# Patient Record
Sex: MALE | Race: WHITE | Employment: OTHER | URBAN - NONMETROPOLITAN AREA
[De-identification: names, ages, dates, MRNs, and addresses within clinical notes are randomized per-mention and may not be internally consistent; named-entity substitution may affect disease eponyms.]

---

## 2017-01-03 ENCOUNTER — OFFICE VISIT (OUTPATIENT)
Dept: PRIMARY CARE CLINIC | Age: 82
End: 2017-01-03
Payer: MEDICARE

## 2017-01-03 VITALS
BODY MASS INDEX: 39.91 KG/M2 | TEMPERATURE: 97.6 F | WEIGHT: 278.8 LBS | HEART RATE: 74 BPM | SYSTOLIC BLOOD PRESSURE: 142 MMHG | DIASTOLIC BLOOD PRESSURE: 80 MMHG | HEIGHT: 70 IN | OXYGEN SATURATION: 92 %

## 2017-01-03 DIAGNOSIS — J44.9 CHRONIC OBSTRUCTIVE PULMONARY DISEASE, UNSPECIFIED COPD TYPE (HCC): ICD-10-CM

## 2017-01-03 DIAGNOSIS — E11.42 TYPE 2 DIABETES MELLITUS WITH DIABETIC POLYNEUROPATHY, WITHOUT LONG-TERM CURRENT USE OF INSULIN (HCC): Primary | ICD-10-CM

## 2017-01-03 DIAGNOSIS — I10 ESSENTIAL HYPERTENSION: ICD-10-CM

## 2017-01-03 DIAGNOSIS — G47.33 OSA (OBSTRUCTIVE SLEEP APNEA): ICD-10-CM

## 2017-01-03 DIAGNOSIS — L03.116 CELLULITIS OF LEFT LOWER EXTREMITY: ICD-10-CM

## 2017-01-03 PROCEDURE — 99214 OFFICE O/P EST MOD 30 MIN: CPT | Performed by: PEDIATRICS

## 2017-01-03 RX ORDER — CEPHALEXIN 500 MG/1
500 CAPSULE ORAL 3 TIMES DAILY
Qty: 30 CAPSULE | Refills: 0 | Status: SHIPPED | OUTPATIENT
Start: 2017-01-03 | End: 2017-01-31

## 2017-01-03 RX ORDER — LANCETS 30 GAUGE
EACH MISCELLANEOUS
Qty: 100 EACH | Refills: 3 | Status: SHIPPED | OUTPATIENT
Start: 2017-01-03 | End: 2017-05-25

## 2017-01-03 ASSESSMENT — ENCOUNTER SYMPTOMS
ABDOMINAL PAIN: 0
SHORTNESS OF BREATH: 0
BACK PAIN: 0
COUGH: 0
WHEEZING: 1
RHINORRHEA: 0
VOICE CHANGE: 0
SINUS PRESSURE: 0
NAUSEA: 0
DIARRHEA: 0
VOMITING: 0

## 2017-01-11 ENCOUNTER — TELEPHONE (OUTPATIENT)
Dept: UROLOGY | Facility: CLINIC | Age: 82
End: 2017-01-11

## 2017-01-11 ENCOUNTER — OFFICE VISIT (OUTPATIENT)
Dept: UROLOGY | Facility: CLINIC | Age: 82
End: 2017-01-11

## 2017-01-11 VITALS — WEIGHT: 272 LBS | HEIGHT: 70 IN | BODY MASS INDEX: 38.94 KG/M2

## 2017-01-11 DIAGNOSIS — R35.1 NOCTURIA: ICD-10-CM

## 2017-01-11 DIAGNOSIS — N32.81 OAB (OVERACTIVE BLADDER): ICD-10-CM

## 2017-01-11 DIAGNOSIS — R35.0 FREQUENCY OF URINATION: ICD-10-CM

## 2017-01-11 DIAGNOSIS — C61 CANCER OF PROSTATE (HCC): Primary | ICD-10-CM

## 2017-01-11 LAB
BILIRUB BLD-MCNC: NEGATIVE MG/DL
CLARITY, POC: CLEAR
COLOR UR: YELLOW
GLUCOSE UR STRIP-MCNC: NEGATIVE MG/DL
KETONES UR QL: NEGATIVE
LEUKOCYTE EST, POC: NEGATIVE
NITRITE UR-MCNC: NEGATIVE MG/ML
PH UR: 6.5 [PH] (ref 5–8)
PROT UR STRIP-MCNC: NEGATIVE MG/DL
RBC # UR STRIP: ABNORMAL /UL
SP GR UR: 1.01 (ref 1–1.03)
UROBILINOGEN UR QL: NORMAL

## 2017-01-11 PROCEDURE — 81003 URINALYSIS AUTO W/O SCOPE: CPT | Performed by: UROLOGY

## 2017-01-11 PROCEDURE — 99214 OFFICE O/P EST MOD 30 MIN: CPT | Performed by: UROLOGY

## 2017-01-11 NOTE — PROGRESS NOTES
Subjective    Mr. Castro is 81 y.o. male    CHIEF COMPLAINT: Prostate cancer the patient has recently been diagnosed with hormone refractory see of the prostate.  We are continuing to Lupron he did not quite a year ago now had a PET/CT and CT scan looking for metastatic disease WHICH were negative his last PSA was actually slightly down to 6.25 but most recently bouts 10.5 he still denies any symptoms metastatic disease such as bone pain back pain weight loss or anorexia and is still continuing to Lupron    He also has irritable bladder symptoms from the radiation therapy he got for see the prostate initially with urgency frequency nocturia we have tried multiple medications he is now on 50 mg of Myrbetriq with seems to have worked the best he also takes Gore in his had no gross hematuria or change in his symptoms      History of Present Illness      The following portions of the patient's history were reviewed and updated as appropriate: allergies, current medications, past family history, past medical history, past social history, past surgical history and problem list.    Review of Systems   Constitutional: Negative for chills and fever.   Gastrointestinal: Negative for abdominal pain, anal bleeding and blood in stool.   Genitourinary: Positive for frequency and urgency. Negative for difficulty urinating, dysuria, flank pain, hematuria, penile pain and penile swelling.         Current Outpatient Prescriptions:   •  albuterol (PROVENTIL HFA;VENTOLIN HFA) 108 (90 BASE) MCG/ACT inhaler, Inhale 2 puffs every 4 (four) hours as needed for wheezing., Disp: , Rfl:   •  buPROPion XL (WELLBUTRIN XL) 300 MG 24 hr tablet, Take 300 mg by mouth daily., Disp: , Rfl:   •  carvedilol (COREG) 12.5 MG tablet, Take 12.5 mg by mouth 2 (two) times a day with meals., Disp: , Rfl:   •  carvedilol (COREG) 6.25 MG tablet, Take 6.25 mg by mouth daily., Disp: , Rfl:   •  celecoxib (CeleBREX) 200 MG capsule, Take 200 mg by mouth daily.,  Disp: , Rfl:   •  digoxin (LANOXIN) 125 MCG tablet, Take 125 mcg by mouth every day., Disp: , Rfl:   •  digoxin (LANOXIN) 250 MCG tablet, Take 250 mcg by mouth every day., Disp: , Rfl:   •  DULoxetine (CYMBALTA) 60 MG capsule, Take 60 mg by mouth daily., Disp: , Rfl:   •  fluticasone (FLONASE) 50 MCG/ACT nasal spray, 2 sprays into each nostril daily. Administer 2 sprays in each nostril for each dose., Disp: , Rfl:   •  formoterol (FORADIL) 12 MCG capsule for inhaler, Place 12 mcg into inhaler and inhale 2 (two) times a day., Disp: , Rfl:   •  furosemide (LASIX) 10 MG/ML solution, Take 60 mg by mouth daily., Disp: , Rfl:   •  furosemide (LASIX) 40 MG tablet, Take 40 mg by mouth 2 (two) times a day., Disp: , Rfl:   •  glimepiride (AMARYL) 4 MG tablet, Take 4 mg by mouth every morning before breakfast., Disp: , Rfl:   •  leuprolide (LUPRON) 30 MG injection, Inject 30 mg into the shoulder, thigh, or buttocks Every 4 (Four) Months., Disp: , Rfl:   •  lisinopril (PRINIVIL,ZESTRIL) 40 MG tablet, Take 40 mg by mouth daily., Disp: , Rfl:   •  metolazone (ZAROXOLYN) 2.5 MG tablet, Take 2.5 mg by mouth daily., Disp: , Rfl:   •  Mirabegron ER (MYRBETRIQ) 50 MG tablet sustained-release 24 hour, Take 50 mg by mouth daily., Disp: , Rfl:   •  Multiple Vitamins-Minerals (MULTIVITAMIN ADULT PO), Take  by mouth daily., Disp: , Rfl:   •  potassium chloride (K-DUR,KLOR-CON) 10 MEQ CR tablet, Take 10 mEq by mouth 2 (two) times a day., Disp: , Rfl:   •  predniSONE (DELTASONE) 10 MG tablet, Take 10 mg by mouth daily., Disp: , Rfl:   •  rivaroxaban (XARELTO) 20 MG tablet, Take 20 mg by mouth daily., Disp: , Rfl:   •  sitaGLIPtin (JANUVIA) 100 MG tablet, Take 100 mg by mouth daily., Disp: , Rfl:   •  SITagliptin-MetFORMIN HCl (JANUMET PO), Take  by mouth., Disp: , Rfl:   •  terazosin (HYTRIN) 5 MG capsule, Take 5 mg by mouth every night., Disp: , Rfl:     Past Medical History   Diagnosis Date   • Cancer      prostate   • Cellulitis    • CHF  "(congestive heart failure)    • Diabetes mellitus    • Elevated prostate specific antigen (PSA)    • Hematuria    • Hypertension    • Overactive bladder    • SOB (shortness of breath)        Past Surgical History   Procedure Laterality Date   • Replacement total knee bilateral     • Hernia repair     • Eye surgery         Social History     Social History   • Marital status:      Spouse name: N/A   • Number of children: N/A   • Years of education: N/A     Social History Main Topics   • Smoking status: Never Smoker   • Smokeless tobacco: None   • Alcohol use No   • Drug use: None   • Sexual activity: Not Asked     Other Topics Concern   • None     Social History Narrative       Family History   Problem Relation Age of Onset   • Prostate cancer Son        Objective    Visit Vitals   • Ht 70\" (177.8 cm)   • Wt 272 lb (123 kg)   • BMI 39.03 kg/m2       Physical Exam      No results found for any previous visit.    Results for orders placed or performed in visit on 01/11/17   POC Urinalysis Dipstick, Automated   Result Value Ref Range    Color Yellow Yellow, Straw, Dark Yellow, Julee    Clarity, UA Clear Clear    Glucose, UA Negative Negative, 1000 mg/dL (3+) mg/dL    Bilirubin Negative Negative    Ketones, UA Negative Negative    Specific Gravity  1.010 1.005 - 1.030    Blood, UA Small (A) Negative    pH, Urine 6.5 5.0 - 8.0    Protein, POC Negative Negative mg/dL    Urobilinogen, UA Normal Normal    Leukocytes Negative Negative    Nitrite, UA Negative Negative       Assessment and Plan    Prakash was seen today for cancer of prostate.    Diagnoses and all orders for this visit:    Cancer of prostate  -     POC Urinalysis Dipstick, Automated  -     CT Abd With Contrast Pelvis With & Without Contrast; Future  -     NM Bone Scan Whole Body; Future    Frequency of urination    Nocturia    OAB (overactive bladder)      Plan--I once again discussed with Mr. Castro and his wife the hormone refractory disease and that is " progressing has been since February that we have done a workup and also is gone up from 5-6 range to 10 cm go ahead and repeat the bone scan and CT scan and we will do that in the next couple weeks and go from there I did negate to indicate to them that at this point in time unfortunately the Zytiga and Xtandi are not prove without evidence of metastatic disease.    I thing of answered all her questions at this time

## 2017-01-11 NOTE — TELEPHONE ENCOUNTER
----- Message from Ariana Leong sent at 1/11/2017  1:39 PM CST -----  PATIENT IS COMING IN ON 01/27/16 AND THEY NEED A BONE SCAN ORDERED AND A CT WITH AND WITHOUT CONTRAST OF ABDOMEN. [facility] CALLED SAID DR GARNICA WANTS THESE ORDERED.    THANKS        Orders already in chart. ML

## 2017-01-11 NOTE — MR AVS SNAPSHOT
Prakash Castro   1/11/2017 10:30 AM   Office Visit    Dept Phone:  [phone]   Encounter #:  [id number]    Provider:  Jesus Kilgore MD   Department:  [facility] UROLOGY                Your Full Care Plan              Your Updated Medication List          This list is accurate as of: 1/11/17  2:59 PM.  Always use your most recent med list.                albuterol 108 (90 BASE) MCG/ACT inhaler   Commonly known as:  PROVENTIL HFA;VENTOLIN HFA       buPROPion  MG 24 hr tablet   Commonly known as:  WELLBUTRIN XL       * carvedilol 6.25 MG tablet   Commonly known as:  COREG       * carvedilol 12.5 MG tablet   Commonly known as:  COREG       celecoxib 200 MG capsule   Commonly known as:  CeleBREX       * digoxin 250 MCG tablet   Commonly known as:  LANOXIN       * digoxin 125 MCG tablet   Commonly known as:  LANOXIN       DULoxetine 60 MG capsule   Commonly known as:  CYMBALTA       fluticasone 50 MCG/ACT nasal spray   Commonly known as:  FLONASE       formoterol 12 MCG capsule for inhaler   Commonly known as:  FORADIL       * furosemide 10 MG/ML solution   Commonly known as:  LASIX       * furosemide 40 MG tablet   Commonly known as:  LASIX       glimepiride 4 MG tablet   Commonly known as:  AMARYL       JANUMET PO       leuprolide 30 MG injection   Commonly known as:  LUPRON       lisinopril 40 MG tablet   Commonly known as:  PRINIVIL,ZESTRIL       metOLazone 2.5 MG tablet   Commonly known as:  ZAROXOLYN       MULTIVITAMIN ADULT PO       MYRBETRIQ 50 MG tablet sustained-release 24 hour   Generic drug:  Mirabegron ER       potassium chloride 10 MEQ CR tablet   Commonly known as:  K-DUR,KLOR-CON       predniSONE 10 MG tablet   Commonly known as:  DELTASONE       rivaroxaban 20 MG tablet   Commonly known as:  XARELTO       SITagliptin 100 MG tablet   Commonly known as:  JANUVIA       terazosin 5 MG capsule   Commonly known as:  HYTRIN       * Notice:  This list has  "6 medication(s) that are the same as other medications prescribed for you. Read the directions carefully, and ask your doctor or other care provider to review them with you.            We Performed the Following     POC Urinalysis Dipstick, Automated       You Were Diagnosed With        Codes Comments    Cancer of prostate    -  Primary ICD-10-CM: C61  ICD-9-CM: 185     Frequency of urination     ICD-10-CM: R35.0  ICD-9-CM: 788.41     Nocturia     ICD-10-CM: R35.1  ICD-9-CM: 788.43     OAB (overactive bladder)     ICD-10-CM: N32.81  ICD-9-CM: 596.51       Instructions     None    Patient Instructions History      Upcoming Appointments     Visit Type Date Time Department    FOLLOW UP 1/11/2017 10:30 AM [facility] [place]    FOLLOW UP 1/27/2017  9:00 AM [facility]      MyChart Signup     Our records indicate that you have declined [facility] signup. If you would like to sign up for [employer], please email [email or web address] or call [phone] to obtain an activation code.             Other Info from Your Visit           Your Appointments     Jan 27, 2017  9:00 AM CST   Follow Up with Jesus Kilgore MD   [facility] (--)    [address] [place] [id number] [phone]           Arrive 15 minutes prior to appointment.              Allergies     No Known Allergies      Reason for Visit     Cancer of prostate           Vital Signs     Height Weight Body Mass Index Smoking Status          70\" (177.8 cm) 272 lb (123 kg) 39.03 kg/m2 Never Smoker        Problems and Diagnoses Noted     Prostate cancer    Frequency of urination    Urination, excessive at night    OAB (overactive bladder)      Results     POC Urinalysis Dipstick, Automated      Component Value Standard Range & Units    Color Yellow Yellow, Straw, Dark Yellow, Julee    Clarity, UA Clear Clear    Glucose, UA Negative Negative, 1000 mg/dL (3+) mg/dL    Bilirubin Negative Negative    " Ketones, UA Negative Negative    Specific Gravity  1.010 1.005 - 1.030    Blood, UA Small Negative    pH, Urine 6.5 5.0 - 8.0    Protein, POC Negative Negative mg/dL    Urobilinogen, UA Normal Normal    Leukocytes Negative Negative    Nitrite, UA Negative Negative

## 2017-01-17 DIAGNOSIS — N40.0 BENIGN NODULAR PROSTATIC HYPERPLASIA WITHOUT LOWER URINARY TRACT SYMPTOMS: ICD-10-CM

## 2017-01-17 RX ORDER — TERAZOSIN 5 MG/1
CAPSULE ORAL
Qty: 90 CAPSULE | Refills: 3 | Status: SHIPPED | OUTPATIENT
Start: 2017-01-17 | End: 2017-05-09 | Stop reason: SDUPTHER

## 2017-01-20 RX ORDER — DIGOXIN 125 MCG
125 TABLET ORAL DAILY
Qty: 90 TABLET | Refills: 3 | Status: SHIPPED | OUTPATIENT
Start: 2017-01-20 | End: 2017-10-24 | Stop reason: SDUPTHER

## 2017-01-25 ENCOUNTER — HOSPITAL ENCOUNTER (OUTPATIENT)
Dept: NUCLEAR MEDICINE | Facility: HOSPITAL | Age: 82
Discharge: HOME | End: 2017-01-25
Attending: UROLOGY

## 2017-01-25 DIAGNOSIS — C61 CANCER OF PROSTATE (HCC): ICD-10-CM

## 2017-01-25 PROCEDURE — 0 TECHNETIUM OXIDRONATE KIT: Performed by: UROLOGY

## 2017-01-25 PROCEDURE — A9561 TC99M OXIDRONATE: HCPCS | Performed by: UROLOGY

## 2017-01-25 PROCEDURE — 78306 BONE IMAGING WHOLE BODY: CPT

## 2017-01-25 RX ADMIN — TECHNETIUM TC 99M OXIDRONATE 1 DOSE: 3.15 INJECTION, POWDER, LYOPHILIZED, FOR SOLUTION INTRAVENOUS at 10:45

## 2017-01-27 ENCOUNTER — OFFICE VISIT (OUTPATIENT)
Dept: UROLOGY | Facility: CLINIC | Age: 82
End: 2017-01-27

## 2017-01-27 VITALS
HEIGHT: 70 IN | DIASTOLIC BLOOD PRESSURE: 62 MMHG | SYSTOLIC BLOOD PRESSURE: 130 MMHG | BODY MASS INDEX: 40.09 KG/M2 | WEIGHT: 280 LBS | TEMPERATURE: 97.1 F

## 2017-01-27 DIAGNOSIS — C61 CANCER OF PROSTATE (HCC): Primary | ICD-10-CM

## 2017-01-27 DIAGNOSIS — R35.0 FREQUENCY OF URINATION: ICD-10-CM

## 2017-01-27 DIAGNOSIS — R35.1 NOCTURIA: ICD-10-CM

## 2017-01-27 DIAGNOSIS — N32.81 OAB (OVERACTIVE BLADDER): ICD-10-CM

## 2017-01-27 LAB
BILIRUB BLD-MCNC: NEGATIVE MG/DL
CLARITY, POC: CLEAR
COLOR UR: YELLOW
GLUCOSE UR STRIP-MCNC: NEGATIVE MG/DL
KETONES UR QL: NEGATIVE
LEUKOCYTE EST, POC: NEGATIVE
NITRITE UR-MCNC: NEGATIVE MG/ML
PH UR: 5.5 [PH] (ref 5–8)
PROT UR STRIP-MCNC: NEGATIVE MG/DL
RBC # UR STRIP: NEGATIVE /UL
SP GR UR: 1.02 (ref 1–1.03)
UROBILINOGEN UR QL: NORMAL

## 2017-01-27 PROCEDURE — 99214 OFFICE O/P EST MOD 30 MIN: CPT | Performed by: UROLOGY

## 2017-01-27 PROCEDURE — 81003 URINALYSIS AUTO W/O SCOPE: CPT | Performed by: UROLOGY

## 2017-01-27 NOTE — MR AVS SNAPSHOT
Prakash Castro   1/27/2017 9:00 AM   Office Visit    Dept Phone:  [phone]   Encounter #:  [id number]    Provider:  Jesus Kilgore MD   Department:  [facility] UROLOGY                Your Full Care Plan              Your Updated Medication List          This list is accurate as of: 1/27/17  1:56 PM.  Always use your most recent med list.                albuterol 108 (90 BASE) MCG/ACT inhaler   Commonly known as:  PROVENTIL HFA;VENTOLIN HFA       buPROPion  MG 24 hr tablet   Commonly known as:  WELLBUTRIN XL       * carvedilol 6.25 MG tablet   Commonly known as:  COREG       * carvedilol 12.5 MG tablet   Commonly known as:  COREG       celecoxib 200 MG capsule   Commonly known as:  CeleBREX       * digoxin 250 MCG tablet   Commonly known as:  LANOXIN       * digoxin 125 MCG tablet   Commonly known as:  LANOXIN       DULoxetine 60 MG capsule   Commonly known as:  CYMBALTA       fluticasone 50 MCG/ACT nasal spray   Commonly known as:  FLONASE       formoterol 12 MCG capsule for inhaler   Commonly known as:  FORADIL       * furosemide 10 MG/ML solution   Commonly known as:  LASIX       * furosemide 40 MG tablet   Commonly known as:  LASIX       glimepiride 4 MG tablet   Commonly known as:  AMARYL       JANUMET PO       leuprolide 30 MG injection   Commonly known as:  LUPRON       lisinopril 40 MG tablet   Commonly known as:  PRINIVIL,ZESTRIL       metOLazone 2.5 MG tablet   Commonly known as:  ZAROXOLYN       MULTIVITAMIN ADULT PO       MYRBETRIQ 50 MG tablet sustained-release 24 hour   Generic drug:  Mirabegron ER       potassium chloride 10 MEQ CR tablet   Commonly known as:  K-DUR,KLOR-CON       predniSONE 10 MG tablet   Commonly known as:  DELTASONE       rivaroxaban 20 MG tablet   Commonly known as:  XARELTO       SITagliptin 100 MG tablet   Commonly known as:  JANUVIA       terazosin 5 MG capsule   Commonly known as:  HYTRIN       * Notice:  This list has 6  "medication(s) that are the same as other medications prescribed for you. Read the directions carefully, and ask your doctor or other care provider to review them with you.            We Performed the Following     POC Urinalysis Dipstick, Automated       You Were Diagnosed With        Codes Comments    Cancer of prostate    -  Primary ICD-10-CM: C61  ICD-9-CM: 185     Frequency of urination     ICD-10-CM: R35.0  ICD-9-CM: 788.41     Nocturia     ICD-10-CM: R35.1  ICD-9-CM: 788.43     OAB (overactive bladder)     ICD-10-CM: N32.81  ICD-9-CM: 596.51       Instructions     None    Patient Instructions History      Upcoming Appointments     Visit Type Date Time Department    FOLLOW UP 1/27/2017  9:00 AM [facility] [place] [place] [facility] [place]    FOLLOW UP 2/14/2017 10:30 AM [facility] [place] [place] [facility]      MyChart Signup     Our records indicate that you have declined [facility] signup. If you would like to sign up for [employer], please email [email or web address] or call [phone] to obtain an activation code.             Other Info from Your Visit           Your Appointments     Feb 14, 2017 10:30 AM CST   Follow Up with Jesus Kilgore MD   [facility] (--)    [address] [place] [id number] [phone]           Arrive 15 minutes prior to appointment.              Allergies     No Known Allergies      Reason for Visit     Cancer of prostate           Vital Signs     Blood Pressure Temperature Height Weight Body Mass Index Smoking Status    130/62 97.1 °F (36.2 °C) 70\" (177.8 cm) 280 lb (127 kg) 40.18 kg/m2 Never Smoker      Problems and Diagnoses Noted     Prostate cancer    Frequency of urination    Urination, excessive at night    OAB (overactive bladder)      Results     POC Urinalysis Dipstick, Automated      Component Value Standard Range & Units    Color Yellow Yellow, Straw, Dark Yellow, Julee    Clarity, UA Clear Clear    Glucose, UA Negative Negative, 1000 mg/dL (3+) mg/dL    " Bilirubin Negative Negative    Ketones, UA Negative Negative    Specific Gravity  1.020 1.005 - 1.030    Blood, UA Negative Negative    pH, Urine 5.5 5.0 - 8.0    Protein, POC Negative Negative mg/dL    Urobilinogen, UA Normal Normal    Leukocytes Negative Negative    Nitrite, UA Negative Negative

## 2017-01-27 NOTE — PROGRESS NOTES
Subjective    Mr. Castro is 81 y.o. male    CHIEF COMPLAINT: Prostate cancer    The patient comes back today with his wife for discussion of workup for metastatic disease for rising PSA in the face of hormone therapy here    I he got a new care medicine bone scan that does show possible metastatic disease although not overt disease he is not had any symptoms of metastatic disease such as a change in bone pain back pain weight loss or anorexia in fact is gained weight    It does not appear that he got a CAT scan even know it looks like one was order    I he also has overactive bladder symptoms which are stable and his AUA score today is 27.  He is had no gross hematuria no flank pain    History of Present Illness      The following portions of the patient's history were reviewed and updated as appropriate: allergies, current medications, past family history, past medical history, past social history, past surgical history and problem list.    Review of Systems   Constitutional: Positive for fatigue. Negative for chills and fever.   Gastrointestinal: Negative for abdominal distention, abdominal pain, anal bleeding, blood in stool and nausea.   Genitourinary: Positive for difficulty urinating, frequency and urgency. Negative for dysuria, flank pain, hematuria, penile pain and penile swelling.   Psychiatric/Behavioral: Negative.  Negative for agitation and confusion.         Current Outpatient Prescriptions:   •  albuterol (PROVENTIL HFA;VENTOLIN HFA) 108 (90 BASE) MCG/ACT inhaler, Inhale 2 puffs every 4 (four) hours as needed for wheezing., Disp: , Rfl:   •  buPROPion XL (WELLBUTRIN XL) 300 MG 24 hr tablet, Take 300 mg by mouth daily., Disp: , Rfl:   •  carvedilol (COREG) 12.5 MG tablet, Take 12.5 mg by mouth 2 (two) times a day with meals., Disp: , Rfl:   •  carvedilol (COREG) 6.25 MG tablet, Take 6.25 mg by mouth daily., Disp: , Rfl:   •  celecoxib (CeleBREX) 200 MG capsule, Take 200 mg by mouth daily., Disp: , Rfl:    •  digoxin (LANOXIN) 125 MCG tablet, Take 125 mcg by mouth every day., Disp: , Rfl:   •  digoxin (LANOXIN) 250 MCG tablet, Take 250 mcg by mouth every day., Disp: , Rfl:   •  DULoxetine (CYMBALTA) 60 MG capsule, Take 60 mg by mouth daily., Disp: , Rfl:   •  fluticasone (FLONASE) 50 MCG/ACT nasal spray, 2 sprays into each nostril daily. Administer 2 sprays in each nostril for each dose., Disp: , Rfl:   •  formoterol (FORADIL) 12 MCG capsule for inhaler, Place 12 mcg into inhaler and inhale 2 (two) times a day., Disp: , Rfl:   •  furosemide (LASIX) 10 MG/ML solution, Take 60 mg by mouth daily., Disp: , Rfl:   •  furosemide (LASIX) 40 MG tablet, Take 40 mg by mouth 2 (two) times a day., Disp: , Rfl:   •  glimepiride (AMARYL) 4 MG tablet, Take 4 mg by mouth every morning before breakfast., Disp: , Rfl:   •  leuprolide (LUPRON) 30 MG injection, Inject 30 mg into the shoulder, thigh, or buttocks Every 4 (Four) Months., Disp: , Rfl:   •  lisinopril (PRINIVIL,ZESTRIL) 40 MG tablet, Take 40 mg by mouth daily., Disp: , Rfl:   •  metolazone (ZAROXOLYN) 2.5 MG tablet, Take 2.5 mg by mouth daily., Disp: , Rfl:   •  Mirabegron ER (MYRBETRIQ) 50 MG tablet sustained-release 24 hour, Take 50 mg by mouth daily., Disp: , Rfl:   •  Multiple Vitamins-Minerals (MULTIVITAMIN ADULT PO), Take  by mouth daily., Disp: , Rfl:   •  potassium chloride (K-DUR,KLOR-CON) 10 MEQ CR tablet, Take 10 mEq by mouth 2 (two) times a day., Disp: , Rfl:   •  predniSONE (DELTASONE) 10 MG tablet, Take 10 mg by mouth daily., Disp: , Rfl:   •  rivaroxaban (XARELTO) 20 MG tablet, Take 20 mg by mouth daily., Disp: , Rfl:   •  sitaGLIPtin (JANUVIA) 100 MG tablet, Take 100 mg by mouth daily., Disp: , Rfl:   •  SITagliptin-MetFORMIN HCl (JANUMET PO), Take  by mouth., Disp: , Rfl:   •  terazosin (HYTRIN) 5 MG capsule, Take 5 mg by mouth every night., Disp: , Rfl:     Past Medical History   Diagnosis Date   • Arthritis    • Cancer      prostate   • Cellulitis    •  "CHF (congestive heart failure)    • Diabetes mellitus    • Elevated prostate specific antigen (PSA)    • Hematuria    • Hypertension    • Overactive bladder    • SOB (shortness of breath)        Past Surgical History   Procedure Laterality Date   • Replacement total knee bilateral     • Hernia repair     • Eye surgery     • Joint replacement         Social History     Social History   • Marital status:      Spouse name: N/A   • Number of children: N/A   • Years of education: N/A     Social History Main Topics   • Smoking status: Never Smoker   • Smokeless tobacco: None   • Alcohol use No   • Drug use: None   • Sexual activity: Not Asked     Other Topics Concern   • None     Social History Narrative       Family History   Problem Relation Age of Onset   • Prostate cancer Son        Objective    Visit Vitals   • /62   • Temp 97.1 °F (36.2 °C)   • Ht 70\" (177.8 cm)   • Wt 280 lb (127 kg)   • BMI 40.18 kg/m2       Physical Exam      Office Visit on 01/11/2017   Component Date Value Ref Range Status   • Color 01/11/2017 Yellow  Yellow, Straw, Dark Yellow, Julee Final   • Clarity, UA 01/11/2017 Clear  Clear Final   • Glucose, UA 01/11/2017 Negative  Negative, 1000 mg/dL (3+) mg/dL Final   • Bilirubin 01/11/2017 Negative  Negative Final   • Ketones, UA 01/11/2017 Negative  Negative Final   • Specific Gravity  01/11/2017 1.010  1.005 - 1.030 Final   • Blood, UA 01/11/2017 Small* Negative Final   • pH, Urine 01/11/2017 6.5  5.0 - 8.0 Final   • Protein, POC 01/11/2017 Negative  Negative mg/dL Final   • Urobilinogen, UA 01/11/2017 Normal  Normal Final   • Leukocytes 01/11/2017 Negative  Negative Final   • Nitrite, UA 01/11/2017 Negative  Negative Final       Results for orders placed or performed in visit on 01/27/17   POC Urinalysis Dipstick, Automated   Result Value Ref Range    Color Yellow Yellow, Straw, Dark Yellow, Julee    Clarity, UA Clear Clear    Glucose, UA Negative Negative, 1000 mg/dL (3+) mg/dL    " Bilirubin Negative Negative    Ketones, UA Negative Negative    Specific Gravity  1.020 1.005 - 1.030    Blood, UA Negative Negative    pH, Urine 5.5 5.0 - 8.0    Protein, POC Negative Negative mg/dL    Urobilinogen, UA Normal Normal    Leukocytes Negative Negative    Nitrite, UA Negative Negative       Assessment and Plan    Prakash was seen today for cancer of prostate.    Diagnoses and all orders for this visit:    Cancer of prostate  -     POC Urinalysis Dipstick, Automated    Frequency of urination    Nocturia    OAB (overactive bladder)    Plan--I discussed the findings with the patient his wife regarding a suspicious bone scan obviously with his PSA going up in the face of hormonal therapy I think he does have hormone refractory prostate cancer.    I recommended that we evaluate this with sodium chloride PET scan which going try to order if I can figure out how to do this Epic machine    EMR Dragon/Transcription disclaimer:  Much of this encounter note is an electronic transcription/translation of spoken language to printed text. The electronic translation of spoken language may permit erroneous, or at times, nonsensical words or phrases to be inadvertently transcribed; although I have reviewed the note for such errors, some may still exist.

## 2017-01-31 ENCOUNTER — DOCUMENTATION (OUTPATIENT)
Dept: UROLOGY | Facility: CLINIC | Age: 82
End: 2017-01-31

## 2017-01-31 ENCOUNTER — OFFICE VISIT (OUTPATIENT)
Dept: PRIMARY CARE CLINIC | Age: 82
End: 2017-01-31
Payer: MEDICARE

## 2017-01-31 VITALS
OXYGEN SATURATION: 94 % | DIASTOLIC BLOOD PRESSURE: 82 MMHG | HEART RATE: 70 BPM | SYSTOLIC BLOOD PRESSURE: 132 MMHG | WEIGHT: 276.4 LBS | TEMPERATURE: 97.6 F | BODY MASS INDEX: 39.57 KG/M2 | HEIGHT: 70 IN

## 2017-01-31 DIAGNOSIS — I48.0 PAROXYSMAL ATRIAL FIBRILLATION (HCC): ICD-10-CM

## 2017-01-31 DIAGNOSIS — E78.2 MIXED HYPERLIPIDEMIA: ICD-10-CM

## 2017-01-31 DIAGNOSIS — I10 ESSENTIAL HYPERTENSION: ICD-10-CM

## 2017-01-31 DIAGNOSIS — H10.31 ACUTE BACTERIAL CONJUNCTIVITIS OF RIGHT EYE: ICD-10-CM

## 2017-01-31 DIAGNOSIS — R60.9 PERIPHERAL EDEMA: ICD-10-CM

## 2017-01-31 DIAGNOSIS — E11.42 TYPE 2 DIABETES MELLITUS WITH DIABETIC POLYNEUROPATHY, WITHOUT LONG-TERM CURRENT USE OF INSULIN (HCC): ICD-10-CM

## 2017-01-31 DIAGNOSIS — I50.42 CHRONIC COMBINED SYSTOLIC AND DIASTOLIC CONGESTIVE HEART FAILURE (HCC): Primary | ICD-10-CM

## 2017-01-31 PROCEDURE — G8427 DOCREV CUR MEDS BY ELIG CLIN: HCPCS | Performed by: PEDIATRICS

## 2017-01-31 PROCEDURE — 1036F TOBACCO NON-USER: CPT | Performed by: PEDIATRICS

## 2017-01-31 PROCEDURE — 1123F ACP DISCUSS/DSCN MKR DOCD: CPT | Performed by: PEDIATRICS

## 2017-01-31 PROCEDURE — 4040F PNEUMOC VAC/ADMIN/RCVD: CPT | Performed by: PEDIATRICS

## 2017-01-31 PROCEDURE — G8484 FLU IMMUNIZE NO ADMIN: HCPCS | Performed by: PEDIATRICS

## 2017-01-31 PROCEDURE — 99214 OFFICE O/P EST MOD 30 MIN: CPT | Performed by: PEDIATRICS

## 2017-01-31 PROCEDURE — G8419 CALC BMI OUT NRM PARAM NOF/U: HCPCS | Performed by: PEDIATRICS

## 2017-01-31 RX ORDER — METOLAZONE 2.5 MG/1
2.5 TABLET ORAL DAILY PRN
Qty: 30 TABLET | Refills: 3 | Status: SHIPPED | OUTPATIENT
Start: 2017-01-31 | End: 2017-07-21 | Stop reason: SDUPTHER

## 2017-01-31 ASSESSMENT — ENCOUNTER SYMPTOMS
ABDOMINAL PAIN: 0
COUGH: 0
EYE PAIN: 1
SHORTNESS OF BREATH: 0
BACK PAIN: 0
RHINORRHEA: 0
VOICE CHANGE: 0
SINUS PRESSURE: 0
DIARRHEA: 0
VOMITING: 0
NAUSEA: 0

## 2017-01-31 NOTE — PROGRESS NOTES
We are requesting the sodium chloride PET scan because the patient has questionable metastatic disease and hormone refractory prostate cancer by PSA.    If he has metastatic disease we will start him on either Zytiga or Xtandi

## 2017-02-01 ENCOUNTER — APPOINTMENT (OUTPATIENT)
Dept: CT IMAGING | Facility: HOSPITAL | Age: 82
End: 2017-02-01
Attending: UROLOGY

## 2017-02-08 ENCOUNTER — HOSPITAL ENCOUNTER (OUTPATIENT)
Dept: CT IMAGING | Facility: HOSPITAL | Age: 82
End: 2017-02-08
Attending: UROLOGY

## 2017-02-14 ENCOUNTER — TELEPHONE (OUTPATIENT)
Dept: UROLOGY | Facility: CLINIC | Age: 82
End: 2017-02-14

## 2017-02-14 ENCOUNTER — OFFICE VISIT (OUTPATIENT)
Dept: UROLOGY | Facility: CLINIC | Age: 82
End: 2017-02-14

## 2017-02-14 VITALS — HEIGHT: 70 IN | TEMPERATURE: 97.3 F | WEIGHT: 280 LBS | BODY MASS INDEX: 40.09 KG/M2

## 2017-02-14 DIAGNOSIS — R35.0 FREQUENCY OF URINATION: Primary | ICD-10-CM

## 2017-02-14 DIAGNOSIS — C61 CANCER OF PROSTATE (HCC): ICD-10-CM

## 2017-02-14 DIAGNOSIS — N32.81 OAB (OVERACTIVE BLADDER): ICD-10-CM

## 2017-02-14 PROCEDURE — 99214 OFFICE O/P EST MOD 30 MIN: CPT | Performed by: UROLOGY

## 2017-02-14 NOTE — TELEPHONE ENCOUNTER
----- Message from Jesus Kilgore MD sent at 2/14/2017 10:34 AM CST -----  This is patient and each presurgical for Xtandi

## 2017-02-14 NOTE — PROGRESS NOTES
Subjective    Mr. Castro is 81 y.o. male    CHIEF COMPLAINT prostate cancer    The patient has hormone refractory prostate cancer he recently had a CT scan which was benign but his bone scan showed what appeared to be findings consistent with early metastatic disease and cannot be excluded.    We try to get him approved from 4 sodium chloride PET scan but his insurance out right denies it even though I did appeared.  With his physician on the panel graft I was told that they do not cover this whatsoever regardless of the etiology was considered investigational.    Therefore I think that if that is the case we have sufficient documentation to consider him a candidate for Xtandi and I discussed this with he and his wife I discussed the complications including seizures she has no evidence of any neurologic disease.    He does have a history of diabetes and congestive heart failure and cardiac disease so we do not probably want you Zytiga at that point  History of Present Illness      The following portions of the patient's history were reviewed and updated as appropriate: allergies, current medications, past family history, past medical history, past social history, past surgical history and problem list.    Review of Systems   Constitutional: Negative.  Negative for chills and fever.   Gastrointestinal: Negative for abdominal distention, abdominal pain, anal bleeding, blood in stool and nausea.   Genitourinary: Positive for difficulty urinating, frequency and urgency. Negative for dysuria, flank pain and hematuria.   Psychiatric/Behavioral: Negative.  Negative for agitation and confusion.         Current Outpatient Prescriptions:   •  albuterol (PROVENTIL HFA;VENTOLIN HFA) 108 (90 BASE) MCG/ACT inhaler, Inhale 2 puffs every 4 (four) hours as needed for wheezing., Disp: , Rfl:   •  buPROPion XL (WELLBUTRIN XL) 300 MG 24 hr tablet, Take 300 mg by mouth daily., Disp: , Rfl:   •  carvedilol (COREG) 12.5 MG tablet, Take  12.5 mg by mouth 2 (two) times a day with meals., Disp: , Rfl:   •  carvedilol (COREG) 6.25 MG tablet, Take 6.25 mg by mouth daily., Disp: , Rfl:   •  celecoxib (CeleBREX) 200 MG capsule, Take 200 mg by mouth daily., Disp: , Rfl:   •  digoxin (LANOXIN) 125 MCG tablet, Take 125 mcg by mouth every day., Disp: , Rfl:   •  digoxin (LANOXIN) 250 MCG tablet, Take 250 mcg by mouth every day., Disp: , Rfl:   •  DULoxetine (CYMBALTA) 60 MG capsule, Take 60 mg by mouth daily., Disp: , Rfl:   •  fluticasone (FLONASE) 50 MCG/ACT nasal spray, 2 sprays into each nostril daily. Administer 2 sprays in each nostril for each dose., Disp: , Rfl:   •  formoterol (FORADIL) 12 MCG capsule for inhaler, Place 12 mcg into inhaler and inhale 2 (two) times a day., Disp: , Rfl:   •  furosemide (LASIX) 10 MG/ML solution, Take 60 mg by mouth daily., Disp: , Rfl:   •  furosemide (LASIX) 40 MG tablet, Take 40 mg by mouth 2 (two) times a day., Disp: , Rfl:   •  glimepiride (AMARYL) 4 MG tablet, Take 4 mg by mouth every morning before breakfast., Disp: , Rfl:   •  leuprolide (LUPRON) 30 MG injection, Inject 30 mg into the shoulder, thigh, or buttocks Every 4 (Four) Months., Disp: , Rfl:   •  lisinopril (PRINIVIL,ZESTRIL) 40 MG tablet, Take 40 mg by mouth daily., Disp: , Rfl:   •  metolazone (ZAROXOLYN) 2.5 MG tablet, Take 2.5 mg by mouth daily., Disp: , Rfl:   •  Mirabegron ER (MYRBETRIQ) 50 MG tablet sustained-release 24 hour, Take 50 mg by mouth daily., Disp: , Rfl:   •  Multiple Vitamins-Minerals (MULTIVITAMIN ADULT PO), Take  by mouth daily., Disp: , Rfl:   •  potassium chloride (K-DUR,KLOR-CON) 10 MEQ CR tablet, Take 10 mEq by mouth 2 (two) times a day., Disp: , Rfl:   •  predniSONE (DELTASONE) 10 MG tablet, Take 10 mg by mouth daily., Disp: , Rfl:   •  rivaroxaban (XARELTO) 20 MG tablet, Take 20 mg by mouth daily., Disp: , Rfl:   •  sitaGLIPtin (JANUVIA) 100 MG tablet, Take 100 mg by mouth daily., Disp: , Rfl:   •  SITagliptin-MetFORMIN HCl  "(JANUMET PO), Take  by mouth., Disp: , Rfl:   •  terazosin (HYTRIN) 5 MG capsule, Take 5 mg by mouth every night., Disp: , Rfl:     Past Medical History   Diagnosis Date   • Arthritis    • Cancer      prostate   • Cellulitis    • CHF (congestive heart failure)    • Diabetes mellitus    • Elevated prostate specific antigen (PSA)    • Hematuria    • Hypertension    • Overactive bladder    • SOB (shortness of breath)        Past Surgical History   Procedure Laterality Date   • Replacement total knee bilateral     • Hernia repair     • Eye surgery     • Joint replacement         Social History     Social History   • Marital status:      Spouse name: N/A   • Number of children: N/A   • Years of education: N/A     Social History Main Topics   • Smoking status: Never Smoker   • Smokeless tobacco: None   • Alcohol use No   • Drug use: None   • Sexual activity: Not Asked     Other Topics Concern   • None     Social History Narrative       Family History   Problem Relation Age of Onset   • Prostate cancer Son        Objective    Visit Vitals   • Temp 97.3 °F (36.3 °C)   • Ht 70\" (177.8 cm)   • Wt 280 lb (127 kg)   • BMI 40.18 kg/m2       Physical Exam      Office Visit on 01/27/2017   Component Date Value Ref Range Status   • Color 01/27/2017 Yellow  Yellow, Straw, Dark Yellow, Julee Final   • Clarity, UA 01/27/2017 Clear  Clear Final   • Glucose, UA 01/27/2017 Negative  Negative, 1000 mg/dL (3+) mg/dL Final   • Bilirubin 01/27/2017 Negative  Negative Final   • Ketones, UA 01/27/2017 Negative  Negative Final   • Specific Gravity  01/27/2017 1.020  1.005 - 1.030 Final   • Blood, UA 01/27/2017 Negative  Negative Final   • pH, Urine 01/27/2017 5.5  5.0 - 8.0 Final   • Protein, POC 01/27/2017 Negative  Negative mg/dL Final   • Urobilinogen, UA 01/27/2017 Normal  Normal Final   • Leukocytes 01/27/2017 Negative  Negative Final   • Nitrite, UA 01/27/2017 Negative  Negative Final       Results for orders placed or performed in " visit on 01/27/17   POC Urinalysis Dipstick, Automated   Result Value Ref Range    Color Yellow Yellow, Straw, Dark Yellow, Julee    Clarity, UA Clear Clear    Glucose, UA Negative Negative, 1000 mg/dL (3+) mg/dL    Bilirubin Negative Negative    Ketones, UA Negative Negative    Specific Gravity  1.020 1.005 - 1.030    Blood, UA Negative Negative    pH, Urine 5.5 5.0 - 8.0    Protein, POC Negative Negative mg/dL    Urobilinogen, UA Normal Normal    Leukocytes Negative Negative    Nitrite, UA Negative Negative       Assessment and Plan    Diagnoses and all orders for this visit:    Frequency of urination    Cancer of prostate    OAB (overactive bladder)   plan--were retrieved we cannot give him preapproved for a Xtandi and then we will call with him with those results I thing of answered all her questions that time they have no further    EMR Dragon/Transcription disclaimer:  Much of this encounter note is an electronic transcription/translation of spoken language to printed text. The electronic translation of spoken language may permit erroneous, or at times, nonsensical words or phrases to be inadvertently transcribed; although I have reviewed the note for such errors, some may still exist.

## 2017-02-15 ENCOUNTER — TELEPHONE (OUTPATIENT)
Dept: UROLOGY | Facility: CLINIC | Age: 82
End: 2017-02-15

## 2017-02-15 NOTE — TELEPHONE ENCOUNTER
----- Message from Ariana Leong sent at 2/15/2017  9:01 AM CST -----  Contact: [phone]  Vanessa from [facility] called wants you to call back about Prakash Castro want to know if patient needs copay help.    Thanks

## 2017-02-20 NOTE — TELEPHONE ENCOUNTER
Request for Medicare Drug Coverage Determination Form faxed to [facility] on 2-20-17 at [phone]. Confirmation of receipt rcvd and scanned.

## 2017-02-22 ENCOUNTER — OFFICE VISIT (OUTPATIENT)
Dept: UROLOGY | Facility: CLINIC | Age: 82
End: 2017-02-22

## 2017-02-22 DIAGNOSIS — C61 CANCER OF PROSTATE (HCC): ICD-10-CM

## 2017-02-22 PROCEDURE — 96402 CHEMO HORMON ANTINEOPL SQ/IM: CPT | Performed by: UROLOGY

## 2017-02-22 NOTE — PROGRESS NOTES
Pt of Dr. Kilgore is here today for a 4 month lupron injection. i administered the injection in the left hip without difficulty. Dr. Kilgore was in office. Pt will return in 4 months for next injection.

## 2017-02-23 ENCOUNTER — TELEPHONE (OUTPATIENT)
Dept: PRIMARY CARE CLINIC | Age: 82
End: 2017-02-23

## 2017-02-23 ENCOUNTER — TELEPHONE (OUTPATIENT)
Dept: UROLOGY | Facility: CLINIC | Age: 82
End: 2017-02-23

## 2017-02-23 ENCOUNTER — OFFICE VISIT (OUTPATIENT)
Dept: PRIMARY CARE CLINIC | Age: 82
End: 2017-02-23
Payer: MEDICARE

## 2017-02-23 VITALS
TEMPERATURE: 97.7 F | OXYGEN SATURATION: 94 % | SYSTOLIC BLOOD PRESSURE: 138 MMHG | BODY MASS INDEX: 39.67 KG/M2 | HEART RATE: 70 BPM | HEIGHT: 70 IN | WEIGHT: 277.12 LBS | DIASTOLIC BLOOD PRESSURE: 70 MMHG

## 2017-02-23 DIAGNOSIS — J98.4 MIXED RESTRICTIVE AND OBSTRUCTIVE LUNG DISEASE (HCC): ICD-10-CM

## 2017-02-23 DIAGNOSIS — E11.42 TYPE 2 DIABETES MELLITUS WITH DIABETIC POLYNEUROPATHY, WITHOUT LONG-TERM CURRENT USE OF INSULIN (HCC): ICD-10-CM

## 2017-02-23 DIAGNOSIS — G47.33 OSA (OBSTRUCTIVE SLEEP APNEA): ICD-10-CM

## 2017-02-23 DIAGNOSIS — J43.9 MIXED RESTRICTIVE AND OBSTRUCTIVE LUNG DISEASE (HCC): ICD-10-CM

## 2017-02-23 DIAGNOSIS — G47.00 MIDDLE INSOMNIA: Primary | ICD-10-CM

## 2017-02-23 DIAGNOSIS — D50.9 MICROCYTIC ANEMIA: Primary | ICD-10-CM

## 2017-02-23 DIAGNOSIS — E78.2 MIXED HYPERLIPIDEMIA: ICD-10-CM

## 2017-02-23 DIAGNOSIS — I50.42 CHRONIC COMBINED SYSTOLIC AND DIASTOLIC CONGESTIVE HEART FAILURE (HCC): ICD-10-CM

## 2017-02-23 DIAGNOSIS — I10 ESSENTIAL HYPERTENSION: ICD-10-CM

## 2017-02-23 DIAGNOSIS — J44.9 CHRONIC OBSTRUCTIVE PULMONARY DISEASE, UNSPECIFIED COPD TYPE (HCC): ICD-10-CM

## 2017-02-23 LAB
ALBUMIN SERPL-MCNC: 4.2 G/DL (ref 3.5–5.2)
ALP BLD-CCNC: 37 U/L (ref 40–130)
ALT SERPL-CCNC: 21 U/L (ref 5–41)
ANION GAP SERPL CALCULATED.3IONS-SCNC: 17 MMOL/L (ref 7–19)
AST SERPL-CCNC: 19 U/L (ref 5–40)
BASOPHILS ABSOLUTE: 0 K/UL (ref 0–0.2)
BASOPHILS RELATIVE PERCENT: 0.5 % (ref 0–1)
BILIRUB SERPL-MCNC: 0.5 MG/DL (ref 0.2–1.2)
BUN BLDV-MCNC: 23 MG/DL (ref 8–23)
CALCIUM SERPL-MCNC: 10 MG/DL (ref 8.8–10.2)
CHLORIDE BLD-SCNC: 94 MMOL/L (ref 98–111)
CHOLESTEROL, TOTAL: 144 MG/DL (ref 160–199)
CO2: 29 MMOL/L (ref 22–29)
CREAT SERPL-MCNC: 0.9 MG/DL (ref 0.5–1.2)
CREATININE URINE: 127.5 MG/DL (ref 4.2–622)
EOSINOPHILS ABSOLUTE: 0.2 K/UL (ref 0–0.6)
EOSINOPHILS RELATIVE PERCENT: 1.7 % (ref 0–5)
GFR NON-AFRICAN AMERICAN: >60
GLOBULIN: 2.6 G/DL
GLUCOSE BLD-MCNC: 179 MG/DL (ref 74–109)
HBA1C MFR BLD: 6.5 %
HCT VFR BLD CALC: 42.2 % (ref 42–52)
HDLC SERPL-MCNC: 50 MG/DL (ref 55–121)
HEMOGLOBIN: 14.6 G/DL (ref 14–18)
LDL CHOLESTEROL CALCULATED: 58 MG/DL
LYMPHOCYTES ABSOLUTE: 1.3 K/UL (ref 1.1–4.5)
LYMPHOCYTES RELATIVE PERCENT: 15.4 % (ref 20–40)
MCH RBC QN AUTO: 33.3 PG (ref 27–31)
MCHC RBC AUTO-ENTMCNC: 34.6 G/DL (ref 33–37)
MCV RBC AUTO: 96.3 FL (ref 80–94)
MICROALBUMIN UR-MCNC: 4.1 MG/DL (ref 0–19)
MICROALBUMIN/CREAT UR-RTO: 32.2 MG/G
MONOCYTES ABSOLUTE: 0.7 K/UL (ref 0–0.9)
MONOCYTES RELATIVE PERCENT: 8.1 % (ref 0–10)
NEUTROPHILS ABSOLUTE: 6.5 K/UL (ref 1.5–7.5)
NEUTROPHILS RELATIVE PERCENT: 74.3 % (ref 50–65)
PDW BLD-RTO: 12.3 % (ref 11.5–14.5)
PLATELET # BLD: 182 K/UL (ref 130–400)
PMV BLD AUTO: 10.7 FL (ref 7.4–10.4)
POTASSIUM SERPL-SCNC: 4.1 MMOL/L (ref 3.5–5)
RBC # BLD: 4.38 M/UL (ref 4.7–6.1)
SODIUM BLD-SCNC: 140 MMOL/L (ref 136–145)
TOTAL PROTEIN: 6.8 G/DL (ref 6.6–8.7)
TRIGL SERPL-MCNC: 178 MG/DL (ref 150–199)
WBC # BLD: 8.7 K/UL (ref 4.8–10.8)

## 2017-02-23 PROCEDURE — G8417 CALC BMI ABV UP PARAM F/U: HCPCS | Performed by: PEDIATRICS

## 2017-02-23 PROCEDURE — G8926 SPIRO NO PERF OR DOC: HCPCS | Performed by: PEDIATRICS

## 2017-02-23 PROCEDURE — 3023F SPIROM DOC REV: CPT | Performed by: PEDIATRICS

## 2017-02-23 PROCEDURE — G8484 FLU IMMUNIZE NO ADMIN: HCPCS | Performed by: PEDIATRICS

## 2017-02-23 PROCEDURE — 1036F TOBACCO NON-USER: CPT | Performed by: PEDIATRICS

## 2017-02-23 PROCEDURE — 4040F PNEUMOC VAC/ADMIN/RCVD: CPT | Performed by: PEDIATRICS

## 2017-02-23 PROCEDURE — 99214 OFFICE O/P EST MOD 30 MIN: CPT | Performed by: PEDIATRICS

## 2017-02-23 PROCEDURE — G8427 DOCREV CUR MEDS BY ELIG CLIN: HCPCS | Performed by: PEDIATRICS

## 2017-02-23 PROCEDURE — 1123F ACP DISCUSS/DSCN MKR DOCD: CPT | Performed by: PEDIATRICS

## 2017-02-23 RX ORDER — PRAZOSIN HYDROCHLORIDE 2 MG/1
2 CAPSULE ORAL NIGHTLY
Qty: 30 CAPSULE | Refills: 5 | Status: SHIPPED | OUTPATIENT
Start: 2017-02-23 | End: 2017-08-04 | Stop reason: SDUPTHER

## 2017-02-23 RX ORDER — CELECOXIB 200 MG/1
200 CAPSULE ORAL DAILY PRN
COMMUNITY
End: 2018-06-13 | Stop reason: SDUPTHER

## 2017-02-23 ASSESSMENT — ENCOUNTER SYMPTOMS
NAUSEA: 0
ABDOMINAL PAIN: 0
RHINORRHEA: 0
BACK PAIN: 0
SHORTNESS OF BREATH: 0
COUGH: 0
DIARRHEA: 0
SINUS PRESSURE: 0
VOMITING: 0
VOICE CHANGE: 0

## 2017-02-23 NOTE — TELEPHONE ENCOUNTER
----- Message from Jaycee Barrios sent at 2/23/2017  2:31 PM CST -----  Contact: [facility]  They called about script for Xtandi. The need script faxed and also dx code, med list, and list of any allergies. Fax # [phone]. If you have questions call [phone] and ask for a pharmacist.

## 2017-02-23 NOTE — TELEPHONE ENCOUNTER
I faxed over the last office note which does has his medication list and any list of allergies, I also printed and faxed over his xtandi RX. ML

## 2017-02-24 ENCOUNTER — TELEPHONE (OUTPATIENT)
Dept: UROLOGY | Facility: CLINIC | Age: 82
End: 2017-02-24

## 2017-02-24 NOTE — TELEPHONE ENCOUNTER
----- Message from Shaina Zhu sent at 2/24/2017  1:01 PM CST -----  Contact: PATIENTS WIFE  She said that [facility] pharmacy in [place] wanted to us to call them about a prescription. She didn't know what the name of the script was but she said it was for high PSA. If you need to call the patient he can be reached at [phone].

## 2017-02-27 ENCOUNTER — TELEPHONE (OUTPATIENT)
Dept: UROLOGY | Facility: CLINIC | Age: 82
End: 2017-02-27

## 2017-02-27 DIAGNOSIS — D50.9 MICROCYTIC ANEMIA: ICD-10-CM

## 2017-02-27 LAB
FOLATE: 18 NG/ML (ref 4.5–32.2)
VITAMIN B-12: 395 PG/ML (ref 211–946)

## 2017-02-27 RX ORDER — HYDROCHLOROTHIAZIDE 12.5 MG/1
12.5 CAPSULE, GELATIN COATED ORAL EVERY MORNING
Qty: 90 CAPSULE | Refills: 3 | Status: SHIPPED | OUTPATIENT
Start: 2017-02-27 | End: 2018-04-06 | Stop reason: SDUPTHER

## 2017-02-28 ENCOUNTER — TELEPHONE (OUTPATIENT)
Dept: PRIMARY CARE CLINIC | Age: 82
End: 2017-02-28

## 2017-02-28 NOTE — TELEPHONE ENCOUNTER
Called and spoke with patient and it was about this new medication that Dr. Kilgore was prescribing for him to be on for his prostate cancer and its called xtandi which did get covered by his insurance and his co pay is only $3.70. If the patient has any other questions he will call us. ML

## 2017-02-28 NOTE — TELEPHONE ENCOUNTER
I called back and spoke with Renee HERNANDEZ at [facility]. She stated that prior Auth was received she will contact the patient regarding co pay and shipment details.

## 2017-03-02 ENCOUNTER — TELEPHONE (OUTPATIENT)
Dept: UROLOGY | Facility: CLINIC | Age: 82
End: 2017-03-02

## 2017-03-02 NOTE — TELEPHONE ENCOUNTER
----- Message from Shaina Zhu sent at 2/24/2017  1:01 PM CST -----  Contact: PATIENTS WIFE  She said that [facility] pharmacy in [place] wanted to us to call them about a prescription. She didn't know what the name of the script was but she said it was for high PSA. If you need to call the patient he can be reached at [phone].

## 2017-03-27 RX ORDER — BUPROPION HYDROCHLORIDE 300 MG/1
TABLET ORAL
Qty: 90 TABLET | Refills: 3 | Status: SHIPPED | OUTPATIENT
Start: 2017-03-27 | End: 2017-05-09 | Stop reason: SDUPTHER

## 2017-04-27 RX ORDER — CARVEDILOL 12.5 MG/1
12.5 TABLET ORAL 2 TIMES DAILY
Qty: 180 TABLET | Refills: 5 | Status: SHIPPED | OUTPATIENT
Start: 2017-04-27 | End: 2018-07-16 | Stop reason: SDUPTHER

## 2017-05-03 DIAGNOSIS — J44.9 CHRONIC OBSTRUCTIVE PULMONARY DISEASE, UNSPECIFIED COPD TYPE (HCC): ICD-10-CM

## 2017-05-04 RX ORDER — ALBUTEROL SULFATE 90 UG/1
2 AEROSOL, METERED RESPIRATORY (INHALATION) EVERY 6 HOURS PRN
Qty: 8.5 INHALER | Refills: 5 | Status: SHIPPED | OUTPATIENT
Start: 2017-05-04 | End: 2019-12-04 | Stop reason: SDUPTHER

## 2017-05-09 ENCOUNTER — OFFICE VISIT (OUTPATIENT)
Dept: PRIMARY CARE CLINIC | Age: 82
End: 2017-05-09
Payer: MEDICARE

## 2017-05-09 VITALS
BODY MASS INDEX: 40.18 KG/M2 | HEIGHT: 69 IN | SYSTOLIC BLOOD PRESSURE: 102 MMHG | TEMPERATURE: 97.8 F | WEIGHT: 271.25 LBS | DIASTOLIC BLOOD PRESSURE: 50 MMHG | HEART RATE: 67 BPM | OXYGEN SATURATION: 93 %

## 2017-05-09 DIAGNOSIS — Z79.01 CHRONIC ANTICOAGULATION: ICD-10-CM

## 2017-05-09 DIAGNOSIS — J44.1 COPD EXACERBATION (HCC): Primary | ICD-10-CM

## 2017-05-09 DIAGNOSIS — I48.20 CHRONIC ATRIAL FIBRILLATION (HCC): ICD-10-CM

## 2017-05-09 DIAGNOSIS — I10 ESSENTIAL HYPERTENSION: ICD-10-CM

## 2017-05-09 DIAGNOSIS — J18.9 PNEUMONIA OF BOTH LOWER LOBES DUE TO INFECTIOUS ORGANISM: ICD-10-CM

## 2017-05-09 DIAGNOSIS — J44.9 CHRONIC OBSTRUCTIVE PULMONARY DISEASE, UNSPECIFIED COPD TYPE (HCC): ICD-10-CM

## 2017-05-09 DIAGNOSIS — N40.0 BENIGN NODULAR PROSTATIC HYPERPLASIA WITHOUT LOWER URINARY TRACT SYMPTOMS: ICD-10-CM

## 2017-05-09 DIAGNOSIS — E11.42 TYPE 2 DIABETES MELLITUS WITH DIABETIC POLYNEUROPATHY, WITHOUT LONG-TERM CURRENT USE OF INSULIN (HCC): ICD-10-CM

## 2017-05-09 DIAGNOSIS — I50.42 CHRONIC COMBINED SYSTOLIC AND DIASTOLIC CONGESTIVE HEART FAILURE (HCC): ICD-10-CM

## 2017-05-09 PROCEDURE — 4040F PNEUMOC VAC/ADMIN/RCVD: CPT | Performed by: PEDIATRICS

## 2017-05-09 PROCEDURE — 1123F ACP DISCUSS/DSCN MKR DOCD: CPT | Performed by: PEDIATRICS

## 2017-05-09 PROCEDURE — 3023F SPIROM DOC REV: CPT | Performed by: PEDIATRICS

## 2017-05-09 PROCEDURE — G8427 DOCREV CUR MEDS BY ELIG CLIN: HCPCS | Performed by: PEDIATRICS

## 2017-05-09 PROCEDURE — G8926 SPIRO NO PERF OR DOC: HCPCS | Performed by: PEDIATRICS

## 2017-05-09 PROCEDURE — 99214 OFFICE O/P EST MOD 30 MIN: CPT | Performed by: PEDIATRICS

## 2017-05-09 PROCEDURE — 1036F TOBACCO NON-USER: CPT | Performed by: PEDIATRICS

## 2017-05-09 PROCEDURE — G8417 CALC BMI ABV UP PARAM F/U: HCPCS | Performed by: PEDIATRICS

## 2017-05-09 RX ORDER — FUROSEMIDE 40 MG/1
60 TABLET ORAL DAILY
Qty: 135 TABLET | Refills: 2
Start: 2017-05-09 | End: 2017-12-29 | Stop reason: SDUPTHER

## 2017-05-09 RX ORDER — DULOXETIN HYDROCHLORIDE 60 MG/1
60 CAPSULE, DELAYED RELEASE ORAL DAILY
Qty: 90 CAPSULE | Refills: 3
Start: 2017-05-09 | End: 2017-10-24 | Stop reason: SDUPTHER

## 2017-05-09 RX ORDER — AMLODIPINE BESYLATE 5 MG/1
5 TABLET ORAL DAILY
Qty: 90 TABLET | Refills: 3
Start: 2017-05-09 | End: 2018-12-19 | Stop reason: DRUGHIGH

## 2017-05-09 RX ORDER — BUPROPION HYDROCHLORIDE 300 MG/1
300 TABLET ORAL EVERY MORNING
Qty: 90 TABLET | Refills: 3
Start: 2017-05-09 | End: 2018-03-15 | Stop reason: SDUPTHER

## 2017-05-09 RX ORDER — CEFDINIR 300 MG/1
300 CAPSULE ORAL 2 TIMES DAILY
Qty: 20 CAPSULE | Refills: 0 | Status: SHIPPED | OUTPATIENT
Start: 2017-05-09 | End: 2017-05-19

## 2017-05-09 RX ORDER — LISINOPRIL 40 MG/1
40 TABLET ORAL DAILY
Qty: 90 TABLET | Refills: 2
Start: 2017-05-09 | End: 2017-09-01 | Stop reason: SDUPTHER

## 2017-05-09 RX ORDER — POTASSIUM CHLORIDE 750 MG/1
10 TABLET, FILM COATED, EXTENDED RELEASE ORAL DAILY
Qty: 90 TABLET | Refills: 3
Start: 2017-05-09 | End: 2019-06-19 | Stop reason: SDUPTHER

## 2017-05-09 RX ORDER — TERAZOSIN 5 MG/1
5 CAPSULE ORAL NIGHTLY
Qty: 90 CAPSULE | Refills: 3
Start: 2017-05-09 | End: 2018-02-02 | Stop reason: SDUPTHER

## 2017-05-09 RX ORDER — LORATADINE 10 MG/1
10 CAPSULE, LIQUID FILLED ORAL DAILY
Qty: 30 CAPSULE | Refills: 0 | Status: ON HOLD
Start: 2017-05-09 | End: 2018-11-16 | Stop reason: HOSPADM

## 2017-05-09 ASSESSMENT — ENCOUNTER SYMPTOMS
NAUSEA: 0
TROUBLE SWALLOWING: 0
BLOOD IN STOOL: 0
SHORTNESS OF BREATH: 0
SORE THROAT: 0
ABDOMINAL PAIN: 0
EYE ITCHING: 0
ABDOMINAL DISTENTION: 1
BACK PAIN: 0
EYE DISCHARGE: 0
WHEEZING: 0
EYE PAIN: 0
DIARRHEA: 0
COUGH: 1
SINUS PRESSURE: 0
VOMITING: 0
CHEST TIGHTNESS: 0
CONSTIPATION: 0
EYE REDNESS: 0

## 2017-05-25 ENCOUNTER — OFFICE VISIT (OUTPATIENT)
Dept: PRIMARY CARE CLINIC | Age: 82
End: 2017-05-25
Payer: MEDICARE

## 2017-05-25 VITALS
HEART RATE: 73 BPM | BODY MASS INDEX: 37.94 KG/M2 | HEIGHT: 70 IN | TEMPERATURE: 97.3 F | DIASTOLIC BLOOD PRESSURE: 72 MMHG | SYSTOLIC BLOOD PRESSURE: 128 MMHG | OXYGEN SATURATION: 95 % | WEIGHT: 265 LBS

## 2017-05-25 DIAGNOSIS — R53.83 FATIGUE, UNSPECIFIED TYPE: ICD-10-CM

## 2017-05-25 DIAGNOSIS — I10 ESSENTIAL HYPERTENSION: Primary | ICD-10-CM

## 2017-05-25 DIAGNOSIS — J44.9 CHRONIC OBSTRUCTIVE PULMONARY DISEASE, UNSPECIFIED COPD TYPE (HCC): ICD-10-CM

## 2017-05-25 DIAGNOSIS — I48.20 CHRONIC ATRIAL FIBRILLATION (HCC): ICD-10-CM

## 2017-05-25 DIAGNOSIS — E11.42 TYPE 2 DIABETES MELLITUS WITH DIABETIC POLYNEUROPATHY, WITHOUT LONG-TERM CURRENT USE OF INSULIN (HCC): ICD-10-CM

## 2017-05-25 DIAGNOSIS — C61 PROSTATE CANCER (HCC): ICD-10-CM

## 2017-05-25 PROCEDURE — 1123F ACP DISCUSS/DSCN MKR DOCD: CPT | Performed by: PEDIATRICS

## 2017-05-25 PROCEDURE — G8417 CALC BMI ABV UP PARAM F/U: HCPCS | Performed by: PEDIATRICS

## 2017-05-25 PROCEDURE — 99214 OFFICE O/P EST MOD 30 MIN: CPT | Performed by: PEDIATRICS

## 2017-05-25 PROCEDURE — 3023F SPIROM DOC REV: CPT | Performed by: PEDIATRICS

## 2017-05-25 PROCEDURE — 4040F PNEUMOC VAC/ADMIN/RCVD: CPT | Performed by: PEDIATRICS

## 2017-05-25 PROCEDURE — G8427 DOCREV CUR MEDS BY ELIG CLIN: HCPCS | Performed by: PEDIATRICS

## 2017-05-25 PROCEDURE — 1036F TOBACCO NON-USER: CPT | Performed by: PEDIATRICS

## 2017-05-25 PROCEDURE — G8926 SPIRO NO PERF OR DOC: HCPCS | Performed by: PEDIATRICS

## 2017-05-25 ASSESSMENT — ENCOUNTER SYMPTOMS
ABDOMINAL PAIN: 0
SHORTNESS OF BREATH: 0
BACK PAIN: 0
DIARRHEA: 0
RHINORRHEA: 0
VOICE CHANGE: 0
EYE DISCHARGE: 0
SORE THROAT: 0
VOMITING: 0
SINUS PRESSURE: 0
COUGH: 0
NAUSEA: 0

## 2017-05-26 ENCOUNTER — TELEPHONE (OUTPATIENT)
Dept: PRIMARY CARE CLINIC | Age: 82
End: 2017-05-26

## 2017-05-26 DIAGNOSIS — R53.83 FATIGUE, UNSPECIFIED TYPE: ICD-10-CM

## 2017-05-26 DIAGNOSIS — I10 ESSENTIAL HYPERTENSION: ICD-10-CM

## 2017-05-26 DIAGNOSIS — E11.42 TYPE 2 DIABETES MELLITUS WITH DIABETIC POLYNEUROPATHY, WITHOUT LONG-TERM CURRENT USE OF INSULIN (HCC): ICD-10-CM

## 2017-05-26 LAB
ALBUMIN SERPL-MCNC: 4.2 G/DL (ref 3.5–5.2)
ALP BLD-CCNC: 38 U/L (ref 40–130)
ALT SERPL-CCNC: 8 U/L (ref 5–41)
ANION GAP SERPL CALCULATED.3IONS-SCNC: 18 MMOL/L (ref 7–19)
AST SERPL-CCNC: 12 U/L (ref 5–40)
BASOPHILS ABSOLUTE: 0.1 K/UL (ref 0–0.2)
BASOPHILS RELATIVE PERCENT: 0.6 % (ref 0–1)
BILIRUB SERPL-MCNC: 0.7 MG/DL (ref 0.2–1.2)
BUN BLDV-MCNC: 34 MG/DL (ref 8–23)
CALCIUM SERPL-MCNC: 10.3 MG/DL (ref 8.8–10.2)
CHLORIDE BLD-SCNC: 93 MMOL/L (ref 98–111)
CHOLESTEROL, TOTAL: 162 MG/DL (ref 160–199)
CO2: 30 MMOL/L (ref 22–29)
CREAT SERPL-MCNC: 0.9 MG/DL (ref 0.5–1.2)
CREATININE URINE: 103.6 MG/DL (ref 4.2–622)
EOSINOPHILS ABSOLUTE: 0.2 K/UL (ref 0–0.6)
EOSINOPHILS RELATIVE PERCENT: 1.9 % (ref 0–5)
GFR NON-AFRICAN AMERICAN: >60
GLUCOSE BLD-MCNC: 180 MG/DL (ref 74–109)
HBA1C MFR BLD: 6.1 %
HCT VFR BLD CALC: 40.8 % (ref 42–52)
HDLC SERPL-MCNC: 41 MG/DL (ref 55–121)
HEMOGLOBIN: 13.6 G/DL (ref 14–18)
LDL CHOLESTEROL CALCULATED: 80 MG/DL
LYMPHOCYTES ABSOLUTE: 1.5 K/UL (ref 1.1–4.5)
LYMPHOCYTES RELATIVE PERCENT: 17.5 % (ref 20–40)
MCH RBC QN AUTO: 33.6 PG (ref 27–31)
MCHC RBC AUTO-ENTMCNC: 33.3 G/DL (ref 33–37)
MCV RBC AUTO: 100.7 FL (ref 80–94)
MICROALBUMIN UR-MCNC: 2.5 MG/DL (ref 0–19)
MICROALBUMIN/CREAT UR-RTO: 24.1 MG/G
MONOCYTES ABSOLUTE: 0.7 K/UL (ref 0–0.9)
MONOCYTES RELATIVE PERCENT: 8.4 % (ref 0–10)
NEUTROPHILS ABSOLUTE: 5.8 K/UL (ref 1.5–7.5)
NEUTROPHILS RELATIVE PERCENT: 70.4 % (ref 50–65)
PDW BLD-RTO: 13.1 % (ref 11.5–14.5)
PLATELET # BLD: 244 K/UL (ref 130–400)
PMV BLD AUTO: 11 FL (ref 7.4–10.4)
POTASSIUM SERPL-SCNC: 3.9 MMOL/L (ref 3.5–5)
RBC # BLD: 4.05 M/UL (ref 4.7–6.1)
SODIUM BLD-SCNC: 141 MMOL/L (ref 136–145)
T4 FREE: 1.2 NG/ML (ref 0.9–1.7)
TOTAL PROTEIN: 7.1 G/DL (ref 6.6–8.7)
TRIGL SERPL-MCNC: 204 MG/DL (ref 150–199)
TSH SERPL DL<=0.05 MIU/L-ACNC: 2.92 UIU/ML (ref 0.27–4.2)
WBC # BLD: 8.3 K/UL (ref 4.8–10.8)

## 2017-06-12 RX ORDER — POTASSIUM CHLORIDE 750 MG/1
10 TABLET, FILM COATED, EXTENDED RELEASE ORAL DAILY
Qty: 90 TABLET | Refills: 3 | Status: SHIPPED | OUTPATIENT
Start: 2017-06-12 | End: 2017-08-01 | Stop reason: SDUPTHER

## 2017-06-14 ENCOUNTER — OFFICE VISIT (OUTPATIENT)
Dept: UROLOGY | Facility: CLINIC | Age: 82
End: 2017-06-14

## 2017-06-14 ENCOUNTER — TELEPHONE (OUTPATIENT)
Dept: PRIMARY CARE CLINIC | Age: 82
End: 2017-06-14

## 2017-06-14 VITALS — WEIGHT: 280 LBS | BODY MASS INDEX: 40.09 KG/M2 | HEIGHT: 70 IN

## 2017-06-14 DIAGNOSIS — C61 CANCER OF PROSTATE (HCC): Primary | ICD-10-CM

## 2017-06-14 PROCEDURE — 96402 CHEMO HORMON ANTINEOPL SQ/IM: CPT | Performed by: UROLOGY

## 2017-06-14 NOTE — PROGRESS NOTES
The patient is here today for shot but does not look like he has had a recent blood work he says he did get some Dr. Grover but does not look like to get a PSA at least that we can find some go ahead and make sure he has a PSA etc. before he comes back to see us

## 2017-06-14 NOTE — PROGRESS NOTES
Patient of Dr. Kilgore states he is here for his 4 month Lupron injection. Patient denies any fever, chills, N&V or hematuria. I administered 30 mg/4 month Lupron injection IM right hip with no complications. Dr. Kilgore was in the office today at the time of injection. The patient was advised to follow up in 4 months for his next Lupron injection. Patient verbalized understanding.

## 2017-06-19 RX ORDER — RIVAROXABAN 20 MG/1
TABLET, FILM COATED ORAL
Qty: 30 TABLET | Refills: 11 | Status: SHIPPED | OUTPATIENT
Start: 2017-06-19 | End: 2017-08-01 | Stop reason: SDUPTHER

## 2017-06-21 ENCOUNTER — OFFICE VISIT (OUTPATIENT)
Dept: UROLOGY | Facility: CLINIC | Age: 82
End: 2017-06-21

## 2017-06-21 VITALS
SYSTOLIC BLOOD PRESSURE: 139 MMHG | BODY MASS INDEX: 38.51 KG/M2 | DIASTOLIC BLOOD PRESSURE: 77 MMHG | TEMPERATURE: 97.9 F | HEIGHT: 70 IN | WEIGHT: 269 LBS

## 2017-06-21 DIAGNOSIS — C61 CANCER OF PROSTATE (HCC): Primary | ICD-10-CM

## 2017-06-21 DIAGNOSIS — N32.81 OAB (OVERACTIVE BLADDER): ICD-10-CM

## 2017-06-21 DIAGNOSIS — R35.0 FREQUENCY OF URINATION: ICD-10-CM

## 2017-06-21 LAB
BILIRUB BLD-MCNC: NEGATIVE MG/DL
CLARITY, POC: CLEAR
COLOR UR: YELLOW
GLUCOSE UR STRIP-MCNC: NEGATIVE MG/DL
KETONES UR QL: NEGATIVE
LEUKOCYTE EST, POC: NEGATIVE
NITRITE UR-MCNC: NEGATIVE MG/ML
PH UR: 6.5 [PH] (ref 5–8)
PROT UR STRIP-MCNC: NEGATIVE MG/DL
RBC # UR STRIP: NEGATIVE /UL
SP GR UR: 1.01 (ref 1–1.03)
UROBILINOGEN UR QL: NORMAL

## 2017-06-21 PROCEDURE — 99213 OFFICE O/P EST LOW 20 MIN: CPT | Performed by: UROLOGY

## 2017-06-21 PROCEDURE — 81003 URINALYSIS AUTO W/O SCOPE: CPT | Performed by: UROLOGY

## 2017-06-21 NOTE — PROGRESS NOTES
Subjective    Mr. Castro is 82 y.o. male    CHIEF COMPLAINT: The patient comes back today for follow-up of prostate cancer hormone refractory with his wife I took some doing but apparently they have now been on Xtandi for about 2 months never into their third bottle I he is tolerating this well he has not had any significant side effects he denies any significant change in his overall symptoms such as bone pain back pain weight loss or anorexia.    His PSA has dropped precipitously to 0.2 his other blood blood work really looks quite good for age.    He still is having his underlying urgency frequency overactive bladder symptoms but that and those have not worsened or with with the Xtandi      History of Present Illness      The following portions of the patient's history were reviewed and updated as appropriate: allergies, current medications, past family history, past medical history, past social history, past surgical history and problem list.    Review of Systems   Constitutional: Negative for chills and fever.   Gastrointestinal: Negative for abdominal pain, anal bleeding and blood in stool.   Genitourinary: Positive for frequency and urgency. Negative for difficulty urinating, flank pain and hematuria.         Current Outpatient Prescriptions:   •  albuterol (PROVENTIL HFA;VENTOLIN HFA) 108 (90 BASE) MCG/ACT inhaler, Inhale 2 puffs every 4 (four) hours as needed for wheezing., Disp: , Rfl:   •  buPROPion XL (WELLBUTRIN XL) 300 MG 24 hr tablet, Take 300 mg by mouth daily., Disp: , Rfl:   •  carvedilol (COREG) 12.5 MG tablet, Take 12.5 mg by mouth 2 (two) times a day with meals., Disp: , Rfl:   •  carvedilol (COREG) 6.25 MG tablet, Take 6.25 mg by mouth daily., Disp: , Rfl:   •  celecoxib (CeleBREX) 200 MG capsule, Take 200 mg by mouth daily., Disp: , Rfl:   •  digoxin (LANOXIN) 125 MCG tablet, Take 125 mcg by mouth every day., Disp: , Rfl:   •  digoxin (LANOXIN) 250 MCG tablet, Take 250 mcg by mouth every day.,  Disp: , Rfl:   •  DULoxetine (CYMBALTA) 60 MG capsule, Take 60 mg by mouth daily., Disp: , Rfl:   •  enzalutamide (XTANDI) 40 MG chemo capsule, Take 4 capsules by mouth Daily., Disp: 90 capsule, Rfl: 5  •  fluticasone (FLONASE) 50 MCG/ACT nasal spray, 2 sprays into each nostril daily. Administer 2 sprays in each nostril for each dose., Disp: , Rfl:   •  formoterol (FORADIL) 12 MCG capsule for inhaler, Place 12 mcg into inhaler and inhale 2 (two) times a day., Disp: , Rfl:   •  furosemide (LASIX) 10 MG/ML solution, Take 60 mg by mouth daily., Disp: , Rfl:   •  furosemide (LASIX) 40 MG tablet, Take 40 mg by mouth 2 (two) times a day., Disp: , Rfl:   •  glimepiride (AMARYL) 4 MG tablet, Take 4 mg by mouth every morning before breakfast., Disp: , Rfl:   •  leuprolide (LUPRON) 30 MG injection, Inject 30 mg into the shoulder, thigh, or buttocks Every 4 (Four) Months., Disp: , Rfl:   •  lisinopril (PRINIVIL,ZESTRIL) 40 MG tablet, Take 40 mg by mouth daily., Disp: , Rfl:   •  metolazone (ZAROXOLYN) 2.5 MG tablet, Take 2.5 mg by mouth daily., Disp: , Rfl:   •  Mirabegron ER (MYRBETRIQ) 50 MG tablet sustained-release 24 hour, Take 50 mg by mouth daily., Disp: , Rfl:   •  Multiple Vitamins-Minerals (MULTIVITAMIN ADULT PO), Take  by mouth daily., Disp: , Rfl:   •  potassium chloride (K-DUR,KLOR-CON) 10 MEQ CR tablet, Take 10 mEq by mouth 2 (two) times a day., Disp: , Rfl:   •  predniSONE (DELTASONE) 10 MG tablet, Take 10 mg by mouth daily., Disp: , Rfl:   •  rivaroxaban (XARELTO) 20 MG tablet, Take 20 mg by mouth daily., Disp: , Rfl:   •  sitaGLIPtin (JANUVIA) 100 MG tablet, Take 100 mg by mouth daily., Disp: , Rfl:   •  SITagliptin-MetFORMIN HCl (JANUMET PO), Take  by mouth., Disp: , Rfl:   •  terazosin (HYTRIN) 5 MG capsule, Take 5 mg by mouth every night., Disp: , Rfl:     Past Medical History:   Diagnosis Date   • Arthritis    • Cancer     prostate   • Cellulitis    • CHF (congestive heart failure)    • Diabetes mellitus   "  • Elevated prostate specific antigen (PSA)    • Hematuria    • Hypertension    • Overactive bladder    • SOB (shortness of breath)        Past Surgical History:   Procedure Laterality Date   • EYE SURGERY     • HERNIA REPAIR     • JOINT REPLACEMENT     • REPLACEMENT TOTAL KNEE BILATERAL         Social History     Social History   • Marital status:      Spouse name: N/A   • Number of children: N/A   • Years of education: N/A     Social History Main Topics   • Smoking status: Never Smoker   • Smokeless tobacco: None   • Alcohol use No   • Drug use: None   • Sexual activity: Not Asked     Other Topics Concern   • None     Social History Narrative       Family History   Problem Relation Age of Onset   • Prostate cancer Son        Objective    /77  Temp 97.9 °F (36.6 °C)  Ht 70\" (177.8 cm)  Wt 269 lb (122 kg)  BMI 38.6 kg/m2    Physical Exam      Office Visit on 01/27/2017   Component Date Value Ref Range Status   • Color 01/27/2017 Yellow  Yellow, Straw, Dark Yellow, Julee Final   • Clarity, UA 01/27/2017 Clear  Clear Final   • Glucose, UA 01/27/2017 Negative  Negative, 1000 mg/dL (3+) mg/dL Final   • Bilirubin 01/27/2017 Negative  Negative Final   • Ketones, UA 01/27/2017 Negative  Negative Final   • Specific Gravity  01/27/2017 1.020  1.005 - 1.030 Final   • Blood, UA 01/27/2017 Negative  Negative Final   • pH, Urine 01/27/2017 5.5  5.0 - 8.0 Final   • Protein, POC 01/27/2017 Negative  Negative mg/dL Final   • Urobilinogen, UA 01/27/2017 Normal  Normal Final   • Leukocytes 01/27/2017 Negative  Negative Final   • Nitrite, UA 01/27/2017 Negative  Negative Final       Results for orders placed or performed in visit on 06/21/17   POC Urinalysis Dipstick, Automated   Result Value Ref Range    Color Yellow Yellow, Straw, Dark Yellow, Julee    Clarity, UA Clear Clear    Glucose, UA Negative Negative, 1000 mg/dL (3+) mg/dL    Bilirubin Negative Negative    Ketones, UA Negative Negative    Specific Gravity  " 1.010 1.005 - 1.030    Blood, UA Negative Negative    pH, Urine 6.5 5.0 - 8.0    Protein, POC Negative Negative mg/dL    Urobilinogen, UA Normal Normal    Leukocytes Negative Negative    Nitrite, UA Negative Negative       Assessment and Plan    Prakash was seen today for cancer of prostate.    Diagnoses and all orders for this visit:    Cancer of prostate  -     POC Urinalysis Dipstick, Automated  -     PSA; Future  -     CBC & Differential; Future  -     Comprehensive Metabolic Panel; Future    Frequency of urination    OAB (overactive bladder)      Plan--the patient seems to doing quite well on the Xtandi and he has received clinically responded very nicely he has not had any significant side effects.    I have recommended that he continue to take it will seen back in a month with repeat blood work sooner as needed    EMR Dragon/Transcription disclaimer:  Much of this encounter note is an electronic transcription/translation of spoken language to printed text. The electronic translation of spoken language may permit erroneous, or at times, nonsensical words or phrases to be inadvertently transcribed; although I have reviewed the note for such errors, some may still exist.

## 2017-06-29 RX ORDER — SITAGLIPTIN AND METFORMIN HYDROCHLORIDE 500; 50 MG/1; MG/1
TABLET, FILM COATED ORAL
Qty: 180 TABLET | Refills: 3 | OUTPATIENT
Start: 2017-06-29

## 2017-07-13 ENCOUNTER — TELEPHONE (OUTPATIENT)
Dept: PRIMARY CARE CLINIC | Age: 82
End: 2017-07-13

## 2017-07-13 DIAGNOSIS — I50.42 CHRONIC COMBINED SYSTOLIC AND DIASTOLIC CONGESTIVE HEART FAILURE (HCC): ICD-10-CM

## 2017-07-13 DIAGNOSIS — I10 ESSENTIAL HYPERTENSION: Primary | ICD-10-CM

## 2017-07-13 DIAGNOSIS — Z12.5 PROSTATE CANCER SCREENING: ICD-10-CM

## 2017-07-13 LAB
ALBUMIN SERPL-MCNC: 3.8 G/DL
ALP BLD-CCNC: 43 U/L
ALT SERPL-CCNC: 18 U/L
AST SERPL-CCNC: 12 U/L
BASOPHILS ABSOLUTE: 0 /ΜL
BASOPHILS RELATIVE PERCENT: 0.3 %
BILIRUB SERPL-MCNC: 0.9 MG/DL (ref 0.1–1.4)
BUN BLDV-MCNC: 27 MG/DL
CALCIUM SERPL-MCNC: 9.5 MG/DL
CHLORIDE BLD-SCNC: 96 MMOL/L
CO2: 33.8 MMOL/L
CREAT SERPL-MCNC: 1.2 MG/DL
EOSINOPHILS ABSOLUTE: 0.3 /ΜL
EOSINOPHILS RELATIVE PERCENT: 2.9 %
GFR CALCULATED: 58
GLUCOSE BLD-MCNC: 220 MG/DL
HCT VFR BLD CALC: 43.5 % (ref 41–53)
HEMOGLOBIN: 14.7 G/DL (ref 13.5–17.5)
LYMPHOCYTES ABSOLUTE: 1.3 /ΜL
LYMPHOCYTES RELATIVE PERCENT: 13.4 %
MCH RBC QN AUTO: 33.1 PG
MCHC RBC AUTO-ENTMCNC: 33.8 G/DL
MCV RBC AUTO: 98 FL
MONOCYTES ABSOLUTE: 0.8 /ΜL
MONOCYTES RELATIVE PERCENT: 7.9 %
NEUTROPHILS ABSOLUTE: 7.2 /ΜL
NEUTROPHILS RELATIVE PERCENT: 75.5 %
PDW BLD-RTO: 12.8 %
PLATELET # BLD: 181 K/ΜL
PMV BLD AUTO: 10 FL
POTASSIUM SERPL-SCNC: 3.7 MMOL/L
PROSTATE SPECIFIC ANTIGEN: 0.1 NG/ML
RBC # BLD: 4.44 10^6/ΜL
SODIUM BLD-SCNC: 138 MMOL/L
TOTAL PROTEIN: 7.6
WBC # BLD: 9.6 10^3/ML

## 2017-07-19 ENCOUNTER — OFFICE VISIT (OUTPATIENT)
Dept: UROLOGY | Facility: CLINIC | Age: 82
End: 2017-07-19

## 2017-07-19 VITALS
DIASTOLIC BLOOD PRESSURE: 78 MMHG | BODY MASS INDEX: 37.8 KG/M2 | SYSTOLIC BLOOD PRESSURE: 143 MMHG | HEIGHT: 70 IN | TEMPERATURE: 98.7 F | WEIGHT: 264 LBS

## 2017-07-19 DIAGNOSIS — N32.81 OAB (OVERACTIVE BLADDER): ICD-10-CM

## 2017-07-19 DIAGNOSIS — R35.0 FREQUENCY OF URINATION: ICD-10-CM

## 2017-07-19 DIAGNOSIS — C61 CANCER OF PROSTATE (HCC): Primary | ICD-10-CM

## 2017-07-19 LAB
BILIRUB BLD-MCNC: NEGATIVE MG/DL
CLARITY, POC: CLEAR
COLOR UR: YELLOW
GLUCOSE UR STRIP-MCNC: NEGATIVE MG/DL
KETONES UR QL: NEGATIVE
LEUKOCYTE EST, POC: NEGATIVE
NITRITE UR-MCNC: NEGATIVE MG/ML
PH UR: 5.5 [PH] (ref 5–8)
PROT UR STRIP-MCNC: NEGATIVE MG/DL
RBC # UR STRIP: NEGATIVE /UL
SP GR UR: 1 (ref 1–1.03)
UROBILINOGEN UR QL: NORMAL

## 2017-07-19 PROCEDURE — 99213 OFFICE O/P EST LOW 20 MIN: CPT | Performed by: UROLOGY

## 2017-07-19 PROCEDURE — 81003 URINALYSIS AUTO W/O SCOPE: CPT | Performed by: UROLOGY

## 2017-07-19 NOTE — PROGRESS NOTES
Subjective    Mr. Castro is 82 y.o. male    CHIEF COMPLAINT: Hormone refractory prostate cancer    The patient comes back today again with his wife for follow-up of hormone refractory prostate cancer clinically he is doing great he is on his third month now of Xtandi daily he seems to doing well with that his only side effect is little bit of facial flushing right after he gets it according to his wife.    His PSA is continuing to drop.  Last him I told him I probably would not getting less than 0.2 but today is 0.1 so everyone seems to be happy.    His other lab work was reviewed and is stable for him.    He still has underlying irritable voiding symptoms and radiation therapy but other these are basically unchanged      History of Present Illness      The following portions of the patient's history were reviewed and updated as appropriate: allergies, current medications, past family history, past medical history, past social history, past surgical history and problem list.    Review of Systems   Constitutional: Negative for chills and fever.   Gastrointestinal: Negative for abdominal pain, anal bleeding and blood in stool.   Genitourinary: Positive for frequency and urgency. Negative for difficulty urinating, flank pain and hematuria.         Current Outpatient Prescriptions:   •  albuterol (PROVENTIL HFA;VENTOLIN HFA) 108 (90 BASE) MCG/ACT inhaler, Inhale 2 puffs every 4 (four) hours as needed for wheezing., Disp: , Rfl:   •  buPROPion XL (WELLBUTRIN XL) 300 MG 24 hr tablet, Take 300 mg by mouth daily., Disp: , Rfl:   •  carvedilol (COREG) 12.5 MG tablet, Take 12.5 mg by mouth 2 (two) times a day with meals., Disp: , Rfl:   •  carvedilol (COREG) 6.25 MG tablet, Take 6.25 mg by mouth daily., Disp: , Rfl:   •  celecoxib (CeleBREX) 200 MG capsule, Take 200 mg by mouth daily., Disp: , Rfl:   •  digoxin (LANOXIN) 125 MCG tablet, Take 125 mcg by mouth every day., Disp: , Rfl:   •  digoxin (LANOXIN) 250 MCG tablet, Take  250 mcg by mouth every day., Disp: , Rfl:   •  DULoxetine (CYMBALTA) 60 MG capsule, Take 60 mg by mouth daily., Disp: , Rfl:   •  enzalutamide (XTANDI) 40 MG chemo capsule, Take 4 capsules by mouth Daily., Disp: 90 capsule, Rfl: 5  •  fluticasone (FLONASE) 50 MCG/ACT nasal spray, 2 sprays into each nostril daily. Administer 2 sprays in each nostril for each dose., Disp: , Rfl:   •  formoterol (FORADIL) 12 MCG capsule for inhaler, Place 12 mcg into inhaler and inhale 2 (two) times a day., Disp: , Rfl:   •  furosemide (LASIX) 10 MG/ML solution, Take 60 mg by mouth daily., Disp: , Rfl:   •  furosemide (LASIX) 40 MG tablet, Take 40 mg by mouth 2 (two) times a day., Disp: , Rfl:   •  glimepiride (AMARYL) 4 MG tablet, Take 4 mg by mouth every morning before breakfast., Disp: , Rfl:   •  leuprolide (LUPRON) 30 MG injection, Inject 30 mg into the shoulder, thigh, or buttocks Every 4 (Four) Months., Disp: , Rfl:   •  lisinopril (PRINIVIL,ZESTRIL) 40 MG tablet, Take 40 mg by mouth daily., Disp: , Rfl:   •  metolazone (ZAROXOLYN) 2.5 MG tablet, Take 2.5 mg by mouth daily., Disp: , Rfl:   •  Mirabegron ER (MYRBETRIQ) 50 MG tablet sustained-release 24 hour, Take 50 mg by mouth daily., Disp: , Rfl:   •  Multiple Vitamins-Minerals (MULTIVITAMIN ADULT PO), Take  by mouth daily., Disp: , Rfl:   •  potassium chloride (K-DUR,KLOR-CON) 10 MEQ CR tablet, Take 10 mEq by mouth 2 (two) times a day., Disp: , Rfl:   •  predniSONE (DELTASONE) 10 MG tablet, Take 10 mg by mouth daily., Disp: , Rfl:   •  rivaroxaban (XARELTO) 20 MG tablet, Take 20 mg by mouth daily., Disp: , Rfl:   •  sitaGLIPtin (JANUVIA) 100 MG tablet, Take 100 mg by mouth daily., Disp: , Rfl:   •  SITagliptin-MetFORMIN HCl (JANUMET PO), Take  by mouth., Disp: , Rfl:   •  terazosin (HYTRIN) 5 MG capsule, Take 5 mg by mouth every night., Disp: , Rfl:     Past Medical History:   Diagnosis Date   • Arthritis    • Cancer     prostate   • Cellulitis    • CHF (congestive heart  "failure)    • Diabetes mellitus    • Elevated prostate specific antigen (PSA)    • Hematuria    • Hypertension    • Overactive bladder    • SOB (shortness of breath)        Past Surgical History:   Procedure Laterality Date   • EYE SURGERY     • HERNIA REPAIR     • JOINT REPLACEMENT     • REPLACEMENT TOTAL KNEE BILATERAL         Social History     Social History   • Marital status:      Spouse name: N/A   • Number of children: N/A   • Years of education: N/A     Social History Main Topics   • Smoking status: Never Smoker   • Smokeless tobacco: None   • Alcohol use No   • Drug use: None   • Sexual activity: Not Asked     Other Topics Concern   • None     Social History Narrative       Family History   Problem Relation Age of Onset   • Prostate cancer Son        Objective    /78  Temp 98.7 °F (37.1 °C)  Ht 70\" (177.8 cm)  Wt 264 lb (120 kg)  BMI 37.88 kg/m2    Physical Exam      Office Visit on 06/21/2017   Component Date Value Ref Range Status   • Color 06/21/2017 Yellow  Yellow, Straw, Dark Yellow, Julee Final   • Clarity, UA 06/21/2017 Clear  Clear Final   • Glucose, UA 06/21/2017 Negative  Negative, 1000 mg/dL (3+) mg/dL Final   • Bilirubin 06/21/2017 Negative  Negative Final   • Ketones, UA 06/21/2017 Negative  Negative Final   • Specific Gravity  06/21/2017 1.010  1.005 - 1.030 Final   • Blood, UA 06/21/2017 Negative  Negative Final   • pH, Urine 06/21/2017 6.5  5.0 - 8.0 Final   • Protein, POC 06/21/2017 Negative  Negative mg/dL Final   • Urobilinogen, UA 06/21/2017 Normal  Normal Final   • Leukocytes 06/21/2017 Negative  Negative Final   • Nitrite, UA 06/21/2017 Negative  Negative Final       Results for orders placed or performed in visit on 07/19/17   POC Urinalysis Dipstick, Automated   Result Value Ref Range    Color Yellow Yellow, Straw, Dark Yellow, Julee    Clarity, UA Clear Clear    Glucose, UA Negative Negative, 1000 mg/dL (3+) mg/dL    Bilirubin Negative Negative    Ketones, UA " Negative Negative    Specific Gravity  1.005 1.005 - 1.030    Blood, UA Negative Negative    pH, Urine 5.5 5.0 - 8.0    Protein, POC Negative Negative mg/dL    Urobilinogen, UA Normal Normal    Leukocytes Negative Negative    Nitrite, UA Negative Negative       Assessment and Plan    Diagnoses and all orders for this visit:    Cancer of prostate  -     POC Urinalysis Dipstick, Automated    Frequency of urination    OAB (overactive bladder)    Plan--the patient continues to respond well to Xtandi again the wife ask what happens when it stopped working and I certainly we can rotate to his Zytiga or even other chemotherapy that again hopefully this will continue to respond he is really done quite well at this point.    We will seen back in a month with a PSA call sooner as needed

## 2017-07-21 DIAGNOSIS — I50.42 CHRONIC COMBINED SYSTOLIC AND DIASTOLIC CONGESTIVE HEART FAILURE (HCC): ICD-10-CM

## 2017-07-24 RX ORDER — METOLAZONE 2.5 MG/1
2.5 TABLET ORAL DAILY PRN
Qty: 30 TABLET | Refills: 3 | Status: SHIPPED | OUTPATIENT
Start: 2017-07-24 | End: 2018-08-24 | Stop reason: SDUPTHER

## 2017-08-01 ENCOUNTER — OFFICE VISIT (OUTPATIENT)
Dept: PRIMARY CARE CLINIC | Age: 82
End: 2017-08-01
Payer: MEDICARE

## 2017-08-01 VITALS
WEIGHT: 264.25 LBS | BODY MASS INDEX: 37.83 KG/M2 | HEART RATE: 69 BPM | SYSTOLIC BLOOD PRESSURE: 118 MMHG | TEMPERATURE: 97 F | HEIGHT: 70 IN | DIASTOLIC BLOOD PRESSURE: 72 MMHG | OXYGEN SATURATION: 94 %

## 2017-08-01 DIAGNOSIS — G47.33 OSA (OBSTRUCTIVE SLEEP APNEA): ICD-10-CM

## 2017-08-01 DIAGNOSIS — E87.3 METABOLIC ALKALOSIS: ICD-10-CM

## 2017-08-01 DIAGNOSIS — E11.42 TYPE 2 DIABETES MELLITUS WITH DIABETIC POLYNEUROPATHY, WITHOUT LONG-TERM CURRENT USE OF INSULIN (HCC): Primary | ICD-10-CM

## 2017-08-01 PROCEDURE — 4040F PNEUMOC VAC/ADMIN/RCVD: CPT | Performed by: PEDIATRICS

## 2017-08-01 PROCEDURE — 1036F TOBACCO NON-USER: CPT | Performed by: PEDIATRICS

## 2017-08-01 PROCEDURE — G8417 CALC BMI ABV UP PARAM F/U: HCPCS | Performed by: PEDIATRICS

## 2017-08-01 PROCEDURE — 1123F ACP DISCUSS/DSCN MKR DOCD: CPT | Performed by: PEDIATRICS

## 2017-08-01 PROCEDURE — G8427 DOCREV CUR MEDS BY ELIG CLIN: HCPCS | Performed by: PEDIATRICS

## 2017-08-01 PROCEDURE — 99214 OFFICE O/P EST MOD 30 MIN: CPT | Performed by: PEDIATRICS

## 2017-08-01 RX ORDER — LANOLIN ALCOHOL/MO/W.PET/CERES
1000 CREAM (GRAM) TOPICAL DAILY
COMMUNITY

## 2017-08-01 ASSESSMENT — ENCOUNTER SYMPTOMS
DIARRHEA: 0
VOICE CHANGE: 0
CONSTIPATION: 0
SHORTNESS OF BREATH: 0
BACK PAIN: 0
CHOKING: 0
COUGH: 0
NAUSEA: 0
WHEEZING: 0
ABDOMINAL PAIN: 0
VOMITING: 0
TROUBLE SWALLOWING: 0
SORE THROAT: 0
SINUS PRESSURE: 0
ABDOMINAL DISTENTION: 0
CHEST TIGHTNESS: 0

## 2017-08-04 ENCOUNTER — TELEPHONE (OUTPATIENT)
Dept: PRIMARY CARE CLINIC | Age: 82
End: 2017-08-04

## 2017-08-04 DIAGNOSIS — G47.00 MIDDLE INSOMNIA: ICD-10-CM

## 2017-08-04 RX ORDER — PRAZOSIN HYDROCHLORIDE 2 MG/1
2 CAPSULE ORAL NIGHTLY
Qty: 30 CAPSULE | Refills: 5 | Status: SHIPPED | OUTPATIENT
Start: 2017-08-04 | End: 2017-10-24 | Stop reason: SDUPTHER

## 2017-08-12 DIAGNOSIS — E78.2 MIXED HYPERLIPIDEMIA: ICD-10-CM

## 2017-08-14 RX ORDER — CHOLESTYRAMINE 4 G/9G
1 POWDER, FOR SUSPENSION ORAL 3 TIMES DAILY
Qty: 90 PACKET | Refills: 3 | Status: SHIPPED | OUTPATIENT
Start: 2017-08-14 | End: 2018-02-25 | Stop reason: SDUPTHER

## 2017-08-23 ENCOUNTER — OFFICE VISIT (OUTPATIENT)
Dept: UROLOGY | Facility: CLINIC | Age: 82
End: 2017-08-23

## 2017-08-23 VITALS
BODY MASS INDEX: 37.22 KG/M2 | DIASTOLIC BLOOD PRESSURE: 62 MMHG | SYSTOLIC BLOOD PRESSURE: 131 MMHG | WEIGHT: 260 LBS | HEIGHT: 70 IN

## 2017-08-23 DIAGNOSIS — N32.81 OAB (OVERACTIVE BLADDER): ICD-10-CM

## 2017-08-23 DIAGNOSIS — R35.0 FREQUENCY OF URINATION: ICD-10-CM

## 2017-08-23 DIAGNOSIS — C61 CANCER OF PROSTATE (HCC): Primary | ICD-10-CM

## 2017-08-23 LAB
BILIRUB BLD-MCNC: NEGATIVE MG/DL
CLARITY, POC: CLEAR
COLOR UR: YELLOW
GLUCOSE UR STRIP-MCNC: NEGATIVE MG/DL
KETONES UR QL: NEGATIVE
LEUKOCYTE EST, POC: NEGATIVE
NITRITE UR-MCNC: NEGATIVE MG/ML
PH UR: 6 [PH] (ref 5–8)
PROT UR STRIP-MCNC: NEGATIVE MG/DL
RBC # UR STRIP: NEGATIVE /UL
SP GR UR: 1.02 (ref 1–1.03)
UROBILINOGEN UR QL: NORMAL

## 2017-08-23 PROCEDURE — 99213 OFFICE O/P EST LOW 20 MIN: CPT | Performed by: UROLOGY

## 2017-08-23 PROCEDURE — 81003 URINALYSIS AUTO W/O SCOPE: CPT | Performed by: UROLOGY

## 2017-08-23 NOTE — PROGRESS NOTES
Subjective    Mr. Castro is 82 y.o. male    CHIEF COMPLAINT: Prostate cancer    The patient has hormone refractory prostate cancer he is doing quite well he is been on Xtandi now for about 3-4 months he is doing quite well he is having no significant side effects his most recent PSA is undetectable at less than 0.1.    He denies any weight loss or anorexia bone pain or decreased change in his metastatic symptoms    He has had radiation therapy his prostate and his OAB symptoms with AUA score today of 24 no urgency frequency gross hematuria flank pain burning stinging etc. at this time that his change in      History of Present Illness      The following portions of the patient's history were reviewed and updated as appropriate: allergies, current medications, past family history, past medical history, past social history, past surgical history and problem list.    Review of Systems   Constitutional: Negative.  Negative for chills and fever.   Gastrointestinal: Negative for abdominal distention, abdominal pain, anal bleeding, blood in stool and nausea.   Genitourinary: Positive for frequency and urgency. Negative for difficulty urinating, dysuria, flank pain and hematuria.   Psychiatric/Behavioral: Negative.  Negative for agitation and confusion.         Current Outpatient Prescriptions:   •  enzalutamide (XTANDI) 40 MG chemo capsule, Take  by mouth Daily., Disp: , Rfl:   •  albuterol (PROVENTIL HFA;VENTOLIN HFA) 108 (90 BASE) MCG/ACT inhaler, Inhale 2 puffs every 4 (four) hours as needed for wheezing., Disp: , Rfl:   •  buPROPion XL (WELLBUTRIN XL) 300 MG 24 hr tablet, Take 300 mg by mouth daily., Disp: , Rfl:   •  carvedilol (COREG) 12.5 MG tablet, Take 12.5 mg by mouth 2 (two) times a day with meals., Disp: , Rfl:   •  carvedilol (COREG) 6.25 MG tablet, Take 6.25 mg by mouth daily., Disp: , Rfl:   •  celecoxib (CeleBREX) 200 MG capsule, Take 200 mg by mouth daily., Disp: , Rfl:   •  digoxin (LANOXIN) 125 MCG  tablet, Take 125 mcg by mouth every day., Disp: , Rfl:   •  digoxin (LANOXIN) 250 MCG tablet, Take 250 mcg by mouth every day., Disp: , Rfl:   •  DULoxetine (CYMBALTA) 60 MG capsule, Take 60 mg by mouth daily., Disp: , Rfl:   •  enzalutamide (XTANDI) 40 MG chemo capsule, Take 4 capsules by mouth Daily., Disp: 90 capsule, Rfl: 5  •  fluticasone (FLONASE) 50 MCG/ACT nasal spray, 2 sprays into each nostril daily. Administer 2 sprays in each nostril for each dose., Disp: , Rfl:   •  formoterol (FORADIL) 12 MCG capsule for inhaler, Place 12 mcg into inhaler and inhale 2 (two) times a day., Disp: , Rfl:   •  furosemide (LASIX) 10 MG/ML solution, Take 60 mg by mouth daily., Disp: , Rfl:   •  furosemide (LASIX) 40 MG tablet, Take 40 mg by mouth 2 (two) times a day., Disp: , Rfl:   •  glimepiride (AMARYL) 4 MG tablet, Take 4 mg by mouth every morning before breakfast., Disp: , Rfl:   •  leuprolide (LUPRON) 30 MG injection, Inject 30 mg into the shoulder, thigh, or buttocks Every 4 (Four) Months., Disp: , Rfl:   •  lisinopril (PRINIVIL,ZESTRIL) 40 MG tablet, Take 40 mg by mouth daily., Disp: , Rfl:   •  metolazone (ZAROXOLYN) 2.5 MG tablet, Take 2.5 mg by mouth daily., Disp: , Rfl:   •  Mirabegron ER (MYRBETRIQ) 50 MG tablet sustained-release 24 hour, Take 50 mg by mouth daily., Disp: , Rfl:   •  Multiple Vitamins-Minerals (MULTIVITAMIN ADULT PO), Take  by mouth daily., Disp: , Rfl:   •  potassium chloride (K-DUR,KLOR-CON) 10 MEQ CR tablet, Take 10 mEq by mouth 2 (two) times a day., Disp: , Rfl:   •  predniSONE (DELTASONE) 10 MG tablet, Take 10 mg by mouth daily., Disp: , Rfl:   •  rivaroxaban (XARELTO) 20 MG tablet, Take 20 mg by mouth daily., Disp: , Rfl:   •  sitaGLIPtin (JANUVIA) 100 MG tablet, Take 100 mg by mouth daily., Disp: , Rfl:   •  SITagliptin-MetFORMIN HCl (JANUMET PO), Take  by mouth., Disp: , Rfl:   •  terazosin (HYTRIN) 5 MG capsule, Take 5 mg by mouth every night., Disp: , Rfl:     Past Medical History:  "  Diagnosis Date   • Arthritis    • Cancer     prostate   • Cellulitis    • CHF (congestive heart failure)    • Diabetes mellitus    • Elevated prostate specific antigen (PSA)    • Hematuria    • Hypertension    • Overactive bladder    • SOB (shortness of breath)        Past Surgical History:   Procedure Laterality Date   • EYE SURGERY     • HERNIA REPAIR     • JOINT REPLACEMENT     • REPLACEMENT TOTAL KNEE BILATERAL         Social History     Social History   • Marital status:      Spouse name: N/A   • Number of children: N/A   • Years of education: N/A     Social History Main Topics   • Smoking status: Never Smoker   • Smokeless tobacco: None   • Alcohol use No   • Drug use: None   • Sexual activity: Not Asked     Other Topics Concern   • None     Social History Narrative       Family History   Problem Relation Age of Onset   • Prostate cancer Son    • No Known Problems Father    • No Known Problems Mother        Objective    /62  Ht 70\" (177.8 cm)  Wt 260 lb (118 kg)  BMI 37.31 kg/m2    Physical Exam      Office Visit on 07/19/2017   Component Date Value Ref Range Status   • Color 07/19/2017 Yellow  Yellow, Straw, Dark Yellow, Julee Final   • Clarity, UA 07/19/2017 Clear  Clear Final   • Glucose, UA 07/19/2017 Negative  Negative, 1000 mg/dL (3+) mg/dL Final   • Bilirubin 07/19/2017 Negative  Negative Final   • Ketones, UA 07/19/2017 Negative  Negative Final   • Specific Gravity  07/19/2017 1.005  1.005 - 1.030 Final   • Blood, UA 07/19/2017 Negative  Negative Final   • pH, Urine 07/19/2017 5.5  5.0 - 8.0 Final   • Protein, POC 07/19/2017 Negative  Negative mg/dL Final   • Urobilinogen, UA 07/19/2017 Normal  Normal Final   • Leukocytes 07/19/2017 Negative  Negative Final   • Nitrite, UA 07/19/2017 Negative  Negative Final       Results for orders placed or performed in visit on 08/23/17   POC Urinalysis Dipstick, Automated   Result Value Ref Range    Color Yellow Yellow, Straw, Dark Yellow, Julee "    Clarity, UA Clear Clear    Glucose, UA Negative Negative, 1000 mg/dL (3+) mg/dL    Bilirubin Negative Negative    Ketones, UA Negative Negative    Specific Gravity  1.020 1.005 - 1.030    Blood, UA Negative Negative    pH, Urine 6.0 5.0 - 8.0    Protein, POC Negative Negative mg/dL    Urobilinogen, UA Normal Normal    Leukocytes Negative Negative    Nitrite, UA Negative Negative       Assessment and Plan    Prakash was seen today for prostate cancer.    Diagnoses and all orders for this visit:    Cancer of prostate  -     PSA; Future  -     CBC & Differential; Future  -     Comprehensive Metabolic Panel; Future    OAB (overactive bladder)  -     POC Urinalysis Dipstick, Automated    Frequency of urination    Plan--from a prostate cancer point review the patient seems doing great on Xtandi I's PSA is undetectable he is feeling very well having no sniffing side effects we will continue the drug was seen back again in 1 month with blood work.    Result OAB symptoms and we tried multiple medications none of which have been really successful so I do not have anything at THAT POINT OF VIEW

## 2017-09-01 RX ORDER — LISINOPRIL 40 MG/1
40 TABLET ORAL DAILY
Qty: 90 TABLET | Refills: 3 | Status: SHIPPED | OUTPATIENT
Start: 2017-09-01 | End: 2018-08-30 | Stop reason: SDUPTHER

## 2017-09-01 RX ORDER — LISINOPRIL 40 MG/1
40 TABLET ORAL DAILY
Qty: 90 TABLET | Refills: 3 | Status: SHIPPED | OUTPATIENT
Start: 2017-09-01 | End: 2017-11-15 | Stop reason: SDUPTHER

## 2017-09-20 ENCOUNTER — OFFICE VISIT (OUTPATIENT)
Dept: UROLOGY | Facility: CLINIC | Age: 82
End: 2017-09-20

## 2017-09-20 VITALS — BODY MASS INDEX: 37.22 KG/M2 | WEIGHT: 260 LBS | TEMPERATURE: 98.6 F | HEIGHT: 70 IN

## 2017-09-20 DIAGNOSIS — R35.0 FREQUENCY OF URINATION: ICD-10-CM

## 2017-09-20 DIAGNOSIS — R35.1 NOCTURIA: ICD-10-CM

## 2017-09-20 DIAGNOSIS — C61 CANCER OF PROSTATE (HCC): Primary | ICD-10-CM

## 2017-09-20 LAB
BILIRUB BLD-MCNC: ABNORMAL MG/DL
CLARITY, POC: CLEAR
COLOR UR: YELLOW
GLUCOSE UR STRIP-MCNC: NEGATIVE MG/DL
KETONES UR QL: NEGATIVE
LEUKOCYTE EST, POC: NEGATIVE
NITRITE UR-MCNC: NEGATIVE MG/ML
PH UR: 5 [PH] (ref 5–8)
PROT UR STRIP-MCNC: NEGATIVE MG/DL
RBC # UR STRIP: NEGATIVE /UL
SP GR UR: 1.01 (ref 1–1.03)
UROBILINOGEN UR QL: NORMAL

## 2017-09-20 PROCEDURE — 99213 OFFICE O/P EST LOW 20 MIN: CPT | Performed by: UROLOGY

## 2017-09-20 PROCEDURE — 81003 URINALYSIS AUTO W/O SCOPE: CPT | Performed by: UROLOGY

## 2017-09-20 NOTE — PROGRESS NOTES
Subjective    Mr. Castro is 82 y.o. male    CHIEF COMPLAINT: Hormone refractory prostate cancer    The patient comes back and then again in today with his wife for follow-up of hormone refractory prostate cancer he is been on Xtandi now for several months really doing quite well he is having no significant side effects no symptoms metastatic disease such as bone pain back pain weight loss or anorexia.    His most recent PSA is graded 0.1 other laboratory work looks relatively normal for him    He does have overactive irritable bladder with urgency frequency nocturia etc. he has not responded well to previous medications this is status post radiation therapy I think he also takes a diuretic in the morning and has to go frequently because of that in that slows up as a day goes on.    Obviously this is an expected symptom      History of Present Illness      The following portions of the patient's history were reviewed and updated as appropriate: allergies, current medications, past family history, past medical history, past social history, past surgical history and problem list.    Review of Systems   Constitutional: Negative.  Negative for chills and fever.   Gastrointestinal: Negative for abdominal distention, abdominal pain, anal bleeding, blood in stool and nausea.   Genitourinary: Positive for frequency and urgency. Negative for difficulty urinating, dysuria, flank pain and hematuria.   Psychiatric/Behavioral: Negative.  Negative for agitation and confusion.         Current Outpatient Prescriptions:   •  albuterol (PROVENTIL HFA;VENTOLIN HFA) 108 (90 BASE) MCG/ACT inhaler, Inhale 2 puffs every 4 (four) hours as needed for wheezing., Disp: , Rfl:   •  buPROPion XL (WELLBUTRIN XL) 300 MG 24 hr tablet, Take 300 mg by mouth daily., Disp: , Rfl:   •  carvedilol (COREG) 12.5 MG tablet, Take 12.5 mg by mouth 2 (two) times a day with meals., Disp: , Rfl:   •  carvedilol (COREG) 6.25 MG tablet, Take 6.25 mg by mouth  daily., Disp: , Rfl:   •  celecoxib (CeleBREX) 200 MG capsule, Take 200 mg by mouth daily., Disp: , Rfl:   •  digoxin (LANOXIN) 125 MCG tablet, Take 125 mcg by mouth every day., Disp: , Rfl:   •  digoxin (LANOXIN) 250 MCG tablet, Take 250 mcg by mouth every day., Disp: , Rfl:   •  DULoxetine (CYMBALTA) 60 MG capsule, Take 60 mg by mouth daily., Disp: , Rfl:   •  enzalutamide (XTANDI) 40 MG chemo capsule, Take 4 capsules by mouth Daily., Disp: 90 capsule, Rfl: 5  •  enzalutamide (XTANDI) 40 MG chemo capsule, Take  by mouth Daily., Disp: , Rfl:   •  fluticasone (FLONASE) 50 MCG/ACT nasal spray, 2 sprays into each nostril daily. Administer 2 sprays in each nostril for each dose., Disp: , Rfl:   •  formoterol (FORADIL) 12 MCG capsule for inhaler, Place 12 mcg into inhaler and inhale 2 (two) times a day., Disp: , Rfl:   •  furosemide (LASIX) 10 MG/ML solution, Take 60 mg by mouth daily., Disp: , Rfl:   •  furosemide (LASIX) 40 MG tablet, Take 40 mg by mouth 2 (two) times a day., Disp: , Rfl:   •  glimepiride (AMARYL) 4 MG tablet, Take 4 mg by mouth every morning before breakfast., Disp: , Rfl:   •  leuprolide (LUPRON) 30 MG injection, Inject 30 mg into the shoulder, thigh, or buttocks Every 4 (Four) Months., Disp: , Rfl:   •  lisinopril (PRINIVIL,ZESTRIL) 40 MG tablet, Take 40 mg by mouth daily., Disp: , Rfl:   •  metolazone (ZAROXOLYN) 2.5 MG tablet, Take 2.5 mg by mouth daily., Disp: , Rfl:   •  Mirabegron ER (MYRBETRIQ) 50 MG tablet sustained-release 24 hour, Take 50 mg by mouth daily., Disp: , Rfl:   •  Multiple Vitamins-Minerals (MULTIVITAMIN ADULT PO), Take  by mouth daily., Disp: , Rfl:   •  potassium chloride (K-DUR,KLOR-CON) 10 MEQ CR tablet, Take 10 mEq by mouth 2 (two) times a day., Disp: , Rfl:   •  predniSONE (DELTASONE) 10 MG tablet, Take 10 mg by mouth daily., Disp: , Rfl:   •  rivaroxaban (XARELTO) 20 MG tablet, Take 20 mg by mouth daily., Disp: , Rfl:   •  sitaGLIPtin (JANUVIA) 100 MG tablet, Take 100 mg  "by mouth daily., Disp: , Rfl:   •  SITagliptin-MetFORMIN HCl (JANUMET PO), Take  by mouth., Disp: , Rfl:   •  terazosin (HYTRIN) 5 MG capsule, Take 5 mg by mouth every night., Disp: , Rfl:     Past Medical History:   Diagnosis Date   • Arthritis    • Cancer     prostate   • Cellulitis    • CHF (congestive heart failure)    • Diabetes mellitus    • Elevated prostate specific antigen (PSA)    • Hematuria    • Hypertension    • Overactive bladder    • SOB (shortness of breath)        Past Surgical History:   Procedure Laterality Date   • EYE SURGERY     • HERNIA REPAIR     • JOINT REPLACEMENT     • REPLACEMENT TOTAL KNEE BILATERAL         Social History     Social History   • Marital status:      Spouse name: N/A   • Number of children: N/A   • Years of education: N/A     Social History Main Topics   • Smoking status: Never Smoker   • Smokeless tobacco: None   • Alcohol use No   • Drug use: None   • Sexual activity: Not Asked     Other Topics Concern   • None     Social History Narrative       Family History   Problem Relation Age of Onset   • Prostate cancer Son    • No Known Problems Father    • No Known Problems Mother        Objective    Temp 98.6 °F (37 °C)  Ht 70\" (177.8 cm)  Wt 260 lb (118 kg)  BMI 37.31 kg/m2    Physical Exam      Office Visit on 08/23/2017   Component Date Value Ref Range Status   • Color 08/23/2017 Yellow  Yellow, Straw, Dark Yellow, Julee Final   • Clarity, UA 08/23/2017 Clear  Clear Final   • Glucose, UA 08/23/2017 Negative  Negative, 1000 mg/dL (3+) mg/dL Final   • Bilirubin 08/23/2017 Negative  Negative Final   • Ketones, UA 08/23/2017 Negative  Negative Final   • Specific Gravity  08/23/2017 1.020  1.005 - 1.030 Final   • Blood, UA 08/23/2017 Negative  Negative Final   • pH, Urine 08/23/2017 6.0  5.0 - 8.0 Final   • Protein, POC 08/23/2017 Negative  Negative mg/dL Final   • Urobilinogen, UA 08/23/2017 Normal  Normal Final   • Leukocytes 08/23/2017 Negative  Negative Final   • " Nitrite, UA 08/23/2017 Negative  Negative Final       Results for orders placed or performed in visit on 09/20/17   POC Urinalysis Dipstick, Automated   Result Value Ref Range    Color Yellow Yellow, Straw, Dark Yellow, Julee    Clarity, UA Clear Clear    Glucose, UA Negative Negative, 1000 mg/dL (3+) mg/dL    Bilirubin Small (1+) (A) Negative    Ketones, UA Negative Negative    Specific Gravity  1.010 1.005 - 1.030    Blood, UA Negative Negative    pH, Urine 5.0 5.0 - 8.0    Protein, POC Negative Negative mg/dL    Urobilinogen, UA Normal Normal    Leukocytes Negative Negative    Nitrite, UA Negative Negative       Assessment and Plan    Prakash was seen today for cancer of prostate.    Diagnoses and all orders for this visit:    Cancer of prostate  -     POC Urinalysis Dipstick, Automated  -     PSA; Future  -     CBC & Differential; Future  -     Comprehensive Metabolic Panel; Future    Nocturia    Frequency of urination    Plan--from a hormone refractory prostate cancer point review the patient is doing very well he has responded very nicely to Xtandi his PSA is undetectable and he has no other symptoms of metastatic disease    As far as his overactive irritable bladder symptoms again we will just observe those at this time

## 2017-10-13 DIAGNOSIS — R35.0 FREQUENCY OF URINATION: Primary | ICD-10-CM

## 2017-10-13 RX ORDER — MIRABEGRON 25 MG/1
TABLET, FILM COATED, EXTENDED RELEASE ORAL
Qty: 90 TABLET | Refills: 4 | Status: SHIPPED | OUTPATIENT
Start: 2017-10-13 | End: 2018-10-01 | Stop reason: SDUPTHER

## 2017-10-24 DIAGNOSIS — G47.00 MIDDLE INSOMNIA: ICD-10-CM

## 2017-10-24 RX ORDER — DULOXETIN HYDROCHLORIDE 60 MG/1
60 CAPSULE, DELAYED RELEASE ORAL DAILY
Qty: 90 CAPSULE | Refills: 3 | Status: SHIPPED | OUTPATIENT
Start: 2017-10-24 | End: 2018-03-15 | Stop reason: SDUPTHER

## 2017-10-24 RX ORDER — DIGOXIN 125 MCG
125 TABLET ORAL DAILY
Qty: 90 TABLET | Refills: 3 | Status: SHIPPED | OUTPATIENT
Start: 2017-10-24 | End: 2018-10-29 | Stop reason: SDUPTHER

## 2017-10-24 RX ORDER — PRAZOSIN HYDROCHLORIDE 2 MG/1
2 CAPSULE ORAL NIGHTLY
Qty: 30 CAPSULE | Refills: 5 | Status: SHIPPED | OUTPATIENT
Start: 2017-10-24 | End: 2018-04-23 | Stop reason: SDUPTHER

## 2017-10-25 ENCOUNTER — OFFICE VISIT (OUTPATIENT)
Dept: UROLOGY | Facility: CLINIC | Age: 82
End: 2017-10-25

## 2017-10-25 DIAGNOSIS — C61 CANCER OF PROSTATE (HCC): Primary | ICD-10-CM

## 2017-10-25 PROCEDURE — 96402 CHEMO HORMON ANTINEOPL SQ/IM: CPT | Performed by: UROLOGY

## 2017-10-31 DIAGNOSIS — J44.9 CHRONIC OBSTRUCTIVE PULMONARY DISEASE, UNSPECIFIED COPD TYPE (HCC): ICD-10-CM

## 2017-10-31 DIAGNOSIS — I50.42 CHRONIC COMBINED SYSTOLIC AND DIASTOLIC CONGESTIVE HEART FAILURE (HCC): ICD-10-CM

## 2017-10-31 DIAGNOSIS — Z12.5 PROSTATE CANCER SCREENING: ICD-10-CM

## 2017-10-31 DIAGNOSIS — E87.4 METABOLIC ALKALOSIS WITH RESPIRATORY ACIDOSIS: ICD-10-CM

## 2017-10-31 DIAGNOSIS — I10 ESSENTIAL HYPERTENSION: Primary | ICD-10-CM

## 2017-10-31 LAB
ALBUMIN SERPL-MCNC: 4 G/DL
ALP BLD-CCNC: 39 U/L
ALT SERPL-CCNC: 17 U/L
ANION GAP SERPL CALCULATED.3IONS-SCNC: 8 MMOL/L
AST SERPL-CCNC: 12 U/L
BASOPHILS ABSOLUTE: 0 /ΜL
BASOPHILS RELATIVE PERCENT: 0.4 %
BILIRUB SERPL-MCNC: 0.6 MG/DL (ref 0.1–1.4)
BUN BLDV-MCNC: 16 MG/DL
CALCIUM SERPL-MCNC: 9.7 MG/DL
CHLORIDE BLD-SCNC: 99 MMOL/L
CO2: 32 MMOL/L
CREAT SERPL-MCNC: 0.9 MG/DL
EOSINOPHILS ABSOLUTE: 0.1 /ΜL
EOSINOPHILS RELATIVE PERCENT: 1.4 %
GFR CALCULATED: 81
GLUCOSE BLD-MCNC: 136 MG/DL
HCT VFR BLD CALC: 39 % (ref 41–53)
HEMOGLOBIN: 13.5 G/DL (ref 13.5–17.5)
LYMPHOCYTES ABSOLUTE: 1 /ΜL
LYMPHOCYTES RELATIVE PERCENT: 10.4 %
MCH RBC QN AUTO: 33.6 PG
MCHC RBC AUTO-ENTMCNC: 34.6 G/DL
MCV RBC AUTO: 97 FL
MONOCYTES ABSOLUTE: 0.7 /ΜL
MONOCYTES RELATIVE PERCENT: 7.3 %
NEUTROPHILS ABSOLUTE: 7.8 /ΜL
NEUTROPHILS RELATIVE PERCENT: 80.5 %
PDW BLD-RTO: 13.1 %
PLATELET # BLD: 180 K/ΜL
PMV BLD AUTO: 9.5 FL
POTASSIUM SERPL-SCNC: 4.3 MMOL/L
PROSTATE SPECIFIC ANTIGEN: 0.1 NG/ML
RBC # BLD: 4.02 10^6/ΜL
SODIUM BLD-SCNC: 139 MMOL/L
TOTAL PROTEIN: 7.5
WBC # BLD: 9.7 10^3/ML

## 2017-11-01 ENCOUNTER — TELEPHONE (OUTPATIENT)
Dept: PRIMARY CARE CLINIC | Age: 82
End: 2017-11-01

## 2017-11-01 ENCOUNTER — OFFICE VISIT (OUTPATIENT)
Dept: UROLOGY | Facility: CLINIC | Age: 82
End: 2017-11-01

## 2017-11-01 VITALS — HEIGHT: 70 IN | WEIGHT: 260 LBS | BODY MASS INDEX: 37.22 KG/M2 | TEMPERATURE: 98.6 F

## 2017-11-01 DIAGNOSIS — C61 CANCER OF PROSTATE (HCC): Primary | ICD-10-CM

## 2017-11-01 DIAGNOSIS — R35.0 FREQUENCY OF URINATION: ICD-10-CM

## 2017-11-01 DIAGNOSIS — N32.81 OAB (OVERACTIVE BLADDER): ICD-10-CM

## 2017-11-01 LAB
BILIRUB BLD-MCNC: NEGATIVE MG/DL
CLARITY, POC: CLEAR
COLOR UR: YELLOW
GLUCOSE UR STRIP-MCNC: NEGATIVE MG/DL
KETONES UR QL: NEGATIVE
LEUKOCYTE EST, POC: NEGATIVE
NITRITE UR-MCNC: NEGATIVE MG/ML
PH UR: 5 [PH] (ref 5–8)
PROT UR STRIP-MCNC: NEGATIVE MG/DL
RBC # UR STRIP: ABNORMAL /UL
SP GR UR: 1.01 (ref 1–1.03)
UROBILINOGEN UR QL: NORMAL

## 2017-11-01 PROCEDURE — 81003 URINALYSIS AUTO W/O SCOPE: CPT | Performed by: UROLOGY

## 2017-11-01 PROCEDURE — 99213 OFFICE O/P EST LOW 20 MIN: CPT | Performed by: UROLOGY

## 2017-11-01 NOTE — PROGRESS NOTES
Subjective    Mr. Castro is 82 y.o. 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 normal as well.    He denies any change in his symptoms such as bone pain back pain weight loss or anorexia so no symptoms of metastatic disease at all at this point in time.    He also has an irritable overactive bladder from radiation therapy he has had tried multiple drugs none of which seemed to have helped him and he is just learned to live with the symptoms he does have a high AUA score I think because of this      History of Present Illness      The following portions of the patient's history were reviewed and updated as appropriate: allergies, current medications, past family history, past medical history, past social history, past surgical history and problem list.    Review of Systems   Constitutional: Negative.  Negative for chills and fever.   Gastrointestinal: Negative for abdominal distention, abdominal pain, anal bleeding, blood in stool and nausea.   Genitourinary: Positive for frequency and urgency. Negative for difficulty urinating, dysuria, flank pain and hematuria.   Psychiatric/Behavioral: Negative.  Negative for agitation and confusion.         Current Outpatient Prescriptions:   •  albuterol (PROVENTIL HFA;VENTOLIN HFA) 108 (90 BASE) MCG/ACT inhaler, Inhale 2 puffs every 4 (four) hours as needed for wheezing., Disp: , Rfl:   •  buPROPion XL (WELLBUTRIN XL) 300 MG 24 hr tablet, Take 300 mg by mouth daily., Disp: , Rfl:   •  carvedilol (COREG) 12.5 MG tablet, Take 12.5 mg by mouth 2 (two) times a day with meals., Disp: , Rfl:   •  carvedilol (COREG) 6.25 MG tablet, Take 6.25 mg by mouth  daily., Disp: , Rfl:   •  celecoxib (CeleBREX) 200 MG capsule, Take 200 mg by mouth daily., Disp: , Rfl:   •  digoxin (LANOXIN) 125 MCG tablet, Take 125 mcg by mouth every day., Disp: , Rfl:   •  digoxin (LANOXIN) 250 MCG tablet, Take 250 mcg by mouth every day., Disp: , Rfl:   •  DULoxetine (CYMBALTA) 60 MG capsule, Take 60 mg by mouth daily., Disp: , Rfl:   •  enzalutamide (XTANDI) 40 MG chemo capsule, Take 4 capsules by mouth Daily., Disp: 90 capsule, Rfl: 5  •  enzalutamide (XTANDI) 40 MG chemo capsule, Take  by mouth Daily., Disp: , Rfl:   •  fluticasone (FLONASE) 50 MCG/ACT nasal spray, 2 sprays into each nostril daily. Administer 2 sprays in each nostril for each dose., Disp: , Rfl:   •  formoterol (FORADIL) 12 MCG capsule for inhaler, Place 12 mcg into inhaler and inhale 2 (two) times a day., Disp: , Rfl:   •  furosemide (LASIX) 10 MG/ML solution, Take 60 mg by mouth daily., Disp: , Rfl:   •  furosemide (LASIX) 40 MG tablet, Take 40 mg by mouth 2 (two) times a day., Disp: , Rfl:   •  glimepiride (AMARYL) 4 MG tablet, Take 4 mg by mouth every morning before breakfast., Disp: , Rfl:   •  leuprolide (LUPRON) 30 MG injection, Inject 30 mg into the shoulder, thigh, or buttocks Every 4 (Four) Months., Disp: , Rfl:   •  lisinopril (PRINIVIL,ZESTRIL) 40 MG tablet, Take 40 mg by mouth daily., Disp: , Rfl:   •  metolazone (ZAROXOLYN) 2.5 MG tablet, Take 2.5 mg by mouth daily., Disp: , Rfl:   •  Mirabegron ER (MYRBETRIQ) 50 MG tablet sustained-release 24 hour, Take 50 mg by mouth daily., Disp: , Rfl:   •  Multiple Vitamins-Minerals (MULTIVITAMIN ADULT PO), Take  by mouth daily., Disp: , Rfl:   •  MYRBETRIQ 25 MG tablet sustained-release 24 hour 24 hr tablet, TAKE 1 TABLET BY MOUTH DAILY, Disp: 90 tablet, Rfl: 4  •  potassium chloride (K-DUR,KLOR-CON) 10 MEQ CR tablet, Take 10 mEq by mouth 2 (two) times a day., Disp: , Rfl:   •  predniSONE (DELTASONE) 10 MG tablet, Take 10 mg by mouth daily., Disp: , Rfl:   •   "rivaroxaban (XARELTO) 20 MG tablet, Take 20 mg by mouth daily., Disp: , Rfl:   •  sitaGLIPtin (JANUVIA) 100 MG tablet, Take 100 mg by mouth daily., Disp: , Rfl:   •  SITagliptin-MetFORMIN HCl (JANUMET PO), Take  by mouth., Disp: , Rfl:   •  terazosin (HYTRIN) 5 MG capsule, Take 5 mg by mouth every night., Disp: , Rfl:     Past Medical History:   Diagnosis Date   • Arthritis    • Cancer     prostate   • Cellulitis    • CHF (congestive heart failure)    • Diabetes mellitus    • Elevated prostate specific antigen (PSA)    • Hematuria    • Hypertension    • Overactive bladder    • SOB (shortness of breath)        Past Surgical History:   Procedure Laterality Date   • EYE SURGERY     • HERNIA REPAIR     • JOINT REPLACEMENT     • REPLACEMENT TOTAL KNEE BILATERAL         Social History     Social History   • Marital status:      Spouse name: N/A   • Number of children: N/A   • Years of education: N/A     Social History Main Topics   • Smoking status: Never Smoker   • Smokeless tobacco: None   • Alcohol use No   • Drug use: None   • Sexual activity: Not Asked     Other Topics Concern   • None     Social History Narrative       Family History   Problem Relation Age of Onset   • Prostate cancer Son    • No Known Problems Father    • No Known Problems Mother        Objective    Temp 98.6 °F (37 °C)  Ht 70\" (177.8 cm)  Wt 260 lb (118 kg)  BMI 37.31 kg/m2    Physical Exam      Office Visit on 09/20/2017   Component Date Value Ref Range Status   • Color 09/20/2017 Yellow  Yellow, Straw, Dark Yellow, Julee Final   • Clarity, UA 09/20/2017 Clear  Clear Final   • Glucose, UA 09/20/2017 Negative  Negative, 1000 mg/dL (3+) mg/dL Final   • Bilirubin 09/20/2017 Small (1+)* Negative Final   • Ketones, UA 09/20/2017 Negative  Negative Final   • Specific Gravity  09/20/2017 1.010  1.005 - 1.030 Final   • Blood, UA 09/20/2017 Negative  Negative Final   • pH, Urine 09/20/2017 5.0  5.0 - 8.0 Final   • Protein, POC 09/20/2017 " Negative  Negative mg/dL Final   • Urobilinogen, UA 09/20/2017 Normal  Normal Final   • Leukocytes 09/20/2017 Negative  Negative Final   • Nitrite, UA 09/20/2017 Negative  Negative Final       Results for orders placed or performed in visit on 11/01/17   POC Urinalysis Dipstick, Automated   Result Value Ref Range    Color Yellow Yellow, Straw, Dark Yellow, Julee    Clarity, UA Clear Clear    Glucose, UA Negative Negative, 1000 mg/dL (3+) mg/dL    Bilirubin Negative Negative    Ketones, UA Negative Negative    Specific Gravity  1.010 1.005 - 1.030    Blood, UA Trace (A) Negative    pH, Urine 5.0 5.0 - 8.0    Protein, POC Negative Negative mg/dL    Urobilinogen, UA Normal Normal    Leukocytes Negative Negative    Nitrite, UA Negative Negative       Assessment and Plan    Diagnoses and all orders for this visit:    Cancer of prostate  -     POC Urinalysis Dipstick, Automated    Frequency of urination    OAB (overactive bladder)    Other orders  -     SCANNED - LABS  Plan--the patient seems to doing well on his hormonal therapy and Xtandi 0.2 continue this on was seen back again in a month with repeat blood work he will call sooner as needed.    From overactive bladder point review on our group treated with anything at this point in time since he has failed previous attempts

## 2017-11-15 ENCOUNTER — OFFICE VISIT (OUTPATIENT)
Dept: PRIMARY CARE CLINIC | Age: 82
End: 2017-11-15
Payer: MEDICARE

## 2017-11-15 VITALS
DIASTOLIC BLOOD PRESSURE: 70 MMHG | HEIGHT: 70 IN | HEART RATE: 64 BPM | BODY MASS INDEX: 38.22 KG/M2 | TEMPERATURE: 96.6 F | OXYGEN SATURATION: 94 % | WEIGHT: 267 LBS | SYSTOLIC BLOOD PRESSURE: 130 MMHG

## 2017-11-15 DIAGNOSIS — J98.4 MIXED RESTRICTIVE AND OBSTRUCTIVE LUNG DISEASE (HCC): ICD-10-CM

## 2017-11-15 DIAGNOSIS — I50.42 CHRONIC COMBINED SYSTOLIC AND DIASTOLIC CONGESTIVE HEART FAILURE (HCC): ICD-10-CM

## 2017-11-15 DIAGNOSIS — E78.2 MIXED HYPERLIPIDEMIA: ICD-10-CM

## 2017-11-15 DIAGNOSIS — J44.9 CHRONIC OBSTRUCTIVE PULMONARY DISEASE, UNSPECIFIED COPD TYPE (HCC): ICD-10-CM

## 2017-11-15 DIAGNOSIS — J43.9 MIXED RESTRICTIVE AND OBSTRUCTIVE LUNG DISEASE (HCC): ICD-10-CM

## 2017-11-15 DIAGNOSIS — R53.83 FATIGUE, UNSPECIFIED TYPE: ICD-10-CM

## 2017-11-15 DIAGNOSIS — E11.42 TYPE 2 DIABETES MELLITUS WITH DIABETIC POLYNEUROPATHY, WITHOUT LONG-TERM CURRENT USE OF INSULIN (HCC): ICD-10-CM

## 2017-11-15 DIAGNOSIS — I48.20 CHRONIC ATRIAL FIBRILLATION (HCC): ICD-10-CM

## 2017-11-15 DIAGNOSIS — I10 ESSENTIAL HYPERTENSION: Primary | ICD-10-CM

## 2017-11-15 PROCEDURE — 1123F ACP DISCUSS/DSCN MKR DOCD: CPT | Performed by: PEDIATRICS

## 2017-11-15 PROCEDURE — G8926 SPIRO NO PERF OR DOC: HCPCS | Performed by: PEDIATRICS

## 2017-11-15 PROCEDURE — G8484 FLU IMMUNIZE NO ADMIN: HCPCS | Performed by: PEDIATRICS

## 2017-11-15 PROCEDURE — 4040F PNEUMOC VAC/ADMIN/RCVD: CPT | Performed by: PEDIATRICS

## 2017-11-15 PROCEDURE — 1036F TOBACCO NON-USER: CPT | Performed by: PEDIATRICS

## 2017-11-15 PROCEDURE — 3023F SPIROM DOC REV: CPT | Performed by: PEDIATRICS

## 2017-11-15 PROCEDURE — 90662 IIV NO PRSV INCREASED AG IM: CPT | Performed by: PEDIATRICS

## 2017-11-15 PROCEDURE — G0008 ADMIN INFLUENZA VIRUS VAC: HCPCS | Performed by: PEDIATRICS

## 2017-11-15 PROCEDURE — G8417 CALC BMI ABV UP PARAM F/U: HCPCS | Performed by: PEDIATRICS

## 2017-11-15 PROCEDURE — 99214 OFFICE O/P EST MOD 30 MIN: CPT | Performed by: PEDIATRICS

## 2017-11-15 PROCEDURE — G8427 DOCREV CUR MEDS BY ELIG CLIN: HCPCS | Performed by: PEDIATRICS

## 2017-11-15 ASSESSMENT — ENCOUNTER SYMPTOMS
SORE THROAT: 0
NAUSEA: 0
CHEST TIGHTNESS: 0
DIARRHEA: 0
COUGH: 0
WHEEZING: 0
BACK PAIN: 0
ABDOMINAL PAIN: 0
VOMITING: 0
SHORTNESS OF BREATH: 0

## 2017-11-15 NOTE — PATIENT INSTRUCTIONS
Patient Education        Learning About Meal Planning for Diabetes  Why plan your meals? Meal planning can be a key part of managing diabetes. Planning meals and snacks with the right balance of carbohydrate, protein, and fat can help you keep your blood sugar at the target level you set with your doctor. You don't have to eat special foods. You can eat what your family eats, including sweets once in a while. But you do have to pay attention to how often you eat and how much you eat of certain foods. You may want to work with a dietitian or a certified diabetes educator. He or she can give you tips and meal ideas and can answer your questions about meal planning. This health professional can also help you reach a healthy weight if that is one of your goals. What plan is right for you? Your dietitian or diabetes educator may suggest that you start with the plate format or carbohydrate counting. The plate format  The plate format is a simple way to help you manage how you eat. You plan meals by learning how much space each food should take on a plate. Using the plate format helps you spread carbohydrate throughout the day. It can make it easier to keep your blood sugar level within your target range. It also helps you see if you're eating healthy portion sizes. To use the plate format, you put non-starchy vegetables on half your plate. Add meat or meat substitutes on one-quarter of the plate. Put a grain or starchy vegetable (such as brown rice or a potato) on the final quarter of the plate. You can add a small piece of fruit and some low-fat or fat-free milk or yogurt, depending on your carbohydrate goal for each meal.  Here are some tips for using the plate format:  · Make sure that you are not using an oversized plate. A 9-inch plate is best. Many restaurants use larger plates. · Get used to using the plate format at home. Then you can use it when you eat out.   · Write down your questions about using the rapid-acting insulin to take before you eat. If you use an insulin pump, you get a constant rate of insulin during the day. So the pump must be programmed at meals to give you extra insulin to cover the rise in blood sugar after meals. When you know how much carbohydrate you will eat, you can take the right amount of insulin. Or, if you always use the same amount of insulin, you need to make sure that you eat the same amount of carbohydrate at meals. If you need more help to understand carbohydrate counting and food labels, ask your doctor, dietitian, or diabetes educator. How do you get started with meal planning? Here are some tips to get started:  · Plan your meals a week at a time. Don't forget to include snacks too. · Use cookbooks or online recipes to plan several main meals. Plan some quick meals for busy nights. You also can double some recipes that freeze well. Then you can save half for other busy nights when you don't have time to cook. · Make sure you have the ingredients you need for your recipes. If you're running low on basic items, put these items on your shopping list too. · List foods that you use to make breakfasts, lunches, and snacks. List plenty of fruits and vegetables. · Post this list on the refrigerator. Add to it as you think of more things you need. · Take the list to the store to do your weekly shopping. Follow-up care is a key part of your treatment and safety. Be sure to make and go to all appointments, and call your doctor if you are having problems. It's also a good idea to know your test results and keep a list of the medicines you take. Where can you learn more? Go to [email or web address]://[email or web address] org and sign in to your [employer] account. Enter Q398 in the [facility] box to learn more about \"Learning About Meal Planning for Diabetes. \"     If you do not have an account, please click on the \"Sign Up Now\" link.   Current as of: March 13, 2017  Content Version: 11.3  © 8279-2729 [employer]. Care instructions adapted under license by [facility] ([facility]). If you have questions about a medical condition or this instruction, always ask your healthcare professional. [address] any warranty or liability for your use of this information.

## 2017-11-15 NOTE — PROGRESS NOTES
[facility], [address]  Phone [phone]   Fax [phone]      OFFICE VISIT: 11/15/2017    Ayana Aguila- : 1935      HPI  Reason For Visit:  Saul Harris is a 81 y.o. Health Maintenance flu-today  Date of Most Recent Physical:  Over a year  Medicare Health Risk Assessment Form completed and in chart? 2017    The patient presents today for diabetes follow up        Diabetes Mellitus Type 2    Diet compliance:  compliant most of the time  Nutrition Consultation Needed:  no  Med Type:   Metformin 184 bid   trulicity 5.02EK weekly  Medication compliance:  compliant all of the time  Weight trend: fluctuating  Current exercise: no  Checkin times daily  Home blood sugar records: fasting range: 120  Low BG:  no  Eye exam current (within one year): yes  Checking Feet regularly:  yes - no sores  ACE/ARB:  yes - lisinopril  Aspirin: No: but recommended. Tobacco history: He  reports that he has never smoked. He has never used smokeless tobacco.    Lab Results   Component Value Date    LABA1C 6.1 (H) 2017    LABA1C 6.5 (H) 2017    LABA1C 6.2 (H) 2016     Lab Results   Component Value Date    LABMICR 2.50 2017    CREATININE 0.9 10/26/2017       Hypertension:    Home blood pressure monitoring: No.    He is not adherent to a low sodium diet. Hyperlipidemia:    Myalgias or GI upset: no   on cholestyramine (Questran)    Lab Results   Component Value Date    CHOL 162 2017    TRIG 204 (H) 2017    HDL 41 (L) 2017    LDLCALC 80 2017      Lab Results   Component Value Date    ALT 17 (L) 10/26/2017    AST 12 (L) 10/26/2017     Depression:  He is sleeping a lot. He does wear his bipap every night. Barriers To Success: none           height is 5' 10\" (1.778 m) and weight is 267 lb (121.1 kg). His temporal temperature is 96.6 °F (35.9 °C). His blood pressure is 130/70 and his pulse is 64. His oxygen saturation is 94%. Total Protein 07/13/2017 7.6     CO2 07/13/2017 33.8     Alb 07/13/2017 3.8     Alkaline Phosphatase 07/13/2017 43     Total Bilirubin 07/13/2017 0.90     Gfr Calculated 07/13/2017 58     WBC 07/13/2017 9.6     RBC 07/13/2017 4.44     Hemoglobin 07/13/2017 14.7     Hematocrit 07/13/2017 43.5     MCV 07/13/2017 98.0     MCH 07/13/2017 33.1     MCHC 07/13/2017 33.8     Platelets 44/39/5173 181     RDW 07/13/2017 12.8     MPV 07/13/2017 10.0     Neutrophils % 07/13/2017 75.5     Lymphocytes % 07/13/2017 13.4     Monocytes % 07/13/2017 7.9     Eosinophils % 07/13/2017 2.9     Basophils % 07/13/2017 0.3     Neutrophils # 07/13/2017 7.2     Lymphocytes # 07/13/2017 1.3     Monocytes # 07/13/2017 0.8     Eosinophils # 07/13/2017 0.3     Basophils # 07/13/2017 0.0    Orders Only on 05/26/2017   Component Date Value    WBC 05/26/2017 8.3     RBC 05/26/2017 4.05*    Hemoglobin 05/26/2017 13.6*    Hematocrit 05/26/2017 40.8*    MCV 05/26/2017 100.7*    MCH 05/26/2017 33.6*    MCHC 05/26/2017 33.3     RDW 05/26/2017 13.1     Platelets 51/08/1305 244     MPV 05/26/2017 11.0*    Neutrophils % 05/26/2017 70.4*    Lymphocytes % 05/26/2017 17.5*    Monocytes % 05/26/2017 8.4     Eosinophils % 05/26/2017 1.9     Basophils % 05/26/2017 0.6     Neutrophils # 05/26/2017 5.8     Lymphocytes # 05/26/2017 1.5     Monocytes # 05/26/2017 0.70     Eosinophils # 05/26/2017 0.20     Basophils # 05/26/2017 0.10     Sodium 05/26/2017 141     Potassium 05/26/2017 3.9     Chloride 05/26/2017 93*    CO2 05/26/2017 30*    Anion Gap 05/26/2017 18     Glucose 05/26/2017 180*    BUN 05/26/2017 34*    CREATININE 05/26/2017 0.9     GFR Non- 05/26/2017 >60     Calcium 05/26/2017 10.3*    Total Protein 05/26/2017 7.1     Alb 05/26/2017 4.2     Total Bilirubin 05/26/2017 0.7     Alkaline Phosphatase 05/26/2017 38*    ALT 05/26/2017 8     AST 05/26/2017 12     Hemoglobin A1C 05/26/2017 6.1*    Cholesterol, Total 05/26/2017 162     Triglycerides 05/26/2017 204*    HDL 05/26/2017 41*    LDL Calculated 05/26/2017 80     T4 Free 05/26/2017 1.2     TSH 05/26/2017 2.92     MICROALBUMIN, RANDOM URI* 05/26/2017 2.50     Creatinine, Ur 05/26/2017 103.6     Microalb Creat Ratio 05/26/2017 24.1      Copies of these are in the chart. Current Outpatient Prescriptions   Medication Sig Dispense Refill    digoxin (LANOXIN) 125 MCG tablet Take 1 tablet by mouth daily 90 tablet 3    DULoxetine (CYMBALTA) 60 MG extended release capsule Take 1 capsule by mouth daily 90 capsule 3    prazosin (MINIPRESS) 2 MG capsule Take 1 capsule by mouth nightly 30 capsule 5    lisinopril (PRINIVIL;ZESTRIL) 40 MG tablet Take 1 tablet by mouth daily 90 tablet 3    cholestyramine (QUESTRAN) 4 g packet Take 1 packet by mouth 3 times daily 90 packet 3    vitamin B-12 (CYANOCOBALAMIN) 1000 MCG tablet Take 1,000 mcg by mouth daily      Dulaglutide 0.75 MG/0.5ML SOPN Inject 0.75 mg into the skin once a week 4 Pen 11    metFORMIN (GLUCOPHAGE) 500 MG tablet Take 1 tablet by mouth 2 times daily (with meals) 60 tablet 3    metolazone (ZAROXOLYN) 2.5 MG tablet Take 1 tablet by mouth daily as needed (FOR EDEMA * TAKE 30 MINS BEFORE LASIX*) 30 tablet 3    enzalutamide (XTANDI) 40 MG capsule Take 160 mg by mouth daily      CPAP Machine MISC by Does not apply route      buPROPion (WELLBUTRIN XL) 300 MG extended release tablet Take 1 tablet by mouth every morning 90 tablet 3    terazosin (HYTRIN) 5 MG capsule Take 1 capsule by mouth nightly TAKE 1 CAPSULE BY MOUTH NIGHTLY.  90 capsule 3    furosemide (LASIX) 40 MG tablet Take 1.5 tablets by mouth daily 135 tablet 2    amLODIPine (NORVASC) 5 MG tablet Take 1 tablet by mouth daily 90 tablet 3    rivaroxaban (XARELTO) 20 MG TABS tablet Take 1 tablet by mouth daily (with breakfast) 30 tablet 11    potassium chloride (KLOR-CON 10) 10 MEQ extended release tablet Take 1 tablet by mouth daily 90 tablet 3    loratadine (CLARITIN) 10 MG capsule Take 1 capsule by mouth daily 30 capsule 0    albuterol sulfate HFA (PROAIR HFA) 108 (90 BASE) MCG/ACT inhaler Inhale 2 puffs into the lungs every 6 hours as needed for Wheezing 8.5 Inhaler 5    carvedilol (COREG) 12.5 MG tablet Take 1 tablet by mouth 2 times daily 180 tablet 5    hydrochlorothiazide (MICROZIDE) 12.5 MG capsule Take 1 capsule by mouth every morning 90 capsule 3    celecoxib (CELEBREX) 200 MG capsule Take 200 mg by mouth daily as needed       leuprolide (LUPRON) 30 MG injection Inject 30 mg into the muscle once Every 4 months      MYRBETRIQ 25 MG TB24 Take 25 mg by mouth daily       FISH OIL Take 1 capsule by mouth 2 times daily. No current facility-administered medications for this visit. Allergies: Review of patient's allergies indicates no known allergies. Past Medical History:   Diagnosis Date    Anxiety     [facility]rial fibrillation ([facility]     Cancer ([facility]     prostate, had treatment    Depression     Hypertension     [facility]     Type II or unspecified type diabetes mellitus without mention of complication, not stated as uncontrolled        Past Surgical History:   Procedure Laterality Date    EYE SURGERY      HERNIA REPAIR      umbilical with Dr Christina Romano History   Substance Use Topics    Smoking status: Never Smoker    Smokeless tobacco: Never Used    Alcohol use No       Review of Systems   Constitutional: Negative for chills, fatigue and fever. HENT: Negative for congestion, ear pain and sore throat. Eyes: Negative for visual disturbance. Respiratory: Negative for cough, chest tightness, shortness of breath and wheezing. Cardiovascular: Negative for chest pain, palpitations and leg swelling. Gastrointestinal: Negative for abdominal pain, diarrhea, nausea and vomiting. Endocrine: Negative for polyuria.    Genitourinary: Negative for Mixed hyperlipidemia E78.2 272.2 Lipid Panel  This has been controlled    8. Fatigue, unspecified type R53.83 780.79 T4, Free      TSH without Reflex       Orders Placed This Encounter   Procedures    INFLUENZA, HIGH DOSE, 65 YRS +, IM, PF, PREFILL SYR, 0.5ML (FLUZONE HD)    CBC Auto Differential    Comprehensive Metabolic Panel    Lipid Panel    Hemoglobin A1C    Microalbumin / Creatinine Urine Ratio    T4, Free    TSH without Reflex    proBNP, N-TERMINAL        Return in about 3 months (around 2/15/2018). Patient Instructions     Patient Education        Learning About Meal Planning for Diabetes  Why plan your meals? Meal planning can be a key part of managing diabetes. Planning meals and snacks with the right balance of carbohydrate, protein, and fat can help you keep your blood sugar at the target level you set with your doctor. You don't have to eat special foods. You can eat what your family eats, including sweets once in a while. But you do have to pay attention to how often you eat and how much you eat of certain foods. You may want to work with a dietitian or a certified diabetes educator. He or she can give you tips and meal ideas and can answer your questions about meal planning. This health professional can also help you reach a healthy weight if that is one of your goals. What plan is right for you? Your dietitian or diabetes educator may suggest that you start with the plate format or carbohydrate counting. The plate format  The plate format is a simple way to help you manage how you eat. You plan meals by learning how much space each food should take on a plate. Using the plate format helps you spread carbohydrate throughout the day. It can make it easier to keep your blood sugar level within your target range. It also helps you see if you're eating healthy portion sizes. To use the plate format, you put non-starchy vegetables on half your plate.  Add meat or meat substitutes on one-quarter of the plate. Put a grain or starchy vegetable (such as brown rice or a potato) on the final quarter of the plate. You can add a small piece of fruit and some low-fat or fat-free milk or yogurt, depending on your carbohydrate goal for each meal.  Here are some tips for using the plate format:  · Make sure that you are not using an oversized plate. A 9-inch plate is best. Many restaurants use larger plates. · Get used to using the plate format at home. Then you can use it when you eat out. · Write down your questions about using the plate format. Talk to your doctor, a dietitian, or a diabetes educator about your concerns. Carbohydrate counting  With carbohydrate counting, you plan meals based on the amount of carbohydrate in each food. Carbohydrate raises blood sugar higher and more quickly than any other nutrient. It is found in desserts, breads and cereals, and fruit. It's also found in starchy vegetables such as potatoes and corn, grains such as rice and pasta, and milk and yogurt. Spreading carbohydrate throughout the day helps keep your blood sugar levels within your target range. Your daily amount depends on several things, including your weight, how active you are, which diabetes medicines you take, and what your goals are for your blood sugar levels. A registered dietitian or diabetes educator can help you plan how much carbohydrate to include in each meal and snack. A guideline for your daily amount of carbohydrate is:  · 45 to 60 grams at each meal. That's about the same as 3 to 4 carbohydrate servings. · 15 to 20 grams at each snack. That's about the same as 1 carbohydrate serving. The Nutrition Facts label on packaged foods tells you how much carbohydrate is in a serving of the food. First, look at the serving size on the food label. Is that the amount you eat in a serving? All of the nutrition information on a food label is based on that serving size.  So if you eat more or less than that, you'll need to adjust the other numbers. Total carbohydrate is the next thing you need to look for on the label. If you count carbohydrate servings, one serving of carbohydrate is 15 grams. For foods that don't come with labels, such as fresh fruits and vegetables, you'll need a guide that lists carbohydrate in these foods. Ask your doctor, dietitian, or diabetes educator about books or other nutrition guides you can use. If you take insulin, you need to know how many grams of carbohydrate are in a meal. This lets you know how much rapid-acting insulin to take before you eat. If you use an insulin pump, you get a constant rate of insulin during the day. So the pump must be programmed at meals to give you extra insulin to cover the rise in blood sugar after meals. When you know how much carbohydrate you will eat, you can take the right amount of insulin. Or, if you always use the same amount of insulin, you need to make sure that you eat the same amount of carbohydrate at meals. If you need more help to understand carbohydrate counting and food labels, ask your doctor, dietitian, or diabetes educator. How do you get started with meal planning? Here are some tips to get started:  · Plan your meals a week at a time. Don't forget to include snacks too. · Use cookbooks or online recipes to plan several main meals. Plan some quick meals for busy nights. You also can double some recipes that freeze well. Then you can save half for other busy nights when you don't have time to cook. · Make sure you have the ingredients you need for your recipes. If you're running low on basic items, put these items on your shopping list too. · List foods that you use to make breakfasts, lunches, and snacks. List plenty of fruits and vegetables. · Post this list on the refrigerator. Add to it as you think of more things you need. · Take the list to the store to do your weekly shopping.   Follow-up care is a key part of your

## 2017-11-15 NOTE — PROGRESS NOTES
After obtaining consent, and per orders of Dr. Amanda Duque, injection of flu was given in the Right deltoid . Patient tolerated it well. Patient instructed to report any adverse reaction to me immediately.

## 2017-11-29 ENCOUNTER — TELEPHONE (OUTPATIENT)
Dept: PRIMARY CARE CLINIC | Age: 82
End: 2017-11-29

## 2017-11-29 NOTE — TELEPHONE ENCOUNTER
Pt walked in and asked if you would put him in a ref to [address] [place] [address] for PT.  There fax is [phone]

## 2017-12-04 DIAGNOSIS — J98.4 MIXED RESTRICTIVE AND OBSTRUCTIVE LUNG DISEASE (HCC): Primary | ICD-10-CM

## 2017-12-04 DIAGNOSIS — J43.9 MIXED RESTRICTIVE AND OBSTRUCTIVE LUNG DISEASE (HCC): Primary | ICD-10-CM

## 2017-12-06 ENCOUNTER — OFFICE VISIT (OUTPATIENT)
Dept: UROLOGY | Facility: CLINIC | Age: 82
End: 2017-12-06

## 2017-12-06 VITALS
DIASTOLIC BLOOD PRESSURE: 56 MMHG | WEIGHT: 260 LBS | BODY MASS INDEX: 37.22 KG/M2 | SYSTOLIC BLOOD PRESSURE: 125 MMHG | HEIGHT: 70 IN | TEMPERATURE: 98.9 F

## 2017-12-06 DIAGNOSIS — C61 CANCER OF PROSTATE (HCC): Primary | ICD-10-CM

## 2017-12-06 DIAGNOSIS — N32.81 OAB (OVERACTIVE BLADDER): ICD-10-CM

## 2017-12-06 DIAGNOSIS — R35.0 FREQUENCY OF URINATION: ICD-10-CM

## 2017-12-06 LAB
BILIRUB BLD-MCNC: NEGATIVE MG/DL
CLARITY, POC: CLEAR
COLOR UR: YELLOW
GLUCOSE UR STRIP-MCNC: NEGATIVE MG/DL
KETONES UR QL: NEGATIVE
LEUKOCYTE EST, POC: NEGATIVE
NITRITE UR-MCNC: NEGATIVE MG/ML
PH UR: 5.5 [PH] (ref 5–8)
PROT UR STRIP-MCNC: NEGATIVE MG/DL
RBC # UR STRIP: NEGATIVE /UL
SP GR UR: 1.01 (ref 1–1.03)
UROBILINOGEN UR QL: NORMAL

## 2017-12-06 PROCEDURE — 99213 OFFICE O/P EST LOW 20 MIN: CPT | Performed by: UROLOGY

## 2017-12-06 PROCEDURE — 81003 URINALYSIS AUTO W/O SCOPE: CPT | Performed by: UROLOGY

## 2017-12-06 NOTE — PROGRESS NOTES
Subjective    Mr. Castro is 82 y.o. male    CHIEF COMPLAINT: Prostate cancer    The patient comes in today for follow-up of hormone refractory prostate cancer he is been on Xtandi now for some time and is really very very very well he is having no sniffing side effects from it his laboratory work is remained stable his creatinine is stable and his PSA is still undetectable.    He denies any symptoms of metastatic disease as bone pain back pain weight loss or anorexia.    He also has symptoms of OAB from where he had radiation therapy these are pretty much stable was well some frequency urgency nocturia but again no change recently      History of Present Illness      The following portions of the patient's history were reviewed and updated as appropriate: allergies, current medications, past family history, past medical history, past social history, past surgical history and problem list.    Review of Systems   Constitutional: Negative.  Negative for chills and fever.   Gastrointestinal: Negative for abdominal distention, abdominal pain, anal bleeding, blood in stool and nausea.   Genitourinary: Positive for frequency and urgency. Negative for difficulty urinating, dysuria, flank pain and hematuria.   Psychiatric/Behavioral: Negative.  Negative for agitation and confusion.         Current Outpatient Prescriptions:   •  albuterol (PROVENTIL HFA;VENTOLIN HFA) 108 (90 BASE) MCG/ACT inhaler, Inhale 2 puffs every 4 (four) hours as needed for wheezing., Disp: , Rfl:   •  buPROPion XL (WELLBUTRIN XL) 300 MG 24 hr tablet, Take 300 mg by mouth daily., Disp: , Rfl:   •  carvedilol (COREG) 12.5 MG tablet, Take 12.5 mg by mouth 2 (two) times a day with meals., Disp: , Rfl:   •  carvedilol (COREG) 6.25 MG tablet, Take 6.25 mg by mouth daily., Disp: , Rfl:   •  celecoxib (CeleBREX) 200 MG capsule, Take 200 mg by mouth daily., Disp: , Rfl:   •  digoxin (LANOXIN) 125 MCG tablet, Take 125 mcg by mouth every day., Disp: , Rfl:   •   digoxin (LANOXIN) 250 MCG tablet, Take 250 mcg by mouth every day., Disp: , Rfl:   •  DULoxetine (CYMBALTA) 60 MG capsule, Take 60 mg by mouth daily., Disp: , Rfl:   •  enzalutamide (XTANDI) 40 MG chemo capsule, Take 4 capsules by mouth Daily., Disp: 90 capsule, Rfl: 5  •  enzalutamide (XTANDI) 40 MG chemo capsule, Take  by mouth Daily., Disp: , Rfl:   •  fluticasone (FLONASE) 50 MCG/ACT nasal spray, 2 sprays into each nostril daily. Administer 2 sprays in each nostril for each dose., Disp: , Rfl:   •  formoterol (FORADIL) 12 MCG capsule for inhaler, Place 12 mcg into inhaler and inhale 2 (two) times a day., Disp: , Rfl:   •  furosemide (LASIX) 10 MG/ML solution, Take 60 mg by mouth daily., Disp: , Rfl:   •  furosemide (LASIX) 40 MG tablet, Take 40 mg by mouth 2 (two) times a day., Disp: , Rfl:   •  glimepiride (AMARYL) 4 MG tablet, Take 4 mg by mouth every morning before breakfast., Disp: , Rfl:   •  leuprolide (LUPRON) 30 MG injection, Inject 30 mg into the shoulder, thigh, or buttocks Every 4 (Four) Months., Disp: , Rfl:   •  lisinopril (PRINIVIL,ZESTRIL) 40 MG tablet, Take 40 mg by mouth daily., Disp: , Rfl:   •  metolazone (ZAROXOLYN) 2.5 MG tablet, Take 2.5 mg by mouth daily., Disp: , Rfl:   •  Mirabegron ER (MYRBETRIQ) 50 MG tablet sustained-release 24 hour, Take 50 mg by mouth daily., Disp: , Rfl:   •  Multiple Vitamins-Minerals (MULTIVITAMIN ADULT PO), Take  by mouth daily., Disp: , Rfl:   •  MYRBETRIQ 25 MG tablet sustained-release 24 hour 24 hr tablet, TAKE 1 TABLET BY MOUTH DAILY, Disp: 90 tablet, Rfl: 4  •  potassium chloride (K-DUR,KLOR-CON) 10 MEQ CR tablet, Take 10 mEq by mouth 2 (two) times a day., Disp: , Rfl:   •  predniSONE (DELTASONE) 10 MG tablet, Take 10 mg by mouth daily., Disp: , Rfl:   •  rivaroxaban (XARELTO) 20 MG tablet, Take 20 mg by mouth daily., Disp: , Rfl:   •  sitaGLIPtin (JANUVIA) 100 MG tablet, Take 100 mg by mouth daily., Disp: , Rfl:   •  SITagliptin-MetFORMIN HCl (JANUMET PO),  "Take  by mouth., Disp: , Rfl:   •  terazosin (HYTRIN) 5 MG capsule, Take 5 mg by mouth every night., Disp: , Rfl:     Past Medical History:   Diagnosis Date   • Arthritis    • Cancer     prostate   • Cellulitis    • CHF (congestive heart failure)    • Diabetes mellitus    • Elevated prostate specific antigen (PSA)    • Hematuria    • Hypertension    • Overactive bladder    • SOB (shortness of breath)        Past Surgical History:   Procedure Laterality Date   • EYE SURGERY     • HERNIA REPAIR     • JOINT REPLACEMENT     • REPLACEMENT TOTAL KNEE BILATERAL         Social History     Social History   • Marital status:      Spouse name: N/A   • Number of children: N/A   • Years of education: N/A     Social History Main Topics   • Smoking status: Never Smoker   • Smokeless tobacco: Never Used   • Alcohol use No   • Drug use: None   • Sexual activity: Not Asked     Other Topics Concern   • None     Social History Narrative       Family History   Problem Relation Age of Onset   • Prostate cancer Son    • No Known Problems Father    • No Known Problems Mother        Objective    /56  Temp 98.9 °F (37.2 °C)  Ht 177.8 cm (70\")  Wt 118 kg (260 lb)  BMI 37.31 kg/m2    Physical Exam      Office Visit on 11/01/2017   Component Date Value Ref Range Status   • Color 11/01/2017 Yellow  Yellow, Straw, Dark Yellow, Julee Final   • Clarity, UA 11/01/2017 Clear  Clear Final   • Glucose, UA 11/01/2017 Negative  Negative, 1000 mg/dL (3+) mg/dL Final   • Bilirubin 11/01/2017 Negative  Negative Final   • Ketones, UA 11/01/2017 Negative  Negative Final   • Specific Gravity  11/01/2017 1.010  1.005 - 1.030 Final   • Blood, UA 11/01/2017 Trace* Negative Final   • pH, Urine 11/01/2017 5.0  5.0 - 8.0 Final   • Protein, POC 11/01/2017 Negative  Negative mg/dL Final   • Urobilinogen, UA 11/01/2017 Normal  Normal Final   • Leukocytes 11/01/2017 Negative  Negative Final   • Nitrite, UA 11/01/2017 Negative  Negative Final "       Results for orders placed or performed in visit on 12/06/17   POC Urinalysis Dipstick, Automated   Result Value Ref Range    Color Yellow Yellow, Straw, Dark Yellow, Julee    Clarity, UA Clear Clear    Glucose, UA Negative Negative, 1000 mg/dL (3+) mg/dL    Bilirubin Negative Negative    Ketones, UA Negative Negative    Specific Gravity  1.010 1.005 - 1.030    Blood, UA Negative Negative    pH, Urine 5.5 5.0 - 8.0    Protein, POC Negative Negative mg/dL    Urobilinogen, UA Normal Normal    Leukocytes Negative Negative    Nitrite, UA Negative Negative       Assessment and Plan    Prakash was seen today for prostate cancer.    Diagnoses and all orders for this visit:    Cancer of prostate  -     POC Urinalysis Dipstick, Automated    Frequency of urination    OAB (overactive bladder)    Plan--patient seems to be stable he seems doing recently well we will seen back again in 6 weeks's time with repeat blood work he has any problems prior then he will course will call.    From a OAB and recommended anything different since his symptoms have been stable

## 2017-12-08 ENCOUNTER — TELEPHONE (OUTPATIENT)
Dept: PRIMARY CARE CLINIC | Age: 82
End: 2017-12-08

## 2017-12-14 RX ORDER — FUROSEMIDE 40 MG/1
TABLET ORAL
Qty: 135 TABLET | Refills: 2 | OUTPATIENT
Start: 2017-12-14

## 2017-12-26 DIAGNOSIS — E11.42 TYPE 2 DIABETES MELLITUS WITH DIABETIC POLYNEUROPATHY, WITHOUT LONG-TERM CURRENT USE OF INSULIN (HCC): ICD-10-CM

## 2017-12-26 RX ORDER — BLOOD SUGAR DIAGNOSTIC
STRIP MISCELLANEOUS
Qty: 100 STRIP | Refills: 3 | OUTPATIENT
Start: 2017-12-26

## 2017-12-29 RX ORDER — FUROSEMIDE 40 MG/1
60 TABLET ORAL DAILY
Qty: 135 TABLET | Refills: 2 | Status: SHIPPED | OUTPATIENT
Start: 2017-12-29 | End: 2018-10-29 | Stop reason: SDUPTHER

## 2018-01-02 RX ORDER — AMLODIPINE BESYLATE 5 MG/1
TABLET ORAL
Qty: 90 TABLET | Refills: 3 | Status: SHIPPED | OUTPATIENT
Start: 2018-01-02 | End: 2018-02-15 | Stop reason: SDUPTHER

## 2018-01-22 RX ORDER — DIGOXIN 125 MCG
125 TABLET ORAL DAILY
Qty: 90 TABLET | Refills: 3 | Status: SHIPPED | OUTPATIENT
Start: 2018-01-22 | End: 2018-02-15 | Stop reason: SDUPTHER

## 2018-01-24 ENCOUNTER — OFFICE VISIT (OUTPATIENT)
Dept: UROLOGY | Facility: CLINIC | Age: 83
End: 2018-01-24

## 2018-01-24 VITALS
HEIGHT: 70 IN | WEIGHT: 258 LBS | BODY MASS INDEX: 36.94 KG/M2 | DIASTOLIC BLOOD PRESSURE: 78 MMHG | SYSTOLIC BLOOD PRESSURE: 140 MMHG | TEMPERATURE: 95.6 F

## 2018-01-24 DIAGNOSIS — C61 CANCER OF PROSTATE (HCC): Primary | ICD-10-CM

## 2018-01-24 DIAGNOSIS — N32.81 OAB (OVERACTIVE BLADDER): ICD-10-CM

## 2018-01-24 PROCEDURE — 99213 OFFICE O/P EST LOW 20 MIN: CPT | Performed by: UROLOGY

## 2018-01-24 PROCEDURE — 81003 URINALYSIS AUTO W/O SCOPE: CPT | Performed by: UROLOGY

## 2018-01-24 NOTE — PROGRESS NOTES
Subjective    Mr. Castro is 82 y.o. male    CHIEF COMPLAINT: Hormone refractory prostate cancer    The patient comes back today with his wife for treatment of hormone refractory prostate cancer he is been on Xtandi now for some time and really really doing very well he is having no significant side effects no weight loss and anorexia symptoms of metastatic disease etc.    Most recent PSA remains undetectable his other blood work looks pretty good except for high glucose which he has noted have diabetes    He does have his underlying overactive bladder symptoms urgency frequency nocturia etc. but again this is been chronic and been on successful with any medications and problem related to his radiation      History of Present Illness      The following portions of the patient's history were reviewed and updated as appropriate: allergies, current medications, past family history, past medical history, past social history, past surgical history and problem list.    Review of Systems   Constitutional: Negative for chills and fever.   Gastrointestinal: Negative for abdominal pain, anal bleeding and blood in stool.   Genitourinary: Positive for frequency and urgency. Negative for difficulty urinating, flank pain and hematuria.         Current Outpatient Prescriptions:   •  albuterol (PROVENTIL HFA;VENTOLIN HFA) 108 (90 BASE) MCG/ACT inhaler, Inhale 2 puffs every 4 (four) hours as needed for wheezing., Disp: , Rfl:   •  buPROPion XL (WELLBUTRIN XL) 300 MG 24 hr tablet, Take 300 mg by mouth daily., Disp: , Rfl:   •  carvedilol (COREG) 12.5 MG tablet, Take 12.5 mg by mouth 2 (two) times a day with meals., Disp: , Rfl:   •  carvedilol (COREG) 6.25 MG tablet, Take 6.25 mg by mouth daily., Disp: , Rfl:   •  celecoxib (CeleBREX) 200 MG capsule, Take 200 mg by mouth daily., Disp: , Rfl:   •  digoxin (LANOXIN) 125 MCG tablet, Take 125 mcg by mouth every day., Disp: , Rfl:   •  digoxin (LANOXIN) 250 MCG tablet, Take 250 mcg by  mouth every day., Disp: , Rfl:   •  DULoxetine (CYMBALTA) 60 MG capsule, Take 60 mg by mouth daily., Disp: , Rfl:   •  enzalutamide (XTANDI) 40 MG chemo capsule, Take 4 capsules by mouth Daily., Disp: 90 capsule, Rfl: 5  •  enzalutamide (XTANDI) 40 MG chemo capsule, Take  by mouth Daily., Disp: , Rfl:   •  fluticasone (FLONASE) 50 MCG/ACT nasal spray, 2 sprays into each nostril daily. Administer 2 sprays in each nostril for each dose., Disp: , Rfl:   •  formoterol (FORADIL) 12 MCG capsule for inhaler, Place 12 mcg into inhaler and inhale 2 (two) times a day., Disp: , Rfl:   •  furosemide (LASIX) 10 MG/ML solution, Take 60 mg by mouth daily., Disp: , Rfl:   •  furosemide (LASIX) 40 MG tablet, Take 40 mg by mouth 2 (two) times a day., Disp: , Rfl:   •  glimepiride (AMARYL) 4 MG tablet, Take 4 mg by mouth every morning before breakfast., Disp: , Rfl:   •  leuprolide (LUPRON) 30 MG injection, Inject 30 mg into the shoulder, thigh, or buttocks Every 4 (Four) Months., Disp: , Rfl:   •  lisinopril (PRINIVIL,ZESTRIL) 40 MG tablet, Take 40 mg by mouth daily., Disp: , Rfl:   •  metolazone (ZAROXOLYN) 2.5 MG tablet, Take 2.5 mg by mouth daily., Disp: , Rfl:   •  Mirabegron ER (MYRBETRIQ) 50 MG tablet sustained-release 24 hour, Take 50 mg by mouth daily., Disp: , Rfl:   •  Multiple Vitamins-Minerals (MULTIVITAMIN ADULT PO), Take  by mouth daily., Disp: , Rfl:   •  MYRBETRIQ 25 MG tablet sustained-release 24 hour 24 hr tablet, TAKE 1 TABLET BY MOUTH DAILY, Disp: 90 tablet, Rfl: 4  •  potassium chloride (K-DUR,KLOR-CON) 10 MEQ CR tablet, Take 10 mEq by mouth 2 (two) times a day., Disp: , Rfl:   •  predniSONE (DELTASONE) 10 MG tablet, Take 10 mg by mouth daily., Disp: , Rfl:   •  rivaroxaban (XARELTO) 20 MG tablet, Take 20 mg by mouth daily., Disp: , Rfl:   •  sitaGLIPtin (JANUVIA) 100 MG tablet, Take 100 mg by mouth daily., Disp: , Rfl:   •  SITagliptin-MetFORMIN HCl (JANUMET PO), Take  by mouth., Disp: , Rfl:   •  terazosin  "(HYTRIN) 5 MG capsule, Take 5 mg by mouth every night., Disp: , Rfl:     Past Medical History:   Diagnosis Date   • Arthritis    • Cancer     prostate   • Cellulitis    • CHF (congestive heart failure)    • Diabetes mellitus    • Elevated prostate specific antigen (PSA)    • Hematuria    • Hypertension    • Overactive bladder    • SOB (shortness of breath)        Past Surgical History:   Procedure Laterality Date   • EYE SURGERY     • HERNIA REPAIR     • JOINT REPLACEMENT     • REPLACEMENT TOTAL KNEE BILATERAL         Social History     Social History   • Marital status:      Spouse name: N/A   • Number of children: N/A   • Years of education: N/A     Social History Main Topics   • Smoking status: Never Smoker   • Smokeless tobacco: Never Used   • Alcohol use No   • Drug use: None   • Sexual activity: Not Asked     Other Topics Concern   • None     Social History Narrative       Family History   Problem Relation Age of Onset   • Prostate cancer Son    • No Known Problems Father    • No Known Problems Mother        Objective    /78  Temp 95.6 °F (35.3 °C)  Ht 177.8 cm (70\")  Wt 117 kg (258 lb)  BMI 37.02 kg/m2    Physical Exam      Office Visit on 12/06/2017   Component Date Value Ref Range Status   • Color 12/06/2017 Yellow  Yellow, Straw, Dark Yellow, Julee Final   • Clarity, UA 12/06/2017 Clear  Clear Final   • Glucose, UA 12/06/2017 Negative  Negative, 1000 mg/dL (3+) mg/dL Final   • Bilirubin 12/06/2017 Negative  Negative Final   • Ketones, UA 12/06/2017 Negative  Negative Final   • Specific Gravity  12/06/2017 1.010  1.005 - 1.030 Final   • Blood, UA 12/06/2017 Negative  Negative Final   • pH, Urine 12/06/2017 5.5  5.0 - 8.0 Final   • Protein, POC 12/06/2017 Negative  Negative mg/dL Final   • Urobilinogen, UA 12/06/2017 Normal  Normal Final   • Leukocytes 12/06/2017 Negative  Negative Final   • Nitrite, UA 12/06/2017 Negative  Negative Final       Results for orders placed or performed in visit " on 01/24/18   POC Urinalysis Dipstick, Automated   Result Value Ref Range    Color Yellow Yellow, Straw, Dark Yellow, Julee    Clarity, UA Clear Clear    Glucose, UA Negative Negative, 1000 mg/dL (3+) mg/dL    Bilirubin Negative Negative    Ketones, UA Negative Negative    Specific Gravity  1.010 1.005 - 1.030    Blood, UA Negative Negative    pH, Urine 6.5 5.0 - 8.0    Protein, POC Negative Negative mg/dL    Urobilinogen, UA Normal Normal    Leukocytes Negative Negative    Nitrite, UA Negative Negative       Assessment and Plan    Diagnoses and all orders for this visit:    Cancer of prostate  -     POC Urinalysis Dipstick, Automated    OAB (overactive bladder)    Plan--the patient seems doing quite well his symptoms are minimal and stable and his PSA remains undetectable so we will get an stone  Clinical response the Xtandi.    On was seen back again in 2 months we will repeat his blood work then call sooner as needed

## 2018-02-01 DIAGNOSIS — C61 PROSTATE CANCER (HCC): Primary | ICD-10-CM

## 2018-02-02 DIAGNOSIS — N40.0 BENIGN NODULAR PROSTATIC HYPERPLASIA WITHOUT LOWER URINARY TRACT SYMPTOMS: ICD-10-CM

## 2018-02-02 RX ORDER — TERAZOSIN 5 MG/1
5 CAPSULE ORAL NIGHTLY
Qty: 90 CAPSULE | Refills: 3 | Status: SHIPPED | OUTPATIENT
Start: 2018-02-02 | End: 2019-02-24 | Stop reason: SDUPTHER

## 2018-02-02 RX ORDER — ENZALUTAMIDE 40 MG/1
CAPSULE ORAL
Qty: 120 CAPSULE | Refills: 10 | Status: ON HOLD | OUTPATIENT
Start: 2018-02-02 | End: 2019-07-19 | Stop reason: SDUPTHER

## 2018-02-14 ENCOUNTER — TELEPHONE (OUTPATIENT)
Dept: PRIMARY CARE CLINIC | Age: 83
End: 2018-02-14

## 2018-02-15 ENCOUNTER — OFFICE VISIT (OUTPATIENT)
Dept: PRIMARY CARE CLINIC | Age: 83
End: 2018-02-15
Payer: MEDICARE

## 2018-02-15 VITALS
DIASTOLIC BLOOD PRESSURE: 74 MMHG | TEMPERATURE: 96.6 F | HEIGHT: 70 IN | SYSTOLIC BLOOD PRESSURE: 138 MMHG | HEART RATE: 81 BPM | WEIGHT: 268.25 LBS | BODY MASS INDEX: 38.4 KG/M2 | OXYGEN SATURATION: 90 %

## 2018-02-15 DIAGNOSIS — I10 ESSENTIAL HYPERTENSION: ICD-10-CM

## 2018-02-15 DIAGNOSIS — F33.41 RECURRENT MAJOR DEPRESSIVE DISORDER, IN PARTIAL REMISSION (HCC): ICD-10-CM

## 2018-02-15 DIAGNOSIS — J06.9 VIRAL UPPER RESPIRATORY TRACT INFECTION: Primary | ICD-10-CM

## 2018-02-15 DIAGNOSIS — E11.42 TYPE 2 DIABETES MELLITUS WITH DIABETIC POLYNEUROPATHY, WITHOUT LONG-TERM CURRENT USE OF INSULIN (HCC): ICD-10-CM

## 2018-02-15 DIAGNOSIS — E78.2 MIXED HYPERLIPIDEMIA: ICD-10-CM

## 2018-02-15 DIAGNOSIS — I48.20 CHRONIC ATRIAL FIBRILLATION (HCC): ICD-10-CM

## 2018-02-15 DIAGNOSIS — Z79.01 CHRONIC ANTICOAGULATION: ICD-10-CM

## 2018-02-15 DIAGNOSIS — J44.9 CHRONIC OBSTRUCTIVE PULMONARY DISEASE, UNSPECIFIED COPD TYPE (HCC): ICD-10-CM

## 2018-02-15 DIAGNOSIS — I50.9 CONGESTIVE HEART FAILURE, UNSPECIFIED CONGESTIVE HEART FAILURE CHRONICITY, UNSPECIFIED CONGESTIVE HEART FAILURE TYPE: ICD-10-CM

## 2018-02-15 DIAGNOSIS — J98.4 MIXED RESTRICTIVE AND OBSTRUCTIVE LUNG DISEASE (HCC): ICD-10-CM

## 2018-02-15 DIAGNOSIS — R06.89 HYPERCARBIA: ICD-10-CM

## 2018-02-15 DIAGNOSIS — G47.33 OSA (OBSTRUCTIVE SLEEP APNEA): ICD-10-CM

## 2018-02-15 DIAGNOSIS — E53.8 B12 DEFICIENCY: ICD-10-CM

## 2018-02-15 DIAGNOSIS — R05.9 COUGH: ICD-10-CM

## 2018-02-15 DIAGNOSIS — R06.09 DYSPNEA ON EXERTION: ICD-10-CM

## 2018-02-15 DIAGNOSIS — C61 PROSTATE CANCER (HCC): ICD-10-CM

## 2018-02-15 DIAGNOSIS — J43.9 MIXED RESTRICTIVE AND OBSTRUCTIVE LUNG DISEASE (HCC): ICD-10-CM

## 2018-02-15 DIAGNOSIS — J44.1 COPD EXACERBATION (HCC): ICD-10-CM

## 2018-02-15 DIAGNOSIS — R06.01 ORTHOPNEA: ICD-10-CM

## 2018-02-15 PROCEDURE — 1123F ACP DISCUSS/DSCN MKR DOCD: CPT | Performed by: PEDIATRICS

## 2018-02-15 PROCEDURE — 99215 OFFICE O/P EST HI 40 MIN: CPT | Performed by: PEDIATRICS

## 2018-02-15 PROCEDURE — 4040F PNEUMOC VAC/ADMIN/RCVD: CPT | Performed by: PEDIATRICS

## 2018-02-15 PROCEDURE — 1036F TOBACCO NON-USER: CPT | Performed by: PEDIATRICS

## 2018-02-15 PROCEDURE — G8417 CALC BMI ABV UP PARAM F/U: HCPCS | Performed by: PEDIATRICS

## 2018-02-15 PROCEDURE — G8926 SPIRO NO PERF OR DOC: HCPCS | Performed by: PEDIATRICS

## 2018-02-15 PROCEDURE — G8427 DOCREV CUR MEDS BY ELIG CLIN: HCPCS | Performed by: PEDIATRICS

## 2018-02-15 PROCEDURE — G8484 FLU IMMUNIZE NO ADMIN: HCPCS | Performed by: PEDIATRICS

## 2018-02-15 PROCEDURE — 87804 INFLUENZA ASSAY W/OPTIC: CPT | Performed by: PEDIATRICS

## 2018-02-15 PROCEDURE — 3023F SPIROM DOC REV: CPT | Performed by: PEDIATRICS

## 2018-02-15 ASSESSMENT — ENCOUNTER SYMPTOMS
VOMITING: 1
SORE THROAT: 0
ABDOMINAL PAIN: 0
COUGH: 1
WHEEZING: 0
NAUSEA: 1
BACK PAIN: 0
SHORTNESS OF BREATH: 0
DIARRHEA: 1
CHEST TIGHTNESS: 0

## 2018-02-15 NOTE — PROGRESS NOTES
medications for this visit. Allergies: Review of patient's allergies indicates no known allergies. Past Medical History:   Diagnosis Date    Anxiety     Atrial fibrillation ([facility] [facility]     Cancer ([facility] [address]     prostate, had treatment    Depression     Hypertension     [facility]     Type II or unspecified type diabetes mellitus without mention of complication, not stated as uncontrolled        Past Surgical History:   Procedure Laterality Date    EYE SURGERY      HERNIA REPAIR      umbilical with Dr Madsen Quest History   Substance Use Topics    Smoking status: Never Smoker    Smokeless tobacco: Never Used    Alcohol use No       Review of Systems   Constitutional: Positive for fatigue. Negative for chills and fever. HENT: Positive for congestion. Negative for ear pain and sore throat. Eyes: Negative for visual disturbance. Respiratory: Positive for cough. Negative for chest tightness, shortness of breath and wheezing. Cardiovascular: Negative for chest pain, palpitations and leg swelling. Gastrointestinal: Positive for diarrhea, nausea and vomiting. Negative for abdominal pain. Endocrine: Negative for polyuria. Genitourinary: Negative for frequency and urgency. Musculoskeletal: Negative for back pain and neck pain. Skin: Negative for rash. Neurological: Negative for dizziness and headaches. Psychiatric/Behavioral: Negative for self-injury. The patient is not nervous/anxious. Physical Exam   Constitutional: He is oriented to person, place, and time. He appears well-developed and well-nourished. No distress. HENT:   Head: Normocephalic and atraumatic. Right Ear: External ear normal.   Left Ear: External ear normal.   Nose: Nose normal.   Mouth/Throat: Posterior oropharyngeal edema (with cobblestoning) and posterior oropharyngeal erythema present. Eyes: EOM are normal. Pupils are equal, round, and reactive to light.  Right conjunctiva is given to use with prn albuterol   10. 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 Congestive heart failure, unspecified congestive heart failure chronicity, unspecified congestive heart failure type (HCC) I50.9 428.0 ECHO Complete 2D W Doppler W Color  Continue present med regimen. Limit sodium      proBNP, N-TERMINAL  He needs to watch his weight on a daily basis. Should he gain more than 3 pounds overnight or 5 pounds in a week he will need to increase his diuretic regimen. 12. Dyspnea on exertion R06.09 786.09 ECHO Complete 2D W Doppler W Color      proBNP, N-TERMINAL   13. Orthopnea R06.01 786.02 ECHO Complete 2D W Doppler W Color      proBNP, N-TERMINAL   14. NEIDA (obstructive sleep apnea) G47.33 327.23 Continue bipap with o2   15. Hypercarbia R06.89 786.09 Use regularly   16. Prostate cancer ([facility] [address] C61 185 Continue treatment   17. Recurrent major depressive disorder, in partial remission ([facility] [address] F33.41 296.35 He is doing well on present regimen. Cymbalta is helping with his depression as well as pain from neuropathy    18. B12 deficiency E53.8 266.2 Vitamin B12  He has been on supplement for approximately one month      I spent in excess of 55 minutes with this patient and his spouse in direct face-to-face consultation and teaching, with greater than 50% of the meeting being consultation. Orders Placed This Encounter   Procedures    CBC Auto Differential    Comprehensive Metabolic Panel    Vitamin B12    Hemoglobin A1C    Lipid Panel    Microalbumin / Creatinine Urine Ratio    proBNP, N-TERMINAL    POCT Influenza A/B    ECHO Complete 2D W Doppler W Color        Return in about 1 month (around 3/15/2018).      Patient Instructions       Patient Education        Low Sodium Diet (2,000 Milligram): Care Instructions  Your Care Instructions    Too much sodium causes your body to hold on to to [email or web address] and sign in to your ethology account. Enter Y761 in the [place] box to learn more about \"Low Sodium Diet (2,000 Milligram): Care Instructions. \"     If you do not have an account, please click on the \"Sign Up Now\" link. Current as of: May 12, 2017  Content Version: 11.5  © 3571-2429 [employer]. Care instructions adapted under license by [facility] ([facility]). If you have questions about a medical condition or this instruction, always ask your healthcare professional. [place] [address] any warranty or liability for your use of this information. Patient Education        COPD Exacerbation Plan: Care Instructions  Your Care Instructions    If you have chronic obstructive pulmonary disease (COPD), your usual shortness of breath could suddenly get worse. You may start coughing more and have more mucus. This flare-up is called a COPD exacerbation (say \"fz-GVI-aa-BAY-shun\"). A lung infection or air pollution could set off an exacerbation. Sometimes it can happen after a quick change in temperature or being around chemicals. Work with your doctor to make a plan for dealing with an exacerbation. You can better manage it if you plan ahead. Follow-up care is a key part of your treatment and safety. Be sure to make and go to all appointments, and call your doctor if you are having problems. It's also a good idea to know your test results and keep a list of the medicines you take. How can you care for yourself at home? During an exacerbation  · Do not panic if you start to have one. Quick treatment at home may help you prevent serious breathing problems. If you have a COPD exacerbation plan that you developed with your doctor, follow it. · Take your medicines exactly as your doctor tells you. ¨ Use your inhaler as directed by your doctor.  If your symptoms do not get better after you use your medicine, have someone take you to the emergency room. Call an ambulance if necessary. ¨ With inhaled medicines, a spacer or a nebulizer may help you get more medicine to your lungs. Ask your doctor or pharmacist how to use them properly. Practice using the spacer in front of a mirror before you have an exacerbation. This may help you get the medicine into your lungs quickly. ¨ If your doctor has given you steroid pills, take them as directed. ¨ Your doctor may have given you a prescription for antibiotics, which you can fill if you need it. ¨ Talk to your doctor if you have any problems with your medicine. And call your doctor if you have to use your antibiotic or steroid pills. Preventing an exacerbation  · Do not smoke. This is the most important step you can take to prevent more damage to your lungs and prevent problems. If you already smoke, it is never too late to stop. If you need help quitting, talk to your doctor about stop-smoking programs and medicines. These can increase your chances of quitting for good. · Take your daily medicines as prescribed. · Avoid colds and flu. ¨ Get a pneumococcal vaccine. ¨ Get a flu vaccine each year, as soon as it is available. Ask those you live or work with to do the same, so they will not get the flu and infect you. ¨ Try to stay away from people with colds or the flu. ¨ Wash your hands often. · Avoid secondhand smoke; air pollution; cold, dry air; hot, humid air; and high altitudes. Stay at home with your windows closed when air pollution is bad. · Learn breathing techniques for COPD, such as breathing through pursed lips. These techniques can help you breathe easier during an exacerbation. When should you call for help? Call 911 anytime you think you may need emergency care. For example, call if:  ? · You have severe trouble breathing. ? · You have severe chest pain. ?Call your doctor now or seek immediate medical care if:  ? · You have new or worse shortness of breath.    ? · You develop you have any problems. Where can you learn more? Go to [email or web address] and sign in to your [employer] account. Enter C078 in the [facility] box to learn more about \"Breathing Techniques for COPD: Care Instructions. \"     If you do not have an account, please click on the \"Sign Up Now\" link. Current as of: May 12, 2017  Content Version: 11.5  © 4437-0231 [employer]. Care instructions adapted under license by [facility] ([facility]). If you have questions about a medical condition or this instruction, always ask your healthcare professional. [address] any warranty or liability for your use of this information. Patient Education        Learning About COPD  What is COPD? COPD is a lung disease that makes it hard to breathe. COPD stands for chronic obstructive pulmonary disease. It is caused by damage to the lungs over many years, usually from smoking. COPD is a mix of two diseases: chronic bronchitis and emphysema. Other things that may put you at risk for COPD include breathing chemical fumes, dust, or air pollution over a long period of time. Secondhand smoke is also bad. In chronic bronchitis, the airways that carry air to the lungs (bronchial tubes) get inflamed and make a lot of mucus. This can narrow or block the airways, making it hard for you to breathe. In emphysema, the air sacs in your lungs are damaged and lose their stretch. Less air gets in and out of your lungs, which makes you feel short of breath. What can you expect when you have COPD? COPD gets worse over time. You cannot undo the damage to your lungs. Over time, you may find that:  · You get short of breath even when you do simple things like get dressed or fix a meal.  · It is hard to eat or exercise. · You lose weight and feel weaker. But there are things you can do to prevent more damage and feel better. What are the symptoms?   The main symptoms are:  · A cough that

## 2018-02-15 NOTE — PATIENT INSTRUCTIONS
have swelling in your belly and legs. ? · You have severe chest pain. ? Watch closely for changes in your health, and be sure to contact your doctor if you have any problems. Where can you learn more? Go to [email or web address] and sign in to your Auth0 account. Enter K495 in the [employer] box to learn more about \"Breathing Techniques for COPD: Care Instructions. \"     If you do not have an account, please click on the \"Sign Up Now\" link. Current as of: May 12, 2017  Content Version: 11.5  © 9192-6708 [employer]ID technologies. Care instructions adapted under license by [employer] [facility] ([facility]). If you have questions about a medical condition or this instruction, always ask your healthcare professional. [address] any warranty or liability for your use of this information. Patient Education        Learning About COPD  What is COPD? COPD is a lung disease that makes it hard to breathe. COPD stands for chronic obstructive pulmonary disease. It is caused by damage to the lungs over many years, usually from smoking. COPD is a mix of two diseases: chronic bronchitis and emphysema. Other things that may put you at risk for COPD include breathing chemical fumes, dust, or air pollution over a long period of time. Secondhand smoke is also bad. In chronic bronchitis, the airways that carry air to the lungs (bronchial tubes) get inflamed and make a lot of mucus. This can narrow or block the airways, making it hard for you to breathe. In emphysema, the air sacs in your lungs are damaged and lose their stretch. Less air gets in and out of your lungs, which makes you feel short of breath. What can you expect when you have COPD? COPD gets worse over time. You cannot undo the damage to your lungs. Over time, you may find that:  · You get short of breath even when you do simple things like get dressed or fix a meal.  · It is hard to eat or exercise.   · You lose every day for them to work well. · Take your medicines exactly as prescribed. Call your doctor if you think you are having a problem with your medicine. · Oxygen therapy boosts the amount of oxygen in your blood and helps you breathe easier. Use the flow rate your doctor has recommended, and do not change it without talking to your doctor first.  Other care at home  · If your doctor recommends it, get more exercise. Walking is a good choice. Bit by bit, increase the amount you walk every day. Try for at least 30 minutes on most days of the week. · Learn breathing methods-such as breathing through pursed lips-to help you become less short of breath. · If your doctor has not set you up with a pulmonary rehabilitation program, talk to him or her about whether rehab is right for you. Rehab includes exercise programs, education about your disease and how to manage it, help with diet and other changes, and emotional support. · Eat regular, healthy meals. Use bronchodilators about 1 hour before you eat to make it easier to eat. Eat several small meals instead of three large ones. Drink beverages at the end of the meal. Avoid foods that are hard to chew. Follow-up care is a key part of your treatment and safety. Be sure to make and go to all appointments, and call your doctor if you are having problems. It's also a good idea to know your test results and keep a list of the medicines you take. Where can you learn more? Go to [email or web address] and sign in to your [employer] account. Enter V314 in the [facility] box to learn more about \"Learning About COPD. \"     If you do not have an account, please click on the \"Sign Up Now\" link. Current as of: May 12, 2017  Content Version: 11.5  © 6345-4360 Healthwise, [employer]. Care instructions adapted under license by [facility] ([facility]).  If you have questions about a medical condition or this instruction, always ask your healthcare professional.

## 2018-02-19 DIAGNOSIS — I10 ESSENTIAL HYPERTENSION: ICD-10-CM

## 2018-02-19 DIAGNOSIS — E78.2 MIXED HYPERLIPIDEMIA: ICD-10-CM

## 2018-02-19 DIAGNOSIS — E11.42 TYPE 2 DIABETES MELLITUS WITH DIABETIC POLYNEUROPATHY, WITHOUT LONG-TERM CURRENT USE OF INSULIN (HCC): ICD-10-CM

## 2018-02-19 DIAGNOSIS — R06.01 ORTHOPNEA: ICD-10-CM

## 2018-02-19 DIAGNOSIS — R06.09 DYSPNEA ON EXERTION: ICD-10-CM

## 2018-02-19 DIAGNOSIS — E53.8 B12 DEFICIENCY: ICD-10-CM

## 2018-02-19 DIAGNOSIS — I50.9 CONGESTIVE HEART FAILURE, UNSPECIFIED CONGESTIVE HEART FAILURE CHRONICITY, UNSPECIFIED CONGESTIVE HEART FAILURE TYPE: ICD-10-CM

## 2018-02-19 LAB
ALBUMIN SERPL-MCNC: 3.9 G/DL (ref 3.5–5.2)
ALP BLD-CCNC: 46 U/L (ref 40–130)
ALT SERPL-CCNC: 9 U/L (ref 5–41)
ANION GAP SERPL CALCULATED.3IONS-SCNC: 16 MMOL/L (ref 7–19)
AST SERPL-CCNC: 12 U/L (ref 5–40)
BASOPHILS ABSOLUTE: 0 K/UL (ref 0–0.2)
BASOPHILS RELATIVE PERCENT: 0.6 % (ref 0–1)
BILIRUB SERPL-MCNC: 0.6 MG/DL (ref 0.2–1.2)
BUN BLDV-MCNC: 17 MG/DL (ref 8–23)
CALCIUM SERPL-MCNC: 9.4 MG/DL (ref 8.8–10.2)
CHLORIDE BLD-SCNC: 94 MMOL/L (ref 98–111)
CHOLESTEROL, TOTAL: 138 MG/DL (ref 160–199)
CO2: 30 MMOL/L (ref 22–29)
CREAT SERPL-MCNC: 0.8 MG/DL (ref 0.5–1.2)
CREATININE URINE: 226.6 MG/DL (ref 4.2–622)
EOSINOPHILS ABSOLUTE: 0.2 K/UL (ref 0–0.6)
EOSINOPHILS RELATIVE PERCENT: 2.4 % (ref 0–5)
GFR NON-AFRICAN AMERICAN: >60
GLUCOSE BLD-MCNC: 171 MG/DL (ref 74–109)
HBA1C MFR BLD: 5.7 %
HCT VFR BLD CALC: 41.7 % (ref 42–52)
HDLC SERPL-MCNC: 45 MG/DL (ref 55–121)
HEMOGLOBIN: 14 G/DL (ref 14–18)
LDL CHOLESTEROL CALCULATED: 61 MG/DL
LYMPHOCYTES ABSOLUTE: 1.3 K/UL (ref 1.1–4.5)
LYMPHOCYTES RELATIVE PERCENT: 17.8 % (ref 20–40)
MCH RBC QN AUTO: 33.2 PG (ref 27–31)
MCHC RBC AUTO-ENTMCNC: 33.6 G/DL (ref 33–37)
MCV RBC AUTO: 98.8 FL (ref 80–94)
MICROALBUMIN UR-MCNC: 8.4 MG/DL (ref 0–19)
MICROALBUMIN/CREAT UR-RTO: 37.1 MG/G
MONOCYTES ABSOLUTE: 0.7 K/UL (ref 0–0.9)
MONOCYTES RELATIVE PERCENT: 9.1 % (ref 0–10)
NEUTROPHILS ABSOLUTE: 4.9 K/UL (ref 1.5–7.5)
NEUTROPHILS RELATIVE PERCENT: 68.7 % (ref 50–65)
PDW BLD-RTO: 13.2 % (ref 11.5–14.5)
PLATELET # BLD: 159 K/UL (ref 130–400)
PMV BLD AUTO: 10.5 FL (ref 9.4–12.4)
POTASSIUM SERPL-SCNC: 3.6 MMOL/L (ref 3.5–5)
RBC # BLD: 4.22 M/UL (ref 4.7–6.1)
SODIUM BLD-SCNC: 140 MMOL/L (ref 136–145)
TOTAL PROTEIN: 6.8 G/DL (ref 6.6–8.7)
TRIGL SERPL-MCNC: 158 MG/DL (ref 0–149)
VITAMIN B-12: 699 PG/ML (ref 211–946)
WBC # BLD: 7.2 K/UL (ref 4.8–10.8)

## 2018-02-21 ENCOUNTER — OFFICE VISIT (OUTPATIENT)
Dept: UROLOGY | Facility: CLINIC | Age: 83
End: 2018-02-21

## 2018-02-21 DIAGNOSIS — C61 CANCER OF PROSTATE (HCC): Primary | ICD-10-CM

## 2018-02-21 PROCEDURE — 96402 CHEMO HORMON ANTINEOPL SQ/IM: CPT | Performed by: UROLOGY

## 2018-02-21 NOTE — PROGRESS NOTES
Patient of Dr. Kilgore states he is here for his 3 month Lupron injection. Patient denies any fever, chills, N&V or hematuria. I administered 22.5 mg/3 month Lupron injection IM right hip with no complications. Dr. Kilgore was in the office today at the time of injection. The patient was advised to follow up in 3 months for his next Lupron injection. Patient verbalized understanding.

## 2018-02-25 DIAGNOSIS — E78.2 MIXED HYPERLIPIDEMIA: ICD-10-CM

## 2018-02-26 ENCOUNTER — HOSPITAL ENCOUNTER (OUTPATIENT)
Dept: NON INVASIVE DIAGNOSTICS | Age: 83
Discharge: HOME | End: 2018-02-26
Payer: MEDICARE

## 2018-02-26 DIAGNOSIS — I50.9 CONGESTIVE HEART FAILURE, UNSPECIFIED CONGESTIVE HEART FAILURE CHRONICITY, UNSPECIFIED CONGESTIVE HEART FAILURE TYPE: ICD-10-CM

## 2018-02-26 DIAGNOSIS — R06.01 ORTHOPNEA: ICD-10-CM

## 2018-02-26 DIAGNOSIS — R06.09 DYSPNEA ON EXERTION: ICD-10-CM

## 2018-02-26 LAB
LV EF: 58 %
LVEF MODALITY: NORMAL

## 2018-02-26 PROCEDURE — 93306 TTE W/DOPPLER COMPLETE: CPT

## 2018-02-26 RX ORDER — CHOLESTYRAMINE 4 G/9G
1 POWDER, FOR SUSPENSION ORAL 3 TIMES DAILY
Qty: 90 PACKET | Refills: 5 | Status: SHIPPED | OUTPATIENT
Start: 2018-02-26 | End: 2019-04-25 | Stop reason: DRUGHIGH

## 2018-02-26 NOTE — TELEPHONE ENCOUNTER
Pt seen 2/15/18      Requested Prescriptions     Pending Prescriptions Disp Refills    cholestyramine (QUESTRAN) 4 g packet [Pharmacy Med Name: Tiff Stuart 90 packet 3     Sig: TAKE 1 PACKET 3 TIMES A DAY

## 2018-02-27 ENCOUNTER — TELEPHONE (OUTPATIENT)
Dept: PRIMARY CARE CLINIC | Age: 83
End: 2018-02-27

## 2018-03-01 ENCOUNTER — TELEPHONE (OUTPATIENT)
Dept: PRIMARY CARE CLINIC | Age: 83
End: 2018-03-01

## 2018-03-02 NOTE — TELEPHONE ENCOUNTER
----- Message from Laith Epps DO sent at 2/28/2018  8:53 PM CST -----  2-D echo shows normal left ventricular systolic size and function with an ejection fraction of 55-60% which is normal.  Tricuspid valve does have some regurgitation with an ejection velocity that equates to a right ventricular pressure of 48 mmHg  Right atrium is enlarged, as his left atrium  Right ventricle is not well imaged. Overall this shows left sided heart with normal size and function. There is evidence of pulmonary hypertension most likely secondary to sleep apnea. It is essential that you use your BiPAP device on a routine basis, even when whenever you take a nap. Continued weight loss would also significantly help sleep apnea as well as right ventricular pressures.

## 2018-03-02 NOTE — TELEPHONE ENCOUNTER
Called patient, spoke with: Patient regarding the results of the patients most recent 2-D Echo. I advised Patient of Dr. Dinorah Min recommendations.    Patient did voice understanding

## 2018-03-15 ENCOUNTER — OFFICE VISIT (OUTPATIENT)
Dept: PRIMARY CARE CLINIC | Age: 83
End: 2018-03-15
Payer: MEDICARE

## 2018-03-15 VITALS
BODY MASS INDEX: 38.65 KG/M2 | TEMPERATURE: 97.9 F | SYSTOLIC BLOOD PRESSURE: 130 MMHG | WEIGHT: 270 LBS | HEART RATE: 59 BPM | DIASTOLIC BLOOD PRESSURE: 70 MMHG | OXYGEN SATURATION: 90 % | HEIGHT: 70 IN

## 2018-03-15 DIAGNOSIS — F33.1 MODERATE EPISODE OF RECURRENT MAJOR DEPRESSIVE DISORDER (HCC): Primary | ICD-10-CM

## 2018-03-15 DIAGNOSIS — J44.9 CHRONIC OBSTRUCTIVE PULMONARY DISEASE, UNSPECIFIED COPD TYPE (HCC): ICD-10-CM

## 2018-03-15 DIAGNOSIS — H61.23 BILATERAL IMPACTED CERUMEN: ICD-10-CM

## 2018-03-15 PROCEDURE — 69209 REMOVE IMPACTED EAR WAX UNI: CPT | Performed by: PEDIATRICS

## 2018-03-15 PROCEDURE — 99214 OFFICE O/P EST MOD 30 MIN: CPT | Performed by: PEDIATRICS

## 2018-03-15 PROCEDURE — 1123F ACP DISCUSS/DSCN MKR DOCD: CPT | Performed by: PEDIATRICS

## 2018-03-15 PROCEDURE — G8427 DOCREV CUR MEDS BY ELIG CLIN: HCPCS | Performed by: PEDIATRICS

## 2018-03-15 PROCEDURE — G8926 SPIRO NO PERF OR DOC: HCPCS | Performed by: PEDIATRICS

## 2018-03-15 PROCEDURE — 1036F TOBACCO NON-USER: CPT | Performed by: PEDIATRICS

## 2018-03-15 PROCEDURE — G8482 FLU IMMUNIZE ORDER/ADMIN: HCPCS | Performed by: PEDIATRICS

## 2018-03-15 PROCEDURE — 4040F PNEUMOC VAC/ADMIN/RCVD: CPT | Performed by: PEDIATRICS

## 2018-03-15 PROCEDURE — 3023F SPIROM DOC REV: CPT | Performed by: PEDIATRICS

## 2018-03-15 PROCEDURE — G8417 CALC BMI ABV UP PARAM F/U: HCPCS | Performed by: PEDIATRICS

## 2018-03-15 RX ORDER — DULOXETIN HYDROCHLORIDE 60 MG/1
60 CAPSULE, DELAYED RELEASE ORAL 2 TIMES DAILY
Qty: 180 CAPSULE | Refills: 3 | Status: SHIPPED | OUTPATIENT
Start: 2018-03-15 | End: 2018-09-13 | Stop reason: ALTCHOICE

## 2018-03-15 RX ORDER — BUPROPION HYDROCHLORIDE 150 MG/1
300 TABLET ORAL EVERY MORNING
Qty: 90 TABLET | Refills: 3 | Status: SHIPPED | OUTPATIENT
Start: 2018-03-15 | End: 2018-09-07 | Stop reason: SDUPTHER

## 2018-03-15 ASSESSMENT — ENCOUNTER SYMPTOMS
BACK PAIN: 0
CHEST TIGHTNESS: 0
NAUSEA: 0
SHORTNESS OF BREATH: 0
WHEEZING: 0
ABDOMINAL PAIN: 0
DIARRHEA: 0
SORE THROAT: 0
VOMITING: 1
COUGH: 0

## 2018-03-15 NOTE — PROGRESS NOTES
feel like they are plugged up). Negative for congestion and sore throat. Eyes: Negative for visual disturbance. Respiratory: Negative for cough, chest tightness, shortness of breath and wheezing. Cardiovascular: Negative for chest pain, palpitations and leg swelling. Gastrointestinal: Positive for vomiting. Negative for abdominal pain, diarrhea and nausea. Endocrine: Negative for polyuria. Genitourinary: Negative for frequency and urgency. Musculoskeletal: Negative for back pain and neck pain. Skin: Negative for rash. Neurological: Negative for dizziness and headaches. Psychiatric/Behavioral: Positive for dysphoric mood (more depressed than usual). Negative for self-injury. The patient is not nervous/anxious. There have been some increase stressors recently   he gets a little lightheaded when he gets up first thing in the morning    Physical Exam   Constitutional: He is oriented to person, place, and time. He appears well-developed and well-nourished. No distress. HENT:   Head: Normocephalic and atraumatic. Right Ear: External ear normal. A foreign body (cerumen impaction) is present. Left Ear: External ear normal. A foreign body (cerumen impaction) is present. Nose: Nose normal.   Mouth/Throat: No posterior oropharyngeal edema or posterior oropharyngeal erythema. Eyes: EOM are normal. Pupils are equal, round, and reactive to light. Right conjunctiva is not injected. No scleral icterus. Neck: Normal range of motion. Neck supple. No JVD present. Carotid bruit is not present. No thyromegaly present. Cardiovascular: Normal rate, S1 normal, S2 normal and normal heart sounds. An irregularly irregular rhythm present. No extrasystoles are present. PMI is not displaced. Exam reveals no gallop and no friction rub. No murmur heard. Pulmonary/Chest: Effort normal. No respiratory distress. He has no wheezes. He has no rhonchi. He has no rales. Abdominal: Soft.  Bowel sounds are normal. There is no hepatosplenomegaly. There is no tenderness. There is no rebound, no guarding and no CVA tenderness. Abdominal obesity is present   Genitourinary:   Genitourinary Comments: Exam deferred   Musculoskeletal: Normal range of motion. He exhibits no edema or tenderness. Lymphadenopathy:     He has no cervical adenopathy. Neurological: He is alert and oriented to person, place, and time. He has normal strength. No sensory deficit (no numbness or tingling). Skin: Skin is warm and dry. No rash noted. Psychiatric: He has a normal mood and affect. ASSESSMENT      ICD-10-CM ICD-9-CM    1. Moderate episode of recurrent major depressive disorder (HCC) F33.1 296.32 DULoxetine (CYMBALTA) 60 MG extended release capsule      buPROPion (WELLBUTRIN XL) 150 MG extended release tablet   2. Chronic obstructive pulmonary disease, unspecified COPD type ([facility].) J44.9 496    3. Bilateral impacted cerumen H61.23 380.4        PLAN      ICD-10-CM ICD-9-CM    1. Moderate episode of recurrent major depressive disorder (HCC) F33.1 296.32 DULoxetine (CYMBALTA) 60 MG extended release capsule  This was increased to 60 mg twice daily instead of 60 mg once daily. buPROPion (WELLBUTRIN XL) 150 MG extended release tablet  This was cut down from 300 mg once daily to 150 mg.     2. Chronic obstructive pulmonary disease, unspecified COPD type ([facility].) J44.9 496 We will continue his present inhaler therapy as he has experienced good results with this regimen. 3. Bilateral impacted cerumen H61.23 380.4 Status post irrigation lavage of bilateral ears with liberation of the cerumen impaction bilaterally. No orders of the defined types were placed in this encounter. Return in about 1 month (around 4/15/2018). There are no Patient Instructions on file for this visit.              Procedure Note      Ear Cerumen Removal Procedure Note    Indication: ear cerumen impaction    Procedure: After placing

## 2018-03-22 DIAGNOSIS — C61 CANCER OF PROSTATE (HCC): ICD-10-CM

## 2018-03-28 ENCOUNTER — OFFICE VISIT (OUTPATIENT)
Dept: UROLOGY | Facility: CLINIC | Age: 83
End: 2018-03-28

## 2018-03-28 VITALS
TEMPERATURE: 98.1 F | BODY MASS INDEX: 37.65 KG/M2 | SYSTOLIC BLOOD PRESSURE: 146 MMHG | DIASTOLIC BLOOD PRESSURE: 65 MMHG | WEIGHT: 263 LBS | HEIGHT: 70 IN

## 2018-03-28 DIAGNOSIS — C61 CANCER OF PROSTATE (HCC): Primary | ICD-10-CM

## 2018-03-28 DIAGNOSIS — N32.81 OAB (OVERACTIVE BLADDER): ICD-10-CM

## 2018-03-28 DIAGNOSIS — R35.0 FREQUENCY OF URINATION: ICD-10-CM

## 2018-03-28 LAB
BILIRUB BLD-MCNC: NEGATIVE MG/DL
CLARITY, POC: CLEAR
COLOR UR: YELLOW
GLUCOSE UR STRIP-MCNC: NEGATIVE MG/DL
KETONES UR QL: NEGATIVE
LEUKOCYTE EST, POC: NEGATIVE
NITRITE UR-MCNC: NEGATIVE MG/ML
PH UR: 6 [PH] (ref 5–8)
PROT UR STRIP-MCNC: ABNORMAL MG/DL
RBC # UR STRIP: NEGATIVE /UL
SP GR UR: 1.01 (ref 1–1.03)
UROBILINOGEN UR QL: NORMAL

## 2018-03-28 PROCEDURE — 81001 URINALYSIS AUTO W/SCOPE: CPT | Performed by: UROLOGY

## 2018-03-28 PROCEDURE — 99213 OFFICE O/P EST LOW 20 MIN: CPT | Performed by: UROLOGY

## 2018-03-28 NOTE — PROGRESS NOTES
Subjective    Mr. Castro is 83 y.o. male    CHIEF COMPLAINT: Prostate cancer    The patient comes back today for follow-up of hormone refractory prostate cancer we have been treating him for many years and is doing very very well at this point he is on Xtandi and really has responded extremely well to the drug he is not any significant side effects.    He has no symptoms metastatic disease such as bone pain back pain weight loss or anorexia.  His PSA is undetectable    He does still have irritable bladder symptoms from his radiation with urgency frequency he has not responded well to medications and he does not want try anything differently at this time      History of Present Illness      The following portions of the patient's history were reviewed and updated as appropriate: allergies, current medications, past family history, past medical history, past social history, past surgical history and problem list.    Review of Systems   Constitutional: Negative for chills and fever.   Gastrointestinal: Negative for abdominal pain, anal bleeding and blood in stool.   Genitourinary: Positive for frequency and urgency. Negative for difficulty urinating, flank pain and hematuria.         Current Outpatient Prescriptions:   •  albuterol (PROVENTIL HFA;VENTOLIN HFA) 108 (90 BASE) MCG/ACT inhaler, Inhale 2 puffs every 4 (four) hours as needed for wheezing., Disp: , Rfl:   •  buPROPion XL (WELLBUTRIN XL) 300 MG 24 hr tablet, Take 300 mg by mouth daily., Disp: , Rfl:   •  carvedilol (COREG) 12.5 MG tablet, Take 12.5 mg by mouth 2 (two) times a day with meals., Disp: , Rfl:   •  carvedilol (COREG) 6.25 MG tablet, Take 6.25 mg by mouth daily., Disp: , Rfl:   •  celecoxib (CeleBREX) 200 MG capsule, Take 200 mg by mouth daily., Disp: , Rfl:   •  digoxin (LANOXIN) 125 MCG tablet, Take 125 mcg by mouth every day., Disp: , Rfl:   •  digoxin (LANOXIN) 250 MCG tablet, Take 250 mcg by mouth every day., Disp: , Rfl:   •  DULoxetine  (CYMBALTA) 60 MG capsule, Take 60 mg by mouth daily., Disp: , Rfl:   •  enzalutamide (XTANDI) 40 MG chemo capsule, Take 4 capsules by mouth Daily., Disp: 90 capsule, Rfl: 5  •  enzalutamide (XTANDI) 40 MG chemo capsule, Take  by mouth Daily., Disp: , Rfl:   •  fluticasone (FLONASE) 50 MCG/ACT nasal spray, 2 sprays into each nostril daily. Administer 2 sprays in each nostril for each dose., Disp: , Rfl:   •  formoterol (FORADIL) 12 MCG capsule for inhaler, Place 12 mcg into inhaler and inhale 2 (two) times a day., Disp: , Rfl:   •  furosemide (LASIX) 10 MG/ML solution, Take 60 mg by mouth daily., Disp: , Rfl:   •  furosemide (LASIX) 40 MG tablet, Take 40 mg by mouth 2 (two) times a day., Disp: , Rfl:   •  glimepiride (AMARYL) 4 MG tablet, Take 4 mg by mouth every morning before breakfast., Disp: , Rfl:   •  leuprolide (LUPRON) 30 MG injection, Inject 30 mg into the shoulder, thigh, or buttocks Every 4 (Four) Months., Disp: , Rfl:   •  lisinopril (PRINIVIL,ZESTRIL) 40 MG tablet, Take 40 mg by mouth daily., Disp: , Rfl:   •  metolazone (ZAROXOLYN) 2.5 MG tablet, Take 2.5 mg by mouth daily., Disp: , Rfl:   •  Mirabegron ER (MYRBETRIQ) 50 MG tablet sustained-release 24 hour, Take 50 mg by mouth daily., Disp: , Rfl:   •  Multiple Vitamins-Minerals (MULTIVITAMIN ADULT PO), Take  by mouth daily., Disp: , Rfl:   •  MYRBETRIQ 25 MG tablet sustained-release 24 hour 24 hr tablet, TAKE 1 TABLET BY MOUTH DAILY, Disp: 90 tablet, Rfl: 4  •  potassium chloride (K-DUR,KLOR-CON) 10 MEQ CR tablet, Take 10 mEq by mouth 2 (two) times a day., Disp: , Rfl:   •  predniSONE (DELTASONE) 10 MG tablet, Take 10 mg by mouth daily., Disp: , Rfl:   •  rivaroxaban (XARELTO) 20 MG tablet, Take 20 mg by mouth daily., Disp: , Rfl:   •  sitaGLIPtin (JANUVIA) 100 MG tablet, Take 100 mg by mouth daily., Disp: , Rfl:   •  SITagliptin-MetFORMIN HCl (JANUMET PO), Take  by mouth., Disp: , Rfl:   •  terazosin (HYTRIN) 5 MG capsule, Take 5 mg by mouth every  "night., Disp: , Rfl:   •  XTANDI 40 MG chemo capsule, Take 4 capsules (160mg) by mouth daily, Disp: 120 capsule, Rfl: 10    Past Medical History:   Diagnosis Date   • Arthritis    • Cancer     prostate   • Cellulitis    • CHF (congestive heart failure)    • Diabetes mellitus    • Elevated prostate specific antigen (PSA)    • Hematuria    • Hypertension    • Overactive bladder    • SOB (shortness of breath)        Past Surgical History:   Procedure Laterality Date   • EYE SURGERY     • HERNIA REPAIR     • JOINT REPLACEMENT     • REPLACEMENT TOTAL KNEE BILATERAL         Social History     Social History   • Marital status:      Social History Main Topics   • Smoking status: Never Smoker   • Smokeless tobacco: Never Used   • Alcohol use No   • Drug use: Unknown     Other Topics Concern   • Not on file       Family History   Problem Relation Age of Onset   • Prostate cancer Son    • No Known Problems Father    • No Known Problems Mother        Objective    /65   Temp 98.1 °F (36.7 °C)   Ht 177.8 cm (70\")   Wt 119 kg (263 lb)   BMI 37.74 kg/m²     Physical Exam      Office Visit on 01/24/2018   Component Date Value Ref Range Status   • Color 01/24/2018 Yellow  Yellow, Straw, Dark Yellow, Julee Final   • Clarity, UA 01/24/2018 Clear  Clear Final   • Glucose, UA 01/24/2018 Negative  Negative, 1000 mg/dL (3+) mg/dL Final   • Bilirubin 01/24/2018 Negative  Negative Final   • Ketones, UA 01/24/2018 Negative  Negative Final   • Specific Gravity  01/24/2018 1.010  1.005 - 1.030 Final   • Blood, UA 01/24/2018 Negative  Negative Final   • pH, Urine 01/24/2018 6.5  5.0 - 8.0 Final   • Protein, POC 01/24/2018 Negative  Negative mg/dL Final   • Urobilinogen, UA 01/24/2018 Normal  Normal Final   • Leukocytes 01/24/2018 Negative  Negative Final   • Nitrite, UA 01/24/2018 Negative  Negative Final       Results for orders placed or performed in visit on 03/28/18   POC Urinalysis Dipstick, Automated   Result Value Ref " Range    Color Yellow Yellow, Straw, Dark Yellow, Julee    Clarity, UA Clear Clear    Glucose, UA Negative Negative, 1000 mg/dL (3+) mg/dL    Bilirubin Negative Negative    Ketones, UA Negative Negative    Specific Gravity  1.015 1.005 - 1.030    Blood, UA Negative Negative    pH, Urine 6.0 5.0 - 8.0    Protein, POC Trace (A) Negative mg/dL    Urobilinogen, UA Normal Normal    Leukocytes Negative Negative    Nitrite, UA Negative Negative       Assessment and Plan    Diagnoses and all orders for this visit:    Cancer of prostate  -     POC Urinalysis Dipstick, Automated    OAB (overactive bladder)    Frequency of urination    Plan--the patient is done quite well and a stable now on Xtandi for a long time his PSA remains undetectable he is having no significant side effects from the medication.    I therefore we will stretch them out to 3 month appointment with repeat blood work and he has any difficulties prior then he will let us know.    His overactive bladder symptoms are stable although significant but that he does not want to try any medications at this time

## 2018-04-06 RX ORDER — HYDROCHLOROTHIAZIDE 12.5 MG/1
12.5 CAPSULE, GELATIN COATED ORAL EVERY MORNING
Qty: 90 CAPSULE | Refills: 3 | Status: SHIPPED | OUTPATIENT
Start: 2018-04-06 | End: 2018-10-03 | Stop reason: SDUPTHER

## 2018-04-14 DIAGNOSIS — E11.42 TYPE 2 DIABETES MELLITUS WITH DIABETIC POLYNEUROPATHY, WITHOUT LONG-TERM CURRENT USE OF INSULIN (HCC): ICD-10-CM

## 2018-04-23 DIAGNOSIS — G47.00 MIDDLE INSOMNIA: ICD-10-CM

## 2018-04-23 RX ORDER — PRAZOSIN HYDROCHLORIDE 2 MG/1
2 CAPSULE ORAL NIGHTLY
Qty: 30 CAPSULE | Refills: 5 | Status: SHIPPED | OUTPATIENT
Start: 2018-04-23 | End: 2018-08-20 | Stop reason: SDUPTHER

## 2018-05-07 ENCOUNTER — OFFICE VISIT (OUTPATIENT)
Dept: PRIMARY CARE CLINIC | Age: 83
End: 2018-05-07
Payer: MEDICARE

## 2018-05-07 VITALS
TEMPERATURE: 97.2 F | HEIGHT: 70 IN | OXYGEN SATURATION: 97 % | BODY MASS INDEX: 38.91 KG/M2 | SYSTOLIC BLOOD PRESSURE: 138 MMHG | DIASTOLIC BLOOD PRESSURE: 84 MMHG | HEART RATE: 60 BPM | WEIGHT: 271.8 LBS

## 2018-05-07 DIAGNOSIS — E11.42 TYPE 2 DIABETES MELLITUS WITH DIABETIC POLYNEUROPATHY, WITHOUT LONG-TERM CURRENT USE OF INSULIN (HCC): ICD-10-CM

## 2018-05-07 DIAGNOSIS — R10.84 GENERALIZED ABDOMINAL PAIN: ICD-10-CM

## 2018-05-07 DIAGNOSIS — I48.20 CHRONIC ATRIAL FIBRILLATION (HCC): ICD-10-CM

## 2018-05-07 DIAGNOSIS — R06.09 DOE (DYSPNEA ON EXERTION): Primary | ICD-10-CM

## 2018-05-07 PROCEDURE — 4040F PNEUMOC VAC/ADMIN/RCVD: CPT | Performed by: PEDIATRICS

## 2018-05-07 PROCEDURE — 1036F TOBACCO NON-USER: CPT | Performed by: PEDIATRICS

## 2018-05-07 PROCEDURE — G8427 DOCREV CUR MEDS BY ELIG CLIN: HCPCS | Performed by: PEDIATRICS

## 2018-05-07 PROCEDURE — G8417 CALC BMI ABV UP PARAM F/U: HCPCS | Performed by: PEDIATRICS

## 2018-05-07 PROCEDURE — 99214 OFFICE O/P EST MOD 30 MIN: CPT | Performed by: PEDIATRICS

## 2018-05-07 PROCEDURE — 1123F ACP DISCUSS/DSCN MKR DOCD: CPT | Performed by: PEDIATRICS

## 2018-05-07 ASSESSMENT — ENCOUNTER SYMPTOMS
SHORTNESS OF BREATH: 1
NAUSEA: 0
WHEEZING: 0
SORE THROAT: 0
VOMITING: 0
COUGH: 1
CHEST TIGHTNESS: 0
DIARRHEA: 0
BACK PAIN: 0
ABDOMINAL PAIN: 0

## 2018-05-08 ENCOUNTER — TELEPHONE (OUTPATIENT)
Dept: PRIMARY CARE CLINIC | Age: 83
End: 2018-05-08

## 2018-05-14 ENCOUNTER — TELEPHONE (OUTPATIENT)
Dept: PRIMARY CARE CLINIC | Age: 83
End: 2018-05-14

## 2018-05-14 DIAGNOSIS — R10.84 GENERALIZED ABDOMINAL PAIN: Primary | ICD-10-CM

## 2018-05-15 ENCOUNTER — TELEPHONE (OUTPATIENT)
Dept: PRIMARY CARE CLINIC | Age: 83
End: 2018-05-15

## 2018-05-16 ENCOUNTER — HOSPITAL ENCOUNTER (OUTPATIENT)
Dept: NUCLEAR MEDICINE | Age: 83
Discharge: HOME | End: 2018-05-18
Payer: MEDICARE

## 2018-05-16 ENCOUNTER — HOSPITAL ENCOUNTER (OUTPATIENT)
Dept: NON INVASIVE DIAGNOSTICS | Age: 83
Discharge: HOME | End: 2018-05-16
Payer: MEDICARE

## 2018-05-16 DIAGNOSIS — R06.09 DOE (DYSPNEA ON EXERTION): ICD-10-CM

## 2018-05-16 PROCEDURE — 3430000000 HC RX DIAGNOSTIC RADIOPHARMACEUTICAL: Performed by: PEDIATRICS

## 2018-05-16 PROCEDURE — 6360000002 HC RX W HCPCS: Performed by: PEDIATRICS

## 2018-05-16 PROCEDURE — 93017 CV STRESS TEST TRACING ONLY: CPT

## 2018-05-16 PROCEDURE — A9500 TC99M SESTAMIBI: HCPCS | Performed by: PEDIATRICS

## 2018-05-16 PROCEDURE — 78452 HT MUSCLE IMAGE SPECT MULT: CPT

## 2018-05-16 RX ADMIN — REGADENOSON 0.4 MG: 0.08 INJECTION, SOLUTION INTRAVENOUS at 14:47

## 2018-05-16 RX ADMIN — TETRAKIS(2-METHOXYISOBUTYLISOCYANIDE)COPPER(I) TETRAFLUOROBORATE 30 MILLICURIE: 1 INJECTION, POWDER, LYOPHILIZED, FOR SOLUTION INTRAVENOUS at 14:48

## 2018-05-16 RX ADMIN — TETRAKIS(2-METHOXYISOBUTYLISOCYANIDE)COPPER(I) TETRAFLUOROBORATE 10 MILLICURIE: 1 INJECTION, POWDER, LYOPHILIZED, FOR SOLUTION INTRAVENOUS at 14:47

## 2018-05-17 LAB
LV EF: 46 %
LVEF MODALITY: NORMAL

## 2018-05-18 ENCOUNTER — TELEPHONE (OUTPATIENT)
Dept: PRIMARY CARE CLINIC | Age: 83
End: 2018-05-18

## 2018-05-18 ENCOUNTER — HOSPITAL ENCOUNTER (OUTPATIENT)
Dept: GENERAL RADIOLOGY | Age: 83
Discharge: HOME | End: 2018-05-18
Payer: MEDICARE

## 2018-05-18 DIAGNOSIS — R10.84 GENERALIZED ABDOMINAL PAIN: ICD-10-CM

## 2018-05-18 DIAGNOSIS — E11.42 TYPE 2 DIABETES MELLITUS WITH DIABETIC POLYNEUROPATHY, WITHOUT LONG-TERM CURRENT USE OF INSULIN (HCC): ICD-10-CM

## 2018-05-18 PROCEDURE — 76700 US EXAM ABDOM COMPLETE: CPT

## 2018-05-29 RX ORDER — CELECOXIB 200 MG/1
200 CAPSULE ORAL 2 TIMES DAILY
Qty: 180 CAPSULE | Refills: 3 | Status: SHIPPED | OUTPATIENT
Start: 2018-05-29 | End: 2019-04-25 | Stop reason: DRUGHIGH

## 2018-06-13 ENCOUNTER — OFFICE VISIT (OUTPATIENT)
Dept: PRIMARY CARE CLINIC | Age: 83
End: 2018-06-13
Payer: MEDICARE

## 2018-06-13 VITALS
HEART RATE: 75 BPM | OXYGEN SATURATION: 97 % | DIASTOLIC BLOOD PRESSURE: 78 MMHG | WEIGHT: 268 LBS | TEMPERATURE: 97.7 F | HEIGHT: 70 IN | SYSTOLIC BLOOD PRESSURE: 130 MMHG | BODY MASS INDEX: 38.37 KG/M2

## 2018-06-13 DIAGNOSIS — G62.9 NEUROPATHY: ICD-10-CM

## 2018-06-13 DIAGNOSIS — I48.20 CHRONIC ATRIAL FIBRILLATION (HCC): ICD-10-CM

## 2018-06-13 DIAGNOSIS — F33.41 RECURRENT MAJOR DEPRESSIVE DISORDER, IN PARTIAL REMISSION (HCC): ICD-10-CM

## 2018-06-13 DIAGNOSIS — I50.9 CONGESTIVE HEART FAILURE, UNSPECIFIED CONGESTIVE HEART FAILURE CHRONICITY, UNSPECIFIED CONGESTIVE HEART FAILURE TYPE: ICD-10-CM

## 2018-06-13 DIAGNOSIS — J44.9 CHRONIC OBSTRUCTIVE PULMONARY DISEASE, UNSPECIFIED COPD TYPE (HCC): ICD-10-CM

## 2018-06-13 DIAGNOSIS — E11.42 TYPE 2 DIABETES MELLITUS WITH DIABETIC POLYNEUROPATHY, WITHOUT LONG-TERM CURRENT USE OF INSULIN (HCC): ICD-10-CM

## 2018-06-13 DIAGNOSIS — I10 ESSENTIAL HYPERTENSION: ICD-10-CM

## 2018-06-13 DIAGNOSIS — R10.84 GENERALIZED ABDOMINAL PAIN: Primary | ICD-10-CM

## 2018-06-13 PROCEDURE — 1036F TOBACCO NON-USER: CPT | Performed by: PEDIATRICS

## 2018-06-13 PROCEDURE — G8417 CALC BMI ABV UP PARAM F/U: HCPCS | Performed by: PEDIATRICS

## 2018-06-13 PROCEDURE — G8427 DOCREV CUR MEDS BY ELIG CLIN: HCPCS | Performed by: PEDIATRICS

## 2018-06-13 PROCEDURE — 3023F SPIROM DOC REV: CPT | Performed by: PEDIATRICS

## 2018-06-13 PROCEDURE — 99214 OFFICE O/P EST MOD 30 MIN: CPT | Performed by: PEDIATRICS

## 2018-06-13 PROCEDURE — 4040F PNEUMOC VAC/ADMIN/RCVD: CPT | Performed by: PEDIATRICS

## 2018-06-13 PROCEDURE — G8926 SPIRO NO PERF OR DOC: HCPCS | Performed by: PEDIATRICS

## 2018-06-13 PROCEDURE — 1123F ACP DISCUSS/DSCN MKR DOCD: CPT | Performed by: PEDIATRICS

## 2018-06-13 ASSESSMENT — ENCOUNTER SYMPTOMS
NAUSEA: 0
BACK PAIN: 0
SORE THROAT: 0
VOMITING: 0
COUGH: 1
CHEST TIGHTNESS: 0
DIARRHEA: 0
WHEEZING: 0
SHORTNESS OF BREATH: 1
ABDOMINAL PAIN: 0

## 2018-06-20 ENCOUNTER — OFFICE VISIT (OUTPATIENT)
Dept: UROLOGY | Facility: CLINIC | Age: 83
End: 2018-06-20

## 2018-06-20 VITALS — HEIGHT: 70 IN | WEIGHT: 273 LBS | BODY MASS INDEX: 39.08 KG/M2

## 2018-06-20 DIAGNOSIS — R35.0 FREQUENCY OF URINATION: ICD-10-CM

## 2018-06-20 DIAGNOSIS — N32.81 OAB (OVERACTIVE BLADDER): ICD-10-CM

## 2018-06-20 DIAGNOSIS — C61 CANCER OF PROSTATE (HCC): Primary | ICD-10-CM

## 2018-06-20 LAB
BILIRUB BLD-MCNC: NEGATIVE MG/DL
CLARITY, POC: CLEAR
COLOR UR: YELLOW
GLUCOSE UR STRIP-MCNC: NEGATIVE MG/DL
KETONES UR QL: NEGATIVE
LEUKOCYTE EST, POC: NEGATIVE
NITRITE UR-MCNC: NEGATIVE MG/ML
PH UR: 6 [PH] (ref 5–8)
PROT UR STRIP-MCNC: NEGATIVE MG/DL
RBC # UR STRIP: ABNORMAL /UL
SP GR UR: 1.01 (ref 1–1.03)
UROBILINOGEN UR QL: NORMAL

## 2018-06-20 PROCEDURE — 99213 OFFICE O/P EST LOW 20 MIN: CPT | Performed by: UROLOGY

## 2018-06-20 PROCEDURE — 81001 URINALYSIS AUTO W/SCOPE: CPT | Performed by: UROLOGY

## 2018-06-20 NOTE — PROGRESS NOTES
Subjective    Mr. Castro is 83 y.o. male    CHIEF COMPLAINT: Prostate cancer    The patient comes back today with his wife for home or hormone refractory prostate cancer he has been on Xtandi now for just about a year he is active done very very well he is had actually no side effects whatsoever he has no symptoms metastatic disease such as bone pain back pain weight loss or anorexia he denies any gross hematuria etc.    His recent laboratory work she is to be stable and his PSA remains 0.    He also has a previous symptoms of right irritable bladder symptoms of OAB from radiation this pre-much have resolved as well still has a moderate AUA score today but again no worsening of his symptoms      History of Present Illness      The following portions of the patient's history were reviewed and updated as appropriate: allergies, current medications, past family history, past medical history, past social history, past surgical history and problem list.    Review of Systems   Constitutional: Negative.  Negative for chills and fever.   Gastrointestinal: Negative for abdominal distention, abdominal pain, anal bleeding, blood in stool and nausea.   Genitourinary: Positive for difficulty urinating, frequency and urgency. Negative for decreased urine volume, discharge, dysuria, enuresis, flank pain, genital sores, hematuria, penile pain, penile swelling, scrotal swelling and testicular pain.   Psychiatric/Behavioral: Negative.  Negative for agitation and confusion.         Current Outpatient Prescriptions:   •  albuterol (PROVENTIL HFA;VENTOLIN HFA) 108 (90 BASE) MCG/ACT inhaler, Inhale 2 puffs every 4 (four) hours as needed for wheezing., Disp: , Rfl:   •  buPROPion XL (WELLBUTRIN XL) 300 MG 24 hr tablet, Take 300 mg by mouth daily., Disp: , Rfl:   •  carvedilol (COREG) 12.5 MG tablet, Take 12.5 mg by mouth 2 (two) times a day with meals., Disp: , Rfl:   •  carvedilol (COREG) 6.25 MG tablet, Take 6.25 mg by mouth daily.,  Disp: , Rfl:   •  celecoxib (CeleBREX) 200 MG capsule, Take 200 mg by mouth daily., Disp: , Rfl:   •  digoxin (LANOXIN) 125 MCG tablet, Take 125 mcg by mouth every day., Disp: , Rfl:   •  digoxin (LANOXIN) 250 MCG tablet, Take 250 mcg by mouth every day., Disp: , Rfl:   •  DULoxetine (CYMBALTA) 60 MG capsule, Take 60 mg by mouth daily., Disp: , Rfl:   •  enzalutamide (XTANDI) 40 MG chemo capsule, Take 4 capsules by mouth Daily., Disp: 90 capsule, Rfl: 5  •  enzalutamide (XTANDI) 40 MG chemo capsule, Take  by mouth Daily., Disp: , Rfl:   •  fluticasone (FLONASE) 50 MCG/ACT nasal spray, 2 sprays into each nostril daily. Administer 2 sprays in each nostril for each dose., Disp: , Rfl:   •  formoterol (FORADIL) 12 MCG capsule for inhaler, Place 12 mcg into inhaler and inhale 2 (two) times a day., Disp: , Rfl:   •  furosemide (LASIX) 10 MG/ML solution, Take 60 mg by mouth daily., Disp: , Rfl:   •  furosemide (LASIX) 40 MG tablet, Take 40 mg by mouth 2 (two) times a day., Disp: , Rfl:   •  glimepiride (AMARYL) 4 MG tablet, Take 4 mg by mouth every morning before breakfast., Disp: , Rfl:   •  leuprolide (LUPRON) 30 MG injection, Inject 30 mg into the shoulder, thigh, or buttocks Every 4 (Four) Months., Disp: , Rfl:   •  lisinopril (PRINIVIL,ZESTRIL) 40 MG tablet, Take 40 mg by mouth daily., Disp: , Rfl:   •  metolazone (ZAROXOLYN) 2.5 MG tablet, Take 2.5 mg by mouth daily., Disp: , Rfl:   •  Mirabegron ER (MYRBETRIQ) 50 MG tablet sustained-release 24 hour, Take 50 mg by mouth daily., Disp: , Rfl:   •  Multiple Vitamins-Minerals (MULTIVITAMIN ADULT PO), Take  by mouth daily., Disp: , Rfl:   •  MYRBETRIQ 25 MG tablet sustained-release 24 hour 24 hr tablet, TAKE 1 TABLET BY MOUTH DAILY, Disp: 90 tablet, Rfl: 4  •  potassium chloride (K-DUR,KLOR-CON) 10 MEQ CR tablet, Take 10 mEq by mouth 2 (two) times a day., Disp: , Rfl:   •  predniSONE (DELTASONE) 10 MG tablet, Take 10 mg by mouth daily., Disp: , Rfl:   •  rivaroxaban  "(XARELTO) 20 MG tablet, Take 20 mg by mouth daily., Disp: , Rfl:   •  sitaGLIPtin (JANUVIA) 100 MG tablet, Take 100 mg by mouth daily., Disp: , Rfl:   •  SITagliptin-MetFORMIN HCl (JANUMET PO), Take  by mouth., Disp: , Rfl:   •  terazosin (HYTRIN) 5 MG capsule, Take 5 mg by mouth every night., Disp: , Rfl:   •  XTANDI 40 MG chemo capsule, Take 4 capsules (160mg) by mouth daily, Disp: 120 capsule, Rfl: 10    Past Medical History:   Diagnosis Date   • Arthritis    • Cancer     prostate   • Cellulitis    • CHF (congestive heart failure)    • Diabetes mellitus    • Elevated prostate specific antigen (PSA)    • Hematuria    • Hypertension    • Overactive bladder    • SOB (shortness of breath)        Past Surgical History:   Procedure Laterality Date   • EYE SURGERY     • HERNIA REPAIR     • JOINT REPLACEMENT     • REPLACEMENT TOTAL KNEE BILATERAL         Social History     Social History   • Marital status:      Social History Main Topics   • Smoking status: Never Smoker   • Smokeless tobacco: Never Used   • Alcohol use No   • Drug use: Unknown     Other Topics Concern   • Not on file       Family History   Problem Relation Age of Onset   • Prostate cancer Son    • No Known Problems Father    • No Known Problems Mother        Objective    Ht 177.8 cm (70\")   Wt 124 kg (273 lb)   BMI 39.17 kg/m²     Physical Exam      Office Visit on 03/28/2018   Component Date Value Ref Range Status   • Color 03/28/2018 Yellow  Yellow, Straw, Dark Yellow, Julee Final   • Clarity, UA 03/28/2018 Clear  Clear Final   • Glucose, UA 03/28/2018 Negative  Negative, 1000 mg/dL (3+) mg/dL Final   • Bilirubin 03/28/2018 Negative  Negative Final   • Ketones, UA 03/28/2018 Negative  Negative Final   • Specific Gravity  03/28/2018 1.015  1.005 - 1.030 Final   • Blood, UA 03/28/2018 Negative  Negative Final   • pH, Urine 03/28/2018 6.0  5.0 - 8.0 Final   • Protein, POC 03/28/2018 Trace* Negative mg/dL Final   • Urobilinogen, UA 03/28/2018 " Normal  Normal Final   • Leukocytes 03/28/2018 Negative  Negative Final   • Nitrite, UA 03/28/2018 Negative  Negative Final       Results for orders placed or performed in visit on 06/20/18   POC Urinalysis Dipstick   Result Value Ref Range    Color Yellow Yellow, Straw, Dark Yellow, Julee    Clarity, UA Clear Clear    Glucose, UA Negative Negative, 1000 mg/dL (3+) mg/dL    Bilirubin Negative Negative    Ketones, UA Negative Negative    Specific Gravity  1.015 1.005 - 1.030    Blood, UA Trace (A) Negative    pH, Urine 6.0 5.0 - 8.0    Protein, POC Negative Negative mg/dL    Urobilinogen, UA Normal Normal    Leukocytes Negative Negative    Nitrite, UA Negative Negative     Microscopic Urinalysis  I inspected the urine myself based on the clinical situation including the dipstick urine. The urine is spun in a centrifuge for three minutes. The spun urine shows 0-2 rbc/hpf, 3-6 wbc/hpf, none epi/hpf, negative bacteria, negative crystals, and negative casts.     Assessment and Plan    Diagnoses and all orders for this visit:    Cancer of prostate  -     POC Urinalysis Dipstick  -     PSA DIAGNOSTIC; Future  -     CBC & Differential; Future  -     Comprehensive Metabolic Panel; Future    OAB (overactive bladder)    Frequency of urination    Plan--the patient seems doing well on Xtandi without any significant side effects and continue that we will seen back again here in 3 months with repeat blood work.    His force OAB symptoms and we tried multiple medications of his work well he seems to be stable

## 2018-06-21 ENCOUNTER — HOSPITAL ENCOUNTER (OUTPATIENT)
Dept: GENERAL RADIOLOGY | Age: 83
Discharge: HOME | End: 2018-06-21
Payer: MEDICARE

## 2018-06-21 ENCOUNTER — NURSE ONLY (OUTPATIENT)
Dept: FAMILY MEDICINE CLINIC | Age: 83
End: 2018-06-21

## 2018-06-21 DIAGNOSIS — Z13.79 ENCOUNTER FOR PHARMACOGENETIC TESTING: Primary | ICD-10-CM

## 2018-06-21 DIAGNOSIS — I48.91 ATRIAL FIBRILLATION, UNSPECIFIED TYPE (HCC): Primary | ICD-10-CM

## 2018-06-21 DIAGNOSIS — R10.84 GENERALIZED ABDOMINAL PAIN: ICD-10-CM

## 2018-06-21 PROCEDURE — 74177 CT ABD & PELVIS W/CONTRAST: CPT

## 2018-06-21 PROCEDURE — 6360000004 HC RX CONTRAST MEDICATION: Performed by: PEDIATRICS

## 2018-06-21 PROCEDURE — 99999 PR OFFICE/OUTPT VISIT,PROCEDURE ONLY: CPT | Performed by: FAMILY MEDICINE

## 2018-06-21 RX ADMIN — IOPAMIDOL 75 ML: 755 INJECTION, SOLUTION INTRAVENOUS at 10:14

## 2018-06-22 ENCOUNTER — TELEPHONE (OUTPATIENT)
Dept: UROLOGY | Facility: CLINIC | Age: 83
End: 2018-06-22

## 2018-06-22 ENCOUNTER — TELEPHONE (OUTPATIENT)
Dept: PRIMARY CARE CLINIC | Age: 83
End: 2018-06-22

## 2018-06-22 NOTE — TELEPHONE ENCOUNTER
ACS called to let us know this patient's Xtandi order was shipped on 6/11/18 for delivery 6/12/18.

## 2018-06-27 ENCOUNTER — OFFICE VISIT (OUTPATIENT)
Dept: UROLOGY | Facility: CLINIC | Age: 83
End: 2018-06-27

## 2018-06-27 DIAGNOSIS — C61 PROSTATE CANCER (HCC): Primary | ICD-10-CM

## 2018-06-27 PROCEDURE — 96402 CHEMO HORMON ANTINEOPL SQ/IM: CPT | Performed by: UROLOGY

## 2018-06-29 RX ORDER — POTASSIUM CHLORIDE 750 MG/1
10 TABLET, FILM COATED, EXTENDED RELEASE ORAL DAILY
Qty: 90 TABLET | Refills: 3 | Status: SHIPPED | OUTPATIENT
Start: 2018-06-29 | End: 2018-09-13 | Stop reason: SDUPTHER

## 2018-07-05 DIAGNOSIS — C61 CANCER OF PROSTATE (HCC): ICD-10-CM

## 2018-07-16 RX ORDER — CARVEDILOL 12.5 MG/1
12.5 TABLET ORAL 2 TIMES DAILY
Qty: 180 TABLET | Refills: 3 | Status: SHIPPED | OUTPATIENT
Start: 2018-07-16 | End: 2019-07-16 | Stop reason: SDUPTHER

## 2018-07-24 DIAGNOSIS — Z79.01 CHRONIC ANTICOAGULATION: Primary | ICD-10-CM

## 2018-08-20 DIAGNOSIS — G47.00 MIDDLE INSOMNIA: ICD-10-CM

## 2018-08-20 RX ORDER — PRAZOSIN HYDROCHLORIDE 2 MG/1
2 CAPSULE ORAL NIGHTLY
Qty: 90 CAPSULE | Refills: 3 | Status: SHIPPED | OUTPATIENT
Start: 2018-08-20 | End: 2021-05-24

## 2018-08-24 DIAGNOSIS — I50.42 CHRONIC COMBINED SYSTOLIC AND DIASTOLIC CONGESTIVE HEART FAILURE (HCC): ICD-10-CM

## 2018-08-24 RX ORDER — METOLAZONE 2.5 MG/1
TABLET ORAL
Qty: 30 TABLET | Refills: 5 | Status: SHIPPED | OUTPATIENT
Start: 2018-08-24 | End: 2019-02-10 | Stop reason: SDUPTHER

## 2018-08-24 NOTE — TELEPHONE ENCOUNTER
Received fax from pharmacy requesting refill on pts medication(s). Pt was last seen in office on 6/13/2018  and has a follow up scheduled for 9/13/2018. Will send request to  Dr. Helene Shea  for authorization.      Requested Prescriptions     Pending Prescriptions Disp Refills    metolazone (ZAROXOLYN) 2.5 MG tablet [Pharmacy Med Name: METOLAZONE 2.5 MG TABLET] 30 tablet 5     Sig: TAKE 1 TABLET BY MOUTH DAILY AS NEEDED FOR EDEMA - TAKE 30 MINUTES BEFORE LASIX

## 2018-08-30 RX ORDER — LISINOPRIL 40 MG/1
40 TABLET ORAL DAILY
Qty: 90 TABLET | Refills: 3 | Status: SHIPPED | OUTPATIENT
Start: 2018-08-30 | End: 2019-08-25 | Stop reason: SDUPTHER

## 2018-09-07 DIAGNOSIS — F33.1 MODERATE EPISODE OF RECURRENT MAJOR DEPRESSIVE DISORDER (HCC): ICD-10-CM

## 2018-09-07 RX ORDER — BUPROPION HYDROCHLORIDE 150 MG/1
300 TABLET ORAL EVERY MORNING
Qty: 180 TABLET | Refills: 3 | Status: SHIPPED | OUTPATIENT
Start: 2018-09-07 | End: 2019-09-08 | Stop reason: SDUPTHER

## 2018-09-07 NOTE — TELEPHONE ENCOUNTER
I dont see where you ever changed this rx, but they sent over a request for 2 a day. I did see this in your note from 18. Okay to send in? OFFICE VISIT: 2018     Bebe Temo- : 1935        HPI  Reason For Visit:  Erik Vargas is a 81 y.o.      Health Maintenance     Follow-up; Anxiety (Medication helps); Hypertension; Discuss Medications (Wants to discuss Wellbutrin); and Abdominal Pain (Patient states this started about 6 months ago)     Patient presents on follow-up for depression and evaluation on Cymbalta 60 mg once daily with Wellbutrin 150 mg daily. He was also having chronic pain in his Wellbutrin was decreased from 300 mg 150 and Cymbalta was added.     He has been taking 300mg wellbutrin (2 of 150mg per day)  He is also taking cymbalta  He notes that he does not seem to be with it. He is feeling some weak. Received fax from pharmacy requesting refill on pts medication(s). Pt was last seen in office on 2018  and has a follow up scheduled for 2018. Will send request to  Dr. Rivas Mcknight  for authorization.      Requested Prescriptions     Pending Prescriptions Disp Refills    buPROPion (WELLBUTRIN XL) 150 MG extended release tablet [Pharmacy Med Name: BUPROPION HCL  MG TABLET] 180 tablet 3     Sig: Take 2 tablets by mouth every morning

## 2018-09-13 ENCOUNTER — OFFICE VISIT (OUTPATIENT)
Dept: PRIMARY CARE CLINIC | Age: 83
End: 2018-09-13
Payer: MEDICARE

## 2018-09-13 VITALS
TEMPERATURE: 97.4 F | HEIGHT: 70 IN | BODY MASS INDEX: 38.2 KG/M2 | DIASTOLIC BLOOD PRESSURE: 80 MMHG | OXYGEN SATURATION: 96 % | SYSTOLIC BLOOD PRESSURE: 136 MMHG | HEART RATE: 66 BPM | WEIGHT: 266.8 LBS

## 2018-09-13 DIAGNOSIS — I48.19 PERSISTENT ATRIAL FIBRILLATION (HCC): ICD-10-CM

## 2018-09-13 DIAGNOSIS — G47.33 OSA (OBSTRUCTIVE SLEEP APNEA): ICD-10-CM

## 2018-09-13 DIAGNOSIS — J44.9 CHRONIC OBSTRUCTIVE PULMONARY DISEASE, UNSPECIFIED COPD TYPE (HCC): ICD-10-CM

## 2018-09-13 DIAGNOSIS — Z00.00 VISIT FOR PREVENTIVE HEALTH EXAMINATION: Primary | ICD-10-CM

## 2018-09-13 DIAGNOSIS — E87.4 METABOLIC ALKALOSIS WITH RESPIRATORY ACIDOSIS: ICD-10-CM

## 2018-09-13 DIAGNOSIS — E78.2 MIXED HYPERLIPIDEMIA: ICD-10-CM

## 2018-09-13 DIAGNOSIS — I10 ESSENTIAL HYPERTENSION: ICD-10-CM

## 2018-09-13 DIAGNOSIS — F33.41 RECURRENT MAJOR DEPRESSIVE DISORDER, IN PARTIAL REMISSION (HCC): ICD-10-CM

## 2018-09-13 DIAGNOSIS — Z12.5 ENCOUNTER FOR SCREENING FOR MALIGNANT NEOPLASM OF PROSTATE: ICD-10-CM

## 2018-09-13 DIAGNOSIS — C61 PROSTATE CANCER (HCC): ICD-10-CM

## 2018-09-13 DIAGNOSIS — E11.42 TYPE 2 DIABETES MELLITUS WITH DIABETIC POLYNEUROPATHY, WITHOUT LONG-TERM CURRENT USE OF INSULIN (HCC): ICD-10-CM

## 2018-09-13 DIAGNOSIS — Z79.01 CHRONIC ANTICOAGULATION: ICD-10-CM

## 2018-09-13 PROCEDURE — G0439 PPPS, SUBSEQ VISIT: HCPCS | Performed by: PEDIATRICS

## 2018-09-13 PROCEDURE — 1123F ACP DISCUSS/DSCN MKR DOCD: CPT | Performed by: PEDIATRICS

## 2018-09-13 PROCEDURE — 4040F PNEUMOC VAC/ADMIN/RCVD: CPT | Performed by: PEDIATRICS

## 2018-09-13 PROCEDURE — G8926 SPIRO NO PERF OR DOC: HCPCS | Performed by: PEDIATRICS

## 2018-09-13 PROCEDURE — 1036F TOBACCO NON-USER: CPT | Performed by: PEDIATRICS

## 2018-09-13 PROCEDURE — G0444 DEPRESSION SCREEN ANNUAL: HCPCS | Performed by: PEDIATRICS

## 2018-09-13 PROCEDURE — G8417 CALC BMI ABV UP PARAM F/U: HCPCS | Performed by: PEDIATRICS

## 2018-09-13 PROCEDURE — 99214 OFFICE O/P EST MOD 30 MIN: CPT | Performed by: PEDIATRICS

## 2018-09-13 PROCEDURE — 3023F SPIROM DOC REV: CPT | Performed by: PEDIATRICS

## 2018-09-13 PROCEDURE — 1101F PT FALLS ASSESS-DOCD LE1/YR: CPT | Performed by: PEDIATRICS

## 2018-09-13 PROCEDURE — G8427 DOCREV CUR MEDS BY ELIG CLIN: HCPCS | Performed by: PEDIATRICS

## 2018-09-13 RX ORDER — FLUOXETINE HYDROCHLORIDE 40 MG/1
40 CAPSULE ORAL DAILY
Qty: 30 CAPSULE | Refills: 11 | Status: SHIPPED | OUTPATIENT
Start: 2018-09-13 | End: 2018-11-12 | Stop reason: SDUPTHER

## 2018-09-13 ASSESSMENT — PATIENT HEALTH QUESTIONNAIRE - PHQ9: SUM OF ALL RESPONSES TO PHQ QUESTIONS 1-9: 11

## 2018-09-13 ASSESSMENT — ENCOUNTER SYMPTOMS
SHORTNESS OF BREATH: 1
ABDOMINAL PAIN: 0
CHEST TIGHTNESS: 0
BACK PAIN: 0
VOMITING: 0
NAUSEA: 0
SORE THROAT: 0
DIARRHEA: 0
COUGH: 1
WHEEZING: 0

## 2018-09-13 ASSESSMENT — LIFESTYLE VARIABLES: HOW OFTEN DO YOU HAVE A DRINK CONTAINING ALCOHOL: 0

## 2018-09-13 ASSESSMENT — ANXIETY QUESTIONNAIRES: GAD7 TOTAL SCORE: 4

## 2018-09-13 NOTE — PATIENT INSTRUCTIONS
Personalized Preventive Plan for Kara Shone - 9/13/2018  Medicare offers a range of preventive health benefits. Some of the tests and screenings are paid in full while other may be subject to a deductible, co-insurance, and/or copay. Some of these benefits include a comprehensive review of your medical history including lifestyle, illnesses that may run in your family, and various assessments and screenings as appropriate. After reviewing your medical record and screening and assessments performed today your provider may have ordered immunizations, labs, imaging, and/or referrals for you. A list of these orders (if applicable) as well as your Preventive Care list are included within your After Visit Summary for your review. Other Preventive Recommendations:    · A preventive eye exam performed by an eye specialist is recommended every 1-2 years to screen for glaucoma; cataracts, macular degeneration, and other eye disorders. · A preventive dental visit is recommended every 6 months. · Try to get at least 150 minutes of exercise per week or 10,000 steps per day on a pedometer . · Order or download the FREE \"Exercise & Physical Activity: Your Everyday Guide\" from [employer]. Call 5-565.166.1451 or search [employer] online. · You need 1224-5019 mg of calcium and 0954-2116 IU of vitamin D per day. It is possible to meet your calcium requirement with diet alone, but a vitamin D supplement is usually necessary to meet this goal.  · When exposed to the sun, use a sunscreen that protects against both UVA and UVB radiation with an SPF of 30 or greater. Reapply every 2 to 3 hours or after sweating, drying off with a towel, or swimming. · Always wear a seat belt when traveling in a car. Always wear a helmet when riding a bicycle or motorcycle.   Patient Education        Well Visit, Over 72: Care Instructions  Your Care Instructions    Physical exams can help you stay healthy. Your doctor has checked your overall health and may have suggested ways to take good care of yourself. He or she also may have recommended tests. At home, you can help prevent illness with healthy eating, regular exercise, and other steps. Follow-up care is a key part of your treatment and safety. Be sure to make and go to all appointments, and call your doctor if you are having problems. It's also a good idea to know your test results and keep a list of the medicines you take. How can you care for yourself at home? Reach and stay at a healthy weight. This will lower your risk for many problems, such as obesity, diabetes, heart disease, and high blood pressure. Get at least 30 minutes of exercise on most days of the week. Walking is a good choice. You also may want to do other activities, such as running, swimming, cycling, or playing tennis or team sports. Do not smoke. Smoking can make health problems worse. If you need help quitting, talk to your doctor about stop-smoking programs and medicines. These can increase your chances of quitting for good. Protect your skin from too much sun. When you're outdoors from 10 a.m. to 4 p.m., stay in the shade or cover up with clothing and a hat with a wide brim. Wear sunglasses that block UV rays. Even when it's cloudy, put broad-spectrum sunscreen (SPF 30 or higher) on any exposed skin. See a dentist one or two times a year for checkups and to have your teeth cleaned. Wear a seat belt in the car. Limit alcohol to 2 drinks a day for men and 1 drink a day for women. Too much alcohol can cause health problems. Follow your doctor's advice about when to have certain tests. These tests can spot problems early. For men and women  Cholesterol. Your doctor will tell you how often to have this done based on your overall health and other things that can increase your risk for heart attack and stroke. Blood pressure.  Have your blood pressure checked during a routine doctor visit. Your doctor will tell you how often to check your blood pressure based on your age, your blood pressure results, and other factors. Diabetes. Ask your doctor whether you should have tests for diabetes. Vision. Experts recommend that you have yearly exams for glaucoma and other age-related eye problems. Hearing. Tell your doctor if you notice any change in your hearing. You can have tests to find out how well you hear. Colon cancer tests. Keep having colon cancer tests as your doctor recommends. You can have one of several types of tests. Heart attack and stroke risk. At least every 4 to 6 years, you should have your risk for heart attack and stroke assessed. Your doctor uses factors such as your age, blood pressure, cholesterol, and whether you smoke or have diabetes to show what your risk for a heart attack or stroke is over the next 10 years. Osteoporosis. Talk to your doctor about whether you should have a bone density test to find out whether you have thinning bones. Also ask your doctor about whether you should take calcium and vitamin D supplements. For women  Pap test and pelvic exam. You may no longer need a Pap test. Talk with your doctor about whether to stop or continue to have Pap tests. Breast exam and mammogram. Ask how often you should have a mammogram, which is an X-ray of your breasts. A mammogram can spot breast cancer before it can be felt and when it is easiest to treat. Thyroid disease. Talk to your doctor about whether to have your thyroid checked as part of a regular physical exam. Women have an increased chance of a thyroid problem. For men  Prostate exam. Talk to your doctor about whether you should have a blood test (called a PSA test) for prostate cancer. Experts disagree on whether men should have this test. Some experts recommend that you discuss the benefits and risks of the test with your doctor. Abdominal aortic aneurysm.  Ask your doctor whether you should have a test to check for an aneurysm. You may need a test if you ever smoked or if your parent, brother, sister, or child has had an aneurysm. When should you call for help? Watch closely for changes in your health, and be sure to contact your doctor if you have any problems or symptoms that concern you. Where can you learn more? Go to [email or web address]://[email or web address].[email or web address] org and sign in to your Qraved account. Enter Q335 in the [employer] box to learn more about \"Well Visit, Over 65: Care Instructions. \"     If you do not have an account, please click on the \"Sign Up Now\" link. Current as of: May 16, 2017  Content Version: 11.7  © 6982-9456 [employer]. Care instructions adapted under license by [facility] ([facility]). If you have questions about a medical condition or this instruction, always ask your healthcare professional. [place] [address] any warranty or liability for your use of this information. Patient Education        Preventing Outdoor Falls: Care Instructions  Your Care Instructions    Worries about falls don't need to keep you indoors. Outdoor activities like walking have big benefits for your health. You will need to watch your step and learn a few safety measures. If you are worried about having a fall outdoors, ask your doctor about exercises, classes, or physical therapy that may help. You can learn ways to gain strength, flexibility, and balance. Ask if it might help to use a cane or walker. You can make your time outdoors safer with a few simple measures. Follow-up care is a key part of your treatment and safety. Be sure to make and go to all appointments, and call your doctor if you are having problems. It's also a good idea to know your test results and keep a list of the medicines you take. How can you prevent falls outdoors? Wear shoes with firm soles and low heels.  If you have to walk on an icy surface, use grippers that can be worn consider installing a raised toilet seat to make getting on and off the toilet easier. Keep your bathroom door unlocked while you are in the shower. Where can you learn more? Go to https://[email or web address].[email or web address] org and sign in to your Netviewer account. Enter 0476 [id number] in the [facility] box to learn more about \"Preventing Falls: Care Instructions. \"     If you do not have an account, please click on the \"Sign Up Now\" link. Current as of: May 12, 2017  Content Version: 11.7  © 3709-9590 [employer]. Care instructions adapted under license by [facility] ([facility]). If you have questions about a medical condition or this instruction, always ask your healthcare professional. [address] any warranty or liability for your use of this information. Patient Education        Learning About Getting In and Out of a Bathtub Safely  Introduction  Many falls happen during bathing. All that water makes the bathroom a slippery place. You may no longer be able to step over the tub wall comfortably and safely. You might lean on things that aren't meant to support your weight, like a towel bar or the shower curtain. There are several types of aids that will help keep you safe. You can buy them at Turn or home improvement stores or online. What tools can you use to get in and out of the tub? Tub rail and grab bar    There are many types of bars and rails you can install on the walls next to your tub or on the edge of the tub. They will help keep you safe while you're getting in or out of the tub. It's important to have them permanently installed, rather than attached with suction. Transfer bench    There are several kinds of benches or seats to use in the bathtub. One type sits in the tub and extends out over the side. You sit on the bench. Lift one leg at a time into the tub, sliding your body over as you do so. Another common tub bench sits inside the tub.  To use this type you need to be able to safely step into the tub before sitting down. Nonskid mats    Water makes both the floor of the tub and the bathroom floor slippery. You can buy adhesive strips that stick to the floor of the tub. Or you can use a nonskid tub mat. Outside the tub, make sure you use a rug with a nonskid bottom. Don't use a plain towel. Hand-held shower faucet    With a hand-held faucet, you can get clean without having to lower yourself into the tub. Hang a shower curtain to keep water from spilling out onto the floor. Follow-up care is a key part of your treatment and safety. Be sure to make and go to all appointments, and call your doctor if you are having problems. It's also a good idea to know your test results and keep a list of the medicines you take. Where can you learn more? Go to https://Surface TensionpeJaxtr.[employer]. org and sign in to your SportsManias account. Enter Z310 in the [employer] box to learn more about \"Learning About Getting In and Out of a Bathtub Safely. \"     If you do not have an account, please click on the \"Sign Up Now\" link. Current as of: November 29, 2017  Content Version: 11.7  © 6708-4952 [employer]. Care instructions adapted under license by [facility] ([place] [facility]). If you have questions about a medical condition or this instruction, always ask your healthcare professional. [place] [address] any warranty or liability for your use of this information. Patient Education        Learning About Emotional Support  When do you need emotional support? When you have a long-term health problem, you may feel alone, confused, or scared. But you are not alone. Other people are going through the same thing you are and know how you feel. Talking with others about your feelings can help you feel better. Your family and friends can give you support. So can your doctor, a support group, or a Mu-ism.  If you have a support network, you will not feel as relationship includes emotional support such as love, trust, and understanding, as well as advice and concrete help, such as help managing your time. Reach out to others  Family and friends can help you. Ask them to:  Listen to you and give you encouragement. This can keep you from feeling hopeless or alone. Help with small daily tasks or with bigger problems. A helping hand can keep you from feeling overwhelmed. Go to doctor visits with you. Your loved ones can offer support by being involved in your medical care. Respect your relationships  A good relationship is also a two-way street. You count on help from others, but they also count on you. Know your friends' limits. You do not have to see or call your friends every day. If you are going through a rough patch, ask friends if you can contact them outside of the usual boundaries. Do not always complain or talk about yourself. Know when it is time to stop talking and listen or just enjoy your friend's company. Know that good friends can be a bad influence. For example, if a friend encourages you to drink when you know it will harm you, you may want to end the friendship. Where can you learn more? Go to https://Writer.lypepiceweb.[email or web address] org and sign in to your MirageWorks account. Enter X041 in the [employer] box to learn more about \"Learning About Emotional Support. \"     If you do not have an account, please click on the \"Sign Up Now\" link. Current as of: October 10, 2017  Content Version: 11.7  © 3520-5509 [employer]. Care instructions adapted under license by [facility] ([facility]). If you have questions about a medical condition or this instruction, always ask your healthcare professional. [place] [address] any warranty or liability for your use of this information.        Patient Education        Eating Healthy Foods: Care Instructions  Your Care Instructions    Eating healthy foods can help lower your risk for Fast-food and convenience-food meals often contain few or no fruits or vegetables. Make sure you eat some fruits and vegetables to make the meal more nutritious. Look at your food diary. For each food group, add up what you have eaten and then divide the total by the number of days. This will give you an idea of how much you are eating from each food group. See if you can find some ways to change your diet to make it more healthy. Start small  Do not try to make dramatic changes to your diet all at once. You might feel that you are missing out on your favorite foods and then be more likely to fail. Start slowly, and gradually change your habits. Try some of the following:  Use whole wheat bread instead of white bread. Use nonfat or low-fat milk instead of whole milk. Eat brown rice instead of white rice, and eat whole wheat pasta instead of white-flour pasta. Try low-fat cheeses and low-fat yogurt. Add more fruits and vegetables to meals and have them for snacks. Add lettuce, tomato, cucumber, and onion to sandwiches. Add fruit to yogurt and cereal.  Enjoy food  You can still eat your favorite foods. You just may need to eat less of them. If your favorite foods are high in fat, salt, and sugar, limit how often you eat them, but do not cut them out entirely. Eat a wide variety of foods. Make healthy choices when eating out  The type of restaurant you choose can help you make healthy choices. Even fast-food chains are now offering more low-fat or healthier choices on the menu. Choose smaller portions, or take half of your meal home. When eating out, try:  A veggie pizza with a whole wheat crust or grilled chicken (instead of sausage or pepperoni). Pasta with roasted vegetables, grilled chicken, or marinara sauce instead of cream sauce. A vegetable wrap or grilled chicken wrap. Broiled or poached food instead of fried or breaded items.   Make healthy choices easy  Buy packaged, prewashed, ready-to-eat cereal, pasta, and rice have about 15 grams of carbs in a serving. A serving is 1 slice of bread (1 ounce), ½ cup of cooked cereal, or 1/3 cup of cooked pasta or rice. Fruits have 15 grams of carbs in a serving. A serving is 1 small fresh fruit, such as an apple or orange; ½ of a banana; ½ cup of cooked or canned fruit; ½ cup of fruit juice; 1 cup of melon or raspberries; or 2 tablespoons of dried fruit. Milk and no-sugar-added yogurt have 15 grams of carbs in a serving. A serving is 1 cup of milk or 2/3 cup of no-sugar-added yogurt. Starchy vegetables have 15 grams of carbs in a serving. A serving is ½ cup of mashed potatoes or sweet potato; 1 cup winter squash; ½ of a small baked potato; ½ cup of cooked beans; or ½ cup cooked corn or green peas. Learn how much carbs to eat each day and at each meal. A dietitian or CDE can teach you how to keep track of the amount of carbs you eat. This is called carbohydrate counting. If you are not sure how to count carbohydrate grams, use the Plate Method to plan meals. It is a good, quick way to make sure that you have a balanced meal. It also helps you spread carbs throughout the day. Divide your plate by types of foods. Put non-starchy vegetables on half the plate, meat or other protein food on one-quarter of the plate, and a grain or starchy vegetable in the final quarter of the plate. To this you can add a small piece of fruit and 1 cup of milk or yogurt, depending on how many carbs you are supposed to eat at a meal.  Try to eat about the same amount of carbs at each meal. Do not \"save up\" your daily allowance of carbs to eat at one meal.  Proteins have very little or no carbs per serving. Examples of proteins are beef, chicken, turkey, fish, eggs, tofu, cheese, cottage cheese, and peanut butter. A serving size of meat is 3 ounces, which is about the size of a deck of cards.  Examples of meat substitute serving sizes (equal to 1 ounce of meat) are 1/4 cup of cottage cheese, 1 egg, 1 tablespoon of peanut butter, and ½ cup of tofu. How can you eat out and still eat healthy? Learn to estimate the serving sizes of foods that have carbohydrate. If you measure food at home, it will be easier to estimate the amount in a serving of restaurant food. If the meal you order has too much carbohydrate (such as potatoes, corn, or baked beans), ask to have a low-carbohydrate food instead. Ask for a salad or green vegetables. If you use insulin, check your blood sugar before and after eating out to help you plan how much to eat in the future. If you eat more carbohydrate at a meal than you had planned, take a walk or do other exercise. This will help lower your blood sugar. What else should you know? Limit saturated fat, such as the fat from meat and dairy products. This is a healthy choice because people who have diabetes are at higher risk of heart disease. So choose lean cuts of meat and nonfat or low-fat dairy products. Use olive or canola oil instead of butter or shortening when cooking. Don't skip meals. Your blood sugar may drop too low if you skip meals and take insulin or certain medicines for diabetes. Check with your doctor before you drink alcohol. Alcohol can cause your blood sugar to drop too low. Alcohol can also cause a bad reaction if you take certain diabetes medicines. Follow-up care is a key part of your treatment and safety. Be sure to make and go to all appointments, and call your doctor if you are having problems. It's also a good idea to know your test results and keep a list of the medicines you take. Where can you learn more? Go to https://amanda.Rapleaf. org and sign in to your [employer] account. Enter R119 in the [facility] box to learn more about \"Learning About Diabetes Food Guidelines. \"     If you do not have an account, please click on the \"Sign Up Now\" link.   Current as of: December 7, 2017  Content Version: 11.7  © 4200-8711 [employer]wise, Incorporated. Care instructions adapted under license by [facility] ([facility]). If you have questions about a medical condition or this instruction, always ask your healthcare professional. [address] any warranty or liability for your use of this information.

## 2018-09-13 NOTE — PROGRESS NOTES
tobacco.    Lab Results   Component Value Date    LABA1C 5.7 02/19/2018    LABA1C 6.1 (H) 05/26/2017    LABA1C 6.5 (H) 02/23/2017     Lab Results   Component Value Date    LABMICR 8.40 02/19/2018    CREATININE 0.9 06/21/2018       Hypertension:   Medication   Lisinopril 40 mg daily   Carvedilol 12.5 mg twice daily    Hydrochlorothiazide 12.5 mg daily   Amlodipine 5 mg daily     Medication compliance:  compliant most of the time  Home blood pressure monitoring: Yes - controlled. He is adherent to a low sodium diet. Symptoms: none  Laboratory:  Lab Results   Component Value Date    BUN 17 02/19/2018    CREATININE 0.9 06/21/2018       Hyperlipidemia:    Myalgias or GI upset: no   on cholestyramine (Questran)    Lab Results   Component Value Date    CHOL 138 (L) 02/19/2018    TRIG 158 (H) 02/19/2018    HDL 45 (L) 02/19/2018    LDLCALC 61 02/19/2018      Lab Results   Component Value Date    ALT 9 02/19/2018    AST 12 02/19/2018       Atrial Fibrillation:  Medication   Digoxin 125mcg daily   xarelto 20mg daily  Symptoms:  None. Prostate cancer: This is stable       Barriers To Success: financial     height is 5' 9.75\" (1.772 m) and weight is 266 lb 12.8 oz (121 kg). His temporal temperature is 97.4 °F (36.3 °C). His blood pressure is 136/80 and his pulse is 66. His oxygen saturation is 96%. Body mass index is 38.56 kg/m². I have reviewed the following with the Mr. Cammie Gonzalez   Lab Review   Erroneous Encounter on 06/21/2018   Component Date Value    POC Sodium 06/21/2018 141     POC Potassium 06/21/2018 4.2     POC Chloride 06/21/2018 96*    CO2 06/21/2018 31     POC Anion Gap 06/21/2018 14     POC Glucose 06/21/2018 133*    POC BUN 06/21/2018 16     POC Creatinine 06/21/2018 0.9     GFR Non- 06/21/2018 >60     Calcium, Ion 06/21/2018 1.23     Performed on 06/21/2018 i-Stat      Copies of these are in the chart.     Current Outpatient Prescriptions   Medication Sig Dispense tablet by mouth daily (with breakfast) 30 tablet 11    potassium chloride (KLOR-CON 10) 10 MEQ extended release tablet Take 1 tablet by mouth daily 90 tablet 3    loratadine (CLARITIN) 10 MG capsule Take 1 capsule by mouth daily 30 capsule 0    albuterol sulfate HFA (PROAIR HFA) 108 (90 BASE) MCG/ACT inhaler Inhale 2 puffs into the lungs every 6 hours as needed for Wheezing 8.5 Inhaler 5    leuprolide (LUPRON) 30 MG injection Inject 30 mg into the muscle once Every 4 months      MYRBETRIQ 25 MG TB24 Take 25 mg by mouth daily       FISH OIL Take 1 capsule by mouth 2 times daily. No current facility-administered medications for this visit. Allergies: Patient has no known allergies. Past Medical History:   Diagnosis Date    Anxiety     [facility] ([facility]     [facility]     prostate, had treatment    Depression     Hypertension     Neuropathy     Type II or unspecified type diabetes mellitus without mention of complication, not stated as uncontrolled        Past Surgical History:   Procedure Laterality Date    EYE SURGERY      HERNIA REPAIR      umbilical with Dr Trinity Trejo History   Substance Use Topics    Smoking status: Never Smoker    Smokeless tobacco: Never Used    Alcohol use No        Review of Systems   Constitutional: Positive for fatigue. Negative for chills and fever. HENT: Negative for congestion, ear pain and sore throat. Eyes: Negative for visual disturbance. Respiratory: Positive for cough (occasional and painful when he does.) and shortness of breath. Negative for chest tightness and wheezing. Cardiovascular: Negative for chest pain, palpitations and leg swelling. Gastrointestinal: Negative for abdominal pain, diarrhea, nausea and vomiting. Endocrine: Negative for polyuria. Genitourinary: Negative for frequency and urgency. Musculoskeletal: Negative for back pain and neck pain. Skin: Negative for rash. I10 401.9 CBC Auto Differential      Comprehensive Metabolic Panel      Microalbumin / Creatinine Urine Ratio  This is controlled but on the higher and desired level of control. Will follow and not make any changes at this time    4. Mixed hyperlipidemia E78.2 272.2 Lipid Panel   5. Persistent atrial fibrillation (HCC) I48.1 427.31 Appropriately rate controlled and anticoagulated    6. Chronic anticoagulation Z79.01 V58.61 Appropriate    7. Chronic obstructive pulmonary disease, unspecified COPD type ([facility] J44.9 496 Compensated at this time   No changes in medication regimen    8. NEIDA (obstructive sleep apnea) G47.33 327.23 Using Pap device regularly    9. Metabolic alkalosis with respiratory acidosis E87.4 276.4 Encouraged him to use device every time that he goes to sleep    10. Prostate cancer (HCC) C61 185 Psa screening   11. Recurrent major depressive disorder, in partial remission (HCC) F33.41 296.35 T4, Free      TSH without Reflex      FLUoxetine (PROZAC) 40 MG capsule  This medication will replace Cymbalta 60 mg twice daily    12. Encounter for screening for malignant neoplasm of prostate  Z12.5 V76.44 Psa screening  He is following with urology and is on chronic GnRH antagonist        Orders Placed This Encounter   Procedures    CBC Auto Differential    Comprehensive Metabolic Panel    Microalbumin / Creatinine Urine Ratio    Psa screening    T4, Free    TSH without Reflex    Lipid Panel    Hemoglobin A1C        Return in about 1 month (around 10/13/2018) for Medicare Annual Wellness Visit in 1 year. Medicare Annual Wellness Visit  Name: Alejandro Pozo Date: 2018   MRN: [id number] Sex: Male   Age: 81 y.o. Ethnicity: Non-/Non    : 1935 Race: Katlin Conner is here for Medicare AWV (Patient is here for his Medicare Annual Wellness visit);  Health Maintenance (Last colonoscopy was possibly 5 years ago [facility] by Dr. Pato Drew Last eye exam was couple months ago by Dr. Willow Melendrez in [place]/ Last Dental exam was was 3 months ago/ ); Other (Patient states he does not get enough uninterrupted sleep at [facility] and feels depressed); and Depression (Patient states he feels depressed. He states he bought a kitten for him and his girlfriend hoping this would help)    Screenings for behavioral, psychosocial and functional/safety risks, and cognitive dysfunction are all negative except as indicated below. These results, as well as other patient data from the [address] [facility] form, are documented in Flowsheets linked to this Encounter. No Known Allergies    Prior to Visit Medications    Medication Sig Taking?  Authorizing Provider   FLUoxetine (PROZAC) 40 MG capsule Take 1 capsule by mouth daily Yes ALISON Burton DO   buPROPion (WELLBUTRIN XL) 150 MG extended release tablet Take 2 tablets by mouth every morning Yes ALISON Burton DO   lisinopril (PRINIVIL;ZESTRIL) 40 MG tablet TAKE 1 TABLET BY MOUTH DAILY Yes ALISON Burton DO   metolazone (ZAROXOLYN) 2.5 MG tablet TAKE 1 TABLET BY MOUTH DAILY AS NEEDED FOR EDEMA - TAKE 30 MINUTES BEFORE LASIX Yes ALISON Burton DO   prazosin (MINIPRESS) 2 MG capsule Take 1 capsule by mouth nightly Yes Merrilyn Carrel, APRN   carvedilol (COREG) 12.5 MG tablet TAKE 1 TABLET BY MOUTH 2 TIMES DAILY Yes ISAAC Barnard   celecoxib (CELEBREX) 200 MG capsule Take 1 capsule by mouth 2 times daily  Patient taking differently: Take 200 mg by mouth daily  Yes ALISON Burton DO   metFORMIN (GLUCOPHAGE) 500 MG tablet Take 1 tablet by mouth 2 times daily (with meals) Yes ALISON Burton DO   Dulaglutide (TRULICITY) 0.29 VL/3.7NI SOPN INJECT 1 PEN EVERY WEEK Yes Merrilyn Carrel, APRN   hydrochlorothiazide (MICROZIDE) 12.5 MG capsule TAKE 1 CAPSULE BY MOUTH EVERY MORNING Yes ISAAC Barnard   cholestyramine (QUESTRAN) 4 g packet Take 1 packet by mouth 3 times daily Yes ISAAC Russell   umeclidinium-vilanterol (ANORO ELLIPTA) 62.5-25 MCG/INH AEPB inhaler Inhale 1 puff into the lungs daily Yes ALISON Núñez, DO   terazosin (HYTRIN) 5 MG capsule Take 1 capsule by mouth nightly TAKE 1 CAPSULE BY MOUTH NIGHTLY. Yes ALISON Núñez, DO   furosemide (LASIX) 40 MG tablet Take 1.5 tablets by mouth daily Yes ISAAC Lovelace   glucose blood VI test strips (FREESTYLE TEST STRIPS) strip USE DAILY AS NEEDED DX: E11.42 Yes ISAAC Lovelace   digoxin (LANOXIN) 125 MCG tablet Take 1 tablet by mouth daily Yes ALISON Núñez, DO   vitamin B-12 (CYANOCOBALAMIN) 1000 MCG tablet Take 1,000 mcg by mouth daily Yes Historical Provider, MD   enzalutamide (XTANDI) 40 MG capsule Take 160 mg by mouth daily Yes Historical Provider, MD   CPAP Machine MISC by Does not apply route Yes Historical Provider, MD   amLODIPine (NORVASC) 5 MG tablet Take 1 tablet by mouth daily Yes ALISON Núñez, DO   rivaroxaban (XARELTO) 20 MG TABS tablet Take 1 tablet by mouth daily (with breakfast) Yes ALISON Núñez DO   potassium chloride (KLOR-CON 10) 10 MEQ extended release tablet Take 1 tablet by mouth daily Yes ALISON Núñez, DO   loratadine (CLARITIN) 10 MG capsule Take 1 capsule by mouth daily Yes ALISON Núñez, DO   albuterol sulfate HFA (PROAIR HFA) 108 (90 BASE) MCG/ACT inhaler Inhale 2 puffs into the lungs every 6 hours as needed for Wheezing Yes ALISON Núñez, DO   leuprolide (LUPRON) 30 MG injection Inject 30 mg into the muscle once Every 4 months Yes Historical Provider, MD   MYRBETRIQ 25 MG TB24 Take 25 mg by mouth daily  Yes Historical Provider, MD   FISH OIL Take 1 capsule by mouth 2 times daily.  Yes Historical Provider, MD       Past Medical History:   Diagnosis Date    Anxiety     Atrial fibrillation ([facility]     [facility]     prostate, had treatment    Depression     Hypertension     Neuropathy     Type II or unspecified type diabetes mellitus

## 2018-09-13 NOTE — Clinical Note
They are looking to see if any services are available for them to clean house. I said I would inquire.

## 2018-09-14 ENCOUNTER — CARE COORDINATION (OUTPATIENT)
Dept: CARE COORDINATION | Age: 83
End: 2018-09-14

## 2018-09-14 NOTE — CARE COORDINATION
Ambulatory Care Coordination Note  9/14/2018  CM Risk Score: 4  Ivone Mortality Risk Score: 28.73    ACC: Pollo Montes, RN    Summary Note: ACC contacted pt at request of PCP. Pt is interested in any support for housekeeping needs at his home. He cannot afford to pay privately for this. ACC advised pt to contact Daxa at [address] [place] [address] Main: [phone]. She will obtain some information and help determine if Al is eligible for waiver services in his home. He thanked me and voiced understanding. He is agreeable to follow up call in 2 weeks. Ambulatory Care Coordination Assessment    Care Coordination Protocol  Program Enrollment:  Rising Risk  Referral from Primary Care Provider:  Yes  Week 1 - Initial Assessment     Do you have all of your prescriptions and are they filled?:  Yes  Barriers to medication adherence:  None  Are you able to afford your medications?:  Yes  How often do you have trouble taking your medications the way you have been told to take them?:  I always take them as prescribed. Do you have Home O2 Therapy?:  No      Is patient able to live independently?:  Yes                    Thinking about your patient's physical health needs, are there any symptoms or problems (risk indicators) you are unsure about that require further investigation?:  Mild vague physical symptoms or problems; but do not impact on daily life or are not of concern to patient   Are the patients physical health problems impacting on their mental well-being?:  Mild impact on mental well-being e.g. \"\"feeling fed-up\"\", \"\"reduced enjoyment\"\"   Are there any problems with your patients lifestyle behaviors (alcohol, drugs, diet, exercise) that are impacting on physical or mental well-being?:  Moderate to severe impact on well-being, preventing enjoyment of usual activities   Do you have any other concerns about your patients mental well-being?  How would you rate their severity and impact on the patient?:  Moderate to severe problems that interfere with function   How would you rate their home environment in terms of safety and stability (including domestic violence, insecure housing, neighbor harassment)?:  Safe, stable, but with some inconsistency   How would you rate their social network (family, work, friends)?:  Restricted participation with some degree of social isolation   Do other services need to be involved to help this patient?:  Other care/services not in place and required   Are current services involved with this patient well-coordinated? (Include coordination with other services you are now recommendation): All required care/services in place and well-coordinated   Suggested Interventions and Community Resources   Home Care Waiver:  Not Started              Goals      Patient Stated (pt-stated)            Obtain housekeeping assistance    Barriers: financial  Plan for overcoming my barriers:   Contact community agency for eligibility to obtain waiver services  Other options as available  Confidence: 10/10  Anticipated Goal Completion Date: 12/14/18            Prior to Admission medications    Medication Sig Start Date End Date Taking?  Authorizing Provider   FLUoxetine (PROZAC) 40 MG capsule Take 1 capsule by mouth daily 9/13/18   ALISON Abraham DO   buPROPion (WELLBUTRIN XL) 150 MG extended release tablet Take 2 tablets by mouth every morning 9/7/18   ALISON Abraham DO   lisinopril (PRINIVIL;ZESTRIL) 40 MG tablet TAKE 1 TABLET BY MOUTH DAILY 8/30/18   ALISON Abraham DO   metolazone (ZAROXOLYN) 2.5 MG tablet TAKE 1 TABLET BY MOUTH DAILY AS NEEDED FOR EDEMA - TAKE 30 MINUTES BEFORE LASIX 8/24/18   ALISON Abraham DO   prazosin (MINIPRESS) 2 MG capsule Take 1 capsule by mouth nightly 8/20/18   ISAAC Sierra   carvedilol (COREG) 12.5 MG tablet TAKE 1 TABLET BY MOUTH 2 TIMES DAILY 7/16/18   Merlin Leventhal, APRN   celecoxib (CELEBREX) 200 MG capsule Take 1 capsule by mouth 2 puffs into the lungs every 6 hours as needed for Wheezing 5/4/17   ALISON Bahena,    leuprolide (LUPRON) 30 MG injection Inject 30 mg into the muscle once Every 4 months    Historical Provider, MD   MYRBETRIQ 25 MG TB24 Take 25 mg by mouth daily  1/25/16   Historical Provider, MD   FISH OIL Take 1 capsule by mouth 2 times daily.     Historical Provider, MD       Future Appointments  Date Time Provider [facility]   10/10/2018 3:30 PM ALISON Bahena, [address] [place] [address]

## 2018-10-01 ENCOUNTER — TELEPHONE (OUTPATIENT)
Dept: UROLOGY | Facility: CLINIC | Age: 83
End: 2018-10-01

## 2018-10-01 DIAGNOSIS — C61 PROSTATE CANCER (HCC): Primary | ICD-10-CM

## 2018-10-03 RX ORDER — HYDROCHLOROTHIAZIDE 12.5 MG/1
12.5 CAPSULE, GELATIN COATED ORAL EVERY MORNING
Qty: 90 CAPSULE | Refills: 3 | Status: SHIPPED | OUTPATIENT
Start: 2018-10-03 | End: 2019-11-23 | Stop reason: SDUPTHER

## 2018-10-19 ENCOUNTER — OFFICE VISIT (OUTPATIENT)
Dept: PRIMARY CARE CLINIC | Age: 83
End: 2018-10-19
Payer: MEDICARE

## 2018-10-19 ENCOUNTER — HOSPITAL ENCOUNTER (EMERGENCY)
Facility: HOSPITAL | Age: 83
Discharge: HOME | End: 2018-10-19
Attending: EMERGENCY MEDICINE | Admitting: EMERGENCY MEDICINE

## 2018-10-19 ENCOUNTER — APPOINTMENT (OUTPATIENT)
Dept: GENERAL RADIOLOGY | Facility: HOSPITAL | Age: 83
End: 2018-10-19

## 2018-10-19 VITALS
WEIGHT: 262 LBS | DIASTOLIC BLOOD PRESSURE: 80 MMHG | HEART RATE: 58 BPM | SYSTOLIC BLOOD PRESSURE: 152 MMHG | BODY MASS INDEX: 37.51 KG/M2 | TEMPERATURE: 97.9 F | HEIGHT: 70 IN | OXYGEN SATURATION: 98 %

## 2018-10-19 VITALS
RESPIRATION RATE: 19 BRPM | DIASTOLIC BLOOD PRESSURE: 58 MMHG | WEIGHT: 262 LBS | OXYGEN SATURATION: 98 % | SYSTOLIC BLOOD PRESSURE: 143 MMHG | TEMPERATURE: 98.7 F | BODY MASS INDEX: 37.51 KG/M2 | HEIGHT: 70 IN | HEART RATE: 74 BPM

## 2018-10-19 DIAGNOSIS — E11.42 TYPE 2 DIABETES MELLITUS WITH DIABETIC POLYNEUROPATHY, WITHOUT LONG-TERM CURRENT USE OF INSULIN (HCC): ICD-10-CM

## 2018-10-19 DIAGNOSIS — I50.22 CHRONIC SYSTOLIC CONGESTIVE HEART FAILURE (HCC): ICD-10-CM

## 2018-10-19 DIAGNOSIS — I10 ESSENTIAL HYPERTENSION: ICD-10-CM

## 2018-10-19 DIAGNOSIS — I50.9 ACUTE ON CHRONIC CONGESTIVE HEART FAILURE, UNSPECIFIED HEART FAILURE TYPE (HCC): Primary | ICD-10-CM

## 2018-10-19 DIAGNOSIS — I20.0 UNSTABLE ANGINA (HCC): Primary | ICD-10-CM

## 2018-10-19 DIAGNOSIS — R53.83 FATIGUE, UNSPECIFIED TYPE: ICD-10-CM

## 2018-10-19 DIAGNOSIS — J44.9 CHRONIC OBSTRUCTIVE PULMONARY DISEASE, UNSPECIFIED COPD TYPE (HCC): ICD-10-CM

## 2018-10-19 DIAGNOSIS — R06.02 SHORTNESS OF BREATH AT REST: ICD-10-CM

## 2018-10-19 DIAGNOSIS — R94.31 ABNORMAL EKG: ICD-10-CM

## 2018-10-19 LAB
ALBUMIN SERPL-MCNC: 4.2 G/DL (ref 3.5–5)
ALBUMIN/GLOB SERPL: 1.4 G/DL (ref 1.1–2.5)
ALP SERPL-CCNC: 44 U/L (ref 24–120)
ALT SERPL W P-5'-P-CCNC: 23 U/L (ref 0–54)
ANION GAP SERPL CALCULATED.3IONS-SCNC: 10 MMOL/L (ref 4–13)
AST SERPL-CCNC: 18 U/L (ref 7–45)
BASOPHILS # BLD AUTO: 0.03 10*3/MM3 (ref 0–0.2)
BASOPHILS NFR BLD AUTO: 0.3 % (ref 0–2)
BILIRUB SERPL-MCNC: 0.7 MG/DL (ref 0.1–1)
BUN BLD-MCNC: 16 MG/DL (ref 5–21)
BUN/CREAT SERPL: 20.3 (ref 7–25)
CALCIUM SPEC-SCNC: 9.5 MG/DL (ref 8.4–10.4)
CHLORIDE SERPL-SCNC: 97 MMOL/L (ref 98–110)
CO2 SERPL-SCNC: 32 MMOL/L (ref 24–31)
CREAT BLD-MCNC: 0.79 MG/DL (ref 0.5–1.4)
D DIMER PPP FEU-MCNC: 1.04 MG/L (FEU) (ref 0–0.5)
DEPRECATED RDW RBC AUTO: 47.2 FL (ref 40–54)
DIGOXIN SERPL-MCNC: 0.8 NG/ML (ref 0.8–2)
EOSINOPHIL # BLD AUTO: 0.11 10*3/MM3 (ref 0–0.7)
EOSINOPHIL NFR BLD AUTO: 1.2 % (ref 0–4)
ERYTHROCYTE [DISTWIDTH] IN BLOOD BY AUTOMATED COUNT: 13.4 % (ref 12–15)
GFR SERPL CREATININE-BSD FRML MDRD: 94 ML/MIN/1.73
GLOBULIN UR ELPH-MCNC: 2.9 GM/DL
GLUCOSE BLD-MCNC: 96 MG/DL (ref 70–100)
HCT VFR BLD AUTO: 35.8 % (ref 40–52)
HGB BLD-MCNC: 12.5 G/DL (ref 14–18)
HOLD SPECIMEN: NORMAL
HOLD SPECIMEN: NORMAL
IMM GRANULOCYTES # BLD: 0.09 10*3/MM3 (ref 0–0.03)
IMM GRANULOCYTES NFR BLD: 1 % (ref 0–5)
LYMPHOCYTES # BLD AUTO: 1.13 10*3/MM3 (ref 0.72–4.86)
LYMPHOCYTES NFR BLD AUTO: 12.3 % (ref 15–45)
MCH RBC QN AUTO: 33.5 PG (ref 28–32)
MCHC RBC AUTO-ENTMCNC: 34.9 G/DL (ref 33–36)
MCV RBC AUTO: 96 FL (ref 82–95)
MONOCYTES # BLD AUTO: 0.81 10*3/MM3 (ref 0.19–1.3)
MONOCYTES NFR BLD AUTO: 8.8 % (ref 4–12)
NEUTROPHILS # BLD AUTO: 6.99 10*3/MM3 (ref 1.87–8.4)
NEUTROPHILS NFR BLD AUTO: 76.4 % (ref 39–78)
NRBC BLD MANUAL-RTO: 0 /100 WBC (ref 0–0)
NT-PROBNP SERPL-MCNC: 1180 PG/ML (ref 0–1800)
PLATELET # BLD AUTO: 192 10*3/MM3 (ref 130–400)
PMV BLD AUTO: 10.3 FL (ref 6–12)
POTASSIUM BLD-SCNC: 3.4 MMOL/L (ref 3.5–5.3)
PROT SERPL-MCNC: 7.1 G/DL (ref 6.3–8.7)
RBC # BLD AUTO: 3.73 10*6/MM3 (ref 4.8–5.9)
SODIUM BLD-SCNC: 139 MMOL/L (ref 135–145)
TROPONIN I SERPL-MCNC: <0.012 NG/ML (ref 0–0.03)
WBC NRBC COR # BLD: 9.16 10*3/MM3 (ref 4.8–10.8)
WHOLE BLOOD HOLD SPECIMEN: NORMAL
WHOLE BLOOD HOLD SPECIMEN: NORMAL

## 2018-10-19 PROCEDURE — 93005 ELECTROCARDIOGRAM TRACING: CPT | Performed by: EMERGENCY MEDICINE

## 2018-10-19 PROCEDURE — 83880 ASSAY OF NATRIURETIC PEPTIDE: CPT | Performed by: EMERGENCY MEDICINE

## 2018-10-19 PROCEDURE — 93010 ELECTROCARDIOGRAM REPORT: CPT | Performed by: INTERNAL MEDICINE

## 2018-10-19 PROCEDURE — 84484 ASSAY OF TROPONIN QUANT: CPT | Performed by: EMERGENCY MEDICINE

## 2018-10-19 PROCEDURE — 71045 X-RAY EXAM CHEST 1 VIEW: CPT

## 2018-10-19 PROCEDURE — 1101F PT FALLS ASSESS-DOCD LE1/YR: CPT | Performed by: PEDIATRICS

## 2018-10-19 PROCEDURE — 99214 OFFICE O/P EST MOD 30 MIN: CPT | Performed by: PEDIATRICS

## 2018-10-19 PROCEDURE — 80162 ASSAY OF DIGOXIN TOTAL: CPT | Performed by: EMERGENCY MEDICINE

## 2018-10-19 PROCEDURE — 1123F ACP DISCUSS/DSCN MKR DOCD: CPT | Performed by: PEDIATRICS

## 2018-10-19 PROCEDURE — 3023F SPIROM DOC REV: CPT | Performed by: PEDIATRICS

## 2018-10-19 PROCEDURE — G8417 CALC BMI ABV UP PARAM F/U: HCPCS | Performed by: PEDIATRICS

## 2018-10-19 PROCEDURE — G8926 SPIRO NO PERF OR DOC: HCPCS | Performed by: PEDIATRICS

## 2018-10-19 PROCEDURE — 85379 FIBRIN DEGRADATION QUANT: CPT | Performed by: EMERGENCY MEDICINE

## 2018-10-19 PROCEDURE — 93000 ELECTROCARDIOGRAM COMPLETE: CPT | Performed by: PEDIATRICS

## 2018-10-19 PROCEDURE — 4040F PNEUMOC VAC/ADMIN/RCVD: CPT | Performed by: PEDIATRICS

## 2018-10-19 PROCEDURE — G8484 FLU IMMUNIZE NO ADMIN: HCPCS | Performed by: PEDIATRICS

## 2018-10-19 PROCEDURE — 85025 COMPLETE CBC W/AUTO DIFF WBC: CPT | Performed by: EMERGENCY MEDICINE

## 2018-10-19 PROCEDURE — 80053 COMPREHEN METABOLIC PANEL: CPT | Performed by: EMERGENCY MEDICINE

## 2018-10-19 PROCEDURE — 1036F TOBACCO NON-USER: CPT | Performed by: PEDIATRICS

## 2018-10-19 PROCEDURE — 99285 EMERGENCY DEPT VISIT HI MDM: CPT

## 2018-10-19 PROCEDURE — G8598 ASA/ANTIPLAT THER USED: HCPCS | Performed by: PEDIATRICS

## 2018-10-19 PROCEDURE — 93005 ELECTROCARDIOGRAM TRACING: CPT

## 2018-10-19 PROCEDURE — G8427 DOCREV CUR MEDS BY ELIG CLIN: HCPCS | Performed by: PEDIATRICS

## 2018-10-19 RX ORDER — SODIUM CHLORIDE 0.9 % (FLUSH) 0.9 %
10 SYRINGE (ML) INJECTION AS NEEDED
Status: DISCONTINUED | OUTPATIENT
Start: 2018-10-19 | End: 2018-10-19 | Stop reason: HOSPADM

## 2018-10-19 ASSESSMENT — ENCOUNTER SYMPTOMS
VOMITING: 0
SORE THROAT: 0
BACK PAIN: 0
COUGH: 1
NAUSEA: 0
SHORTNESS OF BREATH: 1
ABDOMINAL PAIN: 0
WHEEZING: 0
CHEST TIGHTNESS: 0
DIARRHEA: 0

## 2018-10-19 NOTE — PROGRESS NOTES
[facility], [address]  Phone [phone]   Fax [phone]      OFFICE VISIT: 10/19/2018    Navin Blank-: 1935      HPI  Reason For Visit:  Chantale Figueroa is a 81 y.o. Health Maintenance    Fatigue (Patient states that he has been tired and short of breath since his medication has been changed. )    Pt presents with fatigue. He is short of breath. He had a change in his medication. We took him off cymbalta and added prozac. He is very weak. If he walks any distance, he is short of breath. He does feel depressed worse than he was before the change. He has significant orthopnea that is not any more than it was previously. height is 5' 10\" (1.778 m) and weight is 262 lb (118.8 kg). His temporal temperature is 97.9 °F (36.6 °C). His blood pressure is 152/80 (abnormal) and his pulse is 58. His oxygen saturation is 98%. He states that this is an estimated weight. He was not actually weighed today. Body mass index is 37.59 kg/m². I have reviewed the following with the Mr. Terrence Sotelo   Lab Review  Erroneous Encounter on 2018   Component Date Value    POC Sodium 2018 141     POC Potassium 2018 4.2     POC Chloride 2018 96*    CO2 2018 31     POC Anion Gap 2018 14     POC Glucose 2018 133*    POC BUN 2018 16     POC Creatinine 2018 0.9     GFR Non- 2018 >60     Calcium, Ion 2018 1.23     Performed on 2018 i-Stat      Copies of these are in the chart.     Current Outpatient Prescriptions   Medication Sig Dispense Refill    hydrochlorothiazide (MICROZIDE) 12.5 MG capsule Take 1 capsule by mouth every morning 90 capsule 3    FLUoxetine (PROZAC) 40 MG capsule Take 1 capsule by mouth daily 30 capsule 11    buPROPion (WELLBUTRIN XL) 150 MG extended release tablet Take 2 tablets by mouth every morning 180 tablet 3    lisinopril (PRINIVIL;ZESTRIL) 40 MG tablet

## 2018-10-19 NOTE — ED PROVIDER NOTES
Subjective   Patient c/o progressive SOB and weakness for 1 week.  Seen at PCP office and had EKG that was abnormal and sent here.        History provided by:  Patient   used: No    Shortness of Breath   Severity:  Moderate  Onset quality:  Gradual  Duration:  1 week  Timing:  Constant  Progression:  Worsening  Chronicity:  Recurrent  Context: not activity, not animal exposure, not emotional upset, not fumes, not known allergens, not occupational exposure, not pollens, not smoke exposure, not strong odors, not URI and not weather changes    Relieved by:  Nothing  Worsened by:  Exertion  Ineffective treatments:  None tried  Associated symptoms: no abdominal pain, no chest pain, no claudication, no cough, no diaphoresis, no ear pain, no fever, no headaches, no hemoptysis, no neck pain, no PND, no rash, no sore throat, no sputum production, no syncope, no swollen glands, no vomiting and no wheezing    Risk factors: no recent alcohol use, no family hx of DVT, no hx of cancer, no hx of PE/DVT, no obesity, no oral contraceptive use, no prolonged immobilization, no recent surgery and no tobacco use        Review of Systems   Constitutional: Negative.  Negative for diaphoresis and fever.   HENT: Negative.  Negative for ear pain and sore throat.    Respiratory: Positive for shortness of breath. Negative for cough, hemoptysis, sputum production and wheezing.    Cardiovascular: Negative.  Negative for chest pain, claudication, syncope and PND.   Gastrointestinal: Negative.  Negative for abdominal pain and vomiting.   Genitourinary: Negative.    Musculoskeletal: Negative.  Negative for neck pain.   Skin: Negative.  Negative for rash.   Neurological: Negative.  Negative for headaches.   Hematological: Negative.    Psychiatric/Behavioral: Negative.    All other systems reviewed and are negative.      Past Medical History:   Diagnosis Date   • Arthritis    • Cancer (CMS/HCC)     prostate   • Cellulitis    • CHF  (congestive heart failure) (CMS/[facility])    • Diabetes mellitus (CMS/[facility])    • Elevated prostate specific antigen (PSA)    • Hematuria    • Hypertension    • Overactive bladder    • SOB (shortness of breath)        No Known Allergies    Past Surgical History:   Procedure Laterality Date   • EYE SURGERY     • HERNIA REPAIR     • JOINT REPLACEMENT     • REPLACEMENT TOTAL KNEE BILATERAL         Family History   Problem Relation Age of Onset   • Prostate cancer Son    • No Known Problems Father    • No Known Problems Mother        Social History     Social History   • Marital status:      Social History Main Topics   • Smoking status: Never Smoker   • Smokeless tobacco: Never Used   • Alcohol use No   • Drug use: Unknown     Other Topics Concern   • Not on file       Prior to Admission medications    Medication Sig Start Date End Date Taking? Authorizing Provider   albuterol (PROVENTIL HFA;VENTOLIN HFA) 108 (90 BASE) MCG/ACT inhaler Inhale 2 puffs every 4 (four) hours as needed for wheezing.    Yaakov Seymour MD   buPROPion XL (WELLBUTRIN XL) 300 MG 24 hr tablet Take 300 mg by mouth daily.    Yaakov Seymour MD   carvedilol (COREG) 12.5 MG tablet Take 12.5 mg by mouth 2 (two) times a day with meals.    Yaakov Seymour MD   carvedilol (COREG) 6.25 MG tablet Take 6.25 mg by mouth daily.    Yaakov Seymour MD   celecoxib (CeleBREX) 200 MG capsule Take 200 mg by mouth daily.    Yaakov Seymour MD   digoxin (LANOXIN) 125 MCG tablet Take 125 mcg by mouth every day.    Yaakov Seymour MD   digoxin (LANOXIN) 250 MCG tablet Take 250 mcg by mouth every day.    Yaakov Seymour MD   DULoxetine (CYMBALTA) 60 MG capsule Take 60 mg by mouth daily.    Yaakov Seymour MD   enzalutamide (XTANDI) 40 MG chemo capsule Take 4 capsules by mouth Daily. 2/14/17   Jesus Kilgore MD   enzalutamide (XTANDI) 40 MG chemo capsule Take  by mouth Daily.    Yaakov Seymour MD   fluticasone  (FLONASE) 50 MCG/ACT nasal spray 2 sprays into each nostril daily. Administer 2 sprays in each nostril for each dose.    Yaakov Seymour MD   formoterol (FORADIL) 12 MCG capsule for inhaler Place 12 mcg into inhaler and inhale 2 (two) times a day.    Yaakov Seymour MD   furosemide (LASIX) 10 MG/ML solution Take 60 mg by mouth daily.    Yaakov Seymour MD   furosemide (LASIX) 40 MG tablet Take 40 mg by mouth 2 (two) times a day.    Yaakov Seymour MD   glimepiride (AMARYL) 4 MG tablet Take 4 mg by mouth every morning before breakfast.    Yaakov Seymour MD   leuprolide (LUPRON) 30 MG injection Inject 30 mg into the shoulder, thigh, or buttocks Every 4 (Four) Months.    Yaakov Seymour MD   lisinopril (PRINIVIL,ZESTRIL) 40 MG tablet Take 40 mg by mouth daily.    Yaakov Seymour MD   metolazone (ZAROXOLYN) 2.5 MG tablet Take 2.5 mg by mouth daily.    Yaakov Seymour MD   Mirabegron ER (MYRBETRIQ) 25 MG tablet sustained-release 24 hour 24 hr tablet Take 1 tablet by mouth Daily. 10/1/18   Jesus Kilgore MD   Multiple Vitamins-Minerals (MULTIVITAMIN ADULT PO) Take  by mouth daily.    Yaakov Seymour MD   potassium chloride (K-DUR,KLOR-CON) 10 MEQ CR tablet Take 10 mEq by mouth 2 (two) times a day.    Yaakov Seymour MD   predniSONE (DELTASONE) 10 MG tablet Take 10 mg by mouth daily.    Yaakov Seymour MD   rivaroxaban (XARELTO) 20 MG tablet Take 20 mg by mouth daily.    Yaakov Seymour MD   sitaGLIPtin (JANUVIA) 100 MG tablet Take 100 mg by mouth daily.    Yaakov Seymour MD   SITagliptin-MetFORMIN HCl (JANUMET PO) Take  by mouth.    Yaakov Seymour MD   terazosin (HYTRIN) 5 MG capsule Take 5 mg by mouth every night.    Yaakov Seymour MD   XTANDI 40 MG chemo capsule Take 4 capsules (160mg) by mouth daily 2/2/18   Jesus Kilgore MD       Medications   sodium chloride 0.9 % flush 10 mL (not administered)       Vitals:     10/19/18 1531   BP:    Pulse:    Resp:    Temp: 98.7 °F (37.1 °C)   SpO2:          Objective   Physical Exam   Constitutional: He appears well-developed and well-nourished.   HENT:   Head: Normocephalic and atraumatic.   Neck: Normal range of motion. Neck supple.   Cardiovascular:   Mildly bradycardic and irregular   Pulmonary/Chest: Effort normal. He has rales.   Abdominal: Soft. Bowel sounds are normal.   Musculoskeletal: Normal range of motion. He exhibits edema.   Neurological: He is alert.   Skin: Skin is warm and dry.   Psychiatric: He has a normal mood and affect. His behavior is normal.   Nursing note and vitals reviewed.      Procedures         Lab Results (last 24 hours)     Procedure Component Value Units Date/Time    Troponin [[id number]]  (Normal) Collected:  10/19/18 1447    Specimen:  Blood Updated:  10/19/18 1518     Troponin I <0.012 ng/mL     CBC & Differential [[id number]] Collected:  10/19/18 1447    Specimen:  Blood Updated:  10/19/18 1501    Narrative:       The following orders were created for panel order CBC & Differential.  Procedure                               Abnormality         Status                     ---------                               -----------         ------                     CBC Auto Differential[[id number]]        Abnormal            Final result                 Please view results for these tests on the individual orders.    Comprehensive Metabolic Panel [[id number]]  (Abnormal) Collected:  10/19/18 1447    Specimen:  Blood Updated:  10/19/18 1518     Glucose 96 mg/dL      BUN 16 mg/dL      Creatinine 0.79 mg/dL      Sodium 139 mmol/L      Potassium 3.4 (L) mmol/L      Chloride 97 (L) mmol/L      CO2 32.0 (H) mmol/L      Calcium 9.5 mg/dL      Total Protein 7.1 g/dL      Albumin 4.20 g/dL      ALT (SGPT) 23 U/L      AST (SGOT) 18 U/L      Alkaline Phosphatase 44 U/L      Total Bilirubin 0.7 mg/dL      eGFR Non African Amer 94 mL/min/1.73      Globulin 2.9 gm/dL      A/G  Ratio 1.4 g/dL      BUN/Creatinine Ratio 20.3     Anion Gap 10.0 mmol/L     Narrative:       The MDRD GFR formula is only valid for adults with stable renal function between ages 18 and 70.    BNP [[id number]]  (Normal) Collected:  10/19/18 1447    Specimen:  Blood Updated:  10/19/18 1526     proBNP 1,180.0 pg/mL     D-dimer, Quantitative [[id number]]  (Abnormal) Collected:  10/19/18 1447    Specimen:  Blood Updated:  10/19/18 1511     D-Dimer, Quantitative 1.04 (H) mg/L (FEU)     Narrative:       Reference Range is 0-0.50 mg/L FEU. However, results <0.50 mg/L FEU tends to rule out DVT or PE. Results >0.50 mg/L FEU are not useful in predicting absence or presence of DVT or PE.    Digoxin Level [[id number]]  (Normal) Collected:  10/19/18 1447    Specimen:  Blood Updated:  10/19/18 1523     Digoxin 0.80 ng/mL     CBC Auto Differential [[id number]]  (Abnormal) Collected:  10/19/18 1447    Specimen:  Blood Updated:  10/19/18 1501     WBC 9.16 10*3/mm3      RBC 3.73 (L) 10*6/mm3      Hemoglobin 12.5 (L) g/dL      Hematocrit 35.8 (L) %      MCV 96.0 (H) fL      MCH 33.5 (H) pg      MCHC 34.9 g/dL      RDW 13.4 %      RDW-SD 47.2 fl      MPV 10.3 fL      Platelets 192 10*3/mm3      Neutrophil % 76.4 %      Lymphocyte % 12.3 (L) %      Monocyte % 8.8 %      Eosinophil % 1.2 %      Basophil % 0.3 %      Immature Grans % 1.0 %      Neutrophils, Absolute 6.99 10*3/mm3      Lymphocytes, Absolute 1.13 10*3/mm3      Monocytes, Absolute 0.81 10*3/mm3      Eosinophils, Absolute 0.11 10*3/mm3      Basophils, Absolute 0.03 10*3/mm3      Immature Grans, Absolute 0.09 (H) 10*3/mm3      nRBC 0.0 /100 WBC           XR Chest 1 View   Final Result   Impression:       Moderate cardiomegaly and central pulmonary venous congestion.       This report was finalized on 10/19/2018 15:35 by Dr. Mikaela Rizvi MD.          ED Course  ED Course as of Oct 19 1643   Fri Oct 19, 2018   1639 Told the patient his labs look pretty good and it seems he just  "needs to get rid of some extra fluid seen on CXR.  No overwhelming CHF and no MI.  Dimer slightly up but no clinically significant.  He says he has \"super\" pill that he can take when he needs to get rid of extra fluid so I told him to do that.  I think it is the zaroxolyn in his list.  Stable for DC.  [TR]      ED Course User Index  [TR] Dustin Quesada Jr., MD          MDM  Number of Diagnoses or Management Options  Acute on chronic congestive heart failure, unspecified heart failure type (CMS/HCC): established and worsening     Amount and/or Complexity of Data Reviewed  Clinical lab tests: ordered and reviewed  Tests in the radiology section of CPT®: ordered and reviewed  Tests in the medicine section of CPT®: ordered and reviewed    Risk of Complications, Morbidity, and/or Mortality  Presenting problems: moderate  Diagnostic procedures: moderate  Management options: moderate    Patient Progress  Patient progress: stable      Final diagnoses:   Acute on chronic congestive heart failure, unspecified heart failure type (CMS/HCC)          Dustin Quesada Jr., MD  10/19/18 4628    "

## 2018-10-24 ENCOUNTER — PROCEDURE VISIT (OUTPATIENT)
Dept: UROLOGY | Facility: CLINIC | Age: 83
End: 2018-10-24

## 2018-10-24 DIAGNOSIS — C61 CANCER OF PROSTATE (HCC): Primary | ICD-10-CM

## 2018-10-24 PROCEDURE — 96402 CHEMO HORMON ANTINEOPL SQ/IM: CPT | Performed by: UROLOGY

## 2018-10-29 RX ORDER — DIGOXIN 125 MCG
125 TABLET ORAL DAILY
Qty: 90 TABLET | Refills: 3 | Status: SHIPPED | OUTPATIENT
Start: 2018-10-29 | End: 2019-10-17 | Stop reason: DRUGHIGH

## 2018-10-29 RX ORDER — FUROSEMIDE 40 MG/1
60 TABLET ORAL DAILY
Qty: 135 TABLET | Refills: 3 | Status: SHIPPED | OUTPATIENT
Start: 2018-10-29 | End: 2019-11-11 | Stop reason: SDUPTHER

## 2018-10-29 NOTE — ED NOTES
"ED Call Back Questions    1. How are you doing since leaving the Emergency Department?    Doing some better, asked for a urinal never got and peed pants, was upset about this  2. Do you have any questions about your discharge instructions? No     3. Have you filled your new prescriptions yet? N/A  a. Do you have any questions about those medications? N/A    4. Were you able to make a follow-up appointment with the physician? No     5. Do you have a primary care physician? Yes   a. If No, would you like for me to set you up with one? N/A  i. If Yes, “I will have our ED  give you a call right back at this number to work with you on the best time for an appointment.”    6. We are always looking to get better at what we do. Do you have any suggestions for what we can do to be even better? No   a. If Yes, \"Thank you for sharing your concerns. I apologize. I will follow up with our manager and patient . Would you like someone to call you back?\" N/A    7. Is there anything else I can do for you? No     "

## 2018-11-12 DIAGNOSIS — F33.41 RECURRENT MAJOR DEPRESSIVE DISORDER, IN PARTIAL REMISSION (HCC): ICD-10-CM

## 2018-11-12 RX ORDER — FLUOXETINE HYDROCHLORIDE 40 MG/1
40 CAPSULE ORAL DAILY
Qty: 90 CAPSULE | Refills: 3 | Status: SHIPPED | OUTPATIENT
Start: 2018-11-12 | End: 2019-04-25 | Stop reason: DRUGHIGH

## 2018-11-14 ENCOUNTER — APPOINTMENT (OUTPATIENT)
Dept: CT IMAGING | Age: 83
DRG: 191 | End: 2018-11-14
Payer: MEDICARE

## 2018-11-14 ENCOUNTER — HOSPITAL ENCOUNTER (INPATIENT)
Age: 83
LOS: 2 days | Discharge: HOME | DRG: 191 | End: 2018-11-16
Attending: EMERGENCY MEDICINE | Admitting: FAMILY MEDICINE
Payer: MEDICARE

## 2018-11-14 ENCOUNTER — APPOINTMENT (OUTPATIENT)
Dept: GENERAL RADIOLOGY | Age: 83
DRG: 191 | End: 2018-11-14
Payer: MEDICARE

## 2018-11-14 DIAGNOSIS — R06.02 SHORTNESS OF BREATH: ICD-10-CM

## 2018-11-14 DIAGNOSIS — R09.02 HYPOXIA: ICD-10-CM

## 2018-11-14 DIAGNOSIS — J44.1 COPD EXACERBATION (HCC): ICD-10-CM

## 2018-11-14 DIAGNOSIS — J18.9 PNEUMONIA DUE TO ORGANISM: Primary | ICD-10-CM

## 2018-11-14 PROBLEM — J96.90 RESPIRATORY FAILURE (HCC): Status: ACTIVE | Noted: 2018-11-14

## 2018-11-14 LAB
ALBUMIN SERPL-MCNC: 4.1 G/DL (ref 3.5–5.2)
ALP BLD-CCNC: 51 U/L (ref 40–130)
ALT SERPL-CCNC: 15 U/L (ref 5–41)
ANION GAP SERPL CALCULATED.3IONS-SCNC: 9 MMOL/L (ref 7–19)
AST SERPL-CCNC: 15 U/L (ref 5–40)
BASE EXCESS ARTERIAL: 5.8 MMOL/L (ref -2–2)
BILIRUB SERPL-MCNC: 0.8 MG/DL (ref 0.2–1.2)
BUN BLDV-MCNC: 13 MG/DL (ref 8–23)
CALCIUM SERPL-MCNC: 9.2 MG/DL (ref 8.8–10.2)
CARBOXYHEMOGLOBIN ARTERIAL: 1.9 % (ref 0–5)
CHLORIDE BLD-SCNC: 100 MMOL/L (ref 98–111)
CO2: 32 MMOL/L (ref 22–29)
CREAT SERPL-MCNC: 0.9 MG/DL (ref 0.5–1.2)
D DIMER: 1.79 UG/ML FEU (ref 0–0.48)
DIGOXIN LEVEL: 0.9 NG/ML (ref 0.6–1.2)
FOLATE: 18.5 NG/ML (ref 4.5–32.2)
GFR NON-AFRICAN AMERICAN: >60
GLUCOSE BLD-MCNC: 145 MG/DL (ref 74–109)
HCO3 ARTERIAL: 29.8 MMOL/L (ref 22–26)
HCT VFR BLD CALC: 38.4 % (ref 42–52)
HEMOGLOBIN, ART, EXTENDED: 12.9 G/DL (ref 14–18)
HEMOGLOBIN: 12.9 G/DL (ref 14–18)
LACTIC ACID: 2.6 MMOL/L (ref 0.5–1.9)
LACTIC ACID: 4.6 MMOL/L (ref 0.5–1.9)
MAGNESIUM: 1.9 MG/DL (ref 1.6–2.4)
MCH RBC QN AUTO: 33.2 PG (ref 27–31)
MCHC RBC AUTO-ENTMCNC: 33.6 G/DL (ref 33–37)
MCV RBC AUTO: 98.7 FL (ref 80–94)
METHEMOGLOBIN ARTERIAL: 0.6 %
O2 CONTENT ARTERIAL: 16.8 ML/DL
O2 SAT, ARTERIAL: 92.8 %
O2 THERAPY: ABNORMAL
PCO2 ARTERIAL: 40 MMHG (ref 35–45)
PDW BLD-RTO: 13.6 % (ref 11.5–14.5)
PH ARTERIAL: 7.48 (ref 7.35–7.45)
PLATELET # BLD: 180 K/UL (ref 130–400)
PMV BLD AUTO: 10.1 FL (ref 9.4–12.4)
PO2 ARTERIAL: 64 MMHG (ref 80–100)
POTASSIUM SERPL-SCNC: 3.6 MMOL/L (ref 3.5–5)
POTASSIUM, WHOLE BLOOD: 3.2
PRO-BNP: 1405 PG/ML (ref 0–1800)
PROSTATE SPECIFIC ANTIGEN: 0.01 NG/ML (ref 0–4)
RBC # BLD: 3.89 M/UL (ref 4.7–6.1)
SODIUM BLD-SCNC: 141 MMOL/L (ref 136–145)
TOTAL PROTEIN: 7.2 G/DL (ref 6.6–8.7)
TROPONIN: <0.01 NG/ML (ref 0–0.03)
TSH SERPL DL<=0.05 MIU/L-ACNC: 4.11 UIU/ML (ref 0.27–4.2)
VITAMIN B-12: 688 PG/ML (ref 211–946)
VITAMIN D 25-HYDROXY: 7.7 NG/ML
WBC # BLD: 10.9 K/UL (ref 4.8–10.8)

## 2018-11-14 PROCEDURE — 96375 TX/PRO/DX INJ NEW DRUG ADDON: CPT

## 2018-11-14 PROCEDURE — 2500000003 HC RX 250 WO HCPCS: Performed by: HOSPITALIST

## 2018-11-14 PROCEDURE — 84484 ASSAY OF TROPONIN QUANT: CPT

## 2018-11-14 PROCEDURE — 83735 ASSAY OF MAGNESIUM: CPT

## 2018-11-14 PROCEDURE — 82607 VITAMIN B-12: CPT

## 2018-11-14 PROCEDURE — 80162 ASSAY OF DIGOXIN TOTAL: CPT

## 2018-11-14 PROCEDURE — 6370000000 HC RX 637 (ALT 250 FOR IP): Performed by: HOSPITALIST

## 2018-11-14 PROCEDURE — 99222 1ST HOSP IP/OBS MODERATE 55: CPT | Performed by: HOSPITALIST

## 2018-11-14 PROCEDURE — 94664 DEMO&/EVAL PT USE INHALER: CPT

## 2018-11-14 PROCEDURE — 82746 ASSAY OF FOLIC ACID SERUM: CPT

## 2018-11-14 PROCEDURE — 87040 BLOOD CULTURE FOR BACTERIA: CPT

## 2018-11-14 PROCEDURE — 36415 COLL VENOUS BLD VENIPUNCTURE: CPT

## 2018-11-14 PROCEDURE — 71045 X-RAY EXAM CHEST 1 VIEW: CPT

## 2018-11-14 PROCEDURE — 2580000003 HC RX 258: Performed by: EMERGENCY MEDICINE

## 2018-11-14 PROCEDURE — 82803 BLOOD GASES ANY COMBINATION: CPT

## 2018-11-14 PROCEDURE — 82306 VITAMIN D 25 HYDROXY: CPT

## 2018-11-14 PROCEDURE — 84443 ASSAY THYROID STIM HORMONE: CPT

## 2018-11-14 PROCEDURE — 6360000002 HC RX W HCPCS: Performed by: HOSPITALIST

## 2018-11-14 PROCEDURE — 99285 EMERGENCY DEPT VISIT HI MDM: CPT | Performed by: EMERGENCY MEDICINE

## 2018-11-14 PROCEDURE — 85027 COMPLETE CBC AUTOMATED: CPT

## 2018-11-14 PROCEDURE — 93005 ELECTROCARDIOGRAM TRACING: CPT

## 2018-11-14 PROCEDURE — 84132 ASSAY OF SERUM POTASSIUM: CPT

## 2018-11-14 PROCEDURE — 99285 EMERGENCY DEPT VISIT HI MDM: CPT

## 2018-11-14 PROCEDURE — 85379 FIBRIN DEGRADATION QUANT: CPT

## 2018-11-14 PROCEDURE — 80053 COMPREHEN METABOLIC PANEL: CPT

## 2018-11-14 PROCEDURE — 71260 CT THORAX DX C+: CPT

## 2018-11-14 PROCEDURE — 1210000000 HC MED SURG R&B

## 2018-11-14 PROCEDURE — 2700000000 HC OXYGEN THERAPY PER DAY

## 2018-11-14 PROCEDURE — 6360000002 HC RX W HCPCS: Performed by: EMERGENCY MEDICINE

## 2018-11-14 PROCEDURE — 84153 ASSAY OF PSA TOTAL: CPT

## 2018-11-14 PROCEDURE — 2580000003 HC RX 258: Performed by: HOSPITALIST

## 2018-11-14 PROCEDURE — 83605 ASSAY OF LACTIC ACID: CPT

## 2018-11-14 PROCEDURE — 6360000004 HC RX CONTRAST MEDICATION: Performed by: HOSPITALIST

## 2018-11-14 PROCEDURE — 83880 ASSAY OF NATRIURETIC PEPTIDE: CPT

## 2018-11-14 PROCEDURE — 6370000000 HC RX 637 (ALT 250 FOR IP): Performed by: EMERGENCY MEDICINE

## 2018-11-14 PROCEDURE — 96374 THER/PROPH/DIAG INJ IV PUSH: CPT

## 2018-11-14 PROCEDURE — 94640 AIRWAY INHALATION TREATMENT: CPT

## 2018-11-14 RX ORDER — METHYLPREDNISOLONE SODIUM SUCCINATE 40 MG/ML
40 INJECTION, POWDER, LYOPHILIZED, FOR SOLUTION INTRAMUSCULAR; INTRAVENOUS ONCE
Status: COMPLETED | OUTPATIENT
Start: 2018-11-14 | End: 2018-11-14

## 2018-11-14 RX ORDER — DOXAZOSIN MESYLATE 4 MG/1
4 TABLET ORAL NIGHTLY
Status: DISCONTINUED | OUTPATIENT
Start: 2018-11-14 | End: 2018-11-16 | Stop reason: HOSPADM

## 2018-11-14 RX ORDER — POTASSIUM CHLORIDE 750 MG/1
10 TABLET, EXTENDED RELEASE ORAL DAILY
Status: DISCONTINUED | OUTPATIENT
Start: 2018-11-14 | End: 2018-11-16 | Stop reason: HOSPADM

## 2018-11-14 RX ORDER — LISINOPRIL 20 MG/1
40 TABLET ORAL DAILY
Status: DISCONTINUED | OUTPATIENT
Start: 2018-11-14 | End: 2018-11-16 | Stop reason: HOSPADM

## 2018-11-14 RX ORDER — LORATADINE 10 MG/1
10 CAPSULE, LIQUID FILLED ORAL DAILY
Status: DISCONTINUED | OUTPATIENT
Start: 2018-11-14 | End: 2018-11-14 | Stop reason: CLARIF

## 2018-11-14 RX ORDER — CARVEDILOL 12.5 MG/1
12.5 TABLET ORAL 2 TIMES DAILY
Status: DISCONTINUED | OUTPATIENT
Start: 2018-11-14 | End: 2018-11-14

## 2018-11-14 RX ORDER — METHYLPREDNISOLONE SODIUM SUCCINATE 125 MG/2ML
125 INJECTION, POWDER, LYOPHILIZED, FOR SOLUTION INTRAMUSCULAR; INTRAVENOUS ONCE
Status: COMPLETED | OUTPATIENT
Start: 2018-11-14 | End: 2018-11-14

## 2018-11-14 RX ORDER — BUPROPION HYDROCHLORIDE 150 MG/1
300 TABLET ORAL EVERY MORNING
Status: DISCONTINUED | OUTPATIENT
Start: 2018-11-15 | End: 2018-11-16 | Stop reason: HOSPADM

## 2018-11-14 RX ORDER — CHOLESTYRAMINE 4 G/9G
1 POWDER, FOR SUSPENSION ORAL 3 TIMES DAILY
Status: DISCONTINUED | OUTPATIENT
Start: 2018-11-14 | End: 2018-11-16 | Stop reason: HOSPADM

## 2018-11-14 RX ORDER — CETIRIZINE HYDROCHLORIDE 10 MG/1
10 TABLET ORAL DAILY
Status: DISCONTINUED | OUTPATIENT
Start: 2018-11-15 | End: 2018-11-16 | Stop reason: HOSPADM

## 2018-11-14 RX ORDER — TERAZOSIN 5 MG/1
5 CAPSULE ORAL NIGHTLY
Status: DISCONTINUED | OUTPATIENT
Start: 2018-11-14 | End: 2018-11-14 | Stop reason: CLARIF

## 2018-11-14 RX ORDER — METHYLPREDNISOLONE SODIUM SUCCINATE 40 MG/ML
40 INJECTION, POWDER, LYOPHILIZED, FOR SOLUTION INTRAMUSCULAR; INTRAVENOUS DAILY
Status: DISCONTINUED | OUTPATIENT
Start: 2018-11-15 | End: 2018-11-16 | Stop reason: HOSPADM

## 2018-11-14 RX ORDER — METOLAZONE 2.5 MG/1
2.5 TABLET ORAL DAILY
Status: DISCONTINUED | OUTPATIENT
Start: 2018-11-14 | End: 2018-11-16 | Stop reason: HOSPADM

## 2018-11-14 RX ORDER — HYDROCHLOROTHIAZIDE 12.5 MG/1
12.5 CAPSULE, GELATIN COATED ORAL EVERY MORNING
Status: DISCONTINUED | OUTPATIENT
Start: 2018-11-15 | End: 2018-11-16 | Stop reason: HOSPADM

## 2018-11-14 RX ORDER — IPRATROPIUM BROMIDE AND ALBUTEROL SULFATE 2.5; .5 MG/3ML; MG/3ML
1 SOLUTION RESPIRATORY (INHALATION) EVERY 4 HOURS
Status: DISCONTINUED | OUTPATIENT
Start: 2018-11-14 | End: 2018-11-16 | Stop reason: HOSPADM

## 2018-11-14 RX ORDER — DIGOXIN 125 MCG
125 TABLET ORAL DAILY
Status: DISCONTINUED | OUTPATIENT
Start: 2018-11-15 | End: 2018-11-16 | Stop reason: HOSPADM

## 2018-11-14 RX ORDER — FUROSEMIDE 10 MG/ML
40 INJECTION INTRAMUSCULAR; INTRAVENOUS DAILY
Status: DISCONTINUED | OUTPATIENT
Start: 2018-11-14 | End: 2018-11-16 | Stop reason: HOSPADM

## 2018-11-14 RX ORDER — CARVEDILOL 3.12 MG/1
3.12 TABLET ORAL 2 TIMES DAILY
Status: DISCONTINUED | OUTPATIENT
Start: 2018-11-15 | End: 2018-11-16 | Stop reason: HOSPADM

## 2018-11-14 RX ORDER — FLUOXETINE HYDROCHLORIDE 20 MG/1
40 CAPSULE ORAL DAILY
Status: DISCONTINUED | OUTPATIENT
Start: 2018-11-14 | End: 2018-11-16 | Stop reason: HOSPADM

## 2018-11-14 RX ORDER — SODIUM CHLORIDE 9 MG/ML
INJECTION, SOLUTION INTRAVENOUS CONTINUOUS
Status: DISCONTINUED | OUTPATIENT
Start: 2018-11-14 | End: 2018-11-16 | Stop reason: HOSPADM

## 2018-11-14 RX ORDER — FUROSEMIDE 40 MG/1
40 TABLET ORAL DAILY
Status: DISCONTINUED | OUTPATIENT
Start: 2018-11-14 | End: 2018-11-14

## 2018-11-14 RX ORDER — GUAIFENESIN 600 MG/1
600 TABLET, EXTENDED RELEASE ORAL 2 TIMES DAILY
Status: DISCONTINUED | OUTPATIENT
Start: 2018-11-14 | End: 2018-11-16 | Stop reason: HOSPADM

## 2018-11-14 RX ORDER — IPRATROPIUM BROMIDE AND ALBUTEROL SULFATE 2.5; .5 MG/3ML; MG/3ML
1 SOLUTION RESPIRATORY (INHALATION) ONCE
Status: COMPLETED | OUTPATIENT
Start: 2018-11-14 | End: 2018-11-14

## 2018-11-14 RX ADMIN — GUAIFENESIN 600 MG: 600 TABLET, EXTENDED RELEASE ORAL at 20:08

## 2018-11-14 RX ADMIN — IOPAMIDOL 90 ML: 755 INJECTION, SOLUTION INTRAVENOUS at 20:59

## 2018-11-14 RX ADMIN — AZITHROMYCIN MONOHYDRATE 500 MG: 500 INJECTION, POWDER, LYOPHILIZED, FOR SOLUTION INTRAVENOUS at 17:23

## 2018-11-14 RX ADMIN — FLUOXETINE 40 MG: 20 CAPSULE ORAL at 23:35

## 2018-11-14 RX ADMIN — POTASSIUM CHLORIDE 10 MEQ: 750 TABLET, EXTENDED RELEASE ORAL at 20:01

## 2018-11-14 RX ADMIN — IPRATROPIUM BROMIDE AND ALBUTEROL SULFATE 1 AMPULE: 2.5; .5 SOLUTION RESPIRATORY (INHALATION) at 16:05

## 2018-11-14 RX ADMIN — CEFTRIAXONE 1 G: 1 INJECTION, POWDER, FOR SOLUTION INTRAMUSCULAR; INTRAVENOUS at 17:15

## 2018-11-14 RX ADMIN — DOXAZOSIN 4 MG: 4 TABLET ORAL at 22:21

## 2018-11-14 RX ADMIN — DOXYCYCLINE 100 MG: 100 INJECTION, POWDER, LYOPHILIZED, FOR SOLUTION INTRAVENOUS at 22:23

## 2018-11-14 RX ADMIN — SODIUM CHLORIDE: 9 INJECTION, SOLUTION INTRAVENOUS at 22:22

## 2018-11-14 RX ADMIN — MOMETASONE FUROATE AND FORMOTEROL FUMARATE DIHYDRATE: 100; 5 AEROSOL RESPIRATORY (INHALATION) at 22:24

## 2018-11-14 RX ADMIN — METOLAZONE 2.5 MG: 2.5 TABLET ORAL at 20:01

## 2018-11-14 RX ADMIN — LISINOPRIL 40 MG: 20 TABLET ORAL at 23:35

## 2018-11-14 RX ADMIN — METHYLPREDNISOLONE SODIUM SUCCINATE 125 MG: 125 INJECTION, POWDER, FOR SOLUTION INTRAMUSCULAR; INTRAVENOUS at 15:56

## 2018-11-14 RX ADMIN — METHYLPREDNISOLONE SODIUM SUCCINATE 40 MG: 40 INJECTION, POWDER, FOR SOLUTION INTRAMUSCULAR; INTRAVENOUS at 19:59

## 2018-11-14 RX ADMIN — IPRATROPIUM BROMIDE AND ALBUTEROL SULFATE 1 AMPULE: .5; 3 SOLUTION RESPIRATORY (INHALATION) at 19:11

## 2018-11-14 RX ADMIN — CHOLESTYRAMINE 4 G: 4 POWDER, FOR SUSPENSION ORAL at 22:21

## 2018-11-14 RX ADMIN — FUROSEMIDE 40 MG: 10 INJECTION, SOLUTION INTRAMUSCULAR; INTRAVENOUS at 20:01

## 2018-11-14 ASSESSMENT — ENCOUNTER SYMPTOMS
SHORTNESS OF BREATH: 1
COUGH: 0
WHEEZING: 0

## 2018-11-14 NOTE — ED PROVIDER NOTES
mg by mouth daily       FISH OIL Take 1 capsule by mouth 2 times daily. ALLERGIES     Patient has no known allergies. FAMILY HISTORY     History reviewed. No pertinent family history. SOCIAL HISTORY       Social History     Social History    Marital status:      Spouse name: N/A    Number of children: N/A    Years of education: N/A     Social History Main Topics    Smoking status: Never Smoker    Smokeless tobacco: Never Used    Alcohol use No    Drug use: No    Sexual activity: Not Asked     Other Topics Concern    None     Social History Narrative    None       SCREENINGS      @FLOW([id number])@      PHYSICAL EXAM    (up to 7 for level 4, 8 or more for level 5)     ED Triage Vitals [11/14/18 1523]   BP Temp Temp src Pulse Resp SpO2 Height Weight   (!) 154/77 97.8 °F (36.6 °C) -- 87 20 (!) 85 % -- --       Physical Exam   Constitutional: He is oriented to person, place, and time. He appears distressed (mild, aware of breathing). obese   HENT:   Mouth/Throat: Oropharynx is clear and moist.   Eyes: Conjunctivae are normal.   Neck: Normal range of motion. Neck supple. No JVD present. Cardiovascular: Normal rate and normal heart sounds. irregular   Pulmonary/Chest: He is in respiratory distress (mild). He has wheezes (faint exp bilaterally). He has no rales. Abdominal: Soft. There is no tenderness. Musculoskeletal: He exhibits no edema. Neurological: He is alert and oriented to person, place, and time. Skin: Skin is warm and dry. He is not diaphoretic. Psychiatric: He has a normal mood and affect. Nursing note and vitals reviewed.       DIAGNOSTIC RESULTS     EKG: All EKG's are interpreted by the Emergency Department Physician who either signs or Co-signsthis chart in the absence of a cardiologist.    EKG: a.fib, VR 55, IVCD, no sig change from 10/19/18    RADIOLOGY:   Non-plain filmimages such as CT, Ultrasound and MRI are read by the radiologist. Hazel Hickman

## 2018-11-15 PROBLEM — I50.32 CHRONIC DIASTOLIC HEART FAILURE (HCC): Status: ACTIVE | Noted: 2018-11-15

## 2018-11-15 PROBLEM — R79.89 ELEVATED D-DIMER: Status: ACTIVE | Noted: 2018-11-15

## 2018-11-15 PROBLEM — I07.1 TRICUSPID REGURGITATION: Status: ACTIVE | Noted: 2018-11-15

## 2018-11-15 PROBLEM — Z51.5 PALLIATIVE CARE PATIENT: Status: ACTIVE | Noted: 2018-11-15

## 2018-11-15 PROBLEM — A41.9 SEPSIS (HCC): Status: ACTIVE | Noted: 2018-11-15

## 2018-11-15 PROBLEM — I34.0 MITRAL REGURGITATION: Status: ACTIVE | Noted: 2018-11-15

## 2018-11-15 PROBLEM — E55.9 VITAMIN D DEFICIENCY: Status: ACTIVE | Noted: 2018-11-15

## 2018-11-15 PROBLEM — D53.9 MACROCYTIC ANEMIA: Status: ACTIVE | Noted: 2018-11-15

## 2018-11-15 PROBLEM — J20.9 BRONCHITIS, ACUTE: Status: ACTIVE | Noted: 2018-11-15

## 2018-11-15 PROBLEM — I27.20 PULMONARY HYPERTENSION (HCC): Status: ACTIVE | Noted: 2018-11-15

## 2018-11-15 PROBLEM — J96.01 ACUTE RESPIRATORY FAILURE WITH HYPOXIA (HCC): Status: ACTIVE | Noted: 2018-11-14

## 2018-11-15 PROBLEM — J96.11 CHRONIC RESPIRATORY FAILURE WITH HYPOXIA (HCC): Status: ACTIVE | Noted: 2018-11-14

## 2018-11-15 LAB
LV EF: 58 %
LVEF MODALITY: NORMAL

## 2018-11-15 PROCEDURE — 2580000003 HC RX 258: Performed by: HOSPITALIST

## 2018-11-15 PROCEDURE — 6370000000 HC RX 637 (ALT 250 FOR IP): Performed by: HOSPITALIST

## 2018-11-15 PROCEDURE — 99232 SBSQ HOSP IP/OBS MODERATE 35: CPT | Performed by: FAMILY MEDICINE

## 2018-11-15 PROCEDURE — 93306 TTE W/DOPPLER COMPLETE: CPT

## 2018-11-15 PROCEDURE — 2700000000 HC OXYGEN THERAPY PER DAY

## 2018-11-15 PROCEDURE — 94640 AIRWAY INHALATION TREATMENT: CPT

## 2018-11-15 PROCEDURE — 6360000002 HC RX W HCPCS: Performed by: HOSPITALIST

## 2018-11-15 PROCEDURE — 2500000003 HC RX 250 WO HCPCS: Performed by: HOSPITALIST

## 2018-11-15 PROCEDURE — 1210000000 HC MED SURG R&B

## 2018-11-15 RX ADMIN — DOXYCYCLINE 100 MG: 100 INJECTION, POWDER, LYOPHILIZED, FOR SOLUTION INTRAVENOUS at 23:42

## 2018-11-15 RX ADMIN — CARVEDILOL 3.12 MG: 3.12 TABLET, FILM COATED ORAL at 19:19

## 2018-11-15 RX ADMIN — FLUOXETINE 40 MG: 20 CAPSULE ORAL at 08:45

## 2018-11-15 RX ADMIN — LISINOPRIL 40 MG: 20 TABLET ORAL at 08:45

## 2018-11-15 RX ADMIN — METOLAZONE 2.5 MG: 2.5 TABLET ORAL at 08:44

## 2018-11-15 RX ADMIN — BUPROPION HYDROCHLORIDE 300 MG: 150 TABLET, FILM COATED, EXTENDED RELEASE ORAL at 08:46

## 2018-11-15 RX ADMIN — DOXYCYCLINE 100 MG: 100 INJECTION, POWDER, LYOPHILIZED, FOR SOLUTION INTRAVENOUS at 08:44

## 2018-11-15 RX ADMIN — POTASSIUM CHLORIDE 10 MEQ: 750 TABLET, EXTENDED RELEASE ORAL at 08:46

## 2018-11-15 RX ADMIN — CARVEDILOL 3.12 MG: 3.12 TABLET, FILM COATED ORAL at 08:45

## 2018-11-15 RX ADMIN — GUAIFENESIN 600 MG: 600 TABLET, EXTENDED RELEASE ORAL at 19:20

## 2018-11-15 RX ADMIN — IPRATROPIUM BROMIDE AND ALBUTEROL SULFATE 1 AMPULE: .5; 3 SOLUTION RESPIRATORY (INHALATION) at 10:20

## 2018-11-15 RX ADMIN — MOMETASONE FUROATE AND FORMOTEROL FUMARATE DIHYDRATE 2 PUFF: 100; 5 AEROSOL RESPIRATORY (INHALATION) at 19:19

## 2018-11-15 RX ADMIN — CHOLESTYRAMINE 4 G: 4 POWDER, FOR SUSPENSION ORAL at 14:36

## 2018-11-15 RX ADMIN — IPRATROPIUM BROMIDE AND ALBUTEROL SULFATE 1 AMPULE: .5; 3 SOLUTION RESPIRATORY (INHALATION) at 06:17

## 2018-11-15 RX ADMIN — IPRATROPIUM BROMIDE AND ALBUTEROL SULFATE 1 AMPULE: .5; 3 SOLUTION RESPIRATORY (INHALATION) at 19:01

## 2018-11-15 RX ADMIN — CETIRIZINE HYDROCHLORIDE 10 MG: 10 TABLET, FILM COATED ORAL at 08:44

## 2018-11-15 RX ADMIN — IPRATROPIUM BROMIDE AND ALBUTEROL SULFATE 1 AMPULE: .5; 3 SOLUTION RESPIRATORY (INHALATION) at 22:21

## 2018-11-15 RX ADMIN — HYDROCHLOROTHIAZIDE 12.5 MG: 12.5 CAPSULE ORAL at 08:45

## 2018-11-15 RX ADMIN — DIGOXIN 125 MCG: 125 TABLET ORAL at 08:45

## 2018-11-15 RX ADMIN — DOXAZOSIN 4 MG: 4 TABLET ORAL at 19:20

## 2018-11-15 RX ADMIN — FUROSEMIDE 40 MG: 10 INJECTION, SOLUTION INTRAMUSCULAR; INTRAVENOUS at 08:44

## 2018-11-15 RX ADMIN — RIVAROXABAN 20 MG: 20 TABLET, FILM COATED ORAL at 08:46

## 2018-11-15 RX ADMIN — MOMETASONE FUROATE AND FORMOTEROL FUMARATE DIHYDRATE 2 PUFF: 100; 5 AEROSOL RESPIRATORY (INHALATION) at 08:46

## 2018-11-15 RX ADMIN — CHOLESTYRAMINE 4 G: 4 POWDER, FOR SUSPENSION ORAL at 08:46

## 2018-11-15 RX ADMIN — IPRATROPIUM BROMIDE AND ALBUTEROL SULFATE 1 AMPULE: .5; 3 SOLUTION RESPIRATORY (INHALATION) at 14:15

## 2018-11-15 RX ADMIN — SODIUM CHLORIDE: 9 INJECTION, SOLUTION INTRAVENOUS at 21:36

## 2018-11-15 RX ADMIN — GUAIFENESIN 600 MG: 600 TABLET, EXTENDED RELEASE ORAL at 08:46

## 2018-11-15 RX ADMIN — IPRATROPIUM BROMIDE AND ALBUTEROL SULFATE 1 AMPULE: .5; 3 SOLUTION RESPIRATORY (INHALATION) at 02:31

## 2018-11-15 RX ADMIN — CEFTRIAXONE 1 G: 1 INJECTION, POWDER, FOR SOLUTION INTRAMUSCULAR; INTRAVENOUS at 17:19

## 2018-11-15 RX ADMIN — METHYLPREDNISOLONE SODIUM SUCCINATE 40 MG: 40 INJECTION, POWDER, FOR SOLUTION INTRAMUSCULAR; INTRAVENOUS at 08:45

## 2018-11-15 NOTE — H&P
CO2  32*   --    BUN  13   --    CREATININE  0.9   --    GLUCOSE  145*   --    CALCIUM  9.2   --      TSH:   Lab Results   Component Value Date    TSH 4.110 11/14/2018     Cardiac Injury Profile:   Lab Results   Component Value Date    TROPONINI <0.01 11/14/2018     Lipid Profile: No components found for: CHLPL  Lab Results   Component Value Date    TRIG 158 (H) 02/19/2018    TRIG 204 (H) 05/26/2017    TRIG 178 02/23/2017     Lab Results   Component Value Date    HDL 45 (L) 02/19/2018    HDL 41 (L) 05/26/2017    HDL 50 (L) 02/23/2017     Lab Results   Component Value Date    LDLCALC 61 02/19/2018    [address] 05/26/2017    LDLCALC 58 02/23/2017     No results found for: LABVLDL  Hemoglobin A1C:   Lab Results   Component Value Date    LABA1C 5.7 02/19/2018       Assessment/Plan:  ·   Dyspnea with cardiomegaly per CXR and patchy RLL opacities- CT chest and echo pending  · Possible pna- ab, cultures  · COPD AE- stable now- was wheezing in ER- on iv steroid  · PAF- home meds  · HTN- home meds  · DM2- ISS  · History of prostate cancer- home meds  Neuropathy  Anxiety  Depression  NEIDA (obstructive sleep apnea)  Asbestosis ([address]  Chronic anticoagulation  Mixed hyperlipidemia                   I have reviewed my findings and recommendations in detail with Trinh Brandt.     Kortney Robbins MD

## 2018-11-15 NOTE — PROGRESS NOTES
Palliative Care:   Very pleasant 81 yr old male admitted with SOA and fatigue x 2 weeks. Pt is alert and oriented x 3, significant other is present. Palliative care initiated. Pt states he feels better today than yesterday. Past Medical History:        Past Medical History:   Diagnosis Date    Anxiety     Arthritis     Atrial fibrillation ([address]     Blood circulation, collateral     Cancer (HCC)     prostate, had treatment    CHF (congestive heart failure) (HCC)     Chronic kidney disease     COPD (chronic obstructive pulmonary disease) (HCC)     Depression     Hyperlipidemia     Hypertension     Neuromuscular disorder (HCC)     Neuropathy     Other disorders of kidney and ureter in diseases classified elsewhere     Palliative care patient 11/15/2018    Pneumonia     Type II or unspecified type diabetes mellitus without mention of complication, not stated as uncontrolled        Advance Directives:   Pt states he has POA and living will. Discussed getting a copy for our records as well as adding his POA and HCS. Pt tells me he has 6 children, all live out of state. He has named 3 as his HCS and provided me with phone numbers for the POA and one HCS both listed are children. Dionte Chu -[phone]  Martell Pollackdy [phone]  Yohan  no phone number obtained      Pain/Other Symptoms:    Pt denies pain. Pt does state he has SOA with exertion. Activity:    As maya         Psychological/Spiritual:     Good support from sig other. Friends support. Does not attend [employer] at this time. Patient/Family Discussion:   Pt tells me he has been feeling fatigued and SOA  x 2 weeks. Reviewed pt ADL needs and independence. Pt states he can shower hisself. Still goes on outing but has noticed increase in SOA on exertion. Pt has O2 at HS as well as bi pap. Pt tells me he had both knees replaced approx 8-10 yrs ago. Also states he was a  for many years.   Pt states he

## 2018-11-15 NOTE — PROGRESS NOTES
Hospitalist Progress Note  11/15/2018 2:02 PM  Subjective:   Admit Date: 11/14/2018  PCP: Sherryle Portugal, DO    Chief Complaint: shortness of breath    Subjective: Patient states he is feeling much better today. Minimal cough, nonproductive. Denies any sick contacts. No fever or chills. Felt a little \"fuzzy in the head\" after his morning medications today. History is otherwise unchanged. [facility] Hospital History:   11-14: Presents to ED with increasing shortness of breath and fatigue for 2 weeks. History of COPD with chronic hypoxic respiratory failure and diastolic heart failure. CXR showed cardiomegaly and possible bilateral lower lobe infiltrates. D-dimer elevated. CT chest with contrast shows bilateral pleural effusions without infiltrate, bronchial wall thickening, paratracheal lymphadenopathy. Admitted on IV ceftriaxone and azithromycin. IV doxycycline added later. 11-15: DC doxycycline. Clinically improving. Probable discharge to home tomorrow. ROS: 14 point review of systems is negative except as specifically addressed above. DIET GENERAL;     Intake/Output Summary (Last 24 hours) at 11/15/18 1402  Last data filed at 11/15/18 1248   Gross per 24 hour   Intake             2053 ml   Output             3780 ml   Net            -1727 ml     Medications:   sodium chloride 50 mL/hr at 11/14/18 2222     Current Facility-Administered Medications   Medication Dose Route Frequency Provider Last Rate Last Dose    guaiFENesin (MUCINEX) extended release tablet 600 mg  600 mg Oral BID Carlos Nichole MD   600 mg at 11/15/18 0846    metolazone (ZAROXOLYN) tablet 2.5 mg  2.5 mg Oral Daily Carlos Nichole MD   2.5 mg at 11/15/18 0844    potassium chloride (KLOR-CON M) extended release tablet 10 mEq  10 mEq Oral Daily Carlos Nichole MD   10 mEq at 11/15/18 0846    carvedilol (COREG) tablet 3.125 mg  3.125 mg Oral BID Carlos Nichole MD   3.125 mg at 11/15/18 0845    murmur, click, rub or gallop  Abdomen: soft, non-tender; bowel sounds normal; no masses,  no organomegaly and obese  Extremities: edema trace bilaterally, modified Rula's negative  Neurologic: No focal neurologic deficits, normal sensation, alert and oriented, affect and mood appropriate. Skin: no rashes, nodules. Assessment and Plan:   Principal Problem:    Sepsis ([address]  Active Problems:    Type 2 diabetes mellitus with diabetic polyneuropathy (HCC)    Chronic respiratory failure with hypoxia (HCC)    Palliative care patient    Chronic diastolic heart failure (HCC)    Macrocytic anemia    Elevated d-dimer    Vitamin D deficiency    Mitral regurgitation    Tricuspid regurgitation    Pulmonary hypertension (HCC)    Bronchitis, acute  Resolved Problems:    * No resolved hospital problems. *    DC doxycycline. Likely discharge to home to complete antibiotics tomorrow. Advance Directive: No Order    DVT prophylaxis: rivaroxaban    Discharge planning:  To home      DO Jaja Jesus Hospitalist

## 2018-11-16 VITALS
SYSTOLIC BLOOD PRESSURE: 150 MMHG | OXYGEN SATURATION: 90 % | RESPIRATION RATE: 18 BRPM | BODY MASS INDEX: 39.08 KG/M2 | WEIGHT: 273 LBS | TEMPERATURE: 98 F | HEIGHT: 70 IN | DIASTOLIC BLOOD PRESSURE: 80 MMHG | HEART RATE: 62 BPM

## 2018-11-16 LAB
ANION GAP SERPL CALCULATED.3IONS-SCNC: 13 MMOL/L (ref 7–19)
BASOPHILS ABSOLUTE: 0 K/UL (ref 0–0.2)
BASOPHILS RELATIVE PERCENT: 0.3 % (ref 0–1)
BUN BLDV-MCNC: 14 MG/DL (ref 8–23)
CALCIUM SERPL-MCNC: 9.4 MG/DL (ref 8.8–10.2)
CHLORIDE BLD-SCNC: 94 MMOL/L (ref 98–111)
CO2: 31 MMOL/L (ref 22–29)
CREAT SERPL-MCNC: 0.9 MG/DL (ref 0.5–1.2)
EOSINOPHILS ABSOLUTE: 0.1 K/UL (ref 0–0.6)
EOSINOPHILS RELATIVE PERCENT: 1.1 % (ref 0–5)
GFR NON-AFRICAN AMERICAN: >60
GLUCOSE BLD-MCNC: 134 MG/DL (ref 74–109)
HCT VFR BLD CALC: 35.7 % (ref 42–52)
HEMOGLOBIN: 12 G/DL (ref 14–18)
LACTIC ACID: 2.2 MMOL/L (ref 0.5–1.9)
LYMPHOCYTES ABSOLUTE: 1.7 K/UL (ref 1.1–4.5)
LYMPHOCYTES RELATIVE PERCENT: 13.1 % (ref 20–40)
MAGNESIUM: 1.9 MG/DL (ref 1.6–2.4)
MCH RBC QN AUTO: 32.7 PG (ref 27–31)
MCHC RBC AUTO-ENTMCNC: 33.6 G/DL (ref 33–37)
MCV RBC AUTO: 97.3 FL (ref 80–94)
MONOCYTES ABSOLUTE: 1 K/UL (ref 0–0.9)
MONOCYTES RELATIVE PERCENT: 7.8 % (ref 0–10)
NEUTROPHILS ABSOLUTE: 9.6 K/UL (ref 1.5–7.5)
NEUTROPHILS RELATIVE PERCENT: 76.4 % (ref 50–65)
PDW BLD-RTO: 13.2 % (ref 11.5–14.5)
PLATELET # BLD: 170 K/UL (ref 130–400)
PMV BLD AUTO: 9.8 FL (ref 9.4–12.4)
POTASSIUM REFLEX MAGNESIUM: 3 MMOL/L (ref 3.5–5)
RBC # BLD: 3.67 M/UL (ref 4.7–6.1)
SODIUM BLD-SCNC: 138 MMOL/L (ref 136–145)
WBC # BLD: 12.6 K/UL (ref 4.8–10.8)

## 2018-11-16 PROCEDURE — 94640 AIRWAY INHALATION TREATMENT: CPT

## 2018-11-16 PROCEDURE — 99238 HOSP IP/OBS DSCHRG MGMT 30/<: CPT | Performed by: FAMILY MEDICINE

## 2018-11-16 PROCEDURE — 6370000000 HC RX 637 (ALT 250 FOR IP): Performed by: HOSPITALIST

## 2018-11-16 PROCEDURE — 85025 COMPLETE CBC W/AUTO DIFF WBC: CPT

## 2018-11-16 PROCEDURE — 2580000003 HC RX 258: Performed by: HOSPITALIST

## 2018-11-16 PROCEDURE — 6360000002 HC RX W HCPCS: Performed by: HOSPITALIST

## 2018-11-16 PROCEDURE — 83735 ASSAY OF MAGNESIUM: CPT

## 2018-11-16 PROCEDURE — 83605 ASSAY OF LACTIC ACID: CPT

## 2018-11-16 PROCEDURE — 80048 BASIC METABOLIC PNL TOTAL CA: CPT

## 2018-11-16 PROCEDURE — 2500000003 HC RX 250 WO HCPCS: Performed by: HOSPITALIST

## 2018-11-16 PROCEDURE — 36415 COLL VENOUS BLD VENIPUNCTURE: CPT

## 2018-11-16 PROCEDURE — 2700000000 HC OXYGEN THERAPY PER DAY

## 2018-11-16 RX ORDER — CETIRIZINE HYDROCHLORIDE 10 MG/1
10 TABLET ORAL DAILY
Qty: 30 TABLET | Refills: 0 | COMMUNITY
Start: 2018-11-17 | End: 2021-05-24

## 2018-11-16 RX ORDER — DOXYCYCLINE HYCLATE 100 MG
100 TABLET ORAL 2 TIMES DAILY
Qty: 10 TABLET | Refills: 0 | Status: SHIPPED | OUTPATIENT
Start: 2018-11-16 | End: 2018-11-21

## 2018-11-16 RX ORDER — CEPHALEXIN 500 MG/1
500 CAPSULE ORAL 4 TIMES DAILY
Qty: 20 CAPSULE | Refills: 0 | Status: SHIPPED | OUTPATIENT
Start: 2018-11-16 | End: 2018-11-21

## 2018-11-16 RX ORDER — PREDNISONE 20 MG/1
TABLET ORAL
Qty: 5 TABLET | Refills: 0 | Status: SHIPPED | OUTPATIENT
Start: 2018-11-16 | End: 2018-12-03 | Stop reason: ALTCHOICE

## 2018-11-16 RX ORDER — GUAIFENESIN 600 MG/1
600 TABLET, EXTENDED RELEASE ORAL 2 TIMES DAILY
Qty: 30 TABLET | Refills: 0 | Status: SHIPPED | OUTPATIENT
Start: 2018-11-16 | End: 2019-04-25

## 2018-11-16 RX ADMIN — IPRATROPIUM BROMIDE AND ALBUTEROL SULFATE 1 AMPULE: .5; 3 SOLUTION RESPIRATORY (INHALATION) at 02:18

## 2018-11-16 RX ADMIN — FLUOXETINE 40 MG: 20 CAPSULE ORAL at 09:00

## 2018-11-16 RX ADMIN — METHYLPREDNISOLONE SODIUM SUCCINATE 40 MG: 40 INJECTION, POWDER, FOR SOLUTION INTRAMUSCULAR; INTRAVENOUS at 09:01

## 2018-11-16 RX ADMIN — GUAIFENESIN 600 MG: 600 TABLET, EXTENDED RELEASE ORAL at 09:01

## 2018-11-16 RX ADMIN — HYDROCHLOROTHIAZIDE 12.5 MG: 12.5 CAPSULE ORAL at 09:01

## 2018-11-16 RX ADMIN — CARVEDILOL 3.12 MG: 3.12 TABLET, FILM COATED ORAL at 09:01

## 2018-11-16 RX ADMIN — METOLAZONE 2.5 MG: 2.5 TABLET ORAL at 09:01

## 2018-11-16 RX ADMIN — DIGOXIN 125 MCG: 125 TABLET ORAL at 09:01

## 2018-11-16 RX ADMIN — IPRATROPIUM BROMIDE AND ALBUTEROL SULFATE 1 AMPULE: .5; 3 SOLUTION RESPIRATORY (INHALATION) at 10:22

## 2018-11-16 RX ADMIN — FUROSEMIDE 40 MG: 10 INJECTION, SOLUTION INTRAMUSCULAR; INTRAVENOUS at 09:01

## 2018-11-16 RX ADMIN — CETIRIZINE HYDROCHLORIDE 10 MG: 10 TABLET, FILM COATED ORAL at 09:00

## 2018-11-16 RX ADMIN — RIVAROXABAN 20 MG: 20 TABLET, FILM COATED ORAL at 09:00

## 2018-11-16 RX ADMIN — DOXYCYCLINE 100 MG: 100 INJECTION, POWDER, LYOPHILIZED, FOR SOLUTION INTRAVENOUS at 09:00

## 2018-11-16 RX ADMIN — IPRATROPIUM BROMIDE AND ALBUTEROL SULFATE 1 AMPULE: .5; 3 SOLUTION RESPIRATORY (INHALATION) at 06:22

## 2018-11-16 RX ADMIN — CHOLESTYRAMINE 4 G: 4 POWDER, FOR SUSPENSION ORAL at 09:00

## 2018-11-16 RX ADMIN — LISINOPRIL 40 MG: 20 TABLET ORAL at 09:00

## 2018-11-16 RX ADMIN — POTASSIUM CHLORIDE 10 MEQ: 750 TABLET, EXTENDED RELEASE ORAL at 09:00

## 2018-11-16 RX ADMIN — BUPROPION HYDROCHLORIDE 300 MG: 150 TABLET, FILM COATED, EXTENDED RELEASE ORAL at 09:07

## 2018-11-16 RX ADMIN — MOMETASONE FUROATE AND FORMOTEROL FUMARATE DIHYDRATE 2 PUFF: 100; 5 AEROSOL RESPIRATORY (INHALATION) at 09:01

## 2018-11-16 NOTE — PLAN OF CARE
Problem: Falls - Risk of:  Goal: Will remain free from falls  Will remain free from falls   Outcome: Ongoing    Goal: Absence of physical injury  Absence of physical injury   Outcome: Ongoing      Problem: Risk for Impaired Skin Integrity  Goal: Tissue integrity - skin and mucous membranes  Structural intactness and normal physiological function of skin and  mucous membranes.    Outcome: Ongoing      Problem: Ineffective Breathing Pattern  Goal: Able to breathe comfortably  Able to breathe comfortably     Outcome: Ongoing

## 2018-11-19 ENCOUNTER — CARE COORDINATION (OUTPATIENT)
Dept: CARE COORDINATION | Age: 83
End: 2018-11-19

## 2018-11-19 ENCOUNTER — HOSPITAL ENCOUNTER (OUTPATIENT)
Dept: GENERAL RADIOLOGY | Age: 83
Discharge: HOME | End: 2018-11-19
Payer: MEDICARE

## 2018-11-19 ENCOUNTER — OFFICE VISIT (OUTPATIENT)
Dept: PRIMARY CARE CLINIC | Age: 83
End: 2018-11-19
Payer: MEDICARE

## 2018-11-19 ENCOUNTER — CARE COORDINATION (OUTPATIENT)
Dept: CASE MANAGEMENT | Age: 83
End: 2018-11-19

## 2018-11-19 ENCOUNTER — TELEPHONE (OUTPATIENT)
Dept: PRIMARY CARE CLINIC | Age: 83
End: 2018-11-19

## 2018-11-19 VITALS
DIASTOLIC BLOOD PRESSURE: 82 MMHG | SYSTOLIC BLOOD PRESSURE: 136 MMHG | HEART RATE: 64 BPM | BODY MASS INDEX: 37.65 KG/M2 | WEIGHT: 263 LBS | TEMPERATURE: 96.2 F | OXYGEN SATURATION: 93 % | HEIGHT: 70 IN

## 2018-11-19 DIAGNOSIS — J44.1 CHRONIC OBSTRUCTIVE PULMONARY DISEASE WITH ACUTE EXACERBATION (HCC): ICD-10-CM

## 2018-11-19 DIAGNOSIS — R93.89 ABNORMAL CT OF THE CHEST: ICD-10-CM

## 2018-11-19 DIAGNOSIS — I50.32 CHRONIC DIASTOLIC HEART FAILURE (HCC): ICD-10-CM

## 2018-11-19 DIAGNOSIS — Z09 HOSPITAL DISCHARGE FOLLOW-UP: Primary | ICD-10-CM

## 2018-11-19 DIAGNOSIS — Z91.81 AT HIGH RISK FOR FALLS: ICD-10-CM

## 2018-11-19 DIAGNOSIS — J96.11 CHRONIC RESPIRATORY FAILURE WITH HYPOXIA (HCC): ICD-10-CM

## 2018-11-19 LAB
ALBUMIN SERPL-MCNC: 4.2 G/DL (ref 3.5–5.2)
ALP BLD-CCNC: 46 U/L (ref 40–130)
ALT SERPL-CCNC: 10 U/L (ref 5–41)
ANION GAP SERPL CALCULATED.3IONS-SCNC: 19 MMOL/L (ref 7–19)
AST SERPL-CCNC: 18 U/L (ref 5–40)
BASOPHILS ABSOLUTE: 0.1 K/UL (ref 0–0.2)
BASOPHILS RELATIVE PERCENT: 0.5 % (ref 0–1)
BILIRUB SERPL-MCNC: 0.7 MG/DL (ref 0.2–1.2)
BLOOD CULTURE, ROUTINE: NORMAL
BUN BLDV-MCNC: 30 MG/DL (ref 8–23)
CALCIUM SERPL-MCNC: 10.2 MG/DL (ref 8.8–10.2)
CHLORIDE BLD-SCNC: 91 MMOL/L (ref 98–111)
CO2: 29 MMOL/L (ref 22–29)
CREAT SERPL-MCNC: 0.9 MG/DL (ref 0.5–1.2)
CULTURE, BLOOD 2: NORMAL
EKG P AXIS: NORMAL DEGREES
EKG P-R INTERVAL: NORMAL MS
EKG Q-T INTERVAL: 450 MS
EKG QRS DURATION: 114 MS
EKG QTC CALCULATION (BAZETT): 440 MS
EKG T AXIS: -94 DEGREES
EOSINOPHILS ABSOLUTE: 0.4 K/UL (ref 0–0.6)
EOSINOPHILS RELATIVE PERCENT: 2.3 % (ref 0–5)
GFR NON-AFRICAN AMERICAN: >60
GLUCOSE BLD-MCNC: 119 MG/DL (ref 74–109)
HCT VFR BLD CALC: 42.7 % (ref 42–52)
HEMOGLOBIN: 14.3 G/DL (ref 14–18)
LYMPHOCYTES ABSOLUTE: 1 K/UL (ref 1.1–4.5)
LYMPHOCYTES RELATIVE PERCENT: 6.3 % (ref 20–40)
MCH RBC QN AUTO: 32.8 PG (ref 27–31)
MCHC RBC AUTO-ENTMCNC: 33.5 G/DL (ref 33–37)
MCV RBC AUTO: 97.9 FL (ref 80–94)
MONOCYTES ABSOLUTE: 0.7 K/UL (ref 0–0.9)
MONOCYTES RELATIVE PERCENT: 4.7 % (ref 0–10)
NEUTROPHILS ABSOLUTE: 13.3 K/UL (ref 1.5–7.5)
NEUTROPHILS RELATIVE PERCENT: 84.6 % (ref 50–65)
PDW BLD-RTO: 13.2 % (ref 11.5–14.5)
PLATELET # BLD: 238 K/UL (ref 130–400)
PMV BLD AUTO: 10.1 FL (ref 9.4–12.4)
POTASSIUM SERPL-SCNC: 4.2 MMOL/L (ref 3.5–5)
RBC # BLD: 4.36 M/UL (ref 4.7–6.1)
SODIUM BLD-SCNC: 139 MMOL/L (ref 136–145)
TOTAL PROTEIN: 7.6 G/DL (ref 6.6–8.7)
WBC # BLD: 15.8 K/UL (ref 4.8–10.8)

## 2018-11-19 PROCEDURE — 1111F DSCHRG MED/CURRENT MED MERGE: CPT | Performed by: NURSE PRACTITIONER

## 2018-11-19 PROCEDURE — 99495 TRANSJ CARE MGMT MOD F2F 14D: CPT | Performed by: NURSE PRACTITIONER

## 2018-11-19 ASSESSMENT — ENCOUNTER SYMPTOMS
DIARRHEA: 0
EYE DISCHARGE: 0
CHOKING: 0
WHEEZING: 0
COUGH: 0
EYE REDNESS: 0
BLOOD IN STOOL: 0
SORE THROAT: 0
RHINORRHEA: 0
CONSTIPATION: 0

## 2018-11-19 NOTE — PROGRESS NOTES
On the basis of positive falls risk screening, assessment and plan is as follows: home safety tips provided. Post-Discharge Transitional Care Management Services or Hospital Follow Up      Kajal Mackenzie   YOB: 1935    Date of Office Visit:  11/19/2018  Date of Hospital Admission: 11/14/18  Date of Hospital Discharge: 11/16/18  Readmission Risk Score(high >=14%.  Medium >=10%):Readmission Risk Score: 17    Care management risk score Rising risk (score 2-5) and Complex Care (Scores >=6): 4     Non face to face  following discharge, date last encounter closed (first attempt may have been earlier): *No documented post hospital discharge outreach found in the last 14 days *No documented post hospital discharge outreach found in the last 14 days    Call initiated 2 business days of discharge: *No response recorded in the last 14 days     Patient Active Problem List   Diagnosis    Hypertension    Neuropathy    Atrial fibrillation ([address]    Anxiety    Depression    Mixed restrictive and obstructive lung disease ([address]    COPD (chronic obstructive pulmonary disease) ([address]    NEIDA (obstructive sleep apnea)    Asbestosis ([address]    Metabolic alkalosis with respiratory acidosis    Chronic anticoagulation    Acne rosacea    Type 2 diabetes mellitus with diabetic polyneuropathy ([address]    Mixed hyperlipidemia    Prostate cancer ([address]    Recurrent major depressive disorder, in partial remission ([address]    Chronic respiratory failure with hypoxia ([address]    Palliative care patient    Chronic diastolic heart failure (HCC)    Macrocytic anemia    Sepsis ([address]    Elevated d-dimer    Vitamin D deficiency    Mitral regurgitation    Tricuspid regurgitation    Pulmonary hypertension (HCC)    Bronchitis, acute       No Known Allergies    Medications listed as ordered at the time of discharge from hospital   [facility] Medication Instructions COLL[place]:    Printed on:11/19/18 0099   Medication tablet 3    albuterol sulfate HFA (PROAIR HFA) 108 (90 BASE) MCG/ACT inhaler Inhale 2 puffs into the lungs every 6 hours as needed for Wheezing 8.5 Inhaler 5    leuprolide (LUPRON) 30 MG injection Inject 30 mg into the muscle once Every 4 months      MYRBETRIQ 25 MG TB24 Take 25 mg by mouth daily       FISH OIL Take 1 capsule by mouth 2 times daily. Medications patient taking as of now reconciled against medications ordered at time of hospital discharge: Yes    Chief Complaint   Patient presents with    Follow-up     [address] 11/16/18 - pneumonia-     Nausea     has not ate anything today - dry heaves twice since leaving [address]       HPI    Inpatient course: Discharge summary reviewed- see chart. Interval history/Current status: states breathing is better. He is down 10 lbs . He is not doing daily weights. rec this. Still feels fatigued. Review of Systems   Constitutional: Positive for fatigue. Negative for appetite change and unexpected weight change. HENT: Negative for congestion, ear pain, rhinorrhea and sore throat. Eyes: Negative for discharge and redness. Respiratory: Negative for cough, choking and wheezing. Cardiovascular: Negative for chest pain. Gastrointestinal: Negative for blood in stool, constipation and diarrhea. Genitourinary: Negative for decreased urine volume and dysuria. Musculoskeletal: Positive for arthralgias. Skin: Negative for rash. Neurological: Negative for weakness. Hematological: Negative for adenopathy. Psychiatric/Behavioral: Negative for suicidal ideas. Vitals:    11/19/18 1027   BP: 136/82   Pulse: 64   Temp: 96.2 °F (35.7 °C)   TempSrc: Temporal   SpO2: 93%   Weight: 263 lb (119.3 kg)   Height: 5' 10\" (1.778 m)     Body mass index is 37.74 kg/m².    Wt Readings from Last 3 Encounters:   11/19/18 263 lb (119.3 kg)   11/14/18 273 lb (123.8 kg)   10/19/18 262 lb (118.8 kg)     BP Readings from Last 3 Encounters:   11/19/18 136/82   11/16/18 (!)

## 2018-11-20 ENCOUNTER — TELEPHONE (OUTPATIENT)
Dept: PRIMARY CARE CLINIC | Age: 83
End: 2018-11-20

## 2018-11-20 ENCOUNTER — TELEPHONE (OUTPATIENT)
Dept: CARDIOLOGY | Age: 83
End: 2018-11-20

## 2018-11-20 NOTE — TELEPHONE ENCOUNTER
----- Message from Frankey Fake, APRN sent at 11/19/2018  4:12 PM CST -----  Please inform patient results show  potassium is normal now. He is a little dry, but he just had fluid over load.    Wbc is elevated, I believe that he was on steroids in the hospital.

## 2018-11-23 ENCOUNTER — CARE COORDINATION (OUTPATIENT)
Dept: CARE COORDINATION | Age: 83
End: 2018-11-23

## 2018-11-26 ENCOUNTER — CARE COORDINATION (OUTPATIENT)
Dept: CARE COORDINATION | Age: 83
End: 2018-11-26

## 2018-11-29 ENCOUNTER — CARE COORDINATION (OUTPATIENT)
Dept: CARE COORDINATION | Age: 83
End: 2018-11-29

## 2018-11-29 NOTE — CARE COORDINATION
Ambulatory Care Coordination Note  11/29/2018  CM Risk Score: 7  Ivone Mortality Risk Score: 43    ACC: Luma Arias, RN    Summary Note: ACC spoke at length with Al and his SO - Aman Mcdonald. Al has not been keeping a wgt log or a FBS log. He said he has had company and didn't do it. He reported he has continued to be fatigued and is easily short of air with exertion. Pt has home oxygen but only uses it at night presently. He has an oximeter but is not using it. Pt stated he cannot find his HCTZ - it was refilled in October for 90 d. He has been without it for 2 weeks. Pt did say his wgt today is 275lb on home scale. Pt's SO, Ya, stated pt does continue to be fatigued and sometimes his heart seems to be beating harder. ACC explained this may be due to excess fluid from not taking his daily HCTZ. ACC discussed value of daily log of pt's wgt, his FBS and his oxygen level. Urged pt to use his oxygen during the day for exertion also. Encouraged that this will support his heart function and reduce effort / burden on his heart. Stressed that pt needs to write down his daily morning weight, his FBS and an oxygen level at least once daily. Urged him to use his oxygen during the day if below 90% or feels SOA or very fatigued. Kiki Bartlett to help pt remember to log his vitals as this is an important way to monitor how well his heart is doing. Reminded pt and Ya that he has an appt with the Heart Failure Clinic on 12/4 at 9:30 am.  [facility] called local pharmacy, spoke to [facility] and she stated pt may pay cash price for 30d supply of HCTZ at $13.09. [facility] notified Lachelle Bergeron and she stated she will  the refill and get pt restarted on this. Encouraged that pt elevate his feet as well to help get the fluid off his body. Informed Ya I will call again tomorrow and check on pt. She voiced understanding.           Care Coordination Interventions    Program Enrollment:  Rising Risk  Referral from Primary Care Provider: by mouth daily   Yes Historical Provider, MD   enzalutamide [place]) 40 MG capsule Take 160 mg by mouth daily   Yes Historical Provider, MD   amLODIPine (NORVASC) 5 MG tablet Take 1 tablet by mouth daily 5/9/17  Yes ALISON Arellano, DO   rivaroxaban (XARELTO) 20 MG TABS tablet Take 1 tablet by mouth daily (with breakfast) 5/9/17  Yes ALISON Arellano, DO   potassium chloride (KLOR-CON 10) 10 MEQ extended release tablet Take 1 tablet by mouth daily 5/9/17  Yes ALISON Arellano, DO   albuterol sulfate HFA (PROAIR HFA) 108 (90 BASE) MCG/ACT inhaler Inhale 2 puffs into the lungs every 6 hours as needed for Wheezing 5/4/17  Yes ALISON Arellano, DO   leuprolide (LUPRON) 30 MG injection Inject 30 mg into the muscle once Every 4 months   Yes Historical Provider, MD   MYRBETRIQ 25 MG TB24 Take 25 mg by mouth daily  1/25/16  Yes Historical Provider, MD   FISH OIL Take 1 capsule by mouth 2 times daily.    Yes Historical Provider, MD   hydrochlorothiazide (MICROZIDE) 12.5 MG capsule Take 1 capsule by mouth every morning 10/3/18   ALISON Arellano, DO       Future Appointments  Date Time Provider Addie Doss   12/3/2018 9:30 AM ISAAC Escudero [facility]   12/17/2018 11:00 AM ISAAC Haile [facility]      and   Congestive Heart Failure Assessment    Are you currently restricting fluids?:  No Restriction  Do you understand a low sodium diet?:  No (Comment: Discussed today with pt and SO)  Do you understand how to read food labels?:  Yes  How many restaurant meals do you eat per week?:  0  Do you salt your food before tasting it?:  Yes     Other symptoms causing concern      Symptoms:   CHF associated fatigue: Pos, CHF associated shortness of breath: Pos      Symptom course:  no change  Patient-reported weight (lb):  275  Weight trend:  (Comment: 11/29/18 No other home wgts to compare with.)  Salt intake watch compared to last visit:  improved

## 2018-11-30 ENCOUNTER — CARE COORDINATION (OUTPATIENT)
Dept: CARE COORDINATION | Age: 83
End: 2018-11-30

## 2018-11-30 RX ORDER — GLUCOSAMINE HCL/CHONDROITIN SU 500-400 MG
CAPSULE ORAL
Qty: 100 STRIP | Refills: 3 | Status: SHIPPED | OUTPATIENT
Start: 2018-11-30 | End: 2019-04-25

## 2018-12-03 ENCOUNTER — OFFICE VISIT (OUTPATIENT)
Dept: CARDIOLOGY | Age: 83
End: 2018-12-03
Payer: MEDICARE

## 2018-12-03 VITALS
WEIGHT: 267 LBS | DIASTOLIC BLOOD PRESSURE: 76 MMHG | HEART RATE: 62 BPM | SYSTOLIC BLOOD PRESSURE: 138 MMHG | OXYGEN SATURATION: 91 % | BODY MASS INDEX: 38.22 KG/M2 | HEIGHT: 70 IN

## 2018-12-03 DIAGNOSIS — E78.2 MIXED HYPERLIPIDEMIA: ICD-10-CM

## 2018-12-03 DIAGNOSIS — G47.33 OSA (OBSTRUCTIVE SLEEP APNEA): ICD-10-CM

## 2018-12-03 DIAGNOSIS — I34.0 NON-RHEUMATIC MITRAL REGURGITATION: ICD-10-CM

## 2018-12-03 DIAGNOSIS — J44.9 CHRONIC OBSTRUCTIVE PULMONARY DISEASE, UNSPECIFIED COPD TYPE (HCC): ICD-10-CM

## 2018-12-03 DIAGNOSIS — I50.32 CHRONIC DIASTOLIC HEART FAILURE (HCC): Primary | ICD-10-CM

## 2018-12-03 DIAGNOSIS — I48.0 PAROXYSMAL ATRIAL FIBRILLATION (HCC): ICD-10-CM

## 2018-12-03 DIAGNOSIS — Z79.01 CHRONIC ANTICOAGULATION: ICD-10-CM

## 2018-12-03 DIAGNOSIS — J96.11 CHRONIC RESPIRATORY FAILURE WITH HYPOXIA (HCC): ICD-10-CM

## 2018-12-03 DIAGNOSIS — I27.20 PULMONARY HYPERTENSION (HCC): ICD-10-CM

## 2018-12-03 PROCEDURE — G8484 FLU IMMUNIZE NO ADMIN: HCPCS | Performed by: NURSE PRACTITIONER

## 2018-12-03 PROCEDURE — 1111F DSCHRG MED/CURRENT MED MERGE: CPT | Performed by: NURSE PRACTITIONER

## 2018-12-03 PROCEDURE — G8417 CALC BMI ABV UP PARAM F/U: HCPCS | Performed by: NURSE PRACTITIONER

## 2018-12-03 PROCEDURE — 1101F PT FALLS ASSESS-DOCD LE1/YR: CPT | Performed by: NURSE PRACTITIONER

## 2018-12-03 PROCEDURE — 3023F SPIROM DOC REV: CPT | Performed by: NURSE PRACTITIONER

## 2018-12-03 PROCEDURE — 1123F ACP DISCUSS/DSCN MKR DOCD: CPT | Performed by: NURSE PRACTITIONER

## 2018-12-03 PROCEDURE — G8598 ASA/ANTIPLAT THER USED: HCPCS | Performed by: NURSE PRACTITIONER

## 2018-12-03 PROCEDURE — G8427 DOCREV CUR MEDS BY ELIG CLIN: HCPCS | Performed by: NURSE PRACTITIONER

## 2018-12-03 PROCEDURE — 4040F PNEUMOC VAC/ADMIN/RCVD: CPT | Performed by: NURSE PRACTITIONER

## 2018-12-03 PROCEDURE — G8926 SPIRO NO PERF OR DOC: HCPCS | Performed by: NURSE PRACTITIONER

## 2018-12-03 PROCEDURE — 99203 OFFICE O/P NEW LOW 30 MIN: CPT | Performed by: NURSE PRACTITIONER

## 2018-12-03 PROCEDURE — 1036F TOBACCO NON-USER: CPT | Performed by: NURSE PRACTITIONER

## 2018-12-03 NOTE — PROGRESS NOTES
grade 3 diastolic   dysfunction. Severely dilated left atrium. Thickened tricuspid aortic   valve with calcific nodule on the right cusp but preserved cusp   separation. No evidence of stenosis or insufficiency. Mildly thickened but   mobile mitral leaflets with mild-to-moderate regurgitation. Markedly   enlarged right atrium. Right ventricle appears normal size with preserved   systolic function. Mild to moderate tricuspid regurgitation with moderate   pulmonary hypertension and an RVSP estimate of 64 mmHg. The IVC is dilated   with poor inspiratory collapse consistent with elevated right atrial   pressure. No pericardial effusion present. Aortic root dimensions are   within normal limits.      Signature      ----------------------------------------------------------------   Electronically signed by Gigi Gonzalez MD(Interpreting physician)   on 11/15/2018 11:40 AM    Assessment:     Diagnosis Orders   1. Chronic diastolic heart failure (HCC)     2. Paroxysmal atrial fibrillation ([address]     3. Chronic anticoagulation     4. Chronic obstructive pulmonary disease, unspecified COPD type ([address]     5. NEIDA (obstructive sleep apnea)     6. Mixed hyperlipidemia     7. Chronic respiratory failure with hypoxia (HCC)     8. Pulmonary hypertension ([address]     9. Non-rheumatic mitral regurgitation           Patient is  on GDMT including ACE/ARB, BB, Aldosterone antagonist.  Class III - Symptoms of HF on less-than-ordinary exertion, Newly Diagnosed? No, Heart Failure Type: Diastolic    no know adherence challenges  States taking medications as prescribed  Stable cardiovascular status. No evidence of overt heart failure, angina or dysrhythmia. Plan:    Approximately 35-45 minutes spent with patient. Discussed pathophysiology of congestive heart failure. Reviewed echo results with patient. Discussed daily weights, blood pressure control, sodium and fluid restrictions, diet and exercise. Reviewed medications.  Reviewed recent lab for overnight sleep study. Those results will be sent to your primary care physician for further management as directed. Labs: You will be screened for anemia, diabetes, thyroid disease, kidney disease, and hyperlipidemia. If you have not had recent labs you may be asked to have a CBC, CMP, BNP, HgA1C, and TSH drawn. Abnormal results will be referred to your primary care physician for further evaluation and management. Testing: An Ultrasound of your heart called an Echocardiogram may be repeated as directed. Follow up: You will follow up in the heart failure clinic in 2 weeks. You will receive a call from the heart failure nurse in between your visits. You will follow up with your primary care physician once discharged from the clinic. You will follow up with your cardiologist once discharged from the clinic. Report any abnormal edema or increasing shortness of breath  Monitor BP at home- goal at rest < 130/80  Call with any questions or concerns  Report any new problems  Continue current medications as directed  Continue plan of treatment  It is always recommended that you bring your medications bottles with you to each visit - this is for your safety! Smoking:  Quit smoking. Call the [address] [place] [address] 7-204-QUIT-NOW     Quit Now [place] is a FREE online service available to [place] residents 13years of age and over. When you become a member, it offers a free telephone service, so you can speak to a  in person, if you would prefer. Call the [facility] or [phone]. Special tools, a support team of coaches, research-based information, and a community of others trying to become tobacco free.  Expert coaches can talk to you about overcoming common barriers, such as dealing with stress, fighting cravings, coping with irritability, and controlling weight

## 2018-12-03 NOTE — PATIENT INSTRUCTIONS
Weigh daily:  Learn what your \"dry\" or \"ideal\" weight is - Dry weight is your weight without extra water (fluid). Weigh yourself at the same time each day, preferably in the morning, in similar clothing, after urinating but before eating, and on the same scale. Record your weight in a diary or calendar. If you gain two pounds in a day or five pounds in a week, call and report as you may be directed to increase your diuretic. Guideline Directed Medical Therapy (GDMT):   You will be monitored and placed on GDMT. This will include medications such as Diuretics, Ace Inhibitors, Ace Receptor Blockers (ARB), Beta Blockers (BB), Aldosterone Antagonists, and newer combination medication Entresto. Diet:   Low sodium diet- < 2000mg daily   Fluid Restriction of 1500 mL per 24 hours unless notified otherwise. Exercise: Your goal is to progress to 30 minutes a day. Recommendations have been reviewed with you by the heart failure nurse and provided in your information folder. Sleep Apnea  If you have sleep apnea and use a CPAP/ BIPAP- please continue to be compliant in your use. It has been shown that sleep apnea may coexist with patients with heart disease and has been indicated in the progression of heart failure and vascular disease. If you have never been tested for sleep apnea, a [place] sleepiness scale has been given to you. If you score above a 10 we will recommend you for overnight sleep study. Those results will be sent to your primary care physician for further management as directed. Labs: You will be screened for anemia, diabetes, thyroid disease, kidney disease, and hyperlipidemia. If you have not had recent labs you may be asked to have a CBC, CMP, BNP, HgA1C, and TSH drawn. Abnormal results will be referred to your primary care physician for further evaluation and management. Testing: An Ultrasound of your heart called an Echocardiogram may be repeated as directed.      Follow

## 2018-12-19 ENCOUNTER — OFFICE VISIT (OUTPATIENT)
Dept: CARDIOLOGY | Age: 83
End: 2018-12-19
Payer: MEDICARE

## 2018-12-19 VITALS
HEIGHT: 70 IN | HEART RATE: 54 BPM | WEIGHT: 264 LBS | BODY MASS INDEX: 37.8 KG/M2 | DIASTOLIC BLOOD PRESSURE: 68 MMHG | SYSTOLIC BLOOD PRESSURE: 142 MMHG

## 2018-12-19 DIAGNOSIS — J43.9 MIXED RESTRICTIVE AND OBSTRUCTIVE LUNG DISEASE (HCC): ICD-10-CM

## 2018-12-19 DIAGNOSIS — Z79.01 CHRONIC ANTICOAGULATION: ICD-10-CM

## 2018-12-19 DIAGNOSIS — I50.32 CHRONIC DIASTOLIC HEART FAILURE (HCC): ICD-10-CM

## 2018-12-19 DIAGNOSIS — J98.4 MIXED RESTRICTIVE AND OBSTRUCTIVE LUNG DISEASE (HCC): ICD-10-CM

## 2018-12-19 DIAGNOSIS — E78.2 MIXED HYPERLIPIDEMIA: ICD-10-CM

## 2018-12-19 DIAGNOSIS — I10 ESSENTIAL HYPERTENSION: ICD-10-CM

## 2018-12-19 DIAGNOSIS — I48.0 PAROXYSMAL ATRIAL FIBRILLATION (HCC): Primary | ICD-10-CM

## 2018-12-19 DIAGNOSIS — G47.33 OSA (OBSTRUCTIVE SLEEP APNEA): ICD-10-CM

## 2018-12-19 PROCEDURE — 1036F TOBACCO NON-USER: CPT | Performed by: NURSE PRACTITIONER

## 2018-12-19 PROCEDURE — G8427 DOCREV CUR MEDS BY ELIG CLIN: HCPCS | Performed by: NURSE PRACTITIONER

## 2018-12-19 PROCEDURE — G8417 CALC BMI ABV UP PARAM F/U: HCPCS | Performed by: NURSE PRACTITIONER

## 2018-12-19 PROCEDURE — G8598 ASA/ANTIPLAT THER USED: HCPCS | Performed by: NURSE PRACTITIONER

## 2018-12-19 PROCEDURE — 99214 OFFICE O/P EST MOD 30 MIN: CPT | Performed by: NURSE PRACTITIONER

## 2018-12-19 PROCEDURE — 1123F ACP DISCUSS/DSCN MKR DOCD: CPT | Performed by: NURSE PRACTITIONER

## 2018-12-19 PROCEDURE — G8484 FLU IMMUNIZE NO ADMIN: HCPCS | Performed by: NURSE PRACTITIONER

## 2018-12-19 PROCEDURE — 4040F PNEUMOC VAC/ADMIN/RCVD: CPT | Performed by: NURSE PRACTITIONER

## 2018-12-19 PROCEDURE — 1101F PT FALLS ASSESS-DOCD LE1/YR: CPT | Performed by: NURSE PRACTITIONER

## 2018-12-19 PROCEDURE — 93000 ELECTROCARDIOGRAM COMPLETE: CPT | Performed by: NURSE PRACTITIONER

## 2018-12-19 RX ORDER — AMLODIPINE BESYLATE 10 MG/1
10 TABLET ORAL DAILY
Qty: 30 TABLET | Refills: 3 | Status: SHIPPED
Start: 2018-12-19 | End: 2018-12-21 | Stop reason: SDUPTHER

## 2018-12-19 NOTE — PATIENT INSTRUCTIONS
further evaluation and management. Testing: An Ultrasound of your heart called an Echocardiogram may be repeated as directed. Follow up: You will follow up in the heart failure clinic in 2 weeks. You will receive a call from the heart failure nurse in between your visits. You will follow up with your primary care physician once discharged from the clinic. You will follow up with your cardiologist once discharged from the clinic. Report any abnormal edema or increasing shortness of breath  Monitor BP at home- goal at rest < 130/80  Call with any questions or concerns  Report any new problems  Continue current medications as directed  Continue plan of treatment  It is always recommended that you bring your medications bottles with you to each visit - this is for your safety! Smoking:  Quit smoking. Call the [address] [place] [address] 9-875-QUIT-NOW     Quit Now [place] is a FREE online service available to [place] residents 13years of age and over. When you become a member, it offers a free telephone service, so you can speak to a  in person, if you would prefer. Call the [facility] or [phone]. Special tools, a support team of coaches, research-based information, and a community of others trying to become tobacco free. Expert coaches can talk to you about overcoming common barriers, such as dealing with stress, fighting cravings, coping with irritability, and controlling weight gain. https://www.[employer]/    Https://www.quitnow[place]usion Dynamicy.org/    Nicotine is an addictive drug found in tobacco that causes changes in the brain - making people crave it more and more. When prolonged tobacco use is stopped, it can cause unpleasant withdrawal symptoms, including irritability and anxiety. BENEFITS OF STOPPING SMOKIN minutes after quitting - Your heart rate and blood pressure drop.   12 hours after quitting - The carbon monoxide

## 2018-12-19 NOTE — PROGRESS NOTES
celecoxib (CELEBREX) 200 MG capsule Take 1 capsule by mouth 2 times daily (Patient taking differently: Take 200 mg by mouth daily ) 180 capsule 3    metFORMIN (GLUCOPHAGE) 500 MG tablet Take 1 tablet by mouth 2 times daily (with meals) 60 tablet 11    Dulaglutide (TRULICITY) 6.92 BR/5.0IT SOPN INJECT 1 PEN EVERY WEEK 4 pen 11    cholestyramine (QUESTRAN) 4 g packet Take 1 packet by mouth 3 times daily 90 packet 5    umeclidinium-vilanterol (ANORO ELLIPTA) 62.5-25 MCG/INH AEPB inhaler Inhale 1 puff into the lungs daily 1 each 11    terazosin (HYTRIN) 5 MG capsule Take 1 capsule by mouth nightly TAKE 1 CAPSULE BY MOUTH NIGHTLY. 90 capsule 3    glucose blood VI test strips (FREESTYLE TEST STRIPS) strip USE DAILY AS NEEDED DX: E11.42 100 strip 11    vitamin B-12 (CYANOCOBALAMIN) 1000 MCG tablet Take 1,000 mcg by mouth daily      enzalutamide (XTANDI) 40 MG capsule Take 160 mg by mouth daily      rivaroxaban (XARELTO) 20 MG TABS tablet Take 1 tablet by mouth daily (with breakfast) 30 tablet 11    potassium chloride (KLOR-CON 10) 10 MEQ extended release tablet Take 1 tablet by mouth daily 90 tablet 3    albuterol sulfate HFA (PROAIR HFA) 108 (90 BASE) MCG/ACT inhaler Inhale 2 puffs into the lungs every 6 hours as needed for Wheezing 8.5 Inhaler 5    leuprolide (LUPRON) 30 MG injection Inject 30 mg into the muscle once Every 4 months      MYRBETRIQ 25 MG TB24 Take 25 mg by mouth daily       FISH OIL Take 1 capsule by mouth 2 times daily. No current facility-administered medications for this visit. Allergies: Patient has no known allergies. Review of Systems  Constitutional - Activity tolerance has not improved. Appetite Good  No fever, chills or diaphoresis. has fatigue. HEENT - no significant rhinorrhea or epistaxis. No tinnitus or significant hearing loss. Eyes - no sudden vision change or amaurosis. Respiratory - no significant wheezing, stridor, apnea or cough.   has dyspnea on multifactorial related to both diastolic HF and his significant pulmonary disease. Check BMP and BNP   HTN-  Norvasc 5 mg daily  Coreg 12.5 mg twice daily  Lasix 60 mg daily  HCTZ 12.5 daily  Lisinopril 40 mg daily   Metolazone 2.5 daily PRN -last one yesterday. Minipress 2 mg nightly  Hytrin 5 mg nightly   Increase Norvasc from 5 mg to 10 mg   PAF-Continue Xarelto and BB      Guideline Directed Medical Therapy (GDMT):   You will be monitored and placed on GDMT. This will include medications such as Diuretics, Ace Inhibitors, Ace Receptor Blockers (ARB), Beta Blockers (BB), Aldosterone Antagonists, and newer combination medication Entresto. Weigh daily:  Learn what your \"dry\" or \"ideal\" weight is - Dry weight is your weight without extra water (fluid). Weigh yourself at the same time each day, preferably in the morning, in similar clothing, after urinating but before eating, and on the same scale. Record your weight in a diary or calendar. If you gain two pounds in a day or five pounds in a week, call and report as you may be directed to increase your diuretic. Diet:   Low sodium diet- < 2000mg daily   Fluid Restriction of 1500 mL per 24 hours unless notified otherwise. Exercise: Your goal is to progress to 30 minutes a day. Recommendations have been reviewed with you by the heart failure nurse and provided in your information folder. Sleep Apnea  If you have sleep apnea and use a CPAP/ BIPAP- please continue to be compliant in your use. It has been shown that sleep apnea may coexist with patients with heart disease and has been indicated in the progression of heart failure and vascular disease. If you have never been tested for sleep apnea, a [place] sleepiness scale has been given to you. If you score above a 10 we will recommend you for overnight sleep study. Those results will be sent to your primary care physician for further management as directed. Labs:    You will be screened for anemia, diabetes, thyroid disease, kidney disease, and hyperlipidemia. If you have not had recent labs you may be asked to have a CBC, CMP, BNP, HgA1C, and TSH drawn. Abnormal results will be referred to your primary care physician for further evaluation and management. Testing: An Ultrasound of your heart called an Echocardiogram may be repeated as directed. Follow up: You will follow up in the heart failure clinic in 2 weeks. You will receive a call from the heart failure nurse in between your visits. You will follow up with your primary care physician once discharged from the clinic. You will follow up with your cardiologist once discharged from the clinic. Report any abnormal edema or increasing shortness of breath  Monitor BP at home- goal at rest < 130/80  Call with any questions or concerns  Report any new problems  Continue current medications as directed  Continue plan of treatment  It is always recommended that you bring your medications bottles with you to each visit - this is for your safety! Smoking:  Quit smoking. Call the [address] [place] [address] 3-040-QUIT-NOW     Quit Now [place] is a FREE online service available to [place] residents 13years of age and over. When you become a member, it offers a free telephone service, so you can speak to a  in person, if you would prefer. Call the [facility] or [phone]. Special tools, a support team of coaches, research-based information, and a community of others trying to become tobacco free. Expert coaches can talk to you about overcoming common barriers, such as dealing with stress, fighting cravings, coping with irritability, and controlling weight gain. https://www.[employer].com/    Https://www.quitnow[place].org/    Nicotine is an addictive drug found in tobacco that causes changes in the brain - making people crave it more and more.  When prolonged tobacco use is

## 2018-12-21 ENCOUNTER — TELEPHONE (OUTPATIENT)
Dept: CARDIOLOGY | Age: 83
End: 2018-12-21

## 2018-12-21 ENCOUNTER — OFFICE VISIT (OUTPATIENT)
Dept: PRIMARY CARE CLINIC | Age: 83
End: 2018-12-21
Payer: MEDICARE

## 2018-12-21 ENCOUNTER — CARE COORDINATION (OUTPATIENT)
Dept: CARE COORDINATION | Age: 83
End: 2018-12-21

## 2018-12-21 VITALS
WEIGHT: 267 LBS | BODY MASS INDEX: 38.22 KG/M2 | OXYGEN SATURATION: 96 % | DIASTOLIC BLOOD PRESSURE: 74 MMHG | HEART RATE: 75 BPM | SYSTOLIC BLOOD PRESSURE: 130 MMHG | HEIGHT: 70 IN | TEMPERATURE: 97.6 F

## 2018-12-21 DIAGNOSIS — J98.4 MIXED RESTRICTIVE AND OBSTRUCTIVE LUNG DISEASE (HCC): ICD-10-CM

## 2018-12-21 DIAGNOSIS — Z12.5 ENCOUNTER FOR SCREENING FOR MALIGNANT NEOPLASM OF PROSTATE: ICD-10-CM

## 2018-12-21 DIAGNOSIS — I50.32 CHRONIC DIASTOLIC HEART FAILURE (HCC): ICD-10-CM

## 2018-12-21 DIAGNOSIS — I10 ESSENTIAL HYPERTENSION: ICD-10-CM

## 2018-12-21 DIAGNOSIS — I50.22 CHRONIC SYSTOLIC CONGESTIVE HEART FAILURE (HCC): ICD-10-CM

## 2018-12-21 DIAGNOSIS — E78.2 MIXED HYPERLIPIDEMIA: ICD-10-CM

## 2018-12-21 DIAGNOSIS — E11.42 TYPE 2 DIABETES MELLITUS WITH DIABETIC POLYNEUROPATHY, WITHOUT LONG-TERM CURRENT USE OF INSULIN (HCC): ICD-10-CM

## 2018-12-21 DIAGNOSIS — R06.02 SHORTNESS OF BREATH AT REST: ICD-10-CM

## 2018-12-21 DIAGNOSIS — J43.9 MIXED RESTRICTIVE AND OBSTRUCTIVE LUNG DISEASE (HCC): ICD-10-CM

## 2018-12-21 DIAGNOSIS — F33.41 RECURRENT MAJOR DEPRESSIVE DISORDER, IN PARTIAL REMISSION (HCC): ICD-10-CM

## 2018-12-21 DIAGNOSIS — C61 PROSTATE CANCER (HCC): ICD-10-CM

## 2018-12-21 DIAGNOSIS — R93.89 ABNORMAL CT OF THE CHEST: ICD-10-CM

## 2018-12-21 DIAGNOSIS — R53.83 FATIGUE, UNSPECIFIED TYPE: ICD-10-CM

## 2018-12-21 DIAGNOSIS — J44.9 CHRONIC OBSTRUCTIVE PULMONARY DISEASE, UNSPECIFIED COPD TYPE (HCC): Primary | ICD-10-CM

## 2018-12-21 DIAGNOSIS — Z00.00 VISIT FOR PREVENTIVE HEALTH EXAMINATION: ICD-10-CM

## 2018-12-21 LAB
ALBUMIN SERPL-MCNC: 4.1 G/DL (ref 3.5–5.2)
ALP BLD-CCNC: 42 U/L (ref 40–130)
ALT SERPL-CCNC: 9 U/L (ref 5–41)
ANION GAP SERPL CALCULATED.3IONS-SCNC: 23 MMOL/L (ref 7–19)
AST SERPL-CCNC: 12 U/L (ref 5–40)
BASOPHILS ABSOLUTE: 0.1 K/UL (ref 0–0.2)
BASOPHILS RELATIVE PERCENT: 0.6 % (ref 0–1)
BILIRUB SERPL-MCNC: 0.9 MG/DL (ref 0.2–1.2)
BUN BLDV-MCNC: 25 MG/DL (ref 8–23)
CALCIUM SERPL-MCNC: 9.7 MG/DL (ref 8.8–10.2)
CHLORIDE BLD-SCNC: 94 MMOL/L (ref 98–111)
CHOLESTEROL, TOTAL: 141 MG/DL (ref 160–199)
CO2: 25 MMOL/L (ref 22–29)
CREAT SERPL-MCNC: 0.9 MG/DL (ref 0.5–1.2)
DIGOXIN LEVEL: 0.9 NG/ML (ref 0.6–1.2)
EOSINOPHILS ABSOLUTE: 0.3 K/UL (ref 0–0.6)
EOSINOPHILS RELATIVE PERCENT: 3 % (ref 0–5)
GFR NON-AFRICAN AMERICAN: >60
GLUCOSE BLD-MCNC: 168 MG/DL (ref 74–109)
HBA1C MFR BLD: 6.3 % (ref 4–6)
HCT VFR BLD CALC: 38.4 % (ref 42–52)
HDLC SERPL-MCNC: 46 MG/DL (ref 55–121)
HEMOGLOBIN: 12.7 G/DL (ref 14–18)
LDL CHOLESTEROL CALCULATED: 67 MG/DL
LYMPHOCYTES ABSOLUTE: 1.1 K/UL (ref 1.1–4.5)
LYMPHOCYTES RELATIVE PERCENT: 12.1 % (ref 20–40)
MCH RBC QN AUTO: 32.1 PG (ref 27–31)
MCHC RBC AUTO-ENTMCNC: 33.1 G/DL (ref 33–37)
MCV RBC AUTO: 97 FL (ref 80–94)
MONOCYTES ABSOLUTE: 0.7 K/UL (ref 0–0.9)
MONOCYTES RELATIVE PERCENT: 7.3 % (ref 0–10)
NEUTROPHILS ABSOLUTE: 6.8 K/UL (ref 1.5–7.5)
NEUTROPHILS RELATIVE PERCENT: 75.9 % (ref 50–65)
PDW BLD-RTO: 12.6 % (ref 11.5–14.5)
PLATELET # BLD: 189 K/UL (ref 130–400)
PMV BLD AUTO: 10.7 FL (ref 9.4–12.4)
POTASSIUM SERPL-SCNC: 3.7 MMOL/L (ref 3.5–5)
PRO-BNP: 799 PG/ML (ref 0–1800)
PROSTATE SPECIFIC ANTIGEN: 0.01 NG/ML (ref 0–4)
RBC # BLD: 3.96 M/UL (ref 4.7–6.1)
SODIUM BLD-SCNC: 142 MMOL/L (ref 136–145)
T4 FREE: 1.2 NG/DL (ref 0.9–1.7)
TOTAL PROTEIN: 7.2 G/DL (ref 6.6–8.7)
TRIGL SERPL-MCNC: 140 MG/DL (ref 0–149)
TSH SERPL DL<=0.05 MIU/L-ACNC: 3.69 UIU/ML (ref 0.27–4.2)
WBC # BLD: 9 K/UL (ref 4.8–10.8)

## 2018-12-21 PROCEDURE — G8484 FLU IMMUNIZE NO ADMIN: HCPCS | Performed by: NURSE PRACTITIONER

## 2018-12-21 PROCEDURE — 1101F PT FALLS ASSESS-DOCD LE1/YR: CPT | Performed by: NURSE PRACTITIONER

## 2018-12-21 PROCEDURE — 1036F TOBACCO NON-USER: CPT | Performed by: NURSE PRACTITIONER

## 2018-12-21 PROCEDURE — G8427 DOCREV CUR MEDS BY ELIG CLIN: HCPCS | Performed by: NURSE PRACTITIONER

## 2018-12-21 PROCEDURE — 1123F ACP DISCUSS/DSCN MKR DOCD: CPT | Performed by: NURSE PRACTITIONER

## 2018-12-21 PROCEDURE — G8417 CALC BMI ABV UP PARAM F/U: HCPCS | Performed by: NURSE PRACTITIONER

## 2018-12-21 PROCEDURE — 3023F SPIROM DOC REV: CPT | Performed by: NURSE PRACTITIONER

## 2018-12-21 PROCEDURE — 4040F PNEUMOC VAC/ADMIN/RCVD: CPT | Performed by: NURSE PRACTITIONER

## 2018-12-21 PROCEDURE — 99214 OFFICE O/P EST MOD 30 MIN: CPT | Performed by: NURSE PRACTITIONER

## 2018-12-21 PROCEDURE — G8926 SPIRO NO PERF OR DOC: HCPCS | Performed by: NURSE PRACTITIONER

## 2018-12-21 PROCEDURE — G8598 ASA/ANTIPLAT THER USED: HCPCS | Performed by: NURSE PRACTITIONER

## 2018-12-21 RX ORDER — AMLODIPINE BESYLATE 10 MG/1
10 TABLET ORAL DAILY
Qty: 30 TABLET | Refills: 11 | Status: SHIPPED | OUTPATIENT
Start: 2018-12-21 | End: 2019-06-19 | Stop reason: DRUGHIGH

## 2018-12-21 ASSESSMENT — ENCOUNTER SYMPTOMS
DIARRHEA: 0
SORE THROAT: 0
WHEEZING: 0
RHINORRHEA: 0
BLOOD IN STOOL: 0
EYE DISCHARGE: 0
CONSTIPATION: 0
SHORTNESS OF BREATH: 1
DYSPNEA ASSOCIATED WITH: EXERTION
COUGH: 0
EYE REDNESS: 0
CHOKING: 0

## 2018-12-21 NOTE — PROGRESS NOTES
[address]  [place], [address] [place] [address] Rd  Phone [phone]   Fax [phone]      OFFICE VISIT: 12/21/2018    Pauline Hayden is a 81 y.o. male whopresents today for his medical conditions/complaints as noted below. Pauline Hayden isc/o of Follow-up (pt says that he still doesnt feel right) and Discuss Labs (only CBC)        HPI:     HPI  Here for follow up. He has been to the chf clinic since last visit. norvasc was increased to 10 mg. He had this confused with myrbetriq. Hasn't had any swelling with the increase. Copd  States that he is having SOB with activity. States he doesn't think this will get better  He is on anoro daily. States this helps. Ct chest  Impression   1. Small left-sided and moderate right-sided pleural effusion with   bibasilar compressive atelectasis. No evidence of acute consolidative   pneumonia. There is some bronchial wall thickening noted within the   lungs suggesting an element of bronchitis. 2. There are some enlarged right paratracheal nodes present. There is   also a focus of soft tissue nodularity within the lower right   paratracheal bobbi group suspicious for an enlarged bobbi mass. Some   right hilar nodes are also present. These could be reactive in nature   but would warrant follow-up. 3. Coronary calcifications in the LAD, circumflex and right coronary   distribution. There is moderate cardiomegaly. .   Signed by Dr Alvin Elaine on 11/14/2018 9:23 PM         Need to follow up and recheck this. Will order. And have precerted.      Diabetes   A1C is 6.3  Takes trulicity 5.65 mg weekly  Metformin 500mg bid    Past Medical History:   Diagnosis Date    Anxiety     Arthritis     Atrial fibrillation ([facility]     Blood circulation, collateral     Cancer (HCC)     prostate, had treatment    CHF (congestive heart failure) (HCC)     Chronic kidney disease     COPD (chronic obstructive pulmonary disease) (HCC)     Depression     Hyperlipidemia    

## 2018-12-26 RX ORDER — AMLODIPINE BESYLATE 5 MG/1
5 TABLET ORAL DAILY
Qty: 90 TABLET | Refills: 3 | OUTPATIENT
Start: 2018-12-26

## 2018-12-27 ENCOUNTER — HOSPITAL ENCOUNTER (OUTPATIENT)
Dept: GENERAL RADIOLOGY | Age: 83
Discharge: HOME | End: 2018-12-27
Payer: MEDICARE

## 2018-12-27 ENCOUNTER — TELEPHONE (OUTPATIENT)
Dept: PRIMARY CARE CLINIC | Age: 83
End: 2018-12-27

## 2018-12-27 DIAGNOSIS — R93.89 ABNORMAL CHEST CT: ICD-10-CM

## 2018-12-27 DIAGNOSIS — J98.59 MEDIASTINAL MASS: Primary | ICD-10-CM

## 2018-12-27 DIAGNOSIS — R93.89 ABNORMAL CT OF THE CHEST: ICD-10-CM

## 2018-12-27 PROCEDURE — 6360000004 HC RX CONTRAST MEDICATION: Performed by: NURSE PRACTITIONER

## 2018-12-27 PROCEDURE — 71260 CT THORAX DX C+: CPT

## 2018-12-27 RX ADMIN — IOPAMIDOL 65 ML: 755 INJECTION, SOLUTION INTRAVENOUS at 10:38

## 2018-12-31 ENCOUNTER — CARE COORDINATION (OUTPATIENT)
Dept: CARE COORDINATION | Age: 83
End: 2018-12-31

## 2019-01-02 ENCOUNTER — OFFICE VISIT (OUTPATIENT)
Dept: CARDIOLOGY | Age: 84
End: 2019-01-02
Payer: MEDICARE

## 2019-01-02 VITALS
WEIGHT: 263 LBS | HEART RATE: 66 BPM | HEIGHT: 70 IN | DIASTOLIC BLOOD PRESSURE: 60 MMHG | SYSTOLIC BLOOD PRESSURE: 128 MMHG | BODY MASS INDEX: 37.65 KG/M2

## 2019-01-02 DIAGNOSIS — G47.33 OSA (OBSTRUCTIVE SLEEP APNEA): ICD-10-CM

## 2019-01-02 DIAGNOSIS — I10 ESSENTIAL HYPERTENSION: ICD-10-CM

## 2019-01-02 DIAGNOSIS — Z79.01 CHRONIC ANTICOAGULATION: ICD-10-CM

## 2019-01-02 DIAGNOSIS — E78.2 MIXED HYPERLIPIDEMIA: ICD-10-CM

## 2019-01-02 DIAGNOSIS — I48.0 PAROXYSMAL ATRIAL FIBRILLATION (HCC): ICD-10-CM

## 2019-01-02 DIAGNOSIS — J96.11 CHRONIC RESPIRATORY FAILURE WITH HYPOXIA (HCC): ICD-10-CM

## 2019-01-02 DIAGNOSIS — I50.32 CHRONIC DIASTOLIC HEART FAILURE (HCC): Primary | ICD-10-CM

## 2019-01-02 PROCEDURE — 1101F PT FALLS ASSESS-DOCD LE1/YR: CPT | Performed by: NURSE PRACTITIONER

## 2019-01-02 PROCEDURE — G8427 DOCREV CUR MEDS BY ELIG CLIN: HCPCS | Performed by: NURSE PRACTITIONER

## 2019-01-02 PROCEDURE — G8417 CALC BMI ABV UP PARAM F/U: HCPCS | Performed by: NURSE PRACTITIONER

## 2019-01-02 PROCEDURE — 4040F PNEUMOC VAC/ADMIN/RCVD: CPT | Performed by: NURSE PRACTITIONER

## 2019-01-02 PROCEDURE — 1123F ACP DISCUSS/DSCN MKR DOCD: CPT | Performed by: NURSE PRACTITIONER

## 2019-01-02 PROCEDURE — G8484 FLU IMMUNIZE NO ADMIN: HCPCS | Performed by: NURSE PRACTITIONER

## 2019-01-02 PROCEDURE — 1036F TOBACCO NON-USER: CPT | Performed by: NURSE PRACTITIONER

## 2019-01-02 PROCEDURE — 99213 OFFICE O/P EST LOW 20 MIN: CPT | Performed by: NURSE PRACTITIONER

## 2019-01-02 PROCEDURE — 93000 ELECTROCARDIOGRAM COMPLETE: CPT | Performed by: NURSE PRACTITIONER

## 2019-01-03 ENCOUNTER — CARE COORDINATION (OUTPATIENT)
Dept: CARE COORDINATION | Age: 84
End: 2019-01-03

## 2019-01-03 ASSESSMENT — ENCOUNTER SYMPTOMS: DYSPNEA ASSOCIATED WITH: EXERTION

## 2019-01-04 ENCOUNTER — TELEPHONE (OUTPATIENT)
Dept: PRIMARY CARE CLINIC | Age: 84
End: 2019-01-04

## 2019-01-04 RX ORDER — BLOOD-GLUCOSE METER
1 KIT MISCELLANEOUS DAILY
Qty: 1 KIT | Refills: 0 | Status: SHIPPED | OUTPATIENT
Start: 2019-01-04 | End: 2019-04-25

## 2019-01-10 ENCOUNTER — TELEPHONE (OUTPATIENT)
Dept: UROLOGY | Facility: CLINIC | Age: 84
End: 2019-01-10

## 2019-01-10 NOTE — TELEPHONE ENCOUNTER
Pt effective date to receive Lupron injection starts the 2/22/19; Nurse called and informed pt to schedule for 2/27/19 when Dr. Kilgore is in [place] to perform. Pt verified understanding.

## 2019-01-15 ENCOUNTER — TELEPHONE (OUTPATIENT)
Dept: CARDIAC SURGERY | Facility: CLINIC | Age: 84
End: 2019-01-15

## 2019-01-15 NOTE — TELEPHONE ENCOUNTER
Patient called asking about appointment with Dr. Allen. Told patient we would call after Dr. Allen reviews records.

## 2019-01-18 ENCOUNTER — PROCEDURE VISIT (OUTPATIENT)
Dept: PRIMARY CARE CLINIC | Age: 84
End: 2019-01-18
Payer: MEDICARE

## 2019-01-18 DIAGNOSIS — Z23 NEED FOR INFLUENZA VACCINATION: Primary | ICD-10-CM

## 2019-01-18 PROCEDURE — G0008 ADMIN INFLUENZA VIRUS VAC: HCPCS | Performed by: PEDIATRICS

## 2019-01-18 PROCEDURE — 90662 IIV NO PRSV INCREASED AG IM: CPT | Performed by: PEDIATRICS

## 2019-02-04 DIAGNOSIS — C61 PROSTATE CANCER (HCC): ICD-10-CM

## 2019-02-10 DIAGNOSIS — I50.42 CHRONIC COMBINED SYSTOLIC AND DIASTOLIC CONGESTIVE HEART FAILURE (HCC): ICD-10-CM

## 2019-02-11 RX ORDER — METOLAZONE 2.5 MG/1
2.5 TABLET ORAL DAILY
Qty: 30 TABLET | Refills: 5 | Status: SHIPPED | OUTPATIENT
Start: 2019-02-11 | End: 2019-08-10 | Stop reason: SDUPTHER

## 2019-02-15 ENCOUNTER — TELEPHONE (OUTPATIENT)
Dept: UROLOGY | Facility: CLINIC | Age: 84
End: 2019-02-15

## 2019-02-15 ENCOUNTER — TELEPHONE (OUTPATIENT)
Dept: CARDIAC SURGERY | Facility: CLINIC | Age: 84
End: 2019-02-15

## 2019-02-15 NOTE — TELEPHONE ENCOUNTER
"Called to cx appt this am b/c pt has been sick and \"can hardly walk this morning\".  Informed her we would see when else pt could be scheduled and call back later./vangie  "

## 2019-02-21 ENCOUNTER — OFFICE VISIT (OUTPATIENT)
Dept: PRIMARY CARE CLINIC | Age: 84
End: 2019-02-21
Payer: MEDICARE

## 2019-02-21 ENCOUNTER — CARE COORDINATION (OUTPATIENT)
Dept: CARE COORDINATION | Age: 84
End: 2019-02-21

## 2019-02-21 VITALS
TEMPERATURE: 97.9 F | DIASTOLIC BLOOD PRESSURE: 68 MMHG | HEIGHT: 70 IN | HEART RATE: 56 BPM | SYSTOLIC BLOOD PRESSURE: 118 MMHG | BODY MASS INDEX: 37.94 KG/M2 | WEIGHT: 265 LBS | OXYGEN SATURATION: 96 %

## 2019-02-21 DIAGNOSIS — J43.9 MIXED RESTRICTIVE AND OBSTRUCTIVE LUNG DISEASE (HCC): ICD-10-CM

## 2019-02-21 DIAGNOSIS — F33.41 RECURRENT MAJOR DEPRESSIVE DISORDER, IN PARTIAL REMISSION (HCC): ICD-10-CM

## 2019-02-21 DIAGNOSIS — R06.09 DOE (DYSPNEA ON EXERTION): Primary | ICD-10-CM

## 2019-02-21 DIAGNOSIS — J98.4 MIXED RESTRICTIVE AND OBSTRUCTIVE LUNG DISEASE (HCC): ICD-10-CM

## 2019-02-21 DIAGNOSIS — J96.11 CHRONIC RESPIRATORY FAILURE WITH HYPOXIA (HCC): ICD-10-CM

## 2019-02-21 DIAGNOSIS — J61 ASBESTOSIS (HCC): ICD-10-CM

## 2019-02-21 DIAGNOSIS — I48.0 PAROXYSMAL ATRIAL FIBRILLATION (HCC): ICD-10-CM

## 2019-02-21 DIAGNOSIS — R93.89 ABNORMAL FINDINGS ON DIAGNOSTIC IMAGING OF OTHER SPECIFIED BODY STRUCTURES: ICD-10-CM

## 2019-02-21 DIAGNOSIS — F33.1 MODERATE EPISODE OF RECURRENT MAJOR DEPRESSIVE DISORDER (HCC): ICD-10-CM

## 2019-02-21 DIAGNOSIS — E11.42 TYPE 2 DIABETES MELLITUS WITH DIABETIC POLYNEUROPATHY, WITHOUT LONG-TERM CURRENT USE OF INSULIN (HCC): ICD-10-CM

## 2019-02-21 DIAGNOSIS — I51.89 ATRIAL MASS: ICD-10-CM

## 2019-02-21 DIAGNOSIS — G47.33 OSA TREATED WITH BIPAP: ICD-10-CM

## 2019-02-21 DIAGNOSIS — J44.9 CHRONIC OBSTRUCTIVE PULMONARY DISEASE, UNSPECIFIED COPD TYPE (HCC): ICD-10-CM

## 2019-02-21 DIAGNOSIS — I50.32 CHRONIC DIASTOLIC HEART FAILURE (HCC): ICD-10-CM

## 2019-02-21 DIAGNOSIS — I27.20 PULMONARY HYPERTENSION (HCC): ICD-10-CM

## 2019-02-21 PROCEDURE — G8482 FLU IMMUNIZE ORDER/ADMIN: HCPCS | Performed by: PEDIATRICS

## 2019-02-21 PROCEDURE — 1123F ACP DISCUSS/DSCN MKR DOCD: CPT | Performed by: PEDIATRICS

## 2019-02-21 PROCEDURE — G8417 CALC BMI ABV UP PARAM F/U: HCPCS | Performed by: PEDIATRICS

## 2019-02-21 PROCEDURE — G8926 SPIRO NO PERF OR DOC: HCPCS | Performed by: PEDIATRICS

## 2019-02-21 PROCEDURE — 99215 OFFICE O/P EST HI 40 MIN: CPT | Performed by: PEDIATRICS

## 2019-02-21 PROCEDURE — G8427 DOCREV CUR MEDS BY ELIG CLIN: HCPCS | Performed by: PEDIATRICS

## 2019-02-21 PROCEDURE — 1101F PT FALLS ASSESS-DOCD LE1/YR: CPT | Performed by: PEDIATRICS

## 2019-02-21 PROCEDURE — 1036F TOBACCO NON-USER: CPT | Performed by: PEDIATRICS

## 2019-02-21 PROCEDURE — 3023F SPIROM DOC REV: CPT | Performed by: PEDIATRICS

## 2019-02-21 PROCEDURE — 4040F PNEUMOC VAC/ADMIN/RCVD: CPT | Performed by: PEDIATRICS

## 2019-02-21 RX ORDER — FLUOXETINE HYDROCHLORIDE 20 MG/1
20 CAPSULE ORAL DAILY
Qty: 30 CAPSULE | Refills: 5 | Status: SHIPPED | OUTPATIENT
Start: 2019-02-21 | End: 2019-04-25 | Stop reason: DRUGHIGH

## 2019-02-21 ASSESSMENT — ENCOUNTER SYMPTOMS
VOMITING: 0
SORE THROAT: 0
SHORTNESS OF BREATH: 1
NAUSEA: 0
DYSPNEA ASSOCIATED WITH: EXERTION
WHEEZING: 0
DIARRHEA: 0
CHEST TIGHTNESS: 0
BACK PAIN: 0
COUGH: 1
ABDOMINAL PAIN: 0

## 2019-02-24 DIAGNOSIS — N40.0 BENIGN NODULAR PROSTATIC HYPERPLASIA WITHOUT LOWER URINARY TRACT SYMPTOMS: ICD-10-CM

## 2019-02-25 ENCOUNTER — OFFICE VISIT (OUTPATIENT)
Dept: CARDIAC SURGERY | Facility: CLINIC | Age: 84
End: 2019-02-25

## 2019-02-25 VITALS
DIASTOLIC BLOOD PRESSURE: 68 MMHG | OXYGEN SATURATION: 93 % | HEIGHT: 70 IN | WEIGHT: 264 LBS | BODY MASS INDEX: 37.8 KG/M2 | SYSTOLIC BLOOD PRESSURE: 110 MMHG | HEART RATE: 56 BPM

## 2019-02-25 DIAGNOSIS — J98.59 MEDIASTINAL MASS: Primary | ICD-10-CM

## 2019-02-25 PROCEDURE — 99204 OFFICE O/P NEW MOD 45 MIN: CPT | Performed by: THORACIC SURGERY (CARDIOTHORACIC VASCULAR SURGERY)

## 2019-02-25 RX ORDER — TERAZOSIN 5 MG/1
5 CAPSULE ORAL NIGHTLY
Qty: 90 CAPSULE | Refills: 3 | Status: SHIPPED | OUTPATIENT
Start: 2019-02-25 | End: 2020-01-21 | Stop reason: SDUPTHER

## 2019-02-25 RX ORDER — AMLODIPINE BESYLATE 10 MG/1
10 TABLET ORAL DAILY
COMMUNITY

## 2019-02-25 NOTE — PROGRESS NOTES
Prakash Castro  1935  Gabriele Grover DO [facility] APRN    Chief Complaint   Patient presents with   • Mediastinal Mass     New patient from [facility]       Present Illness: Mr. Ruperto Castro is an 84-year-old  male referred to us By Scarlet FIELD for evaluation of a right paratracheal mass.  Mr. Castro has multiple medical problems including prostate cancer, diabetes mellitus, hypertension, significant COPD/emphysema requiring home O2 at night, significant street of heart disease including congestive heart failure and diastolic dysfunction.  He is under the treatment and care of Dr. Juan Ramon Beal at [facility].  He has obesity and sleep apnea also.    As part of his ongoing multiple medical problems had an episode of pneumonia in November 2018.  He had a CT scan performed November 14 and a follow-up CT scan on December 27, 2018.  The initial CT scan revealed a right effusion and pneumonia.  He was also noted to have a 3 cm right paratracheal mass.  He was treated for his pneumonia and got much better.  Repeat CT scan on December 27, 2018 showed the lungs appearing much better.  He still had the right paratracheal mass which was measured at 3.7 x 3.2 cm.  With the finding of this paratracheal mass he was referred to the CT surgical service.    He denies any right-sided chest pain.  He has not had weight loss but in fact is somewhat obese and probably has some mild edema.  He is not been a smoker.  He has not had hemoptysis or significant cough after his bout with pneumonia.  Most of his workup has not been at [facility] but in his hometown and also at [facility].  I was able to see his CT scans which were delivered to me on a disc.  He does have the aforementioned somewhat homogeneous 3.7 x 3.2 cm right paratracheal mass.    Past Medical History:   Diagnosis Date   • Arthritis    • Cancer (CMS/HCC)     prostate   • Cellulitis    • CHF (congestive heart failure)  (CMS/[facility])    • Diabetes mellitus (CMS/[facility])    • Elevated prostate specific antigen (PSA)    • Hematuria    • Hypertension    • Overactive bladder    • SOB (shortness of breath)        Past Surgical History:   Procedure Laterality Date   • EYE SURGERY     • HERNIA REPAIR     • JOINT REPLACEMENT     • REPLACEMENT TOTAL KNEE BILATERAL         No Known Allergies      Current Outpatient Medications:   •  albuterol (PROVENTIL HFA;VENTOLIN HFA) 108 (90 BASE) MCG/ACT inhaler, Inhale 2 puffs every 4 (four) hours as needed for wheezing., Disp: , Rfl:   •  amLODIPine (NORVASC) 10 MG tablet, Take 10 mg by mouth Daily., Disp: , Rfl:   •  buPROPion XL (WELLBUTRIN XL) 300 MG 24 hr tablet, Take 300 mg by mouth daily., Disp: , Rfl:   •  carvedilol (COREG) 12.5 MG tablet, Take 12.5 mg by mouth 2 (two) times a day with meals., Disp: , Rfl:   •  celecoxib (CeleBREX) 200 MG capsule, Take 200 mg by mouth daily., Disp: , Rfl:   •  digoxin (LANOXIN) 125 MCG tablet, Take 125 mcg by mouth every day., Disp: , Rfl:   •  enzalutamide (XTANDI) 40 MG chemo capsule, Take 4 capsules by mouth Daily., Disp: 90 capsule, Rfl: 5  •  fluticasone (FLONASE) 50 MCG/ACT nasal spray, 2 sprays into each nostril daily. Administer 2 sprays in each nostril for each dose., Disp: , Rfl:   •  furosemide (LASIX) 40 MG tablet, Take 40 mg by mouth 2 (two) times a day., Disp: , Rfl:   •  leuprolide (LUPRON) 30 MG injection, Inject 30 mg into the shoulder, thigh, or buttocks Every 4 (Four) Months., Disp: , Rfl:   •  lisinopril (PRINIVIL,ZESTRIL) 40 MG tablet, Take 40 mg by mouth daily., Disp: , Rfl:   •  metFORMIN (GLUCOPHAGE) 500 MG tablet, Take 500 mg by mouth 2 (Two) Times a Day With Meals., Disp: , Rfl:   •  metolazone (ZAROXOLYN) 2.5 MG tablet, Take 2.5 mg by mouth daily., Disp: , Rfl:   •  Mirabegron ER (MYRBETRIQ) 25 MG tablet sustained-release 24 hour 24 hr tablet, Take 1 tablet by mouth Daily., Disp: 90 tablet, Rfl: 3  •  Multiple Vitamins-Minerals (MULTIVITAMIN  ADULT PO), Take  by mouth daily., Disp: , Rfl:   •  potassium chloride (K-DUR,KLOR-CON) 10 MEQ CR tablet, Take 10 mEq by mouth 2 (two) times a day., Disp: , Rfl:   •  rivaroxaban (XARELTO) 20 MG tablet, Take 20 mg by mouth daily., Disp: , Rfl:   •  sitaGLIPtin (JANUVIA) 100 MG tablet, Take 100 mg by mouth daily., Disp: , Rfl:   •  SITagliptin-MetFORMIN HCl (JANUMET PO), Take  by mouth., Disp: , Rfl:   •  terazosin (HYTRIN) 5 MG capsule, Take 5 mg by mouth every night., Disp: , Rfl:   •  XTANDI 40 MG chemo capsule, Take 4 capsules (160mg) by mouth daily, Disp: 120 capsule, Rfl: 10  •  carvedilol (COREG) 6.25 MG tablet, Take 6.25 mg by mouth daily., Disp: , Rfl:   •  digoxin (LANOXIN) 250 MCG tablet, Take 250 mcg by mouth every day., Disp: , Rfl:   •  DULoxetine (CYMBALTA) 60 MG capsule, Take 60 mg by mouth daily., Disp: , Rfl:   •  enzalutamide (XTANDI) 40 MG chemo capsule, Take 4 capsules by mouth Daily., Disp: 120 capsule, Rfl: 10  •  formoterol (FORADIL) 12 MCG capsule for inhaler, Place 12 mcg into inhaler and inhale 2 (two) times a day., Disp: , Rfl:   •  furosemide (LASIX) 10 MG/ML solution, Take 60 mg by mouth daily., Disp: , Rfl:   •  glimepiride (AMARYL) 4 MG tablet, Take 4 mg by mouth every morning before breakfast., Disp: , Rfl:   •  predniSONE (DELTASONE) 10 MG tablet, Take 10 mg by mouth daily., Disp: , Rfl:     Family History   Problem Relation Age of Onset   • Prostate cancer Son    • No Known Problems Father    • No Known Problems Mother        Social History     Socioeconomic History   • Marital status:      Spouse name: Not on file   • Number of children: Not on file   • Years of education: Not on file   • Highest education level: Not on file   Tobacco Use   • Smoking status: Never Smoker   • Smokeless tobacco: Never Used   Substance and Sexual Activity   • Alcohol use: No         Review of Systems   Constitutional: Positive for activity change and fatigue. Negative for unexpected weight  "change.   HENT: Positive for congestion and hearing loss. Negative for voice change.    Eyes: Negative.    Respiratory: Positive for shortness of breath.    Cardiovascular: Positive for leg swelling. Negative for chest pain and palpitations.   Gastrointestinal: Negative.    Endocrine: Negative.    Genitourinary: Positive for difficulty urinating, dysuria and urgency.   Musculoskeletal: Positive for arthralgias and myalgias.   Skin: Negative.    Allergic/Immunologic: Negative.    Neurological: Positive for weakness.   Psychiatric/Behavioral: Negative.          /68 (BP Location: Left arm, Patient Position: Sitting, Cuff Size: Adult)   Pulse 56   Ht 177.8 cm (70\")   Wt 120 kg (264 lb)   SpO2 93%   BMI 37.88 kg/m²     Physical Exam  General: Pleasant, oriented elderly male who appears overweight and somewhat weak and debilitated.  HEENT: Eyes: Pupils equal round and react to light; extraocular movements intact                Nose mouth and throat are benign                Neck: Neck is supple without thyromegaly, mass or bruit.  Chest/Lungs: No chest wall abnormality.  Some decreased breath sounds bilaterally and a few wheezes  Heart:: Regular rate and rhythm without murmur.  He has an S3 gallop  Abdomen: Soft, nontender without rebound guarding or mass  Extremities: Without clubbing cyanosis with 1+ edema  Neurologic: Cranial nerves II through XII are intact.  Motor and sensation are grossly normal    Assessment: Mr. Castro is an 84-year-old somewhat frail and fragile  male with multiple medical problems as outlined above.  He is in the midst of a cardiac workup at the present time.  He has significant COPD/emphysema and is on home oxygen at times.  He had recent (November) pneumonia that has been treated.  He has appointments to be seen in follow-up for cardiac workup in the near future.    In the midst of this workup he was found to have a right paratracheal mass.  There has not been a diagnosis.  " He was sent to cardiothoracic surgical service for our opinion.  This mass would be amenable to mediastinoscopy but I think that general anesthesia would carry somewhat increased risk.  He is on Xarelto and will need to be off of this and he would need to complete his heart workup which is in progress at [facility] prior to any operation.  I think this would be amenable to biopsy from bronchoscopy and possibly EBUS.  This of course would not require general anesthesia and might be the safer option at least originally.  I will contact Dr. Silvestre and see if he would see the patient and give his opinion.  This is not an emergency procedure at the present time.  This would be our safest best way to get a diagnosis and then proceed with the known diagnosis.  I have talked with Mr. Catsro and his significant other.  We discussed the above and they are willing to see Dr. Silvestre.  We will try to schedule this as soon as possible.  Again he is on Xarelto and will have to have cardiology permission to stop this prior to proceeding with any procedure.      Plan: As above      Audi Allen MD  02/25/19  10:52 AM

## 2019-02-26 DIAGNOSIS — J98.59 MEDIASTINAL MASS: Primary | ICD-10-CM

## 2019-02-27 ENCOUNTER — OFFICE VISIT (OUTPATIENT)
Dept: UROLOGY | Facility: CLINIC | Age: 84
End: 2019-02-27

## 2019-02-27 ENCOUNTER — TELEPHONE (OUTPATIENT)
Dept: UROLOGY | Facility: CLINIC | Age: 84
End: 2019-02-27

## 2019-02-27 DIAGNOSIS — C61 CANCER OF PROSTATE (HCC): Primary | ICD-10-CM

## 2019-02-27 PROCEDURE — 96402 CHEMO HORMON ANTINEOPL SQ/IM: CPT | Performed by: UROLOGY

## 2019-02-27 NOTE — PROGRESS NOTES
Patient of Dr. Kilgore states he is here for his 4 month Lupron injection. Patient denies any fever, chills, N&V or hematuria. Alexandrea Epps administered 30  mg/4 month Lupron injection IM Left  hip with no complications. Dr. Kilgore  was in the office today at the time of injection. The patient was advised to follow up in 4 months for his next Lupron injection. Patient verbalized understanding.     Reviewed and agreed with above

## 2019-02-28 NOTE — TELEPHONE ENCOUNTER
Called and talked to patient about his follow up and patient wanted to be seen in [place]. I called Alexandrea to let her know the patient needed to be scheduled and about the time the patient preferred.

## 2019-03-01 ENCOUNTER — HOSPITAL ENCOUNTER (OUTPATIENT)
Dept: NON INVASIVE DIAGNOSTICS | Age: 84
Discharge: HOME | End: 2019-03-01
Payer: MEDICARE

## 2019-03-01 DIAGNOSIS — R93.89 ABNORMAL FINDINGS ON DIAGNOSTIC IMAGING OF OTHER SPECIFIED BODY STRUCTURES: ICD-10-CM

## 2019-03-01 DIAGNOSIS — G47.33 OSA TREATED WITH BIPAP: ICD-10-CM

## 2019-03-01 DIAGNOSIS — I51.89 ATRIAL MASS: ICD-10-CM

## 2019-03-01 DIAGNOSIS — R06.09 DOE (DYSPNEA ON EXERTION): ICD-10-CM

## 2019-03-01 DIAGNOSIS — I27.20 PULMONARY HYPERTENSION (HCC): ICD-10-CM

## 2019-03-04 ENCOUNTER — RESULTS ENCOUNTER (OUTPATIENT)
Dept: UROLOGY | Facility: CLINIC | Age: 84
End: 2019-03-04

## 2019-03-04 DIAGNOSIS — C61 CANCER OF PROSTATE (HCC): ICD-10-CM

## 2019-03-04 PROBLEM — I48.91 ATRIAL FIBRILLATION (HCC): Status: ACTIVE | Noted: 2019-03-04

## 2019-03-04 PROBLEM — J44.9 STAGE 2 MODERATE COPD BY GOLD CLASSIFICATION (HCC): Status: ACTIVE | Noted: 2019-03-04

## 2019-03-04 PROBLEM — R59.0 MEDIASTINAL ADENOPATHY: Status: ACTIVE | Noted: 2019-03-04

## 2019-03-05 ENCOUNTER — TELEPHONE (OUTPATIENT)
Dept: CARDIOLOGY | Age: 84
End: 2019-03-05

## 2019-03-06 DIAGNOSIS — C61 CANCER OF PROSTATE (HCC): Primary | ICD-10-CM

## 2019-03-11 ENCOUNTER — RESULTS ENCOUNTER (OUTPATIENT)
Dept: UROLOGY | Facility: CLINIC | Age: 84
End: 2019-03-11

## 2019-03-11 DIAGNOSIS — C61 CANCER OF PROSTATE (HCC): ICD-10-CM

## 2019-03-13 ENCOUNTER — HOSPITAL ENCOUNTER (OUTPATIENT)
Dept: CARDIAC CATH/INVASIVE PROCEDURES | Age: 84
Setting detail: OBSERVATION
Discharge: HOME | End: 2019-03-16
Attending: INTERNAL MEDICINE | Admitting: INTERNAL MEDICINE
Payer: MEDICARE

## 2019-03-13 DIAGNOSIS — I48.0 PAROXYSMAL ATRIAL FIBRILLATION (HCC): ICD-10-CM

## 2019-03-13 DIAGNOSIS — C61 CANCER OF PROSTATE (HCC): Primary | ICD-10-CM

## 2019-03-13 LAB
ANION GAP SERPL CALCULATED.3IONS-SCNC: 12 MMOL/L (ref 7–19)
BASOPHILS ABSOLUTE: 0 K/UL (ref 0–0.2)
BASOPHILS RELATIVE PERCENT: 0.3 % (ref 0–1)
BUN BLDV-MCNC: 19 MG/DL (ref 8–23)
CALCIUM SERPL-MCNC: 9.6 MG/DL (ref 8.8–10.2)
CHLORIDE BLD-SCNC: 94 MMOL/L (ref 98–111)
CO2: 33 MMOL/L (ref 22–29)
CREAT SERPL-MCNC: 1.1 MG/DL (ref 0.5–1.2)
DIGOXIN LEVEL: 0.7 NG/ML (ref 0.6–1.2)
EOSINOPHILS ABSOLUTE: 0.2 K/UL (ref 0–0.6)
EOSINOPHILS RELATIVE PERCENT: 2 % (ref 0–5)
GFR NON-AFRICAN AMERICAN: >60
GLUCOSE BLD-MCNC: 150 MG/DL (ref 70–99)
GLUCOSE BLD-MCNC: 93 MG/DL (ref 74–109)
HCT VFR BLD CALC: 40.1 % (ref 42–52)
HEMOGLOBIN: 13.7 G/DL (ref 14–18)
LYMPHOCYTES ABSOLUTE: 1.4 K/UL (ref 1.1–4.5)
LYMPHOCYTES RELATIVE PERCENT: 14 % (ref 20–40)
MAGNESIUM: 1.9 MG/DL (ref 1.6–2.4)
MCH RBC QN AUTO: 33.3 PG (ref 27–31)
MCHC RBC AUTO-ENTMCNC: 34.2 G/DL (ref 33–37)
MCV RBC AUTO: 97.3 FL (ref 80–94)
MONOCYTES ABSOLUTE: 0.8 K/UL (ref 0–0.9)
MONOCYTES RELATIVE PERCENT: 7.9 % (ref 0–10)
NEUTROPHILS ABSOLUTE: 7.3 K/UL (ref 1.5–7.5)
NEUTROPHILS RELATIVE PERCENT: 75.1 % (ref 50–65)
PDW BLD-RTO: 13.2 % (ref 11.5–14.5)
PERFORMED ON: ABNORMAL
PLATELET # BLD: 192 K/UL (ref 130–400)
PMV BLD AUTO: 10.2 FL (ref 9.4–12.4)
POTASSIUM REFLEX MAGNESIUM: 2.9 MMOL/L (ref 3.5–5)
RBC # BLD: 4.12 M/UL (ref 4.7–6.1)
SODIUM BLD-SCNC: 139 MMOL/L (ref 136–145)
TROPONIN: <0.01 NG/ML (ref 0–0.03)
WBC # BLD: 9.7 K/UL (ref 4.8–10.8)

## 2019-03-13 PROCEDURE — G0378 HOSPITAL OBSERVATION PER HR: HCPCS

## 2019-03-13 PROCEDURE — 2580000003 HC RX 258: Performed by: INTERNAL MEDICINE

## 2019-03-13 PROCEDURE — 80048 BASIC METABOLIC PNL TOTAL CA: CPT

## 2019-03-13 PROCEDURE — 99223 1ST HOSP IP/OBS HIGH 75: CPT | Performed by: INTERNAL MEDICINE

## 2019-03-13 PROCEDURE — 80162 ASSAY OF DIGOXIN TOTAL: CPT

## 2019-03-13 PROCEDURE — 36415 COLL VENOUS BLD VENIPUNCTURE: CPT

## 2019-03-13 PROCEDURE — 84484 ASSAY OF TROPONIN QUANT: CPT

## 2019-03-13 PROCEDURE — 6370000000 HC RX 637 (ALT 250 FOR IP): Performed by: INTERNAL MEDICINE

## 2019-03-13 PROCEDURE — 85025 COMPLETE CBC W/AUTO DIFF WBC: CPT

## 2019-03-13 PROCEDURE — 82948 REAGENT STRIP/BLOOD GLUCOSE: CPT

## 2019-03-13 PROCEDURE — 83735 ASSAY OF MAGNESIUM: CPT

## 2019-03-13 PROCEDURE — 6360000002 HC RX W HCPCS: Performed by: INTERNAL MEDICINE

## 2019-03-13 RX ORDER — SODIUM CHLORIDE 9 MG/ML
INJECTION, SOLUTION INTRAVENOUS CONTINUOUS
Status: DISCONTINUED | OUTPATIENT
Start: 2019-03-13 | End: 2019-03-14

## 2019-03-13 RX ORDER — FLUOXETINE HYDROCHLORIDE 20 MG/1
20 CAPSULE ORAL DAILY
Status: DISCONTINUED | OUTPATIENT
Start: 2019-03-14 | End: 2019-03-16 | Stop reason: HOSPADM

## 2019-03-13 RX ORDER — BUPROPION HYDROCHLORIDE 150 MG/1
300 TABLET ORAL EVERY MORNING
Status: DISCONTINUED | OUTPATIENT
Start: 2019-03-14 | End: 2019-03-16 | Stop reason: HOSPADM

## 2019-03-13 RX ORDER — POTASSIUM CHLORIDE 750 MG/1
10 TABLET, EXTENDED RELEASE ORAL DAILY
Status: DISCONTINUED | OUTPATIENT
Start: 2019-03-14 | End: 2019-03-15

## 2019-03-13 RX ORDER — ONDANSETRON 2 MG/ML
4 INJECTION INTRAMUSCULAR; INTRAVENOUS EVERY 6 HOURS PRN
Status: DISCONTINUED | OUTPATIENT
Start: 2019-03-13 | End: 2019-03-16 | Stop reason: HOSPADM

## 2019-03-13 RX ORDER — SODIUM CHLORIDE 0.9 % (FLUSH) 0.9 %
10 SYRINGE (ML) INJECTION EVERY 12 HOURS SCHEDULED
Status: DISCONTINUED | OUTPATIENT
Start: 2019-03-13 | End: 2019-03-16 | Stop reason: HOSPADM

## 2019-03-13 RX ORDER — LISINOPRIL 20 MG/1
40 TABLET ORAL DAILY
Status: DISCONTINUED | OUTPATIENT
Start: 2019-03-14 | End: 2019-03-16 | Stop reason: HOSPADM

## 2019-03-13 RX ORDER — SODIUM CHLORIDE 0.9 % (FLUSH) 0.9 %
10 SYRINGE (ML) INJECTION PRN
Status: DISCONTINUED | OUTPATIENT
Start: 2019-03-13 | End: 2019-03-16 | Stop reason: HOSPADM

## 2019-03-13 RX ORDER — BLOOD-GLUCOSE METER
1 KIT MISCELLANEOUS DAILY
Status: DISCONTINUED | OUTPATIENT
Start: 2019-03-13 | End: 2019-03-13 | Stop reason: RX

## 2019-03-13 RX ORDER — ALBUTEROL SULFATE 90 UG/1
2 AEROSOL, METERED RESPIRATORY (INHALATION) EVERY 6 HOURS PRN
Status: DISCONTINUED | OUTPATIENT
Start: 2019-03-13 | End: 2019-03-16 | Stop reason: HOSPADM

## 2019-03-13 RX ORDER — CETIRIZINE HYDROCHLORIDE 10 MG/1
10 TABLET ORAL DAILY
Status: DISCONTINUED | OUTPATIENT
Start: 2019-03-14 | End: 2019-03-16 | Stop reason: HOSPADM

## 2019-03-13 RX ORDER — AMLODIPINE BESYLATE 10 MG/1
10 TABLET ORAL DAILY
Status: DISCONTINUED | OUTPATIENT
Start: 2019-03-14 | End: 2019-03-16 | Stop reason: HOSPADM

## 2019-03-13 RX ORDER — ASPIRIN 81 MG/1
81 TABLET, CHEWABLE ORAL DAILY
Status: DISCONTINUED | OUTPATIENT
Start: 2019-03-13 | End: 2019-03-16 | Stop reason: HOSPADM

## 2019-03-13 RX ORDER — FLUOXETINE HYDROCHLORIDE 20 MG/1
40 CAPSULE ORAL DAILY
Status: DISCONTINUED | OUTPATIENT
Start: 2019-03-14 | End: 2019-03-16 | Stop reason: HOSPADM

## 2019-03-13 RX ORDER — CHOLECALCIFEROL (VITAMIN D3) 125 MCG
1000 CAPSULE ORAL DAILY
Status: DISCONTINUED | OUTPATIENT
Start: 2019-03-13 | End: 2019-03-16 | Stop reason: HOSPADM

## 2019-03-13 RX ORDER — PRAZOSIN HYDROCHLORIDE 2 MG/1
2 CAPSULE ORAL NIGHTLY
Status: DISCONTINUED | OUTPATIENT
Start: 2019-03-13 | End: 2019-03-16 | Stop reason: HOSPADM

## 2019-03-13 RX ORDER — HYDROCHLOROTHIAZIDE 12.5 MG/1
12.5 CAPSULE, GELATIN COATED ORAL EVERY MORNING
Status: DISCONTINUED | OUTPATIENT
Start: 2019-03-14 | End: 2019-03-15

## 2019-03-13 RX ADMIN — METFORMIN HYDROCHLORIDE 500 MG: 500 TABLET ORAL at 20:48

## 2019-03-13 RX ADMIN — Medication 10 ML: at 20:48

## 2019-03-13 RX ADMIN — SODIUM CHLORIDE: 9 INJECTION, SOLUTION INTRAVENOUS at 14:09

## 2019-03-13 RX ADMIN — PRAZOSIN HYDROCHLORIDE 2 MG: 2 CAPSULE ORAL at 20:48

## 2019-03-13 RX ADMIN — ENOXAPARIN SODIUM 40 MG: 40 INJECTION SUBCUTANEOUS at 20:48

## 2019-03-13 ASSESSMENT — PAIN SCALES - GENERAL
PAINLEVEL_OUTOF10: 0

## 2019-03-14 LAB
BILIRUBIN URINE: NEGATIVE
BLOOD, URINE: NEGATIVE
CHOLESTEROL, TOTAL: 122 MG/DL (ref 160–199)
CLARITY: CLEAR
COLOR: YELLOW
CREATININE URINE: 17.6 MG/DL (ref 4.2–622)
EKG P AXIS: NORMAL DEGREES
EKG P-R INTERVAL: NORMAL MS
EKG Q-T INTERVAL: 538 MS
EKG QRS DURATION: 126 MS
EKG QTC CALCULATION (BAZETT): 523 MS
EKG T AXIS: -129 DEGREES
GLUCOSE BLD-MCNC: 105 MG/DL (ref 70–99)
GLUCOSE BLD-MCNC: 109 MG/DL (ref 70–99)
GLUCOSE BLD-MCNC: 112 MG/DL (ref 70–99)
GLUCOSE BLD-MCNC: 136 MG/DL (ref 70–99)
GLUCOSE URINE: NEGATIVE MG/DL
HCT VFR BLD CALC: 36.1 % (ref 42–52)
HDLC SERPL-MCNC: 37 MG/DL (ref 55–121)
HEMOGLOBIN: 12.2 G/DL (ref 14–18)
KETONES, URINE: NEGATIVE MG/DL
LDL CHOLESTEROL CALCULATED: 55 MG/DL
LEUKOCYTE ESTERASE, URINE: NEGATIVE
MCH RBC QN AUTO: 32.9 PG (ref 27–31)
MCHC RBC AUTO-ENTMCNC: 33.8 G/DL (ref 33–37)
MCV RBC AUTO: 97.3 FL (ref 80–94)
MICROALBUMIN UR-MCNC: <1.2 MG/DL (ref 0–19)
MICROALBUMIN/CREAT UR-RTO: NORMAL MG/G
NITRITE, URINE: NEGATIVE
PDW BLD-RTO: 13 % (ref 11.5–14.5)
PERFORMED ON: ABNORMAL
PH UA: 6 (ref 5–8)
PLATELET # BLD: 187 K/UL (ref 130–400)
PMV BLD AUTO: 10.4 FL (ref 9.4–12.4)
POTASSIUM SERPL-SCNC: 2.9 MMOL/L (ref 3.5–5)
POTASSIUM SERPL-SCNC: 3.3 MMOL/L (ref 3.5–5)
PROTEIN UA: NEGATIVE MG/DL
RBC # BLD: 3.71 M/UL (ref 4.7–6.1)
SODIUM URINE: 91 MMOL/L
SPECIFIC GRAVITY UA: 1.01 (ref 1–1.03)
TRIGL SERPL-MCNC: 148 MG/DL (ref 0–149)
TROPONIN: <0.01 NG/ML (ref 0–0.03)
TROPONIN: <0.01 NG/ML (ref 0–0.03)
UROBILINOGEN, URINE: 0.2 E.U./DL
WBC # BLD: 9.8 K/UL (ref 4.8–10.8)

## 2019-03-14 PROCEDURE — 80061 LIPID PANEL: CPT

## 2019-03-14 PROCEDURE — 82043 UR ALBUMIN QUANTITATIVE: CPT

## 2019-03-14 PROCEDURE — 2580000003 HC RX 258: Performed by: INTERNAL MEDICINE

## 2019-03-14 PROCEDURE — 82948 REAGENT STRIP/BLOOD GLUCOSE: CPT

## 2019-03-14 PROCEDURE — 6360000002 HC RX W HCPCS: Performed by: INTERNAL MEDICINE

## 2019-03-14 PROCEDURE — 93005 ELECTROCARDIOGRAM TRACING: CPT

## 2019-03-14 PROCEDURE — 85027 COMPLETE CBC AUTOMATED: CPT

## 2019-03-14 PROCEDURE — 6370000000 HC RX 637 (ALT 250 FOR IP): Performed by: INTERNAL MEDICINE

## 2019-03-14 PROCEDURE — G0378 HOSPITAL OBSERVATION PER HR: HCPCS

## 2019-03-14 PROCEDURE — 81003 URINALYSIS AUTO W/O SCOPE: CPT

## 2019-03-14 PROCEDURE — 84484 ASSAY OF TROPONIN QUANT: CPT

## 2019-03-14 PROCEDURE — 84132 ASSAY OF SERUM POTASSIUM: CPT

## 2019-03-14 PROCEDURE — 84300 ASSAY OF URINE SODIUM: CPT

## 2019-03-14 PROCEDURE — 82570 ASSAY OF URINE CREATININE: CPT

## 2019-03-14 PROCEDURE — 82088 ASSAY OF ALDOSTERONE: CPT

## 2019-03-14 PROCEDURE — 36415 COLL VENOUS BLD VENIPUNCTURE: CPT

## 2019-03-14 RX ORDER — POTASSIUM CHLORIDE 7.45 MG/ML
10 INJECTION INTRAVENOUS PRN
Status: DISCONTINUED | OUTPATIENT
Start: 2019-03-14 | End: 2019-03-16 | Stop reason: HOSPADM

## 2019-03-14 RX ORDER — SPIRONOLACTONE 25 MG/1
25 TABLET ORAL DAILY
Status: DISCONTINUED | OUTPATIENT
Start: 2019-03-14 | End: 2019-03-15

## 2019-03-14 RX ADMIN — ASPIRIN 81 MG 81 MG: 81 TABLET ORAL at 08:56

## 2019-03-14 RX ADMIN — Medication 10 ML: at 08:57

## 2019-03-14 RX ADMIN — POTASSIUM CHLORIDE 10 MEQ: 10 TABLET, EXTENDED RELEASE ORAL at 08:56

## 2019-03-14 RX ADMIN — METFORMIN HYDROCHLORIDE 500 MG: 500 TABLET ORAL at 17:29

## 2019-03-14 RX ADMIN — SODIUM CHLORIDE: 9 INJECTION, SOLUTION INTRAVENOUS at 09:09

## 2019-03-14 RX ADMIN — FLUOXETINE 40 MG: 20 CAPSULE ORAL at 08:55

## 2019-03-14 RX ADMIN — AMLODIPINE BESYLATE 10 MG: 10 TABLET ORAL at 08:55

## 2019-03-14 RX ADMIN — MIRABEGRON 50 MG: 25 TABLET, FILM COATED, EXTENDED RELEASE ORAL at 08:56

## 2019-03-14 RX ADMIN — HYDROCHLOROTHIAZIDE 12.5 MG: 12.5 CAPSULE ORAL at 08:56

## 2019-03-14 RX ADMIN — LISINOPRIL 40 MG: 20 TABLET ORAL at 08:56

## 2019-03-14 RX ADMIN — FUROSEMIDE 60 MG: 40 TABLET ORAL at 08:54

## 2019-03-14 RX ADMIN — SPIRONOLACTONE 25 MG: 25 TABLET ORAL at 10:44

## 2019-03-14 RX ADMIN — CYANOCOBALAMIN TAB 500 MCG 1000 MCG: 500 TAB at 08:55

## 2019-03-14 RX ADMIN — POTASSIUM CHLORIDE 10 MEQ: 7.46 INJECTION, SOLUTION INTRAVENOUS at 09:04

## 2019-03-14 RX ADMIN — BUPROPION HYDROCHLORIDE 300 MG: 150 TABLET, FILM COATED, EXTENDED RELEASE ORAL at 08:57

## 2019-03-14 RX ADMIN — CETIRIZINE HYDROCHLORIDE 10 MG: 10 TABLET, FILM COATED ORAL at 08:56

## 2019-03-14 RX ADMIN — FLUOXETINE 20 MG: 20 CAPSULE ORAL at 08:57

## 2019-03-14 RX ADMIN — RIVAROXABAN 20 MG: 20 TABLET, FILM COATED ORAL at 08:55

## 2019-03-14 RX ADMIN — METFORMIN HYDROCHLORIDE 500 MG: 500 TABLET ORAL at 08:55

## 2019-03-14 RX ADMIN — PRAZOSIN HYDROCHLORIDE 2 MG: 2 CAPSULE ORAL at 22:36

## 2019-03-14 RX ADMIN — Medication 10 ML: at 22:36

## 2019-03-14 RX ADMIN — POTASSIUM CHLORIDE: 2 INJECTION, SOLUTION, CONCENTRATE INTRAVENOUS at 10:42

## 2019-03-14 ASSESSMENT — PAIN SCALES - GENERAL
PAINLEVEL_OUTOF10: 0

## 2019-03-15 ENCOUNTER — APPOINTMENT (OUTPATIENT)
Dept: GENERAL RADIOLOGY | Age: 84
End: 2019-03-15
Attending: INTERNAL MEDICINE
Payer: MEDICARE

## 2019-03-15 LAB
ALBUMIN SERPL-MCNC: 3.6 G/DL (ref 3.5–5.2)
ALP BLD-CCNC: 49 U/L (ref 40–130)
ALT SERPL-CCNC: 5 U/L (ref 5–41)
ANION GAP SERPL CALCULATED.3IONS-SCNC: 10 MMOL/L (ref 7–19)
ANION GAP SERPL CALCULATED.3IONS-SCNC: 16 MMOL/L (ref 7–19)
AST SERPL-CCNC: 8 U/L (ref 5–40)
BILIRUB SERPL-MCNC: 0.7 MG/DL (ref 0.2–1.2)
BUN BLDV-MCNC: 14 MG/DL (ref 8–23)
BUN BLDV-MCNC: 21 MG/DL (ref 8–23)
CALCIUM SERPL-MCNC: 8.9 MG/DL (ref 8.8–10.2)
CALCIUM SERPL-MCNC: 9.5 MG/DL (ref 8.8–10.2)
CHLORIDE BLD-SCNC: 95 MMOL/L (ref 98–111)
CHLORIDE BLD-SCNC: 96 MMOL/L (ref 98–111)
CO2: 28 MMOL/L (ref 22–29)
CO2: 35 MMOL/L (ref 22–29)
CREAT SERPL-MCNC: 0.8 MG/DL (ref 0.5–1.2)
CREAT SERPL-MCNC: 1.1 MG/DL (ref 0.5–1.2)
DIGOXIN LEVEL: 0.6 NG/ML (ref 0.6–1.2)
EKG P AXIS: NORMAL DEGREES
EKG P-R INTERVAL: NORMAL MS
EKG Q-T INTERVAL: 466 MS
EKG QRS DURATION: 118 MS
EKG QTC CALCULATION (BAZETT): 467 MS
EKG T AXIS: -86 DEGREES
GFR NON-AFRICAN AMERICAN: >60
GFR NON-AFRICAN AMERICAN: >60
GLUCOSE BLD-MCNC: 108 MG/DL (ref 70–99)
GLUCOSE BLD-MCNC: 111 MG/DL (ref 74–109)
GLUCOSE BLD-MCNC: 115 MG/DL (ref 70–99)
GLUCOSE BLD-MCNC: 92 MG/DL (ref 74–109)
GLUCOSE BLD-MCNC: 94 MG/DL (ref 70–99)
HCT VFR BLD CALC: 36 % (ref 42–52)
HEMOGLOBIN: 12.4 G/DL (ref 14–18)
LV EF: 55 %
LVEF MODALITY: NORMAL
MCH RBC QN AUTO: 33.7 PG (ref 27–31)
MCHC RBC AUTO-ENTMCNC: 34.4 G/DL (ref 33–37)
MCV RBC AUTO: 97.8 FL (ref 80–94)
PDW BLD-RTO: 13 % (ref 11.5–14.5)
PERFORMED ON: ABNORMAL
PERFORMED ON: ABNORMAL
PERFORMED ON: NORMAL
PLATELET # BLD: 182 K/UL (ref 130–400)
PMV BLD AUTO: 10.5 FL (ref 9.4–12.4)
POTASSIUM SERPL-SCNC: 3 MMOL/L (ref 3.5–5)
POTASSIUM SERPL-SCNC: 3.8 MMOL/L (ref 3.5–5)
RBC # BLD: 3.68 M/UL (ref 4.7–6.1)
SODIUM BLD-SCNC: 140 MMOL/L (ref 136–145)
SODIUM BLD-SCNC: 140 MMOL/L (ref 136–145)
TOTAL PROTEIN: 6.3 G/DL (ref 6.6–8.7)
WBC # BLD: 8.8 K/UL (ref 4.8–10.8)

## 2019-03-15 PROCEDURE — 82948 REAGENT STRIP/BLOOD GLUCOSE: CPT

## 2019-03-15 PROCEDURE — 6360000002 HC RX W HCPCS: Performed by: INTERNAL MEDICINE

## 2019-03-15 PROCEDURE — 85027 COMPLETE CBC AUTOMATED: CPT

## 2019-03-15 PROCEDURE — 99218 PR INITIAL OBSERVATION CARE/DAY 30 MINUTES: CPT | Performed by: INTERNAL MEDICINE

## 2019-03-15 PROCEDURE — 2580000003 HC RX 258: Performed by: INTERNAL MEDICINE

## 2019-03-15 PROCEDURE — 71045 X-RAY EXAM CHEST 1 VIEW: CPT

## 2019-03-15 PROCEDURE — C1781 MESH (IMPLANTABLE): HCPCS

## 2019-03-15 PROCEDURE — 99153 MOD SED SAME PHYS/QHP EA: CPT | Performed by: INTERNAL MEDICINE

## 2019-03-15 PROCEDURE — 93320 DOPPLER ECHO COMPLETE: CPT | Performed by: INTERNAL MEDICINE

## 2019-03-15 PROCEDURE — 2500000003 HC RX 250 WO HCPCS

## 2019-03-15 PROCEDURE — 99152 MOD SED SAME PHYS/QHP 5/>YRS: CPT | Performed by: INTERNAL MEDICINE

## 2019-03-15 PROCEDURE — 6360000002 HC RX W HCPCS

## 2019-03-15 PROCEDURE — 93325 DOPPLER ECHO COLOR FLOW MAPG: CPT | Performed by: INTERNAL MEDICINE

## 2019-03-15 PROCEDURE — C1786 PMKR, SINGLE, RATE-RESP: HCPCS

## 2019-03-15 PROCEDURE — 33207 INSERT HEART PM VENTRICULAR: CPT | Performed by: INTERNAL MEDICINE

## 2019-03-15 PROCEDURE — G0378 HOSPITAL OBSERVATION PER HR: HCPCS

## 2019-03-15 PROCEDURE — C1894 INTRO/SHEATH, NON-LASER: HCPCS

## 2019-03-15 PROCEDURE — 6370000000 HC RX 637 (ALT 250 FOR IP): Performed by: INTERNAL MEDICINE

## 2019-03-15 PROCEDURE — 2709999900 HC NON-CHARGEABLE SUPPLY

## 2019-03-15 PROCEDURE — 6370000000 HC RX 637 (ALT 250 FOR IP)

## 2019-03-15 PROCEDURE — 80053 COMPREHEN METABOLIC PANEL: CPT

## 2019-03-15 PROCEDURE — 36415 COLL VENOUS BLD VENIPUNCTURE: CPT

## 2019-03-15 PROCEDURE — C1898 LEAD, PMKR, OTHER THAN TRANS: HCPCS

## 2019-03-15 PROCEDURE — 80162 ASSAY OF DIGOXIN TOTAL: CPT

## 2019-03-15 PROCEDURE — 93312 ECHO TRANSESOPHAGEAL: CPT | Performed by: INTERNAL MEDICINE

## 2019-03-15 RX ORDER — SODIUM CHLORIDE 0.9 % (FLUSH) 0.9 %
10 SYRINGE (ML) INJECTION PRN
Status: DISCONTINUED | OUTPATIENT
Start: 2019-03-15 | End: 2019-03-16 | Stop reason: HOSPADM

## 2019-03-15 RX ORDER — ONDANSETRON 2 MG/ML
4 INJECTION INTRAMUSCULAR; INTRAVENOUS EVERY 6 HOURS PRN
Status: DISCONTINUED | OUTPATIENT
Start: 2019-03-15 | End: 2019-03-16 | Stop reason: HOSPADM

## 2019-03-15 RX ORDER — SPIRONOLACTONE 50 MG/1
50 TABLET, FILM COATED ORAL DAILY
Status: DISCONTINUED | OUTPATIENT
Start: 2019-03-15 | End: 2019-03-16 | Stop reason: HOSPADM

## 2019-03-15 RX ORDER — SODIUM CHLORIDE 0.9 % (FLUSH) 0.9 %
10 SYRINGE (ML) INJECTION EVERY 12 HOURS SCHEDULED
Status: DISCONTINUED | OUTPATIENT
Start: 2019-03-15 | End: 2019-03-16 | Stop reason: HOSPADM

## 2019-03-15 RX ORDER — POTASSIUM CHLORIDE 20 MEQ/1
20 TABLET, EXTENDED RELEASE ORAL DAILY
Status: DISCONTINUED | OUTPATIENT
Start: 2019-03-15 | End: 2019-03-16

## 2019-03-15 RX ORDER — ACETAMINOPHEN 325 MG/1
650 TABLET ORAL EVERY 4 HOURS PRN
Status: DISCONTINUED | OUTPATIENT
Start: 2019-03-15 | End: 2019-03-16 | Stop reason: HOSPADM

## 2019-03-15 RX ORDER — SODIUM CHLORIDE 9 MG/ML
INJECTION, SOLUTION INTRAVENOUS CONTINUOUS
Status: DISCONTINUED | OUTPATIENT
Start: 2019-03-15 | End: 2019-03-16 | Stop reason: HOSPADM

## 2019-03-15 RX ORDER — METOPROLOL SUCCINATE 25 MG/1
25 TABLET, EXTENDED RELEASE ORAL DAILY
Status: DISCONTINUED | OUTPATIENT
Start: 2019-03-16 | End: 2019-03-16 | Stop reason: HOSPADM

## 2019-03-15 RX ADMIN — MIRABEGRON 50 MG: 25 TABLET, FILM COATED, EXTENDED RELEASE ORAL at 09:56

## 2019-03-15 RX ADMIN — POTASSIUM CHLORIDE 10 MEQ: 7.46 INJECTION, SOLUTION INTRAVENOUS at 15:33

## 2019-03-15 RX ADMIN — CETIRIZINE HYDROCHLORIDE 10 MG: 10 TABLET, FILM COATED ORAL at 09:56

## 2019-03-15 RX ADMIN — Medication 10 ML: at 09:59

## 2019-03-15 RX ADMIN — POTASSIUM CHLORIDE: 2 INJECTION, SOLUTION, CONCENTRATE INTRAVENOUS at 01:38

## 2019-03-15 RX ADMIN — FLUOXETINE 40 MG: 20 CAPSULE ORAL at 10:04

## 2019-03-15 RX ADMIN — SPIRONOLACTONE 50 MG: 50 TABLET ORAL at 09:59

## 2019-03-15 RX ADMIN — FLUOXETINE 20 MG: 20 CAPSULE ORAL at 10:04

## 2019-03-15 RX ADMIN — HYDROCHLOROTHIAZIDE 12.5 MG: 12.5 CAPSULE ORAL at 09:56

## 2019-03-15 RX ADMIN — LISINOPRIL 40 MG: 20 TABLET ORAL at 09:55

## 2019-03-15 RX ADMIN — SODIUM CHLORIDE: 9 INJECTION, SOLUTION INTRAVENOUS at 09:46

## 2019-03-15 RX ADMIN — POTASSIUM CHLORIDE 10 MEQ: 7.46 INJECTION, SOLUTION INTRAVENOUS at 14:06

## 2019-03-15 RX ADMIN — AMLODIPINE BESYLATE 10 MG: 10 TABLET ORAL at 09:54

## 2019-03-15 RX ADMIN — POTASSIUM CHLORIDE 10 MEQ: 7.46 INJECTION, SOLUTION INTRAVENOUS at 16:55

## 2019-03-15 RX ADMIN — BUPROPION HYDROCHLORIDE 300 MG: 150 TABLET, FILM COATED, EXTENDED RELEASE ORAL at 10:05

## 2019-03-15 RX ADMIN — POTASSIUM CHLORIDE 20 MEQ: 20 TABLET, EXTENDED RELEASE ORAL at 09:59

## 2019-03-15 RX ADMIN — CYANOCOBALAMIN TAB 500 MCG 1000 MCG: 500 TAB at 09:54

## 2019-03-15 RX ADMIN — POTASSIUM CHLORIDE 10 MEQ: 7.46 INJECTION, SOLUTION INTRAVENOUS at 09:46

## 2019-03-15 RX ADMIN — FUROSEMIDE 60 MG: 40 TABLET ORAL at 09:54

## 2019-03-15 RX ADMIN — POTASSIUM CHLORIDE 10 MEQ: 7.46 INJECTION, SOLUTION INTRAVENOUS at 12:27

## 2019-03-15 RX ADMIN — ASPIRIN 81 MG 81 MG: 81 TABLET ORAL at 09:56

## 2019-03-15 RX ADMIN — POTASSIUM CHLORIDE 10 MEQ: 7.46 INJECTION, SOLUTION INTRAVENOUS at 11:26

## 2019-03-15 ASSESSMENT — PAIN SCALES - GENERAL
PAINLEVEL_OUTOF10: 0
PAINLEVEL_OUTOF10: 0

## 2019-03-16 VITALS
WEIGHT: 251 LBS | TEMPERATURE: 98.4 F | HEART RATE: 60 BPM | HEIGHT: 70 IN | SYSTOLIC BLOOD PRESSURE: 135 MMHG | BODY MASS INDEX: 35.93 KG/M2 | RESPIRATION RATE: 18 BRPM | DIASTOLIC BLOOD PRESSURE: 78 MMHG | OXYGEN SATURATION: 91 %

## 2019-03-16 LAB
ALBUMIN SERPL-MCNC: 3.7 G/DL (ref 3.5–5.2)
ALP BLD-CCNC: 49 U/L (ref 40–130)
ALT SERPL-CCNC: 5 U/L (ref 5–41)
ANION GAP SERPL CALCULATED.3IONS-SCNC: 12 MMOL/L (ref 7–19)
ANION GAP SERPL CALCULATED.3IONS-SCNC: 13 MMOL/L (ref 7–19)
AST SERPL-CCNC: 9 U/L (ref 5–40)
BILIRUB SERPL-MCNC: 0.7 MG/DL (ref 0.2–1.2)
BUN BLDV-MCNC: 15 MG/DL (ref 8–23)
BUN BLDV-MCNC: 16 MG/DL (ref 8–23)
CALCIUM SERPL-MCNC: 8.9 MG/DL (ref 8.8–10.2)
CALCIUM SERPL-MCNC: 8.9 MG/DL (ref 8.8–10.2)
CHLORIDE BLD-SCNC: 100 MMOL/L (ref 98–111)
CHLORIDE BLD-SCNC: 99 MMOL/L (ref 98–111)
CO2: 30 MMOL/L (ref 22–29)
CO2: 30 MMOL/L (ref 22–29)
CREAT SERPL-MCNC: 0.9 MG/DL (ref 0.5–1.2)
CREAT SERPL-MCNC: 1 MG/DL (ref 0.5–1.2)
GFR NON-AFRICAN AMERICAN: >60
GFR NON-AFRICAN AMERICAN: >60
GLUCOSE BLD-MCNC: 140 MG/DL (ref 74–109)
GLUCOSE BLD-MCNC: 151 MG/DL (ref 74–109)
HCT VFR BLD CALC: 34.8 % (ref 42–52)
HEMOGLOBIN: 11.7 G/DL (ref 14–18)
MCH RBC QN AUTO: 32.8 PG (ref 27–31)
MCHC RBC AUTO-ENTMCNC: 33.6 G/DL (ref 33–37)
MCV RBC AUTO: 97.5 FL (ref 80–94)
PDW BLD-RTO: 13 % (ref 11.5–14.5)
PLATELET # BLD: 171 K/UL (ref 130–400)
PMV BLD AUTO: 10.1 FL (ref 9.4–12.4)
POTASSIUM SERPL-SCNC: 2.9 MMOL/L (ref 3.5–5)
POTASSIUM SERPL-SCNC: 3.2 MMOL/L (ref 3.5–5)
RBC # BLD: 3.57 M/UL (ref 4.7–6.1)
SODIUM BLD-SCNC: 141 MMOL/L (ref 136–145)
SODIUM BLD-SCNC: 143 MMOL/L (ref 136–145)
TOTAL PROTEIN: 6.3 G/DL (ref 6.6–8.7)
WBC # BLD: 10.3 K/UL (ref 4.8–10.8)

## 2019-03-16 PROCEDURE — 96365 THER/PROPH/DIAG IV INF INIT: CPT

## 2019-03-16 PROCEDURE — 96376 TX/PRO/DX INJ SAME DRUG ADON: CPT

## 2019-03-16 PROCEDURE — 6370000000 HC RX 637 (ALT 250 FOR IP): Performed by: INTERNAL MEDICINE

## 2019-03-16 PROCEDURE — 99024 POSTOP FOLLOW-UP VISIT: CPT | Performed by: INTERNAL MEDICINE

## 2019-03-16 PROCEDURE — 6360000002 HC RX W HCPCS: Performed by: INTERNAL MEDICINE

## 2019-03-16 PROCEDURE — 80053 COMPREHEN METABOLIC PANEL: CPT

## 2019-03-16 PROCEDURE — 36415 COLL VENOUS BLD VENIPUNCTURE: CPT

## 2019-03-16 PROCEDURE — 93005 ELECTROCARDIOGRAM TRACING: CPT

## 2019-03-16 PROCEDURE — 2580000003 HC RX 258: Performed by: INTERNAL MEDICINE

## 2019-03-16 PROCEDURE — G0378 HOSPITAL OBSERVATION PER HR: HCPCS

## 2019-03-16 PROCEDURE — 85027 COMPLETE CBC AUTOMATED: CPT

## 2019-03-16 RX ORDER — SPIRONOLACTONE 50 MG/1
50 TABLET, FILM COATED ORAL DAILY
Qty: 30 TABLET | Refills: 3 | Status: SHIPPED | OUTPATIENT
Start: 2019-03-17 | End: 2019-06-06 | Stop reason: SDUPTHER

## 2019-03-16 RX ORDER — POTASSIUM CHLORIDE 20 MEQ/1
40 TABLET, EXTENDED RELEASE ORAL DAILY
Status: DISCONTINUED | OUTPATIENT
Start: 2019-03-17 | End: 2019-03-16 | Stop reason: HOSPADM

## 2019-03-16 RX ADMIN — FUROSEMIDE 60 MG: 40 TABLET ORAL at 09:42

## 2019-03-16 RX ADMIN — METOPROLOL SUCCINATE 25 MG: 25 TABLET, EXTENDED RELEASE ORAL at 09:42

## 2019-03-16 RX ADMIN — Medication 10 ML: at 00:17

## 2019-03-16 RX ADMIN — AMLODIPINE BESYLATE 10 MG: 10 TABLET ORAL at 09:50

## 2019-03-16 RX ADMIN — CETIRIZINE HYDROCHLORIDE 10 MG: 10 TABLET, FILM COATED ORAL at 09:42

## 2019-03-16 RX ADMIN — PRAZOSIN HYDROCHLORIDE 2 MG: 2 CAPSULE ORAL at 00:15

## 2019-03-16 RX ADMIN — Medication 10 ML: at 09:43

## 2019-03-16 RX ADMIN — CYANOCOBALAMIN TAB 500 MCG 1000 MCG: 500 TAB at 09:41

## 2019-03-16 RX ADMIN — POTASSIUM CHLORIDE 10 MEQ: 7.46 INJECTION, SOLUTION INTRAVENOUS at 05:49

## 2019-03-16 RX ADMIN — SPIRONOLACTONE 50 MG: 50 TABLET ORAL at 09:42

## 2019-03-16 RX ADMIN — POTASSIUM CHLORIDE 10 MEQ: 7.46 INJECTION, SOLUTION INTRAVENOUS at 09:40

## 2019-03-16 RX ADMIN — FLUOXETINE 40 MG: 20 CAPSULE ORAL at 09:41

## 2019-03-16 RX ADMIN — ASPIRIN 81 MG 81 MG: 81 TABLET ORAL at 09:42

## 2019-03-16 RX ADMIN — MIRABEGRON 50 MG: 25 TABLET, FILM COATED, EXTENDED RELEASE ORAL at 09:41

## 2019-03-16 RX ADMIN — POTASSIUM CHLORIDE 20 MEQ: 20 TABLET, EXTENDED RELEASE ORAL at 09:41

## 2019-03-16 RX ADMIN — Medication 2 G: at 09:46

## 2019-03-16 RX ADMIN — BUPROPION HYDROCHLORIDE 300 MG: 150 TABLET, FILM COATED, EXTENDED RELEASE ORAL at 09:41

## 2019-03-16 RX ADMIN — LISINOPRIL 40 MG: 20 TABLET ORAL at 09:42

## 2019-03-16 RX ADMIN — POTASSIUM CHLORIDE 10 MEQ: 7.46 INJECTION, SOLUTION INTRAVENOUS at 08:04

## 2019-03-16 RX ADMIN — METFORMIN HYDROCHLORIDE 500 MG: 500 TABLET ORAL at 09:42

## 2019-03-16 RX ADMIN — Medication 2 G: at 00:16

## 2019-03-16 RX ADMIN — RIVAROXABAN 20 MG: 20 TABLET, FILM COATED ORAL at 09:41

## 2019-03-16 RX ADMIN — Medication 10 ML: at 00:16

## 2019-03-16 RX ADMIN — POTASSIUM CHLORIDE 10 MEQ: 7.46 INJECTION, SOLUTION INTRAVENOUS at 06:58

## 2019-03-16 RX ADMIN — FLUOXETINE 20 MG: 20 CAPSULE ORAL at 09:41

## 2019-03-16 ASSESSMENT — PAIN SCALES - GENERAL
PAINLEVEL_OUTOF10: 0
PAINLEVEL_OUTOF10: 0

## 2019-03-17 LAB — ALDOSTERONE: 13 NG/DL

## 2019-03-18 ENCOUNTER — TELEPHONE (OUTPATIENT)
Dept: PRIMARY CARE CLINIC | Age: 84
End: 2019-03-18

## 2019-03-18 ENCOUNTER — RESULTS ENCOUNTER (OUTPATIENT)
Dept: UROLOGY | Facility: CLINIC | Age: 84
End: 2019-03-18

## 2019-03-18 ENCOUNTER — TELEPHONE (OUTPATIENT)
Dept: CARDIOLOGY | Age: 84
End: 2019-03-18

## 2019-03-18 DIAGNOSIS — C61 CANCER OF PROSTATE (HCC): ICD-10-CM

## 2019-03-18 PROBLEM — R00.1 BRADYCARDIA: Status: ACTIVE | Noted: 2019-03-15

## 2019-03-18 PROBLEM — Z95.0 PACEMAKER: Status: ACTIVE | Noted: 2019-03-15

## 2019-03-20 ENCOUNTER — CARE COORDINATION (OUTPATIENT)
Dept: CARE COORDINATION | Age: 84
End: 2019-03-20

## 2019-03-22 ENCOUNTER — OFFICE VISIT (OUTPATIENT)
Dept: CARDIOLOGY | Age: 84
End: 2019-03-22

## 2019-03-22 DIAGNOSIS — Z51.89 VISIT FOR WOUND CHECK: Primary | ICD-10-CM

## 2019-03-22 DIAGNOSIS — Z95.0 PACEMAKER: ICD-10-CM

## 2019-03-22 PROCEDURE — 99024 POSTOP FOLLOW-UP VISIT: CPT | Performed by: CLINICAL NURSE SPECIALIST

## 2019-03-25 ENCOUNTER — OFFICE VISIT (OUTPATIENT)
Dept: PRIMARY CARE CLINIC | Age: 84
End: 2019-03-25
Payer: MEDICARE

## 2019-03-25 ENCOUNTER — TELEPHONE (OUTPATIENT)
Dept: PRIMARY CARE CLINIC | Age: 84
End: 2019-03-25

## 2019-03-25 ENCOUNTER — CARE COORDINATION (OUTPATIENT)
Dept: CARE COORDINATION | Age: 84
End: 2019-03-25

## 2019-03-25 VITALS
OXYGEN SATURATION: 94 % | TEMPERATURE: 97.9 F | DIASTOLIC BLOOD PRESSURE: 66 MMHG | HEIGHT: 70 IN | HEART RATE: 60 BPM | BODY MASS INDEX: 37.62 KG/M2 | WEIGHT: 262.8 LBS | SYSTOLIC BLOOD PRESSURE: 130 MMHG

## 2019-03-25 DIAGNOSIS — I07.1 TRICUSPID VALVE INSUFFICIENCY, UNSPECIFIED ETIOLOGY: ICD-10-CM

## 2019-03-25 DIAGNOSIS — I27.20 PULMONARY HYPERTENSION (HCC): ICD-10-CM

## 2019-03-25 DIAGNOSIS — I10 ESSENTIAL HYPERTENSION: ICD-10-CM

## 2019-03-25 DIAGNOSIS — E78.2 MIXED HYPERLIPIDEMIA: ICD-10-CM

## 2019-03-25 DIAGNOSIS — D64.9 ANEMIA, UNSPECIFIED TYPE: ICD-10-CM

## 2019-03-25 DIAGNOSIS — Z09 HOSPITAL DISCHARGE FOLLOW-UP: Primary | ICD-10-CM

## 2019-03-25 DIAGNOSIS — Z95.0 PACEMAKER: ICD-10-CM

## 2019-03-25 DIAGNOSIS — G47.33 OSA (OBSTRUCTIVE SLEEP APNEA): ICD-10-CM

## 2019-03-25 DIAGNOSIS — R00.1 BRADYCARDIA: ICD-10-CM

## 2019-03-25 DIAGNOSIS — E11.42 TYPE 2 DIABETES MELLITUS WITH DIABETIC POLYNEUROPATHY, WITHOUT LONG-TERM CURRENT USE OF INSULIN (HCC): ICD-10-CM

## 2019-03-25 DIAGNOSIS — E87.6 HYPOKALEMIA: ICD-10-CM

## 2019-03-25 DIAGNOSIS — J44.9 CHRONIC OBSTRUCTIVE PULMONARY DISEASE, UNSPECIFIED COPD TYPE (HCC): ICD-10-CM

## 2019-03-25 LAB
ALBUMIN SERPL-MCNC: 4.4 G/DL (ref 3.5–5.2)
ALP BLD-CCNC: 58 U/L (ref 40–130)
ALT SERPL-CCNC: <5 U/L (ref 5–41)
ANION GAP SERPL CALCULATED.3IONS-SCNC: 15 MMOL/L (ref 7–19)
AST SERPL-CCNC: 10 U/L (ref 5–40)
BASOPHILS ABSOLUTE: 0.1 K/UL (ref 0–0.2)
BASOPHILS RELATIVE PERCENT: 0.5 % (ref 0–1)
BILIRUB SERPL-MCNC: 0.6 MG/DL (ref 0.2–1.2)
BUN BLDV-MCNC: 17 MG/DL (ref 8–23)
CALCIUM SERPL-MCNC: 10.1 MG/DL (ref 8.8–10.2)
CHLORIDE BLD-SCNC: 94 MMOL/L (ref 98–111)
CO2: 27 MMOL/L (ref 22–29)
CREAT SERPL-MCNC: 0.9 MG/DL (ref 0.5–1.2)
EOSINOPHILS ABSOLUTE: 0.3 K/UL (ref 0–0.6)
EOSINOPHILS RELATIVE PERCENT: 2.2 % (ref 0–5)
GFR NON-AFRICAN AMERICAN: >60
GLUCOSE BLD-MCNC: 106 MG/DL (ref 74–109)
HCT VFR BLD CALC: 39.5 % (ref 42–52)
HEMOGLOBIN: 13.2 G/DL (ref 14–18)
LYMPHOCYTES ABSOLUTE: 1.1 K/UL (ref 1.1–4.5)
LYMPHOCYTES RELATIVE PERCENT: 9.3 % (ref 20–40)
MCH RBC QN AUTO: 33.5 PG (ref 27–31)
MCHC RBC AUTO-ENTMCNC: 33.4 G/DL (ref 33–37)
MCV RBC AUTO: 100.3 FL (ref 80–94)
MONOCYTES ABSOLUTE: 0.6 K/UL (ref 0–0.9)
MONOCYTES RELATIVE PERCENT: 5.5 % (ref 0–10)
NEUTROPHILS ABSOLUTE: 9.4 K/UL (ref 1.5–7.5)
NEUTROPHILS RELATIVE PERCENT: 81.4 % (ref 50–65)
PDW BLD-RTO: 12.3 % (ref 11.5–14.5)
PLATELET # BLD: 291 K/UL (ref 130–400)
PMV BLD AUTO: 10 FL (ref 9.4–12.4)
POTASSIUM SERPL-SCNC: 4.2 MMOL/L (ref 3.5–5)
RBC # BLD: 3.94 M/UL (ref 4.7–6.1)
SODIUM BLD-SCNC: 136 MMOL/L (ref 136–145)
TOTAL PROTEIN: 7.7 G/DL (ref 6.6–8.7)
WBC # BLD: 11.6 K/UL (ref 4.8–10.8)

## 2019-03-25 PROCEDURE — 1111F DSCHRG MED/CURRENT MED MERGE: CPT | Performed by: PEDIATRICS

## 2019-03-25 PROCEDURE — 99495 TRANSJ CARE MGMT MOD F2F 14D: CPT | Performed by: PEDIATRICS

## 2019-03-25 ASSESSMENT — ENCOUNTER SYMPTOMS
WHEEZING: 0
SORE THROAT: 0
COUGH: 1
ABDOMINAL PAIN: 0
BACK PAIN: 0
NAUSEA: 0
VOMITING: 0
SHORTNESS OF BREATH: 1
DIARRHEA: 0
CHEST TIGHTNESS: 0

## 2019-04-15 DIAGNOSIS — Z95.0 PACEMAKER: Primary | ICD-10-CM

## 2019-04-15 DIAGNOSIS — I48.0 PAROXYSMAL ATRIAL FIBRILLATION (HCC): ICD-10-CM

## 2019-04-25 ENCOUNTER — OFFICE VISIT (OUTPATIENT)
Dept: CARDIOLOGY | Age: 84
End: 2019-04-25
Payer: MEDICARE

## 2019-04-25 VITALS
HEART RATE: 60 BPM | HEIGHT: 70 IN | BODY MASS INDEX: 36.79 KG/M2 | DIASTOLIC BLOOD PRESSURE: 78 MMHG | WEIGHT: 257 LBS | SYSTOLIC BLOOD PRESSURE: 118 MMHG

## 2019-04-25 DIAGNOSIS — G47.33 OSA (OBSTRUCTIVE SLEEP APNEA): ICD-10-CM

## 2019-04-25 DIAGNOSIS — I50.32 CHRONIC DIASTOLIC HEART FAILURE (HCC): ICD-10-CM

## 2019-04-25 DIAGNOSIS — I48.20 CHRONIC ATRIAL FIBRILLATION (HCC): ICD-10-CM

## 2019-04-25 DIAGNOSIS — I49.5 SINOATRIAL NODE DYSFUNCTION (HCC): Primary | ICD-10-CM

## 2019-04-25 DIAGNOSIS — Z95.0 PACEMAKER: ICD-10-CM

## 2019-04-25 DIAGNOSIS — I10 ESSENTIAL HYPERTENSION: ICD-10-CM

## 2019-04-25 PROBLEM — I48.0 PAF (PAROXYSMAL ATRIAL FIBRILLATION) (HCC): Status: RESOLVED | Noted: 2019-03-13 | Resolved: 2019-04-25

## 2019-04-25 PROCEDURE — 1036F TOBACCO NON-USER: CPT | Performed by: CLINICAL NURSE SPECIALIST

## 2019-04-25 PROCEDURE — 1123F ACP DISCUSS/DSCN MKR DOCD: CPT | Performed by: CLINICAL NURSE SPECIALIST

## 2019-04-25 PROCEDURE — 4040F PNEUMOC VAC/ADMIN/RCVD: CPT | Performed by: CLINICAL NURSE SPECIALIST

## 2019-04-25 PROCEDURE — G8417 CALC BMI ABV UP PARAM F/U: HCPCS | Performed by: CLINICAL NURSE SPECIALIST

## 2019-04-25 PROCEDURE — 93279 PRGRMG DEV EVAL PM/LDLS PM: CPT | Performed by: CLINICAL NURSE SPECIALIST

## 2019-04-25 PROCEDURE — 99214 OFFICE O/P EST MOD 30 MIN: CPT | Performed by: CLINICAL NURSE SPECIALIST

## 2019-04-25 PROCEDURE — G8427 DOCREV CUR MEDS BY ELIG CLIN: HCPCS | Performed by: CLINICAL NURSE SPECIALIST

## 2019-04-25 RX ORDER — FLUOXETINE HYDROCHLORIDE 20 MG/1
20 CAPSULE ORAL DAILY
COMMUNITY
End: 2019-04-29 | Stop reason: SDUPTHER

## 2019-04-25 RX ORDER — CELECOXIB 200 MG/1
200 CAPSULE ORAL DAILY
COMMUNITY
End: 2019-08-15

## 2019-04-25 RX ORDER — FLUOXETINE HYDROCHLORIDE 40 MG/1
40 CAPSULE ORAL DAILY
COMMUNITY
End: 2020-01-28

## 2019-04-25 RX ORDER — CHOLESTYRAMINE 4 G/9G
1 POWDER, FOR SUSPENSION ORAL PRN
COMMUNITY
End: 2019-11-18 | Stop reason: ALTCHOICE

## 2019-04-25 ASSESSMENT — ENCOUNTER SYMPTOMS
SHORTNESS OF BREATH: 1
EYE REDNESS: 0
CHEST TIGHTNESS: 0
VOMITING: 0
ABDOMINAL PAIN: 0
FACIAL SWELLING: 0
NAUSEA: 0
COUGH: 0
WHEEZING: 0

## 2019-04-25 NOTE — PATIENT INSTRUCTIONS
Return in about 3 months (around 7/25/2019) for APRN. - Weigh daily and report weight gain of 3lbs or more in 24hrs or 5lbs in one week. - Call for increasing shortness of breath or increasing swelling in feet and legs. (This could mean you are retaining too much fluid)  - 2000mg low sodium diet  - Fluid restriction of 1500ml per day (about 6 cups of fluid per day)    Call with any questionsor concerns  Follow up with Litzy Black, DO for non cardiac problems  Report any new problems  Cardiovascular Fitness-Exercise as tolerated. Strive for 15 minutes of exercise most days of the week. Cardiac / HealthyDiet  Continue current medications as directed  Continue plan of treatment  It is always recommended that you bring your medicationsbottles with you to each visit - this is for your safety!

## 2019-04-25 NOTE — PROGRESS NOTES
[facility], [address] [place] [address]  [id number]  Phone: [phone]  Fax: [phone]    OFFICE VISIT:  2019    Meghann Pugh - : 1935    Reason For Visit:  Rosendo De La Torre is a 81 y.o. male who is here for Follow Up After Procedure (no cardiac symptoms); Congestive Heart Failure; and Atrial Fibrillation    HPI  Patient is here for hospital follow-up. He had a scheduled LUCILLE for a possible atrial mass and was found to be bradycardic during the procedure and then had placement of a VVI pacemaker. He also has a history of chronic diastolic heart failure, hypertension, sleep apnea, atrial fibrillation. He was found to be in atrial fibrillation during the procedure and LUCILLE showed severe biatrial enlargement with by atrial smoke despite the use of Xarelto. No obvious atrial mass was noted. The patient is chronically short of breath which is unchanged. He denies any signs of fluid retention such as sudden weight gain, orthopnea, PND. He has some mild lower extremity edema which is unchanged       HEATH Olivera DO is PCP.   Meghann Pugh has the following history as recorded in [facility]:    Patient Active Problem List    Diagnosis Date Noted    Sinoatrial node dysfunction ([address] 2019    Bradycardia 03/15/2019    Pacemaker 03/15/2019    Palliative care patient 11/15/2018    Chronic diastolic heart failure ([address] 11/15/2018    Macrocytic anemia 11/15/2018    Sepsis ([address] 11/15/2018    Elevated d-dimer 11/15/2018    Vitamin D deficiency 11/15/2018    Mitral regurgitation 11/15/2018    Tricuspid regurgitation 11/15/2018    Pulmonary hypertension ([address] 11/15/2018    Bronchitis, acute 11/15/2018    Chronic respiratory failure with hypoxia ([address] 2018    Mixed hyperlipidemia 02/15/2018    Prostate cancer ([address] 02/15/2018    Recurrent major depressive disorder, in partial remission ([address] 02/15/2018    Type 2 diabetes mellitus with diabetic Inhale 2 puffs into the lungs every 6 hours as needed for Wheezing 8.5 Inhaler 5    leuprolide (LUPRON) 30 MG injection Inject 30 mg into the muscle once Every 4 months      MYRBETRIQ 25 MG TB24 Take 25 mg by mouth daily       FISH OIL Take 1 capsule by mouth 2 times daily. No current facility-administered medications for this visit. Allergies: Patient has no known allergies. Review of Systems  Review of Systems   Constitutional: Positive for fatigue. Negative for activity change, diaphoresis, fever and unexpected weight change. HENT: Negative for facial swelling and nosebleeds. Eyes: Negative for redness and visual disturbance. Respiratory: Positive for shortness of breath (chronic). Negative for cough, chest tightness and wheezing. Cardiovascular: Positive for leg swelling (mild). Negative for chest pain and palpitations. Gastrointestinal: Negative for abdominal pain, nausea and vomiting. Endocrine: Negative for cold intolerance and heat intolerance. Genitourinary: Negative for dysuria and hematuria. Musculoskeletal: Negative for arthralgias and myalgias. Skin: Negative for pallor and rash. Neurological: Negative for dizziness, seizures, syncope, weakness and light-headedness. Hematological: Does not bruise/bleed easily. Psychiatric/Behavioral: Negative for agitation. The patient is not nervous/anxious. C/o short term memory issues       Objective  Vital Signs - /78   Pulse 60   Ht 5' 10\" (1.778 m)   Wt 257 lb (116.6 kg)   BMI 36.88 kg/m²    Wt Readings from Last 3 Encounters:   04/25/19 257 lb (116.6 kg)   03/25/19 262 lb 12.8 oz (119.2 kg)   03/16/19 251 lb (113.9 kg)      Physical Exam   Constitutional: He is oriented to person, place, and time. He appears well-developed and well-nourished. No distress. HENT:   Head: Normocephalic and atraumatic.    Right Ear: Hearing and external ear normal.   Left Ear: Hearing and external ear normal.   Nose: Nose normal.   Eyes: Pupils are equal, round, and reactive to light. Right eye exhibits no discharge. Left eye exhibits no discharge. Neck: No JVD present. No tracheal deviation present. No thyromegaly present. Cardiovascular: Normal rate, regular rhythm and intact distal pulses. Exam reveals no gallop and no friction rub. Murmur (1/6 systolic murmur) heard. No carotid bruit   Pulmonary/Chest: Effort normal and breath sounds normal. No respiratory distress. He has no wheezes. He has no rales. Abdominal: Soft. There is no tenderness. Musculoskeletal: He exhibits edema (Trace to 1+ lower extremities). Normal gait and station   Neurological: He is alert and oriented to person, place, and time. No cranial nerve deficit. Skin: Skin is warm and dry. No rash noted. Psychiatric: He has a normal mood and affect. His behavior is normal. Judgment normal.   Nursing note and vitals reviewed. Data:  LUCILLE 3/15/19  This transesophageal echocardiogram revealed:   1.   Severe bi - atrial enlargement  2. Bi - atrial \"smoke\" despite the use of Xarelto  3. Mild aortic and mitral regurgitation  4. Severe tricuspid regurgitation  5. No obvious right atrial mass      Assessment:     Diagnosis Orders   1. Sinoatrial node dysfunction (HCC)     2. Pacemaker     3. Chronic atrial fibrillation ([address]     4. Chronic diastolic heart failure ([address]     5. Essential hypertension     6. NEIDA (obstructive sleep apnea)       Pacemaker check showed adequate battery status @ 3.03 V  Mode: VVIR. Lead impedances are stable. Pacing:   85.3%. Appropriate diagnostics and safety margins noted. Sustained arrythmia:  Chronic atrial fib. Reprogramming for sensitivity and threshold testing. Chronic atrial fibrillation-rate controlled and anticoagulated.  No attempt to restore normal rhythm due to severe biatrial enlargement and biatrial smoke and LUCILLE    Chronic diastolic heart failure NYHA class II, stage III-appears euvolemic on guideline directed medical therapy    Hypertension-well controlled current regimen    Sleep apnea-wearing CPAP regularly    Stable cardiovascular status. No evidence of overt heart failure,angina or dysrhythmia. Plan    Return in about 3 months (around 7/25/2019) for ISAAC. - Weigh daily and report weight gain of 3lbs or more in 24hrs or 5lbs in one week. - Call for increasing shortness of breath or increasing swelling in feet and legs. (This could mean you are retaining too much fluid)  - 2000mg low sodium diet  - Fluid restriction of 1500ml per day (about 6 cups of fluid per day)    Call with any questionsor concerns  Follow up with Sameera Gambino, DO for non cardiac problems  Report any new problems  Cardiovascular Fitness-Exercise as tolerated. Strive for 15 minutes of exercise most days of the week. Cardiac / HealthyDiet  Continue current medications as directed  Continue plan of treatment  It is always recommended that you bring your medicationsbottles with you to each visit - this is for your safety!        ISAAC Jimenez

## 2019-04-29 ENCOUNTER — OFFICE VISIT (OUTPATIENT)
Dept: PRIMARY CARE CLINIC | Age: 84
End: 2019-04-29
Payer: MEDICARE

## 2019-04-29 ENCOUNTER — CARE COORDINATION (OUTPATIENT)
Dept: CARE COORDINATION | Age: 84
End: 2019-04-29

## 2019-04-29 VITALS
BODY MASS INDEX: 36.62 KG/M2 | DIASTOLIC BLOOD PRESSURE: 78 MMHG | TEMPERATURE: 98.2 F | WEIGHT: 255.8 LBS | HEART RATE: 68 BPM | HEIGHT: 70 IN | SYSTOLIC BLOOD PRESSURE: 132 MMHG | OXYGEN SATURATION: 94 %

## 2019-04-29 DIAGNOSIS — J96.11 CHRONIC RESPIRATORY FAILURE WITH HYPOXIA (HCC): ICD-10-CM

## 2019-04-29 DIAGNOSIS — E78.2 MIXED HYPERLIPIDEMIA: ICD-10-CM

## 2019-04-29 DIAGNOSIS — I10 ESSENTIAL HYPERTENSION: Primary | ICD-10-CM

## 2019-04-29 DIAGNOSIS — J44.9 CHRONIC OBSTRUCTIVE PULMONARY DISEASE, UNSPECIFIED COPD TYPE (HCC): ICD-10-CM

## 2019-04-29 DIAGNOSIS — F33.41 RECURRENT MAJOR DEPRESSIVE DISORDER, IN PARTIAL REMISSION (HCC): ICD-10-CM

## 2019-04-29 DIAGNOSIS — I50.32 CHRONIC DIASTOLIC HEART FAILURE (HCC): ICD-10-CM

## 2019-04-29 DIAGNOSIS — G47.33 OSA (OBSTRUCTIVE SLEEP APNEA): ICD-10-CM

## 2019-04-29 DIAGNOSIS — E11.42 TYPE 2 DIABETES MELLITUS WITH DIABETIC POLYNEUROPATHY, WITHOUT LONG-TERM CURRENT USE OF INSULIN (HCC): ICD-10-CM

## 2019-04-29 DIAGNOSIS — I27.20 PULMONARY HYPERTENSION (HCC): ICD-10-CM

## 2019-04-29 PROCEDURE — 99214 OFFICE O/P EST MOD 30 MIN: CPT | Performed by: PEDIATRICS

## 2019-04-29 PROCEDURE — 1036F TOBACCO NON-USER: CPT | Performed by: PEDIATRICS

## 2019-04-29 PROCEDURE — 4040F PNEUMOC VAC/ADMIN/RCVD: CPT | Performed by: PEDIATRICS

## 2019-04-29 PROCEDURE — G8427 DOCREV CUR MEDS BY ELIG CLIN: HCPCS | Performed by: PEDIATRICS

## 2019-04-29 PROCEDURE — 3023F SPIROM DOC REV: CPT | Performed by: PEDIATRICS

## 2019-04-29 PROCEDURE — 1123F ACP DISCUSS/DSCN MKR DOCD: CPT | Performed by: PEDIATRICS

## 2019-04-29 PROCEDURE — G8926 SPIRO NO PERF OR DOC: HCPCS | Performed by: PEDIATRICS

## 2019-04-29 PROCEDURE — G8417 CALC BMI ABV UP PARAM F/U: HCPCS | Performed by: PEDIATRICS

## 2019-04-29 RX ORDER — FLUOXETINE HYDROCHLORIDE 20 MG/1
20 CAPSULE ORAL DAILY
Qty: 90 CAPSULE | Refills: 3 | Status: SHIPPED | OUTPATIENT
Start: 2019-04-29 | End: 2020-06-09

## 2019-04-29 RX ORDER — GLUCOSAMINE HCL/CHONDROITIN SU 500-400 MG
CAPSULE ORAL
Qty: 100 STRIP | Refills: 3 | Status: SHIPPED | OUTPATIENT
Start: 2019-04-29 | End: 2020-02-03

## 2019-04-29 RX ORDER — LANCETS 30 GAUGE
1 EACH MISCELLANEOUS DAILY
Qty: 100 EACH | Refills: 5 | Status: SHIPPED | OUTPATIENT
Start: 2019-04-29

## 2019-04-29 ASSESSMENT — ENCOUNTER SYMPTOMS
BACK PAIN: 0
DIARRHEA: 0
CHEST TIGHTNESS: 0
ABDOMINAL PAIN: 0
WHEEZING: 0
COUGH: 0
NAUSEA: 0
DYSPNEA ASSOCIATED WITH: EXERTION
VOMITING: 0
SHORTNESS OF BREATH: 0
SORE THROAT: 0

## 2019-04-29 NOTE — CARE COORDINATION
Ambulatory Care Coordination Note  4/29/2019  CM Risk Score: 7  Ivone Mortality Risk Score: 67    ACC: Francisca Roa, RN    Summary Note: ACC met with Slick Meade during his appt today. Review:  Slick Meade has not obtained a working meter to check his BS. He did not bring a log today of his weight or BP. Slick Meade has met with a  from [facility] - he stated they will help him with housekeeping needs and he will be going to their facility once a week by PATS transport. Slick Meade is using his cane for ambulation all the time. He stated he has not fallen but has felt he might fall a few times and started using cane regularly. He feels a little stronger but cannot do chores inside or mow his yard. He has a riding mower but does not feel he can manage with it anymore. He would like to find someone to mow. Slick Meade would like to find some inexpensive disposable chux pads he can use at home   Plan:  [facility] discussed need for someone to mow pt's yard - about 2 acres. ACC contacted comm volunteer Zeb neville - he will contact pt about finding someone to mow his yard. ACC contacted [employer] [facility], spoke to [facility] re: status of evaluation of pt's request for homemaker assistance through [facility] marianne Akbar stated pt's housekeeping plan has been submitted to the state today, usually takes a week to get a response. If approved, [facility] will contact pt to set up a schedule for household help and to transport pt to [facility] by PATS once a week. [facility] provided pt with a free Accu-Chek Guide glucometer, MA sent in supplies to pt's pharmacy. [facility] provided pt with new log sheets, instructed several times to log a daily BS, BP, wgt and to bring log to all appointments. Will assess community resources for availability of chux pads through donation or discounted pricing. Slick Meade will contact the office if unable to use or afford the meter and supplies provided today.   He will schedule a 2 mo follow up as inst by PCP, or sooner if emergent symptoms arise. ACC will f/u 10 days. Care Coordination Interventions    Program Enrollment:  Complex Care  Referral from Primary Care Provider:  Yes  Suggested Interventions and Community Resources  Home Care Waiver: In Process (Comment: 4/29/19: Pt's HCB waiver appl is in process per [employer] [facility] Plan submitted to today.)  Pharmacist:  Completed (Comment: 4/29/19: New prescription for meter test strps and lancets today Accu-Chek Guide)  Zone Management Tools: In Process (Comment: 4/29/19: Pt discussed homemaker support. ACC discussed BS, BP and wgt monitoring.)         Goals Addressed                 This Visit's Progress     Self Monitoring   No change     Daily Weights - I will weigh myself as directed - Daily and write down weights  I will notify my provider of any increase in weight by 3 or more pounds in 2 days OR 5 or more pounds in a week. Blood Pressure - I will take my blood pressure as directed - Daily  I will notify my provider of any trends of increasing or decreasing blood pressures over a month period of time. I will notify my provider of any changes in blood pressure associated with symptoms of dizziness, falls, passing out, headache, confusion/change in mental status. Patient Reported Blood Pressure   Patient Reported Blood Pressure 1/3/2019 1/2/2019 1/1/2019   Home Blood Pressure 153/93 141/94 114/82     MyChart Blood Pressure No flowsheet data found. Barriers: none  Plan for overcoming my barriers: N/A  Confidence: 8/10  Anticipated Goal Completion Date: 6/19/19  (extended goal date for [facility] waiver processing, pt has stopped daily self-monitoring)              Prior to Admission medications    Medication Sig Start Date End Date Taking? Authorizing Provider   blood glucose monitor strips Test once daily & as needed for symptoms of irregular blood glucose.  4/29/19   ALISON Butcher, DO   Lancets MISC 1 each by Does not apply route daily Accu Chek Guid3 Lancets 4/29/19   ALISON Chavez DO   FLUoxetine (PROZAC) 20 MG capsule Take 1 capsule by mouth daily Along with the 40 mg tablet 4/29/19   ALISON Chavez DO   celecoxib (CELEBREX) 200 MG capsule Take 200 mg by mouth daily    Historical Provider, MD   cholestyramine (QUESTRAN) 4 g packet Take 1 packet by mouth as needed    Historical Provider, MD   FLUoxetine (PROZAC) 40 MG capsule Take 40 mg by mouth daily Along with the 20 mg tablet    Historical Provider, MD   spironolactone (ALDACTONE) 50 MG tablet Take 1 tablet by mouth daily 3/17/19   Elmo Andino MD   terazosin (HYTRIN) 5 MG capsule Take 1 capsule by mouth nightly 2/25/19   ALISON Chavez DO   metolazone (ZAROXOLYN) 2.5 MG tablet Take 1 tablet by mouth daily 2/11/19   ALISON Chavez DO   Fluticasone-Umeclidin-Vilant (TRELEGY ELLIPTA) 100-62.5-25 MCG/INH AEPB Inhale 1 puff into the lungs daily 12/21/18   Pelon FAGAN Toth, APRN   amLODIPine (NORVASC) 10 MG tablet Take 1 tablet by mouth daily 12/21/18   ISAAC Barcenas   OXYGEN Inhale 2 L into the lungs nightly    Historical Provider, MD   BiPAP Machine MISC by Does not apply route nightly    Historical Provider, MD   cetirizine (ZYRTEC) 10 MG tablet Take 1 tablet by mouth daily 11/17/18   Matt Erickson DO   digoxin (LANOXIN) 125 MCG tablet Take 1 tablet by mouth daily 10/29/18   ALISON Chavez DO   furosemide (LASIX) 40 MG tablet Take 1.5 tablets by mouth daily 10/29/18   ALISON Chavez DO   hydrochlorothiazide (MICROZIDE) 12.5 MG capsule Take 1 capsule by mouth every morning 10/3/18   ALISON Chavez DO   buPROPion (WELLBUTRIN XL) 150 MG extended release tablet Take 2 tablets by mouth every morning 9/7/18   ALISON Chavez DO   lisinopril (PRINIVIL;ZESTRIL) 40 MG tablet TAKE 1 TABLET BY MOUTH DAILY 8/30/18   ALISON Chavez DO   prazosin (MINIPRESS) 2 MG capsule Take 1 capsule by mouth nightly 8/20/18   Cindra ISAAC Mi   carvedilol ([address]) 12.5 MG tablet TAKE 1 TABLET BY MOUTH 2 TIMES DAILY 7/16/18   ISAAC Barrientos   metFORMIN (GLUCOPHAGE) 500 MG tablet Take 1 tablet by mouth 2 times daily (with meals) 5/18/18   B Mclaughlin Filler, DO   Dulaglutide (TRULICITY) 6.64 QB/1.6LI SOPN INJECT 1 PEN EVERY WEEK 4/16/18   ISAAC Tee   vitamin B-12 (CYANOCOBALAMIN) 1000 MCG tablet Take 1,000 mcg by mouth daily    Historical Provider, MD   enzalutamide Francisca Graves) 40 MG capsule Take 4 tablets daily    Historical Provider, MD   rivaroxaban (XARELTO) 20 MG TABS tablet Take 1 tablet by mouth daily (with breakfast) 5/9/17   B Mclaughlin Filler, DO   potassium chloride (KLOR-CON 10) 10 MEQ extended release tablet Take 1 tablet by mouth daily 5/9/17   B Mclaughlin Filler, DO   albuterol sulfate HFA (PROAIR HFA) 108 (90 BASE) MCG/ACT inhaler Inhale 2 puffs into the lungs every 6 hours as needed for Wheezing 5/4/17   B Mclaughlin Filler, DO   leuprolide (LUPRON) 30 MG injection Inject 30 mg into the muscle once Every 4 months    Historical Provider, MD   MYRBETRIQ 25 MG TB24 Take 25 mg by mouth daily  1/25/16   Historical Provider, MD   FISH OIL Take 1 capsule by mouth 2 times daily. Historical Provider, MD       Future Appointments   Date Time Provider Addie Doss   5/28/2019  8:00 PM [facility] BED 3 [facility] Joyce [facility]ds   6/19/2019  3:30 PM [address] [facility] [address], DO Joyce Edouard [place]   7/25/2019 10:00 AM ISAAC Cifuentes [facility] [place]     ,   Diabetes Assessment    Medic Alert ID:  No  Meal Planning:  Avoidance of concentrated sweets   How often do you test your blood sugar?:  Daily   Do you have barriers with adherence to non-pharmacologic self-management interventions?  (Nutrition/Exercise/Self-Monitoring):  No   Have you ever had to go to the ED for symptoms of low blood sugar?:  No       Do you have hyperglycemia symptoms?:  No   Do you have hypoglycemia symptoms?:  No   Blood Sugar Monitoring Regimen:  Not Testing      , Congestive Heart Failure Assessment    Are you currently restricting fluids?:  No Restriction  Do you understand a low sodium diet?:  No (Comment: Discussed today with pt and SO)  Do you understand how to read food labels?:  Yes  How many restaurant meals do you eat per week?:  0  Do you salt your food before tasting it?:  Yes         Symptoms:   CHF associated fatigue: Pos      Symptom course:  improving  Patient-reported weight (lb):  255.8  Weight trend:  decreasing steadily  Salt intake watch compared to last visit:  stable      and   COPD Assessment    Does the patient understand envrionmental exposure?:  Yes  Is the patient able to verbalize Rescue vs. Long Acting medications?:  Yes  Does the patient have a nebulizer?:  No  Does the patient use a space with inhaled medications?:  No            Symptoms:   COPD associated increased fatigue: Pos      Symptom course:  improving  Breathlessness:  exertion  Increase use of rapid acting/rescue inhaled medications?:  No  Change in chronic cough?:  No/At Baseline  Change in sputum?:  No/At Baseline  Sputum characteristics:   (Comment: None)  Self Monitoring - SaO2:  No  Have you had a recent diagnosis of pneumonia either by PCP or at a hospital?:  No

## 2019-04-29 NOTE — PROGRESS NOTES
[address] [facility], [address]  Phone [phone]   Fax [phone]      OFFICE VISIT: 2019    Aarti Blank-: 1935      HPI  Reason For Visit:  Abena Lopez is a 81 y.o. Health Maintenance    1 Month Follow-Up (Patient is here for 1 month follow up); Diabetes (Patient does not check his blood sugar/ Patient states he does not have a meter. ); and Discuss Medications (Patient states he has not been taking the Prozac 40 mg but not the 20mg due to pharmacy stating it was too early to Rory Gonzalez. This has been for about 3 weeks per patient. )      Patient presents for one-month follow-up for multiple health issues    Present Concerns:  He states that he is just not feeling well  He is worried about his wife. She does not want to do anything. She stays in bed all day long. She does not take her antidepressant medication. Life tends to be boring right now. He is only taking 40mg Prozac. Diabetes Mellitus Type 2  Diet compliance:  compliant most of the time  Nutrition Consultation Needed:  no  Med Type:              Metformin 500 bid              trulicity 6.43EO weekly  Medication compliance:  compliant all of the time  Weight trend: fluctuating  Current exercise: no  Checkin times daily  Home blood sugar records: not checking recently  Low BG:  no  Eye exam current (within one year): yes  Checking Feet regularly:  yes - no sores  ACE/ARB:  yes - lisinopril  Aspirin: No: but recommended. Tobacco history: He  reports that he has never smoked.  He has never used smokeless tobacco.    Lab Results   Component Value Date    LABA1C 6.3 (H) 2018    LABA1C 5.7 2018    LABA1C 6.1 (H) 2017     Lab Results   Component Value Date    LABMICR <1.20 2019    CREATININE 0.9 2019       Hypertension:   Medication              Lisinopril 40 mg daily              Carvedilol 12.5 mg twice daily                          Hydrochlorothiazide 12.5 mg daily              Amlodipine 5 mg daily  Medication compliance:  compliant most of the time  Home blood pressure monitoring: Yes - very well controlled. He is adherent to a low sodium diet. Symptoms: none  Laboratory:  Lab Results   Component Value Date    BUN 17 03/25/2019    CREATININE 0.9 03/25/2019       Hyperlipidemia:    Myalgias or GI upset: no   on cholestyramine (Questran)    Lab Results   Component Value Date    CHOL 122 (L) 03/14/2019    TRIG 148 03/14/2019    HDL 37 (L) 03/14/2019    LDLCALC 55 03/14/2019      Lab Results   Component Value Date    ALT <5 (A) 03/25/2019    AST 10 03/25/2019        Atrial Fibrillation:  Medication              Digoxin 125mcg daily              xarelto 20mg daily  Symptoms:  None.       Diastolic heart failure:  Medication:   Aldactone 50 mg daily   Zaroxolyn 2.5 mg daily   Lasix 60 mg daily  Daily weight: every day  Sodium restriction: watching. He has been stable here.       COPD:  He notes that his breathing is much better. Medication:   Trelegy 1 puff daily  Symptoms: as above. Prostate cancer: This is stable       Loose stools  He will try to be regular with use of questran. We also discussed dietary fiber. Barriers To Success: none         height is 5' 10\" (1.778 m) and weight is 255 lb 12.8 oz (116 kg). His temporal temperature is 98.2 °F (36.8 °C). His blood pressure is 132/78 and his pulse is 68. His oxygen saturation is 94%. Body mass index is 36.7 kg/m².     I have reviewed the following with the Mr. Germania Bruner   Lab Review  Orders Only on 03/25/2019   Component Date Value    WBC 03/25/2019 11.6*    RBC 03/25/2019 3.94*    Hemoglobin 03/25/2019 13.2*    Hematocrit 03/25/2019 39.5*    MCV 03/25/2019 100.3*    MCH 03/25/2019 33.5*    MCHC 03/25/2019 33.4     RDW 03/25/2019 12.3     Platelets 97/03/3897 291     MPV 03/25/2019 10.0     Neutrophils % 03/25/2019 81.4*    Lymphocytes % 03/25/2019 9.3*    Monocytes % 03/25/2019 5.5     Eosinophils % 03/25/2019 2.2     Basophils % 03/25/2019 0.5     Neutrophils # 03/25/2019 9.4*    Lymphocytes # 03/25/2019 1.1     Monocytes # 03/25/2019 0.60     Eosinophils # 03/25/2019 0.30     Basophils # 03/25/2019 0.10     Sodium 03/25/2019 136     Potassium 03/25/2019 4.2     Chloride 03/25/2019 94*    CO2 03/25/2019 27     Anion Gap 03/25/2019 15     Glucose 03/25/2019 106     BUN 03/25/2019 17     CREATININE 03/25/2019 0.9     GFR Non- 03/25/2019 >60     Calcium 03/25/2019 10.1     Total Protein 03/25/2019 7.7     Alb 03/25/2019 4.4     Total Bilirubin 03/25/2019 0.6     Alkaline Phosphatase 03/25/2019 58     ALT 03/25/2019 <5*    AST 03/25/2019 10    Admission on 03/13/2019, Discharged on 03/16/2019   Component Date Value    Digoxin Lvl 03/13/2019 0.7     WBC 03/13/2019 9.7     RBC 03/13/2019 4.12*    Hemoglobin 03/13/2019 13.7*    Hematocrit 03/13/2019 40.1*    MCV 03/13/2019 97.3*    MCH 03/13/2019 33.3*    MCHC 03/13/2019 34.2     RDW 03/13/2019 13.2     Platelets 09/64/7088 192     MPV 03/13/2019 10.2     Neutrophils % 03/13/2019 75.1*    Lymphocytes % 03/13/2019 14.0*    Monocytes % 03/13/2019 7.9     Eosinophils % 03/13/2019 2.0     Basophils % 03/13/2019 0.3     Neutrophils # 03/13/2019 7.3     Lymphocytes # 03/13/2019 1.4     Monocytes # 03/13/2019 0.80     Eosinophils # 03/13/2019 0.20     Basophils # 03/13/2019 0.00     Sodium 03/13/2019 139     Potassium reflex Magnesi* 03/13/2019 2.9*    Chloride 03/13/2019 94*    CO2 03/13/2019 33*    Anion Gap 03/13/2019 12     Glucose 03/13/2019 93     BUN 03/13/2019 19     CREATININE 03/13/2019 1.1     GFR Non- 03/13/2019 >60     Calcium 03/13/2019 9.6     QRS Duration 03/13/2019 126     Q-T Interval 03/13/2019 538     QTc Calculation (Bazett) 03/13/2019 523     T Axis 03/13/2019 -129     Troponin 03/13/2019 <0.01     Troponin 03/13/2019 <0.01     Troponin 03/14/2019 28     Anion Gap 03/15/2019 16     Glucose 03/15/2019 111*    BUN 03/15/2019 21     CREATININE 03/15/2019 1.1     GFR Non- 03/15/2019 >60     Calcium 03/15/2019 8.9     Total Protein 03/15/2019 6.3*    Alb 03/15/2019 3.6     Total Bilirubin 03/15/2019 0.7     Alkaline Phosphatase 03/15/2019 49     ALT 03/15/2019 5     AST 03/15/2019 8     POC Glucose 03/14/2019 136*    Performed on 03/14/2019 AccuChek     POC Glucose 03/15/2019 108*    Performed on 03/15/2019 AccuChek     Digoxin Lvl 03/15/2019 0.6     POC Glucose 03/15/2019 115*    Performed on 03/15/2019 AccuChek     POC Glucose 03/15/2019 94     Performed on 03/15/2019 AccuChek     WBC 03/16/2019 10.3     RBC 03/16/2019 3.57*    Hemoglobin 03/16/2019 11.7*    Hematocrit 03/16/2019 34.8*    MCV 03/16/2019 97.5*    MCH 03/16/2019 32.8*    MCHC 03/16/2019 33.6     RDW 03/16/2019 13.0     Platelets 42/30/3152 171     MPV 03/16/2019 10.1     Sodium 03/16/2019 141     Potassium 03/16/2019 2.9*    Chloride 03/16/2019 99     CO2 03/16/2019 30*    Anion Gap 03/16/2019 12     Glucose 03/16/2019 151*    BUN 03/16/2019 15     CREATININE 03/16/2019 1.0     GFR Non- 03/16/2019 >60     Calcium 03/16/2019 8.9     Total Protein 03/16/2019 6.3*    Alb 03/16/2019 3.7     Total Bilirubin 03/16/2019 0.7     Alkaline Phosphatase 03/16/2019 49     ALT 03/16/2019 5     AST 03/16/2019 9     Sodium 03/15/2019 140     Potassium 03/15/2019 3.8     Chloride 03/15/2019 95*    CO2 03/15/2019 35*    Anion Gap 03/15/2019 10     Glucose 03/15/2019 92     BUN 03/15/2019 14     CREATININE 03/15/2019 0.8     GFR Non- 03/15/2019 >60     Calcium 03/15/2019 9.5     Sodium 03/16/2019 143     Potassium 03/16/2019 3.2*    Chloride 03/16/2019 100     CO2 03/16/2019 30*    Anion Gap 03/16/2019 13     Glucose 03/16/2019 140*    BUN 03/16/2019 16     CREATININE 03/16/2019 0.9     GFR Non- Basophils % 11/19/2018 0.5     Neutrophils # 11/19/2018 13.3*    Lymphocytes # 11/19/2018 1.0*    Monocytes # 11/19/2018 0.70     Eosinophils # 11/19/2018 0.40     Basophils # 11/19/2018 0.10     Sodium 11/19/2018 139     Potassium 11/19/2018 4.2     Chloride 11/19/2018 91*    CO2 11/19/2018 29     Anion Gap 11/19/2018 19     Glucose 11/19/2018 119*    BUN 11/19/2018 30*    CREATININE 11/19/2018 0.9     GFR Non- 11/19/2018 >60     Calcium 11/19/2018 10.2     Total Protein 11/19/2018 7.6     Alb 11/19/2018 4.2     Total Bilirubin 11/19/2018 0.7     Alkaline Phosphatase 11/19/2018 46     ALT 11/19/2018 10     AST 11/19/2018 18    Admission on 11/14/2018, Discharged on 11/16/2018   Component Date Value    WBC 11/14/2018 10.9*    RBC 11/14/2018 3.89*    Hemoglobin 11/14/2018 12.9*    Hematocrit 11/14/2018 38.4*    MCV 11/14/2018 98.7*    MCH 11/14/2018 33.2*    MCHC 11/14/2018 33.6     RDW 11/14/2018 13.6     Platelets 26/26/1048 180     MPV 11/14/2018 10.1     QRS Duration 11/14/2018 114     Q-T Interval 11/14/2018 450     QTc Calculation (Bazett) 11/14/2018 440     T Axis 11/14/2018 -94     Troponin 11/14/2018 <0.01     Pro-BNP 11/14/2018 1,405     Sodium 11/14/2018 141     Potassium 11/14/2018 3.6     Chloride 11/14/2018 100     CO2 11/14/2018 32*    Anion Gap 11/14/2018 9     Glucose 11/14/2018 145*    BUN 11/14/2018 13     CREATININE 11/14/2018 0.9     GFR Non- 11/14/2018 >60     Calcium 11/14/2018 9.2     Total Protein 11/14/2018 7.2     Alb 11/14/2018 4.1     Total Bilirubin 11/14/2018 0.8     Alkaline Phosphatase 11/14/2018 51     ALT 11/14/2018 15     AST 11/14/2018 15     pH, Arterial 11/14/2018 7.480*    pCO2, Arterial 11/14/2018 40.0     pO2, Arterial 11/14/2018 64.0*    HCO3, Arterial 11/14/2018 29.8*    Base Excess, Arterial 11/14/2018 5.8*    Hemoglobin, Art, Extended 11/14/2018 12.9*    O2 Sat, Arterial 11/14/2018 92.8     Carboxyhgb, Arterial 11/14/2018 1.9     Methemoglobin, Arterial 11/14/2018 0.6     O2 Content, Arterial 11/14/2018 16.8     O2 Therapy 11/14/2018 Unknown     Potassium, Whole Blood 11/14/2018 3.2     Lactic Acid 11/14/2018 2.6*    Blood Culture, Routine 11/14/2018 No growth after 5 days of incubation.  Culture, Blood 2 11/14/2018 No growth after 5 days of incubation.  Digoxin Lvl 11/14/2018 0.9     Magnesium 11/14/2018 1.9     TSH 11/14/2018 4.110     Lactic Acid 11/14/2018 4.6*    D-Dimer, Quant 11/14/2018 1.79*    Vitamin B-12 11/14/2018 688     Folate 11/14/2018 18.5     Vit D, 25-Hydroxy 11/14/2018 7.7*    PSA 11/14/2018 0.01     Lactic Acid 11/16/2018 2.2*    Sodium 11/16/2018 138     Potassium reflex Magnesi* 11/16/2018 3.0*    Chloride 11/16/2018 94*    CO2 11/16/2018 31*    Anion Gap 11/16/2018 13     Glucose 11/16/2018 134*    BUN 11/16/2018 14     CREATININE 11/16/2018 0.9     GFR Non- 11/16/2018 >60     Calcium 11/16/2018 9.4     WBC 11/16/2018 12.6*    RBC 11/16/2018 3.67*    Hemoglobin 11/16/2018 12.0*    Hematocrit 11/16/2018 35.7*    MCV 11/16/2018 97.3*    MCH 11/16/2018 32.7*    MCHC 11/16/2018 33.6     RDW 11/16/2018 13.2     Platelets 43/72/8585 170     MPV 11/16/2018 9.8     Neutrophils % 11/16/2018 76.4*    Lymphocytes % 11/16/2018 13.1*    Monocytes % 11/16/2018 7.8     Eosinophils % 11/16/2018 1.1     Basophils % 11/16/2018 0.3     Neutrophils # 11/16/2018 9.6*    Lymphocytes # 11/16/2018 1.7     Monocytes # 11/16/2018 1.00*    Eosinophils # 11/16/2018 0.10     Basophils # 11/16/2018 0.00     Magnesium 11/16/2018 1.9      Copies of these are in the chart. Current Outpatient Medications   Medication Sig Dispense Refill    blood glucose monitor strips Test once daily & as needed for symptoms of irregular blood glucose.  100 strip 3    Lancets MISC 1 each by Does not apply route daily Accu Chek Guid3 Lancets 100 each 5    FLUoxetine (PROZAC) 20 MG capsule Take 1 capsule by mouth daily Along with the 40 mg tablet 90 capsule 3    celecoxib (CELEBREX) 200 MG capsule Take 200 mg by mouth daily      cholestyramine (QUESTRAN) 4 g packet Take 1 packet by mouth as needed      FLUoxetine (PROZAC) 40 MG capsule Take 40 mg by mouth daily Along with the 20 mg tablet      spironolactone (ALDACTONE) 50 MG tablet Take 1 tablet by mouth daily 30 tablet 3    terazosin (HYTRIN) 5 MG capsule Take 1 capsule by mouth nightly 90 capsule 3    metolazone (ZAROXOLYN) 2.5 MG tablet Take 1 tablet by mouth daily 30 tablet 5    Fluticasone-Umeclidin-Vilant (TRELEGY ELLIPTA) 100-62.5-25 MCG/INH AEPB Inhale 1 puff into the lungs daily 1 each 11    amLODIPine (NORVASC) 10 MG tablet Take 1 tablet by mouth daily 30 tablet 11    OXYGEN Inhale 2 L into the lungs nightly      BiPAP Machine MISC by Does not apply route nightly      cetirizine (ZYRTEC) 10 MG tablet Take 1 tablet by mouth daily 30 tablet 0    digoxin (LANOXIN) 125 MCG tablet Take 1 tablet by mouth daily 90 tablet 3    furosemide (LASIX) 40 MG tablet Take 1.5 tablets by mouth daily 135 tablet 3    hydrochlorothiazide (MICROZIDE) 12.5 MG capsule Take 1 capsule by mouth every morning 90 capsule 3    buPROPion (WELLBUTRIN XL) 150 MG extended release tablet Take 2 tablets by mouth every morning 180 tablet 3    lisinopril (PRINIVIL;ZESTRIL) 40 MG tablet TAKE 1 TABLET BY MOUTH DAILY 90 tablet 3    prazosin (MINIPRESS) 2 MG capsule Take 1 capsule by mouth nightly 90 capsule 3    carvedilol (COREG) 12.5 MG tablet TAKE 1 TABLET BY MOUTH 2 TIMES DAILY 180 tablet 3    metFORMIN (GLUCOPHAGE) 500 MG tablet Take 1 tablet by mouth 2 times daily (with meals) 60 tablet 11    Dulaglutide (TRULICITY) 7.84 PN/1.1CU SOPN INJECT 1 PEN EVERY WEEK 4 pen 11    vitamin B-12 (CYANOCOBALAMIN) 1000 MCG tablet Take 1,000 mcg by mouth daily      enzalutamide (XTANDI) 40 MG capsule Take 4 tablets daily  rivaroxaban (XARELTO) 20 MG TABS tablet Take 1 tablet by mouth daily (with breakfast) 30 tablet 11    potassium chloride (KLOR-CON 10) 10 MEQ extended release tablet Take 1 tablet by mouth daily 90 tablet 3    albuterol sulfate HFA (PROAIR HFA) 108 (90 BASE) MCG/ACT inhaler Inhale 2 puffs into the lungs every 6 hours as needed for Wheezing 8.5 Inhaler 5    leuprolide (LUPRON) 30 MG injection Inject 30 mg into the muscle once Every 4 months      MYRBETRIQ 25 MG TB24 Take 25 mg by mouth daily       FISH OIL Take 1 capsule by mouth 2 times daily. No current facility-administered medications for this visit. Allergies: Patient has no known allergies. Past Medical History:   Diagnosis Date    Anxiety     Arthritis     Atrial fibrillation ([address]     Blood circulation, collateral     Cancer (HCC)     prostate, had treatment    Cellulitis     left leg    CHF (congestive heart failure) (HCC)     Chronic kidney disease     COPD (chronic obstructive pulmonary disease) (HCC)     Depression     Hyperlipidemia     Hypertension     Neuromuscular disorder (HCC)     Neuropathy     Other disorders of kidney and ureter in diseases classified elsewhere     Palliative care patient 11/15/2018    Pneumonia     Type II or unspecified type diabetes mellitus without mention of complication, not stated as uncontrolled        Family History   Problem Relation Age of Onset    Heart Attack Mother     Cancer Father        Past Surgical History:   Procedure Laterality Date    COLONOSCOPY      EYE SURGERY      EYE SURGERY      HERNIA REPAIR      umbilical with Dr Malia Tillman         Social History     Tobacco Use    Smoking status: Never Smoker    Smokeless tobacco: Never Used   Substance Use Topics    Alcohol use: No        Review of Systems   Constitutional: Positive for fatigue. Negative for chills and fever (improving.).    HENT: tenderness. There is no rebound, no guarding and no CVA tenderness. Abdominal obesity is present   Genitourinary:   Genitourinary Comments: Exam deferred   Musculoskeletal: Normal range of motion. He exhibits no edema or tenderness. Wearing compression stockings. Lymphadenopathy:     He has no cervical adenopathy. Neurological: He is alert and oriented to person, place, and time. He has normal strength. No sensory deficit (no numbness or tingling). Skin: Skin is warm and dry. No rash noted. Psychiatric: He has a normal mood and affect. ASSESSMENT      ICD-10-CM    1. Essential hypertension I10    2. Type 2 diabetes mellitus with diabetic polyneuropathy, without long-term current use of insulin (formerly [facility]) E11.42 blood glucose monitor strips     Lancets MISC   3. Mixed hyperlipidemia E78.2    4. Chronic diastolic heart failure (HCC) I50.32    5. Chronic respiratory failure with hypoxia (formerly [facility]) J96.11    6. Chronic obstructive pulmonary disease, unspecified COPD type (HCC) J44.9    7. NEIDA (obstructive sleep apnea) G47.33    8. Pulmonary hypertension (HCC) I27.20    9. Recurrent major depressive disorder, in partial remission ([facility] F33.41        PLAN      ICD-10-CM    1. Essential hypertension I10    2. Type 2 diabetes mellitus with diabetic polyneuropathy, without long-term current use of insulin (formerly [facility]) E11.42 blood glucose monitor strips     Lancets MISC   3. Mixed hyperlipidemia E78.2    4. Chronic diastolic heart failure (HCC) I50.32    5. Chronic respiratory failure with hypoxia (formerly [facility]) J96.11    6. Chronic obstructive pulmonary disease, unspecified COPD type (HCC) J44.9    7. NEIDA (obstructive sleep apnea) G47.33    8. Pulmonary hypertension (HCC) I27.20    9. Recurrent major depressive disorder, in partial remission ([facility] F33.41        No orders of the defined types were placed in this encounter. No follow-ups on file.

## 2019-04-30 ENCOUNTER — CARE COORDINATION (OUTPATIENT)
Dept: CARE COORDINATION | Age: 84
End: 2019-04-30

## 2019-04-30 NOTE — CARE COORDINATION
Ambulatory Care Coordination Note  4/30/2019  CM Risk Score: 7  Ivone Mortality Risk Score: 67    ACC: Yoselin Selby, RN    Summary Note: ACC called back to pt with updated support information. Informed pt that community volunteer, Kyleigh Vergara, will contact him about someone to do his yard mowing. Informed pt that CHW recommended he utilize test company or [employer] to obtain inexpensive disposable underpads or purchase a set of washable underpads that are re-usable. Jeannette Villafana said he is getting his inhalers through local pharmacy now, Part D is covering them well. Urged pt to log daily wgt, BP and BS. He voiced understanding. Care Coordination Interventions    Program Enrollment:  Complex Care  Referral from Primary Care Provider:  Yes  Suggested Interventions and Community Resources  Home Care Waiver: In Process (Comment: 4/29/19: Pt's HCB waiver appl is in process per [employer]. Plan submitted to today.)  Pharmacist:  Completed (Comment: 4/29/19: New prescription for meter test strps and lancets today Accu-Chek Guide)  Other Services: In Process (Comment: 4/30/19: Spoke to pt re: yard work support and finding disposable underpads)  Zone Management Tools: In Process (Comment: 4/29/19: Pt discussed homemaker support. ACC discussed BS, BP and wgt monitoring.)         Goals Addressed                 This Visit's Progress     Self Monitoring   No change     Daily Weights - I will weigh myself as directed - Daily and write down weights  I will notify my provider of any increase in weight by 3 or more pounds in 2 days OR 5 or more pounds in a week. Blood Pressure - I will take my blood pressure as directed - Daily  I will notify my provider of any trends of increasing or decreasing blood pressures over a month period of time. I will notify my provider of any changes in blood pressure associated with symptoms of dizziness, falls, passing out, headache, confusion/change in mental status.     Patient Reported digoxin (LANOXIN) 125 MCG tablet Take 1 tablet by mouth daily 10/29/18   ALISON Olivera DO   furosemide (LASIX) 40 MG tablet Take 1.5 tablets by mouth daily 10/29/18   ALISON Olivera DO   hydrochlorothiazide (MICROZIDE) 12.5 MG capsule Take 1 capsule by mouth every morning 10/3/18   ALISON Olivera DO   buPROPion (WELLBUTRIN XL) 150 MG extended release tablet Take 2 tablets by mouth every morning 9/7/18   ALISON Olivera DO   lisinopril (PRINIVIL;ZESTRIL) 40 MG tablet TAKE 1 TABLET BY MOUTH DAILY 8/30/18   ALISON Olivera DO   prazosin (MINIPRESS) 2 MG capsule Take 1 capsule by mouth nightly 8/20/18   [facility], APRNEENA   carvedilol (COREG) 12.5 MG tablet TAKE 1 TABLET BY MOUTH 2 TIMES DAILY 7/16/18   ISAAC Danielle   metFORMIN (GLUCOPHAGE) 500 MG tablet Take 1 tablet by mouth 2 times daily (with meals) 5/18/18   ALISON Olivera DO   Dulaglutide (TRULICITY) 0.01 JE/7.0HG SOPN INJECT 1 PEN EVERY WEEK 4/16/18   [facility], APRNEENA   vitamin B-12 (CYANOCOBALAMIN) 1000 MCG tablet Take 1,000 mcg by mouth daily    Historical Provider, MD   enzalutamide Bang Rapp) 40 MG capsule Take 4 tablets daily    Historical Provider, MD   rivaroxaban (XARELTO) 20 MG TABS tablet Take 1 tablet by mouth daily (with breakfast) 5/9/17   ALISON Olivera DO   potassium chloride (KLOR-CON 10) 10 MEQ extended release tablet Take 1 tablet by mouth daily 5/9/17   ALISON Olivera DO   albuterol sulfate HFA (PROAIR HFA) 108 (90 BASE) MCG/ACT inhaler Inhale 2 puffs into the lungs every 6 hours as needed for Wheezing 5/4/17   ALISON Olivera DO   leuprolide (LUPRON) 30 MG injection Inject 30 mg into the muscle once Every 4 months    Historical Provider, MD   MYRBETRIQ 25 MG TB24 Take 25 mg by mouth daily  1/25/16   Historical Provider, MD   FISH OIL Take 1 capsule by mouth 2 times daily.     Historical Provider, MD       Future Appointments   Date Time Provider Department

## 2019-05-24 DIAGNOSIS — E11.42 TYPE 2 DIABETES MELLITUS WITH DIABETIC POLYNEUROPATHY, WITHOUT LONG-TERM CURRENT USE OF INSULIN (HCC): ICD-10-CM

## 2019-05-24 NOTE — TELEPHONE ENCOUNTER
Received fax from pharmacy requesting refill on pts medication(s). Pt was last seen in office on 4/29/2019  and has a follow up scheduled for 6/19/2019. Will send request to  Dr. Deyanira Vaughan  for patient.      Requested Prescriptions     Pending Prescriptions Disp Refills    metFORMIN (GLUCOPHAGE) 500 MG tablet [Pharmacy Med Name: METFORMIN  MG TABLET] 60 tablet 11     Sig: TAKE 1 TABLET BY MOUTH TWICE A DAY WITH FOOD

## 2019-05-31 RX ORDER — CELECOXIB 200 MG/1
200 CAPSULE ORAL 2 TIMES DAILY
Qty: 180 CAPSULE | Refills: 3 | Status: SHIPPED | OUTPATIENT
Start: 2019-05-31 | End: 2019-06-19 | Stop reason: DRUGHIGH

## 2019-06-05 PROBLEM — G47.34 NOCTURNAL HYPOXEMIA: Status: ACTIVE | Noted: 2019-06-05

## 2019-06-05 PROBLEM — J43.2 CENTRILOBULAR EMPHYSEMA (HCC): Status: ACTIVE | Noted: 2019-06-05

## 2019-06-06 RX ORDER — SPIRONOLACTONE 50 MG/1
TABLET, FILM COATED ORAL
Qty: 30 TABLET | Refills: 5 | Status: SHIPPED | OUTPATIENT
Start: 2019-06-06 | End: 2020-02-04

## 2019-06-10 DIAGNOSIS — F33.1 MODERATE EPISODE OF RECURRENT MAJOR DEPRESSIVE DISORDER (HCC): ICD-10-CM

## 2019-06-10 RX ORDER — FLUOXETINE HYDROCHLORIDE 20 MG/1
20 CAPSULE ORAL DAILY
Qty: 90 CAPSULE | Refills: 3 | Status: SHIPPED | OUTPATIENT
Start: 2019-06-10 | End: 2019-06-19 | Stop reason: SDUPTHER

## 2019-06-10 NOTE — TELEPHONE ENCOUNTER
Received fax from pharmacy requesting refill on pts medication(s). Pt was last seen in office on 4/29/2019  and has a follow up scheduled for 6/19/2019. Will send request to  Dr. Robert Edwards  for patient.      Requested Prescriptions     Pending Prescriptions Disp Refills    FLUoxetine (PROZAC) 20 MG capsule [Pharmacy Med Name: FLUOXETINE HCL 20 MG CAPSULE] 90 capsule 3     Sig: Take 1 capsule by mouth daily In addition to 40mg to make 60mg daily

## 2019-06-19 ENCOUNTER — TELEPHONE (OUTPATIENT)
Dept: CARDIAC SURGERY | Facility: CLINIC | Age: 84
End: 2019-06-19

## 2019-06-19 ENCOUNTER — OFFICE VISIT (OUTPATIENT)
Dept: PRIMARY CARE CLINIC | Age: 84
End: 2019-06-19
Payer: MEDICARE

## 2019-06-19 ENCOUNTER — TELEPHONE (OUTPATIENT)
Dept: PRIMARY CARE CLINIC | Age: 84
End: 2019-06-19

## 2019-06-19 VITALS
WEIGHT: 254.5 LBS | DIASTOLIC BLOOD PRESSURE: 56 MMHG | TEMPERATURE: 97.5 F | OXYGEN SATURATION: 94 % | HEART RATE: 61 BPM | SYSTOLIC BLOOD PRESSURE: 108 MMHG | HEIGHT: 70 IN | BODY MASS INDEX: 36.43 KG/M2

## 2019-06-19 DIAGNOSIS — I10 ESSENTIAL HYPERTENSION: ICD-10-CM

## 2019-06-19 DIAGNOSIS — I27.20 PULMONARY HYPERTENSION (HCC): ICD-10-CM

## 2019-06-19 DIAGNOSIS — E78.2 MIXED HYPERLIPIDEMIA: ICD-10-CM

## 2019-06-19 DIAGNOSIS — E11.42 TYPE 2 DIABETES MELLITUS WITH DIABETIC POLYNEUROPATHY, WITHOUT LONG-TERM CURRENT USE OF INSULIN (HCC): ICD-10-CM

## 2019-06-19 DIAGNOSIS — J96.11 CHRONIC RESPIRATORY FAILURE WITH HYPOXIA (HCC): Primary | ICD-10-CM

## 2019-06-19 DIAGNOSIS — R53.83 FATIGUE, UNSPECIFIED TYPE: Primary | ICD-10-CM

## 2019-06-19 DIAGNOSIS — J44.9 CHRONIC OBSTRUCTIVE PULMONARY DISEASE, UNSPECIFIED COPD TYPE (HCC): ICD-10-CM

## 2019-06-19 DIAGNOSIS — D53.9 MACROCYTIC ANEMIA: ICD-10-CM

## 2019-06-19 DIAGNOSIS — I48.20 CHRONIC ATRIAL FIBRILLATION (HCC): ICD-10-CM

## 2019-06-19 DIAGNOSIS — C61 PROSTATE CANCER (HCC): ICD-10-CM

## 2019-06-19 DIAGNOSIS — I50.32 CHRONIC DIASTOLIC HEART FAILURE (HCC): ICD-10-CM

## 2019-06-19 DIAGNOSIS — F33.1 MODERATE EPISODE OF RECURRENT MAJOR DEPRESSIVE DISORDER (HCC): ICD-10-CM

## 2019-06-19 DIAGNOSIS — G47.33 OSA (OBSTRUCTIVE SLEEP APNEA): ICD-10-CM

## 2019-06-19 PROBLEM — Z51.5 PALLIATIVE CARE PATIENT: Status: RESOLVED | Noted: 2018-11-15 | Resolved: 2019-06-19

## 2019-06-19 PROBLEM — R79.89 ELEVATED D-DIMER: Status: RESOLVED | Noted: 2018-11-15 | Resolved: 2019-06-19

## 2019-06-19 PROCEDURE — G8926 SPIRO NO PERF OR DOC: HCPCS | Performed by: PEDIATRICS

## 2019-06-19 PROCEDURE — G8417 CALC BMI ABV UP PARAM F/U: HCPCS | Performed by: PEDIATRICS

## 2019-06-19 PROCEDURE — 99214 OFFICE O/P EST MOD 30 MIN: CPT | Performed by: PEDIATRICS

## 2019-06-19 PROCEDURE — 3023F SPIROM DOC REV: CPT | Performed by: PEDIATRICS

## 2019-06-19 PROCEDURE — 1123F ACP DISCUSS/DSCN MKR DOCD: CPT | Performed by: PEDIATRICS

## 2019-06-19 PROCEDURE — 1036F TOBACCO NON-USER: CPT | Performed by: PEDIATRICS

## 2019-06-19 PROCEDURE — 4040F PNEUMOC VAC/ADMIN/RCVD: CPT | Performed by: PEDIATRICS

## 2019-06-19 PROCEDURE — G8427 DOCREV CUR MEDS BY ELIG CLIN: HCPCS | Performed by: PEDIATRICS

## 2019-06-19 RX ORDER — POTASSIUM CHLORIDE 750 MG/1
10 TABLET, FILM COATED, EXTENDED RELEASE ORAL DAILY
Qty: 90 TABLET | Refills: 3 | Status: SHIPPED | OUTPATIENT
Start: 2019-06-19

## 2019-06-19 RX ORDER — AMLODIPINE BESYLATE 5 MG/1
5 TABLET ORAL DAILY
Qty: 90 TABLET | Refills: 3 | Status: SHIPPED | OUTPATIENT
Start: 2019-06-19 | End: 2019-12-06 | Stop reason: SDUPTHER

## 2019-06-19 RX ORDER — M-VIT,TX,IRON,MINS/CALC/FOLIC 27MG-0.4MG
1 TABLET ORAL DAILY
COMMUNITY
End: 2021-05-24

## 2019-06-19 ASSESSMENT — ENCOUNTER SYMPTOMS
NAUSEA: 0
COUGH: 0
SORE THROAT: 0
VOMITING: 0
WHEEZING: 0
CHEST TIGHTNESS: 0
DIARRHEA: 1
BACK PAIN: 0
ABDOMINAL PAIN: 0
SHORTNESS OF BREATH: 0

## 2019-06-19 NOTE — TELEPHONE ENCOUNTER
Dr. Allen referred this patient to pulmonology and patient has cancelled and no showed with them. Called and spoke with Dr. Grover's nurse to let them know.

## 2019-06-19 NOTE — PROGRESS NOTES
[address] [facility], [address]  Phone [phone]   Fax [phone]      OFFICE VISIT: 2019    Nimo Blank-: 1935      HPI  Reason For Visit:  Renate Rogers is a 81 y.o. Health Maintenance    Follow-up (2 month); Hypertension (hasn't been checking his bp lately. ); Diabetes (hasn't been checking this either. ); Fatigue (Pt reports he feels weak and tired all the time. Been this way for a while now. Can barely get around. Pt reports no SOB or chest pain. Does have COPD so does get SOB with exertion. ); and Insomnia (doesn't get enough sleep. Wakes up at night. )      Patient presents for follow-up. His predominant symptom is fatigue  He states that he does not feel like he can hardly keep getting around. He denies any shortness of breath or chest pain. He does have some shortness of breath with exertion but this is baseline due to his COPD and he does not feel that this is playing a role. He is sleeping with his Bi-Pap device all night long   His mask is leaking a lot and is making noise. He states that he is not getting adequate rest  Bed 9:00-10:00  Up 9:00-10:00  He does wake up frequently during the night. He has daytime hypersomnolence. Diabetes Mellitus Type 2  Diet compliance:  compliant most of the time  Nutrition Consultation Needed:  no  Med Type:              Metformin 500 bid              trulicity 9.15IW weekly  Medication compliance:  compliant all of the time  Weight trend: fluctuating  Current exercise: no  Checking: randomly  Home blood sugar records: not checking recently  Low BG:  no  Eye exam current (within one year): yes  Checking Feet regularly:  yes - no sores  ACE/ARB:  yes - lisinopril  Aspirin: No: but recommended. Tobacco history: He  reports that he has never smoked.  He has never used smokeless tobacco.    Lab Results   Component Value Date    LABA1C 6.3 (H) 2018    LABA1C 5.7 2018    LABA1C 6.1 (H) 2017 Lab Results   Component Value Date    LABMICR <1.20 03/14/2019    CREATININE 0.9 03/25/2019       Hypertension:   BP today was   BP Readings from Last 1 Encounters:   06/19/19 (!) 108/56      Recent BP readings:    BP Readings from Last 3 Encounters:   06/19/19 (!) 108/56   04/29/19 132/78   04/25/19 118/78     Medication              Lisinopril 40 mg daily              Carvedilol 12.5 mg twice daily                          Hydrochlorothiazide 12.5 mg daily              Amlodipine 5 mg daily  Medication compliance:  compliant most of the time  Home blood pressure monitoring: Yes - on occasion and controlled. He is adherent to a low sodium diet. Symptoms: none  Laboratory:  Lab Results   Component Value Date    BUN 17 03/25/2019    CREATININE 0.9 03/25/2019       Hyperlipidemia:   Medication:   cholestyramine (Questran) he takes this sporadically. Low Fat, Low Choleterol Diet:  yes - to some degree, but sneaks. Myalgias or GI upset: no  The patient exercises rarely. Laboratory:    Lab Results   Component Value Date    CHOL 122 (L) 03/14/2019    TRIG 148 03/14/2019    HDL 37 (L) 03/14/2019    LDLCALC 55 03/14/2019      Lab Results   Component Value Date    ALT <5 (A) 03/25/2019    AST 10 03/25/2019       Atrial Fibrillation:  Medication              Digoxin 125mcg daily              xarelto 20mg daily  Symptoms:  None.        Diastolic heart failure:  Medication:              Aldactone 50 mg daily              Zaroxolyn 2.5 mg daily              Lasix 60 mg daily  Daily weight: every day  Sodium restriction: watching. He has been stable here.        COPD:  He notes that his breathing is much better. Medication:              Trelegy 1 puff daily  Symptoms: as above. Barriers To Success: financial       height is 5' 10\" (1.778 m) and weight is 254 lb 8 oz (115.4 kg). His temporal temperature is 97.5 °F (36.4 °C). His blood pressure is 108/56 (abnormal) and his pulse is 61.  His oxygen saturation is  Sodium 03/13/2019 139     Potassium reflex Magnesi* 03/13/2019 2.9*    Chloride 03/13/2019 94*    CO2 03/13/2019 33*    Anion Gap 03/13/2019 12     Glucose 03/13/2019 93     BUN 03/13/2019 19     CREATININE 03/13/2019 1.1     GFR Non- 03/13/2019 >60     Calcium 03/13/2019 9.6     QRS Duration 03/13/2019 126     Q-T Interval 03/13/2019 538     QTc Calculation (Bazett) 03/13/2019 523     T Axis 03/13/2019 -129     Troponin 03/13/2019 <0.01     Troponin 03/13/2019 <0.01     Troponin 03/14/2019 <0.01     Magnesium 03/13/2019 1.9     Cholesterol, Total 03/14/2019 122*    Triglycerides 03/14/2019 148     HDL 03/14/2019 37*    LDL Calculated 03/14/2019 55     WBC 03/14/2019 9.8     RBC 03/14/2019 3.71*    Hemoglobin 03/14/2019 12.2*    Hematocrit 03/14/2019 36.1*    MCV 03/14/2019 97.3*    MCH 03/14/2019 32.9*    MCHC 03/14/2019 33.8     RDW 03/14/2019 13.0     Platelets 09/38/4304 187     MPV 03/14/2019 10.4     POC Glucose 03/13/2019 150*    Performed on 03/13/2019 AccuChek     QRS Duration 03/14/2019 118     Q-T Interval 03/14/2019 466     QTc Calculation (Bazett) 03/14/2019 467     T Axis 03/14/2019 -86     Potassium 03/14/2019 3.3*    Potassium 03/14/2019 2.9*    POC Glucose 03/14/2019 112*    Performed on 03/14/2019 AccuChek     Sodium, Ur 03/14/2019 91.0     Aldosterone 03/14/2019 13.0     Microalbumin, Random Uri* 03/14/2019 <1.20     Creatinine, Ur 03/14/2019 17.6     Microalbumin Creatinine * 03/14/2019 see below     Color, UA 03/14/2019 YELLOW     Clarity, UA 03/14/2019 Clear     Glucose, Ur 03/14/2019 Negative     Bilirubin Urine 03/14/2019 Negative     Ketones, Urine 03/14/2019 Negative     Specific Gravity, UA 03/14/2019 1.010     Blood, Urine 03/14/2019 Negative     pH, UA 03/14/2019 6.0     Protein, UA 03/14/2019 Negative     Urobilinogen, Urine 03/14/2019 0.2     Nitrite, Urine 03/14/2019 Negative     Leukocyte Esterase, Urine  Sodium 03/15/2019 140     Potassium 03/15/2019 3.8     Chloride 03/15/2019 95*    CO2 03/15/2019 35*    Anion Gap 03/15/2019 10     Glucose 03/15/2019 92     BUN 03/15/2019 14     CREATININE 03/15/2019 0.8     GFR Non- 03/15/2019 >60     Calcium 03/15/2019 9.5     Left Ventricular Ejectio* 03/15/2019 55     LVEF MODALITY 03/15/2019 CATH     Sodium 03/16/2019 143     Potassium 03/16/2019 3.2*    Chloride 03/16/2019 100     CO2 03/16/2019 30*    Anion Gap 03/16/2019 13     Glucose 03/16/2019 140*    BUN 03/16/2019 16     CREATININE 03/16/2019 0.9     GFR Non- 03/16/2019 >60     Calcium 03/16/2019 8.9    Orders Only on 12/21/2018   Component Date Value    WBC 12/21/2018 9.0     RBC 12/21/2018 3.96*    Hemoglobin 12/21/2018 12.7*    Hematocrit 12/21/2018 38.4*    MCV 12/21/2018 97.0*    MCH 12/21/2018 32.1*    MCHC 12/21/2018 33.1     RDW 12/21/2018 12.6     Platelets 21/30/8685 189     MPV 12/21/2018 10.7     Neutrophils % 12/21/2018 75.9*    Lymphocytes % 12/21/2018 12.1*    Monocytes % 12/21/2018 7.3     Eosinophils % 12/21/2018 3.0     Basophils % 12/21/2018 0.6     Neutrophils # 12/21/2018 6.8     Lymphocytes # 12/21/2018 1.1     Monocytes # 12/21/2018 0.70     Eosinophils # 12/21/2018 0.30     Basophils # 12/21/2018 0.10     Sodium 12/21/2018 142     Potassium 12/21/2018 3.7     Chloride 12/21/2018 94*    CO2 12/21/2018 25     Anion Gap 12/21/2018 23*    Glucose 12/21/2018 168*    BUN 12/21/2018 25*    CREATININE 12/21/2018 0.9     GFR Non- 12/21/2018 >60     Calcium 12/21/2018 9.7     Total Protein 12/21/2018 7.2     Alb 12/21/2018 4.1     Total Bilirubin 12/21/2018 0.9     Alkaline Phosphatase 12/21/2018 42     ALT 12/21/2018 9     AST 12/21/2018 12     PSA 12/21/2018 0.01     T4 Free 12/21/2018 1.2     TSH 12/21/2018 3.690     Cholesterol, Total 12/21/2018 141*    Triglycerides 12/21/2018 140     HDL 12/21/2018 46*    LDL Calculated 12/21/2018 67     Hemoglobin A1C 12/21/2018 6.3*    Digoxin Lvl 12/21/2018 0.9     Pro-BNP 12/21/2018 799      Copies of these are in the chart. Current Outpatient Medications   Medication Sig Dispense Refill    potassium chloride (KLOR-CON 10) 10 MEQ extended release tablet Take 1 tablet by mouth daily 90 tablet 3    Multiple Vitamins-Minerals (THERAPEUTIC MULTIVITAMIN-MINERALS) tablet Take 1 tablet by mouth daily      amLODIPine (NORVASC) 5 MG tablet Take 1 tablet by mouth daily 90 tablet 3    spironolactone (ALDACTONE) 50 MG tablet TAKE 1 TABLET BY MOUTH EVERY DAY 30 tablet 5    metFORMIN (GLUCOPHAGE) 500 MG tablet Take 1 tablet by mouth 2 times daily (with meals) 180 tablet 3    blood glucose monitor strips Test once daily & as needed for symptoms of irregular blood glucose.  100 strip 3    Lancets MISC 1 each by Does not apply route daily Accu Chek Guid3 Lancets 100 each 5    FLUoxetine (PROZAC) 20 MG capsule Take 1 capsule by mouth daily Along with the 40 mg tablet 90 capsule 3    celecoxib (CELEBREX) 200 MG capsule Take 200 mg by mouth daily      cholestyramine (QUESTRAN) 4 g packet Take 1 packet by mouth as needed      FLUoxetine (PROZAC) 40 MG capsule Take 40 mg by mouth daily Along with the 20 mg tablet      metolazone (ZAROXOLYN) 2.5 MG tablet Take 1 tablet by mouth daily 30 tablet 5    Fluticasone-Umeclidin-Vilant (TRELEGY ELLIPTA) 100-62.5-25 MCG/INH AEPB Inhale 1 puff into the lungs daily 1 each 11    OXYGEN Inhale 2 L into the lungs nightly      BiPAP Machine MISC by Does not apply route nightly      cetirizine (ZYRTEC) 10 MG tablet Take 1 tablet by mouth daily 30 tablet 0    digoxin (LANOXIN) 125 MCG tablet Take 1 tablet by mouth daily 90 tablet 3    furosemide (LASIX) 40 MG tablet Take 1.5 tablets by mouth daily 135 tablet 3    hydrochlorothiazide (MICROZIDE) 12.5 MG capsule Take 1 capsule by mouth every morning 90 capsule 3    buPROPion (WELLBUTRIN XL) 150 MG extended release tablet Take 2 tablets by mouth every morning 180 tablet 3    lisinopril (PRINIVIL;ZESTRIL) 40 MG tablet TAKE 1 TABLET BY MOUTH DAILY 90 tablet 3    prazosin (MINIPRESS) 2 MG capsule Take 1 capsule by mouth nightly 90 capsule 3    carvedilol (COREG) 12.5 MG tablet TAKE 1 TABLET BY MOUTH 2 TIMES DAILY 180 tablet 3    Dulaglutide (TRULICITY) 0.13 LY/6.5JL SOPN INJECT 1 PEN EVERY WEEK 4 pen 11    vitamin B-12 (CYANOCOBALAMIN) 1000 MCG tablet Take 1,000 mcg by mouth daily      enzalutamide (XTANDI) 40 MG capsule Take 4 tablets daily      rivaroxaban (XARELTO) 20 MG TABS tablet Take 1 tablet by mouth daily (with breakfast) 30 tablet 11    albuterol sulfate HFA (PROAIR HFA) 108 (90 BASE) MCG/ACT inhaler Inhale 2 puffs into the lungs every 6 hours as needed for Wheezing 8.5 Inhaler 5    leuprolide (LUPRON) 30 MG injection Inject 30 mg into the muscle once Every 4 months      MYRBETRIQ 25 MG TB24 Take 25 mg by mouth daily       FISH OIL Take 1 capsule by mouth 2 times daily.  terazosin (HYTRIN) 5 MG capsule Take 1 capsule by mouth nightly 90 capsule 3     No current facility-administered medications for this visit. Allergies: Patient has no known allergies.      Past Medical History:   Diagnosis Date    Anxiety     Arthritis     Atrial fibrillation ([facility] [address]     Blood circulation, collateral     Cancer (HCC)     prostate, had treatment    Cellulitis     left leg    CHF (congestive heart failure) (HCC)     Chronic kidney disease     COPD (chronic obstructive pulmonary disease) (HCC)     Depression     Hyperlipidemia     Hypertension     Neuromuscular disorder (HCC)     Neuropathy     Other disorders of kidney and ureter in diseases classified elsewhere     Palliative care patient 11/15/2018    Pneumonia     Type II or unspecified type diabetes mellitus without mention of complication, not stated as uncontrolled        Family History   Problem Relation Age of Onset    Heart Attack Mother     Cancer Father        Past Surgical History:   Procedure Laterality Date    COLONOSCOPY      EYE SURGERY      EYE SURGERY      HERNIA REPAIR      umbilical with Dr Celia Crawford         Social History     Tobacco Use    Smoking status: Never Smoker    Smokeless tobacco: Never Used   Substance Use Topics    Alcohol use: No        Review of Systems   Constitutional: Positive for fatigue. Negative for chills and fever (improving.). HENT: Negative for congestion, ear pain and sore throat. Eyes: Negative for visual disturbance. Respiratory: Negative for cough, chest tightness, shortness of breath and wheezing. Cardiovascular: Negative for chest pain, palpitations and leg swelling. Gastrointestinal: Positive for diarrhea (when he eats out.). Negative for abdominal pain, nausea and vomiting. Endocrine: Negative for polyuria. Genitourinary: Negative for frequency and urgency. Musculoskeletal: Negative for back pain and neck pain. Skin: Negative for rash. Neurological: Negative for dizziness and headaches. Psychiatric/Behavioral: Positive for decreased concentration, dysphoric mood ( he is concerned about his wife.) and sleep disturbance (struggling with depression making worse). Negative for self-injury and suicidal ideas. The patient is nervous/anxious. Physical Exam   Constitutional: He is oriented to person, place, and time. He appears well-developed and well-nourished. No distress. HENT:   Head: Normocephalic and atraumatic. Right Ear: External ear normal.   Left Ear: External ear normal.   Nose: Nose normal.   Mouth/Throat: No posterior oropharyngeal edema or posterior oropharyngeal erythema. Eyes: Pupils are equal, round, and reactive to light. EOM are normal. Right conjunctiva is not injected. No scleral icterus. Neck: Normal range of motion. Neck supple. No JVD present. Carotid bruit is not present. No thyromegaly present. Cardiovascular: Normal rate, regular rhythm, S1 normal, S2 normal and normal heart sounds. No extrasystoles are present. PMI is not displaced. Exam reveals no gallop and no friction rub. No murmur heard. Pulmonary/Chest: Effort normal. No respiratory distress. He has decreased breath sounds (bases bilaterally (mild)) in the right lower field and the left lower field. He has no wheezes. He has no rhonchi. He has no rales. Abdominal: Soft. Bowel sounds are normal. There is no hepatosplenomegaly. There is no tenderness. There is no rebound, no guarding and no CVA tenderness. Abdominal obesity is present   Genitourinary:   Genitourinary Comments: Exam deferred   Musculoskeletal: Normal range of motion. He exhibits no edema or tenderness. Wearing compression stockings. Lymphadenopathy:     He has no cervical adenopathy. Neurological: He is alert and oriented to person, place, and time. He has normal strength. No sensory deficit (no numbness or tingling). Skin: Skin is warm and dry. No rash noted. Psychiatric: He has a normal mood and affect. ASSESSMENT      ICD-10-CM    1. Fatigue, unspecified type R53.83 T4, Free     TSH without Reflex     Digoxin Level   2. Type 2 diabetes mellitus with diabetic polyneuropathy, without long-term current use of insulin ([facility]) E11.42 Hemoglobin A1C     Microalbumin / Creatinine Urine Ratio   3. Essential hypertension I10 CBC Auto Differential     Comprehensive Metabolic Panel     Microalbumin / Creatinine Urine Ratio     amLODIPine (NORVASC) 5 MG tablet   4. Mixed hyperlipidemia E78.2 Lipid Panel   5. Chronic atrial fibrillation (HCC) I48.2    6. Chronic diastolic heart failure (HCC) I50.32    7. Chronic obstructive pulmonary disease, unspecified COPD type ([facility] [address] J44.9    8. Moderate episode of recurrent major depressive disorder (HCC) F33.1    9. Macrocytic anemia D53.9    10.  NEIDA (obstructive sleep apnea) that his facial hair is interfering with his seal, although he does state that he cannot maintain an appropriate seal   11. Prostate cancer ([facility] [address] C61 Psa screening  This has been nonexistent       Orders Placed This Encounter   Procedures    CBC Auto Differential    Comprehensive Metabolic Panel    Hemoglobin A1C    Lipid Panel    Microalbumin / Creatinine Urine Ratio    T4, Free    TSH without Reflex    Digoxin Level    Psa screening        Return in about 1 month (around 7/19/2019) for 30.

## 2019-06-19 NOTE — TELEPHONE ENCOUNTER
diaz with [facility] called to discuss pt. Called her back, she said that we had referred pt the end of December 2018.   They then referred him to pulmonology and pt has no showed both appts

## 2019-06-20 ENCOUNTER — TELEPHONE (OUTPATIENT)
Dept: PRIMARY CARE CLINIC | Age: 84
End: 2019-06-20

## 2019-06-20 DIAGNOSIS — R53.83 FATIGUE, UNSPECIFIED TYPE: ICD-10-CM

## 2019-06-20 DIAGNOSIS — C61 PROSTATE CANCER (HCC): ICD-10-CM

## 2019-06-20 DIAGNOSIS — E11.42 TYPE 2 DIABETES MELLITUS WITH DIABETIC POLYNEUROPATHY, WITHOUT LONG-TERM CURRENT USE OF INSULIN (HCC): ICD-10-CM

## 2019-06-20 DIAGNOSIS — E78.2 MIXED HYPERLIPIDEMIA: ICD-10-CM

## 2019-06-20 DIAGNOSIS — I10 ESSENTIAL HYPERTENSION: ICD-10-CM

## 2019-06-20 LAB
ALBUMIN SERPL-MCNC: 4.1 G/DL (ref 3.5–5.2)
ALP BLD-CCNC: 51 U/L (ref 40–130)
ALT SERPL-CCNC: 7 U/L (ref 5–41)
ANION GAP SERPL CALCULATED.3IONS-SCNC: 16 MMOL/L (ref 7–19)
AST SERPL-CCNC: 10 U/L (ref 5–40)
BASOPHILS ABSOLUTE: 0.1 K/UL (ref 0–0.2)
BASOPHILS RELATIVE PERCENT: 0.7 % (ref 0–1)
BILIRUB SERPL-MCNC: 1 MG/DL (ref 0.2–1.2)
BUN BLDV-MCNC: 27 MG/DL (ref 8–23)
CALCIUM SERPL-MCNC: 10.1 MG/DL (ref 8.8–10.2)
CHLORIDE BLD-SCNC: 99 MMOL/L (ref 98–111)
CHOLESTEROL, TOTAL: 140 MG/DL (ref 160–199)
CO2: 28 MMOL/L (ref 22–29)
CREAT SERPL-MCNC: 1 MG/DL (ref 0.5–1.2)
CREATININE URINE: 174.1 MG/DL (ref 4.2–622)
DIGOXIN LEVEL: 3.4 NG/ML (ref 0.6–1.2)
EOSINOPHILS ABSOLUTE: 0.3 K/UL (ref 0–0.6)
EOSINOPHILS RELATIVE PERCENT: 2.7 % (ref 0–5)
GFR NON-AFRICAN AMERICAN: >60
GLUCOSE BLD-MCNC: 144 MG/DL (ref 74–109)
HBA1C MFR BLD: 5.9 % (ref 4–6)
HCT VFR BLD CALC: 36.6 % (ref 42–52)
HDLC SERPL-MCNC: 40 MG/DL (ref 55–121)
HEMOGLOBIN: 12.2 G/DL (ref 14–18)
LDL CHOLESTEROL CALCULATED: 64 MG/DL
LYMPHOCYTES ABSOLUTE: 1.2 K/UL (ref 1.1–4.5)
LYMPHOCYTES RELATIVE PERCENT: 12 % (ref 20–40)
MCH RBC QN AUTO: 32.4 PG (ref 27–31)
MCHC RBC AUTO-ENTMCNC: 33.3 G/DL (ref 33–37)
MCV RBC AUTO: 97.3 FL (ref 80–94)
MICROALBUMIN UR-MCNC: 2 MG/DL (ref 0–19)
MICROALBUMIN/CREAT UR-RTO: 11.5 MG/G
MONOCYTES ABSOLUTE: 0.8 K/UL (ref 0–0.9)
MONOCYTES RELATIVE PERCENT: 7.7 % (ref 0–10)
NEUTROPHILS ABSOLUTE: 7.7 K/UL (ref 1.5–7.5)
NEUTROPHILS RELATIVE PERCENT: 75.8 % (ref 50–65)
PDW BLD-RTO: 13.2 % (ref 11.5–14.5)
PLATELET # BLD: 204 K/UL (ref 130–400)
PMV BLD AUTO: 10.5 FL (ref 9.4–12.4)
POTASSIUM SERPL-SCNC: 4 MMOL/L (ref 3.5–5)
PROSTATE SPECIFIC ANTIGEN: 0.01 NG/ML (ref 0–4)
RBC # BLD: 3.76 M/UL (ref 4.7–6.1)
SODIUM BLD-SCNC: 143 MMOL/L (ref 136–145)
T4 FREE: 1.1 NG/DL (ref 0.9–1.7)
TOTAL PROTEIN: 7 G/DL (ref 6.6–8.7)
TRIGL SERPL-MCNC: 178 MG/DL (ref 0–149)
TSH SERPL DL<=0.05 MIU/L-ACNC: 3.13 UIU/ML (ref 0.27–4.2)
WBC # BLD: 10.2 K/UL (ref 4.8–10.8)

## 2019-06-20 NOTE — TELEPHONE ENCOUNTER
Jennifer Tucker called from [facility]. Pt has a critical Digoxin level.  3.40 and ref range is 0.6-1.2

## 2019-06-20 NOTE — TELEPHONE ENCOUNTER
Spoke with Dr. Jose Alejandro Jimenez   Stop digoxin immediately  Recheck in 1 week  Patient is going to walk in with pill bottles so we can make sure he is not confusing it with something else. Pt voiced understanding.

## 2019-06-26 DIAGNOSIS — E11.42 TYPE 2 DIABETES MELLITUS WITH DIABETIC POLYNEUROPATHY, WITHOUT LONG-TERM CURRENT USE OF INSULIN (HCC): ICD-10-CM

## 2019-06-26 NOTE — TELEPHONE ENCOUNTER
Received fax from pharmacy requesting refill on pts medication(s). Pt was last seen in office on 6/19/2019  and has a follow up scheduled for 7/18/2019. Will send request to  Dr. Robert Edwards  for patient.      Requested Prescriptions     Pending Prescriptions Disp Refills    Dulaglutide (TRULICITY) 5.40 QA/6.7WJ SOPN [Pharmacy Med Name: TRULICITY 6.53 TL/4.8 ML PEN]  11     Sig: INJECT 1 PEN EVERY WEEK

## 2019-06-28 ENCOUNTER — INFUSION (OUTPATIENT)
Dept: ONCOLOGY | Facility: HOSPITAL | Age: 84
End: 2019-06-28

## 2019-06-28 DIAGNOSIS — C61 CANCER OF PROSTATE (HCC): Primary | ICD-10-CM

## 2019-06-28 PROCEDURE — 25010000002 LEUPROLIDE ACETATE (4 MONTH) PER 7.5 MG: Performed by: UROLOGY

## 2019-06-28 PROCEDURE — 96372 THER/PROPH/DIAG INJ SC/IM: CPT

## 2019-06-28 PROCEDURE — 96402 CHEMO HORMON ANTINEOPL SQ/IM: CPT

## 2019-06-28 RX ADMIN — LEUPROLIDE ACETATE 30 MG: KIT at 12:17

## 2019-07-16 DIAGNOSIS — Z79.01 CHRONIC ANTICOAGULATION: ICD-10-CM

## 2019-07-16 RX ORDER — CARVEDILOL 12.5 MG/1
12.5 TABLET ORAL 2 TIMES DAILY WITH MEALS
Qty: 180 TABLET | Refills: 3 | Status: SHIPPED | OUTPATIENT
Start: 2019-07-16 | End: 2020-09-17

## 2019-07-18 ENCOUNTER — HOSPITAL ENCOUNTER (INPATIENT)
Facility: HOSPITAL | Age: 84
LOS: 4 days | Discharge: HOME HEALTH | End: 2019-07-22
Attending: FAMILY MEDICINE | Admitting: INTERNAL MEDICINE

## 2019-07-18 ENCOUNTER — APPOINTMENT (OUTPATIENT)
Dept: GENERAL RADIOLOGY | Facility: HOSPITAL | Age: 84
End: 2019-07-18

## 2019-07-18 ENCOUNTER — TELEPHONE (OUTPATIENT)
Dept: PODIATRY | Facility: CLINIC | Age: 84
End: 2019-07-18

## 2019-07-18 ENCOUNTER — APPOINTMENT (OUTPATIENT)
Dept: MRI IMAGING | Facility: HOSPITAL | Age: 84
End: 2019-07-18

## 2019-07-18 DIAGNOSIS — L02.612 ABSCESS OF LEFT FOOT: Primary | ICD-10-CM

## 2019-07-18 DIAGNOSIS — Z74.09 IMPAIRED MOBILITY AND ENDURANCE: ICD-10-CM

## 2019-07-18 DIAGNOSIS — Z78.9 DECREASED ACTIVITIES OF DAILY LIVING (ADL): ICD-10-CM

## 2019-07-18 PROBLEM — I50.9 CHF (CONGESTIVE HEART FAILURE) (HCC): Status: ACTIVE | Noted: 2019-07-18

## 2019-07-18 PROBLEM — E11.9 DIABETES MELLITUS (HCC): Status: ACTIVE | Noted: 2019-07-18

## 2019-07-18 PROBLEM — L03.90 CELLULITIS: Status: ACTIVE | Noted: 2019-07-18

## 2019-07-18 LAB
ALBUMIN SERPL-MCNC: 3.5 G/DL (ref 3.5–5)
ALBUMIN/GLOB SERPL: 1.1 G/DL (ref 1.1–2.5)
ALP SERPL-CCNC: 54 U/L (ref 24–120)
ALT SERPL W P-5'-P-CCNC: <15 U/L (ref 0–54)
ANION GAP SERPL CALCULATED.3IONS-SCNC: 9 MMOL/L (ref 4–13)
ARTICHOKE IGE QN: 50 MG/DL (ref 0–99)
AST SERPL-CCNC: 17 U/L (ref 7–45)
BASOPHILS # BLD AUTO: 0.05 10*3/MM3 (ref 0–0.2)
BASOPHILS NFR BLD AUTO: 0.4 % (ref 0–2)
BILIRUB SERPL-MCNC: 0.7 MG/DL (ref 0.1–1)
BUN BLD-MCNC: 35 MG/DL (ref 5–21)
BUN/CREAT SERPL: 31 (ref 7–25)
CALCIUM SPEC-SCNC: 8.7 MG/DL (ref 8.4–10.4)
CHLORIDE SERPL-SCNC: 101 MMOL/L (ref 98–110)
CHOLEST SERPL-MCNC: 100 MG/DL (ref 130–200)
CO2 SERPL-SCNC: 26 MMOL/L (ref 24–31)
CREAT BLD-MCNC: 1.13 MG/DL (ref 0.5–1.4)
DEPRECATED RDW RBC AUTO: 45 FL (ref 40–54)
DIGOXIN SERPL-MCNC: 0.7 NG/ML (ref 0.8–2)
EOSINOPHIL # BLD AUTO: 0.12 10*3/MM3 (ref 0–0.7)
EOSINOPHIL NFR BLD AUTO: 0.9 % (ref 0–4)
ERYTHROCYTE [DISTWIDTH] IN BLOOD BY AUTOMATED COUNT: 13 % (ref 12–15)
GFR SERPL CREATININE-BSD FRML MDRD: 62 ML/MIN/1.73
GLOBULIN UR ELPH-MCNC: 3.2 GM/DL
GLUCOSE BLD-MCNC: 134 MG/DL (ref 70–100)
HBA1C MFR BLD: 5.5 %
HCT VFR BLD AUTO: 26.3 % (ref 40–52)
HDLC SERPL-MCNC: 25 MG/DL
HGB BLD-MCNC: 9.2 G/DL (ref 14–18)
IMM GRANULOCYTES # BLD AUTO: 0.23 10*3/MM3 (ref 0–0.05)
IMM GRANULOCYTES NFR BLD AUTO: 1.8 % (ref 0–5)
LDLC/HDLC SERPL: 2.14 {RATIO}
LYMPHOCYTES # BLD AUTO: 0.84 10*3/MM3 (ref 0.72–4.86)
LYMPHOCYTES NFR BLD AUTO: 6.4 % (ref 15–45)
MCH RBC QN AUTO: 33.3 PG (ref 28–32)
MCHC RBC AUTO-ENTMCNC: 35 G/DL (ref 33–36)
MCV RBC AUTO: 95.3 FL (ref 82–95)
MONOCYTES # BLD AUTO: 0.96 10*3/MM3 (ref 0.19–1.3)
MONOCYTES NFR BLD AUTO: 7.3 % (ref 4–12)
NEUTROPHILS # BLD AUTO: 10.94 10*3/MM3 (ref 1.87–8.4)
NEUTROPHILS NFR BLD AUTO: 83.2 % (ref 39–78)
NRBC BLD AUTO-RTO: 0 /100 WBC (ref 0–0.2)
PLATELET # BLD AUTO: 193 10*3/MM3 (ref 130–400)
PMV BLD AUTO: 10 FL (ref 6–12)
POTASSIUM BLD-SCNC: 3.6 MMOL/L (ref 3.5–5.3)
PROT SERPL-MCNC: 6.7 G/DL (ref 6.3–8.7)
RBC # BLD AUTO: 2.76 10*6/MM3 (ref 4.8–5.9)
SODIUM BLD-SCNC: 136 MMOL/L (ref 135–145)
TRIGL SERPL-MCNC: 107 MG/DL (ref 0–149)
TSH SERPL DL<=0.05 MIU/L-ACNC: 3.02 MIU/ML (ref 0.47–4.68)
WBC NRBC COR # BLD: 13.14 10*3/MM3 (ref 4.8–10.8)

## 2019-07-18 PROCEDURE — 73630 X-RAY EXAM OF FOOT: CPT

## 2019-07-18 PROCEDURE — 25010000002 VANCOMYCIN PER 500 MG: Performed by: FAMILY MEDICINE

## 2019-07-18 PROCEDURE — 94640 AIRWAY INHALATION TREATMENT: CPT

## 2019-07-18 PROCEDURE — 80053 COMPREHEN METABOLIC PANEL: CPT | Performed by: FAMILY MEDICINE

## 2019-07-18 PROCEDURE — 0HDNXZZ EXTRACTION OF LEFT FOOT SKIN, EXTERNAL APPROACH: ICD-10-PCS | Performed by: PODIATRIST

## 2019-07-18 PROCEDURE — 80061 LIPID PANEL: CPT | Performed by: FAMILY MEDICINE

## 2019-07-18 PROCEDURE — 87205 SMEAR GRAM STAIN: CPT | Performed by: PODIATRIST

## 2019-07-18 PROCEDURE — 87070 CULTURE OTHR SPECIMN AEROBIC: CPT | Performed by: PODIATRIST

## 2019-07-18 PROCEDURE — 83036 HEMOGLOBIN GLYCOSYLATED A1C: CPT | Performed by: FAMILY MEDICINE

## 2019-07-18 PROCEDURE — 25010000002 VANCOMYCIN 1 G RECONSTITUTED SOLUTION 1 EACH VIAL: Performed by: FAMILY MEDICINE

## 2019-07-18 PROCEDURE — 87040 BLOOD CULTURE FOR BACTERIA: CPT | Performed by: FAMILY MEDICINE

## 2019-07-18 PROCEDURE — 85025 COMPLETE CBC W/AUTO DIFF WBC: CPT | Performed by: FAMILY MEDICINE

## 2019-07-18 PROCEDURE — 94799 UNLISTED PULMONARY SVC/PX: CPT

## 2019-07-18 PROCEDURE — 84443 ASSAY THYROID STIM HORMONE: CPT | Performed by: FAMILY MEDICINE

## 2019-07-18 PROCEDURE — 99222 1ST HOSP IP/OBS MODERATE 55: CPT | Performed by: PODIATRIST

## 2019-07-18 PROCEDURE — 25010000002 CEFEPIME PER 500 MG: Performed by: FAMILY MEDICINE

## 2019-07-18 PROCEDURE — 97598 DBRDMT OPN WND ADDL 20CM/<: CPT | Performed by: PODIATRIST

## 2019-07-18 PROCEDURE — 97597 DBRDMT OPN WND 1ST 20 CM/<: CPT | Performed by: PODIATRIST

## 2019-07-18 PROCEDURE — 94760 N-INVAS EAR/PLS OXIMETRY 1: CPT

## 2019-07-18 PROCEDURE — 80162 ASSAY OF DIGOXIN TOTAL: CPT | Performed by: FAMILY MEDICINE

## 2019-07-18 RX ORDER — LISINOPRIL 20 MG/1
20 TABLET ORAL DAILY
Status: DISCONTINUED | OUTPATIENT
Start: 2019-07-18 | End: 2019-07-21

## 2019-07-18 RX ORDER — FLUOXETINE HYDROCHLORIDE 20 MG/1
20 CAPSULE ORAL DAILY
COMMUNITY

## 2019-07-18 RX ORDER — AMLODIPINE BESYLATE 10 MG/1
10 TABLET ORAL DAILY
Status: DISCONTINUED | OUTPATIENT
Start: 2019-07-18 | End: 2019-07-22 | Stop reason: HOSPADM

## 2019-07-18 RX ORDER — BUPROPION HYDROCHLORIDE 150 MG/1
300 TABLET ORAL DAILY
Status: DISCONTINUED | OUTPATIENT
Start: 2019-07-18 | End: 2019-07-22 | Stop reason: HOSPADM

## 2019-07-18 RX ORDER — ARFORMOTEROL TARTRATE 15 UG/2ML
15 SOLUTION RESPIRATORY (INHALATION)
Status: DISCONTINUED | OUTPATIENT
Start: 2019-07-18 | End: 2019-07-20

## 2019-07-18 RX ORDER — PRAZOSIN HYDROCHLORIDE 2 MG/1
2 CAPSULE ORAL NIGHTLY
Status: ON HOLD | COMMUNITY
End: 2019-07-19

## 2019-07-18 RX ORDER — TERAZOSIN 5 MG/1
5 CAPSULE ORAL NIGHTLY
Status: DISCONTINUED | OUTPATIENT
Start: 2019-07-18 | End: 2019-07-22 | Stop reason: HOSPADM

## 2019-07-18 RX ORDER — AMOXICILLIN AND CLAVULANATE POTASSIUM 500; 125 MG/1; MG/1
500 TABLET, FILM COATED ORAL 2 TIMES DAILY
COMMUNITY
End: 2019-07-22 | Stop reason: HOSPADM

## 2019-07-18 RX ORDER — FLUTICASONE PROPIONATE 50 MCG
2 SPRAY, SUSPENSION (ML) NASAL DAILY
Status: DISCONTINUED | OUTPATIENT
Start: 2019-07-18 | End: 2019-07-22 | Stop reason: HOSPADM

## 2019-07-18 RX ORDER — HYDROCHLOROTHIAZIDE 12.5 MG/1
12.5 TABLET ORAL DAILY
COMMUNITY
End: 2019-07-22 | Stop reason: HOSPADM

## 2019-07-18 RX ORDER — SPIRONOLACTONE 50 MG/1
50 TABLET, FILM COATED ORAL DAILY
COMMUNITY

## 2019-07-18 RX ORDER — DIGOXIN 125 MCG
125 TABLET ORAL DAILY
Status: DISCONTINUED | OUTPATIENT
Start: 2019-07-18 | End: 2019-07-22 | Stop reason: HOSPADM

## 2019-07-18 RX ORDER — METOLAZONE 2.5 MG/1
2.5 TABLET ORAL DAILY
Status: DISCONTINUED | OUTPATIENT
Start: 2019-07-18 | End: 2019-07-22 | Stop reason: HOSPADM

## 2019-07-18 RX ORDER — CARVEDILOL 6.25 MG/1
6.25 TABLET ORAL DAILY
Status: DISCONTINUED | OUTPATIENT
Start: 2019-07-18 | End: 2019-07-21

## 2019-07-18 RX ORDER — ONDANSETRON 2 MG/ML
4 INJECTION INTRAMUSCULAR; INTRAVENOUS EVERY 6 HOURS PRN
Status: DISCONTINUED | OUTPATIENT
Start: 2019-07-18 | End: 2019-07-22 | Stop reason: HOSPADM

## 2019-07-18 RX ORDER — FAMOTIDINE 10 MG/ML
20 INJECTION, SOLUTION INTRAVENOUS DAILY
Status: DISCONTINUED | OUTPATIENT
Start: 2019-07-18 | End: 2019-07-20 | Stop reason: CLARIF

## 2019-07-18 RX ORDER — SODIUM CHLORIDE 0.9 % (FLUSH) 0.9 %
3-10 SYRINGE (ML) INJECTION AS NEEDED
Status: DISCONTINUED | OUTPATIENT
Start: 2019-07-18 | End: 2019-07-22 | Stop reason: HOSPADM

## 2019-07-18 RX ORDER — DOXYCYCLINE HYCLATE 100 MG/1
100 CAPSULE ORAL 2 TIMES DAILY
COMMUNITY
End: 2019-07-22 | Stop reason: HOSPADM

## 2019-07-18 RX ORDER — SODIUM CHLORIDE 0.9 % (FLUSH) 0.9 %
3 SYRINGE (ML) INJECTION EVERY 12 HOURS SCHEDULED
Status: DISCONTINUED | OUTPATIENT
Start: 2019-07-18 | End: 2019-07-22 | Stop reason: HOSPADM

## 2019-07-18 RX ORDER — FLUOXETINE HYDROCHLORIDE 20 MG/1
20 CAPSULE ORAL DAILY
Status: DISCONTINUED | OUTPATIENT
Start: 2019-07-18 | End: 2019-07-20

## 2019-07-18 RX ORDER — VANCOMYCIN HYDROCHLORIDE 1 G/200ML
1000 INJECTION, SOLUTION INTRAVENOUS EVERY 12 HOURS
Status: DISCONTINUED | OUTPATIENT
Start: 2019-07-18 | End: 2019-07-19 | Stop reason: DRUGHIGH

## 2019-07-18 RX ORDER — ACETAMINOPHEN 325 MG/1
650 TABLET ORAL EVERY 6 HOURS PRN
Status: DISCONTINUED | OUTPATIENT
Start: 2019-07-18 | End: 2019-07-22 | Stop reason: HOSPADM

## 2019-07-18 RX ORDER — DULOXETIN HYDROCHLORIDE 30 MG/1
60 CAPSULE, DELAYED RELEASE ORAL DAILY
Status: DISCONTINUED | OUTPATIENT
Start: 2019-07-18 | End: 2019-07-20

## 2019-07-18 RX ADMIN — ARFORMOTEROL TARTRATE 15 MCG: 15 SOLUTION RESPIRATORY (INHALATION) at 19:30

## 2019-07-18 RX ADMIN — TERAZOSIN HYDROCHLORIDE 5 MG: 5 CAPSULE ORAL at 20:49

## 2019-07-18 RX ADMIN — SODIUM CHLORIDE, PRESERVATIVE FREE 3 ML: 5 INJECTION INTRAVENOUS at 20:49

## 2019-07-18 RX ADMIN — FAMOTIDINE 20 MG: 10 INJECTION, SOLUTION INTRAVENOUS at 08:01

## 2019-07-18 RX ADMIN — LINAGLIPTIN 5 MG: 5 TABLET, FILM COATED ORAL at 07:59

## 2019-07-18 RX ADMIN — RIVAROXABAN 20 MG: 20 TABLET, FILM COATED ORAL at 17:03

## 2019-07-18 RX ADMIN — CARVEDILOL 6.25 MG: 6.25 TABLET, FILM COATED ORAL at 07:55

## 2019-07-18 RX ADMIN — BUPROPION HYDROCHLORIDE 300 MG: 150 TABLET, FILM COATED, EXTENDED RELEASE ORAL at 07:57

## 2019-07-18 RX ADMIN — CEFEPIME HYDROCHLORIDE 1 G: 1 INJECTION, POWDER, FOR SOLUTION INTRAMUSCULAR; INTRAVENOUS at 08:04

## 2019-07-18 RX ADMIN — FLUOXETINE HYDROCHLORIDE 20 MG: 20 CAPSULE ORAL at 07:57

## 2019-07-18 RX ADMIN — ARFORMOTEROL TARTRATE 15 MCG: 15 SOLUTION RESPIRATORY (INHALATION) at 07:29

## 2019-07-18 RX ADMIN — FUROSEMIDE 60 MG: 40 TABLET ORAL at 07:58

## 2019-07-18 RX ADMIN — METOLAZONE 2.5 MG: 2.5 TABLET ORAL at 07:59

## 2019-07-18 RX ADMIN — AMLODIPINE BESYLATE 10 MG: 10 TABLET ORAL at 07:58

## 2019-07-18 RX ADMIN — VANCOMYCIN HYDROCHLORIDE 1000 MG: 1 INJECTION, SOLUTION INTRAVENOUS at 17:07

## 2019-07-18 RX ADMIN — DULOXETINE HYDROCHLORIDE 60 MG: 30 CAPSULE, DELAYED RELEASE ORAL at 07:57

## 2019-07-18 RX ADMIN — LISINOPRIL 20 MG: 20 TABLET ORAL at 07:57

## 2019-07-18 RX ADMIN — CEFEPIME HYDROCHLORIDE 1 G: 1 INJECTION, POWDER, FOR SOLUTION INTRAMUSCULAR; INTRAVENOUS at 15:03

## 2019-07-18 RX ADMIN — SODIUM CHLORIDE, PRESERVATIVE FREE 3 ML: 5 INJECTION INTRAVENOUS at 08:02

## 2019-07-18 RX ADMIN — VANCOMYCIN HYDROCHLORIDE 1750 MG: 1 INJECTION, POWDER, LYOPHILIZED, FOR SOLUTION INTRAVENOUS at 06:17

## 2019-07-18 RX ADMIN — DIGOXIN 125 MCG: 125 TABLET ORAL at 07:56

## 2019-07-18 NOTE — TELEPHONE ENCOUNTER
Per Dr. Uriarte have pt be NPO until he sees him, Podiatry consult for diabetic foot care, left foot wound-  Cellulitis for wound consult- he said this maybe infectious disease, April voiced understanding instructions given

## 2019-07-18 NOTE — TELEPHONE ENCOUNTER
April @ [facility] called for new pt consult  Room 493  Consult for Dr. Uriarte for left foot wound .   Dr. Uriarte was given consult. DN

## 2019-07-19 ENCOUNTER — ANESTHESIA EVENT (OUTPATIENT)
Dept: PERIOP | Facility: HOSPITAL | Age: 84
End: 2019-07-19

## 2019-07-19 ENCOUNTER — TELEPHONE (OUTPATIENT)
Dept: PODIATRY | Facility: CLINIC | Age: 84
End: 2019-07-19

## 2019-07-19 ENCOUNTER — ANESTHESIA (OUTPATIENT)
Dept: PERIOP | Facility: HOSPITAL | Age: 84
End: 2019-07-19

## 2019-07-19 ENCOUNTER — APPOINTMENT (OUTPATIENT)
Dept: MRI IMAGING | Facility: HOSPITAL | Age: 84
End: 2019-07-19

## 2019-07-19 PROBLEM — L02.612 ABSCESS OF LEFT FOOT: Status: ACTIVE | Noted: 2019-07-17

## 2019-07-19 PROBLEM — I50.32 CHRONIC DIASTOLIC CONGESTIVE HEART FAILURE (HCC): Status: ACTIVE | Noted: 2019-07-18

## 2019-07-19 PROBLEM — I10 HYPERTENSION: Status: ACTIVE | Noted: 2019-07-19

## 2019-07-19 PROBLEM — E66.9 OBESITY (BMI 30-39.9): Status: ACTIVE | Noted: 2019-07-19

## 2019-07-19 LAB
ABO GROUP BLD: NORMAL
ALBUMIN SERPL-MCNC: 3 G/DL (ref 3.5–5)
ALBUMIN/GLOB SERPL: 1 G/DL (ref 1.1–2.5)
ALP SERPL-CCNC: 46 U/L (ref 24–120)
ALT SERPL W P-5'-P-CCNC: <15 U/L (ref 0–54)
ANION GAP SERPL CALCULATED.3IONS-SCNC: 10 MMOL/L (ref 4–13)
AST SERPL-CCNC: 8 U/L (ref 7–45)
BASOPHILS # BLD AUTO: 0.04 10*3/MM3 (ref 0–0.2)
BASOPHILS NFR BLD AUTO: 0.4 % (ref 0–2)
BILIRUB SERPL-MCNC: 0.7 MG/DL (ref 0.1–1)
BLD GP AB SCN SERPL QL: NEGATIVE
BUN BLD-MCNC: 33 MG/DL (ref 5–21)
BUN/CREAT SERPL: 34 (ref 7–25)
CALCIUM SPEC-SCNC: 8.4 MG/DL (ref 8.4–10.4)
CHLORIDE SERPL-SCNC: 99 MMOL/L (ref 98–110)
CO2 SERPL-SCNC: 25 MMOL/L (ref 24–31)
CREAT BLD-MCNC: 0.97 MG/DL (ref 0.5–1.4)
DEPRECATED RDW RBC AUTO: 44.8 FL (ref 40–54)
EOSINOPHIL # BLD AUTO: 0.16 10*3/MM3 (ref 0–0.7)
EOSINOPHIL NFR BLD AUTO: 1.6 % (ref 0–4)
ERYTHROCYTE [DISTWIDTH] IN BLOOD BY AUTOMATED COUNT: 13 % (ref 12–15)
GFR SERPL CREATININE-BSD FRML MDRD: 74 ML/MIN/1.73
GLOBULIN UR ELPH-MCNC: 2.9 GM/DL
GLUCOSE BLD-MCNC: 96 MG/DL (ref 70–100)
GLUCOSE BLDC GLUCOMTR-MCNC: 106 MG/DL (ref 70–130)
GLUCOSE BLDC GLUCOMTR-MCNC: 106 MG/DL (ref 70–130)
GLUCOSE BLDC GLUCOMTR-MCNC: 124 MG/DL (ref 70–130)
HCT VFR BLD AUTO: 25.3 % (ref 40–52)
HGB BLD-MCNC: 8.8 G/DL (ref 14–18)
IMM GRANULOCYTES # BLD AUTO: 0.26 10*3/MM3 (ref 0–0.05)
IMM GRANULOCYTES NFR BLD AUTO: 2.6 % (ref 0–5)
LYMPHOCYTES # BLD AUTO: 0.82 10*3/MM3 (ref 0.72–4.86)
LYMPHOCYTES NFR BLD AUTO: 8.3 % (ref 15–45)
MCH RBC QN AUTO: 33.3 PG (ref 28–32)
MCHC RBC AUTO-ENTMCNC: 34.8 G/DL (ref 33–36)
MCV RBC AUTO: 95.8 FL (ref 82–95)
MONOCYTES # BLD AUTO: 0.74 10*3/MM3 (ref 0.19–1.3)
MONOCYTES NFR BLD AUTO: 7.5 % (ref 4–12)
NEUTROPHILS # BLD AUTO: 7.82 10*3/MM3 (ref 1.87–8.4)
NEUTROPHILS NFR BLD AUTO: 79.6 % (ref 39–78)
NRBC BLD AUTO-RTO: 0 /100 WBC (ref 0–0.2)
PLATELET # BLD AUTO: 211 10*3/MM3 (ref 130–400)
PMV BLD AUTO: 10.4 FL (ref 6–12)
POTASSIUM BLD-SCNC: 3.5 MMOL/L (ref 3.5–5.3)
PROT SERPL-MCNC: 5.9 G/DL (ref 6.3–8.7)
RBC # BLD AUTO: 2.64 10*6/MM3 (ref 4.8–5.9)
RH BLD: POSITIVE
SODIUM BLD-SCNC: 134 MMOL/L (ref 135–145)
T&S EXPIRATION DATE: NORMAL
VANCOMYCIN TROUGH SERPL-MCNC: 18.04 MCG/ML (ref 10–20)
WBC NRBC COR # BLD: 9.84 10*3/MM3 (ref 4.8–10.8)

## 2019-07-19 PROCEDURE — 87185 SC STD ENZYME DETCJ PER NZM: CPT | Performed by: PODIATRIST

## 2019-07-19 PROCEDURE — 94760 N-INVAS EAR/PLS OXIMETRY 1: CPT

## 2019-07-19 PROCEDURE — 87070 CULTURE OTHR SPECIMN AEROBIC: CPT | Performed by: PODIATRIST

## 2019-07-19 PROCEDURE — 25010000002 CEFEPIME PER 500 MG: Performed by: FAMILY MEDICINE

## 2019-07-19 PROCEDURE — 80202 ASSAY OF VANCOMYCIN: CPT | Performed by: FAMILY MEDICINE

## 2019-07-19 PROCEDURE — 85025 COMPLETE CBC W/AUTO DIFF WBC: CPT | Performed by: FAMILY MEDICINE

## 2019-07-19 PROCEDURE — 94799 UNLISTED PULMONARY SVC/PX: CPT

## 2019-07-19 PROCEDURE — 10060 I&D ABSCESS SIMPLE/SINGLE: CPT | Performed by: PODIATRIST

## 2019-07-19 PROCEDURE — 97162 PT EVAL MOD COMPLEX 30 MIN: CPT

## 2019-07-19 PROCEDURE — 86901 BLOOD TYPING SEROLOGIC RH(D): CPT | Performed by: PODIATRIST

## 2019-07-19 PROCEDURE — 0K9W0ZZ DRAINAGE OF LEFT FOOT MUSCLE, OPEN APPROACH: ICD-10-PCS | Performed by: PODIATRIST

## 2019-07-19 PROCEDURE — 80053 COMPREHEN METABOLIC PANEL: CPT | Performed by: FAMILY MEDICINE

## 2019-07-19 PROCEDURE — 86850 RBC ANTIBODY SCREEN: CPT | Performed by: PODIATRIST

## 2019-07-19 PROCEDURE — 25010000002 GENTAMICIN PER 80 MG: Performed by: PODIATRIST

## 2019-07-19 PROCEDURE — 87147 CULTURE TYPE IMMUNOLOGIC: CPT | Performed by: PODIATRIST

## 2019-07-19 PROCEDURE — 82962 GLUCOSE BLOOD TEST: CPT

## 2019-07-19 PROCEDURE — 87075 CULTR BACTERIA EXCEPT BLOOD: CPT | Performed by: PODIATRIST

## 2019-07-19 PROCEDURE — 86900 BLOOD TYPING SEROLOGIC ABO: CPT | Performed by: PODIATRIST

## 2019-07-19 PROCEDURE — 25010000002 VANCOMYCIN PER 500 MG: Performed by: FAMILY MEDICINE

## 2019-07-19 PROCEDURE — 87186 SC STD MICRODIL/AGAR DIL: CPT | Performed by: PODIATRIST

## 2019-07-19 PROCEDURE — 25010000002 PROPOFOL 10 MG/ML EMULSION: Performed by: NURSE ANESTHETIST, CERTIFIED REGISTERED

## 2019-07-19 PROCEDURE — 73718 MRI LOWER EXTREMITY W/O DYE: CPT

## 2019-07-19 PROCEDURE — 87205 SMEAR GRAM STAIN: CPT | Performed by: PODIATRIST

## 2019-07-19 PROCEDURE — 25010000002 FENTANYL CITRATE (PF) 100 MCG/2ML SOLUTION: Performed by: NURSE ANESTHETIST, CERTIFIED REGISTERED

## 2019-07-19 RX ORDER — BUPIVACAINE HYDROCHLORIDE 5 MG/ML
INJECTION, SOLUTION PERINEURAL AS NEEDED
Status: DISCONTINUED | OUTPATIENT
Start: 2019-07-19 | End: 2019-07-19 | Stop reason: HOSPADM

## 2019-07-19 RX ORDER — GENTAMICIN SULFATE 40 MG/ML
INJECTION, SOLUTION INTRAMUSCULAR; INTRAVENOUS
Status: DISPENSED
Start: 2019-07-19 | End: 2019-07-20

## 2019-07-19 RX ORDER — HYDROMORPHONE HYDROCHLORIDE 1 MG/ML
0.25 INJECTION, SOLUTION INTRAMUSCULAR; INTRAVENOUS; SUBCUTANEOUS
Status: DISCONTINUED | OUTPATIENT
Start: 2019-07-19 | End: 2019-07-19 | Stop reason: HOSPADM

## 2019-07-19 RX ORDER — MAGNESIUM HYDROXIDE 1200 MG/15ML
LIQUID ORAL AS NEEDED
Status: DISCONTINUED | OUTPATIENT
Start: 2019-07-19 | End: 2019-07-19 | Stop reason: HOSPADM

## 2019-07-19 RX ORDER — NALOXONE HCL 0.4 MG/ML
0.04 VIAL (ML) INJECTION AS NEEDED
Status: DISCONTINUED | OUTPATIENT
Start: 2019-07-19 | End: 2019-07-19 | Stop reason: HOSPADM

## 2019-07-19 RX ORDER — BUPIVACAINE HCL/0.9 % NACL/PF 0.1 %
2 PLASTIC BAG, INJECTION (ML) EPIDURAL ONCE
Status: COMPLETED | OUTPATIENT
Start: 2019-07-19 | End: 2019-07-19

## 2019-07-19 RX ORDER — OXYCODONE AND ACETAMINOPHEN 7.5; 325 MG/1; MG/1
1 TABLET ORAL ONCE AS NEEDED
Status: DISCONTINUED | OUTPATIENT
Start: 2019-07-19 | End: 2019-07-19 | Stop reason: HOSPADM

## 2019-07-19 RX ORDER — FLUOXETINE HYDROCHLORIDE 40 MG/1
40 CAPSULE ORAL DAILY
COMMUNITY

## 2019-07-19 RX ORDER — HYDRALAZINE HYDROCHLORIDE 20 MG/ML
5 INJECTION INTRAMUSCULAR; INTRAVENOUS
Status: DISCONTINUED | OUTPATIENT
Start: 2019-07-19 | End: 2019-07-19 | Stop reason: HOSPADM

## 2019-07-19 RX ORDER — SODIUM CHLORIDE, SODIUM LACTATE, POTASSIUM CHLORIDE, CALCIUM CHLORIDE 600; 310; 30; 20 MG/100ML; MG/100ML; MG/100ML; MG/100ML
100 INJECTION, SOLUTION INTRAVENOUS CONTINUOUS
Status: DISCONTINUED | OUTPATIENT
Start: 2019-07-19 | End: 2019-07-20

## 2019-07-19 RX ORDER — METOCLOPRAMIDE HYDROCHLORIDE 5 MG/ML
5 INJECTION INTRAMUSCULAR; INTRAVENOUS
Status: DISCONTINUED | OUTPATIENT
Start: 2019-07-19 | End: 2019-07-19 | Stop reason: HOSPADM

## 2019-07-19 RX ORDER — SODIUM CHLORIDE 0.9 % (FLUSH) 0.9 %
1-10 SYRINGE (ML) INJECTION AS NEEDED
Status: DISCONTINUED | OUTPATIENT
Start: 2019-07-19 | End: 2019-07-19 | Stop reason: HOSPADM

## 2019-07-19 RX ORDER — SODIUM CHLORIDE 0.9 % (FLUSH) 0.9 %
3 SYRINGE (ML) INJECTION EVERY 12 HOURS SCHEDULED
Status: DISCONTINUED | OUTPATIENT
Start: 2019-07-19 | End: 2019-07-19 | Stop reason: HOSPADM

## 2019-07-19 RX ORDER — FENTANYL CITRATE 50 UG/ML
25 INJECTION, SOLUTION INTRAMUSCULAR; INTRAVENOUS AS NEEDED
Status: DISCONTINUED | OUTPATIENT
Start: 2019-07-19 | End: 2019-07-19 | Stop reason: HOSPADM

## 2019-07-19 RX ORDER — PROPOFOL 10 MG/ML
VIAL (ML) INTRAVENOUS AS NEEDED
Status: DISCONTINUED | OUTPATIENT
Start: 2019-07-19 | End: 2019-07-19 | Stop reason: SURG

## 2019-07-19 RX ORDER — FLUMAZENIL 0.1 MG/ML
0.2 INJECTION INTRAVENOUS AS NEEDED
Status: DISCONTINUED | OUTPATIENT
Start: 2019-07-19 | End: 2019-07-19 | Stop reason: HOSPADM

## 2019-07-19 RX ORDER — FENTANYL CITRATE 50 UG/ML
INJECTION, SOLUTION INTRAMUSCULAR; INTRAVENOUS AS NEEDED
Status: DISCONTINUED | OUTPATIENT
Start: 2019-07-19 | End: 2019-07-19 | Stop reason: SURG

## 2019-07-19 RX ORDER — LABETALOL HYDROCHLORIDE 5 MG/ML
5 INJECTION, SOLUTION INTRAVENOUS
Status: DISCONTINUED | OUTPATIENT
Start: 2019-07-19 | End: 2019-07-19 | Stop reason: HOSPADM

## 2019-07-19 RX ORDER — ONDANSETRON 2 MG/ML
4 INJECTION INTRAMUSCULAR; INTRAVENOUS AS NEEDED
Status: DISCONTINUED | OUTPATIENT
Start: 2019-07-19 | End: 2019-07-19 | Stop reason: HOSPADM

## 2019-07-19 RX ORDER — FAMOTIDINE 10 MG/ML
20 INJECTION, SOLUTION INTRAVENOUS
Status: COMPLETED | OUTPATIENT
Start: 2019-07-19 | End: 2019-07-19

## 2019-07-19 RX ORDER — IPRATROPIUM BROMIDE AND ALBUTEROL SULFATE 2.5; .5 MG/3ML; MG/3ML
3 SOLUTION RESPIRATORY (INHALATION) ONCE AS NEEDED
Status: DISCONTINUED | OUTPATIENT
Start: 2019-07-19 | End: 2019-07-19 | Stop reason: HOSPADM

## 2019-07-19 RX ADMIN — ARFORMOTEROL TARTRATE 15 MCG: 15 SOLUTION RESPIRATORY (INHALATION) at 20:26

## 2019-07-19 RX ADMIN — CARVEDILOL 6.25 MG: 6.25 TABLET, FILM COATED ORAL at 10:55

## 2019-07-19 RX ADMIN — PROPOFOL 50 MG: 10 INJECTION, EMULSION INTRAVENOUS at 17:09

## 2019-07-19 RX ADMIN — CEFEPIME HYDROCHLORIDE 1 G: 1 INJECTION, POWDER, FOR SOLUTION INTRAMUSCULAR; INTRAVENOUS at 00:06

## 2019-07-19 RX ADMIN — DULOXETINE HYDROCHLORIDE 60 MG: 30 CAPSULE, DELAYED RELEASE ORAL at 10:54

## 2019-07-19 RX ADMIN — PROPOFOL 25 MG: 10 INJECTION, EMULSION INTRAVENOUS at 17:21

## 2019-07-19 RX ADMIN — CEFEPIME HYDROCHLORIDE 1 G: 1 INJECTION, POWDER, FOR SOLUTION INTRAMUSCULAR; INTRAVENOUS at 20:11

## 2019-07-19 RX ADMIN — VANCOMYCIN HYDROCHLORIDE 1000 MG: 1 INJECTION, SOLUTION INTRAVENOUS at 06:14

## 2019-07-19 RX ADMIN — FAMOTIDINE 20 MG: 10 INJECTION INTRAVENOUS at 16:52

## 2019-07-19 RX ADMIN — METOLAZONE 2.5 MG: 2.5 TABLET ORAL at 10:56

## 2019-07-19 RX ADMIN — SODIUM CHLORIDE, POTASSIUM CHLORIDE, SODIUM LACTATE AND CALCIUM CHLORIDE 100 ML/HR: 600; 310; 30; 20 INJECTION, SOLUTION INTRAVENOUS at 20:12

## 2019-07-19 RX ADMIN — FAMOTIDINE 20 MG: 10 INJECTION, SOLUTION INTRAVENOUS at 10:57

## 2019-07-19 RX ADMIN — TERAZOSIN HYDROCHLORIDE 5 MG: 5 CAPSULE ORAL at 20:12

## 2019-07-19 RX ADMIN — LINAGLIPTIN 5 MG: 5 TABLET, FILM COATED ORAL at 10:58

## 2019-07-19 RX ADMIN — FENTANYL CITRATE 50 MCG: 50 INJECTION, SOLUTION INTRAMUSCULAR; INTRAVENOUS at 17:08

## 2019-07-19 RX ADMIN — PROPOFOL 25 MG: 10 INJECTION, EMULSION INTRAVENOUS at 17:27

## 2019-07-19 RX ADMIN — FLUTICASONE PROPIONATE 2 SPRAY: 50 SPRAY, METERED NASAL at 10:57

## 2019-07-19 RX ADMIN — BUPROPION HYDROCHLORIDE 300 MG: 150 TABLET, FILM COATED, EXTENDED RELEASE ORAL at 10:55

## 2019-07-19 RX ADMIN — FUROSEMIDE 60 MG: 40 TABLET ORAL at 10:54

## 2019-07-19 RX ADMIN — SODIUM CHLORIDE, POTASSIUM CHLORIDE, SODIUM LACTATE AND CALCIUM CHLORIDE 100 ML/HR: 600; 310; 30; 20 INJECTION, SOLUTION INTRAVENOUS at 16:51

## 2019-07-19 RX ADMIN — PROPOFOL 25 MG: 10 INJECTION, EMULSION INTRAVENOUS at 17:15

## 2019-07-19 RX ADMIN — DIGOXIN 125 MCG: 125 TABLET ORAL at 10:54

## 2019-07-19 RX ADMIN — SODIUM CHLORIDE, PRESERVATIVE FREE 3 ML: 5 INJECTION INTRAVENOUS at 20:17

## 2019-07-19 RX ADMIN — Medication 2 G: at 17:15

## 2019-07-19 RX ADMIN — LISINOPRIL 20 MG: 20 TABLET ORAL at 10:55

## 2019-07-19 RX ADMIN — ACETAMINOPHEN 650 MG: 325 TABLET, FILM COATED ORAL at 06:18

## 2019-07-19 RX ADMIN — SODIUM CHLORIDE, PRESERVATIVE FREE 3 ML: 5 INJECTION INTRAVENOUS at 10:58

## 2019-07-19 RX ADMIN — AMLODIPINE BESYLATE 10 MG: 10 TABLET ORAL at 10:56

## 2019-07-19 RX ADMIN — ARFORMOTEROL TARTRATE 15 MCG: 15 SOLUTION RESPIRATORY (INHALATION) at 07:30

## 2019-07-19 RX ADMIN — CEFEPIME HYDROCHLORIDE 1 G: 1 INJECTION, POWDER, FOR SOLUTION INTRAMUSCULAR; INTRAVENOUS at 10:54

## 2019-07-19 RX ADMIN — FLUOXETINE HYDROCHLORIDE 20 MG: 20 CAPSULE ORAL at 10:58

## 2019-07-19 NOTE — ANESTHESIA PREPROCEDURE EVALUATION
" Anesthesia Evaluation     Patient summary reviewed and Nursing notes reviewed   no history of anesthetic complications:  NPO Solid Status: > 8 hours  NPO Liquid Status: > 8 hours           Airway   Mallampati: I  TM distance: >3 FB  Neck ROM: full  No difficulty expected  Dental    (+) poor dentition    Pulmonary    (+) COPD, shortness of breath,   Cardiovascular   Exercise tolerance: poor (<4 METS)    ECG reviewed  Patient on routine beta blocker and Beta blocker given within 24 hours of surgery    (+) pacemaker (medtronic pacemaker) pacemaker, hypertension, dysrhythmias Atrial Fib, CHF (chronic, diastolic),     ROS comment: Heart cath 3/2019  1.  Severe bi - atrial enlargement  2.   Bi - atrial \"smoke\" despite the use of Xarelto  3.   Mild aortic and mitral regurgitation  4.   Severe tricuspid regurgitation  5.   No obvious right atrial mass         Neuro/Psych  (+) psychiatric history Depression,     GI/Hepatic/Renal/Endo    (+) obesity,   renal disease CRI, diabetes mellitus type 2 using insulin,     ROS Comment: Cellulitis, diabetic ulcer LLE    Musculoskeletal     Abdominal    Substance History      OB/GYN          Other   (+) arthritis   history of cancer (prostate cancer) remission                  Anesthesia Plan    ASA 3     general     intravenous induction   Anesthetic plan, all risks, benefits, and alternatives have been provided, discussed and informed consent has been obtained with: patient.      "

## 2019-07-19 NOTE — TELEPHONE ENCOUNTER
Called April @ [facility] , Dr. Chapito PARRY have pt NPO , room 493.    April voiced understanding verbal orders. DN

## 2019-07-19 NOTE — TELEPHONE ENCOUNTER
Dr. Uriarte put orders in for I&D of left foot, OR booked for today 7/19/19 @ 4:30.  Called OR, s/w Rena- informed Rena orders were entered.  DN

## 2019-07-19 NOTE — ANESTHESIA POSTPROCEDURE EVALUATION
Patient: Prakash Castro    Procedure Summary     Date:  07/19/19 Room / Location:  [facility] OR [address] [place] [address] PAD OR    Anesthesia Start:  1705 Anesthesia Stop:  1742    Procedure:  INCISION AND DRAINAGE, LEFT FOOT (Left ) Diagnosis:       Abscess of left foot      (Abscess of left foot [L02.612])    Surgeon:  Juan Antonio Uriarte DPM Provider:  Maynor Jarrett CRNA    Anesthesia Type:  general ASA Status:  3          Anesthesia Type: general  Last vitals  BP   122/64 (07/19/19 1549)   Temp   98.2 °F (36.8 °C) (07/19/19 1549)   Pulse   69 (07/19/19 1634)   Resp   18 (07/19/19 1634)     SpO2   96 % (07/19/19 1634)     Post Anesthesia Care and Evaluation    Patient location during evaluation: PHASE II  Patient participation: complete - patient participated  Level of consciousness: awake and alert  Pain score: 0  Pain management: adequate  Airway patency: patent  Anesthetic complications: No anesthetic complications  PONV Status: none  Cardiovascular status: acceptable and stable  Respiratory status: acceptable  Hydration status: acceptable

## 2019-07-20 LAB
ANION GAP SERPL CALCULATED.3IONS-SCNC: 7 MMOL/L (ref 4–13)
BUN BLD-MCNC: 24 MG/DL (ref 5–21)
BUN/CREAT SERPL: 27.6 (ref 7–25)
CALCIUM SPEC-SCNC: 9.1 MG/DL (ref 8.4–10.4)
CHLORIDE SERPL-SCNC: 96 MMOL/L (ref 98–110)
CO2 SERPL-SCNC: 30 MMOL/L (ref 24–31)
CREAT BLD-MCNC: 0.87 MG/DL (ref 0.5–1.4)
DEPRECATED RDW RBC AUTO: 44.1 FL (ref 40–54)
ERYTHROCYTE [DISTWIDTH] IN BLOOD BY AUTOMATED COUNT: 12.8 % (ref 12–15)
GFR SERPL CREATININE-BSD FRML MDRD: 84 ML/MIN/1.73
GLUCOSE BLD-MCNC: 101 MG/DL (ref 70–100)
HCT VFR BLD AUTO: 25.2 % (ref 40–52)
HGB BLD-MCNC: 8.8 G/DL (ref 14–18)
MCH RBC QN AUTO: 33 PG (ref 28–32)
MCHC RBC AUTO-ENTMCNC: 34.9 G/DL (ref 33–36)
MCV RBC AUTO: 94.4 FL (ref 82–95)
PLATELET # BLD AUTO: 248 10*3/MM3 (ref 130–400)
PMV BLD AUTO: 10.2 FL (ref 6–12)
POTASSIUM BLD-SCNC: 3.5 MMOL/L (ref 3.5–5.3)
RBC # BLD AUTO: 2.67 10*6/MM3 (ref 4.8–5.9)
SODIUM BLD-SCNC: 133 MMOL/L (ref 135–145)
WBC NRBC COR # BLD: 10.32 10*3/MM3 (ref 4.8–10.8)

## 2019-07-20 PROCEDURE — 85027 COMPLETE CBC AUTOMATED: CPT | Performed by: NURSE PRACTITIONER

## 2019-07-20 PROCEDURE — 80048 BASIC METABOLIC PNL TOTAL CA: CPT | Performed by: NURSE PRACTITIONER

## 2019-07-20 PROCEDURE — 94799 UNLISTED PULMONARY SVC/PX: CPT

## 2019-07-20 PROCEDURE — 97530 THERAPEUTIC ACTIVITIES: CPT

## 2019-07-20 PROCEDURE — 99024 POSTOP FOLLOW-UP VISIT: CPT | Performed by: PODIATRIST

## 2019-07-20 PROCEDURE — 97164 PT RE-EVAL EST PLAN CARE: CPT

## 2019-07-20 PROCEDURE — 25010000002 VANCOMYCIN 10 G RECONSTITUTED SOLUTION: Performed by: FAMILY MEDICINE

## 2019-07-20 PROCEDURE — 25010000002 CEFEPIME PER 500 MG: Performed by: FAMILY MEDICINE

## 2019-07-20 PROCEDURE — 94760 N-INVAS EAR/PLS OXIMETRY 1: CPT

## 2019-07-20 PROCEDURE — 97116 GAIT TRAINING THERAPY: CPT

## 2019-07-20 RX ORDER — FLUOXETINE HYDROCHLORIDE 20 MG/1
60 CAPSULE ORAL DAILY
Status: DISCONTINUED | OUTPATIENT
Start: 2019-07-21 | End: 2019-07-22 | Stop reason: HOSPADM

## 2019-07-20 RX ORDER — FAMOTIDINE 20 MG/1
20 TABLET, FILM COATED ORAL DAILY
Status: DISCONTINUED | OUTPATIENT
Start: 2019-07-21 | End: 2019-07-22 | Stop reason: HOSPADM

## 2019-07-20 RX ORDER — SACCHAROMYCES BOULARDII 250 MG
250 CAPSULE ORAL 2 TIMES DAILY
Status: DISCONTINUED | OUTPATIENT
Start: 2019-07-20 | End: 2019-07-22 | Stop reason: HOSPADM

## 2019-07-20 RX ADMIN — AMLODIPINE BESYLATE 10 MG: 10 TABLET ORAL at 10:56

## 2019-07-20 RX ADMIN — Medication 250 MG: at 20:57

## 2019-07-20 RX ADMIN — VANCOMYCIN HYDROCHLORIDE 750 MG: 10 INJECTION, POWDER, LYOPHILIZED, FOR SOLUTION INTRAVENOUS at 00:38

## 2019-07-20 RX ADMIN — ARFORMOTEROL TARTRATE 15 MCG: 15 SOLUTION RESPIRATORY (INHALATION) at 07:49

## 2019-07-20 RX ADMIN — DULOXETINE HYDROCHLORIDE 60 MG: 30 CAPSULE, DELAYED RELEASE ORAL at 10:56

## 2019-07-20 RX ADMIN — FLUTICASONE PROPIONATE 2 SPRAY: 50 SPRAY, METERED NASAL at 10:58

## 2019-07-20 RX ADMIN — ACETAMINOPHEN 650 MG: 325 TABLET, FILM COATED ORAL at 12:31

## 2019-07-20 RX ADMIN — CARVEDILOL 6.25 MG: 6.25 TABLET, FILM COATED ORAL at 10:57

## 2019-07-20 RX ADMIN — CEFEPIME HYDROCHLORIDE 1 G: 1 INJECTION, POWDER, FOR SOLUTION INTRAMUSCULAR; INTRAVENOUS at 20:57

## 2019-07-20 RX ADMIN — CEFEPIME HYDROCHLORIDE 1 G: 1 INJECTION, POWDER, FOR SOLUTION INTRAMUSCULAR; INTRAVENOUS at 14:51

## 2019-07-20 RX ADMIN — VANCOMYCIN HYDROCHLORIDE 750 MG: 10 INJECTION, POWDER, LYOPHILIZED, FOR SOLUTION INTRAVENOUS at 12:32

## 2019-07-20 RX ADMIN — RIVAROXABAN 20 MG: 20 TABLET, FILM COATED ORAL at 17:58

## 2019-07-20 RX ADMIN — METOLAZONE 2.5 MG: 2.5 TABLET ORAL at 10:55

## 2019-07-20 RX ADMIN — FLUOXETINE HYDROCHLORIDE 20 MG: 20 CAPSULE ORAL at 10:56

## 2019-07-20 RX ADMIN — SODIUM CHLORIDE, PRESERVATIVE FREE 3 ML: 5 INJECTION INTRAVENOUS at 20:57

## 2019-07-20 RX ADMIN — LISINOPRIL 20 MG: 20 TABLET ORAL at 10:57

## 2019-07-20 RX ADMIN — CEFEPIME HYDROCHLORIDE 1 G: 1 INJECTION, POWDER, FOR SOLUTION INTRAMUSCULAR; INTRAVENOUS at 04:50

## 2019-07-20 RX ADMIN — FUROSEMIDE 60 MG: 40 TABLET ORAL at 10:57

## 2019-07-20 RX ADMIN — BUPROPION HYDROCHLORIDE 300 MG: 150 TABLET, FILM COATED, EXTENDED RELEASE ORAL at 10:57

## 2019-07-20 RX ADMIN — TERAZOSIN HYDROCHLORIDE 5 MG: 5 CAPSULE ORAL at 20:57

## 2019-07-20 RX ADMIN — Medication 250 MG: at 12:47

## 2019-07-20 RX ADMIN — DIGOXIN 125 MCG: 125 TABLET ORAL at 10:56

## 2019-07-20 RX ADMIN — FAMOTIDINE 20 MG: 10 INJECTION, SOLUTION INTRAVENOUS at 10:56

## 2019-07-20 RX ADMIN — LINAGLIPTIN 5 MG: 5 TABLET, FILM COATED ORAL at 10:56

## 2019-07-21 LAB
ANION GAP SERPL CALCULATED.3IONS-SCNC: 7 MMOL/L (ref 4–13)
BUN BLD-MCNC: 30 MG/DL (ref 5–21)
BUN/CREAT SERPL: 33 (ref 7–25)
CALCIUM SPEC-SCNC: 9.1 MG/DL (ref 8.4–10.4)
CHLORIDE SERPL-SCNC: 97 MMOL/L (ref 98–110)
CO2 SERPL-SCNC: 29 MMOL/L (ref 24–31)
CREAT BLD-MCNC: 0.91 MG/DL (ref 0.5–1.4)
DEPRECATED RDW RBC AUTO: 44.5 FL (ref 40–54)
ERYTHROCYTE [DISTWIDTH] IN BLOOD BY AUTOMATED COUNT: 12.9 % (ref 12–15)
GFR SERPL CREATININE-BSD FRML MDRD: 80 ML/MIN/1.73
GLUCOSE BLD-MCNC: 106 MG/DL (ref 70–100)
GLUCOSE BLDC GLUCOMTR-MCNC: 109 MG/DL (ref 70–130)
GLUCOSE BLDC GLUCOMTR-MCNC: 150 MG/DL (ref 70–130)
GLUCOSE BLDC GLUCOMTR-MCNC: 164 MG/DL (ref 70–130)
GLUCOSE BLDC GLUCOMTR-MCNC: 215 MG/DL (ref 70–130)
HCT VFR BLD AUTO: 25.8 % (ref 40–52)
HGB BLD-MCNC: 9.1 G/DL (ref 14–18)
MCH RBC QN AUTO: 33.3 PG (ref 28–32)
MCHC RBC AUTO-ENTMCNC: 35.3 G/DL (ref 33–36)
MCV RBC AUTO: 94.5 FL (ref 82–95)
PLATELET # BLD AUTO: 257 10*3/MM3 (ref 130–400)
PMV BLD AUTO: 9.9 FL (ref 6–12)
POTASSIUM BLD-SCNC: 3.3 MMOL/L (ref 3.5–5.3)
RBC # BLD AUTO: 2.73 10*6/MM3 (ref 4.8–5.9)
SODIUM BLD-SCNC: 133 MMOL/L (ref 135–145)
WBC NRBC COR # BLD: 7 10*3/MM3 (ref 4.8–10.8)

## 2019-07-21 PROCEDURE — 97116 GAIT TRAINING THERAPY: CPT

## 2019-07-21 PROCEDURE — 25010000002 CEFEPIME PER 500 MG: Performed by: FAMILY MEDICINE

## 2019-07-21 PROCEDURE — 99024 POSTOP FOLLOW-UP VISIT: CPT | Performed by: PODIATRIST

## 2019-07-21 PROCEDURE — 63710000001 INSULIN LISPRO (HUMAN) PER 5 UNITS: Performed by: NURSE PRACTITIONER

## 2019-07-21 PROCEDURE — 97110 THERAPEUTIC EXERCISES: CPT

## 2019-07-21 PROCEDURE — 82962 GLUCOSE BLOOD TEST: CPT

## 2019-07-21 PROCEDURE — 80048 BASIC METABOLIC PNL TOTAL CA: CPT | Performed by: NURSE PRACTITIONER

## 2019-07-21 PROCEDURE — 85027 COMPLETE CBC AUTOMATED: CPT | Performed by: NURSE PRACTITIONER

## 2019-07-21 PROCEDURE — 25010000002 VANCOMYCIN 10 G RECONSTITUTED SOLUTION: Performed by: FAMILY MEDICINE

## 2019-07-21 RX ORDER — POTASSIUM CHLORIDE 750 MG/1
40 CAPSULE, EXTENDED RELEASE ORAL ONCE
Status: COMPLETED | OUTPATIENT
Start: 2019-07-21 | End: 2019-07-21

## 2019-07-21 RX ORDER — CARVEDILOL 6.25 MG/1
6.25 TABLET ORAL 2 TIMES DAILY WITH MEALS
Status: DISCONTINUED | OUTPATIENT
Start: 2019-07-21 | End: 2019-07-22 | Stop reason: HOSPADM

## 2019-07-21 RX ORDER — DOCUSATE SODIUM 100 MG/1
100 CAPSULE, LIQUID FILLED ORAL 2 TIMES DAILY
Status: DISCONTINUED | OUTPATIENT
Start: 2019-07-21 | End: 2019-07-22 | Stop reason: HOSPADM

## 2019-07-21 RX ORDER — LISINOPRIL 20 MG/1
40 TABLET ORAL DAILY
Status: DISCONTINUED | OUTPATIENT
Start: 2019-07-22 | End: 2019-07-22 | Stop reason: HOSPADM

## 2019-07-21 RX ORDER — POTASSIUM CHLORIDE 750 MG/1
20 CAPSULE, EXTENDED RELEASE ORAL DAILY
Status: DISCONTINUED | OUTPATIENT
Start: 2019-07-22 | End: 2019-07-22 | Stop reason: HOSPADM

## 2019-07-21 RX ORDER — NICOTINE POLACRILEX 4 MG
15 LOZENGE BUCCAL
Status: DISCONTINUED | OUTPATIENT
Start: 2019-07-21 | End: 2019-07-22 | Stop reason: HOSPADM

## 2019-07-21 RX ORDER — DEXTROSE MONOHYDRATE 25 G/50ML
25 INJECTION, SOLUTION INTRAVENOUS
Status: DISCONTINUED | OUTPATIENT
Start: 2019-07-21 | End: 2019-07-22 | Stop reason: HOSPADM

## 2019-07-21 RX ADMIN — FUROSEMIDE 60 MG: 40 TABLET ORAL at 08:31

## 2019-07-21 RX ADMIN — TERAZOSIN HYDROCHLORIDE 5 MG: 5 CAPSULE ORAL at 22:35

## 2019-07-21 RX ADMIN — Medication 250 MG: at 22:35

## 2019-07-21 RX ADMIN — CEFEPIME HYDROCHLORIDE 1 G: 1 INJECTION, POWDER, FOR SOLUTION INTRAMUSCULAR; INTRAVENOUS at 03:47

## 2019-07-21 RX ADMIN — CARVEDILOL 6.25 MG: 6.25 TABLET, FILM COATED ORAL at 17:25

## 2019-07-21 RX ADMIN — POTASSIUM CHLORIDE 40 MEQ: 750 CAPSULE, EXTENDED RELEASE ORAL at 08:30

## 2019-07-21 RX ADMIN — SODIUM CHLORIDE, PRESERVATIVE FREE 3 ML: 5 INJECTION INTRAVENOUS at 08:34

## 2019-07-21 RX ADMIN — FLUTICASONE PROPIONATE 2 SPRAY: 50 SPRAY, METERED NASAL at 08:30

## 2019-07-21 RX ADMIN — SODIUM CHLORIDE, PRESERVATIVE FREE 3 ML: 5 INJECTION INTRAVENOUS at 22:37

## 2019-07-21 RX ADMIN — VANCOMYCIN HYDROCHLORIDE 750 MG: 10 INJECTION, POWDER, LYOPHILIZED, FOR SOLUTION INTRAVENOUS at 00:38

## 2019-07-21 RX ADMIN — DOCUSATE SODIUM 100 MG: 100 CAPSULE, LIQUID FILLED ORAL at 12:17

## 2019-07-21 RX ADMIN — METOLAZONE 2.5 MG: 2.5 TABLET ORAL at 08:32

## 2019-07-21 RX ADMIN — CARVEDILOL 6.25 MG: 6.25 TABLET, FILM COATED ORAL at 08:31

## 2019-07-21 RX ADMIN — Medication 250 MG: at 08:31

## 2019-07-21 RX ADMIN — POLYETHYLENE GLYCOL (3350) 17 G: 17 POWDER, FOR SOLUTION ORAL at 12:17

## 2019-07-21 RX ADMIN — AMLODIPINE BESYLATE 10 MG: 10 TABLET ORAL at 08:34

## 2019-07-21 RX ADMIN — DIGOXIN 125 MCG: 125 TABLET ORAL at 08:30

## 2019-07-21 RX ADMIN — LISINOPRIL 20 MG: 20 TABLET ORAL at 08:31

## 2019-07-21 RX ADMIN — BUPROPION HYDROCHLORIDE 300 MG: 150 TABLET, FILM COATED, EXTENDED RELEASE ORAL at 08:30

## 2019-07-21 RX ADMIN — INSULIN LISPRO 2 UNITS: 100 INJECTION, SOLUTION INTRAVENOUS; SUBCUTANEOUS at 12:17

## 2019-07-21 RX ADMIN — FLUOXETINE HYDROCHLORIDE 60 MG: 20 CAPSULE ORAL at 08:31

## 2019-07-21 RX ADMIN — FAMOTIDINE 20 MG: 20 TABLET, FILM COATED ORAL at 08:32

## 2019-07-21 RX ADMIN — VANCOMYCIN HYDROCHLORIDE 750 MG: 10 INJECTION, POWDER, LYOPHILIZED, FOR SOLUTION INTRAVENOUS at 12:17

## 2019-07-21 RX ADMIN — RIVAROXABAN 20 MG: 20 TABLET, FILM COATED ORAL at 17:25

## 2019-07-22 VITALS
WEIGHT: 263.8 LBS | HEART RATE: 62 BPM | RESPIRATION RATE: 20 BRPM | OXYGEN SATURATION: 92 % | DIASTOLIC BLOOD PRESSURE: 60 MMHG | HEIGHT: 70 IN | BODY MASS INDEX: 37.77 KG/M2 | SYSTOLIC BLOOD PRESSURE: 133 MMHG | TEMPERATURE: 98.1 F

## 2019-07-22 LAB
ANION GAP SERPL CALCULATED.3IONS-SCNC: 6 MMOL/L (ref 4–13)
B-LACTAMASE USUAL SUSC ISLT: POSITIVE
BACTERIA SPEC AEROBE CULT: ABNORMAL
BUN BLD-MCNC: 43 MG/DL (ref 5–21)
BUN/CREAT SERPL: 43.4 (ref 7–25)
CALCIUM SPEC-SCNC: 9.3 MG/DL (ref 8.4–10.4)
CHLORIDE SERPL-SCNC: 96 MMOL/L (ref 98–110)
CO2 SERPL-SCNC: 32 MMOL/L (ref 24–31)
CREAT BLD-MCNC: 0.99 MG/DL (ref 0.5–1.4)
DEPRECATED RDW RBC AUTO: 44.1 FL (ref 40–54)
ERYTHROCYTE [DISTWIDTH] IN BLOOD BY AUTOMATED COUNT: 12.7 % (ref 12–15)
GFR SERPL CREATININE-BSD FRML MDRD: 72 ML/MIN/1.73
GLUCOSE BLD-MCNC: 105 MG/DL (ref 70–100)
GLUCOSE BLDC GLUCOMTR-MCNC: 114 MG/DL (ref 70–130)
GLUCOSE BLDC GLUCOMTR-MCNC: 138 MG/DL (ref 70–130)
GLUCOSE BLDC GLUCOMTR-MCNC: 141 MG/DL (ref 70–130)
GRAM STN SPEC: ABNORMAL
GRAM STN SPEC: ABNORMAL
HCT VFR BLD AUTO: 27.3 % (ref 40–52)
HGB BLD-MCNC: 9.4 G/DL (ref 14–18)
MCH RBC QN AUTO: 33 PG (ref 28–32)
MCHC RBC AUTO-ENTMCNC: 34.4 G/DL (ref 33–36)
MCV RBC AUTO: 95.8 FL (ref 82–95)
PLATELET # BLD AUTO: 287 10*3/MM3 (ref 130–400)
PMV BLD AUTO: 9.8 FL (ref 6–12)
POTASSIUM BLD-SCNC: 3.8 MMOL/L (ref 3.5–5.3)
RBC # BLD AUTO: 2.85 10*6/MM3 (ref 4.8–5.9)
SODIUM BLD-SCNC: 134 MMOL/L (ref 135–145)
WBC NRBC COR # BLD: 8.65 10*3/MM3 (ref 4.8–10.8)

## 2019-07-22 PROCEDURE — 97110 THERAPEUTIC EXERCISES: CPT

## 2019-07-22 PROCEDURE — 25010000002 VANCOMYCIN 10 G RECONSTITUTED SOLUTION: Performed by: FAMILY MEDICINE

## 2019-07-22 PROCEDURE — 97166 OT EVAL MOD COMPLEX 45 MIN: CPT

## 2019-07-22 PROCEDURE — 99024 POSTOP FOLLOW-UP VISIT: CPT | Performed by: PODIATRIST

## 2019-07-22 PROCEDURE — 82962 GLUCOSE BLOOD TEST: CPT

## 2019-07-22 PROCEDURE — 85027 COMPLETE CBC AUTOMATED: CPT | Performed by: NURSE PRACTITIONER

## 2019-07-22 PROCEDURE — 80048 BASIC METABOLIC PNL TOTAL CA: CPT | Performed by: NURSE PRACTITIONER

## 2019-07-22 PROCEDURE — 97116 GAIT TRAINING THERAPY: CPT

## 2019-07-22 PROCEDURE — 97530 THERAPEUTIC ACTIVITIES: CPT

## 2019-07-22 RX ORDER — DOXYCYCLINE 100 MG/1
100 TABLET ORAL EVERY 12 HOURS SCHEDULED
Status: DISCONTINUED | OUTPATIENT
Start: 2019-07-22 | End: 2019-07-22 | Stop reason: HOSPADM

## 2019-07-22 RX ORDER — DOXYCYCLINE 100 MG/1
100 TABLET ORAL EVERY 12 HOURS SCHEDULED
Qty: 19 TABLET | Refills: 0 | Status: SHIPPED | OUTPATIENT
Start: 2019-07-22 | End: 2019-08-02 | Stop reason: HOSPADM

## 2019-07-22 RX ORDER — SACCHAROMYCES BOULARDII 250 MG
250 CAPSULE ORAL 2 TIMES DAILY
Start: 2019-07-22 | End: 2020-03-17

## 2019-07-22 RX ADMIN — Medication 250 MG: at 08:37

## 2019-07-22 RX ADMIN — CARVEDILOL 6.25 MG: 6.25 TABLET, FILM COATED ORAL at 08:37

## 2019-07-22 RX ADMIN — DOCUSATE SODIUM 100 MG: 100 CAPSULE, LIQUID FILLED ORAL at 08:37

## 2019-07-22 RX ADMIN — FLUOXETINE HYDROCHLORIDE 60 MG: 20 CAPSULE ORAL at 08:37

## 2019-07-22 RX ADMIN — DIGOXIN 125 MCG: 125 TABLET ORAL at 08:37

## 2019-07-22 RX ADMIN — CARVEDILOL 6.25 MG: 6.25 TABLET, FILM COATED ORAL at 17:11

## 2019-07-22 RX ADMIN — LISINOPRIL 40 MG: 20 TABLET ORAL at 08:37

## 2019-07-22 RX ADMIN — METOLAZONE 2.5 MG: 2.5 TABLET ORAL at 08:36

## 2019-07-22 RX ADMIN — SODIUM CHLORIDE, PRESERVATIVE FREE 3 ML: 5 INJECTION INTRAVENOUS at 08:37

## 2019-07-22 RX ADMIN — RIVAROXABAN 20 MG: 20 TABLET, FILM COATED ORAL at 17:11

## 2019-07-22 RX ADMIN — BUPROPION HYDROCHLORIDE 300 MG: 150 TABLET, FILM COATED, EXTENDED RELEASE ORAL at 08:37

## 2019-07-22 RX ADMIN — POLYETHYLENE GLYCOL (3350) 17 G: 17 POWDER, FOR SOLUTION ORAL at 08:40

## 2019-07-22 RX ADMIN — FUROSEMIDE 60 MG: 40 TABLET ORAL at 08:36

## 2019-07-22 RX ADMIN — DOXYCYCLINE 100 MG: 100 TABLET ORAL at 12:17

## 2019-07-22 RX ADMIN — VANCOMYCIN HYDROCHLORIDE 750 MG: 10 INJECTION, POWDER, LYOPHILIZED, FOR SOLUTION INTRAVENOUS at 00:28

## 2019-07-22 RX ADMIN — FLUTICASONE PROPIONATE 2 SPRAY: 50 SPRAY, METERED NASAL at 08:41

## 2019-07-22 RX ADMIN — POTASSIUM CHLORIDE 20 MEQ: 750 CAPSULE, EXTENDED RELEASE ORAL at 08:37

## 2019-07-22 RX ADMIN — FAMOTIDINE 20 MG: 20 TABLET, FILM COATED ORAL at 08:37

## 2019-07-22 RX ADMIN — AMLODIPINE BESYLATE 10 MG: 10 TABLET ORAL at 08:37

## 2019-07-23 ENCOUNTER — TELEPHONE (OUTPATIENT)
Dept: PRIMARY CARE CLINIC | Age: 84
End: 2019-07-23

## 2019-07-23 ENCOUNTER — OFFICE VISIT (OUTPATIENT)
Dept: WOUND CARE | Facility: HOSPITAL | Age: 84
End: 2019-07-23

## 2019-07-23 ENCOUNTER — READMISSION MANAGEMENT (OUTPATIENT)
Dept: CALL CENTER | Facility: HOSPITAL | Age: 84
End: 2019-07-23

## 2019-07-23 ENCOUNTER — APPOINTMENT (OUTPATIENT)
Dept: WOUND CARE | Facility: HOSPITAL | Age: 84
End: 2019-07-23

## 2019-07-23 LAB
BACTERIA SPEC AEROBE CULT: NORMAL
GRAM STN SPEC: NORMAL
GRAM STN SPEC: NORMAL

## 2019-07-23 PROCEDURE — G0463 HOSPITAL OUTPT CLINIC VISIT: HCPCS

## 2019-07-23 NOTE — OUTREACH NOTE
Prep Survey      Responses   Facility patient discharged from?  [place]   Is patient eligible?  Yes   Discharge diagnosis  abscess of left foot,  I&D    Does the patient have one of the following disease processes/diagnoses(primary or secondary)?  General Surgery   Does the patient have Home health ordered?  Yes   What is the Home health agency?   [facility]   Is there a DME ordered?  Yes   What DME was ordered?  knee scooter   Prep survey completed?  Yes          Tara Wilder RN

## 2019-07-24 ENCOUNTER — READMISSION MANAGEMENT (OUTPATIENT)
Dept: CALL CENTER | Facility: HOSPITAL | Age: 84
End: 2019-07-24

## 2019-07-24 LAB — BACTERIA SPEC ANAEROBE CULT: NORMAL

## 2019-07-24 NOTE — OUTREACH NOTE
General Surgery Week 1 Survey      Responses   Facility patient discharged from?  [place]   Does the patient have one of the following disease processes/diagnoses(primary or secondary)?  General Surgery   Is there a successful TCM telephone encounter documented?  No   Week 1 attempt successful?  Yes   Call start time  1545   Call end time  1549   Discharge diagnosis  abscess of left foot,  I&D    Meds reviewed with patient/caregiver?  Yes   Is the patient having any side effects they believe may be caused by any medication additions or changes?  No   Does the patient have all medications related to this admission filled (includes all antibiotics, pain medications, etc.)  Yes   Is the patient taking all medications as directed (includes completed medication regime)?  Yes   Does the patient have a follow up appointment scheduled with their surgeon?  Yes   Has the patient kept scheduled appointments due by today?  N/A   [facility]   Has home health visited the patient within 72 hours of discharge?  Yes   What DME was ordered?  knee scooter   Has all DME been delivered?  Yes   Psychosocial issues?  No   Did the patient receive a copy of their discharge instructions?  Yes   Nursing interventions  Reviewed instructions with patient   What is the patient's perception of their health status since discharge?  Improving   Nursing interventions  Nurse provided patient education   Is the patient /caregiver able to teach back basic post-op care?  Lifting as instructed by MD in discharge instructions, Keep incision areas clean,dry and protected, No tub bath, swimming, or hot tub until instructed by MD, Take showers only when approved by MD-sponge bathe until then, Drive as instructed by MD in discharge instructions, Practice 'cough and deep breath', Continue use of incentive spirometry at least 1 week post discharge   Is the patient/caregiver able to teach back signs and symptoms of  incisional infection?  Increased redness, swelling or pain at the incisonal site, Increased drainage or bleeding, Incisional warmth, Fever, Pus or odor from incision   Is the patient/caregiver able to teach back steps to recovery at home?  Set small, achievable goals for return to baseline health, Rest and rebuild strength, gradually increase activity, Eat a well-balance diet   Is the patient/caregiver able to teach back the hierarchy of who to call/visit for symptoms/problems? PCP, Specialist, Home health nurse, Urgent Care, ED, 911  Yes   Additional teach back comments  Pt says he is doing well, he is following all the discharge instructions and doing what he should be, no questions or concerns at this time.   Week 1 call completed?  Yes          Delilah Wells RN

## 2019-07-26 ENCOUNTER — HOSPITAL ENCOUNTER (INPATIENT)
Facility: HOSPITAL | Age: 84
LOS: 7 days | Discharge: HOME HEALTH | End: 2019-08-02
Attending: INTERNAL MEDICINE | Admitting: FAMILY MEDICINE

## 2019-07-26 ENCOUNTER — APPOINTMENT (OUTPATIENT)
Dept: GENERAL RADIOLOGY | Facility: HOSPITAL | Age: 84
End: 2019-07-26

## 2019-07-26 DIAGNOSIS — Z78.9 DECREASED ACTIVITIES OF DAILY LIVING (ADL): ICD-10-CM

## 2019-07-26 DIAGNOSIS — I50.9 ACUTE ON CHRONIC CONGESTIVE HEART FAILURE, UNSPECIFIED HEART FAILURE TYPE (HCC): Primary | ICD-10-CM

## 2019-07-26 DIAGNOSIS — I50.33 ACUTE ON CHRONIC DIASTOLIC CONGESTIVE HEART FAILURE (HCC): ICD-10-CM

## 2019-07-26 DIAGNOSIS — Z74.09 IMPAIRED FUNCTIONAL MOBILITY, BALANCE, GAIT, AND ENDURANCE: ICD-10-CM

## 2019-07-26 DIAGNOSIS — J96.21 ACUTE ON CHRONIC RESPIRATORY FAILURE WITH HYPOXIA AND HYPERCAPNIA (HCC): ICD-10-CM

## 2019-07-26 DIAGNOSIS — J96.22 ACUTE ON CHRONIC RESPIRATORY FAILURE WITH HYPOXIA AND HYPERCAPNIA (HCC): ICD-10-CM

## 2019-07-26 PROBLEM — N17.9 ACUTE KIDNEY INJURY: Status: ACTIVE | Noted: 2019-07-26

## 2019-07-26 LAB
ALBUMIN SERPL-MCNC: 3.6 G/DL (ref 3.5–5)
ALBUMIN/GLOB SERPL: 1 G/DL (ref 1.1–2.5)
ALP SERPL-CCNC: 59 U/L (ref 24–120)
ALT SERPL W P-5'-P-CCNC: <15 U/L (ref 0–54)
ANION GAP SERPL CALCULATED.3IONS-SCNC: 6 MMOL/L (ref 4–13)
ARTERIAL PATENCY WRIST A: POSITIVE
AST SERPL-CCNC: 19 U/L (ref 7–45)
ATMOSPHERIC PRESS: 755 MMHG
BASE EXCESS BLDA CALC-SCNC: 2.6 MMOL/L (ref 0–2)
BASOPHILS # BLD AUTO: 0.06 10*3/MM3 (ref 0–0.2)
BASOPHILS NFR BLD AUTO: 0.5 % (ref 0–2)
BDY SITE: ABNORMAL
BILIRUB SERPL-MCNC: 0.5 MG/DL (ref 0.1–1)
BODY TEMPERATURE: 37 C
BUN BLD-MCNC: 61 MG/DL (ref 5–21)
BUN/CREAT SERPL: 43.3 (ref 7–25)
CALCIUM SPEC-SCNC: 9.5 MG/DL (ref 8.4–10.4)
CHLORIDE SERPL-SCNC: 100 MMOL/L (ref 98–110)
CO2 SERPL-SCNC: 31 MMOL/L (ref 24–31)
CREAT BLD-MCNC: 1.41 MG/DL (ref 0.5–1.4)
DEPRECATED RDW RBC AUTO: 46.7 FL (ref 40–54)
DIGOXIN SERPL-MCNC: 1.2 NG/ML (ref 0.8–2)
EOSINOPHIL # BLD AUTO: 0.16 10*3/MM3 (ref 0–0.7)
EOSINOPHIL NFR BLD AUTO: 1.4 % (ref 0–4)
ERYTHROCYTE [DISTWIDTH] IN BLOOD BY AUTOMATED COUNT: 13.2 % (ref 12–15)
GAS FLOW AIRWAY: 1 LPM
GFR SERPL CREATININE-BSD FRML MDRD: 48 ML/MIN/1.73
GLOBULIN UR ELPH-MCNC: 3.6 GM/DL
GLUCOSE BLD-MCNC: 160 MG/DL (ref 70–100)
HCO3 BLDA-SCNC: 27.6 MMOL/L (ref 20–26)
HCT VFR BLD AUTO: 28.1 % (ref 40–52)
HGB BLD-MCNC: 9.5 G/DL (ref 14–18)
IMM GRANULOCYTES # BLD AUTO: 0.45 10*3/MM3 (ref 0–0.05)
IMM GRANULOCYTES NFR BLD AUTO: 4 % (ref 0–5)
LYMPHOCYTES # BLD AUTO: 0.71 10*3/MM3 (ref 0.72–4.86)
LYMPHOCYTES NFR BLD AUTO: 6.2 % (ref 15–45)
Lab: ABNORMAL
MCH RBC QN AUTO: 33.2 PG (ref 28–32)
MCHC RBC AUTO-ENTMCNC: 33.8 G/DL (ref 33–36)
MCV RBC AUTO: 98.3 FL (ref 82–95)
MODALITY: ABNORMAL
MONOCYTES # BLD AUTO: 0.83 10*3/MM3 (ref 0.19–1.3)
MONOCYTES NFR BLD AUTO: 7.3 % (ref 4–12)
NEUTROPHILS # BLD AUTO: 9.17 10*3/MM3 (ref 1.87–8.4)
NEUTROPHILS NFR BLD AUTO: 80.6 % (ref 39–78)
NRBC BLD AUTO-RTO: 0 /100 WBC (ref 0–0.2)
NT-PROBNP SERPL-MCNC: 4450 PG/ML (ref 0–1800)
PCO2 BLDA: 43.7 MM HG (ref 35–45)
PH BLDA: 7.41 PH UNITS (ref 7.35–7.45)
PLATELET # BLD AUTO: 312 10*3/MM3 (ref 130–400)
PMV BLD AUTO: 9.7 FL (ref 6–12)
PO2 BLDA: 65 MM HG (ref 83–108)
POTASSIUM BLD-SCNC: 4.4 MMOL/L (ref 3.5–5.3)
PROT SERPL-MCNC: 7.2 G/DL (ref 6.3–8.7)
RBC # BLD AUTO: 2.86 10*6/MM3 (ref 4.8–5.9)
SAO2 % BLDCOA: 93.4 % (ref 94–99)
SODIUM BLD-SCNC: 137 MMOL/L (ref 135–145)
TROPONIN I SERPL-MCNC: 0.01 NG/ML (ref 0–0.03)
TROPONIN I SERPL-MCNC: <0.012 NG/ML (ref 0–0.03)
VENTILATOR MODE: ABNORMAL
WBC NRBC COR # BLD: 11.38 10*3/MM3 (ref 4.8–10.8)
WHOLE BLOOD HOLD SPECIMEN: NORMAL

## 2019-07-26 PROCEDURE — 93005 ELECTROCARDIOGRAM TRACING: CPT | Performed by: PHYSICIAN ASSISTANT

## 2019-07-26 PROCEDURE — 85025 COMPLETE CBC W/AUTO DIFF WBC: CPT | Performed by: PHYSICIAN ASSISTANT

## 2019-07-26 PROCEDURE — 71045 X-RAY EXAM CHEST 1 VIEW: CPT

## 2019-07-26 PROCEDURE — 80053 COMPREHEN METABOLIC PANEL: CPT | Performed by: PHYSICIAN ASSISTANT

## 2019-07-26 PROCEDURE — 93010 ELECTROCARDIOGRAM REPORT: CPT | Performed by: INTERNAL MEDICINE

## 2019-07-26 PROCEDURE — 36600 WITHDRAWAL OF ARTERIAL BLOOD: CPT

## 2019-07-26 PROCEDURE — 25010000002 CEFTRIAXONE PER 250 MG: Performed by: NURSE PRACTITIONER

## 2019-07-26 PROCEDURE — 93005 ELECTROCARDIOGRAM TRACING: CPT | Performed by: INTERNAL MEDICINE

## 2019-07-26 PROCEDURE — G0378 HOSPITAL OBSERVATION PER HR: HCPCS

## 2019-07-26 PROCEDURE — 84484 ASSAY OF TROPONIN QUANT: CPT | Performed by: PHYSICIAN ASSISTANT

## 2019-07-26 PROCEDURE — 94799 UNLISTED PULMONARY SVC/PX: CPT

## 2019-07-26 PROCEDURE — 94760 N-INVAS EAR/PLS OXIMETRY 1: CPT

## 2019-07-26 PROCEDURE — 99285 EMERGENCY DEPT VISIT HI MDM: CPT

## 2019-07-26 PROCEDURE — 84484 ASSAY OF TROPONIN QUANT: CPT | Performed by: NURSE PRACTITIONER

## 2019-07-26 PROCEDURE — 83880 ASSAY OF NATRIURETIC PEPTIDE: CPT | Performed by: PHYSICIAN ASSISTANT

## 2019-07-26 PROCEDURE — 25010000002 FUROSEMIDE PER 20 MG: Performed by: PHYSICIAN ASSISTANT

## 2019-07-26 PROCEDURE — 82803 BLOOD GASES ANY COMBINATION: CPT

## 2019-07-26 PROCEDURE — 94640 AIRWAY INHALATION TREATMENT: CPT

## 2019-07-26 PROCEDURE — 80162 ASSAY OF DIGOXIN TOTAL: CPT | Performed by: PHYSICIAN ASSISTANT

## 2019-07-26 PROCEDURE — 63710000001 PREDNISONE PER 1 MG: Performed by: NURSE PRACTITIONER

## 2019-07-26 RX ORDER — SODIUM CHLORIDE 0.9 % (FLUSH) 0.9 %
3 SYRINGE (ML) INJECTION EVERY 12 HOURS SCHEDULED
Status: DISCONTINUED | OUTPATIENT
Start: 2019-07-26 | End: 2019-08-02 | Stop reason: HOSPADM

## 2019-07-26 RX ORDER — SODIUM CHLORIDE 0.9 % (FLUSH) 0.9 %
3-10 SYRINGE (ML) INJECTION AS NEEDED
Status: DISCONTINUED | OUTPATIENT
Start: 2019-07-26 | End: 2019-08-02 | Stop reason: HOSPADM

## 2019-07-26 RX ORDER — ACETAMINOPHEN 325 MG/1
650 TABLET ORAL EVERY 4 HOURS PRN
Status: DISCONTINUED | OUTPATIENT
Start: 2019-07-26 | End: 2019-08-02 | Stop reason: HOSPADM

## 2019-07-26 RX ORDER — DOXYCYCLINE 100 MG/1
100 TABLET ORAL EVERY 12 HOURS SCHEDULED
Status: COMPLETED | OUTPATIENT
Start: 2019-07-26 | End: 2019-08-01

## 2019-07-26 RX ORDER — FLUTICASONE PROPIONATE 50 MCG
2 SPRAY, SUSPENSION (ML) NASAL DAILY
Status: DISCONTINUED | OUTPATIENT
Start: 2019-07-27 | End: 2019-08-02 | Stop reason: HOSPADM

## 2019-07-26 RX ORDER — CARVEDILOL 6.25 MG/1
12.5 TABLET ORAL 2 TIMES DAILY WITH MEALS
Status: DISCONTINUED | OUTPATIENT
Start: 2019-07-26 | End: 2019-08-02 | Stop reason: HOSPADM

## 2019-07-26 RX ORDER — PREDNISONE 20 MG/1
40 TABLET ORAL
Status: COMPLETED | OUTPATIENT
Start: 2019-07-26 | End: 2019-07-30

## 2019-07-26 RX ORDER — POTASSIUM CHLORIDE 750 MG/1
10 CAPSULE, EXTENDED RELEASE ORAL DAILY
Status: DISCONTINUED | OUTPATIENT
Start: 2019-07-27 | End: 2019-07-31

## 2019-07-26 RX ORDER — SPIRONOLACTONE 50 MG/1
50 TABLET, FILM COATED ORAL DAILY
Status: DISCONTINUED | OUTPATIENT
Start: 2019-07-27 | End: 2019-08-02 | Stop reason: HOSPADM

## 2019-07-26 RX ORDER — AMLODIPINE BESYLATE 10 MG/1
10 TABLET ORAL DAILY
Status: DISCONTINUED | OUTPATIENT
Start: 2019-07-27 | End: 2019-08-02 | Stop reason: HOSPADM

## 2019-07-26 RX ORDER — IPRATROPIUM BROMIDE AND ALBUTEROL SULFATE 2.5; .5 MG/3ML; MG/3ML
3 SOLUTION RESPIRATORY (INHALATION)
Status: DISCONTINUED | OUTPATIENT
Start: 2019-07-27 | End: 2019-07-29

## 2019-07-26 RX ORDER — TERAZOSIN 5 MG/1
5 CAPSULE ORAL NIGHTLY
Status: DISCONTINUED | OUTPATIENT
Start: 2019-07-26 | End: 2019-08-02 | Stop reason: HOSPADM

## 2019-07-26 RX ORDER — OXYBUTYNIN CHLORIDE 5 MG/1
5 TABLET, EXTENDED RELEASE ORAL DAILY
Status: DISCONTINUED | OUTPATIENT
Start: 2019-07-27 | End: 2019-08-02 | Stop reason: HOSPADM

## 2019-07-26 RX ORDER — SACCHAROMYCES BOULARDII 250 MG
250 CAPSULE ORAL 2 TIMES DAILY
Status: DISCONTINUED | OUTPATIENT
Start: 2019-07-26 | End: 2019-08-02 | Stop reason: HOSPADM

## 2019-07-26 RX ORDER — DIGOXIN 125 MCG
125 TABLET ORAL
Status: DISCONTINUED | OUTPATIENT
Start: 2019-07-27 | End: 2019-08-02 | Stop reason: HOSPADM

## 2019-07-26 RX ORDER — BUPROPION HYDROCHLORIDE 150 MG/1
300 TABLET ORAL DAILY
Status: DISCONTINUED | OUTPATIENT
Start: 2019-07-27 | End: 2019-08-02 | Stop reason: HOSPADM

## 2019-07-26 RX ORDER — FLUOXETINE HYDROCHLORIDE 20 MG/1
20 CAPSULE ORAL DAILY
Status: DISCONTINUED | OUTPATIENT
Start: 2019-07-27 | End: 2019-08-02 | Stop reason: HOSPADM

## 2019-07-26 RX ORDER — FLUOXETINE HYDROCHLORIDE 20 MG/1
40 CAPSULE ORAL DAILY
Status: DISCONTINUED | OUTPATIENT
Start: 2019-07-27 | End: 2019-08-02 | Stop reason: HOSPADM

## 2019-07-26 RX ORDER — FUROSEMIDE 10 MG/ML
60 INJECTION INTRAMUSCULAR; INTRAVENOUS ONCE
Status: COMPLETED | OUTPATIENT
Start: 2019-07-26 | End: 2019-07-26

## 2019-07-26 RX ORDER — ONDANSETRON 4 MG/1
4 TABLET, FILM COATED ORAL EVERY 6 HOURS PRN
Status: DISCONTINUED | OUTPATIENT
Start: 2019-07-26 | End: 2019-08-02 | Stop reason: HOSPADM

## 2019-07-26 RX ORDER — ONDANSETRON 2 MG/ML
4 INJECTION INTRAMUSCULAR; INTRAVENOUS EVERY 6 HOURS PRN
Status: DISCONTINUED | OUTPATIENT
Start: 2019-07-26 | End: 2019-08-02 | Stop reason: HOSPADM

## 2019-07-26 RX ADMIN — CEFTRIAXONE SODIUM 1 G: 1 INJECTION, POWDER, FOR SOLUTION INTRAMUSCULAR; INTRAVENOUS at 23:37

## 2019-07-26 RX ADMIN — DOXYCYCLINE 100 MG: 100 TABLET ORAL at 23:37

## 2019-07-26 RX ADMIN — TERAZOSIN HYDROCHLORIDE 5 MG: 5 CAPSULE ORAL at 23:37

## 2019-07-26 RX ADMIN — FUROSEMIDE 60 MG: 10 INJECTION, SOLUTION INTRAMUSCULAR; INTRAVENOUS at 20:00

## 2019-07-26 RX ADMIN — Medication 250 MG: at 23:37

## 2019-07-26 RX ADMIN — PREDNISONE 40 MG: 20 TABLET ORAL at 23:37

## 2019-07-26 RX ADMIN — IPRATROPIUM BROMIDE AND ALBUTEROL SULFATE 3 ML: 2.5; .5 SOLUTION RESPIRATORY (INHALATION) at 23:51

## 2019-07-26 RX ADMIN — CARVEDILOL 12.5 MG: 6.25 TABLET, FILM COATED ORAL at 23:37

## 2019-07-27 LAB
ANION GAP SERPL CALCULATED.3IONS-SCNC: 10 MMOL/L (ref 4–13)
BUN BLD-MCNC: 58 MG/DL (ref 5–21)
BUN/CREAT SERPL: 43 (ref 7–25)
CALCIUM SPEC-SCNC: 9.4 MG/DL (ref 8.4–10.4)
CHLORIDE SERPL-SCNC: 100 MMOL/L (ref 98–110)
CO2 SERPL-SCNC: 28 MMOL/L (ref 24–31)
CREAT BLD-MCNC: 1.35 MG/DL (ref 0.5–1.4)
DEPRECATED RDW RBC AUTO: 46.6 FL (ref 40–54)
ERYTHROCYTE [DISTWIDTH] IN BLOOD BY AUTOMATED COUNT: 13.1 % (ref 12–15)
GFR SERPL CREATININE-BSD FRML MDRD: 50 ML/MIN/1.73
GLUCOSE BLD-MCNC: 165 MG/DL (ref 70–100)
GLUCOSE BLDC GLUCOMTR-MCNC: 211 MG/DL (ref 70–130)
GLUCOSE BLDC GLUCOMTR-MCNC: 263 MG/DL (ref 70–130)
HCT VFR BLD AUTO: 27.6 % (ref 40–52)
HGB BLD-MCNC: 9.4 G/DL (ref 14–18)
MCH RBC QN AUTO: 33.3 PG (ref 28–32)
MCHC RBC AUTO-ENTMCNC: 34.1 G/DL (ref 33–36)
MCV RBC AUTO: 97.9 FL (ref 82–95)
PLATELET # BLD AUTO: 287 10*3/MM3 (ref 130–400)
PMV BLD AUTO: 9.9 FL (ref 6–12)
POTASSIUM BLD-SCNC: 4.5 MMOL/L (ref 3.5–5.3)
RBC # BLD AUTO: 2.82 10*6/MM3 (ref 4.8–5.9)
SODIUM BLD-SCNC: 138 MMOL/L (ref 135–145)
TROPONIN I SERPL-MCNC: 0.01 NG/ML (ref 0–0.03)
WBC NRBC COR # BLD: 10.66 10*3/MM3 (ref 4.8–10.8)

## 2019-07-27 PROCEDURE — 94799 UNLISTED PULMONARY SVC/PX: CPT

## 2019-07-27 PROCEDURE — 5A09357 ASSISTANCE WITH RESPIRATORY VENTILATION, LESS THAN 24 CONSECUTIVE HOURS, CONTINUOUS POSITIVE AIRWAY PRESSURE: ICD-10-PCS | Performed by: INTERNAL MEDICINE

## 2019-07-27 PROCEDURE — 84484 ASSAY OF TROPONIN QUANT: CPT | Performed by: NURSE PRACTITIONER

## 2019-07-27 PROCEDURE — 63710000001 INSULIN LISPRO (HUMAN) PER 5 UNITS: Performed by: INTERNAL MEDICINE

## 2019-07-27 PROCEDURE — 94760 N-INVAS EAR/PLS OXIMETRY 1: CPT

## 2019-07-27 PROCEDURE — 63710000001 PREDNISONE PER 1 MG: Performed by: NURSE PRACTITIONER

## 2019-07-27 PROCEDURE — 80048 BASIC METABOLIC PNL TOTAL CA: CPT | Performed by: NURSE PRACTITIONER

## 2019-07-27 PROCEDURE — 25010000002 CEFTRIAXONE PER 250 MG: Performed by: NURSE PRACTITIONER

## 2019-07-27 PROCEDURE — 85027 COMPLETE CBC AUTOMATED: CPT | Performed by: NURSE PRACTITIONER

## 2019-07-27 PROCEDURE — 82962 GLUCOSE BLOOD TEST: CPT

## 2019-07-27 PROCEDURE — 63710000001 INSULIN DETEMIR PER 5 UNITS: Performed by: INTERNAL MEDICINE

## 2019-07-27 RX ORDER — BUMETANIDE 0.25 MG/ML
1 INJECTION INTRAMUSCULAR; INTRAVENOUS EVERY 12 HOURS
Status: DISCONTINUED | OUTPATIENT
Start: 2019-07-27 | End: 2019-07-29

## 2019-07-27 RX ORDER — CELECOXIB 200 MG/1
200 CAPSULE ORAL 2 TIMES DAILY
COMMUNITY

## 2019-07-27 RX ORDER — HYDROCHLOROTHIAZIDE 12.5 MG/1
12.5 TABLET ORAL DAILY
Status: ON HOLD | COMMUNITY
End: 2019-08-02 | Stop reason: SDUPTHER

## 2019-07-27 RX ORDER — PRAZOSIN HYDROCHLORIDE 2 MG/1
2 CAPSULE ORAL NIGHTLY
COMMUNITY

## 2019-07-27 RX ORDER — NICOTINE POLACRILEX 4 MG
15 LOZENGE BUCCAL
Status: DISCONTINUED | OUTPATIENT
Start: 2019-07-27 | End: 2019-08-02 | Stop reason: HOSPADM

## 2019-07-27 RX ADMIN — FUROSEMIDE 60 MG: 40 TABLET ORAL at 10:27

## 2019-07-27 RX ADMIN — IPRATROPIUM BROMIDE AND ALBUTEROL SULFATE 3 ML: 2.5; .5 SOLUTION RESPIRATORY (INHALATION) at 06:37

## 2019-07-27 RX ADMIN — TERAZOSIN HYDROCHLORIDE 5 MG: 5 CAPSULE ORAL at 20:35

## 2019-07-27 RX ADMIN — OXYBUTYNIN CHLORIDE 5 MG: 5 TABLET, EXTENDED RELEASE ORAL at 10:27

## 2019-07-27 RX ADMIN — SPIRONOLACTONE 50 MG: 50 TABLET, FILM COATED ORAL at 10:26

## 2019-07-27 RX ADMIN — DOXYCYCLINE 100 MG: 100 TABLET ORAL at 20:35

## 2019-07-27 RX ADMIN — BUPROPION HYDROCHLORIDE 300 MG: 150 TABLET, FILM COATED, EXTENDED RELEASE ORAL at 10:29

## 2019-07-27 RX ADMIN — CARVEDILOL 12.5 MG: 6.25 TABLET, FILM COATED ORAL at 10:27

## 2019-07-27 RX ADMIN — IPRATROPIUM BROMIDE AND ALBUTEROL SULFATE 3 ML: 2.5; .5 SOLUTION RESPIRATORY (INHALATION) at 14:28

## 2019-07-27 RX ADMIN — SODIUM CHLORIDE, PRESERVATIVE FREE 3 ML: 5 INJECTION INTRAVENOUS at 20:35

## 2019-07-27 RX ADMIN — FLUOXETINE HYDROCHLORIDE 40 MG: 20 CAPSULE ORAL at 10:28

## 2019-07-27 RX ADMIN — Medication 250 MG: at 10:30

## 2019-07-27 RX ADMIN — DOXYCYCLINE 100 MG: 100 TABLET ORAL at 10:28

## 2019-07-27 RX ADMIN — DIGOXIN 125 MCG: 125 TABLET ORAL at 10:28

## 2019-07-27 RX ADMIN — INSULIN LISPRO 3 UNITS: 100 INJECTION, SOLUTION INTRAVENOUS; SUBCUTANEOUS at 22:11

## 2019-07-27 RX ADMIN — INSULIN DETEMIR 5 UNITS: 100 INJECTION, SOLUTION SUBCUTANEOUS at 22:03

## 2019-07-27 RX ADMIN — SODIUM CHLORIDE, PRESERVATIVE FREE 3 ML: 5 INJECTION INTRAVENOUS at 00:03

## 2019-07-27 RX ADMIN — FLUOXETINE HYDROCHLORIDE 20 MG: 20 CAPSULE ORAL at 10:27

## 2019-07-27 RX ADMIN — POTASSIUM CHLORIDE 10 MEQ: 750 CAPSULE, EXTENDED RELEASE ORAL at 10:26

## 2019-07-27 RX ADMIN — FLUTICASONE PROPIONATE 2 SPRAY: 50 SPRAY, METERED NASAL at 10:30

## 2019-07-27 RX ADMIN — BUMETANIDE 1 MG: 0.25 INJECTION INTRAMUSCULAR; INTRAVENOUS at 14:54

## 2019-07-27 RX ADMIN — CARVEDILOL 12.5 MG: 6.25 TABLET, FILM COATED ORAL at 18:05

## 2019-07-27 RX ADMIN — AMLODIPINE BESYLATE 10 MG: 10 TABLET ORAL at 10:29

## 2019-07-27 RX ADMIN — SODIUM CHLORIDE, PRESERVATIVE FREE 3 ML: 5 INJECTION INTRAVENOUS at 14:55

## 2019-07-27 RX ADMIN — PREDNISONE 40 MG: 20 TABLET ORAL at 10:26

## 2019-07-27 RX ADMIN — CEFTRIAXONE SODIUM 1 G: 1 INJECTION, POWDER, FOR SOLUTION INTRAMUSCULAR; INTRAVENOUS at 23:51

## 2019-07-27 RX ADMIN — IPRATROPIUM BROMIDE AND ALBUTEROL SULFATE 3 ML: 2.5; .5 SOLUTION RESPIRATORY (INHALATION) at 18:36

## 2019-07-27 RX ADMIN — RIVAROXABAN 20 MG: 20 TABLET, FILM COATED ORAL at 10:26

## 2019-07-27 RX ADMIN — Medication 250 MG: at 20:35

## 2019-07-28 ENCOUNTER — APPOINTMENT (OUTPATIENT)
Dept: CARDIOLOGY | Facility: HOSPITAL | Age: 84
End: 2019-07-28

## 2019-07-28 ENCOUNTER — READMISSION MANAGEMENT (OUTPATIENT)
Dept: CALL CENTER | Facility: HOSPITAL | Age: 84
End: 2019-07-28

## 2019-07-28 ENCOUNTER — APPOINTMENT (OUTPATIENT)
Dept: GENERAL RADIOLOGY | Facility: HOSPITAL | Age: 84
End: 2019-07-28

## 2019-07-28 LAB
ANION GAP SERPL CALCULATED.3IONS-SCNC: 8 MMOL/L (ref 4–13)
BASOPHILS # BLD AUTO: 0.02 10*3/MM3 (ref 0–0.2)
BASOPHILS NFR BLD AUTO: 0.2 % (ref 0–2)
BH CV ECHO MEAS - AO MAX PG (FULL): 4 MMHG
BH CV ECHO MEAS - AO MAX PG: 10.2 MMHG
BH CV ECHO MEAS - AO MEAN PG (FULL): 2 MMHG
BH CV ECHO MEAS - AO MEAN PG: 6 MMHG
BH CV ECHO MEAS - AO ROOT AREA (BSA CORRECTED): 1.1
BH CV ECHO MEAS - AO ROOT AREA: 4.9 CM^2
BH CV ECHO MEAS - AO ROOT DIAM: 2.5 CM
BH CV ECHO MEAS - AO V2 MAX: 160 CM/SEC
BH CV ECHO MEAS - AO V2 MEAN: 111 CM/SEC
BH CV ECHO MEAS - AO V2 VTI: 31.4 CM
BH CV ECHO MEAS - AVA(I,A): 1.9 CM^2
BH CV ECHO MEAS - AVA(I,D): 1.9 CM^2
BH CV ECHO MEAS - AVA(V,A): 2 CM^2
BH CV ECHO MEAS - AVA(V,D): 2 CM^2
BH CV ECHO MEAS - BSA(HAYCOCK): 2.5 M^2
BH CV ECHO MEAS - BSA: 2.4 M^2
BH CV ECHO MEAS - BZI_BMI: 38 KILOGRAMS/M^2
BH CV ECHO MEAS - BZI_METRIC_HEIGHT: 177.8 CM
BH CV ECHO MEAS - BZI_METRIC_WEIGHT: 120.2 KG
BH CV ECHO MEAS - EDV(MOD-SP4): 97.5 ML
BH CV ECHO MEAS - EF(MOD-SP4): 55.3 %
BH CV ECHO MEAS - ESV(MOD-SP4): 43.6 ML
BH CV ECHO MEAS - LA DIMENSION: 4.8 CM
BH CV ECHO MEAS - LA/AO: 1.9
BH CV ECHO MEAS - LAT PEAK E' VEL: 9.8 CM/SEC
BH CV ECHO MEAS - LV DIASTOLIC VOL/BSA (35-75): 41.4 ML/M^2
BH CV ECHO MEAS - LV MAX PG: 6.2 MMHG
BH CV ECHO MEAS - LV MEAN PG: 4 MMHG
BH CV ECHO MEAS - LV SYSTOLIC VOL/BSA (12-30): 18.5 ML/M^2
BH CV ECHO MEAS - LV V1 MAX: 124.5 CM/SEC
BH CV ECHO MEAS - LV V1 MEAN: 89.3 CM/SEC
BH CV ECHO MEAS - LV V1 VTI: 23.6 CM
BH CV ECHO MEAS - LVLD AP4: 8.4 CM
BH CV ECHO MEAS - LVLS AP4: 7.2 CM
BH CV ECHO MEAS - LVOT AREA (M): 2.5 CM^2
BH CV ECHO MEAS - LVOT AREA: 2.5 CM^2
BH CV ECHO MEAS - LVOT DIAM: 1.8 CM
BH CV ECHO MEAS - MED PEAK E' VEL: 5.44 CM/SEC
BH CV ECHO MEAS - MR MAX PG: 124.1 MMHG
BH CV ECHO MEAS - MR MAX VEL: 557 CM/SEC
BH CV ECHO MEAS - MR MEAN PG: 83 MMHG
BH CV ECHO MEAS - MR MEAN VEL: 424 CM/SEC
BH CV ECHO MEAS - MR VTI: 179 CM
BH CV ECHO MEAS - MV A MAX VEL: 39.9 CM/SEC
BH CV ECHO MEAS - MV DEC TIME: 0.13 SEC
BH CV ECHO MEAS - MV E MAX VEL: 142 CM/SEC
BH CV ECHO MEAS - MV E/A: 3.6
BH CV ECHO MEAS - PA MAX PG: 3.5 MMHG
BH CV ECHO MEAS - PA V2 MAX: 93.6 CM/SEC
BH CV ECHO MEAS - RAP SYSTOLE: 5 MMHG
BH CV ECHO MEAS - RVSP: 56.8 MMHG
BH CV ECHO MEAS - SI(AO): 65.5 ML/M^2
BH CV ECHO MEAS - SI(LVOT): 25.5 ML/M^2
BH CV ECHO MEAS - SI(MOD-SP4): 22.9 ML/M^2
BH CV ECHO MEAS - SV(AO): 154.1 ML
BH CV ECHO MEAS - SV(LVOT): 60.1 ML
BH CV ECHO MEAS - SV(MOD-SP4): 53.9 ML
BH CV ECHO MEAS - TR MAX VEL: 360 CM/SEC
BH CV ECHO MEASUREMENTS AVERAGE E/E' RATIO: 18.64
BUN BLD-MCNC: 60 MG/DL (ref 5–21)
BUN/CREAT SERPL: 42 (ref 7–25)
CALCIUM SPEC-SCNC: 9.3 MG/DL (ref 8.4–10.4)
CHLORIDE SERPL-SCNC: 99 MMOL/L (ref 98–110)
CO2 SERPL-SCNC: 29 MMOL/L (ref 24–31)
CREAT BLD-MCNC: 1.43 MG/DL (ref 0.5–1.4)
DEPRECATED RDW RBC AUTO: 46.2 FL (ref 40–54)
EOSINOPHIL # BLD AUTO: 0.3 10*3/MM3 (ref 0–0.7)
EOSINOPHIL NFR BLD AUTO: 3.2 % (ref 0–4)
ERYTHROCYTE [DISTWIDTH] IN BLOOD BY AUTOMATED COUNT: 13.2 % (ref 12–15)
GFR SERPL CREATININE-BSD FRML MDRD: 47 ML/MIN/1.73
GLUCOSE BLD-MCNC: 83 MG/DL (ref 70–100)
GLUCOSE BLDC GLUCOMTR-MCNC: 180 MG/DL (ref 70–130)
GLUCOSE BLDC GLUCOMTR-MCNC: 200 MG/DL (ref 70–130)
GLUCOSE BLDC GLUCOMTR-MCNC: 211 MG/DL (ref 70–130)
HCT VFR BLD AUTO: 25.8 % (ref 40–52)
HGB BLD-MCNC: 8.8 G/DL (ref 14–18)
IMM GRANULOCYTES # BLD AUTO: 0.23 10*3/MM3 (ref 0–0.05)
IMM GRANULOCYTES NFR BLD AUTO: 2.5 % (ref 0–5)
LEFT ATRIUM VOLUME: 239 CM3
LV EF 2D ECHO EST: 55 %
LYMPHOCYTES # BLD AUTO: 0.91 10*3/MM3 (ref 0.72–4.86)
LYMPHOCYTES NFR BLD AUTO: 9.7 % (ref 15–45)
MAXIMAL PREDICTED HEART RATE: 137 BPM
MCH RBC QN AUTO: 33.1 PG (ref 28–32)
MCHC RBC AUTO-ENTMCNC: 34.1 G/DL (ref 33–36)
MCV RBC AUTO: 97 FL (ref 82–95)
MONOCYTES # BLD AUTO: 0.86 10*3/MM3 (ref 0.19–1.3)
MONOCYTES NFR BLD AUTO: 9.2 % (ref 4–12)
NEUTROPHILS # BLD AUTO: 7.06 10*3/MM3 (ref 1.87–8.4)
NEUTROPHILS NFR BLD AUTO: 75.2 % (ref 39–78)
NRBC BLD AUTO-RTO: 0 /100 WBC (ref 0–0.2)
PLATELET # BLD AUTO: 266 10*3/MM3 (ref 130–400)
PMV BLD AUTO: 9.7 FL (ref 6–12)
POTASSIUM BLD-SCNC: 4.2 MMOL/L (ref 3.5–5.3)
RBC # BLD AUTO: 2.66 10*6/MM3 (ref 4.8–5.9)
SODIUM BLD-SCNC: 136 MMOL/L (ref 135–145)
STRESS TARGET HR: 116 BPM
WBC NRBC COR # BLD: 9.38 10*3/MM3 (ref 4.8–10.8)

## 2019-07-28 PROCEDURE — 94760 N-INVAS EAR/PLS OXIMETRY 1: CPT

## 2019-07-28 PROCEDURE — 93306 TTE W/DOPPLER COMPLETE: CPT

## 2019-07-28 PROCEDURE — 82803 BLOOD GASES ANY COMBINATION: CPT

## 2019-07-28 PROCEDURE — 36600 WITHDRAWAL OF ARTERIAL BLOOD: CPT

## 2019-07-28 PROCEDURE — 71045 X-RAY EXAM CHEST 1 VIEW: CPT

## 2019-07-28 PROCEDURE — 94799 UNLISTED PULMONARY SVC/PX: CPT

## 2019-07-28 PROCEDURE — 85025 COMPLETE CBC W/AUTO DIFF WBC: CPT | Performed by: NURSE PRACTITIONER

## 2019-07-28 PROCEDURE — 63710000001 PREDNISONE PER 1 MG: Performed by: NURSE PRACTITIONER

## 2019-07-28 PROCEDURE — 63710000001 INSULIN DETEMIR PER 5 UNITS: Performed by: INTERNAL MEDICINE

## 2019-07-28 PROCEDURE — 25010000002 METHYLPREDNISOLONE PER 125 MG: Performed by: NURSE PRACTITIONER

## 2019-07-28 PROCEDURE — 82962 GLUCOSE BLOOD TEST: CPT

## 2019-07-28 PROCEDURE — 80048 BASIC METABOLIC PNL TOTAL CA: CPT | Performed by: NURSE PRACTITIONER

## 2019-07-28 PROCEDURE — 63710000001 INSULIN LISPRO (HUMAN) PER 5 UNITS: Performed by: INTERNAL MEDICINE

## 2019-07-28 PROCEDURE — 93306 TTE W/DOPPLER COMPLETE: CPT | Performed by: INTERNAL MEDICINE

## 2019-07-28 RX ORDER — BUMETANIDE 0.25 MG/ML
0.5 INJECTION INTRAMUSCULAR; INTRAVENOUS ONCE
Status: DISCONTINUED | OUTPATIENT
Start: 2019-07-28 | End: 2019-07-28

## 2019-07-28 RX ORDER — METOLAZONE 2.5 MG/1
2.5 TABLET ORAL DAILY
Status: DISCONTINUED | OUTPATIENT
Start: 2019-07-28 | End: 2019-08-02 | Stop reason: HOSPADM

## 2019-07-28 RX ORDER — METHYLPREDNISOLONE SODIUM SUCCINATE 125 MG/2ML
80 INJECTION, POWDER, LYOPHILIZED, FOR SOLUTION INTRAMUSCULAR; INTRAVENOUS ONCE
Status: COMPLETED | OUTPATIENT
Start: 2019-07-28 | End: 2019-07-28

## 2019-07-28 RX ORDER — ACETYLCYSTEINE 200 MG/ML
3 SOLUTION ORAL; RESPIRATORY (INHALATION) EVERY 8 HOURS
Status: DISCONTINUED | OUTPATIENT
Start: 2019-07-28 | End: 2019-07-30

## 2019-07-28 RX ORDER — HYDROCHLOROTHIAZIDE 25 MG/1
12.5 TABLET ORAL DAILY
Status: DISCONTINUED | OUTPATIENT
Start: 2019-07-28 | End: 2019-08-01

## 2019-07-28 RX ORDER — BUMETANIDE 0.25 MG/ML
1 INJECTION INTRAMUSCULAR; INTRAVENOUS ONCE
Status: COMPLETED | OUTPATIENT
Start: 2019-07-28 | End: 2019-07-28

## 2019-07-28 RX ORDER — BUDESONIDE AND FORMOTEROL FUMARATE DIHYDRATE 80; 4.5 UG/1; UG/1
2 AEROSOL RESPIRATORY (INHALATION)
Status: DISCONTINUED | OUTPATIENT
Start: 2019-07-28 | End: 2019-08-02 | Stop reason: HOSPADM

## 2019-07-28 RX ORDER — LISINOPRIL 20 MG/1
40 TABLET ORAL DAILY
Status: DISCONTINUED | OUTPATIENT
Start: 2019-07-28 | End: 2019-08-02 | Stop reason: HOSPADM

## 2019-07-28 RX ORDER — IPRATROPIUM BROMIDE AND ALBUTEROL SULFATE 2.5; .5 MG/3ML; MG/3ML
3 SOLUTION RESPIRATORY (INHALATION) EVERY 6 HOURS PRN
Status: DISCONTINUED | OUTPATIENT
Start: 2019-07-28 | End: 2019-08-02 | Stop reason: HOSPADM

## 2019-07-28 RX ADMIN — TERAZOSIN HYDROCHLORIDE 5 MG: 5 CAPSULE ORAL at 21:50

## 2019-07-28 RX ADMIN — CARVEDILOL 12.5 MG: 6.25 TABLET, FILM COATED ORAL at 17:13

## 2019-07-28 RX ADMIN — DOXYCYCLINE 100 MG: 100 TABLET ORAL at 21:50

## 2019-07-28 RX ADMIN — BUMETANIDE 1 MG: 0.25 INJECTION INTRAMUSCULAR; INTRAVENOUS at 02:05

## 2019-07-28 RX ADMIN — METOLAZONE 2.5 MG: 2.5 TABLET ORAL at 12:38

## 2019-07-28 RX ADMIN — IPRATROPIUM BROMIDE AND ALBUTEROL SULFATE 3 ML: 2.5; .5 SOLUTION RESPIRATORY (INHALATION) at 23:20

## 2019-07-28 RX ADMIN — OXYBUTYNIN CHLORIDE 5 MG: 5 TABLET, EXTENDED RELEASE ORAL at 08:12

## 2019-07-28 RX ADMIN — ACETYLCYSTEINE 3 ML: 200 SOLUTION ORAL; RESPIRATORY (INHALATION) at 18:38

## 2019-07-28 RX ADMIN — FLUOXETINE HYDROCHLORIDE 20 MG: 20 CAPSULE ORAL at 08:17

## 2019-07-28 RX ADMIN — Medication 250 MG: at 08:12

## 2019-07-28 RX ADMIN — ACETYLCYSTEINE 1.5 ML: 200 SOLUTION ORAL; RESPIRATORY (INHALATION) at 13:44

## 2019-07-28 RX ADMIN — HYDROCHLOROTHIAZIDE 12.5 MG: 25 TABLET ORAL at 12:39

## 2019-07-28 RX ADMIN — PREDNISONE 40 MG: 20 TABLET ORAL at 08:12

## 2019-07-28 RX ADMIN — RIVAROXABAN 20 MG: 20 TABLET, FILM COATED ORAL at 08:12

## 2019-07-28 RX ADMIN — CARVEDILOL 12.5 MG: 6.25 TABLET, FILM COATED ORAL at 08:06

## 2019-07-28 RX ADMIN — BUMETANIDE 1 MG: 0.25 INJECTION INTRAMUSCULAR; INTRAVENOUS at 08:38

## 2019-07-28 RX ADMIN — FLUOXETINE HYDROCHLORIDE 40 MG: 20 CAPSULE ORAL at 08:13

## 2019-07-28 RX ADMIN — BUMETANIDE 1 MG: 0.25 INJECTION INTRAMUSCULAR; INTRAVENOUS at 15:55

## 2019-07-28 RX ADMIN — POTASSIUM CHLORIDE 10 MEQ: 750 CAPSULE, EXTENDED RELEASE ORAL at 08:12

## 2019-07-28 RX ADMIN — LISINOPRIL 40 MG: 20 TABLET ORAL at 12:38

## 2019-07-28 RX ADMIN — INSULIN DETEMIR 5 UNITS: 100 INJECTION, SOLUTION SUBCUTANEOUS at 21:49

## 2019-07-28 RX ADMIN — INSULIN LISPRO 2 UNITS: 100 INJECTION, SOLUTION INTRAVENOUS; SUBCUTANEOUS at 17:16

## 2019-07-28 RX ADMIN — DOXYCYCLINE 100 MG: 100 TABLET ORAL at 08:13

## 2019-07-28 RX ADMIN — IPRATROPIUM BROMIDE AND ALBUTEROL SULFATE 3 ML: 2.5; .5 SOLUTION RESPIRATORY (INHALATION) at 13:44

## 2019-07-28 RX ADMIN — INSULIN LISPRO 3 UNITS: 100 INJECTION, SOLUTION INTRAVENOUS; SUBCUTANEOUS at 21:50

## 2019-07-28 RX ADMIN — Medication 250 MG: at 21:50

## 2019-07-28 RX ADMIN — SODIUM CHLORIDE, PRESERVATIVE FREE 3 ML: 5 INJECTION INTRAVENOUS at 08:15

## 2019-07-28 RX ADMIN — SPIRONOLACTONE 50 MG: 50 TABLET, FILM COATED ORAL at 08:12

## 2019-07-28 RX ADMIN — DIGOXIN 125 MCG: 125 TABLET ORAL at 12:45

## 2019-07-28 RX ADMIN — METHYLPREDNISOLONE SODIUM SUCCINATE 80 MG: 125 INJECTION, POWDER, FOR SOLUTION INTRAMUSCULAR; INTRAVENOUS at 12:39

## 2019-07-28 RX ADMIN — AMLODIPINE BESYLATE 10 MG: 10 TABLET ORAL at 08:13

## 2019-07-28 RX ADMIN — INSULIN LISPRO 3 UNITS: 100 INJECTION, SOLUTION INTRAVENOUS; SUBCUTANEOUS at 12:57

## 2019-07-28 RX ADMIN — IPRATROPIUM BROMIDE AND ALBUTEROL SULFATE 3 ML: 2.5; .5 SOLUTION RESPIRATORY (INHALATION) at 00:02

## 2019-07-28 RX ADMIN — IPRATROPIUM BROMIDE AND ALBUTEROL SULFATE 3 ML: 2.5; .5 SOLUTION RESPIRATORY (INHALATION) at 06:37

## 2019-07-28 RX ADMIN — IPRATROPIUM BROMIDE AND ALBUTEROL SULFATE 3 ML: 2.5; .5 SOLUTION RESPIRATORY (INHALATION) at 18:38

## 2019-07-28 RX ADMIN — BUPROPION HYDROCHLORIDE 300 MG: 150 TABLET, FILM COATED, EXTENDED RELEASE ORAL at 08:13

## 2019-07-28 NOTE — OUTREACH NOTE
General Surgery Week 2 Survey      Responses   Facility patient discharged from?  [facility]   Does the patient have one of the following disease processes/diagnoses(primary or secondary)?  General Surgery   Week 2 attempt successful?  No   Revoke  Readmitted          Anni Lucia RN

## 2019-07-29 LAB
ANION GAP SERPL CALCULATED.3IONS-SCNC: 9 MMOL/L (ref 4–13)
ARTERIAL PATENCY WRIST A: POSITIVE
ATMOSPHERIC PRESS: 755 MMHG
BASE EXCESS BLDA CALC-SCNC: 2.4 MMOL/L (ref 0–2)
BDY SITE: ABNORMAL
BODY TEMPERATURE: 37 C
BUN BLD-MCNC: 60 MG/DL (ref 5–21)
BUN/CREAT SERPL: 46.2 (ref 7–25)
CALCIUM SPEC-SCNC: 9 MG/DL (ref 8.4–10.4)
CHLORIDE SERPL-SCNC: 99 MMOL/L (ref 98–110)
CO2 SERPL-SCNC: 27 MMOL/L (ref 24–31)
CREAT BLD-MCNC: 1.3 MG/DL (ref 0.5–1.4)
DEPRECATED RDW RBC AUTO: 45.9 FL (ref 40–54)
EPAP: 16
ERYTHROCYTE [DISTWIDTH] IN BLOOD BY AUTOMATED COUNT: 13.2 % (ref 12–15)
FERRITIN SERPL-MCNC: 127 NG/ML (ref 17.9–464)
GAS FLOW AIRWAY: ABNORMAL LPM
GFR SERPL CREATININE-BSD FRML MDRD: 53 ML/MIN/1.73
GLUCOSE BLD-MCNC: 95 MG/DL (ref 70–100)
GLUCOSE BLDC GLUCOMTR-MCNC: 106 MG/DL (ref 70–130)
GLUCOSE BLDC GLUCOMTR-MCNC: 175 MG/DL (ref 70–130)
GLUCOSE BLDC GLUCOMTR-MCNC: 196 MG/DL (ref 70–130)
GLUCOSE BLDC GLUCOMTR-MCNC: 231 MG/DL (ref 70–130)
HCO3 BLDA-SCNC: 26.6 MMOL/L (ref 20–26)
HCT VFR BLD AUTO: 25.6 % (ref 40–52)
HGB BLD-MCNC: 8.8 G/DL (ref 14–18)
IPAP: 24
IRON 24H UR-MRATE: 34 MCG/DL (ref 42–180)
IRON SATN MFR SERPL: 12 % (ref 20–45)
Lab: ABNORMAL
MCH RBC QN AUTO: 33.1 PG (ref 28–32)
MCHC RBC AUTO-ENTMCNC: 34.4 G/DL (ref 33–36)
MCV RBC AUTO: 96.2 FL (ref 82–95)
MODALITY: ABNORMAL
PCO2 BLDA: 39 MM HG (ref 35–45)
PH BLDA: 7.44 PH UNITS (ref 7.35–7.45)
PLATELET # BLD AUTO: 259 10*3/MM3 (ref 130–400)
PMV BLD AUTO: 9.8 FL (ref 6–12)
PO2 BLDA: 66.2 MM HG (ref 83–108)
POTASSIUM BLD-SCNC: 4.1 MMOL/L (ref 3.5–5.3)
PROCALCITONIN SERPL-MCNC: <0.25 NG/ML
PSV: ABNORMAL CMH2O
RBC # BLD AUTO: 2.66 10*6/MM3 (ref 4.8–5.9)
RETICS # AUTO: 0.08 10*6/MM3 (ref 0.02–0.13)
RETICS/RBC NFR AUTO: 2.9 % (ref 0.6–1.8)
SAO2 % BLDCOA: 94.7 % (ref 94–99)
SET MECH RESP RATE: 12
SODIUM BLD-SCNC: 135 MMOL/L (ref 135–145)
TIBC SERPL-MCNC: 289 MCG/DL (ref 225–420)
VENTILATOR MODE: ABNORMAL
WBC NRBC COR # BLD: 10.46 10*3/MM3 (ref 4.8–10.8)

## 2019-07-29 PROCEDURE — 63710000001 INSULIN DETEMIR PER 5 UNITS: Performed by: INTERNAL MEDICINE

## 2019-07-29 PROCEDURE — 94799 UNLISTED PULMONARY SVC/PX: CPT

## 2019-07-29 PROCEDURE — 83550 IRON BINDING TEST: CPT | Performed by: NURSE PRACTITIONER

## 2019-07-29 PROCEDURE — 25010000002 IRON SUCROSE PER 1 MG: Performed by: NURSE PRACTITIONER

## 2019-07-29 PROCEDURE — 63710000001 PREDNISONE PER 1 MG: Performed by: NURSE PRACTITIONER

## 2019-07-29 PROCEDURE — 82962 GLUCOSE BLOOD TEST: CPT

## 2019-07-29 PROCEDURE — 80048 BASIC METABOLIC PNL TOTAL CA: CPT | Performed by: NURSE PRACTITIONER

## 2019-07-29 PROCEDURE — 85045 AUTOMATED RETICULOCYTE COUNT: CPT | Performed by: NURSE PRACTITIONER

## 2019-07-29 PROCEDURE — 94640 AIRWAY INHALATION TREATMENT: CPT

## 2019-07-29 PROCEDURE — 99024 POSTOP FOLLOW-UP VISIT: CPT | Performed by: PODIATRIST

## 2019-07-29 PROCEDURE — 94760 N-INVAS EAR/PLS OXIMETRY 1: CPT

## 2019-07-29 PROCEDURE — 84145 PROCALCITONIN (PCT): CPT | Performed by: NURSE PRACTITIONER

## 2019-07-29 PROCEDURE — 82728 ASSAY OF FERRITIN: CPT | Performed by: NURSE PRACTITIONER

## 2019-07-29 PROCEDURE — 63710000001 INSULIN LISPRO (HUMAN) PER 5 UNITS: Performed by: INTERNAL MEDICINE

## 2019-07-29 PROCEDURE — 83540 ASSAY OF IRON: CPT | Performed by: NURSE PRACTITIONER

## 2019-07-29 PROCEDURE — 85027 COMPLETE CBC AUTOMATED: CPT | Performed by: NURSE PRACTITIONER

## 2019-07-29 PROCEDURE — 97162 PT EVAL MOD COMPLEX 30 MIN: CPT

## 2019-07-29 RX ORDER — BUMETANIDE 0.25 MG/ML
2 INJECTION INTRAMUSCULAR; INTRAVENOUS
Status: DISCONTINUED | OUTPATIENT
Start: 2019-07-29 | End: 2019-07-29

## 2019-07-29 RX ORDER — BUMETANIDE 0.25 MG/ML
2 INJECTION INTRAMUSCULAR; INTRAVENOUS
Status: DISCONTINUED | OUTPATIENT
Start: 2019-07-29 | End: 2019-07-31

## 2019-07-29 RX ORDER — BUMETANIDE 0.25 MG/ML
1 INJECTION INTRAMUSCULAR; INTRAVENOUS ONCE
Status: COMPLETED | OUTPATIENT
Start: 2019-07-29 | End: 2019-07-29

## 2019-07-29 RX ORDER — IPRATROPIUM BROMIDE AND ALBUTEROL SULFATE 2.5; .5 MG/3ML; MG/3ML
3 SOLUTION RESPIRATORY (INHALATION)
Status: DISCONTINUED | OUTPATIENT
Start: 2019-07-29 | End: 2019-08-01

## 2019-07-29 RX ADMIN — ACETYLCYSTEINE 3 ML: 200 SOLUTION ORAL; RESPIRATORY (INHALATION) at 18:49

## 2019-07-29 RX ADMIN — OXYBUTYNIN CHLORIDE 5 MG: 5 TABLET, EXTENDED RELEASE ORAL at 08:17

## 2019-07-29 RX ADMIN — IPRATROPIUM BROMIDE AND ALBUTEROL SULFATE 3 ML: 2.5; .5 SOLUTION RESPIRATORY (INHALATION) at 06:31

## 2019-07-29 RX ADMIN — DIGOXIN 125 MCG: 125 TABLET ORAL at 11:49

## 2019-07-29 RX ADMIN — RIVAROXABAN 20 MG: 20 TABLET, FILM COATED ORAL at 08:18

## 2019-07-29 RX ADMIN — DOXYCYCLINE 100 MG: 100 TABLET ORAL at 08:18

## 2019-07-29 RX ADMIN — IPRATROPIUM BROMIDE AND ALBUTEROL SULFATE 3 ML: 2.5; .5 SOLUTION RESPIRATORY (INHALATION) at 22:56

## 2019-07-29 RX ADMIN — BUPROPION HYDROCHLORIDE 300 MG: 150 TABLET, FILM COATED, EXTENDED RELEASE ORAL at 08:17

## 2019-07-29 RX ADMIN — BUMETANIDE 1 MG: 0.25 INJECTION INTRAMUSCULAR; INTRAVENOUS at 03:16

## 2019-07-29 RX ADMIN — DOXYCYCLINE 100 MG: 100 TABLET ORAL at 21:00

## 2019-07-29 RX ADMIN — BUDESONIDE AND FORMOTEROL FUMARATE DIHYDRATE 2 PUFF: 80; 4.5 AEROSOL RESPIRATORY (INHALATION) at 18:59

## 2019-07-29 RX ADMIN — ACETYLCYSTEINE 1.5 ML: 200 SOLUTION ORAL; RESPIRATORY (INHALATION) at 11:05

## 2019-07-29 RX ADMIN — FLUOXETINE HYDROCHLORIDE 40 MG: 20 CAPSULE ORAL at 08:19

## 2019-07-29 RX ADMIN — INSULIN DETEMIR 5 UNITS: 100 INJECTION, SOLUTION SUBCUTANEOUS at 20:59

## 2019-07-29 RX ADMIN — BUMETANIDE 1 MG: 0.25 INJECTION INTRAMUSCULAR; INTRAVENOUS at 11:49

## 2019-07-29 RX ADMIN — Medication 250 MG: at 08:17

## 2019-07-29 RX ADMIN — BUDESONIDE AND FORMOTEROL FUMARATE DIHYDRATE 2 PUFF: 80; 4.5 AEROSOL RESPIRATORY (INHALATION) at 06:31

## 2019-07-29 RX ADMIN — INSULIN LISPRO 2 UNITS: 100 INJECTION, SOLUTION INTRAVENOUS; SUBCUTANEOUS at 21:00

## 2019-07-29 RX ADMIN — IPRATROPIUM BROMIDE AND ALBUTEROL SULFATE 3 ML: 2.5; .5 SOLUTION RESPIRATORY (INHALATION) at 11:05

## 2019-07-29 RX ADMIN — INSULIN LISPRO 2 UNITS: 100 INJECTION, SOLUTION INTRAVENOUS; SUBCUTANEOUS at 12:05

## 2019-07-29 RX ADMIN — INSULIN LISPRO 3 UNITS: 100 INJECTION, SOLUTION INTRAVENOUS; SUBCUTANEOUS at 18:15

## 2019-07-29 RX ADMIN — FLUOXETINE HYDROCHLORIDE 20 MG: 20 CAPSULE ORAL at 08:17

## 2019-07-29 RX ADMIN — CARVEDILOL 12.5 MG: 6.25 TABLET, FILM COATED ORAL at 18:15

## 2019-07-29 RX ADMIN — IPRATROPIUM BROMIDE AND ALBUTEROL SULFATE 3 ML: 2.5; .5 SOLUTION RESPIRATORY (INHALATION) at 18:48

## 2019-07-29 RX ADMIN — LISINOPRIL 40 MG: 20 TABLET ORAL at 08:18

## 2019-07-29 RX ADMIN — IRON SUCROSE 200 MG: 20 INJECTION, SOLUTION INTRAVENOUS at 11:49

## 2019-07-29 RX ADMIN — METOLAZONE 2.5 MG: 2.5 TABLET ORAL at 08:17

## 2019-07-29 RX ADMIN — POTASSIUM CHLORIDE 10 MEQ: 750 CAPSULE, EXTENDED RELEASE ORAL at 08:18

## 2019-07-29 RX ADMIN — FLUTICASONE PROPIONATE 2 SPRAY: 50 SPRAY, METERED NASAL at 08:28

## 2019-07-29 RX ADMIN — TERAZOSIN HYDROCHLORIDE 5 MG: 5 CAPSULE ORAL at 21:00

## 2019-07-29 RX ADMIN — BUMETANIDE 2 MG: 0.25 INJECTION INTRAMUSCULAR; INTRAVENOUS at 18:15

## 2019-07-29 RX ADMIN — Medication 250 MG: at 21:00

## 2019-07-29 RX ADMIN — SODIUM CHLORIDE, PRESERVATIVE FREE 3 ML: 5 INJECTION INTRAVENOUS at 08:18

## 2019-07-29 RX ADMIN — AMLODIPINE BESYLATE 10 MG: 10 TABLET ORAL at 08:17

## 2019-07-29 RX ADMIN — IPRATROPIUM BROMIDE AND ALBUTEROL SULFATE 3 ML: 2.5; .5 SOLUTION RESPIRATORY (INHALATION) at 14:49

## 2019-07-29 RX ADMIN — SPIRONOLACTONE 50 MG: 50 TABLET, FILM COATED ORAL at 09:26

## 2019-07-29 RX ADMIN — CARVEDILOL 12.5 MG: 6.25 TABLET, FILM COATED ORAL at 08:17

## 2019-07-29 RX ADMIN — PREDNISONE 40 MG: 20 TABLET ORAL at 08:18

## 2019-07-29 RX ADMIN — HYDROCHLOROTHIAZIDE 12.5 MG: 25 TABLET ORAL at 08:17

## 2019-07-30 LAB
ANION GAP SERPL CALCULATED.3IONS-SCNC: 9 MMOL/L (ref 4–13)
BUN BLD-MCNC: 58 MG/DL (ref 5–21)
BUN/CREAT SERPL: 47.5 (ref 7–25)
CALCIUM SPEC-SCNC: 9.3 MG/DL (ref 8.4–10.4)
CHLORIDE SERPL-SCNC: 97 MMOL/L (ref 98–110)
CO2 SERPL-SCNC: 30 MMOL/L (ref 24–31)
CREAT BLD-MCNC: 1.22 MG/DL (ref 0.5–1.4)
DEPRECATED RDW RBC AUTO: 46.3 FL (ref 40–54)
ERYTHROCYTE [DISTWIDTH] IN BLOOD BY AUTOMATED COUNT: 13.2 % (ref 12–15)
GFR SERPL CREATININE-BSD FRML MDRD: 57 ML/MIN/1.73
GLUCOSE BLD-MCNC: 106 MG/DL (ref 70–100)
GLUCOSE BLDC GLUCOMTR-MCNC: 100 MG/DL (ref 70–130)
GLUCOSE BLDC GLUCOMTR-MCNC: 156 MG/DL (ref 70–130)
GLUCOSE BLDC GLUCOMTR-MCNC: 167 MG/DL (ref 70–130)
GLUCOSE BLDC GLUCOMTR-MCNC: 189 MG/DL (ref 70–130)
GLUCOSE BLDC GLUCOMTR-MCNC: 204 MG/DL (ref 70–130)
HCT VFR BLD AUTO: 27.7 % (ref 40–52)
HGB BLD-MCNC: 9.4 G/DL (ref 14–18)
MCH RBC QN AUTO: 32.6 PG (ref 28–32)
MCHC RBC AUTO-ENTMCNC: 33.9 G/DL (ref 33–36)
MCV RBC AUTO: 96.2 FL (ref 82–95)
PLATELET # BLD AUTO: 265 10*3/MM3 (ref 130–400)
PMV BLD AUTO: 9.8 FL (ref 6–12)
POTASSIUM BLD-SCNC: 3.9 MMOL/L (ref 3.5–5.3)
RBC # BLD AUTO: 2.88 10*6/MM3 (ref 4.8–5.9)
SODIUM BLD-SCNC: 136 MMOL/L (ref 135–145)
WBC NRBC COR # BLD: 11.6 10*3/MM3 (ref 4.8–10.8)

## 2019-07-30 PROCEDURE — 85027 COMPLETE CBC AUTOMATED: CPT | Performed by: NURSE PRACTITIONER

## 2019-07-30 PROCEDURE — 25010000002 IRON SUCROSE PER 1 MG: Performed by: NURSE PRACTITIONER

## 2019-07-30 PROCEDURE — 80048 BASIC METABOLIC PNL TOTAL CA: CPT | Performed by: NURSE PRACTITIONER

## 2019-07-30 PROCEDURE — 94760 N-INVAS EAR/PLS OXIMETRY 1: CPT

## 2019-07-30 PROCEDURE — 94799 UNLISTED PULMONARY SVC/PX: CPT

## 2019-07-30 PROCEDURE — 63710000001 INSULIN LISPRO (HUMAN) PER 5 UNITS: Performed by: INTERNAL MEDICINE

## 2019-07-30 PROCEDURE — 63710000001 INSULIN DETEMIR PER 5 UNITS: Performed by: INTERNAL MEDICINE

## 2019-07-30 PROCEDURE — 97116 GAIT TRAINING THERAPY: CPT

## 2019-07-30 PROCEDURE — 97165 OT EVAL LOW COMPLEX 30 MIN: CPT | Performed by: OCCUPATIONAL THERAPIST

## 2019-07-30 PROCEDURE — 82962 GLUCOSE BLOOD TEST: CPT

## 2019-07-30 PROCEDURE — 97110 THERAPEUTIC EXERCISES: CPT

## 2019-07-30 PROCEDURE — 63710000001 PREDNISONE PER 1 MG: Performed by: NURSE PRACTITIONER

## 2019-07-30 RX ORDER — PREDNISONE 20 MG/1
20 TABLET ORAL
Status: DISCONTINUED | OUTPATIENT
Start: 2019-07-31 | End: 2019-08-02 | Stop reason: HOSPADM

## 2019-07-30 RX ADMIN — SODIUM CHLORIDE, PRESERVATIVE FREE 3 ML: 5 INJECTION INTRAVENOUS at 08:21

## 2019-07-30 RX ADMIN — POTASSIUM CHLORIDE 10 MEQ: 750 CAPSULE, EXTENDED RELEASE ORAL at 08:17

## 2019-07-30 RX ADMIN — SPIRONOLACTONE 50 MG: 50 TABLET, FILM COATED ORAL at 08:16

## 2019-07-30 RX ADMIN — BUDESONIDE AND FORMOTEROL FUMARATE DIHYDRATE 2 PUFF: 80; 4.5 AEROSOL RESPIRATORY (INHALATION) at 06:49

## 2019-07-30 RX ADMIN — IPRATROPIUM BROMIDE AND ALBUTEROL SULFATE 3 ML: 2.5; .5 SOLUTION RESPIRATORY (INHALATION) at 02:57

## 2019-07-30 RX ADMIN — IPRATROPIUM BROMIDE AND ALBUTEROL SULFATE 3 ML: 2.5; .5 SOLUTION RESPIRATORY (INHALATION) at 19:08

## 2019-07-30 RX ADMIN — RIVAROXABAN 20 MG: 20 TABLET, FILM COATED ORAL at 08:17

## 2019-07-30 RX ADMIN — HYDROCHLOROTHIAZIDE 12.5 MG: 25 TABLET ORAL at 08:16

## 2019-07-30 RX ADMIN — DOXYCYCLINE 100 MG: 100 TABLET ORAL at 21:29

## 2019-07-30 RX ADMIN — PREDNISONE 40 MG: 20 TABLET ORAL at 08:18

## 2019-07-30 RX ADMIN — FLUOXETINE HYDROCHLORIDE 20 MG: 20 CAPSULE ORAL at 08:41

## 2019-07-30 RX ADMIN — FLUOXETINE HYDROCHLORIDE 40 MG: 20 CAPSULE ORAL at 08:17

## 2019-07-30 RX ADMIN — LISINOPRIL 40 MG: 20 TABLET ORAL at 08:16

## 2019-07-30 RX ADMIN — IPRATROPIUM BROMIDE AND ALBUTEROL SULFATE 3 ML: 2.5; .5 SOLUTION RESPIRATORY (INHALATION) at 06:49

## 2019-07-30 RX ADMIN — SODIUM CHLORIDE, PRESERVATIVE FREE 3 ML: 5 INJECTION INTRAVENOUS at 08:18

## 2019-07-30 RX ADMIN — CARVEDILOL 12.5 MG: 6.25 TABLET, FILM COATED ORAL at 17:21

## 2019-07-30 RX ADMIN — DIGOXIN 125 MCG: 125 TABLET ORAL at 12:11

## 2019-07-30 RX ADMIN — INSULIN DETEMIR 5 UNITS: 100 INJECTION, SOLUTION SUBCUTANEOUS at 21:30

## 2019-07-30 RX ADMIN — FLUTICASONE PROPIONATE 2 SPRAY: 50 SPRAY, METERED NASAL at 08:43

## 2019-07-30 RX ADMIN — OXYBUTYNIN CHLORIDE 5 MG: 5 TABLET, EXTENDED RELEASE ORAL at 08:17

## 2019-07-30 RX ADMIN — AMLODIPINE BESYLATE 10 MG: 10 TABLET ORAL at 08:18

## 2019-07-30 RX ADMIN — BUDESONIDE AND FORMOTEROL FUMARATE DIHYDRATE 2 PUFF: 80; 4.5 AEROSOL RESPIRATORY (INHALATION) at 19:08

## 2019-07-30 RX ADMIN — Medication 250 MG: at 21:30

## 2019-07-30 RX ADMIN — TERAZOSIN HYDROCHLORIDE 5 MG: 5 CAPSULE ORAL at 21:30

## 2019-07-30 RX ADMIN — IPRATROPIUM BROMIDE AND ALBUTEROL SULFATE 3 ML: 2.5; .5 SOLUTION RESPIRATORY (INHALATION) at 14:51

## 2019-07-30 RX ADMIN — Medication 250 MG: at 08:17

## 2019-07-30 RX ADMIN — IRON SUCROSE 200 MG: 20 INJECTION, SOLUTION INTRAVENOUS at 12:10

## 2019-07-30 RX ADMIN — DOXYCYCLINE 100 MG: 100 TABLET ORAL at 08:17

## 2019-07-30 RX ADMIN — BUMETANIDE 2 MG: 0.25 INJECTION INTRAMUSCULAR; INTRAVENOUS at 17:22

## 2019-07-30 RX ADMIN — INSULIN LISPRO 2 UNITS: 100 INJECTION, SOLUTION INTRAVENOUS; SUBCUTANEOUS at 12:09

## 2019-07-30 RX ADMIN — ACETYLCYSTEINE 3 ML: 200 SOLUTION ORAL; RESPIRATORY (INHALATION) at 02:57

## 2019-07-30 RX ADMIN — METOLAZONE 2.5 MG: 2.5 TABLET ORAL at 08:16

## 2019-07-30 RX ADMIN — BUMETANIDE 2 MG: 0.25 INJECTION INTRAMUSCULAR; INTRAVENOUS at 08:15

## 2019-07-30 RX ADMIN — BUPROPION HYDROCHLORIDE 300 MG: 150 TABLET, FILM COATED, EXTENDED RELEASE ORAL at 08:17

## 2019-07-30 RX ADMIN — CARVEDILOL 12.5 MG: 6.25 TABLET, FILM COATED ORAL at 08:18

## 2019-07-30 RX ADMIN — INSULIN LISPRO 3 UNITS: 100 INJECTION, SOLUTION INTRAVENOUS; SUBCUTANEOUS at 17:22

## 2019-07-30 RX ADMIN — INSULIN LISPRO 2 UNITS: 100 INJECTION, SOLUTION INTRAVENOUS; SUBCUTANEOUS at 21:30

## 2019-07-30 RX ADMIN — IPRATROPIUM BROMIDE AND ALBUTEROL SULFATE 3 ML: 2.5; .5 SOLUTION RESPIRATORY (INHALATION) at 10:51

## 2019-07-31 LAB
ANION GAP SERPL CALCULATED.3IONS-SCNC: 8 MMOL/L (ref 4–13)
BUN BLD-MCNC: 50 MG/DL (ref 5–21)
BUN/CREAT SERPL: 43.9 (ref 7–25)
CALCIUM SPEC-SCNC: 9.5 MG/DL (ref 8.4–10.4)
CHLORIDE SERPL-SCNC: 93 MMOL/L (ref 98–110)
CO2 SERPL-SCNC: 34 MMOL/L (ref 24–31)
CREAT BLD-MCNC: 1.14 MG/DL (ref 0.5–1.4)
DEPRECATED RDW RBC AUTO: 45.7 FL (ref 40–54)
ERYTHROCYTE [DISTWIDTH] IN BLOOD BY AUTOMATED COUNT: 13.2 % (ref 12–15)
GFR SERPL CREATININE-BSD FRML MDRD: 61 ML/MIN/1.73
GLUCOSE BLD-MCNC: 85 MG/DL (ref 70–100)
GLUCOSE BLDC GLUCOMTR-MCNC: 105 MG/DL (ref 70–130)
GLUCOSE BLDC GLUCOMTR-MCNC: 147 MG/DL (ref 70–130)
GLUCOSE BLDC GLUCOMTR-MCNC: 162 MG/DL (ref 70–130)
GLUCOSE BLDC GLUCOMTR-MCNC: 167 MG/DL (ref 70–130)
HCT VFR BLD AUTO: 28.3 % (ref 40–52)
HGB BLD-MCNC: 9.8 G/DL (ref 14–18)
MCH RBC QN AUTO: 33 PG (ref 28–32)
MCHC RBC AUTO-ENTMCNC: 34.6 G/DL (ref 33–36)
MCV RBC AUTO: 95.3 FL (ref 82–95)
PLATELET # BLD AUTO: 287 10*3/MM3 (ref 130–400)
PMV BLD AUTO: 10.1 FL (ref 6–12)
POTASSIUM BLD-SCNC: 3.7 MMOL/L (ref 3.5–5.3)
RBC # BLD AUTO: 2.97 10*6/MM3 (ref 4.8–5.9)
SODIUM BLD-SCNC: 135 MMOL/L (ref 135–145)
WBC NRBC COR # BLD: 13.1 10*3/MM3 (ref 4.8–10.8)

## 2019-07-31 PROCEDURE — 80048 BASIC METABOLIC PNL TOTAL CA: CPT | Performed by: NURSE PRACTITIONER

## 2019-07-31 PROCEDURE — 94799 UNLISTED PULMONARY SVC/PX: CPT

## 2019-07-31 PROCEDURE — 25010000002 ONDANSETRON PER 1 MG: Performed by: NURSE PRACTITIONER

## 2019-07-31 PROCEDURE — 97110 THERAPEUTIC EXERCISES: CPT

## 2019-07-31 PROCEDURE — 63710000001 PREDNISONE PER 1 MG: Performed by: NURSE PRACTITIONER

## 2019-07-31 PROCEDURE — 63710000001 INSULIN LISPRO (HUMAN) PER 5 UNITS: Performed by: INTERNAL MEDICINE

## 2019-07-31 PROCEDURE — 82962 GLUCOSE BLOOD TEST: CPT

## 2019-07-31 PROCEDURE — 94760 N-INVAS EAR/PLS OXIMETRY 1: CPT

## 2019-07-31 PROCEDURE — 85027 COMPLETE CBC AUTOMATED: CPT | Performed by: NURSE PRACTITIONER

## 2019-07-31 PROCEDURE — 97116 GAIT TRAINING THERAPY: CPT

## 2019-07-31 PROCEDURE — 63710000001 INSULIN DETEMIR PER 5 UNITS: Performed by: INTERNAL MEDICINE

## 2019-07-31 RX ORDER — BUMETANIDE 0.25 MG/ML
1 INJECTION INTRAMUSCULAR; INTRAVENOUS
Status: DISCONTINUED | OUTPATIENT
Start: 2019-07-31 | End: 2019-08-01

## 2019-07-31 RX ORDER — SENNA AND DOCUSATE SODIUM 50; 8.6 MG/1; MG/1
2 TABLET, FILM COATED ORAL NIGHTLY
Status: DISCONTINUED | OUTPATIENT
Start: 2019-07-31 | End: 2019-08-02 | Stop reason: HOSPADM

## 2019-07-31 RX ORDER — POTASSIUM CHLORIDE 750 MG/1
20 CAPSULE, EXTENDED RELEASE ORAL DAILY
Status: DISCONTINUED | OUTPATIENT
Start: 2019-07-31 | End: 2019-08-02 | Stop reason: HOSPADM

## 2019-07-31 RX ADMIN — INSULIN DETEMIR 5 UNITS: 100 INJECTION, SOLUTION SUBCUTANEOUS at 21:00

## 2019-07-31 RX ADMIN — RIVAROXABAN 20 MG: 20 TABLET, FILM COATED ORAL at 09:40

## 2019-07-31 RX ADMIN — INSULIN LISPRO 2 UNITS: 100 INJECTION, SOLUTION INTRAVENOUS; SUBCUTANEOUS at 11:42

## 2019-07-31 RX ADMIN — BUMETANIDE 1 MG: 0.25 INJECTION INTRAMUSCULAR; INTRAVENOUS at 17:17

## 2019-07-31 RX ADMIN — IPRATROPIUM BROMIDE AND ALBUTEROL SULFATE 3 ML: 2.5; .5 SOLUTION RESPIRATORY (INHALATION) at 19:01

## 2019-07-31 RX ADMIN — HYDROCHLOROTHIAZIDE 12.5 MG: 25 TABLET ORAL at 09:40

## 2019-07-31 RX ADMIN — METOLAZONE 2.5 MG: 2.5 TABLET ORAL at 09:39

## 2019-07-31 RX ADMIN — SPIRONOLACTONE 50 MG: 50 TABLET, FILM COATED ORAL at 09:40

## 2019-07-31 RX ADMIN — Medication 250 MG: at 09:40

## 2019-07-31 RX ADMIN — BUPROPION HYDROCHLORIDE 300 MG: 150 TABLET, FILM COATED, EXTENDED RELEASE ORAL at 09:40

## 2019-07-31 RX ADMIN — IPRATROPIUM BROMIDE AND ALBUTEROL SULFATE 3 ML: 2.5; .5 SOLUTION RESPIRATORY (INHALATION) at 07:20

## 2019-07-31 RX ADMIN — BUMETANIDE 1 MG: 0.25 INJECTION INTRAMUSCULAR; INTRAVENOUS at 09:38

## 2019-07-31 RX ADMIN — SODIUM CHLORIDE, PRESERVATIVE FREE 3 ML: 5 INJECTION INTRAVENOUS at 09:41

## 2019-07-31 RX ADMIN — FLUOXETINE HYDROCHLORIDE 20 MG: 20 CAPSULE ORAL at 09:39

## 2019-07-31 RX ADMIN — DIGOXIN 125 MCG: 125 TABLET ORAL at 11:42

## 2019-07-31 RX ADMIN — BUDESONIDE AND FORMOTEROL FUMARATE DIHYDRATE 2 PUFF: 80; 4.5 AEROSOL RESPIRATORY (INHALATION) at 19:02

## 2019-07-31 RX ADMIN — SODIUM CHLORIDE, PRESERVATIVE FREE 3 ML: 5 INJECTION INTRAVENOUS at 21:01

## 2019-07-31 RX ADMIN — AMLODIPINE BESYLATE 10 MG: 10 TABLET ORAL at 09:40

## 2019-07-31 RX ADMIN — IPRATROPIUM BROMIDE AND ALBUTEROL SULFATE 3 ML: 2.5; .5 SOLUTION RESPIRATORY (INHALATION) at 23:36

## 2019-07-31 RX ADMIN — Medication 250 MG: at 20:45

## 2019-07-31 RX ADMIN — CARVEDILOL 12.5 MG: 6.25 TABLET, FILM COATED ORAL at 17:17

## 2019-07-31 RX ADMIN — INSULIN LISPRO 2 UNITS: 100 INJECTION, SOLUTION INTRAVENOUS; SUBCUTANEOUS at 17:17

## 2019-07-31 RX ADMIN — IPRATROPIUM BROMIDE AND ALBUTEROL SULFATE 3 ML: 2.5; .5 SOLUTION RESPIRATORY (INHALATION) at 03:27

## 2019-07-31 RX ADMIN — DOXYCYCLINE 100 MG: 100 TABLET ORAL at 09:40

## 2019-07-31 RX ADMIN — LISINOPRIL 40 MG: 20 TABLET ORAL at 09:39

## 2019-07-31 RX ADMIN — OXYBUTYNIN CHLORIDE 5 MG: 5 TABLET, EXTENDED RELEASE ORAL at 09:40

## 2019-07-31 RX ADMIN — TERAZOSIN HYDROCHLORIDE 5 MG: 5 CAPSULE ORAL at 20:45

## 2019-07-31 RX ADMIN — IPRATROPIUM BROMIDE AND ALBUTEROL SULFATE 3 ML: 2.5; .5 SOLUTION RESPIRATORY (INHALATION) at 14:43

## 2019-07-31 RX ADMIN — IPRATROPIUM BROMIDE AND ALBUTEROL SULFATE 3 ML: 2.5; .5 SOLUTION RESPIRATORY (INHALATION) at 00:17

## 2019-07-31 RX ADMIN — FLUOXETINE HYDROCHLORIDE 40 MG: 20 CAPSULE ORAL at 09:40

## 2019-07-31 RX ADMIN — ONDANSETRON HYDROCHLORIDE 4 MG: 2 SOLUTION INTRAMUSCULAR; INTRAVENOUS at 11:11

## 2019-07-31 RX ADMIN — POTASSIUM CHLORIDE 20 MEQ: 750 CAPSULE, EXTENDED RELEASE ORAL at 09:39

## 2019-07-31 RX ADMIN — SENNOSIDES, DOCUSATE SODIUM 2 TABLET: 50; 8.6 TABLET, FILM COATED ORAL at 20:45

## 2019-07-31 RX ADMIN — FLUTICASONE PROPIONATE 2 SPRAY: 50 SPRAY, METERED NASAL at 09:38

## 2019-07-31 RX ADMIN — PREDNISONE 20 MG: 20 TABLET ORAL at 09:40

## 2019-07-31 RX ADMIN — DOXYCYCLINE 100 MG: 100 TABLET ORAL at 20:45

## 2019-07-31 RX ADMIN — BUDESONIDE AND FORMOTEROL FUMARATE DIHYDRATE 2 PUFF: 80; 4.5 AEROSOL RESPIRATORY (INHALATION) at 07:20

## 2019-07-31 RX ADMIN — CARVEDILOL 12.5 MG: 6.25 TABLET, FILM COATED ORAL at 09:39

## 2019-08-01 LAB
ANION GAP SERPL CALCULATED.3IONS-SCNC: 6 MMOL/L (ref 4–13)
BUN BLD-MCNC: 49 MG/DL (ref 5–21)
BUN/CREAT SERPL: 45.4 (ref 7–25)
CALCIUM SPEC-SCNC: 9.4 MG/DL (ref 8.4–10.4)
CHLORIDE SERPL-SCNC: 93 MMOL/L (ref 98–110)
CO2 SERPL-SCNC: 35 MMOL/L (ref 24–31)
CREAT BLD-MCNC: 1.08 MG/DL (ref 0.5–1.4)
DEPRECATED RDW RBC AUTO: 46.6 FL (ref 40–54)
ERYTHROCYTE [DISTWIDTH] IN BLOOD BY AUTOMATED COUNT: 13.2 % (ref 12–15)
GFR SERPL CREATININE-BSD FRML MDRD: 65 ML/MIN/1.73
GLUCOSE BLD-MCNC: 92 MG/DL (ref 70–100)
GLUCOSE BLDC GLUCOMTR-MCNC: 112 MG/DL (ref 70–130)
GLUCOSE BLDC GLUCOMTR-MCNC: 185 MG/DL (ref 70–130)
GLUCOSE BLDC GLUCOMTR-MCNC: 197 MG/DL (ref 70–130)
GLUCOSE BLDC GLUCOMTR-MCNC: 217 MG/DL (ref 70–130)
HCT VFR BLD AUTO: 28 % (ref 40–52)
HGB BLD-MCNC: 9.7 G/DL (ref 14–18)
MCH RBC QN AUTO: 33.2 PG (ref 28–32)
MCHC RBC AUTO-ENTMCNC: 34.6 G/DL (ref 33–36)
MCV RBC AUTO: 95.9 FL (ref 82–95)
PLATELET # BLD AUTO: 271 10*3/MM3 (ref 130–400)
PMV BLD AUTO: 9.9 FL (ref 6–12)
POTASSIUM BLD-SCNC: 3.8 MMOL/L (ref 3.5–5.3)
RBC # BLD AUTO: 2.92 10*6/MM3 (ref 4.8–5.9)
SODIUM BLD-SCNC: 134 MMOL/L (ref 135–145)
WBC NRBC COR # BLD: 14.22 10*3/MM3 (ref 4.8–10.8)

## 2019-08-01 PROCEDURE — 94618 PULMONARY STRESS TESTING: CPT

## 2019-08-01 PROCEDURE — 97116 GAIT TRAINING THERAPY: CPT

## 2019-08-01 PROCEDURE — 94799 UNLISTED PULMONARY SVC/PX: CPT

## 2019-08-01 PROCEDURE — 97535 SELF CARE MNGMENT TRAINING: CPT

## 2019-08-01 PROCEDURE — 63710000001 INSULIN LISPRO (HUMAN) PER 5 UNITS: Performed by: INTERNAL MEDICINE

## 2019-08-01 PROCEDURE — 85027 COMPLETE CBC AUTOMATED: CPT | Performed by: NURSE PRACTITIONER

## 2019-08-01 PROCEDURE — 63710000001 INSULIN DETEMIR PER 5 UNITS: Performed by: INTERNAL MEDICINE

## 2019-08-01 PROCEDURE — 97530 THERAPEUTIC ACTIVITIES: CPT

## 2019-08-01 PROCEDURE — 82962 GLUCOSE BLOOD TEST: CPT

## 2019-08-01 PROCEDURE — 63710000001 PREDNISONE PER 1 MG: Performed by: NURSE PRACTITIONER

## 2019-08-01 PROCEDURE — 80048 BASIC METABOLIC PNL TOTAL CA: CPT | Performed by: NURSE PRACTITIONER

## 2019-08-01 PROCEDURE — 94760 N-INVAS EAR/PLS OXIMETRY 1: CPT

## 2019-08-01 RX ORDER — HYDROCHLOROTHIAZIDE 25 MG/1
25 TABLET ORAL DAILY
Status: DISCONTINUED | OUTPATIENT
Start: 2019-08-01 | End: 2019-08-02 | Stop reason: HOSPADM

## 2019-08-01 RX ORDER — IPRATROPIUM BROMIDE AND ALBUTEROL SULFATE 2.5; .5 MG/3ML; MG/3ML
3 SOLUTION RESPIRATORY (INHALATION)
Status: DISCONTINUED | OUTPATIENT
Start: 2019-08-01 | End: 2019-08-02 | Stop reason: HOSPADM

## 2019-08-01 RX ORDER — GUAIFENESIN 600 MG/1
1200 TABLET, EXTENDED RELEASE ORAL EVERY 12 HOURS SCHEDULED
Status: DISCONTINUED | OUTPATIENT
Start: 2019-08-01 | End: 2019-08-02 | Stop reason: HOSPADM

## 2019-08-01 RX ADMIN — INSULIN LISPRO 2 UNITS: 100 INJECTION, SOLUTION INTRAVENOUS; SUBCUTANEOUS at 12:51

## 2019-08-01 RX ADMIN — DIGOXIN 125 MCG: 125 TABLET ORAL at 12:52

## 2019-08-01 RX ADMIN — BUPROPION HYDROCHLORIDE 300 MG: 150 TABLET, FILM COATED, EXTENDED RELEASE ORAL at 09:44

## 2019-08-01 RX ADMIN — FLUOXETINE HYDROCHLORIDE 40 MG: 20 CAPSULE ORAL at 09:43

## 2019-08-01 RX ADMIN — POTASSIUM CHLORIDE 20 MEQ: 750 CAPSULE, EXTENDED RELEASE ORAL at 09:43

## 2019-08-01 RX ADMIN — CARVEDILOL 12.5 MG: 6.25 TABLET, FILM COATED ORAL at 09:43

## 2019-08-01 RX ADMIN — INSULIN DETEMIR 5 UNITS: 100 INJECTION, SOLUTION SUBCUTANEOUS at 21:46

## 2019-08-01 RX ADMIN — SENNOSIDES, DOCUSATE SODIUM 2 TABLET: 50; 8.6 TABLET, FILM COATED ORAL at 21:46

## 2019-08-01 RX ADMIN — LISINOPRIL 40 MG: 20 TABLET ORAL at 09:43

## 2019-08-01 RX ADMIN — RIVAROXABAN 20 MG: 20 TABLET, FILM COATED ORAL at 09:44

## 2019-08-01 RX ADMIN — FUROSEMIDE 60 MG: 40 TABLET ORAL at 12:43

## 2019-08-01 RX ADMIN — TERAZOSIN HYDROCHLORIDE 5 MG: 5 CAPSULE ORAL at 21:46

## 2019-08-01 RX ADMIN — CARVEDILOL 12.5 MG: 6.25 TABLET, FILM COATED ORAL at 18:11

## 2019-08-01 RX ADMIN — DOXYCYCLINE 100 MG: 100 TABLET ORAL at 09:44

## 2019-08-01 RX ADMIN — GUAIFENESIN 1200 MG: 600 TABLET, EXTENDED RELEASE ORAL at 21:46

## 2019-08-01 RX ADMIN — IPRATROPIUM BROMIDE AND ALBUTEROL SULFATE 3 ML: 2.5; .5 SOLUTION RESPIRATORY (INHALATION) at 03:26

## 2019-08-01 RX ADMIN — SODIUM CHLORIDE, PRESERVATIVE FREE 3 ML: 5 INJECTION INTRAVENOUS at 09:42

## 2019-08-01 RX ADMIN — IPRATROPIUM BROMIDE AND ALBUTEROL SULFATE 3 ML: 2.5; .5 SOLUTION RESPIRATORY (INHALATION) at 07:04

## 2019-08-01 RX ADMIN — METOLAZONE 2.5 MG: 2.5 TABLET ORAL at 09:44

## 2019-08-01 RX ADMIN — HYDROCHLOROTHIAZIDE 25 MG: 25 TABLET ORAL at 09:44

## 2019-08-01 RX ADMIN — FLUOXETINE HYDROCHLORIDE 20 MG: 20 CAPSULE ORAL at 09:45

## 2019-08-01 RX ADMIN — IPRATROPIUM BROMIDE AND ALBUTEROL SULFATE 3 ML: 2.5; .5 SOLUTION RESPIRATORY (INHALATION) at 15:18

## 2019-08-01 RX ADMIN — IPRATROPIUM BROMIDE AND ALBUTEROL SULFATE 3 ML: 2.5; .5 SOLUTION RESPIRATORY (INHALATION) at 20:37

## 2019-08-01 RX ADMIN — GUAIFENESIN 1200 MG: 600 TABLET, EXTENDED RELEASE ORAL at 09:42

## 2019-08-01 RX ADMIN — Medication 250 MG: at 09:43

## 2019-08-01 RX ADMIN — PREDNISONE 20 MG: 20 TABLET ORAL at 09:44

## 2019-08-01 RX ADMIN — BUDESONIDE AND FORMOTEROL FUMARATE DIHYDRATE 2 PUFF: 80; 4.5 AEROSOL RESPIRATORY (INHALATION) at 07:04

## 2019-08-01 RX ADMIN — INSULIN LISPRO 3 UNITS: 100 INJECTION, SOLUTION INTRAVENOUS; SUBCUTANEOUS at 21:46

## 2019-08-01 RX ADMIN — SPIRONOLACTONE 50 MG: 50 TABLET, FILM COATED ORAL at 09:43

## 2019-08-01 RX ADMIN — Medication 250 MG: at 21:46

## 2019-08-01 RX ADMIN — AMLODIPINE BESYLATE 10 MG: 10 TABLET ORAL at 09:44

## 2019-08-01 RX ADMIN — FLUTICASONE PROPIONATE 2 SPRAY: 50 SPRAY, METERED NASAL at 09:46

## 2019-08-01 RX ADMIN — OXYBUTYNIN CHLORIDE 5 MG: 5 TABLET, EXTENDED RELEASE ORAL at 09:44

## 2019-08-01 RX ADMIN — INSULIN LISPRO 2 UNITS: 100 INJECTION, SOLUTION INTRAVENOUS; SUBCUTANEOUS at 18:11

## 2019-08-01 RX ADMIN — SODIUM CHLORIDE, PRESERVATIVE FREE 3 ML: 5 INJECTION INTRAVENOUS at 21:47

## 2019-08-01 NOTE — THERAPY TREATMENT NOTE
Acute Care - Physical Therapy Treatment Note  [facility]     Patient Name: Prakash Castro  : 1935  MRN: [id number]  Today's Date: 2019  Onset of Illness/Injury or Date of Surgery: 19  Date of Referral to PT: 19  Referring Physician: Jessica Khan APRN    Admit Date: 2019    Visit Dx:    ICD-10-CM ICD-9-CM   1. Acute on chronic congestive heart failure, unspecified heart failure type (CMS/HCC) I50.9 428.0   2. Impaired functional mobility, balance, gait, and endurance Z74.09 V49.89   3. Decreased activities of daily living (ADL) R68.89 780.99     Patient Active Problem List   Diagnosis   • Cancer of prostate (CMS/HCC)   • Screening PSA (prostate specific antigen)   • OAB (overactive bladder)   • Frequency of urination   • Nocturia   • Mediastinal mass   • Atrial fibrillation (CMS/HCC)   • Mediastinal adenopathy   • Stage 2 moderate COPD by GOLD classification (CMS/HCC), with exacerbation   • Centrilobular emphysema (CMS/HCC)   • Nocturnal hypoxemia   • Cellulitis   • Diabetes mellitus (CMS/HCC)   • Chronic diastolic congestive heart failure (CMS/HCC)   • Hypertension   • Obesity (BMI 30-39.9)   • Abscess of left foot   • Acute on chronic congestive heart failure (CMS/HCC)   • Acute on chronic respiratory failure with hypoxia and hypercapnia (CMS/HCC)   • Acute kidney injury (CMS/HCC)       Therapy Treatment    Rehabilitation Treatment Summary     Row Name 19 1337 19 1007          Treatment Time/Intention    Discipline  physical therapy assistant  (Pended)   -MO  physical therapy assistant  -BRITTNY GAUTHIER JO2     Document Type  therapy note (daily note)  (Pended)   -MO  therapy note (daily note)  -BRITTNY GAUTHIER JO2     Subjective Information  no complaints  (Pended)   -MO2  complains of;nausea/vomiting  -BRITTNY GAUTHIER JO2     Mode of Treatment  physical therapy  (Pended)   -MO2  physical therapy  -BRITTNY GAUTHIER JO2     Patient/Family Observations  --  Daughter in room  -BRITTNY GAUTHIER JO2     Existing  Precautions/Restrictions  cardiac;weight bearing  (Pended)   -MO2  fall;weight bearing PWB on heel of LLE  -ABRAHAN,MO,JO2     Recorded by [MO] Margie Elizalde PTA Student 08/01/19 1347  [MO2] Margie Elizalde PTA Student 08/01/19 1400 [ABRAHAN,MO,JO2] Jordin Todd, PT DPT (r) Margie Elizalde PTA Student (t) Jordin Todd, PT DPT (c) 08/01/19 1334     Row Name 08/01/19 1337 08/01/19 1007          Bed Mobility Assessment/Treatment    Scooting/Bridging [place] (Bed Mobility)  supervision  (Pended)   -MO  --     Sit-Supine [place] (Bed Mobility)  supervision  (Pended)   -MO  --     Comment (Bed Mobility)  --  Sitting EOB  -ABRAHAN,MO,JO2     Recorded by [MO] Margie Elizalde PTA Student 08/01/19 1400 [ABRAHAN,MO,JO2] Jordin Todd, PT DPT (r) Margie Elizalde, STEFANO Student (t) Jordin Todd, PT DPT (c) 08/01/19 1334     Row Name 08/01/19 1337 08/01/19 1007          Sit-Stand Transfer    Sit-Stand [place] (Transfers)  stand by assist;contact guard;1 person assist;verbal cues  (Pended)   -MO  verbal cues;contact guard;stand by assist  -ABRAHAN,MO,JO2     Assistive Device (Sit-Stand Transfers)  walker, front-wheeled  (Pended)   -MO  walker, front-wheeled  -ABRAHAN,MO,JO2     Recorded by [MO] Margie Elizalde PTA Student 08/01/19 1400 [ABRAHAN,MO,JO2] Jordin Todd, PT DPT (r) Margie Elizalde PTA Student (t) Jordin Todd, PT DPT (c) 08/01/19 1334     Row Name 08/01/19 1337 08/01/19 1007          Stand-Sit Transfer    Stand-Sit [place] (Transfers)  verbal cues;contact guard;stand by assist;1 person assist  (Pended)   -MO  verbal cues;contact guard;stand by assist  -ABRAHAN,MO,JO2     Assistive Device (Stand-Sit Transfers)  walker, front-wheeled  (Pended)   -MO  walker, front-wheeled  -BRITTNY GAUTHIER,JO2     Recorded by [MO] Margie Elizalde, PTA Student 08/01/19 1400 [ABRAHAN,MO,JO2] Jordin Todd, PT DPT (r) Margie Elizalde, STEFANO Student (t) Jordin Todd, PT DPT (c) 08/01/19 1334     Row Name 08/01/19 1337 08/01/19 1007           Gait/Stairs Assessment/Training    [place] Level (Gait)  contact guard  (Pended)   -MO  contact guard  -ABRAHAN,MO,JO2     Assistive Device (Gait)  walker, front-wheeled  (Pended)   -MO  walker, front-wheeled  -ABRAHAN,MO,JO2     Distance in Feet (Gait)  25 ft  (Pended)   -MO  25 ft  -ABRAHAN,MO,JO2     Pattern (Gait)  step-through  (Pended)   -MO  step-through  -ABRAHAN,MO,JO2     Deviations/Abnormal Patterns (Gait)  dina decreased;gait speed decreased;stride length decreased;base of support, narrow  (Pended)   -MO  dina decreased;gait speed decreased;stride length decreased  -ABRAHAN,MO,JO2     Bilateral Gait Deviations  forward flexed posture  (Pended)   -MO  forward flexed posture  -ABRAHAN,MO,JO2     Comment (Gait/Stairs)  --  Pt more stable with use of RW  -ABRAHAN,MO,JO2     Recorded by [MO] Margie Elizalde PTA Student 08/01/19 1400 [ABRAHAN,MO,JO2] Jordin Todd PT DPT (r) Margie Elizalde PTA Student (t) Jordin Todd PT DPT (c) 08/01/19 1334     Row Name 08/01/19 1337 08/01/19 1007          Therapeutic Exercise    Lower Extremity (Therapeutic Exercise)  LAQ (long arc quad), bilateral;marching while seated  (Pended)   -MO  LAQ (long arc quad), bilateral;marching while seated  -ABRAHAN,MO,JO2     Lower Extremity Range of Motion (Therapeutic Exercise)  hip abduction/adduction, bilateral;ankle dorsiflexion/plantar flexion, bilateral  (Pended)   -MO  hip abduction/adduction, bilateral;ankle dorsiflexion/plantar flexion, bilateral  -ABRAHAN,MO,JO2     Exercise Type (Therapeutic Exercise)  AROM (active range of motion)  (Pended)   -MO  AROM (active range of motion)  (Pended)   -MO2     Position (Therapeutic Exercise)  seated  (Pended)   -MO  seated  -ABRAHAN,MO,JO2     Sets/Reps (Therapeutic Exercise)  20  (Pended)   -MO  20  -ABRAHAN,MO,JO2     Recorded by [MO] Margie Elizalde PTA Student 08/01/19 1400 [ABRAHAN,MO,JO2] Jordin Todd, PT DPT (r) Margie Elizalde PTA Student (t) Jordin Todd, PT DPT (c) 08/01/19 1334  [MO2] Margie Elizalde, PTA  Student 08/01/19 1400     Row Name 08/01/19 1337 08/01/19 1007          Positioning and Restraints    Pre-Treatment Position  sitting in chair/recliner  (Pended)   -MO  in bed EOB  -ABRAHAN,MO,JO2     Post Treatment Position  bed  (Pended)   -MO  chair  -ABRAHAN,MO,JO2     In Bed  fowlers;call light within reach;encouraged to call for assist  (Pended)   -MO  --     In Chair  --  call light within reach;encouraged to call for assist;with family/caregiver;legs elevated  -ABRAHAN,MO,JO2     Recorded by [MO] Margie Elizalde PTA Student 08/01/19 1400 [ABRAHAN,MO,JO2] Jordin Todd, PT DPT (r) Margie Elizalde PTA Student (t) Jordin Todd, PT DPT (c) 08/01/19 1334     Row Name 08/01/19 1337 08/01/19 1007          Pain Scale: Numbers Pre/Post-Treatment    Pain Scale: Numbers, Pretreatment  0/10 - no pain  (Pended)   -MO  0/10 - no pain  -ABRAHAN,MO,JO2     Pain Scale: Numbers, Post-Treatment  0/10 - no pain  (Pended)   -MO  0/10 - no pain  -ABRAHAN,MO,JO2     Recorded by [MO] Margie Elizalde PTA Student 08/01/19 1400 [ABRAHAN,MO,JO2] Jordin Todd, PT DPT (r) Margie Elizalde, STEFANO Student (t) Jordin Todd, PT DPT (c) 08/01/19 1334     Row Name                Wound 07/18/19 0221 Left anterior foot blisters    Wound - Properties Group Date first assessed: 07/18/19 [SC] Time first assessed: 0221 [SC] Present On Admission : yes;picture taken [SC] Side: Left [SC] Orientation: anterior [SC] Location: foot [SC] Type: blisters [SC] Recorded by:  [SC] Nicole Rollins RN 07/18/19 0445    Row Name                Wound 07/18/19 0221 Left posterior plantar diabetic/neuropathic ulceration    Wound - Properties Group Date first assessed: 07/18/19 [SC] Time first assessed: 0221 [SC] Present On Admission : yes;picture taken [SC] Side: Left [SC] Orientation: posterior [SC] Location: plantar [SC] Type: diabetic/neuropathic ulceration [SC] Recorded by:  [SC] Nicole Rollins RN 07/18/19 0447      User Key  (r) = Recorded By, (t) = Taken By, (c) = Cosigned By     Initials Name Effective Dates Discipline    Jordin Evans, PT DPT 08/02/16 -  PT    Nicole Alvarado RN 08/02/16 -  Nurse    Margie Goodwin, PTA Student 06/24/19 -  PT          Wound 07/18/19 0221 Left anterior foot blisters (Active)   Dressing Appearance dry;intact 8/1/2019  8:00 AM   Closure DEJAH 8/1/2019  8:00 AM   Base dressing in place, unable to visualize 8/1/2019  8:00 AM   Periwound intact;dry;warm 8/1/2019  8:00 AM   Periwound Temperature warm 8/1/2019  8:00 AM   Periwound Skin Turgor firm 8/1/2019  8:00 AM   Drainage Amount none 8/1/2019  8:00 AM       Wound 07/18/19 0221 Left posterior plantar diabetic/neuropathic ulceration (Active)   Dressing Appearance dry;intact 8/1/2019  8:00 AM   Closure DEJAH 8/1/2019  8:00 AM   Base dressing in place, unable to visualize 8/1/2019  8:00 AM   Periwound intact;dry;warm 8/1/2019  8:00 AM   Periwound Temperature warm 8/1/2019  8:00 AM   Periwound Skin Turgor firm 8/1/2019  8:00 AM   Drainage Amount none 8/1/2019  8:00 AM           Physical Therapy Education     Title: PT OT SLP Therapies (In Progress)     Topic: Physical Therapy (In Progress)     Point: Mobility training (Done)     Learning Progress Summary           Patient Acceptance, E, VU by AL at 7/29/2019  9:54 AM    Comment:  PT educate patient on purse lip breathing and taking deep breaths before activity to help reduce SOB                   Point: Precautions (Done)     Learning Progress Summary           Patient Acceptance, E, VU by AL at 7/29/2019  9:53 AM    Comment:  PT educated patient on fall risk, L WBAT status, and O2 stats.                               User Key     Initials Effective Dates Name Provider Type Discipline    AL 04/24/19 -  Everton Brandon, PT Student PT Student PT                PT Recommendation and Plan     Plan of Care Reviewed With: patient  Progress: improving  Outcome Summary: Pt agreeable to herapy. Issued pt RW for ambulation. Pt much more stable and able ot maintain WB  precautions with use of RW. Performed BLE therapeutic exercise x20. Will continue to follow.   Outcome Measures     Row Name 07/31/19 1525 07/30/19 1500          How much help from another is currently needed...    Putting on and taking off regular lower body clothing?  2  -MM  2  -JJ     Bathing (including washing, rinsing, and drying)  2  -MM  2  -JJ     Toileting (which includes using toilet bed pan or urinal)  3  -MM  3  -JJ     Putting on and taking off regular upper body clothing  4  -MM  4  -JJ     Taking care of personal grooming (such as brushing teeth)  4  -MM  4  -JJ     Eating meals  4  -MM  4  -JJ     AM-PAC 6 Clicks Score (OT)  19  -MM  19  -JJ        Functional Assessment    Outcome Measure Options  --  AM-PAC 6 Clicks Daily Activity (OT)  -JJ       User Key  (r) = Recorded By, (t) = Taken By, (c) = Cosigned By    Initials Name Provider Type    Kelli Middleton COTA/L Occupational Therapy Assistant    Katherine Valle OTR/L Occupational Therapist         Time Calculation:   PT Charges     Row Name 08/01/19 1400 08/01/19 1043          Time Calculation    Start Time  1337  (Pended)   -MO  1007  -ABRAHAN (r) MO (t) ABRAHAN (c)     Stop Time  1356  (Pended)   -MO  1026  -ABRAHAN (r) MO (t) ABRAHAN (c)     Time Calculation (min)  19 min  (Pended)   -MO  19 min  -ABRAHAN (r) MO (t)     PT Received On  08/01/19  (Pended)   -MO  08/01/19  -ABRAHAN (r) MO (t) ABRAHAN (c)     PT Goal Re-Cert Due Date  08/08/19  (Pended)   -MO  08/08/19  -ABRAHAN (r) MO (t) ABRAHAN (c)        Time Calculation- PT    Total Timed Code Minutes- PT  19 minute(s)  (Pended)   -MO  19 minute(s)  -ABRAHAN (r) MO (t) ABRAHAN (c)        Timed Charges    31872 - Gait Training Minutes   19  (Pended)   -MO  19  -ABRAHAN (r) MO (t) ABRAHAN (c)       User Key  (r) = Recorded By, (t) = Taken By, (c) = Cosigned By    Initials Name Provider Type    Jordin Evans, PT DPT Physical Therapist    Margie Goodwin, PTA Student PTA Student        Therapy Charges for Today     Code Description  Service Date Service Provider Modifiers Qty    48872064551 HC PT THER PROC EA 15 MIN 7/31/2019 Margie Elizalde, STEFANO Student GP 1    99812090764 HC GAIT TRAINING EA 15 MIN 7/31/2019 Margie Elizalde, STEFANO Student GP 1    41060787468 HC GAIT TRAINING EA 15 MIN 8/1/2019 Margie Elizalde, STEFANO Student GP 1    41403629007 HC GAIT TRAINING EA 15 MIN 8/1/2019 Margie Elizalde PTA Student GP 1          PT G-Codes  Outcome Measure Options: AM-PAC 6 Clicks Daily Activity (OT)  AM-PAC 6 Clicks Score (PT): 19  AM-PAC 6 Clicks Score (OT): 19    Margie Elizalde PTA Student  8/1/2019

## 2019-08-01 NOTE — THERAPY TREATMENT NOTE
Acute Care - Occupational Therapy Treatment Note  [facility]     Patient Name: Prakash Castro  : 1935  MRN: [id number]  Today's Date: 2019  Onset of Illness/Injury or Date of Surgery: 19  Date of Referral to OT: 19  Referring Physician: Jessica Khan APRN    Admit Date: 2019       ICD-10-CM ICD-9-CM   1. Acute on chronic congestive heart failure, unspecified heart failure type (CMS/HCC) I50.9 428.0   2. Impaired functional mobility, balance, gait, and endurance Z74.09 V49.89   3. Decreased activities of daily living (ADL) R68.89 780.99     Patient Active Problem List   Diagnosis   • Cancer of prostate (CMS/HCC)   • Screening PSA (prostate specific antigen)   • OAB (overactive bladder)   • Frequency of urination   • Nocturia   • Mediastinal mass   • Atrial fibrillation (CMS/HCC)   • Mediastinal adenopathy   • Stage 2 moderate COPD by GOLD classification (CMS/HCC), with exacerbation   • Centrilobular emphysema (CMS/HCC)   • Nocturnal hypoxemia   • Cellulitis   • Diabetes mellitus (CMS/HCC)   • Chronic diastolic congestive heart failure (CMS/HCC)   • Hypertension   • Obesity (BMI 30-39.9)   • Abscess of left foot   • Acute on chronic congestive heart failure (CMS/HCC)   • Acute on chronic respiratory failure with hypoxia and hypercapnia (CMS/HCC)   • Acute kidney injury (CMS/HCC)     Past Medical History:   Diagnosis Date   • Acute kidney injury (CMS/HCC) 2019   • Arthritis    • Atrial fibrillation (CMS/HCC)     HISTORY OF   • Cancer (CMS/HCC)     prostate   • Cellulitis    • CHF (congestive heart failure) (CMS/HCC)    • Depression    • Diabetes mellitus (CMS/HCC)    • Hematuria    • History of cardioversion     PER PATIENT REPORT   • Hypertension    • Obesity    • Overactive bladder    • Pacemaker    • SOB (shortness of breath)    • Stage 2 moderate COPD by GOLD classification (CMS/HCC) 3/4/2019     Past Surgical History:   Procedure Laterality Date   • EYE SURGERY     • HERNIA  REPAIR     • INCISION AND DRAINAGE LEG Left 7/19/2019    Procedure: INCISION AND DRAINAGE, LEFT FOOT;  Surgeon: Juan Antonio Uriarte DPM;  Location: [facility] OR;  Service: Podiatry   • JOINT REPLACEMENT     • REPLACEMENT TOTAL KNEE BILATERAL         Therapy Treatment    Rehabilitation Treatment Summary     Row Name 08/01/19 1452 08/01/19 1337 08/01/19 1007       Treatment Time/Intention    Discipline  occupational therapy assistant  -MM  physical therapy assistant  -ABRAHAN,MO,JO2  physical therapy assistant  -ABRAHAN,MO,JO2    Document Type  therapy note (daily note)  -MM  therapy note (daily note)  -ABRHAAN,MO,JO2  therapy note (daily note)  -ABRAHAN,MO,JO2    Subjective Information  complains of;pain sore  -MM  no complaints  -ABRAHAN,MO,JO2  complains of;nausea/vomiting  -ABRAHAN,MO,JO2    Mode of Treatment  individual therapy;occupational therapy  -MM  physical therapy  -ABRAHAN,MO,JO2  physical therapy  -ABRAHAN,MO,JO2    Patient/Family Observations  --  --  Daughter in room  -ABRAHAN,MO,JO2    Patient Effort  good  -MM  --  --    Existing Precautions/Restrictions  weight bearing  -MM  cardiac;weight bearing  -ABRAHAN,MO,JO2  fall;weight bearing PWB on heel of LLE  -ABRAHAN,MO,JO2    Recorded by [MM] Kelli Riley COTA/L 08/01/19 1459 [ABRAHAN,MO,JO2] Jordin Todd, PT DPT (r) Margie Elizalde, PTA Student (t) Jordin Todd, PT DPT (c) 08/01/19 1454 [ABRAHAN,MO,JO2] Jordin Todd, PT DPT (r) Margie Elizalde, PTA Student (t) Jordin Todd, PT DPT (c) 08/01/19 1334    Row Name 08/01/19 1452             Cognitive Assessment/Intervention- PT/OT    Orientation Status (Cognition)  oriented x 4  -MM      Personal Safety Interventions  fall prevention program maintained;gait belt;nonskid shoes/slippers when out of bed;supervised activity  -MM      Recorded by [MM] Kelli Riley COTA/L 08/01/19 1459      Row Name 08/01/19 1452             Mobility Assessment/Intervention    Extremity Weight-bearing Status  left lower extremity  (Significant)   -MM      Left Lower  Extremity (Weight-bearing Status)  partial weight-bearing (PWB)  (Significant)  verbal order WB on heel only   -MM      Recorded by [MM] Kelli Riley COTA/L 08/01/19 1459      Row Name 08/01/19 1452 08/01/19 1337 08/01/19 1007       Bed Mobility Assessment/Treatment    Scooting/Bridging [place] (Bed Mobility)  supervision  -MM  supervision  -ABRAHAN,MO,JO2  --    Sit-Supine [place] (Bed Mobility)  supervision  -MM  supervision  -ABRAHAN,MO,JO2  --    Comment (Bed Mobility)  I sitting EOB  -MM  --  Sitting EOB  -ABRAHAN,MO,JO2    Recorded by [MM] Kelli Riley COTA/L 08/01/19 1508 [ABRAHAN,MO,JO2] Jordin Todd, PT DPT (r) Margie Elizalde, PTA Student (t) Jordin Todd, PT DPT (c) 08/01/19 1454 [ABRAHAN,MO,JO2] Jordin Todd, PT DPT (r) Margie Elizalde, PTA Student (t) Jordin Todd, PT DPT (c) 08/01/19 1334    Row Name 08/01/19 1452             Functional Mobility    Functional Mobility- Ind. Level  contact guard assist;verbal cues required  -MM      Functional Mobility- Device  rolling walker  -MM      Functional Mobility-Maintain WBing  cues to maintain weight bearing status  -MM      Functional Mobility- Safety Issues  supplemental O2  -MM      Functional Mobility- Comment  in room<>bathroom   -MM      Recorded by [MM] Kelli Riley COTA/L 08/01/19 1530      Row Name 08/01/19 1452             Transfer Assessment/Treatment    Transfer Assessment/Treatment  sit-stand transfer;stand-sit transfer;toilet transfer  -MM      Recorded by [MM] Kelli Riley COTA/L 08/01/19 1530      Row Name 08/01/19 1452 08/01/19 1337 08/01/19 1007       Sit-Stand Transfer    Sit-Stand [place] (Transfers)  contact guard;verbal cues  -MM  stand by assist;contact guard;1 person assist;verbal cues  -ABRAHAN,MO,JO2  verbal cues;contact guard;stand by assist  -ABRAHAN,MO,JO2    Assistive Device (Sit-Stand Transfers)  walker, front-wheeled  -MM  walker, front-wheeled  -ABRAHAN,MO,JO2  walker, front-wheeled  -ABRAHAN,MO,JO2    Recorded by  [MM] Kelli Riley COTA/L 08/01/19 1530 [ABRAHAN,MO,JO2] Jordin Todd, PT DPT (r) Margie Elizalde, STEFANO Student (t) Jordin Todd, PT DPT (c) 08/01/19 1454 [ABRAHAN,MO,JO2] Jordin Todd, PT DPT (r) Margie Elizalde, STEFANO Student (t) Jordin Todd, PT DPT (c) 08/01/19 1334    Row Name 08/01/19 1452 08/01/19 1337 08/01/19 1007       Stand-Sit Transfer    Stand-Sit [place] (Transfers)  contact guard  -MM  verbal cues;contact guard;stand by assist;1 person assist  -ABRAHAN,MO,JO2  verbal cues;contact guard;stand by assist  -ABRAHAN,MO,JO2    Assistive Device (Stand-Sit Transfers)  walker, front-wheeled  -MM  walker, front-wheeled  -ABRAHAN,MO,JO2  walker, front-wheeled  -ABRAHAN,MO,JO2    Recorded by [MM] Kelli Riley HOOPER/L 08/01/19 1530 [ABRAHAN,MO,JO2] Jordin Todd, PT DPT (r) Margie Elizalde, STEFANO Student (t) Jordin Todd, PT DPT (c) 08/01/19 1454 [ABRAHAN,MO,JO2] Jordin Todd, PT DPT (r) Margie Elizalde, STEFANO Student (t) Jordin Todd, PT DPT (c) 08/01/19 1334    Row Name 08/01/19 1452             Toilet Transfer    Type (Toilet Transfer)  stand pivot/stand step  -MM      [place] Level (Toilet Transfer)  verbal cues;contact guard  -MM      Assistive Device (Toilet Transfer)  commode;walker, front-wheeled  -MM      Recorded by [MM] Kelli Riley HOOPER/L 08/01/19 1530      Row Name 08/01/19 1337 08/01/19 1007          Gait/Stairs Assessment/Training    [place] Level (Gait)  contact guard  -ABRAHAN,MO,JO2  contact guard  -ABRAHAN,MO,JO2     Assistive Device (Gait)  walker, front-wheeled  -ABRAHAN,MO,JO2  walker, front-wheeled  -ABRAHAN,MO,JO2     Distance in Feet (Gait)  25 ft  -ABRAHAN,MO,JO2  25 ft  -ABRAHAN,MO,JO2     Pattern (Gait)  step-through  -ABRAHAN,MO,JO2  step-through  -ABRAHAN,MO,JO2     Deviations/Abnormal Patterns (Gait)  dina decreased;gait speed decreased;stride length decreased;base of support, narrow  -ABRAHAN,MO,JO2  dina decreased;gait speed decreased;stride length decreased  -ABRAHAN,MO,JO2     Bilateral Gait Deviations   forward flexed posture  -ABRAHAN,MO,JO2  forward flexed posture  -ABRAHAN,MO,JO2     Comment (Gait/Stairs)  --  Pt more stable with use of RW  -ABRAHAN,MO,JO2     Recorded by [ABRAHAN,MO,JO2] Jordin Todd, PT DPT (r) Margie Elizalde, PTA Student (t) Jordin Todd, PT DPT (c) 08/01/19 1454 [ABRAHAN,MO,JO2] Jordin Todd, PT DPT (r) Margie Elizalde, PTA Student (t) Jordin Todd, PT DPT (c) 08/01/19 1334     Row Name 08/01/19 1452             ADL Assessment/Intervention    BADL Assessment/Intervention  lower body dressing;upper body dressing;grooming;toileting  -MM      Recorded by [MM] Kelli Riley COTA/L 08/01/19 1530      Row Name 08/01/19 1452             Upper Body Dressing Assessment/Training    Upper Body Dressing [place] Level  don;set up  -MM      Upper Body Dressing Position  unsupported sitting  -MM      Comment (Upper Body Dressing)  gown  -MM      Recorded by [MM] Kelli Riley COTA/L 08/01/19 1530      Row Name 08/01/19 1452             Lower Body Dressing Assessment/Training    Lower Body Dressing [place] Level  don;undergarment;minimum assist (75% patient effort)  -MM      Lower Body Dressing Position  unsupported sitting;supported standing  -MM      Comment (Lower Body Dressing)  over feet  -MM      Recorded by [MM] Kelli Riley COTA/L 08/01/19 1530      Row Name 08/01/19 1452             Grooming Assessment/Training    [place] Level (Grooming)  wash face, hands;contact guard assist  -MM      Grooming Position  sink side;supported standing  -MM      Comment (Grooming)  for 4 mins at sink   -MM      Recorded by [MM] Kelli Riley COTA/L 08/01/19 1530      Row Name 08/01/19 1452             Toileting Assessment/Training    [place] Level (Toileting)  perform perineal hygiene;adjust/manage clothing;contact guard assist;verbal cues  -MM      Toileting Position  unsupported sitting;supported standing  -MM      Recorded by [MM] Kelli Riley COTA/L 08/01/19 1530      Row  Name 08/01/19 1452             BADL Safety/Performance    Impairments, BADL Safety/Performance  endurance/activity tolerance;balance;strength  -MM      Skilled BADL Treatment/Intervention  BADL process/adaptation training  -MM      Progress in BADL Status  improvement noted  -MM      Recorded by [MM] Kelli Riley COTA/L 08/01/19 1530      Row Name 08/01/19 1337 08/01/19 1007          Therapeutic Exercise    Lower Extremity (Therapeutic Exercise)  LAQ (long arc quad), bilateral;marching while seated  -ABRAHAN,MO,JO2  LAQ (long arc quad), bilateral;marching while seated  -ABRAHAN,MO,JO2     Lower Extremity Range of Motion (Therapeutic Exercise)  hip abduction/adduction, bilateral;ankle dorsiflexion/plantar flexion, bilateral  -ABRAHAN,MO,JO2  hip abduction/adduction, bilateral;ankle dorsiflexion/plantar flexion, bilateral  -ABRAHAN,MO,JO2     Exercise Type (Therapeutic Exercise)  AROM (active range of motion)  -ABRAHAN,MO,JO2  AROM (active range of motion)  -JO3,MO2,JO4     Position (Therapeutic Exercise)  seated  -ABRAHAN,MO,JO2  seated  -ABRAHAN,MO,JO2     Sets/Reps (Therapeutic Exercise)  20  -ABRAHAN,MO,JO2  20  -ABRAHAN,MO,JO2     Recorded by [ABRAHAN,MO,JO2] Jordin Todd, PT DPT (r) Margie Elizalde, PTA Student (t) Jordin Todd, PT DPT (c) 08/01/19 1454 [ABRAHAN,MO,JO2] Jordin Todd, PT DPT (r) Margie Elizalde, PTA Student (t) Jordin Todd, PT DPT (c) 08/01/19 1334  [JO3,MO2,JO4] Jordin Todd, PT DPT (r) Margie Elizalde PTA Student (t) Jordin Todd, PT DPT (c) 08/01/19 1454     Row Name 08/01/19 1452 08/01/19 1337 08/01/19 1007       Positioning and Restraints    Pre-Treatment Position  in bed  -MM  sitting in chair/recliner  -ABRAHAN,MO,JO2  in bed EOB  -BRITTNY GAUTHIER,JO2    Post Treatment Position  bed  -MM  bed  -ABRAHAN,MO,JO2  chair  -BRITTNY GAUTHIER,JO2    In Bed  fowlers;call light within reach;encouraged to call for assist;with other staff;side rails up x2;LLE elevated  -MM  fowlers;call light within reach;encouraged to call for assist  -BRITTNY GAUTHIER,JO2  --     In Chair  --  --  call light within reach;encouraged to call for assist;with family/caregiver;legs elevated  -ABRAHAN,MO,JO2    Recorded by [MM] Kelli Riley COTA/L 08/01/19 1530 [ABRAHAN,MO,JO2] Jordin Todd, PT DPT (r) Margie Elizalde, PTA Student (t) Jordin Todd, PT DPT (c) 08/01/19 1454 [ABRAHAN,MO,JO2] Jordin Todd, PT DPT (r) Margie Elizalde, PTA Student (t) Jordin Todd, PT DPT (c) 08/01/19 1334    Row Name 08/01/19 1452 08/01/19 1337 08/01/19 1007       Pain Scale: Numbers Pre/Post-Treatment    Pain Scale: Numbers, Pretreatment  2/10  -MM  0/10 - no pain  -ABRAHAN,MO,JO2  0/10 - no pain  -ABRAHAN,MO,JO2    Pain Scale: Numbers, Post-Treatment  0/10 - no pain  -MM  0/10 - no pain  -ABRAHAN,MO,JO2  0/10 - no pain  -ABRAHAN,MO,JO2    Pain Intervention(s)  Repositioned;Ambulation/increased activity  -MM  --  --    Recorded by [MM] Kelli Riley COTA/L 08/01/19 1530 [ABRAHAN,MO,JO2] Jordin Todd, PT DPT (r) Margie Elizalde PTA Student (t) Jordin Todd, PT DPT (c) 08/01/19 1454 [ABRAHAN,MO,JO2] Jordin Todd, PT DPT (r) Margie Elizalde, PTA Student (t) Jordin Todd, PT DPT (c) 08/01/19 1334    Row Name                Wound 07/18/19 0221 Left anterior foot blisters    Wound - Properties Group Date first assessed: 07/18/19 [SC] Time first assessed: 0221 [SC] Present On Admission : yes;picture taken [SC] Side: Left [SC] Orientation: anterior [SC] Location: foot [SC] Type: blisters [SC] Recorded by:  [SC] Nicole Rollins RN 07/18/19 0445    Row Name                Wound 07/18/19 0221 Left posterior plantar diabetic/neuropathic ulceration    Wound - Properties Group Date first assessed: 07/18/19 [SC] Time first assessed: 0221 [SC] Present On Admission : yes;picture taken [SC] Side: Left [SC] Orientation: posterior [SC] Location: plantar [SC] Type: diabetic/neuropathic ulceration [SC] Recorded by:  [SC] Nicole Rollins RN 07/18/19 0447    Row Name 08/01/19 1452             Plan of Care Review    Plan of Care  Reviewed With  patient  -MM      Recorded by [MM] Kelli Riley COTA/L 08/01/19 1530      Row Name 08/01/19 1452             Outcome Summary/Treatment Plan (OT)    Daily Summary of Progress (OT)  progress toward functional goals is good  -MM      Recorded by [MM] Osvaldo KelliJOHNA/RUBÉN 08/01/19 1530        User Key  (r) = Recorded By, (t) = Taken By, (c) = Cosigned By    Initials Name Effective Dates Discipline    Jordin Evans, PT DPT 08/02/16 -  PT    Nicole Alvarado RN 08/02/16 -  Nurse    MM Osvaldo JOHN PereiraA/L 10/15/18 -  OT    Margie Goodwin PTA Student 06/24/19 -  PT        Wound 07/18/19 0221 Left anterior foot blisters (Active)   Dressing Appearance dry;intact 8/1/2019  8:00 AM   Closure DEJAH 8/1/2019  8:00 AM   Base dressing in place, unable to visualize 8/1/2019  8:00 AM   Periwound intact;dry;warm 8/1/2019  8:00 AM   Periwound Temperature warm 8/1/2019  8:00 AM   Periwound Skin Turgor firm 8/1/2019  8:00 AM   Drainage Amount none 8/1/2019  8:00 AM       Wound 07/18/19 0221 Left posterior plantar diabetic/neuropathic ulceration (Active)   Dressing Appearance dry;intact 8/1/2019  8:00 AM   Closure DEJAH 8/1/2019  8:00 AM   Base dressing in place, unable to visualize 8/1/2019  8:00 AM   Periwound intact;dry;warm 8/1/2019  8:00 AM   Periwound Temperature warm 8/1/2019  8:00 AM   Periwound Skin Turgor firm 8/1/2019  8:00 AM   Drainage Amount none 8/1/2019  8:00 AM       Occupational Therapy Education     Title: PT OT SLP Therapies (In Progress)     Topic: Occupational Therapy (In Progress)     Point: ADL training (Done)     Description: Instruct learner(s) on proper safety adaptation and remediation techniques during self care or transfers.   Instruct in proper use of assistive devices.    Learning Progress Summary           Patient Acceptance, E,TB, VU by MM at 8/1/2019  3:30 PM    Comment:  PWB LLE on heel, need for RW, ADLs    Acceptance, E,TB, VU by MM at 7/31/2019  3:43 PM     Comment:  HEP for lung expansion, safety, need for movement, breathing exercises for c/o SOA    Acceptance, E, VU by ADELINA at 7/30/2019  3:54 PM    Comment:  OT POC, adls, t/fs, bed mobility, NWB LLE, assistive device use.                   Point: Home exercise program (Done)     Description: Instruct learner(s) on appropriate technique for monitoring, assisting and/or progressing therapeutic exercises/activities.    Learning Progress Summary           Patient Acceptance, E,TB, VU by BETH at 7/31/2019  3:43 PM    Comment:  HEP for lung expansion, safety, need for movement, breathing exercises for c/o SOA                   Point: Precautions (Done)     Description: Instruct learner(s) on prescribed precautions during self-care and functional transfers.    Learning Progress Summary           Patient Acceptance, E,TB, VU by BETH at 8/1/2019  3:30 PM    Comment:  PWB LLE on heel, need for RW, ADLs    Acceptance, E, VU by ADELINA at 7/30/2019  3:54 PM    Comment:  OT POC, adls, t/fs, bed mobility, NWB LLE, assistive device use.                               User Key     Initials Effective Dates Name Provider Type Discipline    BETH 10/15/18 -  Kelli Riley COTA/L Occupational Therapy Assistant OT    ADELINA 10/12/18 -  Katherine Vidales OTR/L Occupational Therapist OT                OT Recommendation and Plan  Outcome Summary/Treatment Plan (OT)  Daily Summary of Progress (OT): progress toward functional goals is good  Daily Summary of Progress (OT): progress toward functional goals is good  Plan of Care Review  Plan of Care Reviewed With: patient  Plan of Care Reviewed With: patient  Outcome Summary: Pt S bed mobility. Sit<>stand and t/fs CGA via RW; needed VCs for PWB on LLE heel. Pt toileted CGA. [place] don LB clothing (brief). Stood at sink for hand washing CGA. UB don/doff gown set up. Demo'd decreased endurance/strength with ADLs. Recommend OT POC and HH at d/c vs SNF pending I in following WB.  Outcome Measures     Row Name  08/01/19 1452 07/31/19 1525 07/30/19 1500       How much help from another is currently needed...    Putting on and taking off regular lower body clothing?  3  -MM  2  -MM  2  -JJ    Bathing (including washing, rinsing, and drying)  3  -MM  2  -MM  2  -JJ    Toileting (which includes using toilet bed pan or urinal)  3  -MM  3  -MM  3  -JJ    Putting on and taking off regular upper body clothing  4  -MM  4  -MM  4  -JJ    Taking care of personal grooming (such as brushing teeth)  4  -MM  4  -MM  4  -JJ    Eating meals  4  -MM  4  -MM  4  -JJ    AM-PAC 6 Clicks Score (OT)  21  -MM  19  -MM  19  -JJ       Functional Assessment    Outcome Measure Options  --  --  AM-PAC 6 Clicks Daily Activity (OT)  -      User Key  (r) = Recorded By, (t) = Taken By, (c) = Cosigned By    Initials Name Provider Type    Kelli Middleton COTA/L Occupational Therapy Assistant    Katherine Valle OTR/L Occupational Therapist           Time Calculation:   Time Calculation- OT     Row Name 08/01/19 1452 08/01/19 1400 08/01/19 1043       Time Calculation- OT    OT Start Time  1452  -MM  --  --    OT Stop Time  1530  -MM  --  --    OT Time Calculation (min)  38 min  -MM  --  --    Total Timed Code Minutes- OT  38 minute(s)  -MM  --  --    OT Received On  08/01/19  -MM  --  --       Timed Charges    90300 - Gait Training Minutes   --  19  -ABRAHAN (r) MO (t) ABRAHAN (c)  19  -ABRAHAN (r) MO (t) ABRAHAN (c)    22129 - OT Therapeutic Activity Minutes  12  -MM  --  --    26434 - OT Self Care/Mgmt Minutes  26  -MM  --  --      User Key  (r) = Recorded By, (t) = Taken By, (c) = Cosigned By    Initials Name Provider Type    Jordin Evans, PT DPT Physical Therapist    Kelli Middleton COTA/L Occupational Therapy Assistant    Margie Goodwin PTA Student PTA Student        Therapy Charges for Today     Code Description Service Date Service Provider Modifiers Qty    26892354476  OT THER PROC EA 15 MIN 7/31/2019 Kelli Riley COTA/RUBÉN GO 2     45609444248 HC OT THERAPEUTIC ACT EA 15 MIN 8/1/2019 Kelli Riley COTA/L GO 1    49646485620 HC OT SELF CARE/MGMT/TRAIN EA 15 MIN 8/1/2019 Kelli Riley COTA/L GO 2               CLEMENTE Sanchez  8/1/2019

## 2019-08-01 NOTE — PLAN OF CARE
Problem: Patient Care Overview  Goal: Plan of Care Review  Outcome: Ongoing (interventions implemented as appropriate)   08/01/19 0446   Coping/Psychosocial   Plan of Care Reviewed With patient   Plan of Care Review   Progress improving   OTHER   Outcome Summary VSS. No complaint of pain. Weight shift assist provided prn, self turning. Monitor and notify md of any changes.       Problem: Fall Risk (Adult)  Goal: Identify Related Risk Factors and Signs and Symptoms  Outcome: Ongoing (interventions implemented as appropriate)   08/01/19 0446   Fall Risk (Adult)   Related Risk Factors (Fall Risk) age-related changes;bladder function altered;fatigue/slow reaction;gait/mobility problems   Signs and Symptoms (Fall Risk) presence of risk factors     Goal: Absence of Fall  Outcome: Ongoing (interventions implemented as appropriate)   08/01/19 0446   Fall Risk (Adult)   Absence of Fall making progress toward outcome       Problem: Skin Injury Risk (Adult)  Goal: Identify Related Risk Factors and Signs and Symptoms   08/01/19 0446   Skin Injury Risk (Adult)   Related Risk Factors (Skin Injury Risk) advanced age;body weight extremes;mobility impaired;mechanical forces     Goal: Skin Health and Integrity  Outcome: Ongoing (interventions implemented as appropriate)   08/01/19 0446   Skin Injury Risk (Adult)   Skin Health and Integrity making progress toward outcome       Problem: Wound (Includes Pressure Injury) (Adult)  Goal: Signs and Symptoms of Listed Potential Problems Will be Absent, Minimized or Managed (Wound)  Outcome: Ongoing (interventions implemented as appropriate)   08/01/19 0446   Goal/Outcome Evaluation   Problems Assessed (Wound) delayed wound healing;infection   Problems Present (Wound) delayed wound healing

## 2019-08-01 NOTE — PROGRESS NOTES
Exercise Oximetry    Patient Name:Prakash Castro   MRN: [id number]   Date: 08/01/19             ROOM AIR BASELINE   SpO2% 90   Heart Rate    Blood Pressure      EXERCISE ON ROOM AIR SpO2% EXERCISE ON O2 @  3  LPM SpO2%   1 MINUTE 82 1 MINUTE 90   2 MINUTES  2 MINUTES    3 MINUTES  3 MINUTES    4 MINUTES  4 MINUTES    5 MINUTES  5 MINUTES    6 MINUTES  6 MINUTES               Distance Walked   Distance Walked  50'   Dyspnea (Stew Scale)   Dyspnea (Stew Scale)  2   Fatigue (Stew Scale)   Fatigue (Stew Scale)   SpO2% Post Exercise   SpO2% Post Exercise   HR Post Exercise   HR Post Exercise   Time to Recovery   Time to Recovery  3     Comments: Patient desaturated with ambulation on room air to 82%.  Oxygen at 3 lpm applied with recovery time of 3 mins to achieve saturation of 90%.

## 2019-08-01 NOTE — THERAPY TREATMENT NOTE
Acute Care - Physical Therapy Treatment Note  [facility]     Patient Name: Prakash Castro  : 1935  MRN: [id number]  Today's Date: 2019  Onset of Illness/Injury or Date of Surgery: 19  Date of Referral to PT: 19  Referring Physician: Jessica Khan APRN    Admit Date: 2019    Visit Dx:    ICD-10-CM ICD-9-CM   1. Acute on chronic congestive heart failure, unspecified heart failure type (CMS/HCC) I50.9 428.0   2. Impaired functional mobility, balance, gait, and endurance Z74.09 V49.89   3. Decreased activities of daily living (ADL) R68.89 780.99     Patient Active Problem List   Diagnosis   • Cancer of prostate (CMS/HCC)   • Screening PSA (prostate specific antigen)   • OAB (overactive bladder)   • Frequency of urination   • Nocturia   • Mediastinal mass   • Atrial fibrillation (CMS/HCC)   • Mediastinal adenopathy   • Stage 2 moderate COPD by GOLD classification (CMS/HCC), with exacerbation   • Centrilobular emphysema (CMS/HCC)   • Nocturnal hypoxemia   • Cellulitis   • Diabetes mellitus (CMS/HCC)   • Chronic diastolic congestive heart failure (CMS/HCC)   • Hypertension   • Obesity (BMI 30-39.9)   • Abscess of left foot   • Acute on chronic congestive heart failure (CMS/HCC)   • Acute on chronic respiratory failure with hypoxia and hypercapnia (CMS/HCC)   • Acute kidney injury (CMS/HCC)       Therapy Treatment    Rehabilitation Treatment Summary     Row Name 19 1007             Treatment Time/Intention    Discipline  physical therapy assistant  (Pended)   -MO      Document Type  therapy note (daily note)  (Pended)   -MO      Subjective Information  complains of;nausea/vomiting  (Pended)   -MO      Mode of Treatment  physical therapy  (Pended)   -MO      Patient/Family Observations  Daughter in room  (Pended)   -MO      Existing Precautions/Restrictions  fall;weight bearing  (Pended)  PWB on heel of LLE  -MO      Recorded by [MO] Margie Elizalde PTA Student 19 1041      Row  Name 08/01/19 1007             Bed Mobility Assessment/Treatment    Comment (Bed Mobility)  Sitting EOB  (Pended)   -MO      Recorded by [MO] Margie Elizalde PTA Student 08/01/19 1041      Row Name 08/01/19 1007             Sit-Stand Transfer    Sit-Stand [place] (Transfers)  verbal cues;contact guard;stand by assist  (Pended)   -MO      Assistive Device (Sit-Stand Transfers)  walker, front-wheeled  (Pended)   -MO      Recorded by [MO] Margie Elizalde PTA Student 08/01/19 1041      Row Name 08/01/19 1007             Stand-Sit Transfer    Stand-Sit [place] (Transfers)  verbal cues;contact guard;stand by assist  (Pended)   -MO      Assistive Device (Stand-Sit Transfers)  walker, front-wheeled  (Pended)   -MO      Recorded by [MO] Margie Elizalde PTA Student 08/01/19 1041      Row Name 08/01/19 1007             Gait/Stairs Assessment/Training    [place] Level (Gait)  contact guard  (Pended)   -MO      Assistive Device (Gait)  walker, front-wheeled  (Pended)   -MO      Distance in Feet (Gait)  25 ft  (Pended)   -MO      Pattern (Gait)  step-through  (Pended)   -MO      Deviations/Abnormal Patterns (Gait)  dina decreased;gait speed decreased;stride length decreased  (Pended)   -MO      Bilateral Gait Deviations  forward flexed posture  (Pended)   -MO      Comment (Gait/Stairs)  Pt more stable with use of RW  (Pended)   -MO      Recorded by [MO] Margie Elizalde PTA Student 08/01/19 1041      Row Name 08/01/19 1007             Therapeutic Exercise    Lower Extremity (Therapeutic Exercise)  LAQ (long arc quad), bilateral;marching while seated  (Pended)   -MO      Lower Extremity Range of Motion (Therapeutic Exercise)  hip abduction/adduction, bilateral;ankle dorsiflexion/plantar flexion, bilateral  (Pended)   -MO      Exercise Type (Therapeutic Exercise)  AAROM (active assistive range of motion)  (Pended)   -MO      Position (Therapeutic Exercise)  seated  (Pended)   -MO      Sets/Reps (Therapeutic  Exercise)  20  (Pended)   -MO      Recorded by [MO] Margie Elizalde PTA Student 08/01/19 1041      Row Name 08/01/19 1007             Positioning and Restraints    Pre-Treatment Position  in bed  (Pended)  EOB  -MO      Post Treatment Position  chair  (Pended)   -MO      In Chair  call light within reach;encouraged to call for assist;with family/caregiver;legs elevated  (Pended)   -MO      Recorded by [MO] Margie Elizalde PTA Student 08/01/19 1041      Row Name 08/01/19 1007             Pain Scale: Numbers Pre/Post-Treatment    Pain Scale: Numbers, Pretreatment  0/10 - no pain  (Pended)   -MO      Pain Scale: Numbers, Post-Treatment  0/10 - no pain  (Pended)   -MO      Recorded by [MO] Margie Elizalde PTA Student 08/01/19 1041      Row Name                Wound 07/18/19 0221 Left anterior foot blisters    Wound - Properties Group Date first assessed: 07/18/19 [SC] Time first assessed: 0221 [SC] Present On Admission : yes;picture taken [SC] Side: Left [SC] Orientation: anterior [SC] Location: foot [SC] Type: blisters [SC] Recorded by:  [SC] Nicole Rollins RN 07/18/19 0445    Row Name                Wound 07/18/19 0221 Left posterior plantar diabetic/neuropathic ulceration    Wound - Properties Group Date first assessed: 07/18/19 [SC] Time first assessed: 0221 [SC] Present On Admission : yes;picture taken [SC] Side: Left [SC] Orientation: posterior [SC] Location: plantar [SC] Type: diabetic/neuropathic ulceration [SC] Recorded by:  [SC] Nicole Rollins RN 07/18/19 0447      User Key  (r) = Recorded By, (t) = Taken By, (c) = Cosigned By    Initials Name Effective Dates Discipline    SC Nicole Rollins, RN 08/02/16 -  Nurse    Margie Goodwin PTA Student 06/24/19 -  PT          Wound 07/18/19 0221 Left anterior foot blisters (Active)   Dressing Appearance dry;intact 7/31/2019  8:00 PM   Closure DEJAH 7/31/2019  8:00 PM   Base dressing in place, unable to visualize 7/31/2019  8:00 PM       Wound 07/18/19 0221 Left  posterior plantar diabetic/neuropathic ulceration (Active)   Dressing Appearance dry;intact 7/31/2019  8:00 PM   Closure DEJAH 7/31/2019  8:00 PM   Base dressing in place, unable to visualize 7/31/2019  8:00 PM           Physical Therapy Education     Title: PT OT SLP Therapies (In Progress)     Topic: Physical Therapy (In Progress)     Point: Mobility training (Done)     Learning Progress Summary           Patient Acceptance, E, VU by AL at 7/29/2019  9:54 AM    Comment:  PT educate patient on purse lip breathing and taking deep breaths before activity to help reduce SOB                   Point: Precautions (Done)     Learning Progress Summary           Patient Acceptance, E, VU by AL at 7/29/2019  9:53 AM    Comment:  PT educated patient on fall risk, L WBAT status, and O2 stats.                               User Key     Initials Effective Dates Name Provider Type Discipline    AL 04/24/19 -  Everton Brandon, PT Student PT Student PT                PT Recommendation and Plan     Plan of Care Reviewed With: (P) patient  Progress: (P) improving  Outcome Summary: (P) Pt agreeable to herapy. Issued pt RW for ambulation. Pt much more stable and able ot maintain WB precautions with use of RW. Performed BLE therapeutic exercise x20. Will continue to follow.   Outcome Measures     Row Name 07/31/19 1525 07/30/19 1500          How much help from another is currently needed...    Putting on and taking off regular lower body clothing?  2  -MM  2  -JJ     Bathing (including washing, rinsing, and drying)  2  -MM  2  -JJ     Toileting (which includes using toilet bed pan or urinal)  3  -MM  3  -JJ     Putting on and taking off regular upper body clothing  4  -MM  4  -JJ     Taking care of personal grooming (such as brushing teeth)  4  -MM  4  -JJ     Eating meals  4  -MM  4  -JJ     AM-PAC 6 Clicks Score (OT)  19  -MM  19  -JJ        Functional Assessment    Outcome Measure Options  --  AM-PAC 6 Clicks Daily Activity (OT)  -JJ        User Key  (r) = Recorded By, (t) = Taken By, (c) = Cosigned By    Initials Name Provider Type    Kelli Middleton COTA/L Occupational Therapy Assistant    Katherine Valle OTR/L Occupational Therapist         Time Calculation:   PT Charges     Row Name 08/01/19 1043             Time Calculation    Start Time  1007  (Pended)   -MO      Stop Time  1026  (Pended)   -MO      Time Calculation (min)  19 min  (Pended)   -MO      PT Received On  08/01/19  (Pended)   -MO      PT Goal Re-Cert Due Date  08/08/19  (Pended)   -MO         Time Calculation- PT    Total Timed Code Minutes- PT  19 minute(s)  (Pended)   -MO         Timed Charges    98529 - Gait Training Minutes   19  (Pended)   -MO        User Key  (r) = Recorded By, (t) = Taken By, (c) = Cosigned By    Initials Name Provider Type    Margie Goodwin PTA Student PTA Student        Therapy Charges for Today     Code Description Service Date Service Provider Modifiers Qty    86768806500 HC PT THER PROC EA 15 MIN 7/31/2019 Margie Elizalde PTA Student GP 1    13253857118 HC GAIT TRAINING EA 15 MIN 7/31/2019 Margie Elizalde PTA Student GP 1    80252023332 HC GAIT TRAINING EA 15 MIN 8/1/2019 Margie Elizalde PTA Student GP 1          PT G-Codes  Outcome Measure Options: AM-PAC 6 Clicks Daily Activity (OT)  AM-PAC 6 Clicks Score (PT): 19  AM-PAC 6 Clicks Score (OT): 19    Margie Elizalde PTA Student  8/1/2019

## 2019-08-01 NOTE — PROGRESS NOTES
[facility] Services  INPATIENT PROGRESS NOTE    Length of Stay: 6  Date of Admission: 7/26/2019  Primary Care Physician: Gabriele Grover DO    Subjective   Chief Complaint: Follow-up shortness of breath  HPI   Patient is resting in bed.  He reports feeling better overall in terms of his breathing.  He denies chest pain.  He is starting to cough up a little sputum after receiving breathing treatments, has not noticed color of his sputum.  He denies abdominal pain, nausea, vomiting, or diarrhea.     Review of Systems   All pertinent negatives and positives are as above. All other systems have been reviewed and are negative unless otherwise stated.     Objective    Temp:  [97.7 °F (36.5 °C)-98.3 °F (36.8 °C)] 98.1 °F (36.7 °C)  Heart Rate:  [58-68] 60  Resp:  [14-20] 14  BP: (124-149)/(51-66) 140/60  Physical Exam  Constitutional: He is oriented to person, place, and time. He appears well-developed and well-nourished. No distress.   Obese body habitus   HENT:   Head: Normocephalic and atraumatic.   Neck: Normal range of motion. Neck supple. No JVD present. No tracheal deviation present.   Cardiovascular: Normal rate, normal heart sounds and intact distal pulses. Exam reveals no gallop and no friction rub.   Pulmonary/Chest: He has no wheezes. He has rales (Left basilar).   Diminished breath sounds but better air movement  Abdominal: Soft. Bowel sounds are normal. He exhibits no distension. There is no tenderness. There is no guarding.   Musculoskeletal: He exhibits edema (Trace LLE edema-improving, skin is starting to wrinkle.) and tenderness.   Neurological: He is alert and oriented to person, place, and time. No cranial nerve deficit.   Skin: Skin is warm and dry. No rash noted. No erythema.   Chronic venous skin changes of the lower extremities.  Ace wrap to L foot clean/dry/intact   Psychiatric: He has a normal mood and affect. His behavior is normal. Judgment and  thought content normal.   Vitals reviewed.    Results Review:  I have reviewed the labs, radiology results, and diagnostic studies.    Laboratory Data:   Results from last 7 days   Lab Units 08/01/19 0328 07/31/19 0406 07/30/19  0337   WBC 10*3/mm3 14.22* 13.10* 11.60*   HEMOGLOBIN g/dL 9.7* 9.8* 9.4*   HEMATOCRIT % 28.0* 28.3* 27.7*   PLATELETS 10*3/mm3 271 287 265     Results from last 7 days   Lab Units 08/01/19  0328 07/31/19  0406 07/30/19  0337  07/26/19  1903   SODIUM mmol/L 134* 135 136   < > 137   POTASSIUM mmol/L 3.8 3.7 3.9   < > 4.4   CHLORIDE mmol/L 93* 93* 97*   < > 100   CO2 mmol/L 35.0* 34.0* 30.0   < > 31.0   BUN mg/dL 49* 50* 58*   < > 61*   CREATININE mg/dL 1.08 1.14 1.22   < > 1.41*   CALCIUM mg/dL 9.4 9.5 9.3   < > 9.5   BILIRUBIN mg/dL  --   --   --   --  0.5   ALK PHOS U/L  --   --   --   --  59   ALT (SGPT) U/L  --   --   --   --  <15   AST (SGOT) U/L  --   --   --   --  19   GLUCOSE mg/dL 92 85 106*   < > 160*    < > = values in this interval not displayed.     I have reviewed the patient current medications.     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  Patient continues to improve overall.  His weight continues to decrease, difficult to assess how much he has lost overall as the weights have varied from bed to standing scale.  Will convert diuretics to his home dosage of oral Lasix and monitor his symptoms throughout the day.  If he tolerates oral diuretics today, he may likely be able to discharge home tomorrow.  Will increase hydrochlorothiazide.  Continue metolazone and spironolactone at current dosages.  Continue strict intake  and output, daily weights, cardiac diet.  2.  Continue duo nebs- decrease frequency, Symbicort, encouraged use of incentive spirometer and flutter valve.  Add Mucinex.  The patient currently only uses oxygen bled into his CPAP at night, will need walking oximetry to assess for continuous need for oxygen at home.  Continue oral prednisone taper.  3.  Patient will complete 10 days of doxycycline therapy day for SSA cellulitis of the left foot.  Continue wound care as per Dr. Uriarte's orders, we will see the patient in follow-up in the wound care clinic.  4.  Continue physical and occupational therapy with activity as tolerated  5.  Labs in a.m.    Discharge Planning: I expect the patient to be discharged to home with home health in 1-2 days.    RASHIDA Bermeo   08/01/19   7:41 AM    Chart reviewed  Patient examined  agaree with assessment and plan  Discussed with SAMAN Rosario,   08/01/19  3:41 PM

## 2019-08-01 NOTE — PLAN OF CARE
Problem: Patient Care Overview  Goal: Plan of Care Review  Outcome: Ongoing (interventions implemented as appropriate)   08/01/19 1041   Coping/Psychosocial   Plan of Care Reviewed With patient   Plan of Care Review   Progress improving   OTHER   Outcome Summary Pt agreeable to herapy. Issued pt RW for ambulation. Pt much more stable and able ot maintain WB precautions with use of RW. Performed BLE therapeutic exercise x20. Will continue to follow.

## 2019-08-01 NOTE — PLAN OF CARE
Problem: Patient Care Overview  Goal: Plan of Care Review  Outcome: Ongoing (interventions implemented as appropriate)   08/01/19 0137   Coping/Psychosocial   Plan of Care Reviewed With patient   Plan of Care Review   Progress improving   OTHER   Outcome Summary Pt S bed mobility. Sit<>stand and t/fs CGA via RW; needed VCs for PWB on LLE heel. Pt toileted CGA. [place] don LB clothing (brief). Stood at sink for hand washing CGA. UB don/doff gown set up. Demo'd decreased endurance/strength with ADLs. Recommend OT POC and HH at d/c vs SNF pending I in following WB.

## 2019-08-02 ENCOUNTER — TELEPHONE (OUTPATIENT)
Dept: PRIMARY CARE CLINIC | Age: 84
End: 2019-08-02

## 2019-08-02 VITALS
BODY MASS INDEX: 36.16 KG/M2 | TEMPERATURE: 98.2 F | HEART RATE: 59 BPM | DIASTOLIC BLOOD PRESSURE: 47 MMHG | SYSTOLIC BLOOD PRESSURE: 131 MMHG | RESPIRATION RATE: 14 BRPM | OXYGEN SATURATION: 100 % | HEIGHT: 70 IN | WEIGHT: 252.6 LBS

## 2019-08-02 LAB
ANION GAP SERPL CALCULATED.3IONS-SCNC: 7 MMOL/L (ref 4–13)
BUN BLD-MCNC: 47 MG/DL (ref 5–21)
BUN/CREAT SERPL: 48 (ref 7–25)
CALCIUM SPEC-SCNC: 9.3 MG/DL (ref 8.4–10.4)
CHLORIDE SERPL-SCNC: 92 MMOL/L (ref 98–110)
CO2 SERPL-SCNC: 35 MMOL/L (ref 24–31)
CREAT BLD-MCNC: 0.98 MG/DL (ref 0.5–1.4)
DEPRECATED RDW RBC AUTO: 45.5 FL (ref 40–54)
ERYTHROCYTE [DISTWIDTH] IN BLOOD BY AUTOMATED COUNT: 13.2 % (ref 12–15)
GFR SERPL CREATININE-BSD FRML MDRD: 73 ML/MIN/1.73
GLUCOSE BLD-MCNC: 86 MG/DL (ref 70–100)
GLUCOSE BLDC GLUCOMTR-MCNC: 104 MG/DL (ref 70–130)
HCT VFR BLD AUTO: 28.2 % (ref 40–52)
HGB BLD-MCNC: 9.8 G/DL (ref 14–18)
MCH RBC QN AUTO: 33 PG (ref 28–32)
MCHC RBC AUTO-ENTMCNC: 34.8 G/DL (ref 33–36)
MCV RBC AUTO: 94.9 FL (ref 82–95)
PLATELET # BLD AUTO: 260 10*3/MM3 (ref 130–400)
PMV BLD AUTO: 9.8 FL (ref 6–12)
POTASSIUM BLD-SCNC: 3.6 MMOL/L (ref 3.5–5.3)
RBC # BLD AUTO: 2.97 10*6/MM3 (ref 4.8–5.9)
SODIUM BLD-SCNC: 134 MMOL/L (ref 135–145)
WBC NRBC COR # BLD: 15.15 10*3/MM3 (ref 4.8–10.8)

## 2019-08-02 PROCEDURE — 82962 GLUCOSE BLOOD TEST: CPT

## 2019-08-02 PROCEDURE — 94760 N-INVAS EAR/PLS OXIMETRY 1: CPT

## 2019-08-02 PROCEDURE — 97535 SELF CARE MNGMENT TRAINING: CPT | Performed by: OCCUPATIONAL THERAPIST

## 2019-08-02 PROCEDURE — 80048 BASIC METABOLIC PNL TOTAL CA: CPT | Performed by: NURSE PRACTITIONER

## 2019-08-02 PROCEDURE — 97116 GAIT TRAINING THERAPY: CPT

## 2019-08-02 PROCEDURE — 97110 THERAPEUTIC EXERCISES: CPT

## 2019-08-02 PROCEDURE — 94799 UNLISTED PULMONARY SVC/PX: CPT

## 2019-08-02 PROCEDURE — 85027 COMPLETE CBC AUTOMATED: CPT | Performed by: NURSE PRACTITIONER

## 2019-08-02 PROCEDURE — 63710000001 PREDNISONE PER 1 MG: Performed by: NURSE PRACTITIONER

## 2019-08-02 RX ORDER — FERROUS SULFATE 325(65) MG
325 TABLET ORAL
Qty: 30 TABLET | Refills: 1 | Status: SHIPPED | OUTPATIENT
Start: 2019-08-02 | End: 2020-03-17

## 2019-08-02 RX ORDER — HYDROCHLOROTHIAZIDE 12.5 MG/1
12.5 TABLET ORAL DAILY
Qty: 30 TABLET | Refills: 1 | Status: SHIPPED | OUTPATIENT
Start: 2019-08-02

## 2019-08-02 RX ADMIN — FLUOXETINE HYDROCHLORIDE 40 MG: 20 CAPSULE ORAL at 09:10

## 2019-08-02 RX ADMIN — BUPROPION HYDROCHLORIDE 300 MG: 150 TABLET, FILM COATED, EXTENDED RELEASE ORAL at 09:10

## 2019-08-02 RX ADMIN — GUAIFENESIN 1200 MG: 600 TABLET, EXTENDED RELEASE ORAL at 09:11

## 2019-08-02 RX ADMIN — BUDESONIDE AND FORMOTEROL FUMARATE DIHYDRATE 2 PUFF: 80; 4.5 AEROSOL RESPIRATORY (INHALATION) at 06:57

## 2019-08-02 RX ADMIN — FUROSEMIDE 60 MG: 40 TABLET ORAL at 09:11

## 2019-08-02 RX ADMIN — PREDNISONE 20 MG: 20 TABLET ORAL at 09:11

## 2019-08-02 RX ADMIN — IPRATROPIUM BROMIDE AND ALBUTEROL SULFATE 3 ML: 2.5; .5 SOLUTION RESPIRATORY (INHALATION) at 15:15

## 2019-08-02 RX ADMIN — RIVAROXABAN 20 MG: 20 TABLET, FILM COATED ORAL at 09:10

## 2019-08-02 RX ADMIN — POTASSIUM CHLORIDE 20 MEQ: 750 CAPSULE, EXTENDED RELEASE ORAL at 09:10

## 2019-08-02 RX ADMIN — LISINOPRIL 40 MG: 20 TABLET ORAL at 09:10

## 2019-08-02 RX ADMIN — METOLAZONE 2.5 MG: 2.5 TABLET ORAL at 09:10

## 2019-08-02 RX ADMIN — HYDROCHLOROTHIAZIDE 25 MG: 25 TABLET ORAL at 09:10

## 2019-08-02 RX ADMIN — SODIUM CHLORIDE, PRESERVATIVE FREE 3 ML: 5 INJECTION INTRAVENOUS at 09:12

## 2019-08-02 RX ADMIN — SPIRONOLACTONE 50 MG: 50 TABLET, FILM COATED ORAL at 09:10

## 2019-08-02 RX ADMIN — FLUTICASONE PROPIONATE 2 SPRAY: 50 SPRAY, METERED NASAL at 09:14

## 2019-08-02 RX ADMIN — Medication 250 MG: at 09:10

## 2019-08-02 RX ADMIN — AMLODIPINE BESYLATE 10 MG: 10 TABLET ORAL at 09:11

## 2019-08-02 RX ADMIN — FLUOXETINE HYDROCHLORIDE 20 MG: 20 CAPSULE ORAL at 09:11

## 2019-08-02 RX ADMIN — IPRATROPIUM BROMIDE AND ALBUTEROL SULFATE 3 ML: 2.5; .5 SOLUTION RESPIRATORY (INHALATION) at 10:26

## 2019-08-02 RX ADMIN — IPRATROPIUM BROMIDE AND ALBUTEROL SULFATE 3 ML: 2.5; .5 SOLUTION RESPIRATORY (INHALATION) at 06:51

## 2019-08-02 RX ADMIN — OXYBUTYNIN CHLORIDE 5 MG: 5 TABLET, EXTENDED RELEASE ORAL at 09:10

## 2019-08-02 RX ADMIN — CARVEDILOL 12.5 MG: 6.25 TABLET, FILM COATED ORAL at 09:11

## 2019-08-02 NOTE — DISCHARGE SUMMARY
[facility] Medicine Services  DISCHARGE SUMMARY       Date of Admission: 7/26/2019  Date of Discharge:  8/2/2019  Primary Care Physician: Gabriele Grover DO    Presenting Problem/History of Present Illness:  Shortness of breath     Final Discharge Diagnoses:  Active Hospital Problems    Diagnosis   • **Acute on chronic congestive heart failure (CMS/HCC)   • Acute on chronic respiratory failure with hypoxia and hypercapnia (CMS/HCC)   • Acute kidney injury (CMS/HCC)   • Obesity (BMI 30-39.9)   • Diabetes mellitus (CMS/HCC)   • Abscess of left foot   • Stage 2 moderate COPD by GOLD classification (CMS/HCC), with exacerbation     Consults:   1.  Dr. Juan Antonio Uriarte- podiatry/wound care    Procedures Performed: None    Pertinent Test Results:   Lab Results (all)     Procedure Component Value Units Date/Time    POC Glucose Once [[id number]]  (Normal) Collected:  08/02/19 0745    Specimen:  Blood Updated:  08/02/19 0816     Glucose 104 mg/dL      Comment: : [id number] Hernan EuraisaMeter ID: [id number]       Basic Metabolic Panel [[id number]]  (Abnormal) Collected:  08/02/19 0346    Specimen:  Blood Updated:  08/02/19 0423     Glucose 86 mg/dL      BUN 47 mg/dL      Creatinine 0.98 mg/dL      Sodium 134 mmol/L      Potassium 3.6 mmol/L      Chloride 92 mmol/L      CO2 35.0 mmol/L      Calcium 9.3 mg/dL      eGFR Non African Amer 73 mL/min/1.73      BUN/Creatinine Ratio 48.0     Anion Gap 7.0 mmol/L     CBC (No Diff) [[id number]]  (Abnormal) Collected:  08/02/19 0346    Specimen:  Blood Updated:  08/02/19 0400     WBC 15.15 10*3/mm3      RBC 2.97 10*6/mm3      Hemoglobin 9.8 g/dL      Hematocrit 28.2 %      MCV 94.9 fL      MCH 33.0 pg      MCHC 34.8 g/dL      RDW 13.2 %      RDW-SD 45.5 fl      MPV 9.8 fL      Platelets 260 10*3/mm3      Comment: : [id number] Manuel LadonnaMeter ID: [id number]       Basic Metabolic Panel [[id number]]  (Abnormal) Collected:  08/01/19 7906     Specimen:  Blood Updated:  08/01/19 0408     Glucose 92 mg/dL      BUN 49 mg/dL      Creatinine 1.08 mg/dL      Sodium 134 mmol/L      Potassium 3.8 mmol/L      Chloride 93 mmol/L      CO2 35.0 mmol/L      Calcium 9.4 mg/dL      eGFR Non African Amer 65 mL/min/1.73      BUN/Creatinine Ratio 45.4     Anion Gap 6.0 mmol/L     CBC (No Diff) [[id number]]  (Abnormal) Collected:  08/01/19 0328    Specimen:  Blood Updated:  08/01/19 0353     WBC 14.22 10*3/mm3      RBC 2.92 10*6/mm3      Hemoglobin 9.7 g/dL      Hematocrit 28.0 %      MCV 95.9 fL      MCH 33.2 pg      MCHC 34.6 g/dL      RDW 13.2 %      RDW-SD 46.6 fl      MPV 9.9 fL      Platelets 271 10*3/mm3     Basic Metabolic Panel [[id number]]  (Abnormal) Collected:  07/31/19 0406    Specimen:  Blood Updated:  07/31/19 0454     Glucose 85 mg/dL      BUN 50 mg/dL      Creatinine 1.14 mg/dL      Sodium 135 mmol/L      Potassium 3.7 mmol/L      Chloride 93 mmol/L      CO2 34.0 mmol/L      Calcium 9.5 mg/dL      eGFR Non African Amer 61 mL/min/1.73      BUN/Creatinine Ratio 43.9     Anion Gap 8.0 mmol/L     CBC (No Diff) [[id number]]  (Abnormal) Collected:  07/31/19 0406    Specimen:  Blood Updated:  07/31/19 0440     WBC 13.10 10*3/mm3      RBC 2.97 10*6/mm3      Hemoglobin 9.8 g/dL      Hematocrit 28.3 %      MCV 95.3 fL      MCH 33.0 pg      MCHC 34.6 g/dL      RDW 13.2 %      RDW-SD 45.7 fl      MPV 10.1 fL      Platelets 287 10*3/mm3     Basic Metabolic Panel [[id number]]  (Abnormal) Collected:  07/30/19 0337    Specimen:  Blood Updated:  07/30/19 0407     Glucose 106 mg/dL      BUN 58 mg/dL      Creatinine 1.22 mg/dL      Sodium 136 mmol/L      Potassium 3.9 mmol/L      Chloride 97 mmol/L      CO2 30.0 mmol/L      Calcium 9.3 mg/dL      eGFR Non African Amer 57 mL/min/1.73      BUN/Creatinine Ratio 47.5     Anion Gap 9.0 mmol/L     CBC (No Diff) [[id number]]  (Abnormal) Collected:  07/30/19 0337    Specimen:  Blood Updated:  07/30/19 0355     WBC 11.60 10*3/mm3      RBC  2.88 10*6/mm3      Hemoglobin 9.4 g/dL      Hematocrit 27.7 %      MCV 96.2 fL      MCH 32.6 pg      MCHC 33.9 g/dL      RDW 13.2 %      RDW-SD 46.3 fl      MPV 9.8 fL      Platelets 265 10*3/mm3     POC Glucose Once [[id number]]  (Abnormal) Collected:  07/29/19 2021    Specimen:  Blood Updated:  07/29/19 2059     Glucose 196 mg/dL      Comment: : [id number] Lummus TerryMeter ID: [id number]       POC Glucose Once [[id number]]  (Abnormal) Collected:  07/29/19 1612    Specimen:  Blood Updated:  07/29/19 1623     Glucose 231 mg/dL      Comment: : [id number] Hari (Fernandez) KaylieMeter ID: [id number]       Blood Gas, Arterial [[id number]]  (Abnormal) Collected:  07/28/19 0935    Specimen:  Arterial Blood Updated:  07/29/19 1213     Site Left Brachial     Marco's Test Positive     pH, Arterial 7.442 pH units      pCO2, Arterial 39.0 mm Hg      pO2, Arterial 66.2 mm Hg      Comment: 84 Value below reference range        HCO3, Arterial 26.6 mmol/L      Comment: 83 Value above reference range        Base Excess, Arterial 2.4 mmol/L      Comment: 83 Value above reference range        O2 Saturation, Arterial 94.7 %      Temperature 37.0 C      Barometric Pressure for Blood Gas 755 mmHg      Modality BiPap     Flow Rate -- lpm      Comment: Corrected result. Previous result was 2.0 lpm on 7/28/2019 at 0937 CDT.        Ventilator Mode BiPAP     Set [facility] Resp Rate 12.0     PSV -- cmH2O      Comment: Corrected result. Previous result was 8.0 cmH2O on 7/28/2019 at 0937 CDT.        IPAP 24     Comment: Meter: [id number]     :  [id number]        EPAP 16     Collected by [id number]    Ferritin [[id number]]  (Normal) Collected:  07/29/19 0321    Specimen:  Blood Updated:  07/29/19 0435     Ferritin 127.00 ng/mL     Procalcitonin [[id number]]  (Normal) Collected:  07/29/19 0321    Specimen:  Blood Updated:  07/29/19 0417     Procalcitonin <0.25 ng/mL     Iron Profile [[id number]]  (Abnormal) Collected:  07/29/19 5140     Specimen:  Blood Updated:  07/29/19 0408     Iron 34 mcg/dL      TIBC 289 mcg/dL      Iron Saturation 12 %     Basic Metabolic Panel [[id number]]  (Abnormal) Collected:  07/29/19 0321    Specimen:  Blood Updated:  07/29/19 0352     Glucose 95 mg/dL      BUN 60 mg/dL      Creatinine 1.30 mg/dL      Sodium 135 mmol/L      Potassium 4.1 mmol/L      Chloride 99 mmol/L      CO2 27.0 mmol/L      Calcium 9.0 mg/dL      eGFR Non African Amer 53 mL/min/1.73      BUN/Creatinine Ratio 46.2     Anion Gap 9.0 mmol/L     CBC (No Diff) [[id number]]  (Abnormal) Collected:  07/29/19 0321    Specimen:  Blood Updated:  07/29/19 0329     WBC 10.46 10*3/mm3      RBC 2.66 10*6/mm3      Hemoglobin 8.8 g/dL      Hematocrit 25.6 %      MCV 96.2 fL      MCH 33.1 pg      MCHC 34.4 g/dL      RDW 13.2 %      RDW-SD 45.9 fl      MPV 9.8 fL      Platelets 259 10*3/mm3     Reticulocytes [[id number]]  (Abnormal) Collected:  07/29/19 0321    Specimen:  Blood Updated:  07/29/19 0329     Reticulocyte % 2.90 %      Reticulocyte Absolute 0.0771 10*6/mm3      Glucose 200 mg/dL      Comment: : [id number] Hari DanielFernandez) KaylieMeter ID: [id number]       Basic Metabolic Panel [[id number]]  (Abnormal) Collected:  07/28/19 0401    Specimen:  Blood Updated:  07/28/19 0459     Glucose 83 mg/dL      BUN 60 mg/dL      Creatinine 1.43 mg/dL      Sodium 136 mmol/L      Potassium 4.2 mmol/L      Chloride 99 mmol/L      CO2 29.0 mmol/L      Calcium 9.3 mg/dL      eGFR Non African Amer 47 mL/min/1.73      BUN/Creatinine Ratio 42.0     Anion Gap 8.0 mmol/L     CBC Auto Differential [[id number]]  (Abnormal) Collected:  07/28/19 0401    Specimen:  Blood Updated:  07/28/19 0445     WBC 9.38 10*3/mm3      RBC 2.66 10*6/mm3      Hemoglobin 8.8 g/dL      Hematocrit 25.8 %      MCV 97.0 fL      MCH 33.1 pg      MCHC 34.1 g/dL      RDW 13.2 %      RDW-SD 46.2 fl      MPV 9.7 fL      Platelets 266 10*3/mm3      Neutrophil % 75.2 %      Lymphocyte % 9.7 %      Monocyte % 9.2 %       Eosinophil % 3.2 %      Basophil % 0.2 %      Immature Grans % 2.5 %      Neutrophils, Absolute 7.06 10*3/mm3      Lymphocytes, Absolute 0.91 10*3/mm3      Monocytes, Absolute 0.86 10*3/mm3      Eosinophils, Absolute 0.30 10*3/mm3      Basophils, Absolute 0.02 10*3/mm3      Immature Grans, Absolute 0.23 10*3/mm3      nRBC 0.0 /100 WBC     POC Glucose Once [[id number]]  (Abnormal) Collected:  07/27/19 2206    Specimen:  Blood Updated:  07/27/19 2217     Glucose 211 mg/dL      Comment: : [id number] Mehdi Elena  GMeter ID: [id number]       POC Glucose Once [[id number]]  (Abnormal) Collected:  07/27/19 1951    Specimen:  Blood Updated:  07/27/19 2003     Glucose 263 mg/dL      Comment: : [id number] Mehdi Elena  GMeter ID: [id number]       Troponin [[id number]]  (Normal) Collected:  07/27/19 0656    Specimen:  Blood Updated:  07/27/19 0827     Troponin I 0.013 ng/mL     Basic Metabolic Panel [[id number]]  (Abnormal) Collected:  07/27/19 0656    Specimen:  Blood Updated:  07/27/19 0815     Glucose 165 mg/dL      BUN 58 mg/dL      Creatinine 1.35 mg/dL      Sodium 138 mmol/L      Potassium 4.5 mmol/L      Chloride 100 mmol/L      CO2 28.0 mmol/L      Calcium 9.4 mg/dL      eGFR Non African Amer 50 mL/min/1.73      BUN/Creatinine Ratio 43.0     Anion Gap 10.0 mmol/L     CBC (No Diff) [[id number]]  (Abnormal) Collected:  07/27/19 0657    Specimen:  Blood Updated:  07/27/19 0753     WBC 10.66 10*3/mm3      RBC 2.82 10*6/mm3      Hemoglobin 9.4 g/dL      Hematocrit 27.6 %      MCV 97.9 fL      MCH 33.3 pg      MCHC 34.1 g/dL      RDW 13.1 %      RDW-SD 46.6 fl      MPV 9.9 fL      Platelets 287 10*3/mm3     Troponin [[id number]]  (Normal) Collected:  07/26/19 2311    Specimen:  Blood Updated:  07/26/19 2340     Troponin I 0.015 ng/mL     Blood Gas, Arterial [[id number]]  (Abnormal) Collected:  07/26/19 1947    Specimen:  Arterial Blood Updated:  07/26/19 1956     Site Right Radial     Marco's Test Positive     pH,  Arterial 7.409 pH units      pCO2, Arterial 43.7 mm Hg      pO2, Arterial 65.0 mm Hg      Comment: 84 Value below reference range        HCO3, Arterial 27.6 mmol/L      Comment: 83 Value above reference range        Base Excess, Arterial 2.6 mmol/L      Comment: 83 Value above reference range        O2 Saturation, Arterial 93.4 %      Comment: 84 Value below reference range        Temperature 37.0 C      Barometric Pressure for Blood Gas 755 mmHg      Modality Nasal Cannula     Flow Rate 1.0 lpm      Ventilator Mode NA     Collected by [id number]     Comment: Meter: [id number]     :  [id number]       BNP [[id number]]  (Abnormal) Collected:  07/26/19 1903    Specimen:  Blood Updated:  07/26/19 1933     proBNP 4,450.0 pg/mL     Troponin [[id number]]  (Normal) Collected:  07/26/19 1903    Specimen:  Blood Updated:  07/26/19 1933     Troponin I <0.012 ng/mL     Digoxin Level [[id number]]  (Normal) Collected:  07/26/19 1903    Specimen:  Blood Updated:  07/26/19 1925     Digoxin 1.20 ng/mL     Comprehensive Metabolic Panel [[id number]]  (Abnormal) Collected:  07/26/19 1903    Specimen:  Blood Updated:  07/26/19 1925     Glucose 160 mg/dL      BUN 61 mg/dL      Creatinine 1.41 mg/dL      Sodium 137 mmol/L      Potassium 4.4 mmol/L      Chloride 100 mmol/L      CO2 31.0 mmol/L      Calcium 9.5 mg/dL      Total Protein 7.2 g/dL      Albumin 3.60 g/dL      ALT (SGPT) <15 U/L      AST (SGOT) 19 U/L      Alkaline Phosphatase 59 U/L      Total Bilirubin 0.5 mg/dL      eGFR Non African Amer 48 mL/min/1.73      Globulin 3.6 gm/dL      A/G Ratio 1.0 g/dL      BUN/Creatinine Ratio 43.3     Anion Gap 6.0 mmol/L     CBC Auto Differential [[id number]]  (Abnormal) Collected:  07/26/19 1903    Specimen:  Blood Updated:  07/26/19 1912     WBC 11.38 10*3/mm3      RBC 2.86 10*6/mm3      Hemoglobin 9.5 g/dL      Hematocrit 28.1 %      MCV 98.3 fL      MCH 33.2 pg      MCHC 33.8 g/dL      RDW 13.2 %      RDW-SD 46.7 fl      MPV 9.7 fL       Platelets 312 10*3/mm3      Neutrophil % 80.6 %      Lymphocyte % 6.2 %      Monocyte % 7.3 %      Eosinophil % 1.4 %      Basophil % 0.5 %      Immature Grans % 4.0 %      Neutrophils, Absolute 9.17 10*3/mm3      Lymphocytes, Absolute 0.71 10*3/mm3      Monocytes, Absolute 0.83 10*3/mm3      Eosinophils, Absolute 0.16 10*3/mm3      Basophils, Absolute 0.06 10*3/mm3      Immature Grans, Absolute 0.45 10*3/mm3      nRBC 0.0 /100 WBC         Imaging Results (all)     Procedure Component Value Units Date/Time    XR Chest 1 View [[id number]] Collected:  07/28/19 1027     Updated:  07/28/19 1031    Narrative:       Frontal upright radiograph of the chest 7/28/2019 9:50 AM CDT     COMPARISON: 01/26/2019.     HISTORY: Difficulty breathing.     FINDINGS:   Left lung is clear. Right perihilar fullness is present. This may be  early interstitial pulmonary vascular congestion.. Cardiac silhouettes  moderately enlarged. Pacing device present soft tissues left chest..      The osseous structures and surrounding soft tissues demonstrate no acute  abnormality.       Impression:       1. Right perihilar interstitial infiltrate. This may represent early  pulmonary vascular congestion..        This report was finalized on 07/28/2019 10:28 by Dr. Osman Ponce MD.    XR Chest 1 View [[id number]] Collected:  07/26/19 2015     Updated:  07/26/19 2019    Narrative:       EXAMINATION: XR CHEST 1 VW- 7/26/2019 8:15 PM CDT     HISTORY: Shortness of breath.     REPORT: Comparison is made with chest x-ray 10/19/2018.     Lungs are hypoaerated, the heart is enlarged, there is central vascular  congestion with evidence of pulmonary venous hypertension. No lung  consolidation seen. There is no pneumothorax or pleural effusion. There  is a new left subclavian pacemaker, which appears to be in satisfactory  position. The osseous structures are unremarkable.       Impression:       Cardiomegaly with mild congestive heart failure.  This report was  "finalized on 07/26/2019 20:16 by Dr. Freddy Mensah MD.        Results for orders placed during the hospital encounter of 07/26/19   Adult Transthoracic Echo Complete W/ Cont if Necessary Per Protocol    Narrative · Left ventricular wall thickness is consistent with mild concentric   hypertrophy.  · Left ventricular systolic function is normal. Estimated EF = 55%.  · Left ventricular diastolic dysfunction (grade II) consistent with   pseudonormalization.  · Left atrial cavity size is moderately dilated.  · Right ventricular cavity is mildly dilated. Systolic function is normal.  · Mild-to-moderate mitral valve regurgitation is present  · Mild tricuspid valve regurgitation is present.  · Estimated right ventricular systolic pressure from tricuspid   regurgitation is moderately elevated (45-55 mmHg).        Chief Complaint on Day of Discharge: \"woozy\"    History of Present Illness on Day of Discharge: Patient is sitting up on the bedside commode.  He reports feeling \"woozy headed\" after working with therapy, but overall does feel better in comparison to admission.    Hospital Course:  Mr. Castro is an 84-year-old  male who follows Dr. Gabriele Grover for primary care.  He has a past medical history significant for paroxysmal atrial fibrillation, prostate cancer with ongoing chemotherapy, diastolic heart failure, chronic obstructive pulmonary disease, diabetes mellitus type 2, hypertension, hyperlipidemia, sleep apnea and pacemaker.  He presented to the [facility] emergency department on 7/26/2019 with complaints of progressively worsening shortness of breath.  In the emergency department, the patient was noted to have a room air oxygen saturation of 82%.  Chest x-ray showed cardiomegaly with mild congestive heart failure.  BNP level was 4450.  He was felt to have an acute kidney injury with creatinine of 1.41 and BUN of 61.  The patient was admitted to the hospitalist service for further " evaluation and management.    The patient was initially given IV Lasix for diuresis.  He was having minimal urine output and no improvement of his symptoms, therefore he was changed to Bumex.  His dose was titrated upward to achieve desired effect and has now been able to be converted back to his home dosage of oral Lasix.  His echocardiogram shows grade 2 diastolic dysfunction with preserved ejection fraction.  His symptoms are much improved.  He has lost approximately 14 pounds in comparison to admission, and has diuresed nearly 8 L of fluid.      The patient was also treated for exacerbation of chronic obstructive pulmonary disease.  He was given IV Solu-Medrol for 1 dosage, and has been on a tapering dose of prednisone.  He has been given DuoNeb breathing treatments, Mucinex, and pulmonary toilet with incentive spirometry and flutter valve.  He is still requiring supplemental oxygen, and home oxygen evaluation was performed.  This does reveal the patient will need 3 L of oxygen continuously.  He can continue to use oxygen bled into his CPAP at night as well.    Patient was seen in consultation by podiatry/wound care for continuity of care as he was recently discharged from this facility for MSSA cellulitis to the left lower extremity.  We did finish his course of oral doxycycline as previously prescribed.  There are no new changes to his wound care regimen.  He will see wound care in follow-up.    Overall, the patient is hemodynamically stable and appropriate for discharge home today.  We will resume home health services for skilled nursing, physical, and occupational therapy.  He will follow-up with his primary care provider in 1 week.  He has an appointment scheduled with cardiology RASHIDA Jason at the beginning of next month.    Condition on Discharge: Stable    Physical Exam on Discharge:  /47 (BP Location: Right arm, Patient Position: Sitting)   Pulse 64   Temp 98.2 °F (36.8 °C) (Oral)   Resp  "18   Ht 177.8 cm (70\")   Wt 115 kg (252 lb 9.6 oz)   SpO2 98%   BMI 36.24 kg/m²   Physical Exam  Constitutional: He is oriented to person, place, and time. He appears well-developed and well-nourished. No distress.   Obese body habitus   HENT:   Head: Normocephalic and atraumatic.   Neck: Normal range of motion. Neck supple. No JVD present. No tracheal deviation present.   Cardiovascular: Normal rate, normal heart sounds and intact distal pulses. Exam reveals no gallop and no friction rub.   Pulmonary/Chest: He has no wheezes. He has rales (Left basilar).   Diminished breath sounds but better air movement  Abdominal: Soft. Bowel sounds are normal. He exhibits no distension. There is no tenderness. There is no guarding.   Musculoskeletal: He exhibits edema (Trace LLE edema-improving, skin is starting to wrinkle.) and tenderness.   Neurological: He is alert and oriented to person, place, and time. No cranial nerve deficit.   Skin: Skin is warm and dry. No rash noted. No erythema.   Chronic venous skin changes of the lower extremities.  Ace wrap to L foot clean/dry/intact   Psychiatric: He has a normal mood and affect. His behavior is normal. Judgment and thought content normal.   Vitals reviewed.    [place] Dis[place] [facility] [place] Medications:     Discharge Medications      New Medications      Instructions Start Date   ferrous sulfate 325 (65 FE) MG tablet  Commonly known as:  IRON SUPPLEMENT   325 mg, Oral, Daily With Breakfast         Continue These Medications      Instructions Start Date   amLODIPine 10 MG tablet  Commonly known as:  NORVASC   10 mg, Oral, Daily      buPROPion  MG 24 hr tablet  Commonly known as:  WELLBUTRIN XL   300 mg, Oral, Daily      carvedilol 12.5 MG tablet  Commonly known as:  COREG   12.5 mg, Oral, 2 Times Daily With Meals      celecoxib 200 MG capsule  Commonly known as:  CeleBREX   200 mg, Oral, 2 Times Daily      digoxin 125 MCG tablet  Commonly " known as:  LANOXIN   125 mcg, Oral, Daily Digoxin      enzalutamide 40 MG chemo capsule  Commonly known as:  XTANDI   160 mg, Oral, Daily      FLUoxetine 20 MG capsule  Commonly known as:  PROzac   20 mg, Oral, Daily, Take with Prozac 40 mg (total dose of 60 mg daily).      PROZAC 40 MG capsule  Generic drug:  FLUoxetine   40 mg, Oral, Daily, Take with Prozac 20 mg (total dose of 60 mg daily).       fluticasone 50 MCG/ACT nasal spray  Commonly known as:  FLONASE   2 sprays, Nasal, Daily, Administer 2 sprays in each nostril for each dose.      Fluticasone-Umeclidin-Vilant 100-62.5-25 MCG/INH aerosol powder    1 each, Inhalation, Daily      furosemide 40 MG tablet  Commonly known as:  LASIX   60 mg, Oral, Daily      hydrochlorothiazide 12.5 MG tablet  Commonly known as:  HYDRODIURIL   12.5 mg, Oral, Daily      leuprolide 30 MG injection  Commonly known as:  LUPRON   30 mg, Intramuscular, Every 4 Months      lisinopril 40 MG tablet  Commonly known as:  PRINIVIL,ZESTRIL   40 mg, Oral, Daily      metFORMIN 500 MG tablet  Commonly known as:  GLUCOPHAGE   500 mg, Oral, 2 Times Daily With Meals      metOLazone 2.5 MG tablet  Commonly known as:  ZAROXOLYN   2.5 mg, Oral, Daily      Mirabegron ER 25 MG tablet sustained-release 24 hour 24 hr tablet  Commonly known as:  MYRBETRIQ   25 mg, Oral, Daily      MULTIVITAMIN ADULT tablet   1 tablet, Oral, Daily      potassium chloride 10 MEQ CR tablet  Commonly known as:  K-DUR,KLOR-CON   10 mEq, Oral, Daily      prazosin 2 MG capsule  Commonly known as:  MINIPRESS   2 mg, Oral, Nightly      rivaroxaban 20 MG tablet  Commonly known as:  XARELTO   20 mg, Oral, Daily      saccharomyces boulardii 250 MG capsule  Commonly known as:  FLORASTOR   250 mg, Oral, 2 Times Daily      spironolactone 50 MG tablet  Commonly known as:  ALDACTONE   50 mg, Oral, Daily      terazosin 5 MG capsule  Commonly known as:  HYTRIN   5 mg, Oral, Nightly      TRULICITY 0.75 MG/0.5ML solution  pen-injector  Generic drug:  Dulaglutide   0.5 mL, Subcutaneous, Weekly, Saturday.         Stop These Medications    doxycycline 100 MG tablet  Commonly known as:  ADOXA          Discharge Diet:   Diet Instructions     Diet: Cardiac, Consistent Carbohydrate, Renal; Thin      Discharge Diet:   Cardiac  Consistent Carbohydrate  Renal       Fluid Consistency:  Thin        Activity at Discharge:   Activity Instructions     Activity as Tolerated      Measure Weight      Measure weight daily at the same time each day.  Record these measurements.  Call primary care provider for weight gain greater than 2 pounds overnight or 3 pounds in 2 days, especially accompanied with shortness of breath or worsening swelling.        Discharge Care Plan/Instructions:   1.  Return for any acute or worsening symptoms  2.  Measure weight daily  3.  Medication changes as above  4.  Resumption of home health services for skilled nursing, physical, and occupational therapy    Follow-up Appointments:   1.  Primary care provider in 1 week  2.  Patient has an appointment with RASHIDA Hopkins at the beginning of September  Future Appointments   Date Time Provider Department Center   10/18/2019 12:00 PM CHAIR 13  PAD OP INFU ONC  [facility]     Test Results Pending at Discharge: None    RASHIDA Bemreo  08/02/19  9:38 AM    Time: 45 minutes      Chart reviewed.  Patient examined.  Agree with assessment and plan.  Discussed with patient and with TABITHA FIELD.    Brandy Rosario DO  08/02/19  2:27 PM

## 2019-08-02 NOTE — PROGRESS NOTES
Case Management Discharge Note    Final Note: NOTIFIED ANGIE BEJARANO [facility] OF PT'S D/C TODAY AND FAXED D/C SUMMARY.  CONTACTED GIANNI BEJARANO [facility] TO ADVISE THAT PT. HAS NEW ORDER FOR PORTABLE O2 FOR TRANSPORT HOME.  O2 WILL BE DELIVERED TO PT'S ROOM TODAY.      Destination      No service has been selected for the patient.      Durable Medical Equipment      No service has been selected for the patient.      Dialysis/Infusion      No service has been selected for the patient.      Home Medical Care      No service has been selected for the patient.      Therapy      No service has been selected for the patient.      Community Resources      No service has been selected for the patient.             Final Discharge Disposition Code: 06 - home with home health care

## 2019-08-02 NOTE — PLAN OF CARE
Problem: Patient Care Overview  Goal: Plan of Care Review  Outcome: Ongoing (interventions implemented as appropriate)   08/02/19 0437   Coping/Psychosocial   Plan of Care Reviewed With patient   Plan of Care Review   Progress improving   OTHER   Outcome Summary Pt denies any pain. VSS. CPAP worn during the night. Home O2 eval. Possible discharge today with HH if tolerates lasix dose. Will continue to monitor.      Goal: Individualization and Mutuality  Outcome: Ongoing (interventions implemented as appropriate)    Goal: Discharge Needs Assessment  Outcome: Ongoing (interventions implemented as appropriate)      Problem: Fall Risk (Adult)  Goal: Identify Related Risk Factors and Signs and Symptoms  Outcome: Outcome(s) achieved Date Met: 08/02/19    Goal: Absence of Fall  Outcome: Ongoing (interventions implemented as appropriate)      Problem: Skin Injury Risk (Adult)  Goal: Identify Related Risk Factors and Signs and Symptoms  Outcome: Outcome(s) achieved Date Met: 08/02/19    Goal: Skin Health and Integrity  Outcome: Ongoing (interventions implemented as appropriate)      Problem: Wound (Includes Pressure Injury) (Adult)  Goal: Signs and Symptoms of Listed Potential Problems Will be Absent, Minimized or Managed (Wound)  Outcome: Ongoing (interventions implemented as appropriate)      Problem: Breathing Pattern Ineffective (Adult)  Goal: Identify Related Risk Factors and Signs and Symptoms  Outcome: Outcome(s) achieved Date Met: 08/02/19    Goal: Effective Oxygenation/Ventilation  Outcome: Ongoing (interventions implemented as appropriate)    Goal: Anxiety/Fear Reduction  Outcome: Ongoing (interventions implemented as appropriate)

## 2019-08-02 NOTE — THERAPY TREATMENT NOTE
Acute Care - Physical Therapy Treatment Note  [facility]     Patient Name: Prakash Castro  : 1935  MRN: [id number]  Today's Date: 2019  Onset of Illness/Injury or Date of Surgery: 19  Date of Referral to PT: 19  Referring Physician: Jessica Khan APRN    Admit Date: 2019    Visit Dx:    ICD-10-CM ICD-9-CM   1. Acute on chronic congestive heart failure, unspecified heart failure type (CMS/HCC) I50.9 428.0   2. Impaired functional mobility, balance, gait, and endurance Z74.09 V49.89   3. Decreased activities of daily living (ADL) R68.89 780.99     Patient Active Problem List   Diagnosis   • Cancer of prostate (CMS/HCC)   • Screening PSA (prostate specific antigen)   • OAB (overactive bladder)   • Frequency of urination   • Nocturia   • Mediastinal mass   • Atrial fibrillation (CMS/HCC)   • Mediastinal adenopathy   • Stage 2 moderate COPD by GOLD classification (CMS/HCC), with exacerbation   • Centrilobular emphysema (CMS/HCC)   • Nocturnal hypoxemia   • Cellulitis   • Diabetes mellitus (CMS/HCC)   • Chronic diastolic congestive heart failure (CMS/HCC)   • Hypertension   • Obesity (BMI 30-39.9)   • Abscess of left foot   • Acute on chronic congestive heart failure (CMS/HCC)   • Acute on chronic respiratory failure with hypoxia and hypercapnia (CMS/HCC)   • Acute kidney injury (CMS/HCC)       Therapy Treatment    Rehabilitation Treatment Summary     Row Name 19 0740             Treatment Time/Intention    Discipline  physical therapy assistant  (Pended)   -MO      Document Type  therapy note (daily note)  (Pended)   -MO      Subjective Information  no complaints  (Pended)   -MO      Mode of Treatment  physical therapy  (Pended)   -MO      Existing Precautions/Restrictions  fall;weight bearing  (Pended)   -MO      Recorded by [MO] Margie Elizalde PTA Student 19 0813      Row Name 19 0740             Vital Signs    Pre SpO2 (%)  98  (Pended)   -MO      O2 Delivery Pre  Treatment  supplemental O2  (Pended)   -MO      O2 Delivery Intra Treatment  supplemental O2  (Pended)   -MO      Post SpO2 (%)  96  (Pended)   -MO      O2 Delivery Post Treatment  supplemental O2  (Pended)   -MO      Pre Patient Position  Sitting  (Pended)   -MO      Intra Patient Position  Standing  (Pended)   -MO      Post Patient Position  Sitting  (Pended)   -MO      Recorded by [MO] Margie Elizalde PTA Student 08/02/19 0813      Row Name 08/02/19 0740             Bed Mobility Assessment/Treatment    Supine-Sit-Supine Knox (Bed Mobility)  supervision  (Pended)   -MO      Recorded by [MO] Margie Elizalde PTA Student 08/02/19 0815      Row Name 08/02/19 0740             Sit-Stand Transfer    Sit-Stand Knox (Transfers)  contact guard;stand by assist  (Pended)   -MO      Assistive Device (Sit-Stand Transfers)  walker, front-wheeled  (Pended)   -MO      Recorded by [MO] Margie Elizalde PTA Student 08/02/19 0815      Row Name 08/02/19 0740             Stand-Sit Transfer    Stand-Sit Knox (Transfers)  contact guard;stand by assist  (Pended)   -MO      Assistive Device (Stand-Sit Transfers)  walker, front-wheeled  (Pended)   -MO      Recorded by [MO] Margie Elizalde PTA Student 08/02/19 0815      Row Name 08/02/19 0740             Gait/Stairs Assessment/Training    Knox Level (Gait)  contact guard  (Pended)   -MO      Assistive Device (Gait)  walker, front-wheeled  (Pended)   -MO      Distance in Feet (Gait)  75 ft  (Pended)   -MO      Pattern (Gait)  step-through  (Pended)   -MO      Deviations/Abnormal Patterns (Gait)  gait speed decreased;stride length decreased  (Pended)   -MO      Bilateral Gait Deviations  forward flexed posture  (Pended)   -MO      Recorded by [MO] Margie Elizalde PTA Student 08/02/19 0815      Row Name 08/02/19 0740             Therapeutic Exercise    Lower Extremity (Therapeutic Exercise)  LAQ (long arc quad), bilateral;marching while seated  (Pended)   -MO       Lower Extremity Range of Motion (Therapeutic Exercise)  hip abduction/adduction, bilateral;ankle dorsiflexion/plantar flexion, bilateral  (Pended)   -MO      Exercise Type (Therapeutic Exercise)  AROM (active range of motion)  (Pended)   -MO      Position (Therapeutic Exercise)  seated  (Pended)   -MO      Sets/Reps (Therapeutic Exercise)  20  (Pended)   -MO      Recorded by [MO] Margie Elizalde PTA Student 08/02/19 0815      Row Name 08/02/19 0740             Positioning and Restraints    Pre-Treatment Position  in bed  (Pended)   -MO      Post Treatment Position  chair  (Pended)   -MO      In Chair  sitting;call light within reach;encouraged to call for assist  (Pended)   -MO      Recorded by [MO] Margie Elizalde PTA Student 08/02/19 0815      Row Name 08/02/19 0740             Pain Scale: Numbers Pre/Post-Treatment    Pain Scale: Numbers, Pretreatment  0/10 - no pain  (Pended)   -MO      Pain Scale: Numbers, Post-Treatment  0/10 - no pain  (Pended)   -MO      Recorded by [MO] Margie Elizalde PTA Student 08/02/19 0815      Row Name                Wound 07/18/19 0221 Left anterior foot blisters    Wound - Properties Group Date first assessed: 07/18/19 [SC] Time first assessed: 0221 [SC] Present On Admission : yes;picture taken [SC] Side: Left [SC] Orientation: anterior [SC] Location: foot [SC] Type: blisters [SC] Recorded by:  [Nicole Copeland RN 07/18/19 0445    Row Name                Wound 07/18/19 0221 Left posterior plantar diabetic/neuropathic ulceration    Wound - Properties Group Date first assessed: 07/18/19 [SC] Time first assessed: 0221 [SC] Present On Admission : yes;picture taken [SC] Side: Left [SC] Orientation: posterior [SC] Location: plantar [SC] Type: diabetic/neuropathic ulceration [SC] Recorded by:  [SC] Nicole Rollins RN 07/18/19 0447      User Key  (r) = Recorded By, (t) = Taken By, (c) = Cosigned By    Initials Name Effective Dates Discipline    SC Nicole Rollins RN 08/02/16 -  Nurse     Margie Goodwin, PTA Student 06/24/19 -  PT          Wound 07/18/19 0221 Left anterior foot blisters (Active)   Dressing Appearance dry;intact 8/1/2019  8:15 PM   Closure DEJAH 8/1/2019  8:15 PM   Base dressing in place, unable to visualize 8/1/2019  8:15 PM   Periwound intact;dry;warm 8/1/2019  8:15 PM   Periwound Temperature warm 8/1/2019  8:15 PM   Periwound Skin Turgor firm 8/1/2019  8:15 PM   Drainage Amount none 8/1/2019  8:15 PM   Dressing Care, Wound dressing changed;gauze, wet-to-dry;other (see comments) 8/1/2019  6:00 PM   Periwound Care, Wound dry periwound area maintained 8/1/2019  8:15 PM       Wound 07/18/19 0221 Left posterior plantar diabetic/neuropathic ulceration (Active)   Dressing Appearance dry;intact 8/1/2019  8:15 PM   Closure DEJAH 8/1/2019  8:15 PM   Base dressing in place, unable to visualize 8/1/2019  8:15 PM   Periwound intact;dry;warm 8/1/2019  8:15 PM   Periwound Temperature warm 8/1/2019  8:15 PM   Periwound Skin Turgor firm 8/1/2019  8:15 PM   Drainage Amount none 8/1/2019  8:15 PM   Periwound Care, Wound dry periwound area maintained 8/1/2019  8:15 PM           Physical Therapy Education     Title: PT OT SLP Therapies (In Progress)     Topic: Physical Therapy (In Progress)     Point: Mobility training (Done)     Learning Progress Summary           Patient Acceptance, EALEM by AL at 7/29/2019  9:54 AM    Comment:  PT educate patient on purse lip breathing and taking deep breaths before activity to help reduce SOB                   Point: Precautions (Done)     Learning Progress Summary           Patient Acceptance, EALEM by AL at 7/29/2019  9:53 AM    Comment:  PT educated patient on fall risk, L WBAT status, and O2 stats.                               User Key     Initials Effective Dates Name Provider Type Discipline    AL 04/24/19 -  Everton Brandon, PT Student PT Student PT                PT Recommendation and Plan     Plan of Care Reviewed With: (P) patient  Progress: (P)  improving  Outcome Summary: (P) Pt agreeable ot therapy. Pt continue to progress. Pt performed BLE therapeutic exercise x 20. Pt was able to ambulate 75 ft outside of room CGA with RW. Pt sitting in chair eating breaskfast after treatment. Will continue to follow  Outcome Measures     Row Name 08/01/19 1452 07/31/19 1525 07/30/19 1500       How much help from another is currently needed...    Putting on and taking off regular lower body clothing?  3  -MM  2  -MM  2  -JJ    Bathing (including washing, rinsing, and drying)  3  -MM  2  -MM  2  -JJ    Toileting (which includes using toilet bed pan or urinal)  3  -MM  3  -MM  3  -JJ    Putting on and taking off regular upper body clothing  4  -MM  4  -MM  4  -JJ    Taking care of personal grooming (such as brushing teeth)  4  -MM  4  -MM  4  -JJ    Eating meals  4  -MM  4  -MM  4  -JJ    AM-PAC 6 Clicks Score (OT)  21  -MM  19  -MM  19  -JJ       Functional Assessment    Outcome Measure Options  --  --  AM-PAC 6 Clicks Daily Activity (OT)  -JJ      User Key  (r) = Recorded By, (t) = Taken By, (c) = Cosigned By    Initials Name Provider Type    Kelli Middleton COTA/L Occupational Therapy Assistant    Katherine Valle OTR/L Occupational Therapist         Time Calculation:   PT Charges     Row Name 08/02/19 0819             Time Calculation    Start Time  0740  (Pended)   -MO      Stop Time  0810  (Pended)   -MO      Time Calculation (min)  30 min  (Pended)   -MO      PT Received On  08/02/19  (Pended)   -MO      PT Goal Re-Cert Due Date  08/08/19  (Pended)   -MO         Time Calculation- PT    Total Timed Code Minutes- PT  30 minute(s)  (Pended)   -MO         Timed Charges    73179 - PT Therapeutic Exercise Minutes  18  (Pended)   -MO      77355 - Gait Training Minutes   12  (Pended)   -MO        User Key  (r) = Recorded By, (t) = Taken By, (c) = Cosigned By    Initials Name Provider Type    Margie Goodwin PTA Student PTA Student        Therapy Charges for  Today     Code Description Service Date Service Provider Modifiers Qty    39198354142 HC GAIT TRAINING EA 15 MIN 8/1/2019 Margie Elizalde, STEFANO Student GP 1    26526706182 HC GAIT TRAINING EA 15 MIN 8/1/2019 Margie Elizalde, STEFANO Student GP 1    94254779169 HC PT THER PROC EA 15 MIN 8/2/2019 Margie Elizalde, STEFANO Student GP 1    51082066573 HC GAIT TRAINING EA 15 MIN 8/2/2019 Margie Elizalde, STEFANO Student GP 1          PT G-Codes  Outcome Measure Options: AM-PAC 6 Clicks Daily Activity (OT)  AM-PAC 6 Clicks Score (PT): 19  AM-PAC 6 Clicks Score (OT): 21    Margie Elizalde PTA Student  8/2/2019

## 2019-08-02 NOTE — THERAPY TREATMENT NOTE
Acute Care - Occupational Therapy Treatment Note  [facility]     Patient Name: Prakash Castro  : 1935  MRN: [id number]  Today's Date: 2019  Onset of Illness/Injury or Date of Surgery: 19  Date of Referral to OT: 19  Referring Physician: Jessica Khan APRN    Admit Date: 2019       ICD-10-CM ICD-9-CM   1. Acute on chronic congestive heart failure, unspecified heart failure type (CMS/HCC) I50.9 428.0   2. Impaired functional mobility, balance, gait, and endurance Z74.09 V49.89   3. Decreased activities of daily living (ADL) R68.89 780.99   4. Acute on chronic respiratory failure with hypoxia and hypercapnia (CMS/HCC) J96.21 518.84    J96.22 786.09     799.02   5. 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 (CMS/HCC), with exacerbation   • Centrilobular emphysema (CMS/HCC)   • Nocturnal hypoxemia   • Cellulitis   • Diabetes mellitus (CMS/HCC)   • Chronic diastolic congestive heart failure (CMS/HCC)   • Hypertension   • Obesity (BMI 30-39.9)   • Abscess of left foot   • Acute on chronic congestive heart failure (CMS/HCC)   • Acute on chronic respiratory failure with hypoxia and hypercapnia (CMS/HCC)   • Acute kidney injury (CMS/HCC)     Past Medical History:   Diagnosis Date   • Acute kidney injury (CMS/HCC) 2019   • Arthritis    • Atrial fibrillation (CMS/HCC)     HISTORY OF   • Cancer (CMS/HCC)     prostate   • Cellulitis    • CHF (congestive heart failure) (CMS/HCC)    • Depression    • Diabetes mellitus (CMS/HCC)    • Hematuria    • History of cardioversion     PER PATIENT REPORT   • Hypertension    • Obesity    •  Overactive bladder    • Pacemaker    • SOB (shortness of breath)    • Stage 2 moderate COPD by GOLD classification (CMS/HCC) 3/4/2019     Past Surgical History:   Procedure Laterality Date   • EYE SURGERY     • HERNIA REPAIR     • INCISION AND DRAINAGE LEG Left 7/19/2019    Procedure: INCISION AND DRAINAGE, LEFT FOOT;  Surgeon: Juan Antonio Uriarte DPM;  Location: [facility] OR;  Service: Podiatry   • JOINT REPLACEMENT     • REPLACEMENT TOTAL KNEE BILATERAL         Therapy Treatment    Rehabilitation Treatment Summary     Row Name 08/02/19 0923 08/02/19 0740          Treatment Time/Intention    Discipline  occupational therapist  -J  physical therapy assistant  (Pended)   -MO     Document Type  therapy note (daily note)  -  therapy note (daily note)  (Pended)   -MO     Subjective Information  complains of lightheadness  -  no complaints  (Pended)   -MO     Mode of Treatment  occupational therapy;individual therapy  -  physical therapy  (Pended)   -MO     Therapy Frequency (OT Eval)  5 times/wk  -  --     Patient Effort  good  -  --     Existing Precautions/Restrictions  fall;weight bearing PWB L heel  -JJ  fall;weight bearing  (Pended)   -MO     Recorded by [JJ] Katherine Vidales OTR/RUBÉN 08/02/19 0936 [MO] Margie Elizalde PTA Student 08/02/19 0813     Row Name 08/02/19 0740             Vital Signs    Pre SpO2 (%)  98  (Pended)   -MO      O2 Delivery Pre Treatment  supplemental O2  (Pended)   -MO      O2 Delivery Intra Treatment  supplemental O2  (Pended)   -MO      Post SpO2 (%)  96  (Pended)   -MO      O2 Delivery Post Treatment  supplemental O2  (Pended)   -MO      Pre Patient Position  Sitting  (Pended)   -MO      Intra Patient Position  Standing  (Pended)   -MO      Post Patient Position  Sitting  (Pended)   -MO      Recorded by [MO] Margie Elizalde PTA Student 08/02/19 0813      Row Name 08/02/19 0740             Bed Mobility Assessment/Treatment    Supine-Sit-Supine [place] (Bed Mobility)   supervision  (Pended)   -MO      Recorded by [MO] Margie Elizalde PTA Student 08/02/19 0815      Row Name 08/02/19 0923             Transfer Assessment/Treatment    Transfer Assessment/Treatment  sit-stand transfer;stand-sit transfer  -JJ      Maintains Weight-bearing Status (Transfers)  able to maintain  -JJ      Recorded by [JJ] Katherine Vidales OTR/L 08/02/19 1015      Row Name 08/02/19 0923 08/02/19 0740          Sit-Stand Transfer    Sit-Stand [place] (Transfers)  contact guard;stand by assist  -JJ  contact guard;stand by assist  (Pended)   -MO     Assistive Device (Sit-Stand Transfers)  walker, front-wheeled  -JJ  walker, front-wheeled  (Pended)   -MO     Recorded by [JJ] Katherine Vidales OTR/RUBÉN 08/02/19 1015 [MO] Margie Elizalde PTA Student 08/02/19 0815     Row Name 08/02/19 0923 08/02/19 0740          Stand-Sit Transfer    Stand-Sit [place] (Transfers)  contact guard;stand by assist  -JJ  contact guard;stand by assist  (Pended)   -MO     Assistive Device (Stand-Sit Transfers)  walker, front-wheeled  -JJ  walker, front-wheeled  (Pended)   -MO     Recorded by [JJ] Katherine Vidales OTR/RUBÉN 08/02/19 1015 [MO] Margie Elizalde PTA Student 08/02/19 0815     Row Name 08/02/19 0740             Gait/Stairs Assessment/Training    [place] Level (Gait)  contact guard  (Pended)   -MO      Assistive Device (Gait)  walker, front-wheeled  (Pended)   -MO      Distance in Feet (Gait)  75 ft  (Pended)   -MO      Pattern (Gait)  step-through  (Pended)   -MO      Deviations/Abnormal Patterns (Gait)  gait speed decreased;stride length decreased  (Pended)   -MO      Bilateral Gait Deviations  forward flexed posture  (Pended)   -MO      Recorded by [MO] Margie Elizalde PTA Student 08/02/19 0815      Row Name 08/02/19 0923             ADL Assessment/Intervention    BADL Assessment/Intervention  bathing;upper body dressing;lower body dressing;grooming  -JJ      Recorded by [JJ] Katherine Vidales OTR/L 08/02/19  1015      Row Name 08/02/19 0923             Bathing Assessment/Intervention    Bathing [place] Level  upper body;chest/trunk;set up;lower body;minimum assist (75% patient effort);verbal cues  -JJ      Bathing Position  unsupported sitting  -JJ      Recorded by [JJ] Katherine Vidales OTR/L 08/02/19 1015      Row Name 08/02/19 0923             Upper Body Dressing Assessment/Training    Upper Body Dressing [place] Level  don;front opening garment;set up;verbal cues  -JJ      Upper Body Dressing Position  unsupported sitting  -JJ      Comment (Upper Body Dressing)  button up shirt  -JJ      Recorded by [JJ] Katherine Vidales OTR/L 08/02/19 1015      Row Name 08/02/19 0923             Lower Body Dressing Assessment/Training    Lower Body Dressing [place] Level  don;pants/bottoms;shoes/slippers;minimum assist (75% patient effort)  -JJ      Lower Body Dressing Position  unsupported sitting  -JJ      Comment (Lower Body Dressing)  min A for threading feet through   -JJ      Recorded by [JJ] Katherine Vidales OTR/L 08/02/19 1015      Row Name 08/02/19 0923             Grooming Assessment/Training    [place] Level (Grooming)  wash face, hands;set up  -JJ      Grooming Position  unsupported sitting  -JJ      Recorded by [JJ] Katherine Vidales OTR/L 08/02/19 1015      Row Name 08/02/19 0740             Therapeutic Exercise    Lower Extremity (Therapeutic Exercise)  LAQ (long arc quad), bilateral;marching while seated  (Pended)   -MO      Lower Extremity Range of Motion (Therapeutic Exercise)  hip abduction/adduction, bilateral;ankle dorsiflexion/plantar flexion, bilateral  (Pended)   -MO      Exercise Type (Therapeutic Exercise)  AROM (active range of motion)  (Pended)   -MO      Position (Therapeutic Exercise)  seated  (Pended)   -MO      Sets/Reps (Therapeutic Exercise)  20  (Pended)   -MO      Recorded by [MO] Margie Elizalde PTA Student 08/02/19 0815      Row Name 08/02/19 0923 08/02/19 0703           Positioning and Restraints    Pre-Treatment Position  sitting in chair/recliner  -JJ  in bed  (Pended)   -MO     Post Treatment Position  chair  -JJ  chair  (Pended)   -MO     In Chair  sitting;call light within reach;encouraged to call for assist;notified nsg;legs elevated  -JJ  sitting;call light within reach;encouraged to call for assist  (Pended)   -MO     Recorded by [JJ] Katherine Vidales OTR/RUBÉN 08/02/19 1015 [MO] Margie Elizalde PTA Student 08/02/19 0815     Row Name 08/02/19 0740             Pain Scale: Numbers Pre/Post-Treatment    Pain Scale: Numbers, Pretreatment  0/10 - no pain  (Pended)   -MO      Pain Scale: Numbers, Post-Treatment  0/10 - no pain  (Pended)   -MO      Recorded by [MO] Margie Elizalde PTA Student 08/02/19 0815      Row Name                Wound 07/18/19 0221 Left anterior foot blisters    Wound - Properties Group Date first assessed: 07/18/19 [SC] Time first assessed: 0221 [SC] Present On Admission : yes;picture taken [SC] Side: Left [SC] Orientation: anterior [SC] Location: foot [SC] Type: blisters [SC] Recorded by:  [SC] Nicole Rollins RN 07/18/19 0445    Row Name                Wound 07/18/19 0221 Left posterior plantar diabetic/neuropathic ulceration    Wound - Properties Group Date first assessed: 07/18/19 [SC] Time first assessed: 0221 [SC] Present On Admission : yes;picture taken [SC] Side: Left [SC] Orientation: posterior [SC] Location: plantar [SC] Type: diabetic/neuropathic ulceration [SC] Recorded by:  [SC] Nicole Rollins RN 07/18/19 0447    Row Name 08/02/19 0923             Plan of Care Review    Plan of Care Reviewed With  patient  -JJ      Recorded by [JJ] Katherine Vidales OTR/L 08/02/19 1015      Row Name 08/02/19 0923             Outcome Summary/Treatment Plan (OT)    Daily Summary of Progress (OT)  progress toward functional goals is good  -JGENEVA      Anticipated Discharge Disposition (OT)  home with assist;home with home health  -GENEVAJ      Recorded by [JJ]  Katherine Vidales, OTR/L 08/02/19 1015        User Key  (r) = Recorded By, (t) = Taken By, (c) = Cosigned By    Initials Name Effective Dates Discipline    SC Nicole Rollins RN 08/02/16 -  Nurse    ADELINA Katherine Vidales, OTR/L 10/12/18 -  OT    Margie Goodwin PTA Student 06/24/19 -  PT        Wound 07/18/19 0221 Left anterior foot blisters (Active)   Dressing Appearance dry;intact 8/1/2019  8:15 PM   Closure DEJAH 8/1/2019  8:15 PM   Base dressing in place, unable to visualize 8/1/2019  8:15 PM   Periwound intact;dry;warm 8/1/2019  8:15 PM   Periwound Temperature warm 8/1/2019  8:15 PM   Periwound Skin Turgor firm 8/1/2019  8:15 PM   Drainage Amount none 8/1/2019  8:15 PM   Dressing Care, Wound dressing changed;gauze, wet-to-dry;other (see comments) 8/1/2019  6:00 PM   Periwound Care, Wound dry periwound area maintained 8/1/2019  8:15 PM       Wound 07/18/19 0221 Left posterior plantar diabetic/neuropathic ulceration (Active)   Dressing Appearance dry;intact 8/1/2019  8:15 PM   Closure DEJAH 8/1/2019  8:15 PM   Base dressing in place, unable to visualize 8/1/2019  8:15 PM   Periwound intact;dry;warm 8/1/2019  8:15 PM   Periwound Temperature warm 8/1/2019  8:15 PM   Periwound Skin Turgor firm 8/1/2019  8:15 PM   Drainage Amount none 8/1/2019  8:15 PM   Periwound Care, Wound dry periwound area maintained 8/1/2019  8:15 PM       Occupational Therapy Education     Title: PT OT SLP Therapies (In Progress)     Topic: Occupational Therapy (In Progress)     Point: ADL training (Done)     Description: Instruct learner(s) on proper safety adaptation and remediation techniques during self care or transfers.   Instruct in proper use of assistive devices.    Learning Progress Summary           Patient Acceptance, E, VU by ADELINA at 8/2/2019 10:16 AM    Comment:  energy conservation techniques, adl modifications for task simplification.    Acceptance, E,TB, VU by MM at 8/1/2019  3:30 PM    Comment:  PWB LLE on heel, need for RW,  ADLs    Acceptance, E,TB, VU by BETH at 7/31/2019  3:43 PM    Comment:  HEP for lung expansion, safety, need for movement, breathing exercises for c/o SOA    Acceptance, E, VU by ADELINA at 7/30/2019  3:54 PM    Comment:  OT POC, adls, t/fs, bed mobility, NWB LLE, assistive device use.                   Point: Home exercise program (Done)     Description: Instruct learner(s) on appropriate technique for monitoring, assisting and/or progressing therapeutic exercises/activities.    Learning Progress Summary           Patient Acceptance, E,TB, VU by BETH at 7/31/2019  3:43 PM    Comment:  HEP for lung expansion, safety, need for movement, breathing exercises for c/o SOA                   Point: Precautions (Done)     Description: Instruct learner(s) on prescribed precautions during self-care and functional transfers.    Learning Progress Summary           Patient Acceptance, E,TB, VU by BETH at 8/1/2019  3:30 PM    Comment:  PWB LLE on heel, need for RW, ADLs    Acceptance, E, VU by ADELINA at 7/30/2019  3:54 PM    Comment:  OT POC, adls, t/fs, bed mobility, NWB LLE, assistive device use.                               User Key     Initials Effective Dates Name Provider Type Discipline    BETH 10/15/18 -  Kelli Riley COTA/L Occupational Therapy Assistant OT    ADELINA 10/12/18 -  Katherine Vidales OTR/L Occupational Therapist OT                OT Recommendation and Plan  Outcome Summary/Treatment Plan (OT)  Daily Summary of Progress (OT): progress toward functional goals is good  Anticipated Discharge Disposition (OT): home with assist, home with home health  Planned Therapy Interventions (OT Eval): activity tolerance training, BADL retraining, functional balance retraining, adaptive equipment training, occupation/activity based interventions, patient/caregiver education/training, transfer/mobility retraining, strengthening exercise  Therapy Frequency (OT Eval): 5 times/wk  Daily Summary of Progress (OT): progress toward functional  goals is good  Plan of Care Review  Plan of Care Reviewed With: patient  Plan of Care Reviewed With: patient  Outcome Summary: OT tx completed. Pt required Min A for LB bathing and Set-up for UB bathing. Min A for donning pants to thread feet through. Set-up for donning button up shirt. Set-up for washing face. CGA/SBA for sit <> stand t/f from chair. Pt educated on energy conservation techniques and adl modifications for task simplification/increased safety. Continue OT POC.   Outcome Measures     Row Name 08/02/19 1000 08/01/19 1452 07/31/19 1525       How much help from another is currently needed...    Putting on and taking off regular lower body clothing?  3  -JJ  3  -MM  2  -MM    Bathing (including washing, rinsing, and drying)  3  -JJ  3  -MM  2  -MM    Toileting (which includes using toilet bed pan or urinal)  3  -JJ  3  -MM  3  -MM    Putting on and taking off regular upper body clothing  4  -JJ  4  -MM  4  -MM    Taking care of personal grooming (such as brushing teeth)  4  -JJ  4  -MM  4  -MM    Eating meals  4  -JJ  4  -MM  4  -MM    AM-PAC 6 Clicks Score (OT)  21  -JJ  21  -MM  19  -MM       Functional Assessment    Outcome Measure Options  AM-PAC 6 Clicks Daily Activity (OT)  -JJ  --  --    Row Name 07/30/19 1500             How much help from another is currently needed...    Putting on and taking off regular lower body clothing?  2  -JJ      Bathing (including washing, rinsing, and drying)  2  -JJ      Toileting (which includes using toilet bed pan or urinal)  3  -JJ      Putting on and taking off regular upper body clothing  4  -JJ      Taking care of personal grooming (such as brushing teeth)  4  -JJ      Eating meals  4  -JJ      AM-PAC 6 Clicks Score (OT)  19  -JJ         Functional Assessment    Outcome Measure Options  AM-PAC 6 Clicks Daily Activity (OT)  -JJ        User Key  (r) = Recorded By, (t) = Taken By, (c) = Cosigned By    Initials Name Provider Type    Kelli Middleton COTA/URBÉN  Occupational Therapy Assistant    Katherine Valle OTR/L Occupational Therapist           Time Calculation:   Time Calculation- OT     Row Name 08/02/19 1015 08/02/19 0819          Time Calculation- OT    OT Start Time  0923  -J  --     OT Stop Time  1005  -J  --     OT Time Calculation (min)  42 min  -  --     Total Timed Code Minutes- OT  42 minute(s)  -  --     OT Received On  08/02/19  -GENEVA  --        Timed Charges    00509 - Gait Training Minutes   --  12  (Pended)   -MO       User Key  (r) = Recorded By, (t) = Taken By, (c) = Cosigned By    Initials Name Provider Type    Katherine Valle OTR/L Occupational Therapist    Margie Goodwin PTA Student PTA Student        Therapy Charges for Today     Code Description Service Date Service Provider Modifiers Qty    [id number] HC OT SELF CARE/MGMT/TRAIN EA 15 MIN 8/2/2019 Katherine Vidales OTR/L GO 3               STEPHIE Abernathy/RUBÉN  8/2/2019

## 2019-08-02 NOTE — PLAN OF CARE
Problem: Patient Care Overview  Goal: Plan of Care Review  Outcome: Ongoing (interventions implemented as appropriate)   08/02/19 1017   Coping/Psychosocial   Plan of Care Reviewed With patient   Plan of Care Review   Progress improving   OTHER   Outcome Summary OT tx completed. Pt required Min A for LB bathing and Set-up for UB bathing. Min A for donning pants to thread feet through. Set-up for donning button up shirt. Set-up for washing face. CGA/SBA for sit <> stand t/f from chair. Pt educated on energy conservation techniques and adl modifications for task simplification/increased safety. Continue OT POC.

## 2019-08-02 NOTE — PLAN OF CARE
Problem: Patient Care Overview  Goal: Plan of Care Review  Outcome: Ongoing (interventions implemented as appropriate)   08/02/19 0815   Coping/Psychosocial   Plan of Care Reviewed With patient   Plan of Care Review   Progress improving   OTHER   Outcome Summary Pt agreeable ot therapy. Pt continue to progress. Pt performed BLE therapeutic exercise x 20. Pt was able to ambulate 75 ft outside of room CGA with RW. Pt sitting in chair eating breaskfast after treatment. Will continue to follow

## 2019-08-02 NOTE — TELEPHONE ENCOUNTER
[facility] Trans[facility] Initial Follow Up Call    Outreach made within 2 business days of discharge: Yes    Patient: Joann Wong Patient : 1935   MRN: [id number]  Reason for Admission: There are no discharge diagnoses documented for the most recent discharge.   Discharge Date: 3/16/19       Spoke with: spouse    Scheduled appointment with PCP within 7-14 days    Follow Up  Future Appointments   Date Time Provider Addie Doss   2019 11:00 AM ISAAC Chi [facility]   2019 11:15 AM ISAAC Bhagat [facility]       Earnest Ervin

## 2019-08-03 ENCOUNTER — READMISSION MANAGEMENT (OUTPATIENT)
Dept: CALL CENTER | Facility: HOSPITAL | Age: 84
End: 2019-08-03

## 2019-08-03 NOTE — OUTREACH NOTE
Prep Survey      Responses   [facility]   Is patient eligible?  Yes   Discharge diagnosis  CHF   Does the patient have one of the following disease processes/diagnoses(primary or secondary)?  CHF   Does the patient have Home health ordered?  Yes   What is the Home health agency?   [facility]   Is there a DME ordered?  Yes   What DME was ordered?  [facility]   Comments regarding appointments  see AVS   Prep survey completed?  Yes          Tara Wilder RN

## 2019-08-04 NOTE — THERAPY DISCHARGE NOTE
Acute Care - Physical Therapy Progress Note/Discharge  [facility]     Patient Name: Prakash Castro  : 1935  MRN: [id number]  Today's Date: 2019  Onset of Illness/Injury or Date of Surgery: 19  Date of Referral to PT: 19  Referring Physician: Jessica Khan APRN    Admit Date: 2019    Visit Dx:    ICD-10-CM ICD-9-CM   1. Acute on chronic congestive heart failure, unspecified heart failure type (CMS/HCC) I50.9 428.0   2. Impaired functional mobility, balance, gait, and endurance Z74.09 V49.89   3. Decreased activities of daily living (ADL) R68.89 780.99   4. Acute on chronic respiratory failure with hypoxia and hypercapnia (CMS/HCC) J96.21 518.84    J96.22 786.09     799.02   5. 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 (CMS/HCC), with exacerbation   • Centrilobular emphysema (CMS/HCC)   • Nocturnal hypoxemia   • Cellulitis   • Diabetes mellitus (CMS/HCC)   • Chronic diastolic congestive heart failure (CMS/HCC)   • Hypertension   • Obesity (BMI 30-39.9)   • Abscess of left foot   • Acute on chronic congestive heart failure (CMS/HCC)   • Acute on chronic respiratory failure with hypoxia and hypercapnia (CMS/HCC)   • Acute kidney injury (CMS/HCC)       Physical Therapy Education     Title: PT OT SLP Therapies (Resolved)     Topic: Physical Therapy (Resolved)     Point: Mobility training (Resolved)     Learning Progress Summary           Patient BRANDI Nicolas VU by AL at 2019  9:54 AM    Comment:  PT educate patient on purse lip breathing and taking deep breaths before activity to help reduce SOB                   Point:  Precautions (Resolved)     Learning Progress Summary           Patient Acceptance, BRANDI VU by AL at 7/29/2019  9:53 AM    Comment:  PT educated patient on fall risk, L WBAT status, and O2 stats.                               User Key     Initials Effective Dates Name Provider Type Discipline    AL 04/24/19 -  Everton Brandon, PT Student PT Student PT              Rehab Goal Summary     Row Name 08/04/19 1500 08/04/19 0800          Bed Mobility Goal 1 (PT)    [place] Level/Cues Needed (Bed Mobility Goal 1, PT)  -- Goal: cond. independence. Rolling not addressed  -OLIVA  --     Progress/Outcomes (Bed Mobility Goal 1, PT)  goal not met  -OLIVA  --        Transfer Goal 1 (PT)    [place] Level/Cues Needed (Transfer Goal 1, PT)  contact guard assist Goal: supervision  -OLIVA  --     Progress/Outcome (Transfer Goal 1, PT)  goal not met  -OLIVA  --        Gait Training Goal 1 (PT)    [place] Level (Gait Training Goal 1, PT)  contact guard assist Goal: supervision  -OLIVA  --     Distance (Gait Goal 1, PT)  75' Goal:50 w no SOA. SOA is not addressed  -OLIVA  --     Progress/Outcome (Gait Training Goal 1, PT)  goal not met  -OLIVA  --        Balance Goal 1 (PT)    [place] Level/Cues Needed (Balance Goal 1, PT)  -- Goal: supervision. Goal is not addressed.  -OLIVA  --     Progress/Outcomes (Balance Goal 1, PT)  goal not met  -OLIVA  --        Occupational Therapy Goals    Bed Mobility Goal Selection (OT)  --  bed mobility, OT goal 1  -MM     Dressing Goal Selection (OT)  --  dressing, OT goal 1  -MM     Toileting Goal Selection (OT)  --  toileting, OT goal 1  -MM        Bed Mobility Goal 1 (OT)    Activity/Assistive Device (Bed Mobility Goal 1, OT)  --  bed mobility activities, all  -MM     [place] Level/Cues Needed (Bed Mobility Goal 1, OT)  --  contact guard assist  -MM     Time Frame (Bed Mobility Goal 1, OT)  --  long term goal (LTG);by discharge  -MM     Progress/Outcomes (Bed Mobility Goal 1, OT)  --  goal met  -MM        Dressing  Goal 1 (OT)    Activity/Assistive Device (Dressing Goal 1, OT)  --  lower body dressing  -MM     [place]/Cues Needed (Dressing Goal 1, OT)  --  minimum assist (75% or more patient effort)  -MM     Time Frame (Dressing Goal 1, OT)  --  long term goal (LTG);by discharge  -MM     Progress/Outcome (Dressing Goal 1, OT)  --  goal met  -MM        Toileting Goal 1 (OT)    Activity/Device (Toileting Goal 1, OT)  --  toileting skills, all;commode  -MM     [place] Level/Cues Needed (Toileting Goal 1, OT)  --  supervision required  -MM     Time Frame (Toileting Goal 1, OT)  --  long term goal (LTG);by discharge  -MM     Progress/Outcome (Toileting Goal 1, OT)  --  goal not met  -MM       User Key  (r) = Recorded By, (t) = Taken By, (c) = Cosigned By    Initials Name Provider Type Discipline    OLIVA Zully Bo, PTA Physical Therapy Assistant PT    MM Mandeep Santana, OTR/L Occupational Therapist OT        Therapy Treatment  Rehabilitation Treatment Summary     Row Name                Wound 07/18/19 0221 Left anterior foot blisters    Wound - Properties Group Date first assessed: 07/18/19 [SC] Time first assessed: 0221 [SC] Present On Admission : yes;picture taken [SC] Side: Left [SC] Orientation: anterior [SC] Location: foot [SC] Type: blisters [SC] Recorded by:  [SC] Nicole Rollins RN 07/18/19 0445    Row Name                Wound 07/18/19 0221 Left posterior plantar diabetic/neuropathic ulceration    Wound - Properties Group Date first assessed: 07/18/19 [SC] Time first assessed: 0221 [SC] Present On Admission : yes;picture taken [SC] Side: Left [SC] Orientation: posterior [SC] Location: plantar [SC] Type: diabetic/neuropathic ulceration [SC] Recorded by:  [SC] Nicole Rollins RN 07/18/19 5170      User Key  (r) = Recorded By, (t) = Taken By, (c) = Cosigned By    Initials Name Effective Dates Discipline    SC Nicole Rollins RN 08/02/16 -  Nurse             PT Recommendation and Plan  Anticipated  Discharge Disposition (PT): home with home health  Outcome Summary/Treatment Plan (PT)  Anticipated Discharge Disposition (PT): home with home health    Outcome Measures     Row Name 08/02/19 1000             How much help from another is currently needed...    Putting on and taking off regular lower body clothing?  3  -JJ      Bathing (including washing, rinsing, and drying)  3  -JJ      Toileting (which includes using toilet bed pan or urinal)  3  -JJ      Putting on and taking off regular upper body clothing  4  -JJ      Taking care of personal grooming (such as brushing teeth)  4  -JJ      Eating meals  4  -JJ      AM-PAC 6 Clicks Score (OT)  21  -JJ         Functional Assessment    Outcome Measure Options  AM-PAC 6 Clicks Daily Activity (OT)  -        User Key  (r) = Recorded By, (t) = Taken By, (c) = Cosigned By    Initials Name Provider Type    Katherine Valle OTR/L Occupational Therapist           Time Calculation:         PT G-Codes  Outcome Measure Options: AM-PAC 6 Clicks Daily Activity (OT)  AM-PAC 6 Clicks Score (PT): 19  AM-PAC 6 Clicks Score (OT): 21    PT Discharge Summary  Anticipated Discharge Disposition (PT): home with home health  Reason for Discharge: Discharge from facility  Outcomes Achieved: Patient able to partially acheive established goals  Discharge Destination: Home with home health    Zully Bo, PTA  8/4/2019

## 2019-08-05 ENCOUNTER — TELEPHONE (OUTPATIENT)
Dept: CARDIOLOGY | Age: 84
End: 2019-08-05

## 2019-08-05 ENCOUNTER — READMISSION MANAGEMENT (OUTPATIENT)
Dept: CALL CENTER | Facility: HOSPITAL | Age: 84
End: 2019-08-05

## 2019-08-05 NOTE — OUTREACH NOTE
CHF Week 1 Survey      Responses   Facility patient discharged from?  [facility]   Does the patient have one of the following disease processes/diagnoses(primary or secondary)?  CHF   Is there a successful TCM telephone encounter documented?  No   CHF Week 1 attempt successful?  Yes   Call start time  1028   Call end time  1031   Discharge diagnosis  CHF   Is patient permission given to speak with other caregiver?  Yes   List who call center can speak with  SO   Meds reviewed with patient/caregiver?  Yes   Is the patient having any side effects they believe may be caused by any medication additions or changes?  No   Does the patient have all medications ordered at discharge?  Yes   Is the patient taking all medications as directed (includes completed medication regime)?  Yes   Does the patient have a primary care provider?   Yes   Does the patient have an appointment with their PCP within 7 days of discharge?  Yes   Has the patient kept scheduled appointments due by today?  N/A   What is the Home health agency?   [facility]   Has home health visited the patient within 72 hours of discharge?  Yes   What DME was ordered?  O2   Has all DME been delivered?  Yes   Psychosocial issues?  No   Comments  SOA is better per pt.    Did the patient receive a copy of their discharge instructions?  Yes   Nursing interventions  Reviewed instructions with patient   What is the patient's perception of their health status since discharge?  Improving   Nursing interventions  Nurse provided patient education   Is the patient weighing daily?  Yes   Does the patient have scales?  Yes   Daily weight interventions  Education provided on importance of daily weight   Is the patient able to teach back Heart Failure diet management?  Yes   Is the patient able to teach back Heart Failure Zones?  Yes   Is the patient able to teach back signs and symptoms of worsening condition? (i.e. weight gain, shortness of air, etc.)  Yes   Is the  patient/caregiver able to teach back the hierarchy of who to call/visit for symptoms/problems? PCP, Specialist, Home health nurse, Urgent Care, ED, 911  Yes    CHF Week 1 call completed?  Yes          Sherri Loya RN

## 2019-08-08 ENCOUNTER — APPOINTMENT (OUTPATIENT)
Dept: WOUND CARE | Facility: HOSPITAL | Age: 84
End: 2019-08-08

## 2019-08-09 ENCOUNTER — APPOINTMENT (OUTPATIENT)
Dept: WOUND CARE | Facility: HOSPITAL | Age: 84
End: 2019-08-09

## 2019-08-09 ENCOUNTER — OFFICE VISIT (OUTPATIENT)
Dept: WOUND CARE | Facility: HOSPITAL | Age: 84
End: 2019-08-09

## 2019-08-09 PROCEDURE — G0463 HOSPITAL OUTPT CLINIC VISIT: HCPCS

## 2019-08-10 DIAGNOSIS — I50.42 CHRONIC COMBINED SYSTOLIC AND DIASTOLIC CONGESTIVE HEART FAILURE (HCC): ICD-10-CM

## 2019-08-12 RX ORDER — METOLAZONE 2.5 MG/1
2.5 TABLET ORAL DAILY
Qty: 90 TABLET | Refills: 1 | Status: SHIPPED | OUTPATIENT
Start: 2019-08-12 | End: 2020-07-14 | Stop reason: SDUPTHER

## 2019-08-13 ENCOUNTER — READMISSION MANAGEMENT (OUTPATIENT)
Dept: CALL CENTER | Facility: HOSPITAL | Age: 84
End: 2019-08-13

## 2019-08-13 NOTE — OUTREACH NOTE
CHF Week 2 Survey      Responses   Facility patient discharged from?  [place]   Does the patient have one of the following disease processes/diagnoses(primary or secondary)?  CHF   Week 2 attempt successful?  Yes   Call start time  1001   Rescheduled  Rescheduled-pt requested          Ellie Desir, RN

## 2019-08-14 ENCOUNTER — READMISSION MANAGEMENT (OUTPATIENT)
Dept: CALL CENTER | Facility: HOSPITAL | Age: 84
End: 2019-08-14

## 2019-08-14 ENCOUNTER — TELEPHONE (OUTPATIENT)
Dept: PRIMARY CARE CLINIC | Age: 84
End: 2019-08-14

## 2019-08-14 NOTE — OUTREACH NOTE
"CHF Week 2 Survey      Responses   Facility patient discharged from?  [place]   Does the patient have one of the following disease processes/diagnoses(primary or secondary)?  CHF   Week 2 attempt successful?  Yes   Call start time  1239   Call end time  1243   Discharge diagnosis  CHF   Is patient permission given to speak with other caregiver?  Yes   List who call center can speak with  SO   Meds reviewed with patient/caregiver?  Yes   Is the patient having any side effects they believe may be caused by any medication additions or changes?  No   Is the patient taking all medications as directed (includes completed medication regime)?  Yes   Has the patient kept scheduled appointments due by today?  Yes   What is the Home health agency?   [facility]   Has home health visited the patient within 72 hours of discharge?  Yes   Psychosocial issues?  No   Comments  Pt reports that SOA is his baseline. Still wearing O2 and gets \"out of breath with any activity.\" Pt reports wound healing very heal.    What is the patient's perception of their health status since discharge?  Improving   Is the patient weighing daily?  Yes   Does the patient have scales?  Yes   Daily weight interventions  Education provided on importance of daily weight   Is the patient able to teach back Heart Failure diet management?  Yes   Is the patient able to teach back Heart Failure Zones?  Yes   Is the patient able to teach back signs and symptoms of worsening condition? (i.e. weight gain, shortness of air, etc.)  Yes   Additional teach back comments  Pt's wt is stable.    CHF Week 2 call completed?  Yes          Sherri Loya RN  "

## 2019-08-15 ENCOUNTER — CARE COORDINATION (OUTPATIENT)
Dept: CARE COORDINATION | Age: 84
End: 2019-08-15

## 2019-08-15 ENCOUNTER — OFFICE VISIT (OUTPATIENT)
Dept: PRIMARY CARE CLINIC | Age: 84
End: 2019-08-15
Payer: MEDICARE

## 2019-08-15 VITALS
OXYGEN SATURATION: 94 % | TEMPERATURE: 98.2 F | DIASTOLIC BLOOD PRESSURE: 68 MMHG | WEIGHT: 249.12 LBS | SYSTOLIC BLOOD PRESSURE: 118 MMHG | HEART RATE: 98 BPM | HEIGHT: 70 IN | BODY MASS INDEX: 35.66 KG/M2

## 2019-08-15 DIAGNOSIS — I10 ESSENTIAL HYPERTENSION: ICD-10-CM

## 2019-08-15 DIAGNOSIS — E11.42 TYPE 2 DIABETES MELLITUS WITH DIABETIC POLYNEUROPATHY, WITHOUT LONG-TERM CURRENT USE OF INSULIN (HCC): ICD-10-CM

## 2019-08-15 DIAGNOSIS — Z09 HOSPITAL DISCHARGE FOLLOW-UP: Primary | ICD-10-CM

## 2019-08-15 DIAGNOSIS — J44.9 CHRONIC OBSTRUCTIVE PULMONARY DISEASE, UNSPECIFIED COPD TYPE (HCC): ICD-10-CM

## 2019-08-15 DIAGNOSIS — I50.32 CHRONIC DIASTOLIC CONGESTIVE HEART FAILURE (HCC): ICD-10-CM

## 2019-08-15 PROCEDURE — 1123F ACP DISCUSS/DSCN MKR DOCD: CPT | Performed by: PEDIATRICS

## 2019-08-15 PROCEDURE — G8417 CALC BMI ABV UP PARAM F/U: HCPCS | Performed by: PEDIATRICS

## 2019-08-15 PROCEDURE — 3023F SPIROM DOC REV: CPT | Performed by: PEDIATRICS

## 2019-08-15 PROCEDURE — 1036F TOBACCO NON-USER: CPT | Performed by: PEDIATRICS

## 2019-08-15 PROCEDURE — G8926 SPIRO NO PERF OR DOC: HCPCS | Performed by: PEDIATRICS

## 2019-08-15 PROCEDURE — 4040F PNEUMOC VAC/ADMIN/RCVD: CPT | Performed by: PEDIATRICS

## 2019-08-15 PROCEDURE — 99214 OFFICE O/P EST MOD 30 MIN: CPT | Performed by: PEDIATRICS

## 2019-08-15 PROCEDURE — G8427 DOCREV CUR MEDS BY ELIG CLIN: HCPCS | Performed by: PEDIATRICS

## 2019-08-15 ASSESSMENT — ENCOUNTER SYMPTOMS
DIARRHEA: 1
COUGH: 0
WHEEZING: 0
CHEST TIGHTNESS: 0
BACK PAIN: 0
SORE THROAT: 0
SHORTNESS OF BREATH: 0
VOMITING: 0
ABDOMINAL PAIN: 0
NAUSEA: 0

## 2019-08-15 NOTE — PROGRESS NOTES
[address] [facility], [address]  Phone [phone]   Fax [phone]      OFFICE VISIT: 8/15/2019    José Miguel Blank-: 1935      HPI  Reason For Visit:  Luna Lira is a 81 y.o. Health Maintenance    Follow-Up from Hospital (Patient states he was in [facility] 3 different times. Recently was discharged on ? He went in for left leg cellulitis then the second visit was for SOA, then the last time, for foot surgery on left foot. )    Patient presents on follow-up for multiple hospitalizations. He was apparently at [facility] [place] 3 different times within the past couple weeks. No TCN call was performed as we did not have information apparently that he was hospitalized. Only medication change that was made is stopping his Celebrex. Otherwise medications remain unchanged. He had one admission for surgery on his foot for infection. They are going tomorrow to follow up again. This is wound care     He states that he just feels lousy. His head does not feel right. He does have tylenol at home. height is 5' 10\" (1.778 m) and weight is 249 lb 1.9 oz (113 kg). His temporal temperature is 98.2 °F (36.8 °C). His blood pressure is 118/68 and his pulse is 98. His oxygen saturation is 94%. Body mass index is 35.74 kg/m².     I have reviewed the following with the Mr. Damon Cazares   Lab Review  Orders Only on 2019   Component Date Value    PSA 2019 0.01     Digoxin Lvl 2019 3.4*    TSH 2019 3.130     T4 Free 2019 1.1     Microalbumin, Random Uri* 2019 2.00     Creatinine, Ur 2019 174.1     Microalbumin Creatinine * 2019 11.5     Cholesterol, Total 2019 140*    Triglycerides 2019 178*    HDL 2019 40*    LDL Calculated 2019 64     Hemoglobin A1C 2019 5.9     Sodium 2019 143     Potassium 2019 4.0     Chloride 2019 99     CO2 2019 28     Anion Gap capsule Take 4 tablets daily      rivaroxaban (XARELTO) 20 MG TABS tablet Take 1 tablet by mouth daily (with breakfast) 30 tablet 11    albuterol sulfate HFA (PROAIR HFA) 108 (90 BASE) MCG/ACT inhaler Inhale 2 puffs into the lungs every 6 hours as needed for Wheezing 8.5 Inhaler 5    leuprolide (LUPRON) 30 MG injection Inject 30 mg into the muscle once Every 4 months      MYRBETRIQ 25 MG TB24 Take 25 mg by mouth daily       FISH OIL Take 1 capsule by mouth 2 times daily. No current facility-administered medications for this visit. Allergies: Patient has no known allergies. Past Medical History:   Diagnosis Date    Anxiety     Arthritis     Atrial fibrillation ([address]     Blood circulation, collateral     Cancer (HCC)     prostate, had treatment    Cellulitis     left leg    CHF (congestive heart failure) (HCC)     Chronic kidney disease     COPD (chronic obstructive pulmonary disease) (HCC)     Depression     Hyperlipidemia     Hypertension     Neuromuscular disorder (HCC)     Neuropathy     Other disorders of kidney and ureter in diseases classified elsewhere     Palliative care patient 11/15/2018    Pneumonia     Type II or unspecified type diabetes mellitus without mention of complication, not stated as uncontrolled        Family History   Problem Relation Age of Onset    Heart Attack Mother     Cancer Father        Past Surgical History:   Procedure Laterality Date    COLONOSCOPY      EYE SURGERY      EYE SURGERY      HERNIA REPAIR      umbilical with Dr Andrea Weaver         Social History     Tobacco Use    Smoking status: Never Smoker    Smokeless tobacco: Never Used   Substance Use Topics    Alcohol use: No        Review of Systems   Constitutional: Positive for fatigue. Negative for chills and fever (improving.). HENT: Negative for congestion, ear pain and sore throat.     Eyes: Negative for visual tenderness. Abdominal obesity is present   Genitourinary:   Genitourinary Comments: Exam deferred   Musculoskeletal: Normal range of motion. He exhibits no edema or tenderness. Lymphadenopathy:     He has no cervical adenopathy. Neurological: He is alert and oriented to person, place, and time. He has normal strength. No sensory deficit (no numbness or tingling). Skin: Skin is warm and dry. No rash noted. He has his L LE distally wrapped and wearing a surgical boot. Psychiatric: He exhibits a depressed mood. He has been through a lot recently. ASSESSMENT      ICD-10-CM    1. Hospital discharge follow-up Z09    2. Chronic diastolic congestive heart failure (HCC) I50.32    3. Chronic obstructive pulmonary disease, unspecified COPD type ([facility] J44.9    4. Essential hypertension I10    5. Type 2 diabetes mellitus with diabetic polyneuropathy, without long-term current use of insulin ([facility]) E11.42        PLAN      ICD-10-CM    1. Hospital discharge follow-up Z09 Records reviewed   2. Chronic diastolic congestive heart failure (HCC) I50.32 Presently compensated   3. Chronic obstructive pulmonary disease, unspecified COPD type ([facility] J44.9 Stable on oxygen   4. Essential hypertension I10 The current medical regimen is effective;  continue present plan and medications. 5. Type 2 diabetes mellitus with diabetic polyneuropathy, without long-term current use of insulin ([facility]) E11.42 Stable on present medication       No orders of the defined types were placed in this encounter. Return in about 1 month (around 9/15/2019) for 30.

## 2019-08-15 NOTE — PATIENT INSTRUCTIONS
other fast foods. ? Pickles, olives, ketchup, and other condiments, especially soy sauce, unless labeled sodium-free or low-sodium. Where can you learn more? Go to https://Friendshipprsilvina.CPXi. org and sign in to your Swagapalooza account. Enter E137 in the [facility] box to learn more about \"Low Sodium Diet (2,000 Milligram): Care Instructions. \"     If you do not have an account, please click on the \"Sign Up Now\" link. Current as of: November 7, 2018  Content Version: 12.1  © 3795-5152 Auxogyn. Care instructions adapted under license by [facility] ([facility]). If you have questions about a medical condition or this instruction, always ask your healthcare professional. [address] any warranty or liability for your use of this information. Patient Education        How to Read a Food Label to Limit Sodium: Care Instructions  Your Care Instructions  Sodium causes your body to hold on to extra water. This can raise your blood pressure and force your heart and kidneys to work harder. In very serious cases, this could cause you to be put in the hospital. It might even be life-threatening. By limiting sodium, you will feel better and lower your risk of serious problems. Processed foods, fast food, and restaurant foods are the major sources of dietary sodium. The most common name for sodium is salt. Try to limit how much sodium you eat to less than 2,300 milligrams (mg) a day. If you limit your sodium to 1,500 mg a day, you can lower your blood pressure even more. This limit counts all the salt that you eat in foods you cook or in packaged foods. Keep a list of everything you eat and drink. Follow-up care is a key part of your treatment and safety. Be sure to make and go to all appointments, and call your doctor if you are having problems. It's also a good idea to know your test results and keep a list of the medicines you take.   How can you care for yourself at your body get rid of extra fluids. However, your doctor may tell you to limit your fluid intake to a set amount each day. Follow-up care is a key part of your treatment and safety. Be sure to make and go to all appointments, and call your doctor if you are having problems. It's also a good idea to know your test results and keep a list of the medicines you take. How can you care for yourself at home? Read food labels  · Read food labels on cans and food packages. The labels tell you how much sodium is in each serving. Make sure that you look at the serving size. If you eat more than the serving size, you have eaten more sodium than is listed for one serving. · Food labels also tell you the Percent Daily Value. If the Percent Daily Value says 50%, it means that you will get at least 50% of all the sodium you need for the entire day in one serving. Choose products with low Percent Daily Values for sodium. · Be aware that sodium can come in forms other than salt, including monosodium glutamate (MSG), sodium citrate, and sodium bicarbonate (baking soda). MSG is often added to Asian food. You can sometimes ask for food without MSG or salt. Buy low-sodium foods  · Buy foods that are labeled \"unsalted\" (no salt added), \"sodium-free\" (less than 5 mg of sodium per serving), or \"low-sodium\" (less than 140 mg of sodium per serving). A food labeled \"light sodium\" has less than half of the full-sodium version of that food. Foods labeled \"reduced-sodium\" may still have too much sodium. · Buy fresh vegetables or plain, frozen vegetables. Buy low-sodium versions of canned vegetables, soups, and other canned goods. Prepare low-sodium meals  · Use less salt each day when cooking. Reducing salt in this way will help you adjust to the taste. Do not add salt after cooking. Take the salt shaker off the table. · Flavor your food with garlic, lemon juice, onion, vinegar, herbs, and spices instead of salt.  Do not use soy sauce, method that works for you. You might simply write down how much you drink every time you do. Some people keep a container filled with the amount of fluid allowed for that day. If they drink from a source other than the container, then they pour out that amount. · Measure your regular drinking glasses to find out how much fluid each one holds. Once you know this, you will not have to measure every time. · Besides water, milk, juices, and other drinks, some foods have a lot of fluid. Count any foods that will melt (such as ice cream or gelatin dessert) or liquid foods (such as soup) as part of your fluid intake for the day. Where can you learn more? Go to https://Pushing GreenpeAPPEK Mobile Appseweb.New Channel Online School. org and sign in to your [employer] Iron Software account. Enter A166 in the [facility] box to learn more about \"Limiting Sodium and Fluids With Heart Failure: Care Instructions. \"     If you do not have an account, please click on the \"Sign Up Now\" link. Current as of: July 22, 2018  Content Version: 12.1  © 4562-2299 Splash.FM. Care instructions adapted under license by [facility] ([facility]). If you have questions about a medical condition or this instruction, always ask your healthcare professional. [address] any warranty or liability for your use of this information. Patient Education        Heart-Healthy Diet: Care Instructions  Your Care Instructions    A heart-healthy diet has lots of vegetables, fruits, nuts, beans, and whole grains, and is low in salt. It limits foods that are high in saturated fat, such as meats, cheeses, and fried foods. It may be hard to change your diet, but even small changes can lower your risk of heart attack and heart disease. Follow-up care is a key part of your treatment and safety. Be sure to make and go to all appointments, and call your doctor if you are having problems.  It's also a good idea to know your test results and keep a list of the medicines good for your heart. · Choose low-fat or fat-free milk and dairy products. Eat fish  · Eat at least two servings of fish a week. Certain fish, such as salmon and tuna, contain omega-3 fatty acids, which may help reduce your risk of heart attack. Eat foods high in fiber  · Eat a variety of grain products every day. Include whole-grain foods that have lots of fiber and nutrients. Examples of whole-grain foods include oats, whole wheat bread, and brown rice. · Buy whole-grain breads and cereals, instead of white bread or pastries. Limit salt and sodium  · Limit how much salt and sodium you eat to help lower your blood pressure. · Taste food before you salt it. Add only a little salt when you think you need it. With time, your taste buds will adjust to less salt. · Eat fewer snack items, fast foods, and other high-salt, processed foods. Check food labels for the amount of sodium in packaged foods. · Choose low-sodium versions of canned goods (such as soups, vegetables, and beans). Limit sugar  · Limit drinks and foods with added sugar. These include candy, desserts, and soda pop. Limit alcohol  · Limit alcohol to no more than 2 drinks a day for men and 1 drink a day for women. Too much alcohol can cause health problems. When should you call for help? Watch closely for changes in your health, and be sure to contact your doctor if:    · You would like help planning heart-healthy meals. Where can you learn more? Go to https://KUNFOOD.comsilvina.healthFibroGen. org and sign in to your Flimper account. Enter V137 in the [facility] box to learn more about \"Heart-Healthy Diet: Care Instructions. \"     If you do not have an account, please click on the \"Sign Up Now\" link. Current as of: July 22, 2018  Content Version: 12.1  © 6577-0155 Healthwise, Incorporated. Care instructions adapted under license by [facility] ([facility]).  If you have questions about a medical condition or this instruction, always ask your

## 2019-08-15 NOTE — CARE COORDINATION
Blood Pressure No flowsheet data found. Barriers: Motivational  Plan for overcoming my barriers: Pt will take small actions to improve compliance, beginning with working with [facility] services  Confidence: 7/10  Anticipated Goal Completion Date: 10/1/2019  (extended goal date for [facility] waiver processing, pt has stopped daily self-monitoring)              Prior to Admission medications    Medication Sig Start Date End Date Taking? Authorizing Provider   metOLazone (ZAROXOLYN) 2.5 MG tablet Take 1 tablet by mouth daily 8/12/19   ISAAC Ortega   carvedilol (COREG) 12.5 MG tablet Take 1 tablet by mouth 2 times daily (with meals) 7/16/19   ALISON Cotter DO   Dulaglutide (TRULICITY) 3.75 WO/3.3IN SOPN INJECT 1 PEN EVERY WEEK 6/26/19   ISAAC Alcala   potassium chloride (KLOR-CON 10) 10 MEQ extended release tablet Take 1 tablet by mouth daily 6/19/19   ISAAC Alcala   Multiple Vitamins-Minerals (THERAPEUTIC MULTIVITAMIN-MINERALS) tablet Take 1 tablet by mouth daily    Historical Provider, MD   amLODIPine (NORVASC) 5 MG tablet Take 1 tablet by mouth daily 6/19/19   ALISON Cotter DO   spironolactone (ALDACTONE) 50 MG tablet TAKE 1 TABLET BY MOUTH EVERY DAY 6/6/19   ISAAC Alarcon   metFORMIN (GLUCOPHAGE) 500 MG tablet Take 1 tablet by mouth 2 times daily (with meals) 5/24/19   ALISON Cotter DO   blood glucose monitor strips Test once daily & as needed for symptoms of irregular blood glucose.  4/29/19   ALISON Cotter DO   Lancets MISC 1 each by Does not apply route daily Accu Chek Guid3 Lancets 4/29/19   ALISON Cotter DO   FLUoxetine (PROZAC) 20 MG capsule Take 1 capsule by mouth daily Along with the 40 mg tablet 4/29/19   ALISON Cotter DO   cholestyramine (QUESTRAN) 4 g packet Take 1 packet by mouth as needed    Historical Provider, MD   FLUoxetine (PROZAC) 40 MG capsule Take 40 mg by mouth daily Along with the 20 mg tablet    Historical Provider, MD   terazosin (HYTRIN) 5 MG capsule Take 1 capsule by mouth nightly 2/25/19   B Earlean Burows, DO   Fluticasone-Umeclidin-Vilant (TRELEGY ELLIPTA) 100-62.5-25 MCG/INH AEPB Inhale 1 puff into the lungs daily 12/21/18   ISAAC Solis   OXYGEN Inhale 2 L into the lungs nightly    Historical Provider, MD   BiPAP Machine MISC by Does not apply route nightly    Historical Provider, MD   cetirizine (ZYRTEC) 10 MG tablet Take 1 tablet by mouth daily 11/17/18   Madison Reno, DO   digoxin (LANOXIN) 125 MCG tablet Take 1 tablet by mouth daily 10/29/18   B Earlean Burows, DO   furosemide (LASIX) 40 MG tablet Take 1.5 tablets by mouth daily 10/29/18   B Earlean Burows, DO   hydrochlorothiazide (MICROZIDE) 12.5 MG capsule Take 1 capsule by mouth every morning 10/3/18   B Earlean Burows, DO   buPROPion (WELLBUTRIN XL) 150 MG extended release tablet Take 2 tablets by mouth every morning 9/7/18   B Earlean Burows, DO   lisinopril (PRINIVIL;ZESTRIL) 40 MG tablet TAKE 1 TABLET BY MOUTH DAILY 8/30/18   B Earlean Burows, DO   prazosin (MINIPRESS) 2 MG capsule Take 1 capsule by mouth nightly 8/20/18   Tonya SanesISAAC   vitamin B-12 (CYANOCOBALAMIN) 1000 MCG tablet Take 1,000 mcg by mouth daily    Historical Provider, MD   enzalutamide Gretta Lipoma) 40 MG capsule Take 4 tablets daily    Historical Provider, MD   rivaroxaban (XARELTO) 20 MG TABS tablet Take 1 tablet by mouth daily (with breakfast) 5/9/17   B Earlean Burows, DO   albuterol sulfate HFA (PROAIR HFA) 108 (90 BASE) MCG/ACT inhaler Inhale 2 puffs into the lungs every 6 hours as needed for Wheezing 5/4/17   B Earlean Burows, DO   leuprolide (LUPRON) 30 MG injection Inject 30 mg into the muscle once Every 4 months    Historical Provider, MD   MYRBETRIQ 25 MG TB24 Take 25 mg by mouth daily  1/25/16   Historical Provider, MD   FISH OIL Take 1 capsule by mouth 2 times daily.     Historical Provider, MD       Future Appointments   Date

## 2019-08-16 ENCOUNTER — OFFICE VISIT (OUTPATIENT)
Dept: WOUND CARE | Facility: HOSPITAL | Age: 84
End: 2019-08-16

## 2019-08-21 ENCOUNTER — READMISSION MANAGEMENT (OUTPATIENT)
Dept: CALL CENTER | Facility: HOSPITAL | Age: 84
End: 2019-08-21

## 2019-08-21 NOTE — OUTREACH NOTE
CHF Week 3 Survey      Responses   Facility patient discharged from?  [facility]   Does the patient have one of the following disease processes/diagnoses(primary or secondary)?  CHF   Week 3 attempt successful?  Yes   Call start time  1217   Call end time  1222   Discharge diagnosis  CHF   Meds reviewed with patient/caregiver?  Yes   Is the patient taking all medications as directed (includes completed medication regime)?  Yes   Has the patient kept scheduled appointments due by today?  Yes   What is the Home health agency?   [facility]   What DME was ordered?  O2 24/7 increased to 3l/m while in hospital   Comments  Still gets SOA with activity. Wears O2 at 3l/m.   What is the patient's perception of their health status since discharge?  Improving   Is the patient weighing daily?  Yes   Is the patient able to teach back Heart Failure Zones?  Yes [Reviewed zones, reports green zone.]   CHF Week 3 call completed?  Yes          Safia Aguiar RN

## 2019-08-23 ENCOUNTER — CARE COORDINATION (OUTPATIENT)
Dept: CARE COORDINATION | Age: 84
End: 2019-08-23

## 2019-08-23 ENCOUNTER — OFFICE VISIT (OUTPATIENT)
Dept: WOUND CARE | Facility: HOSPITAL | Age: 84
End: 2019-08-23

## 2019-08-26 ENCOUNTER — CARE COORDINATION (OUTPATIENT)
Dept: CARE COORDINATION | Age: 84
End: 2019-08-26

## 2019-08-26 RX ORDER — LISINOPRIL 40 MG/1
40 TABLET ORAL DAILY
Qty: 90 TABLET | Refills: 3 | Status: SHIPPED | OUTPATIENT
Start: 2019-08-26 | End: 2020-08-27 | Stop reason: SDUPTHER

## 2019-08-26 ASSESSMENT — ENCOUNTER SYMPTOMS: DYSPNEA ASSOCIATED WITH: EXERTION

## 2019-08-26 NOTE — CARE COORDINATION
Ambulatory Care Coordination Note  8/26/2019  CM Risk Score: 7  Charlson 10 Year Mortality Risk Score: 100%     ACC: Hawa Patel, RN    Summary Note: ACM spoke to pt's Trang Oneill. Asuncion Luciano stated she has been IP at non[facility] hospital and got home Saturday. Stated pt has been weighing himself daily but not checking his BS, unsure why. Asuncion Luciano thought perhaps pt's hand tremors make it difficulty for him to assess his FBS. Pt has been distracted with her IP stay. They continue to get weekday lunches delivered by [employer]. Plan:  ACM encouraged that Asuncion Luciano ask [facility] nurse to assist pt to determine if he can check and log his daily FBS. ACM spoke to [address] [place] [address] Ne. Requested that she ask if OT can be made aware of need for shower chair for this patient. Also requested that [facility] nurse go over BS meter and self-check process to determine if pt is able to perform this skill - if he is able with or without assistance. Narendra Olmedo voiced understanding. ACM will f/u next week. Care Coordination Interventions    Program Enrollment:  Complex Care  Referral from Primary Care Provider:  Yes  Suggested Interventions and Community Resources  Home Care Waiver:  Completed (Comment: 8/15/19: Waiver completed, application approved, Active Day services in place and resumed post hospital discharge.)  [address]:  Completed (Comment: 8/26/19: [facility](thru [facility] clinic) in place with weekly visits from PT/OT/SN. Please order shower chair for pt, assist him to self-ck his FBS.)  Meals on Wheels:  Completed (Comment: 8/26/19: Weekday lunch delivered by [employer])  Zone Management Tools: In Process (Comment: 8/26/19: Pt's SO, Asuncion Luciano, stated he is weighing himself but not testing his BS. URged this is important information to keep record of.  ACM will ask SN to assist pt to check this.)         Goals Addressed                 This Visit's Progress     Self Monitoring   Improving     Daily Weights - I will (XTANDI) 40 MG capsule Take 4 tablets daily    Historical Provider, MD   rivaroxaban (XARELTO) 20 MG TABS tablet Take 1 tablet by mouth daily (with breakfast) 5/9/17   ALISON Quintanilla DO   albuterol sulfate HFA (PROAIR HFA) 108 (90 BASE) MCG/ACT inhaler Inhale 2 puffs into the lungs every 6 hours as needed for Wheezing 5/4/17   ALISON Quintanilla DO   leuprolide (LUPRON) 30 MG injection Inject 30 mg into the muscle once Every 4 months    Historical Provider, MD   MYRBETRIQ 25 MG TB24 Take 25 mg by mouth daily  1/25/16   Historical Provider, MD   FISH OIL Take 1 capsule by mouth 2 times daily. Historical Provider, MD       Future Appointments   Date Time Provider Addie Doss   9/4/2019 11:15 AM ISAAC Uribe [facility]   9/16/2019  3:00 PM ALISON Quintanilla DO [address] [facility]     ,   Diabetes Assessment    Medic Alert ID:  No  Meal Planning:  Avoidance of concentrated sweets   How often do you test your blood sugar?:  No Testing (Comment: Agreeable to start daily FBG with help from [facility] RN)   Do you have barriers with adherence to non-pharmacologic self-management interventions?  (Nutrition/Exercise/Self-Monitoring):  Yes   Have you ever had to go to the ED for symptoms of low blood sugar?:  No       Blood Sugar Monitoring Regimen:  Not Testing      ,   Congestive Heart Failure Assessment    Are you currently restricting fluids?:  No Restriction  Do you understand a low sodium diet?:  No (Comment: Discussed today with pt and SO)  Do you understand how to read food labels?:  Yes  How many restaurant meals do you eat per week?:  0  Do you salt your food before tasting it?:  Yes         Symptoms:   CHF associated fatigue: Pos, CHF associated shortness of breath: Pos      Symptom course:  no change      and   COPD Assessment    Does the patient understand envrionmental exposure?:  Yes  Is the patient able to verbalize Rescue vs. Long Acting medications?:  Yes  Does the patient have a nebulizer?:  No  Does the patient use a space with inhaled medications?:  No            Symptoms:   COPD associated increased fatigue: Pos      Symptom course:  no change  Breathlessness:  exertion  Increase use of rapid acting/rescue inhaled medications?:  No  Change in chronic cough?:  No/At Baseline  Change in sputum?:  No/At Baseline  Have you had a recent diagnosis of pneumonia either by PCP or at a hospital?:  No

## 2019-08-29 ENCOUNTER — READMISSION MANAGEMENT (OUTPATIENT)
Dept: CALL CENTER | Facility: HOSPITAL | Age: 84
End: 2019-08-29

## 2019-08-30 ENCOUNTER — OFFICE VISIT (OUTPATIENT)
Dept: WOUND CARE | Facility: HOSPITAL | Age: 84
End: 2019-08-30

## 2019-09-04 ENCOUNTER — TELEPHONE (OUTPATIENT)
Dept: CARDIOLOGY | Age: 84
End: 2019-09-04

## 2019-09-04 NOTE — TELEPHONE ENCOUNTER
Spoke with pt spouse and she stated that Mr. Wilma Rodriguez had an appt in [facility]; Pt has not showed up for his appt in cardiology.

## 2019-09-05 ENCOUNTER — TELEPHONE (OUTPATIENT)
Dept: CARDIOLOGY | Age: 84
End: 2019-09-05

## 2019-09-06 ENCOUNTER — OFFICE VISIT (OUTPATIENT)
Dept: WOUND CARE | Facility: HOSPITAL | Age: 84
End: 2019-09-06

## 2019-09-06 ENCOUNTER — TELEPHONE (OUTPATIENT)
Dept: CARDIOLOGY | Age: 84
End: 2019-09-06

## 2019-09-06 PROCEDURE — G0463 HOSPITAL OUTPT CLINIC VISIT: HCPCS

## 2019-09-08 DIAGNOSIS — F33.1 MODERATE EPISODE OF RECURRENT MAJOR DEPRESSIVE DISORDER (HCC): ICD-10-CM

## 2019-09-09 ENCOUNTER — TELEPHONE (OUTPATIENT)
Dept: CARDIOLOGY | Age: 84
End: 2019-09-09

## 2019-09-10 RX ORDER — BUPROPION HYDROCHLORIDE 150 MG/1
300 TABLET ORAL EVERY MORNING
Qty: 180 TABLET | Refills: 3 | Status: SHIPPED | OUTPATIENT
Start: 2019-09-10 | End: 2020-11-30

## 2019-09-13 ENCOUNTER — OFFICE VISIT (OUTPATIENT)
Dept: WOUND CARE | Facility: HOSPITAL | Age: 84
End: 2019-09-13

## 2019-09-13 PROCEDURE — G0463 HOSPITAL OUTPT CLINIC VISIT: HCPCS

## 2019-09-16 ENCOUNTER — OFFICE VISIT (OUTPATIENT)
Dept: PRIMARY CARE CLINIC | Age: 84
End: 2019-09-16
Payer: MEDICARE

## 2019-09-16 VITALS
HEART RATE: 85 BPM | SYSTOLIC BLOOD PRESSURE: 136 MMHG | OXYGEN SATURATION: 95 % | BODY MASS INDEX: 34.22 KG/M2 | DIASTOLIC BLOOD PRESSURE: 80 MMHG | TEMPERATURE: 98 F | HEIGHT: 70 IN | WEIGHT: 239 LBS

## 2019-09-16 DIAGNOSIS — R53.83 FATIGUE, UNSPECIFIED TYPE: ICD-10-CM

## 2019-09-16 DIAGNOSIS — F33.1 MODERATE EPISODE OF RECURRENT MAJOR DEPRESSIVE DISORDER (HCC): ICD-10-CM

## 2019-09-16 DIAGNOSIS — I50.32 CHRONIC DIASTOLIC HEART FAILURE (HCC): ICD-10-CM

## 2019-09-16 DIAGNOSIS — C61 PROSTATE CANCER (HCC): ICD-10-CM

## 2019-09-16 DIAGNOSIS — K91.1 DUMPING SYNDROME: ICD-10-CM

## 2019-09-16 DIAGNOSIS — G47.33 OSA TREATED WITH BIPAP: ICD-10-CM

## 2019-09-16 DIAGNOSIS — R35.0 URINARY FREQUENCY: ICD-10-CM

## 2019-09-16 DIAGNOSIS — T46.0X1D POISONING BY DIGOXIN, ACCIDENTAL OR UNINTENTIONAL, SUBSEQUENT ENCOUNTER: Primary | ICD-10-CM

## 2019-09-16 DIAGNOSIS — T46.0X1D POISONING BY DIGOXIN, ACCIDENTAL OR UNINTENTIONAL, SUBSEQUENT ENCOUNTER: ICD-10-CM

## 2019-09-16 DIAGNOSIS — F41.8 SITUATIONAL ANXIETY: ICD-10-CM

## 2019-09-16 LAB
ALBUMIN SERPL-MCNC: 4.3 G/DL (ref 3.5–5.2)
ALP BLD-CCNC: 55 U/L (ref 40–130)
ALT SERPL-CCNC: 6 U/L (ref 5–41)
ANION GAP SERPL CALCULATED.3IONS-SCNC: 14 MMOL/L (ref 7–19)
AST SERPL-CCNC: 10 U/L (ref 5–40)
BASOPHILS ABSOLUTE: 0.1 K/UL (ref 0–0.2)
BASOPHILS RELATIVE PERCENT: 0.6 % (ref 0–1)
BILIRUB SERPL-MCNC: 0.5 MG/DL (ref 0.2–1.2)
BUN BLDV-MCNC: 19 MG/DL (ref 8–23)
CALCIUM SERPL-MCNC: 10.4 MG/DL (ref 8.8–10.2)
CHLORIDE BLD-SCNC: 96 MMOL/L (ref 98–111)
CO2: 30 MMOL/L (ref 22–29)
CREAT SERPL-MCNC: 0.9 MG/DL (ref 0.5–1.2)
CREATININE URINE: 129.1 MG/DL (ref 4.2–622)
DIGOXIN LEVEL: 1.4 NG/ML (ref 0.6–1.2)
EOSINOPHILS ABSOLUTE: 0.2 K/UL (ref 0–0.6)
EOSINOPHILS RELATIVE PERCENT: 2.1 % (ref 0–5)
GFR NON-AFRICAN AMERICAN: >60
GLUCOSE BLD-MCNC: 89 MG/DL (ref 74–109)
HCT VFR BLD CALC: 38.2 % (ref 42–52)
HEMOGLOBIN: 12.7 G/DL (ref 14–18)
IMMATURE GRANULOCYTES #: 0.1 K/UL
LYMPHOCYTES ABSOLUTE: 1.4 K/UL (ref 1.1–4.5)
LYMPHOCYTES RELATIVE PERCENT: 13.9 % (ref 20–40)
MCH RBC QN AUTO: 32.2 PG (ref 27–31)
MCHC RBC AUTO-ENTMCNC: 33.2 G/DL (ref 33–37)
MCV RBC AUTO: 96.7 FL (ref 80–94)
MICROALBUMIN UR-MCNC: 2.7 MG/DL (ref 0–19)
MICROALBUMIN/CREAT UR-RTO: 20.9 MG/G
MONOCYTES ABSOLUTE: 0.8 K/UL (ref 0–0.9)
MONOCYTES RELATIVE PERCENT: 7.9 % (ref 0–10)
NEUTROPHILS ABSOLUTE: 7.3 K/UL (ref 1.5–7.5)
NEUTROPHILS RELATIVE PERCENT: 74.6 % (ref 50–65)
PDW BLD-RTO: 13.2 % (ref 11.5–14.5)
PLATELET # BLD: 290 K/UL (ref 130–400)
PMV BLD AUTO: 10.2 FL (ref 9.4–12.4)
POTASSIUM SERPL-SCNC: 4 MMOL/L (ref 3.5–5)
RBC # BLD: 3.95 M/UL (ref 4.7–6.1)
SODIUM BLD-SCNC: 140 MMOL/L (ref 136–145)
T4 FREE: 1.2 NG/DL (ref 0.9–1.7)
TOTAL PROTEIN: 8 G/DL (ref 6.6–8.7)
TSH SERPL DL<=0.05 MIU/L-ACNC: 3.41 UIU/ML (ref 0.27–4.2)
VITAMIN B-12: 580 PG/ML (ref 211–946)
WBC # BLD: 9.8 K/UL (ref 4.8–10.8)

## 2019-09-16 PROCEDURE — 99214 OFFICE O/P EST MOD 30 MIN: CPT | Performed by: PEDIATRICS

## 2019-09-16 PROCEDURE — 1036F TOBACCO NON-USER: CPT | Performed by: PEDIATRICS

## 2019-09-16 PROCEDURE — 4040F PNEUMOC VAC/ADMIN/RCVD: CPT | Performed by: PEDIATRICS

## 2019-09-16 PROCEDURE — G8427 DOCREV CUR MEDS BY ELIG CLIN: HCPCS | Performed by: PEDIATRICS

## 2019-09-16 PROCEDURE — 1123F ACP DISCUSS/DSCN MKR DOCD: CPT | Performed by: PEDIATRICS

## 2019-09-16 PROCEDURE — G8417 CALC BMI ABV UP PARAM F/U: HCPCS | Performed by: PEDIATRICS

## 2019-09-16 ASSESSMENT — ENCOUNTER SYMPTOMS
NAUSEA: 0
SHORTNESS OF BREATH: 0
ABDOMINAL PAIN: 0
VOMITING: 0
BACK PAIN: 0
CHEST TIGHTNESS: 0
DIARRHEA: 1
COUGH: 0
SORE THROAT: 0
WHEEZING: 0

## 2019-09-16 NOTE — PROGRESS NOTES
 MCHC 03/25/2019 33.4     RDW 03/25/2019 12.3     Platelets 76/56/5172 291     MPV 03/25/2019 10.0     Neutrophils % 03/25/2019 81.4*    Lymphocytes % 03/25/2019 9.3*    Monocytes % 03/25/2019 5.5     Eosinophils % 03/25/2019 2.2     Basophils % 03/25/2019 0.5     Neutrophils Absolute 03/25/2019 9.4*    Lymphocytes Absolute 03/25/2019 1.1     Monocytes Absolute 03/25/2019 0.60     Eosinophils Absolute 03/25/2019 0.30     Basophils Absolute 03/25/2019 0.10     Sodium 03/25/2019 136     Potassium 03/25/2019 4.2     Chloride 03/25/2019 94*    CO2 03/25/2019 27     Anion Gap 03/25/2019 15     Glucose 03/25/2019 106     BUN 03/25/2019 17     CREATININE 03/25/2019 0.9     GFR Non- 03/25/2019 >60     Calcium 03/25/2019 10.1     Total Protein 03/25/2019 7.7     Alb 03/25/2019 4.4     Total Bilirubin 03/25/2019 0.6     Alkaline Phosphatase 03/25/2019 58     ALT 03/25/2019 <5*    AST 03/25/2019 10      Copies of these are in the chart.     Current Outpatient Medications   Medication Sig Dispense Refill    buPROPion (WELLBUTRIN XL) 150 MG extended release tablet Take 2 tablets by mouth every morning 180 tablet 3    lisinopril (PRINIVIL;ZESTRIL) 40 MG tablet Take 1 tablet by mouth daily 90 tablet 3    metOLazone (ZAROXOLYN) 2.5 MG tablet Take 1 tablet by mouth daily 90 tablet 1    carvedilol (COREG) 12.5 MG tablet Take 1 tablet by mouth 2 times daily (with meals) 180 tablet 3    Dulaglutide (TRULICITY) 3.03 TF/3.7NX SOPN INJECT 1 PEN EVERY WEEK 12 pen 3    potassium chloride (KLOR-CON 10) 10 MEQ extended release tablet Take 1 tablet by mouth daily 90 tablet 3    Multiple Vitamins-Minerals (THERAPEUTIC MULTIVITAMIN-MINERALS) tablet Take 1 tablet by mouth daily      amLODIPine (NORVASC) 5 MG tablet Take 1 tablet by mouth daily 90 tablet 3    spironolactone (ALDACTONE) 50 MG tablet TAKE 1 TABLET BY MOUTH EVERY DAY 30 tablet 5    metFORMIN (GLUCOPHAGE) 500 MG tablet Take 1

## 2019-09-17 ENCOUNTER — TELEPHONE (OUTPATIENT)
Dept: PRIMARY CARE CLINIC | Age: 84
End: 2019-09-17

## 2019-09-17 NOTE — TELEPHONE ENCOUNTER
----- Message from ISAAC Nguyen sent at 9/16/2019  6:24 PM CDT -----  CBC stable   Anemia improving   Recheck in 6 weeks for stability

## 2019-09-18 NOTE — TELEPHONE ENCOUNTER
Spoke with patient regarding recommendations, I called At [address] he is due for cushions on his mask the are going to get these for patient.

## 2019-09-20 ENCOUNTER — OFFICE VISIT (OUTPATIENT)
Dept: WOUND CARE | Facility: HOSPITAL | Age: 84
End: 2019-09-20

## 2019-09-20 PROCEDURE — G0463 HOSPITAL OUTPT CLINIC VISIT: HCPCS

## 2019-09-25 ENCOUNTER — TELEPHONE (OUTPATIENT)
Dept: UROLOGY | Facility: CLINIC | Age: 84
End: 2019-09-25

## 2019-09-26 DIAGNOSIS — C61 CANCER OF PROSTATE (HCC): Primary | ICD-10-CM

## 2019-10-01 NOTE — PROGRESS NOTES
Subjective    Mr. Castro is 84 y.o. male    Chief Complaint: Prostate cancer    History of Present Illness    84-year-old male previously treated by Dr. 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  He reports doing well without bone pain.  Denies LUTS.  No hematuria.  We called over to [facility] today and apparently had a PSA which was undetectable couple weeks ago.  They are faxing the PSA results to us.  He has stable urgency with urge incontinence which is not bothersome to him.    I spent time today reviewing and summarizing old records last urology clinic visit with Dr. Kilgore 6/20/2018.      The following portions of the patient's history were reviewed and updated as appropriate: allergies, current medications, past family history, past medical history, past social history, past surgical history and problem list.    Review of Systems   Constitutional: Negative for chills and fever.   Gastrointestinal: Negative for abdominal pain, anal bleeding and blood in stool.   Genitourinary: Negative for dysuria, frequency, hematuria and urgency.   Musculoskeletal: Positive for gait problem.   All other systems reviewed and are negative.        Current Outpatient Medications:   •  amLODIPine (NORVASC) 10 MG tablet, Take 10 mg by mouth Daily., Disp: , Rfl:   •  buPROPion XL (WELLBUTRIN XL) 150 MG 24 hr tablet, Take 300 mg by mouth Daily., Disp: , Rfl:   •  carvedilol (COREG) 12.5 MG tablet, Take 12.5 mg by mouth 2 (two) times a day with meals., Disp: , Rfl:   •  celecoxib (CeleBREX) 200 MG capsule, Take 200 mg by mouth 2 (Two) Times a Day., Disp: , Rfl:   •  digoxin (LANOXIN) 125 MCG tablet, Take 125 mcg by mouth every day., Disp: , Rfl:   •  Dulaglutide (TRULICITY) 0.75 MG/0.5ML solution pen-injector, Inject 0.5 mL under the skin into the appropriate area as directed 1 (One) Time Per Week. Saturday., Disp: , Rfl:   •  enzalutamide (XTANDI) 40 MG chemo capsule, Take 4  capsules by mouth Daily., Disp: 120 capsule, Rfl: 10  •  ferrous sulfate (IRON SUPPLEMENT) 325 (65 FE) MG tablet, Take 1 tablet by mouth Daily With Breakfast., Disp: 30 tablet, Rfl: 1  •  FLUoxetine (PROzac) 20 MG capsule, Take 20 mg by mouth Daily. Take with Prozac 40 mg (total dose of 60 mg daily)., Disp: , Rfl:   •  FLUoxetine (PROZAC) 40 MG capsule, Take 40 mg by mouth Daily. Take with Prozac 20 mg (total dose of 60 mg daily)., Disp: , Rfl:   •  fluticasone (FLONASE) 50 MCG/ACT nasal spray, 2 sprays into each nostril daily. Administer 2 sprays in each nostril for each dose., Disp: , Rfl:   •  Fluticasone-Umeclidin-Vilant 100-62.5-25 MCG/INH aerosol powder , Inhale 1 each Daily., Disp: , Rfl:   •  furosemide (LASIX) 40 MG tablet, Take 60 mg by mouth Daily., Disp: , Rfl:   •  hydrochlorothiazide (HYDRODIURIL) 12.5 MG tablet, Take 1 tablet by mouth Daily., Disp: 30 tablet, Rfl: 1  •  leuprolide (LUPRON) 30 MG injection, Inject 30 mg into the shoulder, thigh, or buttocks Every 4 (Four) Months., Disp: , Rfl:   •  lisinopril (PRINIVIL,ZESTRIL) 40 MG tablet, Take 40 mg by mouth daily., Disp: , Rfl:   •  metFORMIN (GLUCOPHAGE) 500 MG tablet, Take 500 mg by mouth 2 (Two) Times a Day With Meals., Disp: , Rfl:   •  metolazone (ZAROXOLYN) 2.5 MG tablet, Take 2.5 mg by mouth daily., Disp: , Rfl:   •  Mirabegron ER (MYRBETRIQ) 25 MG tablet sustained-release 24 hour 24 hr tablet, Take 1 tablet by mouth Daily., Disp: 90 tablet, Rfl: 3  •  Multiple Vitamins-Minerals (MULTIVITAMIN ADULT) tablet, Take 1 tablet by mouth Daily., Disp: , Rfl:   •  potassium chloride (K-DUR,KLOR-CON) 10 MEQ CR tablet, Take 10 mEq by mouth Daily., Disp: , Rfl:   •  prazosin (MINIPRESS) 2 MG capsule, Take 2 mg by mouth Every Night., Disp: , Rfl:   •  rivaroxaban (XARELTO) 20 MG tablet, Take 20 mg by mouth daily., Disp: , Rfl:   •  saccharomyces boulardii (FLORASTOR) 250 MG capsule, Take 1 capsule by mouth 2 (Two) Times a Day., Disp: , Rfl:   •   spironolactone (ALDACTONE) 50 MG tablet, Take 50 mg by mouth Daily., Disp: , Rfl:   •  terazosin (HYTRIN) 5 MG capsule, Take 5 mg by mouth every night., Disp: , Rfl:     Past Medical History:   Diagnosis Date   • Acute kidney injury (CMS/HCC) 7/26/2019   • Arthritis    • Atrial fibrillation (CMS/HCC)     HISTORY OF   • Cancer (CMS/HCC)     prostate   • Cellulitis    • CHF (congestive heart failure) (CMS/HCC)    • Depression    • Diabetes mellitus (CMS/HCC)    • Hematuria    • History of cardioversion     PER PATIENT REPORT   • Hypertension    • Obesity    • Overactive bladder    • Pacemaker    • SOB (shortness of breath)    • Stage 2 moderate COPD by GOLD classification (CMS/[facility]) 3/4/2019       Past Surgical History:   Procedure Laterality Date   • EYE SURGERY     • HERNIA REPAIR     • INCISION AND DRAINAGE LEG Left 7/19/2019    Procedure: INCISION AND DRAINAGE, LEFT FOOT;  Surgeon: Juan Antonio Uriarte DPM;  Location: [facility] OR;  Service: Podiatry   • JOINT REPLACEMENT     • REPLACEMENT TOTAL KNEE BILATERAL         Social History     Socioeconomic History   • Marital status:      Spouse name: Not on file   • Number of children: Not on file   • Years of education: Not on file   • Highest education level: Not on file   Tobacco Use   • Smoking status: Never Smoker   • Smokeless tobacco: Never Used   Substance and Sexual Activity   • Alcohol use: No   • Drug use: No   • Sexual activity: Defer       Family History   Problem Relation Age of Onset   • Prostate cancer Son    • Cancer Father    • Arthritis Mother    • Depression Mother    • Hearing loss Mother    • Heart disease Mother    • Hypertension Mother        Objective    There were no vitals taken for this visit.    Physical Exam  Constitutional: Well nourished, Well developed; No apparent distress; Vital reviewed as above  Psychiatric: Appropriate affect; Alert and oriented  Eyes: Unremarkable  Musculoskeletal: Using cane  GI: Abdomen is soft,  non-tender  Respiratory: No distress; Unlabored movement; No accessory musculature needed with symmetric movements  Skin: No pallor or diaphoresis  Lymphatic: No adenopathy neck or groin      Results for orders placed or performed during the hospital encounter of 07/26/19   Comprehensive Metabolic Panel   Result Value Ref Range    Glucose 160 (H) 70 - 100 mg/dL    BUN 61 (H) 5 - 21 mg/dL    Creatinine 1.41 (H) 0.50 - 1.40 mg/dL    Sodium 137 135 - 145 mmol/L    Potassium 4.4 3.5 - 5.3 mmol/L    Chloride 100 98 - 110 mmol/L    CO2 31.0 24.0 - 31.0 mmol/L    Calcium 9.5 8.4 - 10.4 mg/dL    Total Protein 7.2 6.3 - 8.7 g/dL    Albumin 3.60 3.50 - 5.00 g/dL    ALT (SGPT) <15 0 - 54 U/L    AST (SGOT) 19 7 - 45 U/L    Alkaline Phosphatase 59 24 - 120 U/L    Total Bilirubin 0.5 0.1 - 1.0 mg/dL    eGFR Non African Amer 48 (L) >60 mL/min/1.73    Globulin 3.6 gm/dL    A/G Ratio 1.0 (L) 1.1 - 2.5 g/dL    BUN/Creatinine Ratio 43.3 (H) 7.0 - 25.0    Anion Gap 6.0 4.0 - 13.0 mmol/L   BNP   Result Value Ref Range    proBNP 4,450.0 (H) 0.0-1,800.0 pg/mL   Troponin   Result Value Ref Range    Troponin I <0.012 0.000 - 0.034 ng/mL   Digoxin Level   Result Value Ref Range    Digoxin 1.20 0.80 - 2.00 ng/mL   CBC Auto Differential   Result Value Ref Range    WBC 11.38 (H) 4.80 - 10.80 10*3/mm3    RBC 2.86 (L) 4.80 - 5.90 10*6/mm3    Hemoglobin 9.5 (L) 14.0 - 18.0 g/dL    Hematocrit 28.1 (L) 40.0 - 52.0 %    MCV 98.3 (H) 82.0 - 95.0 fL    MCH 33.2 (H) 28.0 - 32.0 pg    MCHC 33.8 33.0 - 36.0 g/dL    RDW 13.2 12.0 - 15.0 %    RDW-SD 46.7 40.0 - 54.0 fl    MPV 9.7 6.0 - 12.0 fL    Platelets 312 130 - 400 10*3/mm3    Neutrophil % 80.6 (H) 39.0 - 78.0 %    Lymphocyte % 6.2 (L) 15.0 - 45.0 %    Monocyte % 7.3 4.0 - 12.0 %    Eosinophil % 1.4 0.0 - 4.0 %    Basophil % 0.5 0.0 - 2.0 %    Immature Grans % 4.0 0.0 - 5.0 %    Neutrophils, Absolute 9.17 (H) 1.87 - 8.40 10*3/mm3    Lymphocytes, Absolute 0.71 (L) 0.72 - 4.86 10*3/mm3    Monocytes,  Absolute 0.83 0.19 - 1.30 10*3/mm3    Eosinophils, Absolute 0.16 0.00 - 0.70 10*3/mm3    Basophils, Absolute 0.06 0.00 - 0.20 10*3/mm3    Immature Grans, Absolute 0.45 (H) 0.00 - 0.05 10*3/mm3    nRBC 0.0 0.0 - 0.2 /100 WBC   Blood Gas, Arterial   Result Value Ref Range    Site Right Radial     Marco's Test Positive     pH, Arterial 7.409 7.350 - 7.450 pH units    pCO2, Arterial 43.7 35.0 - 45.0 mm Hg    pO2, Arterial 65.0 (L) 83.0 - 108.0 mm Hg    HCO3, Arterial 27.6 (H) 20.0 - 26.0 mmol/L    Base Excess, Arterial 2.6 (H) 0.0 - 2.0 mmol/L    O2 Saturation, Arterial 93.4 (L) 94.0 - 99.0 %    Temperature 37.0 C    Barometric Pressure for Blood Gas 755 mmHg    Modality Nasal Cannula     Flow Rate 1.0 lpm    Ventilator Mode NA     Collected by [id number]    Troponin   Result Value Ref Range    Troponin I 0.015 0.000 - 0.034 ng/mL   Troponin   Result Value Ref Range    Troponin I 0.013 0.000 - 0.034 ng/mL   Basic Metabolic Panel   Result Value Ref Range    Glucose 165 (H) 70 - 100 mg/dL    BUN 58 (H) 5 - 21 mg/dL    Creatinine 1.35 0.50 - 1.40 mg/dL    Sodium 138 135 - 145 mmol/L    Potassium 4.5 3.5 - 5.3 mmol/L    Chloride 100 98 - 110 mmol/L    CO2 28.0 24.0 - 31.0 mmol/L    Calcium 9.4 8.4 - 10.4 mg/dL    eGFR Non African Amer 50 (L) >60 mL/min/1.73    BUN/Creatinine Ratio 43.0 (H) 7.0 - 25.0    Anion Gap 10.0 4.0 - 13.0 mmol/L   CBC (No Diff)   Result Value Ref Range    WBC 10.66 4.80 - 10.80 10*3/mm3    RBC 2.82 (L) 4.80 - 5.90 10*6/mm3    Hemoglobin 9.4 (L) 14.0 - 18.0 g/dL    Hematocrit 27.6 (L) 40.0 - 52.0 %    MCV 97.9 (H) 82.0 - 95.0 fL    MCH 33.3 (H) 28.0 - 32.0 pg    MCHC 34.1 33.0 - 36.0 g/dL    RDW 13.1 12.0 - 15.0 %    RDW-SD 46.6 40.0 - 54.0 fl    MPV 9.9 6.0 - 12.0 fL    Platelets 287 130 - 400 10*3/mm3   Basic Metabolic Panel   Result Value Ref Range    Glucose 83 70 - 100 mg/dL    BUN 60 (H) 5 - 21 mg/dL    Creatinine 1.43 (H) 0.50 - 1.40 mg/dL    Sodium 136 135 - 145 mmol/L    Potassium 4.2 3.5 - 5.3  mmol/L    Chloride 99 98 - 110 mmol/L    CO2 29.0 24.0 - 31.0 mmol/L    Calcium 9.3 8.4 - 10.4 mg/dL    eGFR Non African Amer 47 (L) >60 mL/min/1.73    BUN/Creatinine Ratio 42.0 (H) 7.0 - 25.0    Anion Gap 8.0 4.0 - 13.0 mmol/L   CBC Auto Differential   Result Value Ref Range    WBC 9.38 4.80 - 10.80 10*3/mm3    RBC 2.66 (L) 4.80 - 5.90 10*6/mm3    Hemoglobin 8.8 (L) 14.0 - 18.0 g/dL    Hematocrit 25.8 (L) 40.0 - 52.0 %    MCV 97.0 (H) 82.0 - 95.0 fL    MCH 33.1 (H) 28.0 - 32.0 pg    MCHC 34.1 33.0 - 36.0 g/dL    RDW 13.2 12.0 - 15.0 %    RDW-SD 46.2 40.0 - 54.0 fl    MPV 9.7 6.0 - 12.0 fL    Platelets 266 130 - 400 10*3/mm3    Neutrophil % 75.2 39.0 - 78.0 %    Lymphocyte % 9.7 (L) 15.0 - 45.0 %    Monocyte % 9.2 4.0 - 12.0 %    Eosinophil % 3.2 0.0 - 4.0 %    Basophil % 0.2 0.0 - 2.0 %    Immature Grans % 2.5 0.0 - 5.0 %    Neutrophils, Absolute 7.06 1.87 - 8.40 10*3/mm3    Lymphocytes, Absolute 0.91 0.72 - 4.86 10*3/mm3    Monocytes, Absolute 0.86 0.19 - 1.30 10*3/mm3    Eosinophils, Absolute 0.30 0.00 - 0.70 10*3/mm3    Basophils, Absolute 0.02 0.00 - 0.20 10*3/mm3    Immature Grans, Absolute 0.23 (H) 0.00 - 0.05 10*3/mm3    nRBC 0.0 0.0 - 0.2 /100 WBC   Blood Gas, Arterial   Result Value Ref Range    Site Left Brachial     Marco's Test Positive     pH, Arterial 7.442 7.350 - 7.450 pH units    pCO2, Arterial 39.0 35.0 - 45.0 mm Hg    pO2, Arterial 66.2 (L) 83.0 - 108.0 mm Hg    HCO3, Arterial 26.6 (H) 20.0 - 26.0 mmol/L    Base Excess, Arterial 2.4 (H) 0.0 - 2.0 mmol/L    O2 Saturation, Arterial 94.7 94.0 - 99.0 %    Temperature 37.0 C    Barometric Pressure for Blood Gas 755 mmHg    Modality BiPap     Flow Rate  lpm    Ventilator Mode BiPAP     Set Mech Resp Rate 12.0     PSV  cmH2O    IPAP 24     EPAP 16     Collected by [id number]    Procalcitonin   Result Value Ref Range    Procalcitonin <0.25 <=0.25 ng/mL   CBC (No Diff)   Result Value Ref Range    WBC 10.46 4.80 - 10.80 10*3/mm3    RBC 2.66 (L) 4.80 - 5.90  10*6/mm3    Hemoglobin 8.8 (L) 14.0 - 18.0 g/dL    Hematocrit 25.6 (L) 40.0 - 52.0 %    MCV 96.2 (H) 82.0 - 95.0 fL    MCH 33.1 (H) 28.0 - 32.0 pg    MCHC 34.4 33.0 - 36.0 g/dL    RDW 13.2 12.0 - 15.0 %    RDW-SD 45.9 40.0 - 54.0 fl    MPV 9.8 6.0 - 12.0 fL    Platelets 259 130 - 400 10*3/mm3   Basic Metabolic Panel   Result Value Ref Range    Glucose 95 70 - 100 mg/dL    BUN 60 (H) 5 - 21 mg/dL    Creatinine 1.30 0.50 - 1.40 mg/dL    Sodium 135 135 - 145 mmol/L    Potassium 4.1 3.5 - 5.3 mmol/L    Chloride 99 98 - 110 mmol/L    CO2 27.0 24.0 - 31.0 mmol/L    Calcium 9.0 8.4 - 10.4 mg/dL    eGFR Non African Amer 53 (L) >60 mL/min/1.73    BUN/Creatinine Ratio 46.2 (H) 7.0 - 25.0    Anion Gap 9.0 4.0 - 13.0 mmol/L   Iron Profile   Result Value Ref Range    Iron 34 (L) 42 - 180 mcg/dL    TIBC 289 225 - 420 mcg/dL    Iron Saturation 12 (L) 20 - 45 %   Ferritin   Result Value Ref Range    Ferritin 127.00 17.90 - 464.00 ng/mL   Reticulocytes   Result Value Ref Range    Reticulocyte % 2.90 (H) 0.60 - 1.80 %    Reticulocyte Absolute 0.0771 0.0200 - 0.1300 10*6/mm3   Basic Metabolic Panel   Result Value Ref Range    Glucose 106 (H) 70 - 100 mg/dL    BUN 58 (H) 5 - 21 mg/dL    Creatinine 1.22 0.50 - 1.40 mg/dL    Sodium 136 135 - 145 mmol/L    Potassium 3.9 3.5 - 5.3 mmol/L    Chloride 97 (L) 98 - 110 mmol/L    CO2 30.0 24.0 - 31.0 mmol/L    Calcium 9.3 8.4 - 10.4 mg/dL    eGFR Non African Amer 57 (L) >60 mL/min/1.73    BUN/Creatinine Ratio 47.5 (H) 7.0 - 25.0    Anion Gap 9.0 4.0 - 13.0 mmol/L   CBC (No Diff)   Result Value Ref Range    WBC 11.60 (H) 4.80 - 10.80 10*3/mm3    RBC 2.88 (L) 4.80 - 5.90 10*6/mm3    Hemoglobin 9.4 (L) 14.0 - 18.0 g/dL    Hematocrit 27.7 (L) 40.0 - 52.0 %    MCV 96.2 (H) 82.0 - 95.0 fL    MCH 32.6 (H) 28.0 - 32.0 pg    MCHC 33.9 33.0 - 36.0 g/dL    RDW 13.2 12.0 - 15.0 %    RDW-SD 46.3 40.0 - 54.0 fl    MPV 9.8 6.0 - 12.0 fL    Platelets 265 130 - 400 10*3/mm3   CBC (No Diff)   Result Value Ref  Range    WBC 13.10 (H) 4.80 - 10.80 10*3/mm3    RBC 2.97 (L) 4.80 - 5.90 10*6/mm3    Hemoglobin 9.8 (L) 14.0 - 18.0 g/dL    Hematocrit 28.3 (L) 40.0 - 52.0 %    MCV 95.3 (H) 82.0 - 95.0 fL    MCH 33.0 (H) 28.0 - 32.0 pg    MCHC 34.6 33.0 - 36.0 g/dL    RDW 13.2 12.0 - 15.0 %    RDW-SD 45.7 40.0 - 54.0 fl    MPV 10.1 6.0 - 12.0 fL    Platelets 287 130 - 400 10*3/mm3   Basic Metabolic Panel   Result Value Ref Range    Glucose 85 70 - 100 mg/dL    BUN 50 (H) 5 - 21 mg/dL    Creatinine 1.14 0.50 - 1.40 mg/dL    Sodium 135 135 - 145 mmol/L    Potassium 3.7 3.5 - 5.3 mmol/L    Chloride 93 (L) 98 - 110 mmol/L    CO2 34.0 (H) 24.0 - 31.0 mmol/L    Calcium 9.5 8.4 - 10.4 mg/dL    eGFR Non African Amer 61 >60 mL/min/1.73    BUN/Creatinine Ratio 43.9 (H) 7.0 - 25.0    Anion Gap 8.0 4.0 - 13.0 mmol/L   Basic Metabolic Panel   Result Value Ref Range    Glucose 92 70 - 100 mg/dL    BUN 49 (H) 5 - 21 mg/dL    Creatinine 1.08 0.50 - 1.40 mg/dL    Sodium 134 (L) 135 - 145 mmol/L    Potassium 3.8 3.5 - 5.3 mmol/L    Chloride 93 (L) 98 - 110 mmol/L    CO2 35.0 (H) 24.0 - 31.0 mmol/L    Calcium 9.4 8.4 - 10.4 mg/dL    eGFR Non African Amer 65 >60 mL/min/1.73    BUN/Creatinine Ratio 45.4 (H) 7.0 - 25.0    Anion Gap 6.0 4.0 - 13.0 mmol/L   CBC (No Diff)   Result Value Ref Range    WBC 14.22 (H) 4.80 - 10.80 10*3/mm3    RBC 2.92 (L) 4.80 - 5.90 10*6/mm3    Hemoglobin 9.7 (L) 14.0 - 18.0 g/dL    Hematocrit 28.0 (L) 40.0 - 52.0 %    MCV 95.9 (H) 82.0 - 95.0 fL    MCH 33.2 (H) 28.0 - 32.0 pg    MCHC 34.6 33.0 - 36.0 g/dL    RDW 13.2 12.0 - 15.0 %    RDW-SD 46.6 40.0 - 54.0 fl    MPV 9.9 6.0 - 12.0 fL    Platelets 271 130 - 400 10*3/mm3   CBC (No Diff)   Result Value Ref Range    WBC 15.15 (H) 4.80 - 10.80 10*3/mm3    RBC 2.97 (L) 4.80 - 5.90 10*6/mm3    Hemoglobin 9.8 (L) 14.0 - 18.0 g/dL    Hematocrit 28.2 (L) 40.0 - 52.0 %    MCV 94.9 82.0 - 95.0 fL    MCH 33.0 (H) 28.0 - 32.0 pg    MCHC 34.8 33.0 - 36.0 g/dL    RDW 13.2 12.0 - 15.0 %     RDW-SD 45.5 40.0 - 54.0 fl    MPV 9.8 6.0 - 12.0 fL    Platelets 260 130 - 400 10*3/mm3   Basic Metabolic Panel   Result Value Ref Range    Glucose 86 70 - 100 mg/dL    BUN 47 (H) 5 - 21 mg/dL    Creatinine 0.98 0.50 - 1.40 mg/dL    Sodium 134 (L) 135 - 145 mmol/L    Potassium 3.6 3.5 - 5.3 mmol/L    Chloride 92 (L) 98 - 110 mmol/L    CO2 35.0 (H) 24.0 - 31.0 mmol/L    Calcium 9.3 8.4 - 10.4 mg/dL    eGFR Non African Amer 73 >60 mL/min/1.73    BUN/Creatinine Ratio 48.0 (H) 7.0 - 25.0    Anion Gap 7.0 4.0 - 13.0 mmol/L   POC Glucose Once   Result Value Ref Range    Glucose 263 (H) 70 - 130 mg/dL   POC Glucose Once   Result Value Ref Range    Glucose 211 (H) 70 - 130 mg/dL   POC Glucose Once   Result Value Ref Range    Glucose 200 (H) 70 - 130 mg/dL   POC Glucose Once   Result Value Ref Range    Glucose 180 (H) 70 - 130 mg/dL   POC Glucose Once   Result Value Ref Range    Glucose 211 (H) 70 - 130 mg/dL   POC Glucose Once   Result Value Ref Range    Glucose 106 70 - 130 mg/dL   POC Glucose Once   Result Value Ref Range    Glucose 175 (H) 70 - 130 mg/dL   POC Glucose Once   Result Value Ref Range    Glucose 231 (H) 70 - 130 mg/dL   POC Glucose Once   Result Value Ref Range    Glucose 196 (H) 70 - 130 mg/dL   POC Glucose Once   Result Value Ref Range    Glucose 100 70 - 130 mg/dL   POC Glucose Once   Result Value Ref Range    Glucose 156 (H) 70 - 130 mg/dL   POC Glucose Once   Result Value Ref Range    Glucose 189 (H) 70 - 130 mg/dL   POC Glucose Once   Result Value Ref Range    Glucose 204 (H) 70 - 130 mg/dL   POC Glucose Once   Result Value Ref Range    Glucose 167 (H) 70 - 130 mg/dL   POC Glucose Once   Result Value Ref Range    Glucose 105 70 - 130 mg/dL   POC Glucose Once   Result Value Ref Range    Glucose 167 (H) 70 - 130 mg/dL   POC Glucose Once   Result Value Ref Range    Glucose 162 (H) 70 - 130 mg/dL   POC Glucose Once   Result Value Ref Range    Glucose 147 (H) 70 - 130 mg/dL   POC Glucose Once   Result Value  Ref Range    Glucose 112 70 - 130 mg/dL   POC Glucose Once   Result Value Ref Range    Glucose 197 (H) 70 - 130 mg/dL   POC Glucose Once   Result Value Ref Range    Glucose 185 (H) 70 - 130 mg/dL   POC Glucose Once   Result Value Ref Range    Glucose 217 (H) 70 - 130 mg/dL   POC Glucose Once   Result Value Ref Range    Glucose 104 70 - 130 mg/dL   Adult Transthoracic Echo Complete W/ Cont if Necessary Per Protocol   Result Value Ref Range    BSA 2.4 m^2    Ao root diam 2.5 cm    Ao root area 4.9 cm^2    LA dimension 4.8 cm    LA/Ao 1.9     LVOT diam 1.8 cm    LVOT area 2.5 cm^2    LVOT area(traced) 2.5 cm^2    LVLd ap4 8.4 cm    EDV(MOD-sp4) 97.5 ml    LVLs ap4 7.2 cm    ESV(MOD-sp4) 43.6 ml    EF(MOD-sp4) 55.3 %    SV(MOD-sp4) 53.9 ml    SI(MOD-sp4) 22.9 ml/m^2    Ao root area (BSA corrected) 1.1     LV Paredes Vol (BSA corrected) 41.4 ml/m^2    LV Sys Vol (BSA corrected) 18.5 ml/m^2    MV E max nina 142.0 cm/sec    MV A max nina 39.9 cm/sec    MV E/A 3.6     MV dec time 0.13 sec    Ao pk nina 160.0 cm/sec    Ao max PG 10.2 mmHg    Ao max PG (full) 4.0 mmHg    Ao V2 mean 111.0 cm/sec    Ao mean PG 6.0 mmHg    Ao mean PG (full) 2.0 mmHg    Ao V2 VTI 31.4 cm    JYOTI(I,A) 1.9 cm^2    JYOTI(I,D) 1.9 cm^2    JYOTI(V,A) 2.0 cm^2    JYOTI(V,D) 2.0 cm^2    LV V1 max PG 6.2 mmHg    LV V1 mean PG 4.0 mmHg    LV V1 max 124.5 cm/sec    LV V1 mean 89.3 cm/sec    LV V1 VTI 23.6 cm    MR max nina 557.0 cm/sec    MR max .1 mmHg    MR mean nina 424.0 cm/sec    MR mean PG 83.0 mmHg    MR .0 cm    SV(Ao) 154.1 ml    SI(Ao) 65.5 ml/m^2    SV(LVOT) 60.1 ml    SI(LVOT) 25.5 ml/m^2    PA V2 max 93.6 cm/sec    PA max PG 3.5 mmHg    TR max nina 360.0 cm/sec    RVSP(TR) 56.8 mmHg    RAP systole 5.0 mmHg     CV ECHO HUMAIRA - BZI_BMI 38.0 kilograms/m^2     CV ECHO HUMAIRA - BSA(HAYCOCK) 2.5 m^2     CV ECHO HUMAIRA - BZI_METRIC_WEIGHT 120.2 kg     CV ECHO HUMAIRA - BZI_METRIC_HEIGHT 177.8 cm    Target HR (85%) 116 bpm    Max. Pred. HR (100%) 137 bpm     LA volume 239.0 cm3    Avg E/e' ratio 18.64     Lat Peak E' Iggy 9.8 cm/sec    Med Peak E' Iggy 5.44 cm/sec    Echo EF Estimated 55 %   Light Blue Top   Result Value Ref Range    Extra Tube hold for add-on      Patient's There is no height or weight on file to calculate BMI. BMI is above normal parameters. Recommendations include: educational material.    Assessment and Plan    Diagnoses and all orders for this visit:    Cancer of prostate (CMS/HCC)  -     Cancel: POC Urinalysis Dipstick, Multipro  -     PSA DIAGNOSTIC; Future    Essential hypertension    Type 2 diabetes mellitus without complication, unspecified whether long term insulin use (CMS/[facility])      Doing well overall given his age and castrate resistant prostate cancer with reportedly undetectable PSA.  Recommend continue Xtandi and Lupron.  Follow-up with me in 6 months with pre-clinic PSA or sooner as needed.

## 2019-10-07 ENCOUNTER — RESULTS ENCOUNTER (OUTPATIENT)
Dept: UROLOGY | Facility: CLINIC | Age: 84
End: 2019-10-07

## 2019-10-07 ENCOUNTER — OFFICE VISIT (OUTPATIENT)
Dept: UROLOGY | Facility: CLINIC | Age: 84
End: 2019-10-07

## 2019-10-07 VITALS — TEMPERATURE: 97.3 F | WEIGHT: 238 LBS | HEIGHT: 71 IN | BODY MASS INDEX: 33.32 KG/M2

## 2019-10-07 DIAGNOSIS — E11.9 TYPE 2 DIABETES MELLITUS WITHOUT COMPLICATION, UNSPECIFIED WHETHER LONG TERM INSULIN USE (HCC): ICD-10-CM

## 2019-10-07 DIAGNOSIS — C61 CANCER OF PROSTATE (HCC): Primary | ICD-10-CM

## 2019-10-07 DIAGNOSIS — C61 CANCER OF PROSTATE (HCC): ICD-10-CM

## 2019-10-07 DIAGNOSIS — I10 ESSENTIAL HYPERTENSION: ICD-10-CM

## 2019-10-07 PROCEDURE — 99214 OFFICE O/P EST MOD 30 MIN: CPT | Performed by: UROLOGY

## 2019-10-07 NOTE — PATIENT INSTRUCTIONS

## 2019-10-08 DIAGNOSIS — C61 CANCER OF PROSTATE (HCC): ICD-10-CM

## 2019-10-14 ENCOUNTER — OFFICE VISIT (OUTPATIENT)
Dept: CARDIOLOGY | Age: 84
End: 2019-10-14
Payer: MEDICARE

## 2019-10-14 VITALS
HEART RATE: 80 BPM | BODY MASS INDEX: 34.5 KG/M2 | WEIGHT: 241 LBS | DIASTOLIC BLOOD PRESSURE: 60 MMHG | HEIGHT: 70 IN | SYSTOLIC BLOOD PRESSURE: 128 MMHG

## 2019-10-14 DIAGNOSIS — I10 ESSENTIAL HYPERTENSION: ICD-10-CM

## 2019-10-14 DIAGNOSIS — Z95.0 PACEMAKER: ICD-10-CM

## 2019-10-14 DIAGNOSIS — G47.33 OSA (OBSTRUCTIVE SLEEP APNEA): ICD-10-CM

## 2019-10-14 DIAGNOSIS — I48.20 CHRONIC ATRIAL FIBRILLATION (HCC): Primary | ICD-10-CM

## 2019-10-14 DIAGNOSIS — I50.32 CHRONIC DIASTOLIC HEART FAILURE (HCC): ICD-10-CM

## 2019-10-14 PROCEDURE — G8417 CALC BMI ABV UP PARAM F/U: HCPCS | Performed by: CLINICAL NURSE SPECIALIST

## 2019-10-14 PROCEDURE — 1123F ACP DISCUSS/DSCN MKR DOCD: CPT | Performed by: CLINICAL NURSE SPECIALIST

## 2019-10-14 PROCEDURE — 99213 OFFICE O/P EST LOW 20 MIN: CPT | Performed by: CLINICAL NURSE SPECIALIST

## 2019-10-14 PROCEDURE — G8427 DOCREV CUR MEDS BY ELIG CLIN: HCPCS | Performed by: CLINICAL NURSE SPECIALIST

## 2019-10-14 PROCEDURE — 1036F TOBACCO NON-USER: CPT | Performed by: CLINICAL NURSE SPECIALIST

## 2019-10-14 PROCEDURE — 93279 PRGRMG DEV EVAL PM/LDLS PM: CPT | Performed by: CLINICAL NURSE SPECIALIST

## 2019-10-14 PROCEDURE — 4040F PNEUMOC VAC/ADMIN/RCVD: CPT | Performed by: CLINICAL NURSE SPECIALIST

## 2019-10-14 PROCEDURE — G8484 FLU IMMUNIZE NO ADMIN: HCPCS | Performed by: CLINICAL NURSE SPECIALIST

## 2019-10-14 ASSESSMENT — ENCOUNTER SYMPTOMS
ABDOMINAL PAIN: 0
EYE REDNESS: 0
SHORTNESS OF BREATH: 1
VOMITING: 0
COUGH: 0
FACIAL SWELLING: 0
CHEST TIGHTNESS: 0
WHEEZING: 0
NAUSEA: 0

## 2019-10-17 ENCOUNTER — OFFICE VISIT (OUTPATIENT)
Dept: PRIMARY CARE CLINIC | Age: 84
End: 2019-10-17
Payer: MEDICARE

## 2019-10-17 VITALS
TEMPERATURE: 95.1 F | HEART RATE: 84 BPM | SYSTOLIC BLOOD PRESSURE: 118 MMHG | BODY MASS INDEX: 33.74 KG/M2 | OXYGEN SATURATION: 93 % | WEIGHT: 235.12 LBS | DIASTOLIC BLOOD PRESSURE: 68 MMHG
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
## 2019-10-17 DIAGNOSIS — T46.0X1D POISONING BY DIGOXIN, ACCIDENTAL OR UNINTENTIONAL, SUBSEQUENT ENCOUNTER: ICD-10-CM

## 2019-10-17 DIAGNOSIS — R53.83 FATIGUE, UNSPECIFIED TYPE: ICD-10-CM

## 2019-10-17 DIAGNOSIS — Z23 NEED FOR INFLUENZA VACCINATION: ICD-10-CM

## 2019-10-17 DIAGNOSIS — M15.9 PRIMARY OSTEOARTHRITIS INVOLVING MULTIPLE JOINTS: ICD-10-CM

## 2019-10-17 DIAGNOSIS — E78.2 MIXED HYPERLIPIDEMIA: Primary | ICD-10-CM

## 2019-10-17 PROCEDURE — G8417 CALC BMI ABV UP PARAM F/U: HCPCS | Performed by: PEDIATRICS

## 2019-10-17 PROCEDURE — 1036F TOBACCO NON-USER: CPT | Performed by: PEDIATRICS

## 2019-10-17 PROCEDURE — 90653 IIV ADJUVANT VACCINE IM: CPT | Performed by: PEDIATRICS

## 2019-10-17 PROCEDURE — G0008 ADMIN INFLUENZA VIRUS VAC: HCPCS | Performed by: PEDIATRICS

## 2019-10-17 PROCEDURE — 4040F PNEUMOC VAC/ADMIN/RCVD: CPT | Performed by: PEDIATRICS

## 2019-10-17 PROCEDURE — G8427 DOCREV CUR MEDS BY ELIG CLIN: HCPCS | Performed by: PEDIATRICS

## 2019-10-17 PROCEDURE — 99214 OFFICE O/P EST MOD 30 MIN: CPT | Performed by: PEDIATRICS

## 2019-10-17 PROCEDURE — G8482 FLU IMMUNIZE ORDER/ADMIN: HCPCS | Performed by: PEDIATRICS

## 2019-10-17 PROCEDURE — 1123F ACP DISCUSS/DSCN MKR DOCD: CPT | Performed by: PEDIATRICS

## 2019-10-17 RX ORDER — DIGOXIN 125 MCG
125 TABLET ORAL EVERY OTHER DAY
Qty: 90 TABLET | Refills: 3 | Status: SHIPPED
Start: 2019-10-17 | End: 2020-11-30

## 2019-10-17 RX ORDER — CELECOXIB 200 MG/1
200 CAPSULE ORAL DAILY
Qty: 30 CAPSULE | Refills: 5 | Status: ON HOLD | OUTPATIENT
Start: 2019-10-17 | End: 2020-03-30 | Stop reason: HOSPADM

## 2019-10-17 RX ORDER — COLESEVELAM 180 1/1
1875 TABLET ORAL 2 TIMES DAILY WITH MEALS
Qty: 180 TABLET | Refills: 3 | Status: SHIPPED | OUTPATIENT
Start: 2019-10-17 | End: 2020-01-09

## 2019-10-17 ASSESSMENT — ENCOUNTER SYMPTOMS
SHORTNESS OF BREATH: 0
SORE THROAT: 0
BACK PAIN: 0
ABDOMINAL PAIN: 0
DIARRHEA: 1
NAUSEA: 0
VOMITING: 0
COUGH: 0
WHEEZING: 0
CHEST TIGHTNESS: 0

## 2019-10-18 ENCOUNTER — INFUSION (OUTPATIENT)
Dept: ONCOLOGY | Facility: HOSPITAL | Age: 84
End: 2019-10-18

## 2019-10-18 VITALS
DIASTOLIC BLOOD PRESSURE: 46 MMHG | HEART RATE: 119 BPM | RESPIRATION RATE: 18 BRPM | SYSTOLIC BLOOD PRESSURE: 125 MMHG | HEIGHT: 71 IN | WEIGHT: 234 LBS | OXYGEN SATURATION: 97 % | BODY MASS INDEX: 32.76 KG/M2 | TEMPERATURE: 97.8 F

## 2019-10-18 DIAGNOSIS — C61 CANCER OF PROSTATE (HCC): Primary | ICD-10-CM

## 2019-10-18 PROCEDURE — 25010000002 LEUPROLIDE ACETATE (4 MONTH) PER 7.5 MG: Performed by: UROLOGY

## 2019-10-18 PROCEDURE — 96402 CHEMO HORMON ANTINEOPL SQ/IM: CPT

## 2019-10-18 PROCEDURE — 96372 THER/PROPH/DIAG INJ SC/IM: CPT

## 2019-10-18 RX ADMIN — LEUPROLIDE ACETATE 30 MG: KIT at 12:31

## 2019-10-30 ENCOUNTER — CARE COORDINATION (OUTPATIENT)
Dept: CARE COORDINATION | Age: 84
End: 2019-10-30

## 2019-11-01 ENCOUNTER — CARE COORDINATION (OUTPATIENT)
Dept: CARE COORDINATION | Age: 84
End: 2019-11-01

## 2019-11-04 ENCOUNTER — CARE COORDINATION (OUTPATIENT)
Dept: CARE COORDINATION | Age: 84
End: 2019-11-04

## 2019-11-07 ENCOUNTER — CARE COORDINATION (OUTPATIENT)
Dept: CARE COORDINATION | Age: 84
End: 2019-11-07

## 2019-11-07 DIAGNOSIS — F32.89 OTHER DEPRESSION: ICD-10-CM

## 2019-11-07 DIAGNOSIS — F41.9 ANXIETY: Primary | ICD-10-CM

## 2019-11-11 RX ORDER — FUROSEMIDE 40 MG/1
60 TABLET ORAL DAILY
Qty: 135 TABLET | Refills: 3 | Status: SHIPPED | OUTPATIENT
Start: 2019-11-11 | End: 2020-12-23

## 2019-11-11 RX ORDER — DIGOXIN 125 MCG
125 TABLET ORAL DAILY
Qty: 90 TABLET | Refills: 3 | Status: SHIPPED | OUTPATIENT
Start: 2019-11-11 | End: 2019-11-18 | Stop reason: DRUGHIGH

## 2019-11-18 ENCOUNTER — CARE COORDINATION (OUTPATIENT)
Dept: CARE COORDINATION | Age: 84
End: 2019-11-18

## 2019-11-18 ENCOUNTER — OFFICE VISIT (OUTPATIENT)
Dept: PRIMARY CARE CLINIC | Age: 84
End: 2019-11-18
Payer: MEDICARE

## 2019-11-18 VITALS
SYSTOLIC BLOOD PRESSURE: 122 MMHG | TEMPERATURE: 96.7 F | DIASTOLIC BLOOD PRESSURE: 88 MMHG | BODY MASS INDEX: 34.55 KG/M2 | WEIGHT: 240.8 LBS | OXYGEN SATURATION: 93 % | HEART RATE: 94 BPM

## 2019-11-18 DIAGNOSIS — K52.9 CHRONIC DIARRHEA: ICD-10-CM

## 2019-11-18 DIAGNOSIS — R29.6 MULTIPLE FALLS: Primary | ICD-10-CM

## 2019-11-18 DIAGNOSIS — F33.41 RECURRENT MAJOR DEPRESSIVE DISORDER, IN PARTIAL REMISSION (HCC): ICD-10-CM

## 2019-11-18 PROCEDURE — G8482 FLU IMMUNIZE ORDER/ADMIN: HCPCS | Performed by: PEDIATRICS

## 2019-11-18 PROCEDURE — G8417 CALC BMI ABV UP PARAM F/U: HCPCS | Performed by: PEDIATRICS

## 2019-11-18 PROCEDURE — 4040F PNEUMOC VAC/ADMIN/RCVD: CPT | Performed by: PEDIATRICS

## 2019-11-18 PROCEDURE — 1123F ACP DISCUSS/DSCN MKR DOCD: CPT | Performed by: PEDIATRICS

## 2019-11-18 PROCEDURE — 1036F TOBACCO NON-USER: CPT | Performed by: PEDIATRICS

## 2019-11-18 PROCEDURE — 99214 OFFICE O/P EST MOD 30 MIN: CPT | Performed by: PEDIATRICS

## 2019-11-18 PROCEDURE — G8427 DOCREV CUR MEDS BY ELIG CLIN: HCPCS | Performed by: PEDIATRICS

## 2019-11-18 ASSESSMENT — ENCOUNTER SYMPTOMS
NAUSEA: 0
SHORTNESS OF BREATH: 0
SORE THROAT: 0
VOMITING: 0
CHEST TIGHTNESS: 0
ABDOMINAL PAIN: 0
BACK PAIN: 0
WHEEZING: 0
COUGH: 0
DIARRHEA: 1

## 2019-11-19 ASSESSMENT — ENCOUNTER SYMPTOMS: DYSPNEA ASSOCIATED WITH: EXERTION

## 2019-11-25 RX ORDER — HYDROCHLOROTHIAZIDE 12.5 MG/1
CAPSULE, GELATIN COATED ORAL
Qty: 90 CAPSULE | Refills: 3 | Status: SHIPPED | OUTPATIENT
Start: 2019-11-25 | End: 2021-03-16

## 2019-11-26 ENCOUNTER — CARE COORDINATION (OUTPATIENT)
Dept: CARE COORDINATION | Age: 84
End: 2019-11-26

## 2019-11-26 ASSESSMENT — ENCOUNTER SYMPTOMS: DYSPNEA ASSOCIATED WITH: EXERTION

## 2019-12-03 ENCOUNTER — CARE COORDINATION (OUTPATIENT)
Dept: CARE COORDINATION | Age: 84
End: 2019-12-03

## 2019-12-04 DIAGNOSIS — J44.9 CHRONIC OBSTRUCTIVE PULMONARY DISEASE, UNSPECIFIED COPD TYPE (HCC): ICD-10-CM

## 2019-12-04 RX ORDER — ALBUTEROL SULFATE 90 UG/1
2 AEROSOL, METERED RESPIRATORY (INHALATION) EVERY 6 HOURS PRN
Qty: 8.5 INHALER | Refills: 5 | Status: SHIPPED | OUTPATIENT
Start: 2019-12-04

## 2019-12-06 DIAGNOSIS — I10 ESSENTIAL HYPERTENSION: ICD-10-CM

## 2019-12-06 RX ORDER — AMLODIPINE BESYLATE 5 MG/1
5 TABLET ORAL DAILY
Qty: 90 TABLET | Refills: 3 | Status: SHIPPED | OUTPATIENT
Start: 2019-12-06 | End: 2020-12-16 | Stop reason: SDUPTHER

## 2019-12-10 ENCOUNTER — CARE COORDINATION (OUTPATIENT)
Dept: CARE COORDINATION | Age: 84
End: 2019-12-10

## 2019-12-10 ASSESSMENT — ENCOUNTER SYMPTOMS: DYSPNEA ASSOCIATED WITH: EXERTION

## 2019-12-13 DIAGNOSIS — C61 PROSTATE CANCER (HCC): ICD-10-CM

## 2019-12-13 RX ORDER — MIRABEGRON 25 MG/1
TABLET, FILM COATED, EXTENDED RELEASE ORAL
Qty: 90 TABLET | Refills: 3 | Status: SHIPPED | OUTPATIENT
Start: 2019-12-13

## 2019-12-18 DIAGNOSIS — C61 PROSTATE CANCER (HCC): ICD-10-CM

## 2020-01-09 RX ORDER — COLESEVELAM HYDROCHLORIDE 625 MG/1
TABLET, FILM COATED ORAL
Qty: 540 TABLET | Refills: 1 | Status: SHIPPED | OUTPATIENT
Start: 2020-01-09 | End: 2021-01-12

## 2020-01-09 NOTE — TELEPHONE ENCOUNTER
Requested Prescriptions     Pending Prescriptions Disp Refills    WELCHOL 625 MG tablet [Pharmacy Med Name: WELCHOL 625 MG TABLET] 540 tablet 1     Sig: TAKE 3 TABLETS BY MOUTH 2 TIMES DAILY (WITH MEALS)

## 2020-01-10 ENCOUNTER — CARE COORDINATION (OUTPATIENT)
Dept: CARE COORDINATION | Age: 85
End: 2020-01-10

## 2020-01-15 ENCOUNTER — TELEPHONE (OUTPATIENT)
Dept: CARDIOLOGY | Age: 85
End: 2020-01-15

## 2020-01-15 NOTE — TELEPHONE ENCOUNTER
Pt seen 11/18/19 with a follow up on 2/18/20.       Requested Prescriptions     Pending Prescriptions Disp Refills    fluticasone-umeclidin-vilant (TRELEGY ELLIPTA) 100-62.5-25 MCG/INH AEPB 1 each 11     Sig: Inhale 1 puff into the lungs daily

## 2020-01-21 RX ORDER — TERAZOSIN 5 MG/1
5 CAPSULE ORAL NIGHTLY
Qty: 90 CAPSULE | Refills: 3 | Status: SHIPPED | OUTPATIENT
Start: 2020-01-21 | End: 2021-02-04

## 2020-01-21 NOTE — TELEPHONE ENCOUNTER
Pt seen 11/18/19.       Requested Prescriptions     Pending Prescriptions Disp Refills    terazosin (HYTRIN) 5 MG capsule 90 capsule 3     Sig: Take 1 capsule by mouth nightly

## 2020-01-28 RX ORDER — FLUOXETINE HYDROCHLORIDE 40 MG/1
40 CAPSULE ORAL DAILY
Qty: 90 CAPSULE | Refills: 3 | Status: ON HOLD | OUTPATIENT
Start: 2020-01-28 | End: 2021-05-04 | Stop reason: HOSPADM

## 2020-01-28 NOTE — TELEPHONE ENCOUNTER
Pt seen 11/18/19.       Requested Prescriptions     Pending Prescriptions Disp Refills    FLUoxetine (PROZAC) 40 MG capsule [Pharmacy Med Name: FLUOXETINE HCL 40 MG CAPSULE] 90 capsule 3     Sig: TAKE 1 CAPSULE BY MOUTH EVERY DAY

## 2020-02-03 ENCOUNTER — TELEPHONE (OUTPATIENT)
Dept: PRIMARY CARE CLINIC | Age: 85
End: 2020-02-03

## 2020-02-03 ENCOUNTER — CARE COORDINATION (OUTPATIENT)
Dept: CARE COORDINATION | Age: 85
End: 2020-02-03

## 2020-02-03 RX ORDER — GLUCOSAMINE HCL/CHONDROITIN SU 500-400 MG
CAPSULE ORAL
Qty: 100 STRIP | Refills: 1 | Status: SHIPPED | OUTPATIENT
Start: 2020-02-03 | End: 2020-11-11

## 2020-02-03 NOTE — CARE COORDINATION
ACM spoke to Al today. He reported he has moved to an apt in [place] recently - he is at a [address] called [employer]. His significant other is living separately with one of her relatives right now. She is not very mobile and needs increased support. PT stated he is satisfied with his current living situation and that it is very nice. Apt complex has exercise room and TV room and a [employer]. Service includes 3 meals daily provided and maid service weekly. [employer] caregiver now comes twice weekly and takes care of pt's laundry. Pt still has his vehicle and stated he feels comfortable driving from [place] to PCP in [place]. They dispense pt's dirty needles for him, provide a container. Pt is doing meds for himself but using [facility] now. They deliver. They can do med organizer for Al when/if needed. He has new oximeter and BP cuff. He also has a digital scale. Pt has not been able to locate his glucometer. Pt does not feel he needs additional support but did agree to f/u call 1 week - he stated to remind him of appt on 2/18. Plan:  ACM will f/u call 1 week to remind pt of appt w PCP on 2/18. ACM sent request to phone MA that pt needs new glucometer order sent to [facility] in [place] - with supplies. Pt will start weighing daily. Urged that he keep log to bring to his appt in 2 wks. Pt vo.   Electronically signed by Tiana Messer RN on 2/3/2020 at 1:58 PM

## 2020-02-03 NOTE — TELEPHONE ENCOUNTER
Treasure Son, RN  Andrés Mata MA; Guero mcrae - let me know if I should send messages to one or the other phone MA if you are assigned to specific providers. I sent this note to both of you today just in case. Mr. Beverly Barcenas has not been able to find his old glucometer for quite a while. He asked that Dr. Roger Yu send a new prescription for a glucose meter, lancets and test strips:   Test twice a day: fasting and before bedtime. Would you be able to put in that prescription for him? He is now using [facility] on [address] [place] [address]. Thank you,     Treasure Son, [address] [facility] [phone]   C) [phone]   [email or web address]

## 2020-02-04 RX ORDER — SPIRONOLACTONE 50 MG/1
TABLET, FILM COATED ORAL
Qty: 90 TABLET | Refills: 1 | Status: SHIPPED | OUTPATIENT
Start: 2020-02-04 | End: 2020-06-03 | Stop reason: SDUPTHER

## 2020-02-07 PROCEDURE — 93296 REM INTERROG EVL PM/IDS: CPT | Performed by: CLINICAL NURSE SPECIALIST

## 2020-02-07 PROCEDURE — 93294 REM INTERROG EVL PM/LDLS PM: CPT | Performed by: CLINICAL NURSE SPECIALIST

## 2020-02-18 ENCOUNTER — OFFICE VISIT (OUTPATIENT)
Dept: PRIMARY CARE CLINIC | Age: 85
End: 2020-02-18
Payer: MEDICARE

## 2020-02-18 VITALS
HEIGHT: 70 IN | TEMPERATURE: 97.2 F | HEART RATE: 61 BPM | WEIGHT: 243.75 LBS | BODY MASS INDEX: 34.89 KG/M2 | OXYGEN SATURATION: 96 % | SYSTOLIC BLOOD PRESSURE: 124 MMHG | DIASTOLIC BLOOD PRESSURE: 80 MMHG

## 2020-02-18 PROBLEM — Z12.11 ENCOUNTER FOR SCREENING FOR MALIGNANT NEOPLASM OF COLON: Status: ACTIVE | Noted: 2018-11-15

## 2020-02-18 PROCEDURE — 1123F ACP DISCUSS/DSCN MKR DOCD: CPT | Performed by: PEDIATRICS

## 2020-02-18 PROCEDURE — 4040F PNEUMOC VAC/ADMIN/RCVD: CPT | Performed by: PEDIATRICS

## 2020-02-18 PROCEDURE — 3023F SPIROM DOC REV: CPT | Performed by: PEDIATRICS

## 2020-02-18 PROCEDURE — G8926 SPIRO NO PERF OR DOC: HCPCS | Performed by: PEDIATRICS

## 2020-02-18 PROCEDURE — G8417 CALC BMI ABV UP PARAM F/U: HCPCS | Performed by: PEDIATRICS

## 2020-02-18 PROCEDURE — G8427 DOCREV CUR MEDS BY ELIG CLIN: HCPCS | Performed by: PEDIATRICS

## 2020-02-18 PROCEDURE — 99214 OFFICE O/P EST MOD 30 MIN: CPT | Performed by: PEDIATRICS

## 2020-02-18 PROCEDURE — 1036F TOBACCO NON-USER: CPT | Performed by: PEDIATRICS

## 2020-02-18 PROCEDURE — G0444 DEPRESSION SCREEN ANNUAL: HCPCS | Performed by: PEDIATRICS

## 2020-02-18 PROCEDURE — G8482 FLU IMMUNIZE ORDER/ADMIN: HCPCS | Performed by: PEDIATRICS

## 2020-02-18 PROCEDURE — G0439 PPPS, SUBSEQ VISIT: HCPCS | Performed by: PEDIATRICS

## 2020-02-18 RX ORDER — WHEAT DEXTRIN 3 G/3.8 G
4 POWDER (GRAM) ORAL
Qty: 1 CAN | Refills: 5 | Status: SHIPPED | OUTPATIENT
Start: 2020-02-18 | End: 2021-05-24

## 2020-02-18 RX ORDER — CEPHALEXIN 500 MG/1
500 CAPSULE ORAL 3 TIMES DAILY
Qty: 30 CAPSULE | Refills: 0 | Status: SHIPPED | OUTPATIENT
Start: 2020-02-18 | End: 2020-03-27

## 2020-02-18 ASSESSMENT — ENCOUNTER SYMPTOMS
SORE THROAT: 0
NAUSEA: 0
CHEST TIGHTNESS: 0
COUGH: 0
SHORTNESS OF BREATH: 0
ABDOMINAL PAIN: 0
DIARRHEA: 1
WHEEZING: 0
BACK PAIN: 0
VOMITING: 0

## 2020-02-18 ASSESSMENT — PATIENT HEALTH QUESTIONNAIRE - PHQ9: SUM OF ALL RESPONSES TO PHQ QUESTIONS 1-9: 9

## 2020-02-18 ASSESSMENT — LIFESTYLE VARIABLES: HOW OFTEN DO YOU HAVE A DRINK CONTAINING ALCOHOL: 0

## 2020-02-18 NOTE — PROGRESS NOTES
[address] [facility], [address]  Phone [phone]   Fax [phone]      OFFICE VISIT: 2020    Netta Blank-: 1935      HPI  Reason For Visit:  Agus Alaniz is a 81 y.o. Medicare AWV; Skin Problem (itchy, rough spot on left ear. Sore popped up under left arm pit around a week ago. continues to get worse); and Health Maintenance (colon screening, shingles )      Patient presents for routine annual wellness evaluation. He also presents with multiple other health issues. Present concerns: There are some stressful family issues. Diabetes Mellitus Type 2  Diet compliance:  compliant most of the time  Nutrition Consultation Needed:  no  Med Type:              Metformin 500 bid              trulicity 2.30BG weekly  Medication compliance:  compliant all of the time  Weight trend: fluctuating. Up about 3 lb in the past 3 months  Current exercise: no  Checkin times daily  Home blood sugar records: not checking recently  Low BG:  no  Eye exam current (within one year): yes  Checking Feet regularly:  yes - no sores  ACE/ARB:  yes - lisinopril  Aspirin: No: but recommended. Tobacco history: He  reports that he has never smoked.  He has never used smokeless tobacco.    Lab Results   Component Value Date    LABA1C 5.9 2019    LABA1C 6.3 (H) 2018    LABA1C 5.7 2018     Lab Results   Component Value Date    LABMICR 2.70 2019    CREATININE 0.9 2019       Hypertension:   BP today was   BP Readings from Last 1 Encounters:   20 124/80      Recent BP readings:    BP Readings from Last 3 Encounters:   20 124/80   19 122/88   10/17/19 118/68     Medication              Lisinopril 40 mg daily              Carvedilol 12.5 mg twice daily                          Hydrochlorothiazide 12.5 mg daily              Amlodipine 5 mg daily   Medication compliance:  compliant most of the time  Home blood pressure monitoring: No.    He is 1.4*    WBC 09/16/2019 9.8     RBC 09/16/2019 3.95*    Hemoglobin 09/16/2019 12.7*    Hematocrit 09/16/2019 38.2*    MCV 09/16/2019 96.7*    MCH 09/16/2019 32.2*    MCHC 09/16/2019 33.2     RDW 09/16/2019 13.2     Platelets 48/58/7720 290     MPV 09/16/2019 10.2     Neutrophils % 09/16/2019 74.6*    Lymphocytes % 09/16/2019 13.9*    Monocytes % 09/16/2019 7.9     Eosinophils % 09/16/2019 2.1     Basophils % 09/16/2019 0.6     Neutrophils Absolute 09/16/2019 7.3     Immature Granulocytes # 09/16/2019 0.1     Lymphocytes Absolute 09/16/2019 1.4     Monocytes Absolute 09/16/2019 0.80     Eosinophils Absolute 09/16/2019 0.20     Basophils Absolute 09/16/2019 0.10     Sodium 09/16/2019 140     Potassium 09/16/2019 4.0     Chloride 09/16/2019 96*    CO2 09/16/2019 30*    Anion Gap 09/16/2019 14     Glucose 09/16/2019 89     BUN 09/16/2019 19     CREATININE 09/16/2019 0.9     GFR Non- 09/16/2019 >60     Calcium 09/16/2019 10.4*    Total Protein 09/16/2019 8.0     Alb 09/16/2019 4.3     Total Bilirubin 09/16/2019 0.5     Alkaline Phosphatase 09/16/2019 55     ALT 09/16/2019 6     AST 09/16/2019 10     T4 Free 09/16/2019 1.2     TSH 09/16/2019 3.410     Microalbumin, Random Uri* 09/16/2019 2.70     Creatinine, Ur 09/16/2019 129.1     Microalbumin Creatinine * 09/16/2019 20.9     Vitamin B-12 09/16/2019 580      Copies of these are in the chart. Current Outpatient Medications   Medication Sig Dispense Refill    Wheat Dextrin (BENEFIBER) POWD Take 4 g by mouth 3 times daily (with meals) 1 Can 5    cephALEXin (KEFLEX) 500 MG capsule Take 1 capsule by mouth 3 times daily 30 capsule 0    spironolactone (ALDACTONE) 50 MG tablet TAKE 1 TABLET BY MOUTH EVERY DAY 90 tablet 1    blood glucose monitor kit and supplies Test once a day for symptoms of irregular blood glucose.  1 kit 0    ONE TOUCH LANCETS MISC 1 each by Does not apply route daily 100 each 3    blood glucose monitor strips Test once daily for symptoms of irregular blood glucose.  100 strip 1    FLUoxetine (PROZAC) 40 MG capsule Take 1 capsule by mouth daily 90 capsule 3    terazosin (HYTRIN) 5 MG capsule Take 1 capsule by mouth nightly 90 capsule 3    fluticasone-umeclidin-vilant (TRELEGY ELLIPTA) 100-62.5-25 MCG/INH AEPB Inhale 1 puff into the lungs daily 1 each 11    WELCHOL 625 MG tablet TAKE 3 TABLETS BY MOUTH 2 TIMES DAILY (WITH MEALS) 540 tablet 1    amLODIPine (NORVASC) 5 MG tablet Take 1 tablet by mouth daily 90 tablet 3    albuterol sulfate HFA (PROAIR HFA) 108 (90 Base) MCG/ACT inhaler Inhale 2 puffs into the lungs every 6 hours as needed for Wheezing 8.5 Inhaler 5    hydrochlorothiazide (MICROZIDE) 12.5 MG capsule TAKE 1 CAPSULE BY MOUTH EVERY DAY IN THE MORNING 90 capsule 3    furosemide (LASIX) 40 MG tablet Take 1.5 tablets by mouth daily 135 tablet 3    digoxin (LANOXIN) 125 MCG tablet Take 1 tablet by mouth every other day 90 tablet 3    celecoxib (CELEBREX) 200 MG capsule Take 1 capsule by mouth daily 30 capsule 5    buPROPion (WELLBUTRIN XL) 150 MG extended release tablet Take 2 tablets by mouth every morning 180 tablet 3    lisinopril (PRINIVIL;ZESTRIL) 40 MG tablet Take 1 tablet by mouth daily 90 tablet 3    metOLazone (ZAROXOLYN) 2.5 MG tablet Take 1 tablet by mouth daily 90 tablet 1    carvedilol (COREG) 12.5 MG tablet Take 1 tablet by mouth 2 times daily (with meals) 180 tablet 3    Dulaglutide (TRULICITY) 5.12 LS/1.9UY SOPN INJECT 1 PEN EVERY WEEK 12 pen 3    potassium chloride (KLOR-CON 10) 10 MEQ extended release tablet Take 1 tablet by mouth daily 90 tablet 3    Multiple Vitamins-Minerals (THERAPEUTIC MULTIVITAMIN-MINERALS) tablet Take 1 tablet by mouth daily      metFORMIN (GLUCOPHAGE) 500 MG tablet Take 1 tablet by mouth 2 times daily (with meals) 180 tablet 3    Lancets MISC 1 each by Does not apply route daily Accu Chek Guid3 Lancets 100 each 5    FLUoxetine hyperlipidemia E78.2 Lipid Panel   4. Chronic obstructive pulmonary disease, unspecified COPD type ([facility] J44.9 CT CHEST W CONTRAST   5. Chronic diastolic heart failure ([facility]) I50.32    6. Chronic atrial fibrillation I48.20    7. Chronic anticoagulation Z79.01    8. Prostate cancer ([facility] C61 Psa screening   9. Recurrent major depressive disorder, in partial remission ([facility] F33.41    10. Pulmonary hypertension ([facility]) I27.20 CT CHEST W CONTRAST   11. Chronic respiratory failure with hypoxia ([facility]) J96.11 CT CHEST W CONTRAST   12. Asbestosis ([facility] [email or web address] CT CHEST W CONTRAST   13. Anxiety F41.9    14. Routine general medical examination at a health care facility Z00.00 CBC Auto Differential     Comprehensive Metabolic Panel     FREE T4     Lipid Panel     Microalbumin / Creatinine Urine Ratio     TSH without Reflex     Hemoglobin A1C   15. Fatigue, unspecified type R53.83 FREE T4     TSH without Reflex   16. Loose stools R19.5 Wheat Dextrin (BENEFIBER) POWD   17. Abscess L02.91 cephALEXin (KEFLEX) 500 MG capsule   18. Encounter for screening for malignant neoplasm of prostate  Z12.5 Psa screening   19. Digoxin toxicity, accidental or unintentional, subsequent encounter T46.0X1D Digoxin Level   20. Chronic kidney disease, stage 3 (moderate) ([facility])  N18.3 Digoxin Level         PLAN    1. Type 2 diabetes mellitus with diabetic polyneuropathy, without long-term current use of insulin ([facility])  Hemoglobin A1c has been well controlled historically the last value of 5.9 on 6/20/2019. When he does check this is controlled by his account. We will recheck hemoglobin A1c to ensure stability  - Comprehensive Metabolic Panel; Future  - FREE T4; Future  - Microalbumin / Creatinine Urine Ratio; Future  - TSH without Reflex; Future  - Hemoglobin A1C; Future    2. Essential hypertension  Controlled on present medication regimen. Continue same regimen.    - CBC Auto Differential; Future  - Comprehensive Metabolic Panel;  Future  - Microalbumin / Creatinine Urine Ratio; Future    3. Mixed hyperlipidemia  Taking Questran and excellently controlled LDL at 64 6/20/2019. Recheck this value to ensure stability  - Lipid Panel; Future    4. Chronic obstructive pulmonary disease, unspecified COPD type ([facility]  Very well controlled on Trelegy Ellipta 1 inhalation daily. He has not had any exacerbations. He is using BiPAP at night  - CT CHEST W CONTRAST; Future    5. Chronic diastolic heart failure (HCC)  Clinically compensated on present medication regimen. Weight is stable and he is limiting his sodium intake    6. Chronic atrial fibrillation  Rate controlled on Lanoxin and anticoagulated on Xarelto. 7. Chronic anticoagulation  Xarelto as above. 8. Prostate cancer ([facility]  We will need to recheck to ensure stability and the absence of any recurrence of prostate cancer.    - Psa screening; Future    9. Recurrent major depressive disorder, in partial remission ([facility]  Doing well on duloxetine. He is under a lot of stress with family issues but adapting appropriately. 10. Pulmonary hypertension ([facility]  Continue with pulmonary therapy. Need to recheck CT of the chest stenosis and abnormal CT in the past  - CT CHEST W CONTRAST; Future    11. Chronic respiratory failure with hypoxia (HCC)  Continue present therapy stable  - CT CHEST W CONTRAST; Future    12. Asbestosis ([facility]  Recheck imaging as above  - CT CHEST W CONTRAST; Future    13. Anxiety  Doing well on present medication regimen    14. Routine general medical examination at a health care facility  Routine age-appropriate anticipatory guidance was provided. Annual wellness visit was performed in a separate note and issues were addressed as identified.     - CBC Auto Differential; Future  - Comprehensive Metabolic Panel; Future  - FREE T4; Future  - Lipid Panel; Future  - Microalbumin / Creatinine Urine Ratio; Future  - TSH without Reflex; Future  - Hemoglobin A1C; Future    15.  Fatigue, unspecified type  Recheck thyroid function  - FREE T4; Future  - TSH without Reflex; Future    16. Loose stools  Try increasing fiber in addition to Questran  - Wheat Dextrin (BENEFIBER) POWD; Take 4 g by mouth 3 times daily (with meals)  Dispense: 1 Can; Refill: 5    17. Abscess  Treat with antibiotic  - cephALEXin (KEFLEX) 500 MG capsule; Take 1 capsule by mouth 3 times daily  Dispense: 30 capsule; Refill: 0    18. Encounter for screening for malignant neoplasm of prostate   Recheck PSA as mentioned above  - Psa screening; Future    19. Digoxin toxicity, accidental or unintentional, subsequent encounter  Reassess digoxin level on lower dose  - Digoxin Level; Future    20. Chronic kidney disease, stage 3 (moderate) (HCC)   Stable on present medication regimen  - Digoxin Level; Future      Orders Placed This Encounter   Procedures    CT CHEST W CONTRAST    CBC Auto Differential    Comprehensive Metabolic Panel    FREE T4    Lipid Panel    Microalbumin / Creatinine Urine Ratio    TSH without Reflex    Hemoglobin A1C    Psa screening    Digoxin Level        Return in 3 months (on 2020) for Medicare Annual Wellness Visit in 1 year, 30. Medicare Annual Wellness Visit  Name: Magaly Naylor Date: 2020   MRN: [id number] Sex: Male   Age: 81 y.o. Ethnicity: Non-/Non    : 1935 Race: Nathaly Degroot is here for Medicare AWV; Skin Problem (itchy, rough spot on left ear. Sore popped up under left arm pit around a week ago. continues to get worse); and Health Maintenance (colon screening, shingles )    Screenings for behavioral, psychosocial and functional/safety risks, and cognitive dysfunction are all negative except as indicated below. These results, as well as other patient data from the [address] form, are documented in Flowsheets linked to this Encounter. No Known Allergies      Prior to Visit Medications    Medication Sig Taking?  Authorizing Provider   Wheat Dextrin (BENEFIBER) POWD Take 4 g by mouth 3 times daily (with meals) Yes ALISON Rogers DO   cephALEXin (KEFLEX) 500 MG capsule Take 1 capsule by mouth 3 times daily Yes ALISON Rogers DO   spironolactone (ALDACTONE) 50 MG tablet TAKE 1 TABLET BY MOUTH EVERY DAY Yes ISAAC Castro   blood glucose monitor kit and supplies Test once a day for symptoms of irregular blood glucose. Yes ALISON Rogers DO   ONE TOUCH LANCETS MISC 1 each by Does not apply route daily Yes ALISON Rogers DO   blood glucose monitor strips Test once daily for symptoms of irregular blood glucose.  Yes ALISON Rogers DO   FLUoxetine (PROZAC) 40 MG capsule Take 1 capsule by mouth daily Yes ISAAC Soto   terazosin (HYTRIN) 5 MG capsule Take 1 capsule by mouth nightly Yes ISAAC Soto   fluticasone-umeclidin-vilant (TRELEGY ELLIPTA) 100-62.5-25 MCG/INH AEPB Inhale 1 puff into the lungs daily Yes ISAAC Cazares   WELCHOL 625 MG tablet TAKE 3 TABLETS BY MOUTH 2 TIMES DAILY (WITH MEALS) Yes ALISON Rogers DO   amLODIPine (NORVASC) 5 MG tablet Take 1 tablet by mouth daily Yes ISAAC Soto   albuterol sulfate HFA (PROAIR HFA) 108 (90 Base) MCG/ACT inhaler Inhale 2 puffs into the lungs every 6 hours as needed for Wheezing Yes ISAAC Cazares   hydrochlorothiazide (MICROZIDE) 12.5 MG capsule TAKE 1 CAPSULE BY MOUTH EVERY DAY IN THE MORNING Yes ALISON Rogers DO   furosemide (LASIX) 40 MG tablet Take 1.5 tablets by mouth daily Yes ALISON Rogers DO   digoxin (LANOXIN) 125 MCG tablet Take 1 tablet by mouth every other day Yes ALISON Rogers DO   celecoxib (CELEBREX) 200 MG capsule Take 1 capsule by mouth daily Yes ALISON Rogers DO   buPROPion (WELLBUTRIN XL) 150 MG extended release tablet Take 2 tablets by mouth every morning Yes ISAAC Soto   lisinopril (PRINIVIL;ZESTRIL) 40 MG tablet Take 1 tablet Influenza, High Dose (Fluzone 65 yrs and older) 12/06/2016, 11/15/2017, 01/18/2019    Influenza, Triv, inactivated, subunit, adjuvanted, IM (Fluad 65 yrs and older) 10/17/2019    Pneumococcal Conjugate 13-valent ([id number]) 11/25/2015    Pneumococcal Polysaccharide ([id number]) 11/23/2016        Health Maintenance   Topic Date Due    DTaP/Tdap/Td vaccine (1 - Tdap) 02/17/1946    Hepatitis B vaccine (1 of 3 - Risk 3-dose series) 02[birth year]17/1955    Shingles Vaccine (1 of 2) 02/17/1986    Colon cancer screen colonoscopy  02/17/2011    Annual Wellness Visit (AWV)  05/29/2019    PSA counseling  06/20/2020    Potassium monitoring  09/16/2020    Creatinine monitoring  09/16/2020    Flu vaccine  Completed    Pneumococcal 65+ years Vaccine  Completed    Hepatitis A vaccine  Aged Out    Hib vaccine  Aged Out    Meningococcal (ACWY) vaccine  Aged Out     Recommendations for Xyo Due: see orders and patient instructions/AVS.  . Recommended screening schedule for the next 5-10 years is provided to the patient in written form: see Patient Instructions/AVS.    Jamila Tirado was seen today for medicare awv, skin problem and health maintenance. Diagnoses and all orders for this visit:    Type 2 diabetes mellitus with diabetic polyneuropathy, without long-term current use of insulin ([address]  -     Comprehensive Metabolic Panel; Future  -     FREE T4; Future  -     Microalbumin / Creatinine Urine Ratio; Future  -     TSH without Reflex; Future  -     Hemoglobin A1C; Future    Essential hypertension  -     CBC Auto Differential; Future  -     Comprehensive Metabolic Panel; Future  -     Microalbumin / Creatinine Urine Ratio; Future    Mixed hyperlipidemia  -     Lipid Panel; Future    Chronic obstructive pulmonary disease, unspecified COPD type ([address]  -     CT CHEST W CONTRAST;  Future    Chronic diastolic heart failure (HCC)    Chronic atrial fibrillation    Chronic anticoagulation    Prostate cancer ([address]  - Psa screening; Future    Recurrent major depressive disorder, in partial remission ([address]    Pulmonary hypertension ([address]  -     CT CHEST W CONTRAST; Future    Chronic respiratory failure with hypoxia ([address]  -     CT CHEST W CONTRAST; Future    Asbestosis ([address]  -     [address]; Future    Anxiety    Routine general medical examination at a health care facility  -     CBC Auto Differential; Future  -     Comprehensive Metabolic Panel; Future  -     FREE T4; Future  -     Lipid Panel; Future  -     Microalbumin / Creatinine Urine Ratio; Future  -     TSH without Reflex; Future  -     Hemoglobin A1C; Future    Fatigue, unspecified type  -     FREE T4; Future  -     TSH without Reflex; Future    Loose stools  -     Wheat Dextrin (BENEFIBER) POWD; Take 4 g by mouth 3 times daily (with meals)    Abscess  -     cephALEXin (KEFLEX) 500 MG capsule; Take 1 capsule by mouth 3 times daily    Encounter for screening for malignant neoplasm of prostate   -     Psa screening; Future    Digoxin toxicity, accidental or unintentional, subsequent encounter  -     Digoxin Level; Future    Chronic kidney disease, stage 3 (moderate) (HCC)   -     Digoxin Level;  Future

## 2020-02-18 NOTE — PATIENT INSTRUCTIONS
Personalized Preventive Plan for Jeanine Aguilar - 2/18/2020  Medicare offers a range of preventive health benefits. Some of the tests and screenings are paid in full while other may be subject to a deductible, co-insurance, and/or copay. Some of these benefits include a comprehensive review of your medical history including lifestyle, illnesses that may run in your family, and various assessments and screenings as appropriate. After reviewing your medical record and screening and assessments performed today your provider may have ordered immunizations, labs, imaging, and/or referrals for you. A list of these orders (if applicable) as well as your Preventive Care list are included within your After Visit Summary for your review. Other Preventive Recommendations:    · A preventive eye exam performed by an eye specialist is recommended every 1-2 years to screen for glaucoma; cataracts, macular degeneration, and other eye disorders. · A preventive dental visit is recommended every 6 months. · Try to get at least 150 minutes of exercise per week or 10,000 steps per day on a pedometer . · Order or download the FREE \"Exercise & Physical Activity: Your Everyday Guide\" from [employer]. Call 3-921.781.2430 or search [employer] online. · You need 4955-4250 mg of calcium and 3011-4476 IU of vitamin D per day. It is possible to meet your calcium requirement with diet alone, but a vitamin D supplement is usually necessary to meet this goal.  · When exposed to the sun, use a sunscreen that protects against both UVA and UVB radiation with an SPF of 30 or greater. Reapply every 2 to 3 hours or after sweating, drying off with a towel, or swimming. · Always wear a seat belt when traveling in a car. Always wear a helmet when riding a bicycle or motorcycle.   Patient Education        Well Visit, Over 72: Care Instructions  Your Care Instructions    Physical exams can help you stay healthy. Your doctor has checked your overall health and may have suggested ways to take good care of yourself. He or she also may have recommended tests. At home, you can help prevent illness with healthy eating, regular exercise, and other steps. Follow-up care is a key part of your treatment and safety. Be sure to make and go to all appointments, and call your doctor if you are having problems. It's also a good idea to know your test results and keep a list of the medicines you take. How can you care for yourself at home? Reach and stay at a healthy weight. This will lower your risk for many problems, such as obesity, diabetes, heart disease, and high blood pressure. Get at least 30 minutes of exercise on most days of the week. Walking is a good choice. You also may want to do other activities, such as running, swimming, cycling, or playing tennis or team sports. Do not smoke. Smoking can make health problems worse. If you need help quitting, talk to your doctor about stop-smoking programs and medicines. These can increase your chances of quitting for good. Protect your skin from too much sun. When you're outdoors from 10 a.m. to 4 p.m., stay in the shade or cover up with clothing and a hat with a wide brim. Wear sunglasses that block UV rays. Even when it's cloudy, put broad-spectrum sunscreen (SPF 30 or higher) on any exposed skin. See a dentist one or two times a year for checkups and to have your teeth cleaned. Wear a seat belt in the car. Follow your doctor's advice about when to have certain tests. These tests can spot problems early. For men and women  Cholesterol. Your doctor will tell you how often to have this done based on your overall health and other things that can increase your risk for heart attack and stroke. Blood pressure. Have your blood pressure checked during a routine doctor visit.  Your doctor will tell you how often to check your blood pressure based on your age, your blood pressure results, and other factors. Diabetes. Ask your doctor whether you should have tests for diabetes. Vision. Experts recommend that you have yearly exams for glaucoma and other age-related eye problems. Hearing. Tell your doctor if you notice any change in your hearing. You can have tests to find out how well you hear. Colon cancer tests. Keep having colon cancer tests as your doctor recommends. You can have one of several types of tests. Heart attack and stroke risk. At least every 4 to 6 years, you should have your risk for heart attack and stroke assessed. Your doctor uses factors such as your age, blood pressure, cholesterol, and whether you smoke or have diabetes to show what your risk for a heart attack or stroke is over the next 10 years. Osteoporosis. Talk to your doctor about whether you should have a bone density test to find out whether you have thinning bones. Also ask your doctor about whether you should take calcium and vitamin D supplements. For women  Pap test and pelvic exam. You may no longer need a Pap test. Talk with your doctor about whether to stop or continue to have Pap tests. Breast exam and mammogram. Ask how often you should have a mammogram, which is an X-ray of your breasts. A mammogram can spot breast cancer before it can be felt and when it is easiest to treat. Thyroid disease. Talk to your doctor about whether to have your thyroid checked as part of a regular physical exam. Women have an increased chance of a thyroid problem. For men  Prostate exam. Talk to your doctor about whether you should have a blood test (called a PSA test) for prostate cancer. Experts recommend that you discuss the benefits and risks of the test with your doctor before you decide whether to have this test. Some experts say that men ages 79 and older no longer need testing. Abdominal aortic aneurysm. Ask your doctor whether you should have a test to check for an aneurysm.  You may need a test if you ever smoked or if your parent, brother, sister, or child has had an aneurysm. When should you call for help? Watch closely for changes in your health, and be sure to contact your doctor if you have any problems or symptoms that concern you. Where can you learn more? Go to https://chpepiceweb.[employer].. org and sign in to your [employer] account. Enter M124 in the [facility] box to learn more about \"Well Visit, Over 65: Care Instructions. \"     If you do not have an account, please click on the \"Sign Up Now\" link. Current as of: August 21, 2019  Content Version: 12.3  © 9563-6976 [employer]. Care instructions adapted under license by [facility] ([facility]). If you have questions about a medical condition or this instruction, always ask your healthcare professional. [address] any warranty or liability for your use of this information. Patient Education        Preventing Falls: Care Instructions  Your Care Instructions    Getting around your home safely can be a challenge if you have injuries or health problems that make it easy for you to fall. Loose rugs and furniture in walkways are among the dangers for many older people who have problems walking or who have poor eyesight. People who have conditions such as arthritis, osteoporosis, or dementia also have to be careful not to fall. You can make your home safer with a few simple measures. Follow-up care is a key part of your treatment and safety. Be sure to make and go to all appointments, and call your doctor if you are having problems. It's also a good idea to know your test results and keep a list of the medicines you take. How can you care for yourself at home? Taking care of yourself  You may get dizzy if you do not drink enough water. To prevent dehydration, drink plenty of fluids, enough so that your urine is light yellow or clear like water. Choose water and other caffeine-free clear liquids.  If Instructions    Worries about falls don't need to keep you indoors. Outdoor activities like walking have big benefits for your health. You will need to watch your step and learn a few safety measures. If you are worried about having a fall outdoors, ask your doctor about exercises, classes, or physical therapy that may help. You can learn ways to gain strength, flexibility, and balance. Ask if it might help to use a cane or walker. You can make your time outdoors safer with a few simple measures. Follow-up care is a key part of your treatment and safety. Be sure to make and go to all appointments, and call your doctor if you are having problems. It's also a good idea to know your test results and keep a list of the medicines you take. How can you prevent falls outdoors? Wear shoes with firm soles and low heels. If you have to walk on an icy surface, use grippers that can be worn over your shoes in bad weather. Be extra careful if weather is bad. Walk on the grass when the sidewalks are slick. If you live in a place that gets snow and ice in the winter, sprinkle salt on slippery stairs and sidewalks. Be careful getting on or off buses and trains or getting in and out of cars. If handrails are available, use them. Be careful when you cross the street. Look for crosswalks or places where curb cuts or ramps are present. Try not to hurry, especially if you are carrying something. Be cautious in parking lots or garages. There may be curbs or changes in pavement, or the height of the pavement may vary. Make sure to wear the correct eyeglasses, if you need them. Reading glasses or bifocals can make it harder to see hazards that might be in your way. If you are walking outdoors for exercise, try to: Walk in well-lighted, well-maintained areas. These include high school or college tracks, shopping malls, and public spaces. Walk with a partner.   Watch out for cracked sidewalks, curbs, changes in the height of the pavement, exposed tree roots, and debris such as fallen leaves or branches. Where can you learn more? Go to https://chpepiceweb.[employer]. org and sign in to your [employer] account. Enter N684 in the [facility] box to learn more about \"Preventing Outdoor Falls: Care Instructions. \"     If you do not have an account, please click on the \"Sign Up Now\" link. Current as of: August 6, 2019  Content Version: 12.3  © 8693-0624 [email or web address].[employer]. Care instructions adapted under license by [facility] ([facility]). If you have questions about a medical condition or this instruction, always ask your healthcare professional. [place] [address] any warranty or liability for your use of this information. Patient Education        Learning About Getting In and Out of a Bathtub Safely  Introduction  Many falls happen during bathing. All that water makes the bathroom a slippery place. You may no longer be able to step over the tub wall comfortably and safely. You might lean on things that aren't meant to support your weight, like a towel bar or the shower curtain. There are several types of aids that will help keep you safe. You can buy them at [employer] or home improvement stores or online. What tools can you use to get in and out of the tub? Tub rail and grab bar   There are many types of bars and rails you can install on the walls next to your tub or on the edge of the tub. They will help keep you safe while you're getting in or out of the tub. It's important to have them permanently installed, rather than attached with suction. Transfer bench   There are several kinds of benches or seats to use in the bathtub. One type sits in the tub and extends out over the side. You sit on the bench. Lift one leg at a time into the tub, sliding your body over as you do so. Another common tub bench sits inside the tub.  To use this type you need to be able to safely step into the tub before sitting an important job on time. Stress also can last a long time. Long-term stress is caused by stressful situations or events. Examples of long-term stress include long-term health problems, ongoing problems at work, or conflicts in your family. Long-term stress can harm your health. How does stress affect your health? When you are stressed, your body responds as though you are in danger. It makes hormones that speed up your heart, make you breathe faster, and give you a burst of energy. This is called the fight-or-flight stress response. If the stress is over quickly, your body goes back to normal and no harm is done. But if stress happens too often or lasts too long, it can have bad effects. Long-term stress can make you more likely to get sick, and it can make symptoms of some diseases worse. If you tense up when you are stressed, you may develop neck, shoulder, or low back pain. Stress is linked to high blood pressure and heart disease. Stress also harms your emotional health. It can make you cross, tense, or depressed. Your relationships may suffer, and you may not do well at work or school. What can you do to manage stress? How to relax your mind  Write. It may help to write about things that are bothering you. This helps you find out how much stress you feel and what is causing it. When you know this, you can find better ways to cope. Let your feelings out. Talk, laugh, cry, and express anger when you need to. Talking with friends, family, a counselor, or a member of the clergy about your feelings is a healthy way to relieve stress. Do something you enjoy. For example, listen to music or go to a movie. Practice your hobby or do volunteer work. Meditate. This can help you relax, because you are not worrying about what happened before or what may happen in the future. Do guided imagery. Imagine yourself in any setting that helps you feel calm. You can use audiotapes, books, or a teacher to guide you.   How to relax your body  Do something active. Exercise or activity can help reduce stress. Walking is a great way to get started. Even everyday activities such as housecleaning or yard work can help. Do breathing exercises. For example:  From a standing position, bend forward from the waist with your knees slightly bent. Let your arms dangle close to the floor. Breathe in slowly and deeply as you return to a standing position. Roll up slowly and lift your head last.  Hold your breath for just a few seconds in the standing position. Breathe out slowly and bend forward from the waist.  Try yoga or tk chi. These techniques combine exercise and meditation. You may need some training at first to learn them. What can you do to prevent stress? Manage your time. This helps you find time to do the things you want and need to do. Get enough sleep. Your body recovers from the stresses of the day while you are sleeping. Get support. Your family, friends, and community can make a difference in how you experience stress. Where can you learn more? Go to https://ViterichelleWepa.Lemonwise. org and sign in to your [employer] account. Enter B118 in the [employer] box to learn more about \"Learning About Stress. \"     If you do not have an account, please click on the \"Sign Up Now\" link. Current as of: April 7, 2019  Content Version: 12.3  © 3632-7317 Kizoom. Care instructions adapted under license by [address] If you have questions about a medical condition or this instruction, always ask your healthcare professional. [place] [address] any warranty or liability for your use of this information. Patient Education        Learning About Guided Imagery for Stress  What are guided imagery and stress? Stress is what you feel when you have to handle more than you are used to. A lot of things can cause stress.  You may feel stress when you go on a job interview, take a test, or slowly, and then let the air out completely through your mouth. Do it again slowly. Keep breathing like this. Gather up any tension in your body, and send it out with every breath. Let a feeling of warmth spread from your lungs to your neck and head, down your arms to your fingertips, through your body and into your legs, all the way down to your toes. Stay this way for a moment. Now imagine a pleasant day. You're at a park or at a place you've visited in the past where you felt at peace. In your mind's eye, look at what lies in front of you. Maybe you see the sun, filtered through trees. Maybe clouds are drifting by. Look to one side, and then the other. Notice the feel of the air around you. Notice how it feels on your face and on your arms. Stay here for a while. Let it become real for you. Follow-up care is a key part of your treatment and safety. Be sure to make and go to all appointments, and call your doctor if you are having problems. It's also a good idea to know your test results and keep a list of the medicines you take. Where can you learn more? Go to https://Ringleadr.compeCrestHire.Larky. org and sign in to your [employer] account. Enter N291 in the [employer] box to learn more about \"Learning About Guided Imagery for Stress. \"     If you do not have an account, please click on the \"Sign Up Now\" link. Current as of: April 7, 2019  Content Version: 12.3  © 6687-1180 Healthwise, Incorporated. Care instructions adapted under license by [facility] ([facility]). If you have questions about a medical condition or this instruction, always ask your healthcare professional. [place] [address] any warranty or liability for your use of this information. Patient Education        Learning About Mindfulness for Stress  What are mindfulness and stress? Stress is what you feel when you have to handle more than you are used to. A lot of things can cause stress.  You may feel stress when come.  You can practice anytime, anywhere, and in any way you choose. You can practice in many ways. Here are a few ideas:  While doing your chores, like washing the dishes, let your mind focus on what's in your hand. What does the dish feel like? Is the water warm or cold? Go outside and take a few deep breaths. What is the air like? Is it warm or cold? When you can, take some time at the start of your day to sit alone and think. Take a slow walk by yourself. Count your steps while you breathe in and out. Try yoga breathing exercises, stretches, and poses to strengthen and relax your muscles. At work, if you can, try to stop for a few moments each hour. Note how your body feels. Let yourself regroup and let your mind settle before you return to what you were doing. If you struggle with anxiety or \"worry thoughts,\" imagine your mind as a blue yousif and your worry thoughts as clouds. Now imagine those worry thoughts floating across your mind's yousif. Just let them pass by as you watch. Follow-up care is a key part of your treatment and safety. Be sure to make and go to all appointments, and call your doctor if you are having problems. It's also a good idea to know your test results and keep a list of the medicines you take. Where can you learn more? Go to https://ADman Mediapejaneb.[employer]me Networks. org and sign in to your Seisquare account. Enter R718 in the [employer] box to learn more about \"Learning About Mindfulness for Stress. \"     If you do not have an account, please click on the \"Sign Up Now\" link. Current as of: April 7, 2019  Content Version: 12.3  © 3504-2052 Healthwise, Incorporated. Care instructions adapted under license by [facility] ([facility]). If you have questions about a medical condition or this instruction, always ask your healthcare professional. [address] any warranty or liability for your use of this information.          Patient Education        Learning About Progressive Muscle Relaxation for Stress  What are progressive muscle relaxation and stress? Stress is what you feel when you have to handle more than you are used to. A lot of things can cause stress. You may feel stress when you go on a job interview, take a test, or run a race. This kind of short-term stress is normal and even useful. It can help you if you need to work hard or react quickly. Stress also can last a long time. Long-term stress is caused by stressful situations or events. Examples of long-term stress include long-term health problems, ongoing problems at work, and conflicts in your family. Long-term stress can harm your health. When you have anxiety or stress in your life, one of the ways your body responds is with muscle tension. Progressive muscle relaxation is a method that helps relieve that tension. How does progressive muscle relaxation reduce stress? The body responds to stress with muscle tension, which can cause pain or discomfort. In turn, tense muscles relay to the body that it's stressed. That keeps the cycle of stress and muscle tension going. Progressive muscle relaxation helps break this cycle by reducing muscle tension and general mental anxiety. This method often helps people get to sleep. How do you do progressive muscle relaxation? To do progressive muscle relaxation, first you tense a group of muscles as you breathe in. Then you relax them as you breathe out. Notice how your muscles feel when you relax them. You work on your muscle groups in a certain order. Through practice, you can learn to feel the difference between a tensed muscle and a relaxed muscle. Then you can learn how to turn on this relaxed state at the first sign of a muscle tensing up due to stress. Practicing this method for a few weeks will help you get better at this skill. In time you'll be able to use this method to relieve stress.  When you first start, it may help to use an audio recording until with dental care? Normal dental care  To keep the teeth and gums healthy:  Brush the teeth with fluoride toothpaste twice a day--in the morning and at night--and floss at least once a day. Plaque can quickly build up on the teeth of older adults. Watch for the signs of gum disease. These signs include gums that bleed after brushing or after eating hard foods, such as apples. See a dentist regularly. Many experts recommend checkups every 6 months. Keep the dentist up to date on any new medications the person is taking. Encourage a balanced diet that includes whole grains, vegetables, and fruits, and that is low in saturated fat and sodium. Encourage the person you're caring for not to use tobacco products. They can affect dental and general health. Many older adults have a fixed income and feel that they can't afford dental care. But most [facility] and [facility] have programs in which dentists help older adults by lowering fees. Contact your area's public health offices or  for information about dental care in your area. Using a toothbrush  Older adults with arthritis sometimes have trouble brushing their teeth because they can't easily hold the toothbrush. Their hands and fingers may be stiff, painful, or weak. If this is the case, you can: Offer an electric toothbrush. Enlarge the handle of a non-electric toothbrush by wrapping a sponge, an elastic bandage, or adhesive tape around it. Push the toothbrush handle through a ball made of rubber or soft foam.  Make the handle longer and thicker by taping Popsicle sticks or tongue depressors to it. You may also be able to buy special toothbrushes, toothpaste dispensers, and floss holders. Your doctor may recommend a soft-bristle toothbrush if the person you care for bleeds easily. Bleeding can happen because of a health problem or from certain medicines. A toothpaste for sensitive teeth may help if the person you care for has sensitive teeth.   How do you brush and floss someone's teeth? If the person you are caring for has a hard time cleaning their teeth on their own, you may need to brush and floss their teeth for them. It may be easiest to have the person sit and face away from you, and to sit or stand behind them. That way you can steady their head against your arm as you reach around to floss and brush their teeth. Choose a place that has good lighting and is comfortable for both of you. Before you begin, gather your supplies. You will need gloves, floss, a toothbrush, and a container to hold water if you are not near a sink. Wash and dry your hands well and put on gloves. Start by flossing:  Gently work a piece of floss between each of the teeth toward the gums. A plastic flossing tool may make this easier, and they are available at most [facility]. Curve the floss around each tooth into a U-shape and gently slide it under the gum line. Move the floss firmly up and down several times to scrape off the plaque. After you've finished flossing, throw away the used floss and begin brushing:  Wet the brush and apply toothpaste. Place the brush at a 45-degree angle where the teeth meet the gums. Press firmly, and move the brush in small circles over the surface of the teeth. Be careful not to brush too hard. Vigorous brushing can make the gums pull away from the teeth and can scratch the tooth enamel. Brush all surfaces of the teeth, on the tongue side and on the cheek side. Pay special attention to the front teeth and all surfaces of the back teeth. Brush chewing surfaces with short back-and-forth strokes. After you've finished, help the person rinse the remaining toothpaste from their mouth. Where can you learn more? Go to https://amanda.I-Workspartners. org and sign in to your [employer] account. Enter B084 in the ReferStar box to learn more about \"Learning About Dental Care for Older Adults. \"     If you do not have an account, your tongue from back to front. Floss at least once a day. Choose the type and flavor you like best.  Schedule checkups and cleanings as often as your dentist recommends it. Eat a healthy diet to help keep your gums healthy and your teeth strong. Choose foods that are good for your teeth, such as whole grains, vegetables, fruits, and foods that are low in saturated fat and sodium. Mozzarella and other cheeses, peanuts, yogurt, and milk are good for your teeth. Sugar-free chewing gum (especially gum that contains xylitol) is also a good choice. Avoid foods that contain a lot of sugar, especially sticky, sweet foods like taffy. Don't snack before bedtime. Food left on the teeth is more likely to cause tooth decay overnight. Don't smoke or use smokeless tobacco. Tobacco can make tooth decay worse. If you need help quitting, talk to your doctor about stop-smoking programs and medicines. These can increase your chances of quitting for good. Where can you learn more? Go to https://Game Ventures.Mixertech. org and sign in to your M[employer] Connect account. Enter D443 in the CTQuan box to learn more about \"Learning About Dental Care. \"     If you do not have an account, please click on the \"Sign Up Now\" link. Current as of: July 28, 2019  Content Version: 12.3  © 8965-0507 Healthwise, Incorporated. Care instructions adapted under license by [facility] ([facility]). If you have questions about a medical condition or this instruction, always ask your healthcare professional. [place] [address] any warranty or liability for your use of this information. Patient Education        Skin Abscess: Care Instructions  Your Care Instructions    A skin abscess is a bacterial infection that forms a pocket of pus. A boil is a kind of skin abscess. The doctor may have cut an opening in the abscess so that the pus can drain out.  You may have gauze in the cut so that the abscess will stay open and keep draining. You may need antibiotics. You will need to follow up with your doctor to make sure the infection has gone away. The doctor has checked you carefully, but problems can develop later. If you notice any problems or new symptoms, get medical treatment right away. Follow-up care is a key part of your treatment and safety. Be sure to make and go to all appointments, and call your doctor if you are having problems. It's also a good idea to know your test results and keep a list of the medicines you take. How can you care for yourself at home? Apply warm and dry compresses, a heating pad set on low, or a hot water bottle 3 or 4 times a day for pain. Keep a cloth between the heat source and your skin. If your doctor prescribed antibiotics, take them as directed. Do not stop taking them just because you feel better. You need to take the full course of antibiotics. Take pain medicines exactly as directed. If the doctor gave you a prescription medicine for pain, take it as prescribed. If you are not taking a prescription pain medicine, ask your doctor if you can take an over-the-counter medicine. Keep your bandage clean and dry. Change the bandage whenever it gets wet or dirty, or at least one time a day. If the abscess was packed with gauze:  Keep follow-up appointments to have the gauze changed or removed. If the doctor instructed you to remove the gauze, follow the instructions you were given for how to remove it. After the gauze is removed, soak the area in warm water for 15 to 20 minutes 2 times a day, until the wound closes. When should you call for help? Call your doctor now or seek immediate medical care if:    You have signs of worsening infection, such as: Increased pain, swelling, warmth, or redness. Red streaks leading from the infected skin. Pus draining from the wound.   A fever.    Watch closely for changes in your health, and be sure to contact your doctor if:    You do not get better as expected. Where can you learn more? Go to https://chpepiceweb.FibeRio. org and sign in to your TV Pixie account. Enter J605 in the [facility] box to learn more about \"Skin Abscess: Care Instructions. \"     If you do not have an account, please click on the \"Sign Up Now\" link. Current as of: April 1, 2019  Content Version: 12.3  © 8897-2642 Matchbook. Care instructions adapted under license by [facility] ([facility]). If you have questions about a medical condition or this instruction, always ask your healthcare professional. [place] [address] any warranty or liability for your use of this information. Patient Education        Learning About Diabetes Food Guidelines  Your Care Instructions    Meal planning is important to manage diabetes. It helps keep your blood sugar at a target level (which you set with your doctor). You don't have to eat special foods. You can eat what your family eats, including sweets once in a while. But you do have to pay attention to how often you eat and how much you eat of certain foods. You may want to work with a dietitian or a certified diabetes educator (CDE) to help you plan meals and snacks. A dietitian or CDE can also help you lose weight if that is one of your goals. What should you know about eating carbs? Managing the amount of carbohydrate (carbs) you eat is an important part of healthy meals when you have diabetes. Carbohydrate is found in many foods. Learn which foods have carbs. And learn the amounts of carbs in different foods. Bread, cereal, pasta, and rice have about 15 grams of carbs in a serving. A serving is 1 slice of bread (1 ounce), ½ cup of cooked cereal, or 1/3 cup of cooked pasta or rice. Fruits have 15 grams of carbs in a serving.  A serving is 1 small fresh fruit, such as an apple or orange; ½ of a banana; ½ cup of cooked or canned fruit; ½ cup of fruit juice; 1 cup of melon or raspberries; or 2 tablespoons of dried fruit. Milk and no-sugar-added yogurt have 15 grams of carbs in a serving. A serving is 1 cup of milk or 2/3 cup of no-sugar-added yogurt. Starchy vegetables have 15 grams of carbs in a serving. A serving is ½ cup of mashed potatoes or sweet potato; 1 cup winter squash; ½ of a small baked potato; ½ cup of cooked beans; or ½ cup cooked corn or green peas. Learn how much carbs to eat each day and at each meal. A dietitian or CDE can teach you how to keep track of the amount of carbs you eat. This is called carbohydrate counting. If you are not sure how to count carbohydrate grams, use the Plate Method to plan meals. It is a good, quick way to make sure that you have a balanced meal. It also helps you spread carbs throughout the day. Divide your plate by types of foods. Put non-starchy vegetables on half the plate, meat or other protein food on one-quarter of the plate, and a grain or starchy vegetable in the final quarter of the plate. To this you can add a small piece of fruit and 1 cup of milk or yogurt, depending on how many carbs you are supposed to eat at a meal.  Try to eat about the same amount of carbs at each meal. Do not \"save up\" your daily allowance of carbs to eat at one meal.  Proteins have very little or no carbs per serving. Examples of proteins are beef, chicken, turkey, fish, eggs, tofu, cheese, cottage cheese, and peanut butter. A serving size of meat is 3 ounces, which is about the size of a deck of cards. Examples of meat substitute serving sizes (equal to 1 ounce of meat) are 1/4 cup of cottage cheese, 1 egg, 1 tablespoon of peanut butter, and ½ cup of tofu. How can you eat out and still eat healthy? Learn to estimate the serving sizes of foods that have carbohydrate. If you measure food at home, it will be easier to estimate the amount in a serving of restaurant food.   If the meal you order has too much carbohydrate (such as potatoes, corn, or baked beans), ask to have a low-carbohydrate food instead. Ask for a salad or green vegetables. If you use insulin, check your blood sugar before and after eating out to help you plan how much to eat in the future. If you eat more carbohydrate at a meal than you had planned, take a walk or do other exercise. This will help lower your blood sugar. What else should you know? Limit saturated fat, such as the fat from meat and dairy products. This is a healthy choice because people who have diabetes are at higher risk of heart disease. So choose lean cuts of meat and nonfat or low-fat dairy products. Use olive or canola oil instead of butter or shortening when cooking. Don't skip meals. Your blood sugar may drop too low if you skip meals and take insulin or certain medicines for diabetes. Check with your doctor before you drink alcohol. Alcohol can cause your blood sugar to drop too low. Alcohol can also cause a bad reaction if you take certain diabetes medicines. Follow-up care is a key part of your treatment and safety. Be sure to make and go to all appointments, and call your doctor if you are having problems. It's also a good idea to know your test results and keep a list of the medicines you take. Where can you learn more? Go to https://MetaresolverpeRedfin.trgt.us. org and sign in to your Tendr account. Enter Q291 in the [facility] box to learn more about \"Learning About Diabetes Food Guidelines. \"     If you do not have an account, please click on the \"Sign Up Now\" link. Current as of: April 16, 2019  Content Version: 12.3  © 0255-3530 Healthwise, Incorporated. Care instructions adapted under license by [facility] ([facility]). If you have questions about a medical condition or this instruction, always ask your healthcare professional. [place] [address] any warranty or liability for your use of this information.          Patient Education        Learning About Meal Planning for Diabetes  Why plan your concerns. Carbohydrate counting  With carbohydrate counting, you plan meals based on the amount of carbohydrate in each food. Carbohydrate raises blood sugar higher and more quickly than any other nutrient. It is found in desserts, breads and cereals, and fruit. It's also found in starchy vegetables such as potatoes and corn, grains such as rice and pasta, and milk and yogurt. Spreading carbohydrate throughout the day helps keep your blood sugar levels within your target range. Your daily amount depends on several things, including your weight, how active you are, which diabetes medicines you take, and what your goals are for your blood sugar levels. A registered dietitian or diabetes educator can help you plan how much carbohydrate to include in each meal and snack. A guideline for your daily amount of carbohydrate is:  45 to 60 grams at each meal. That's about the same as 3 to 4 carbohydrate servings. 15 to 20 grams at each snack. That's about the same as 1 carbohydrate serving. The Nutrition Facts label on packaged foods tells you how much carbohydrate is in a serving of the food. First, look at the serving size on the food label. Is that the amount you eat in a serving? All of the nutrition information on a food label is based on that serving size. So if you eat more or less than that, you'll need to adjust the other numbers. Total carbohydrate is the next thing you need to look for on the label. If you count carbohydrate servings, one serving of carbohydrate is 15 grams. For foods that don't come with labels, such as fresh fruits and vegetables, you'll need a guide that lists carbohydrate in these foods. Ask your doctor, dietitian, or diabetes educator about books or other nutrition guides you can use. If you take insulin, you need to know how many grams of carbohydrate are in a meal. This lets you know how much rapid-acting insulin to take before you eat.  If you use an insulin pump, you get a constant rate of insulin during the day. So the pump must be programmed at meals to give you extra insulin to cover the rise in blood sugar after meals. When you know how much carbohydrate you will eat, you can take the right amount of insulin. Or, if you always use the same amount of insulin, you need to make sure that you eat the same amount of carbohydrate at meals. If you need more help to understand carbohydrate counting and food labels, ask your doctor, dietitian, or diabetes educator. How do you get started with meal planning? Here are some tips to get started:  Plan your meals a week at a time. Don't forget to include snacks too. Use cookbooks or online recipes to plan several main meals. Plan some quick meals for busy nights. You also can double some recipes that freeze well. Then you can save half for other busy nights when you don't have time to cook. Make sure you have the ingredients you need for your recipes. If you're running low on basic items, put these items on your shopping list too. List foods that you use to make breakfasts, lunches, and snacks. List plenty of fruits and vegetables. Post this list on the refrigerator. Add to it as you think of more things you need. Take the list to the store to do your weekly shopping. Follow-up care is a key part of your treatment and safety. Be sure to make and go to all appointments, and call your doctor if you are having problems. It's also a good idea to know your test results and keep a list of the medicines you take. Where can you learn more? Go to https://amanda.Prepared Response. org and sign in to your optionsXpress account. Enter N872 in the [facility] box to learn more about \"Learning About Meal Planning for Diabetes. \"     If you do not have an account, please click on the \"Sign Up Now\" link. Current as of: April 16, 2019  Content Version: 12.3  © 4292-1368 Healthwise, Incorporated.  Care instructions adapted under license by [facility]

## 2020-02-19 ENCOUNTER — APPOINTMENT (OUTPATIENT)
Dept: ONCOLOGY | Facility: HOSPITAL | Age: 85
End: 2020-02-19

## 2020-02-20 ENCOUNTER — INFUSION (OUTPATIENT)
Dept: ONCOLOGY | Facility: HOSPITAL | Age: 85
End: 2020-02-20

## 2020-02-20 VITALS
WEIGHT: 245 LBS | DIASTOLIC BLOOD PRESSURE: 58 MMHG | HEART RATE: 68 BPM | OXYGEN SATURATION: 98 % | BODY MASS INDEX: 35.07 KG/M2 | SYSTOLIC BLOOD PRESSURE: 143 MMHG | HEIGHT: 70 IN | TEMPERATURE: 98 F | RESPIRATION RATE: 18 BRPM

## 2020-02-20 DIAGNOSIS — C61 CANCER OF PROSTATE (HCC): Primary | ICD-10-CM

## 2020-02-20 PROCEDURE — 96402 CHEMO HORMON ANTINEOPL SQ/IM: CPT

## 2020-02-20 PROCEDURE — 25010000002 LEUPROLIDE ACETATE (4 MONTH) PER 7.5 MG: Performed by: UROLOGY

## 2020-02-20 RX ADMIN — LEUPROLIDE ACETATE 30 MG: KIT at 14:56

## 2020-02-25 ENCOUNTER — TELEPHONE (OUTPATIENT)
Dept: PRIMARY CARE CLINIC | Age: 85
End: 2020-02-25

## 2020-02-25 ENCOUNTER — HOSPITAL ENCOUNTER (OUTPATIENT)
Dept: CT IMAGING | Age: 85
Discharge: HOME | End: 2020-02-25
Payer: MEDICARE

## 2020-02-25 LAB
GFR NON-AFRICAN AMERICAN: >60
PERFORMED ON: NORMAL
POC CREATININE: 1.1 MG/DL (ref 0.3–1.3)
POC SAMPLE TYPE: NORMAL

## 2020-02-25 PROCEDURE — 71260 CT THORAX DX C+: CPT

## 2020-02-25 PROCEDURE — 82565 ASSAY OF CREATININE: CPT

## 2020-02-25 PROCEDURE — 6360000004 HC RX CONTRAST MEDICATION: Performed by: PEDIATRICS

## 2020-02-25 RX ADMIN — IOPAMIDOL 90 ML: 755 INJECTION, SOLUTION INTRAVENOUS at 10:51

## 2020-02-25 NOTE — TELEPHONE ENCOUNTER
Spoke with pt and he said that he had seen Cardiothoracic. In care everywhere it shows that pt was seen by Stanislaw Cantu at [facility] for Mediastinal Mass on 2/25/19.

## 2020-02-25 NOTE — TELEPHONE ENCOUNTER
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 This has been noted in the past and referrals have been sent to cardiothoracic surgery as well as pulmonary. The results of those referrals are not readily available in the chart to my assessment. He does not have a care everywhere tab. I am still concerned about this and would like to have pulmonary revisit this process. Please put in referral to pulmonary medicine with a diagnosis of mediastinal mass. If not already done, we may want to see if they can possibly obtain a piece of this by biopsy to see what exactly this is.   Cardiothoracic surgery would be another option

## 2020-03-04 ENCOUNTER — OFFICE VISIT (OUTPATIENT)
Dept: PRIMARY CARE CLINIC | Age: 85
End: 2020-03-04
Payer: MEDICARE

## 2020-03-04 VITALS
WEIGHT: 242.5 LBS | SYSTOLIC BLOOD PRESSURE: 122 MMHG | TEMPERATURE: 97.6 F | BODY MASS INDEX: 34.72 KG/M2 | HEART RATE: 65 BPM | DIASTOLIC BLOOD PRESSURE: 80 MMHG | HEIGHT: 70 IN | OXYGEN SATURATION: 95 %

## 2020-03-04 PROCEDURE — 1036F TOBACCO NON-USER: CPT | Performed by: PEDIATRICS

## 2020-03-04 PROCEDURE — G8417 CALC BMI ABV UP PARAM F/U: HCPCS | Performed by: PEDIATRICS

## 2020-03-04 PROCEDURE — G8482 FLU IMMUNIZE ORDER/ADMIN: HCPCS | Performed by: PEDIATRICS

## 2020-03-04 PROCEDURE — 1123F ACP DISCUSS/DSCN MKR DOCD: CPT | Performed by: PEDIATRICS

## 2020-03-04 PROCEDURE — 4040F PNEUMOC VAC/ADMIN/RCVD: CPT | Performed by: PEDIATRICS

## 2020-03-04 PROCEDURE — 99214 OFFICE O/P EST MOD 30 MIN: CPT | Performed by: PEDIATRICS

## 2020-03-04 PROCEDURE — G8427 DOCREV CUR MEDS BY ELIG CLIN: HCPCS | Performed by: PEDIATRICS

## 2020-03-04 ASSESSMENT — ENCOUNTER SYMPTOMS
VOMITING: 0
NAUSEA: 0
DIARRHEA: 1
SORE THROAT: 0
COUGH: 0
BACK PAIN: 0
CHEST TIGHTNESS: 0
ABDOMINAL PAIN: 0
WHEEZING: 0
SHORTNESS OF BREATH: 0

## 2020-03-04 NOTE — PROGRESS NOTES
[facility], [address]  Phone [phone]   Fax [phone]      OFFICE VISIT: 3/4/2020    Petty Blank-: 1935      HPI  Reason For Visit:  Luke Mckoy is a 81 y.o. Follow-up (pt is here to discuss CT results, mediastinal mass was found 20, Has raise spot on lower neck that has been itching) and Health Maintenance (shingles, colon screen, )      Patient presents on follow-up for mediastinal mass. He had a CT scan on 2020 that showed progression of the mediastinal mass. He has been followed for this at [facility].  Cardiothoracic surgery has noted that he is amenable to mediastinoscopy in order to definitively diagnose this lesion. Additional consultation with pulmonary has been requested in order to ensure that he is safe from a pulmonary standpoint to undergo this procedure. Cardiothoracic surgery at [facility] did note that he was presently under investigation from a cardiac standpoint. There is no evidence of any active investigation occurring. This note was over a year ago, on 2019. No appointment was made with pulmonary, by chart review. Last cardiology evaluation was on 10/14/2019  There was not even any mention of mediastinal mass in that note. I do not believe that they were even aware of this. This was primarily a pacemaker evaluation. He was noted to have stable cardiovascular status with no evidence of overt heart failure angina or dysrhythmia. The only element of the plan was return in 6 months    I will therefore put in cardiology evaluation for preoperative clearance for potential mediastinoscopy       e height is 5' 10\" (1.778 m) and weight is 242 lb 8 oz (110 kg). His temporal temperature is 97.6 °F (36.4 °C). His blood pressure is 122/80 and his pulse is 65. His oxygen saturation is 95%. Body mass index is 34.8 kg/m².     I have reviewed the following with the Mr. Dunni Lizbet Visit on 02/25/2020   Component Date Value    POC Creatinine 02/25/2020 1.1     GFR Non- 02/25/2020 >60     Sample Type 02/25/2020 Venous     Performed on 02/25/2020 EPOC    Orders Only on 09/16/2019   Component Date Value    Digoxin Lvl 09/16/2019 1.4*    WBC 09/16/2019 9.8     RBC 09/16/2019 3.95*    Hemoglobin 09/16/2019 12.7*    Hematocrit 09/16/2019 38.2*    MCV 09/16/2019 96.7*    MCH 09/16/2019 32.2*    MCHC 09/16/2019 33.2     RDW 09/16/2019 13.2     Platelets 58/42/2741 290     MPV 09/16/2019 10.2     Neutrophils % 09/16/2019 74.6*    Lymphocytes % 09/16/2019 13.9*    Monocytes % 09/16/2019 7.9     Eosinophils % 09/16/2019 2.1     Basophils % 09/16/2019 0.6     Neutrophils Absolute 09/16/2019 7.3     Immature Granulocytes # 09/16/2019 0.1     Lymphocytes Absolute 09/16/2019 1.4     Monocytes Absolute 09/16/2019 0.80     Eosinophils Absolute 09/16/2019 0.20     Basophils Absolute 09/16/2019 0.10     Sodium 09/16/2019 140     Potassium 09/16/2019 4.0     Chloride 09/16/2019 96*    CO2 09/16/2019 30*    Anion Gap 09/16/2019 14     Glucose 09/16/2019 89     BUN 09/16/2019 19     CREATININE 09/16/2019 0.9     GFR Non- 09/16/2019 >60     Calcium 09/16/2019 10.4*    Total Protein 09/16/2019 8.0     Alb 09/16/2019 4.3     Total Bilirubin 09/16/2019 0.5     Alkaline Phosphatase 09/16/2019 55     ALT 09/16/2019 6     AST 09/16/2019 10     T4 Free 09/16/2019 1.2     TSH 09/16/2019 3.410     Microalbumin, Random Uri* 09/16/2019 2.70     Creatinine, Ur 09/16/2019 129.1     Microalbumin Creatinine * 09/16/2019 20.9     Vitamin B-12 09/16/2019 580      Copies of these are in the chart.     Current Outpatient Medications   Medication Sig Dispense Refill    Wheat Dextrin (BENEFIBER) POWD Take 4 g by mouth 3 times daily (with meals) 1 Can 5    cephALEXin (KEFLEX) 500 MG capsule Take 1 capsule by mouth 3 times daily 30 capsule 0    spironolactone (ALDACTONE) 50 MG tablet TAKE 1 TABLET BY MOUTH EVERY DAY 90 tablet 1    blood glucose monitor kit and supplies Test once a day for symptoms of irregular blood glucose. 1 kit 0    ONE TOUCH LANCETS MISC 1 each by Does not apply route daily 100 each 3    blood glucose monitor strips Test once daily for symptoms of irregular blood glucose.  100 strip 1    FLUoxetine (PROZAC) 40 MG capsule Take 1 capsule by mouth daily 90 capsule 3    terazosin (HYTRIN) 5 MG capsule Take 1 capsule by mouth nightly 90 capsule 3    fluticasone-umeclidin-vilant (TRELEGY ELLIPTA) 100-62.5-25 MCG/INH AEPB Inhale 1 puff into the lungs daily 1 each 11    WELCHOL 625 MG tablet TAKE 3 TABLETS BY MOUTH 2 TIMES DAILY (WITH MEALS) 540 tablet 1    amLODIPine (NORVASC) 5 MG tablet Take 1 tablet by mouth daily 90 tablet 3    albuterol sulfate HFA (PROAIR HFA) 108 (90 Base) MCG/ACT inhaler Inhale 2 puffs into the lungs every 6 hours as needed for Wheezing 8.5 Inhaler 5    hydrochlorothiazide (MICROZIDE) 12.5 MG capsule TAKE 1 CAPSULE BY MOUTH EVERY DAY IN THE MORNING 90 capsule 3    furosemide (LASIX) 40 MG tablet Take 1.5 tablets by mouth daily (Patient taking differently: Take 60 mg by mouth daily 1.5 tablets daily for a total of 60mg) 135 tablet 3    digoxin (LANOXIN) 125 MCG tablet Take 1 tablet by mouth every other day 90 tablet 3    celecoxib (CELEBREX) 200 MG capsule Take 1 capsule by mouth daily 30 capsule 5    buPROPion (WELLBUTRIN XL) 150 MG extended release tablet Take 2 tablets by mouth every morning 180 tablet 3    lisinopril (PRINIVIL;ZESTRIL) 40 MG tablet Take 1 tablet by mouth daily 90 tablet 3    metOLazone (ZAROXOLYN) 2.5 MG tablet Take 1 tablet by mouth daily 90 tablet 1    carvedilol (COREG) 12.5 MG tablet Take 1 tablet by mouth 2 times daily (with meals) 180 tablet 3    Dulaglutide (TRULICITY) 2.27 NR/5.9PH SOPN INJECT 1 PEN EVERY WEEK 12 pen 3    potassium chloride (KLOR-CON 10) 10 MEQ extended release tablet Take 1 tablet by mouth daily 90 tablet 3    Multiple Vitamins-Minerals (THERAPEUTIC MULTIVITAMIN-MINERALS) tablet Take 1 tablet by mouth daily      metFORMIN (GLUCOPHAGE) 500 MG tablet Take 1 tablet by mouth 2 times daily (with meals) 180 tablet 3    Lancets MISC 1 each by Does not apply route daily Accu Chek Guid3 Lancets 100 each 5    FLUoxetine (PROZAC) 20 MG capsule Take 1 capsule by mouth daily Along with the 40 mg tablet 90 capsule 3    OXYGEN Inhale 2 L into the lungs nightly      BiPAP Machine MISC by Does not apply route nightly      cetirizine (ZYRTEC) 10 MG tablet Take 1 tablet by mouth daily 30 tablet 0    prazosin (MINIPRESS) 2 MG capsule Take 1 capsule by mouth nightly 90 capsule 3    vitamin B-12 (CYANOCOBALAMIN) 1000 MCG tablet Take 1,000 mcg by mouth daily      enzalutamide (XTANDI) 40 MG capsule Take 4 tablets daily      rivaroxaban (XARELTO) 20 MG TABS tablet Take 1 tablet by mouth daily (with breakfast) 30 tablet 11    leuprolide (LUPRON) 30 MG injection Inject 30 mg into the muscle once Every 4 months      MYRBETRIQ 25 MG TB24 Take 25 mg by mouth daily       FISH OIL Take 1 capsule by mouth 2 times daily. No current facility-administered medications for this visit. Allergies: Patient has no known allergies.      Past Medical History:   Diagnosis Date    Anxiety     Arthritis     Atrial fibrillation ([address]     Blood circulation, collateral     Cancer (HCC)     prostate, had treatment    Cellulitis     left leg    CHF (congestive heart failure) (HCC)     Chronic kidney disease     COPD (chronic obstructive pulmonary disease) (HCC)     Depression     Hyperlipidemia     Hypertension     Neuromuscular disorder (HCC)     Neuropathy     Other disorders of kidney and ureter in diseases classified elsewhere     Palliative care patient 11/15/2018    Pneumonia     Type II or unspecified type diabetes mellitus without mention of complication, not membranes are moist.      Pharynx: Oropharynx is clear. No posterior oropharyngeal erythema. Comments: S/p uvulopalatoplasty  Eyes:      General: No scleral icterus. Extraocular Movements: Extraocular movements intact. Conjunctiva/sclera: Conjunctivae normal.      Right eye: Right conjunctiva is not injected. Pupils: Pupils are equal, round, and reactive to light. Neck:      Musculoskeletal: Normal range of motion and neck supple. Thyroid: No thyromegaly. Vascular: No carotid bruit or JVD. Cardiovascular:      Rate and Rhythm: Normal rate and regular rhythm. No extrasystoles are present. Chest Wall: PMI is not displaced. Heart sounds: Normal heart sounds, S1 normal and S2 normal. No murmur. No friction rub. No gallop. Pulmonary:      Effort: Pulmonary effort is normal. No respiratory distress. Breath sounds: Examination of the right-lower field reveals decreased breath sounds. Examination of the left-lower field reveals decreased breath sounds. Decreased breath sounds (bases bilaterally (mild)) present. No wheezing, rhonchi or rales. Abdominal:      General: Bowel sounds are normal.      Palpations: Abdomen is soft. Tenderness: There is no abdominal tenderness. There is no guarding or rebound. Comments: Abdominal obesity is present   Genitourinary:     Comments: Exam deferred  Musculoskeletal: Normal range of motion. General: No tenderness. Lymphadenopathy:      Cervical: No cervical adenopathy. Skin:     General: Skin is warm and dry. Capillary Refill: Capillary refill takes less than 2 seconds. Findings: No rash. Comments:      Neurological:      Mental Status: He is alert and oriented to person, place, and time. Sensory: No sensory deficit (no numbness or tingling). Psychiatric:         Mood and Affect: Mood normal.         Speech: Speech normal.         Behavior: Behavior normal.         Thought Content:  Thought content normal.      Comments: He has been through a lot recently. ASSESSMENT      ICD-10-CM    1. Chronic diastolic congestive heart failure (HCC) I50.32 Kayleigh Lizama MD, Cyrus Vuong   2. Chronic atrial fibrillation I48.20 Kayleigh Lizama MD, Cyrus Vuong   3. Preoperative clearance Z01.818 Kayleigh Lizama MD, Cyrus Vuong   4. Mediastinal mass J98.59 Kayleigh Lizama MD, [facility] [place]     External Referral To Cardiothoracic Surgery     External Referral To Pulmonology         PLAN    1. Chronic diastolic congestive heart failure ([facility]  Will set up referral for cardiac pre-operative evaluation  - Kayleigh Lizama MD, Cyrus Vuong    2. Chronic atrial fibrillation  Stable on medications  Rated controlled and anticoagulated. - Kayleigh Lizama MD, Malissa Vuong    3. Preoperative clearance  Start work up   - Kayleigh Lizama MD, [facility], [place]    4. Mediastinal mass  Set up appropriate evaluation   - Kayleigh Lizama MD, [facility], [place]  - External Referral To Cardiothoracic Surgery  - External Referral To Pulmonology      Orders Placed This Encounter   Procedures   Kayleigh Lizama MD, Cardiology, [facility] External Referral To Cardiothoracic Surgery    External Referral To Pulmonology        No follow-ups on file.

## 2020-03-06 RX ORDER — TERAZOSIN 5 MG/1
CAPSULE ORAL
Qty: 90 CAPSULE | Refills: 3 | OUTPATIENT
Start: 2020-03-06

## 2020-03-10 ENCOUNTER — OFFICE VISIT (OUTPATIENT)
Dept: CARDIOLOGY | Age: 85
End: 2020-03-10
Payer: MEDICARE

## 2020-03-10 VITALS
SYSTOLIC BLOOD PRESSURE: 116 MMHG | HEIGHT: 70 IN | DIASTOLIC BLOOD PRESSURE: 56 MMHG | BODY MASS INDEX: 34.93 KG/M2 | WEIGHT: 244 LBS | HEART RATE: 82 BPM

## 2020-03-10 PROBLEM — J98.59 MEDIASTINAL MASS: Status: ACTIVE | Noted: 2020-03-10

## 2020-03-10 PROBLEM — J96.21 ACUTE ON CHRONIC RESPIRATORY FAILURE WITH HYPOXIA AND HYPERCAPNIA (HCC): Status: RESOLVED | Noted: 2019-07-26 | Resolved: 2020-03-10

## 2020-03-10 PROBLEM — J96.22 ACUTE ON CHRONIC RESPIRATORY FAILURE WITH HYPOXIA AND HYPERCAPNIA: Status: RESOLVED | Noted: 2019-07-26 | Resolved: 2020-03-10

## 2020-03-10 PROCEDURE — 93279 PRGRMG DEV EVAL PM/LDLS PM: CPT | Performed by: CLINICAL NURSE SPECIALIST

## 2020-03-10 PROCEDURE — G8482 FLU IMMUNIZE ORDER/ADMIN: HCPCS | Performed by: CLINICAL NURSE SPECIALIST

## 2020-03-10 PROCEDURE — 99214 OFFICE O/P EST MOD 30 MIN: CPT | Performed by: CLINICAL NURSE SPECIALIST

## 2020-03-10 PROCEDURE — 4040F PNEUMOC VAC/ADMIN/RCVD: CPT | Performed by: CLINICAL NURSE SPECIALIST

## 2020-03-10 PROCEDURE — G8427 DOCREV CUR MEDS BY ELIG CLIN: HCPCS | Performed by: CLINICAL NURSE SPECIALIST

## 2020-03-10 PROCEDURE — 1036F TOBACCO NON-USER: CPT | Performed by: CLINICAL NURSE SPECIALIST

## 2020-03-10 PROCEDURE — G8417 CALC BMI ABV UP PARAM F/U: HCPCS | Performed by: CLINICAL NURSE SPECIALIST

## 2020-03-10 PROCEDURE — 1123F ACP DISCUSS/DSCN MKR DOCD: CPT | Performed by: CLINICAL NURSE SPECIALIST

## 2020-03-10 ASSESSMENT — ENCOUNTER SYMPTOMS
NAUSEA: 0
FACIAL SWELLING: 0
CHEST TIGHTNESS: 0
WHEEZING: 0
SHORTNESS OF BREATH: 1
VOMITING: 0
ABDOMINAL PAIN: 0
EYE REDNESS: 0
COUGH: 0

## 2020-03-10 NOTE — PROGRESS NOTES
[facility], [address] [place] [address], [phone] [id number]  Phone: [phone]  Fax: [phone]    OFFICE VISIT:  3/10/2020    Hailey Rossi - : 1935    Reason For Visit:  Satish Green is a 81 y.o. male who is here for Atrial Fibrillation (No cardiac sx today. ) and Congestive Heart Failure    HPI  Patient is here forl follow-up history of chronic atrial fibrillation, chronic diastolic heart failure, sinoatrial node dysfunction, pacemaker, sleep apnea, hypertension. The patient is chronically short of breath which is unchanged. He denies any signs of fluid retention such as sudden weight gain, orthopnea, PND. He has some mild lower extremity edema which is unchanged. He is watching his weight and sodium closely. He denies chest pain, orthopnea, PND, unusual edema, or palpitations. He currently lives in assisted living facility. Patient has been diagnosed with a mediastinal mass and is here for cardiac risk stratification     BValentin Choudhary DO is PCP.   Hailey Rossi has the following history as recorded in [facility]:    Patient Active Problem List    Diagnosis Date Noted    Mediastinal mass 03/10/2020    Sinoatrial node dysfunction ([address] 2019    Bradycardia 03/15/2019    Pacemaker 03/15/2019    Encounter for screening for malignant neoplasm of colon 11/15/2018    Chronic diastolic heart failure ([address] 11/15/2018    Macrocytic anemia 11/15/2018    Sepsis ([address] 11/15/2018    Vitamin D deficiency 11/15/2018    Mitral regurgitation 11/15/2018    Tricuspid regurgitation 11/15/2018    Pulmonary hypertension ([address] 11/15/2018    Bronchitis, acute 11/15/2018    Chronic respiratory failure with hypoxia ([address] 2018    Mixed hyperlipidemia 02/15/2018    Prostate cancer ([address] 02/15/2018    Recurrent major depressive disorder, in partial remission ([address] 02/15/2018    Type 2 diabetes mellitus with diabetic polyneuropathy ([address] 2015    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 05/21/2015    NEIDA (obstructive sleep apnea) 05/21/2015    Asbestosis ([facility] 86/45/2854    Metabolic alkalosis with respiratory acidosis 05/21/2015    Chronic anticoagulation 05/21/2015    Acne rosacea 05/21/2015    Hypertension     Neuropathy     Chronic atrial fibrillation     Anxiety     Depression      Past Medical History:   Diagnosis Date    Anxiety     Arthritis     Atrial fibrillation ([facility]     Blood circulation, collateral     Cancer (HCC)     prostate, had treatment    Cellulitis     left leg    [facility]est[facility]) [facility]     Chronic kidney disease     COPD (chronic obstructive pulmonary disease) ([facility])     Depression     Hyperlipidemia     Hypertension     Lung mass     Neuromuscular disorder ([facility])     Neuropathy     Other disorders of kidney and ureter in diseases classified elsewhere     Palliative care patient 11/15/2018    Pneumonia     Type II or unspecified type diabetes mellitus without mention of complication, not stated as uncontrolled      Past Surgical History:   Procedure Laterality Date    COLONOSCOPY      EYE SURGERY      EYE SURGERY      HERNIA REPAIR      umbilical with Dr Naya Banda       Family History   Problem Relation Age of Onset    Heart Attack Mother     Cancer Father      Social History     Tobacco Use    Smoking status: Never Smoker    Smokeless tobacco: Never Used   Substance Use Topics    Alcohol use: No      Current Outpatient Medications   Medication Sig Dispense Refill    Wheat Dextrin (BENEFIBER) POWD Take 4 g by mouth 3 times daily (with meals) 1 Can 5    spironolactone (ALDACTONE) 50 MG tablet TAKE 1 TABLET BY MOUTH EVERY DAY 90 tablet 1    blood glucose monitor kit and supplies Test once a day for symptoms of irregular blood glucose.  1 kit 0    ONE Negative for pallor and rash. Neurological: Negative for dizziness, seizures, syncope, weakness and light-headedness. Hematological: Does not bruise/bleed easily. Psychiatric/Behavioral: Negative for agitation. The patient is not nervous/anxious. C/o short term memory issues       Objective  Vital Signs - BP (!) 116/56   Pulse 82   Ht 5' 10\" (1.778 m)   Wt 244 lb (110.7 kg)   BMI 35.01 kg/m²    Wt Readings from Last 3 Encounters:   03/10/20 244 lb (110.7 kg)   03/04/20 242 lb 8 oz (110 kg)   02/18/20 243 lb 12 oz (110.6 kg)      Physical Exam  Vitals signs and nursing note reviewed. Constitutional:       General: He is not in acute distress. Appearance: He is well-developed. Comments: Deconditioned   HENT:      Head: Normocephalic and atraumatic. Right Ear: Hearing and external ear normal.      Left Ear: Hearing and external ear normal.      Nose: Nose normal.   Eyes:      General:         Right eye: No discharge. Left eye: No discharge. Pupils: Pupils are equal, round, and reactive to light. Neck:      Musculoskeletal: Neck supple. No muscular tenderness. Thyroid: No thyromegaly. Vascular: No carotid bruit or JVD. Trachea: No tracheal deviation. Cardiovascular:      Rate and Rhythm: Normal rate and regular rhythm. Heart sounds: Murmur (1/6 systolic murmur) present. No friction rub. No gallop. Comments: No carotid bruit  Pulmonary:      Effort: Pulmonary effort is normal. No respiratory distress. Breath sounds: Normal breath sounds. No wheezing or rales. Abdominal:      Palpations: Abdomen is soft. Tenderness: There is no abdominal tenderness. Musculoskeletal:         General: Swelling present. No deformity. Right lower leg: Edema (trace) present. Left lower leg: Edema (trace) present. Comments: Ambulates with cane   Skin:     General: Skin is warm and dry. Findings: No rash.    Neurological:      Mental Status: He is alert and oriented to person, place, and time. Cranial Nerves: No cranial nerve deficit. Psychiatric:         Behavior: Behavior normal.         Judgment: Judgment normal.     Data:  LUCILLE 3/15/19  This transesophageal echocardiogram revealed:   1.   Severe bi - atrial enlargement  2. Bi - atrial \"smoke\" despite the use of Xarelto  3. Mild aortic and mitral regurgitation  4. Severe tricuspid regurgitation  5. No obvious right atrial mass      Echo 11/18  Summary   Normal left ventricular size and systolic function EF 25-74%. Mild   concentric left ventricular hypertrophy with grade 3 diastolic   dysfunction. Severely dilated left atrium. Thickened tricuspid aortic   valve with calcific nodule on the right cusp but preserved cusp   separation. No evidence of stenosis or insufficiency. Mildly thickened but   mobile mitral leaflets with mild-to-moderate regurgitation. Markedly   enlarged right atrium. Right ventricle appears normal size with preserved   systolic function. Mild to moderate tricuspid regurgitation with moderate   pulmonary hypertension and an RVSP estimate of 64 mmHg. The IVC is dilated   with poor inspiratory collapse consistent with elevated right atrial   pressure. No pericardial effusion present. Aortic root dimensions are   within normal limits. Nicki 5/18  1. Negative Lexiscan for the presence of ischemia or infarction. 2. The left ventricular ejection fraction is 46%. Signed by Dr Yoselin Hargrove on 5/16/2018 7:52 PM     Assessment:     Diagnosis Orders   1. Pre-op evaluation  NM MYOCARDIAL SPECT REST EXERCISE OR RX   2. Shortness of breath  NM MYOCARDIAL SPECT REST EXERCISE OR RX   3. Chronic diastolic heart failure ([address]     4. Chronic atrial fibrillation     5. Sinoatrial node dysfunction (HCC)     6. NEIDA (obstructive sleep apnea)     7. Pacemaker     8. Mediastinal mass     9.  Essential hypertension       Cardiac risk stratification for upcoming surgery/

## 2020-03-11 NOTE — PROGRESS NOTES
Subjective   Prakash Castro is a 85 y.o. male.     Background: Pt with right paratracheal mass, hx prostate cancer, copd with nocturnal hypoxemia, sleep apnea and obestiy.  FEV! 53% pred 2015.  Pt canceled  initial evaluation 7/2019    Chief Complaint   Patient presents with   • mediastinal mass        History of Present Illness   I am asked to see him for asymptomatic right paratracheal adenopathy.  He reports hx copd and fev1 53% pred in 2015. Records from Dr. 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  He got a defibrillator a year ago after episodes of atrial fibrillation going back to his '30's.  He uses bipap with sleep with good success, oxygen blended in.  Previously on 2 lpm, more recently increased to 3 lpm.  He takes celebrex and xarelto    Medical/Family/Social History   has a past medical history of Acute kidney injury (CMS/HCC) (7/26/2019), Acute on chronic respiratory failure with hypoxia and hypercapnia (CMS/HCC) (7/26/2019), Arthritis, Atrial fibrillation (CMS/HCC), Cancer (CMS/HCC), Cellulitis, CHF (congestive heart failure) (CMS/HCC), Depression, Diabetes mellitus (CMS/HCC), Hematuria, History of cardioversion, Hypertension, Obesity, Overactive bladder, Pacemaker, SOB (shortness of breath), and Stage 2 moderate COPD by GOLD classification (CMS/HCC) (3/4/2019).   has a past surgical history that includes Replacement total knee bilateral; Hernia repair; Eye surgery; Joint replacement; and incision and drainage leg (Left, 7/19/2019).  family history includes Arthritis in his mother; Cancer in his father; Depression in his mother; Hearing loss in his mother; Heart disease in his mother; Hypertension in his mother; Prostate cancer in his son.   reports that he has never smoked. He has never used smokeless tobacco. He reports that he does not drink alcohol or use drugs.  No Known  Allergies  Medications    Current Outpatient Medications:   •  amLODIPine (NORVASC) 10 MG tablet, Take 10 mg by mouth Daily., Disp: , Rfl:   •  buPROPion XL (WELLBUTRIN XL) 150 MG 24 hr tablet, Take 300 mg by mouth Daily., Disp: , Rfl:   •  carvedilol (COREG) 12.5 MG tablet, Take 12.5 mg by mouth 2 (two) times a day with meals., Disp: , Rfl:   •  celecoxib (CeleBREX) 200 MG capsule, Take 200 mg by mouth 2 (Two) Times a Day., Disp: , Rfl:   •  digoxin (LANOXIN) 125 MCG tablet, Take 125 mcg by mouth every day., Disp: , Rfl:   •  Dulaglutide (TRULICITY) 0.75 MG/0.5ML solution pen-injector, Inject 0.5 mL under the skin into the appropriate area as directed 1 (One) Time Per Week. Saturday., Disp: , Rfl:   •  enzalutamide (XTANDI) 40 MG chemo capsule, Take 4 capsules by mouth Daily., Disp: 120 capsule, Rfl: 10  •  ferrous sulfate (IRON SUPPLEMENT) 325 (65 FE) MG tablet, Take 1 tablet by mouth Daily With Breakfast., Disp: 30 tablet, Rfl: 1  •  FLUoxetine (PROzac) 20 MG capsule, Take 20 mg by mouth Daily. Take with Prozac 40 mg (total dose of 60 mg daily)., Disp: , Rfl:   •  FLUoxetine (PROZAC) 40 MG capsule, Take 40 mg by mouth Daily. Take with Prozac 20 mg (total dose of 60 mg daily)., Disp: , Rfl:   •  fluticasone (FLONASE) 50 MCG/ACT nasal spray, 2 sprays into each nostril daily. Administer 2 sprays in each nostril for each dose., Disp: , Rfl:   •  Fluticasone-Umeclidin-Vilant 100-62.5-25 MCG/INH aerosol powder , Inhale 1 each Daily., Disp: , Rfl:   •  furosemide (LASIX) 40 MG tablet, Take 60 mg by mouth Daily., Disp: , Rfl:   •  hydrochlorothiazide (HYDRODIURIL) 12.5 MG tablet, Take 1 tablet by mouth Daily., Disp: 30 tablet, Rfl: 1  •  leuprolide (LUPRON) 30 MG injection, Inject 30 mg into the shoulder, thigh, or buttocks Every 4 (Four) Months., Disp: , Rfl:   •  lisinopril (PRINIVIL,ZESTRIL) 40 MG tablet, Take 40 mg by mouth daily., Disp: , Rfl:   •  metFORMIN (GLUCOPHAGE) 500 MG tablet, Take 500 mg by mouth 2 (Two)  "Times a Day With Meals., Disp: , Rfl:   •  metolazone (ZAROXOLYN) 2.5 MG tablet, Take 2.5 mg by mouth daily., Disp: , Rfl:   •  Multiple Vitamins-Minerals (MULTIVITAMIN ADULT) tablet, Take 1 tablet by mouth Daily., Disp: , Rfl:   •  MYRBETRIQ 25 MG tablet sustained-release 24 hour 24 hr tablet, TAKE 1 TABLET BY MOUTH EVERY DAY, Disp: 90 tablet, Rfl: 3  •  potassium chloride (K-DUR,KLOR-CON) 10 MEQ CR tablet, Take 10 mEq by mouth Daily., Disp: , Rfl:   •  prazosin (MINIPRESS) 2 MG capsule, Take 2 mg by mouth Every Night., Disp: , Rfl:   •  rivaroxaban (XARELTO) 20 MG tablet, Take 20 mg by mouth daily., Disp: , Rfl:   •  saccharomyces boulardii (FLORASTOR) 250 MG capsule, Take 1 capsule by mouth 2 (Two) Times a Day., Disp: , Rfl:   •  spironolactone (ALDACTONE) 50 MG tablet, Take 50 mg by mouth Daily., Disp: , Rfl:   •  terazosin (HYTRIN) 5 MG capsule, Take 5 mg by mouth every night., Disp: , Rfl:     Review of Systems   Constitutional: Negative for chills and fever.   HENT: Negative for trouble swallowing and voice change.    Eyes: Negative for photophobia and visual disturbance.   Respiratory: Negative for shortness of breath.    Cardiovascular: Negative for chest pain and leg swelling.        No defibrillator discharges   Gastrointestinal: Negative for abdominal pain, nausea, vomiting and GERD.   Genitourinary: Negative for difficulty urinating and hematuria.   Musculoskeletal: Negative for gait problem and joint swelling.   Skin: Negative for pallor and skin lesions.   Neurological: Negative for tremors, syncope, weakness and confusion.   Hematological: Negative for adenopathy. Does not bruise/bleed easily.   Psychiatric/Behavioral: The patient is not nervous/anxious.          Objective   /70   Pulse 88   Ht 174 cm (68.5\")   Wt 110 kg (243 lb)   SpO2 93% Comment: RA  BMI 36.41 kg/m²   Physical Exam   Constitutional: He appears well-developed and well-nourished. He does not appear ill. No distress. "   HENT:   Head: Normocephalic and atraumatic.   Nose: Nose normal.   Eyes: Conjunctivae and EOM are normal.   Neck: Neck supple. No JVD present.   Cardiovascular: Normal rate, regular rhythm, S1 normal and S2 normal.   Pulmonary/Chest: Effort normal. No stridor. No respiratory distress. He has no wheezes. He has rales.   Abdominal: Soft. He exhibits no distension. There is no tenderness. There is no guarding.   Musculoskeletal: He exhibits no deformity.   Lymphadenopathy:     He has no cervical adenopathy.   Neurological: He is alert.   Skin: Skin is warm and dry. No rash noted.   Psychiatric: He has a normal mood and affect.       -----------------------------------------------------------------------------------------------  EXAMINATION:  CT CHEST W CONTRAST  2/25/2020 11:05 AM  HISTORY: J44.9. COPD. Pulmonary hypertension. Chronic respiratory  failure with hypoxia. Asbestosis.   COMPARISON: 12/27/2018.  DLP: 876 mGy-cm. Automated exposure control was utilized.  TECHNIQUE: Spiral CT was performed of the chest with contrast.  Multiplanar images were reconstructed.  MEDIASTINUM/VASCULAR: There is ill-defined soft tissue density in the  mediastinum on the right side predominantly in the right paratracheal  and pretracheal region. This measures about 5.5 cm on image 59 of  series 2 and measures about 5.2 cm previously. It extends about 7.7 cm  cephalocaudal and previously measured about 5.6 cm in the same  dimension. This is worrisome for confluent area of lymphadenopathy in  the mediastinum. This surrounds the superior vena cava and extends to  the margin of the brachiocephalic vein. There is atheromatous disease  of the thoracic aorta and coronary arteries. There is cardiomegaly.  The pulmonary arteries are dilated. There is fatty infiltration of the  atrial septum. There is a pacemaker/AICD wire extending to the right  ventricular apex.  LUNGS: There is a small right pleural effusion. There is linear  atelectasis  in the right lower lobe. There is no dense airspace  consolidation.  BONES AND SOFT TISSUES: There are degenerative changes of the spine.  No acute bony abnormality is seen.  UPPER ABDOMEN: There is either calcification in the wall of the  gallbladder versus small gallstones located dependently. There is no  gallbladder wall thickening. The visualized solid organs in the upper  abdomen are unremarkable. There is underdistention of the stomach.  IMPRESSION:  1. Confluent ill-defined soft tissue density in the mediastinum  predominantly in the right paratracheal and pretracheal region. Size  measurements are listed above. This area is increased in size compared  to the previous study. This could be infectious or neoplastic.  2. Atheromatous disease of the thoracic aorta. Coronary artery  disease. Cardiomegaly. Fatty infiltration of the atrial septum.  Dilated pulmonary arteries.  3. Small right pleural effusion. Mild right lower lobe atelectasis.  4. Calcification within the wall of the gallbladder versus small  gallstones layering dependently.  Signed by Dr Andrey Andrade on 2/25/2020 11:19 AM      History: Follow-up of abnormal CT scan of the chest.  DLP: 929.70 mgy.  The CT scan of the chest are performed after intravenous contrast  enhancement. The images are acquired in axial plane with subsequent  reconstruction in coronal and sagittal planes.  The comparison is made with the previous study dated 11/14/2018.  The right-sided mediastinal mass adjacent and to the right of the  distal trachea and the right proximal mainstem bronchus now measures  3.7 x 3.2 cm. It previously measured 3.8 x 0.8 cm.  A level 4 lymph node now measures 2.1 cm in short axis. It measured  2.4 cm in the previous study. Level 2 lymph nodes measure 7 mm and 7  mm in short axis. These measured 11 mm and 11 mm in the previous  study. The previously seen left distal jugular chain lymph node is not  visualized in this study. This area was not  included.  Thyroid gland appears normal.  There is no axillary lymphadenopathy.  Atheromatous changes thoracic aorta are seen. No aneurysmal dilatation  or dissection. Severe atheromatous changes of coronary arteries are  seen, more severely the left coronary artery and the branches.  There is moderate enlargement of the left atrium. An ill-defined  filling defect in the lateral aspect of the right atrium may represent  a flow phenomena. Any intrinsic mass such as atrial myxoma may not be  excluded.  The lungs bilaterally appear unremarkable. No nodules, infiltrate or  consolidation.  There is a marked improvement in the bibasilar pleural effusion and  adjacent compression atelectasis in the previous study. Very small  right basal pleural effusion noted.  There is a small sliding-type hiatal hernia.  The liver and spleen as visualized appear normal. The adrenal glands  bilaterally are normal. No gallstones.  Chronic degenerative changes of the thoracic spine are seen with  severe degeneration of the intervertebral disks of the lower thoracic  spine. No focal bony lesion, bone destruction or a fracture is seen.              -----------------------------------------------------------------------------------------------    My interpretation of the PFT: moderate combined obstructive and restrictive physiology, normal dlco     -----------------------------------------------------------------------------------------------  Assessment/Plan   Problem List Items Addressed This Visit        Pulmonary Problems    Centrilobular emphysema (CMS/HCC) - Primary    Relevant Orders    Pulmonary Function Test    Nocturnal hypoxemia    RESOLVED: Stage 2 moderate COPD by GOLD classification (CMS/HCC), with exacerbation       Other    Mediastinal adenopathy        Patient's Body mass index is 36.41 kg/m². BMI is above normal parameters. Recommendations include: referral to primary care.      Will plan bronchoscopy with endobronchial  ultrasound, try for next Wednesday  The risks, benefits, and alternatives of the procedure; along with the risks of anesthesia was discussed in full with the patient and all questions were answered.   Will need to hold xarelto for 3 days and celebrex for a couple of days ahead of procedure  Follow up 2 weeks.       Electronically signed by Darrian Silvestre MD, 3/12/2020, 15:17

## 2020-03-12 ENCOUNTER — PREP FOR SURGERY (OUTPATIENT)
Dept: OTHER | Facility: HOSPITAL | Age: 85
End: 2020-03-12

## 2020-03-12 ENCOUNTER — OFFICE VISIT (OUTPATIENT)
Dept: PULMONOLOGY | Facility: CLINIC | Age: 85
End: 2020-03-12

## 2020-03-12 VITALS
HEIGHT: 69 IN | SYSTOLIC BLOOD PRESSURE: 140 MMHG | OXYGEN SATURATION: 93 % | DIASTOLIC BLOOD PRESSURE: 70 MMHG | WEIGHT: 243 LBS | HEART RATE: 88 BPM | BODY MASS INDEX: 35.99 KG/M2

## 2020-03-12 DIAGNOSIS — R59.0 MEDIASTINAL ADENOPATHY: Primary | ICD-10-CM

## 2020-03-12 DIAGNOSIS — J43.2 CENTRILOBULAR EMPHYSEMA (HCC): Primary | ICD-10-CM

## 2020-03-12 DIAGNOSIS — G47.34 NOCTURNAL HYPOXEMIA: ICD-10-CM

## 2020-03-12 DIAGNOSIS — R59.0 MEDIASTINAL ADENOPATHY: ICD-10-CM

## 2020-03-12 DIAGNOSIS — J44.9 STAGE 2 MODERATE COPD BY GOLD CLASSIFICATION (HCC): ICD-10-CM

## 2020-03-12 DIAGNOSIS — J43.2 CENTRILOBULAR EMPHYSEMA (HCC): ICD-10-CM

## 2020-03-12 PROCEDURE — 94729 DIFFUSING CAPACITY: CPT | Performed by: INTERNAL MEDICINE

## 2020-03-12 PROCEDURE — 94010 BREATHING CAPACITY TEST: CPT | Performed by: INTERNAL MEDICINE

## 2020-03-12 PROCEDURE — 99204 OFFICE O/P NEW MOD 45 MIN: CPT | Performed by: INTERNAL MEDICINE

## 2020-03-12 PROCEDURE — 94727 GAS DIL/WSHOT DETER LNG VOL: CPT | Performed by: INTERNAL MEDICINE

## 2020-03-12 RX ORDER — SODIUM CHLORIDE 0.9 % (FLUSH) 0.9 %
10 SYRINGE (ML) INJECTION AS NEEDED
Status: CANCELLED | OUTPATIENT
Start: 2020-03-12

## 2020-03-12 RX ORDER — SODIUM CHLORIDE 0.9 % (FLUSH) 0.9 %
3 SYRINGE (ML) INJECTION EVERY 12 HOURS SCHEDULED
Status: CANCELLED | OUTPATIENT
Start: 2020-03-12

## 2020-03-12 NOTE — PATIENT INSTRUCTIONS
Flexible Bronchoscopy    Flexible bronchoscopy is a procedure that is used to examine the passageways in the lungs. During the procedure, a thin, flexible tool with a camera on it (bronchoscope) is passed into the mouth or nose, down through the windpipe (trachea), and into the air tubes (bronchi) in the lungs. This tool allows your health care provider to look at your lungs from the inside and take testing (diagnostic) samples if needed.  Tell a health care provider about:  · Any allergies you have.  · All medicines you are taking, including vitamins, herbs, eye drops, creams, and over-the-counter medicines.  · Any problems you or family members have had with anesthetic medicines.  · Any blood disorders you have.  · Any surgeries you have had.  · Any medical conditions you have.  · Whether you are pregnant or may be pregnant.  What are the risks?  Generally, this is a safe procedure. However, problems may occur, including:  · Infection.  · Bleeding.  · Damage to other structures or organs.  · Allergic reactions to medicines.  · Collapsed lung (pneumothorax).  · Increased need for oxygen or difficulty breathing after the procedure.  What happens before the procedure?  Medicines  Ask your health care provider about:  · Changing or stopping your regular medicines. This is especially important if you are taking diabetes medicines or blood thinners.  · Taking medicines such as aspirin and ibuprofen. These medicines can thin your blood. Do not take these medicines before your procedure if your health care provider instructs you not to.  You may be given antibiotic medicine to help prevent infection.  Staying hydrated  Follow instructions from your health care provider about hydration, which may include:  · Up to 2 hours before the procedure - you may continue to drink clear liquids, such as water, clear fruit juice, black coffee, and plain tea.  Eating and drinking  Follow instructions from your health care provider  about eating and drinking, which may include:  · 8 hours before the procedure - stop eating heavy meals or foods such as meat, fried foods, or fatty foods.  · 6 hours before the procedure - stop eating light meals or foods, such as toast or cereal.  · 6 hours before the procedure - stop drinking milk or drinks that contain milk.  · 2 hours before the procedure - stop drinking clear liquids.  General instructions  · Plan to have someone take you home from the hospital or clinic.  · If you will be going home right after the procedure, plan to have someone with you for 24 hours.  What happens during the procedure?  · To lower your risk of infection:  ? Your health care team will wash or sanitize their hands.  ? Your skin will be washed with soap.  · An IV tube will be inserted into one of your veins.  · You will be given a medicine (local anesthetic) to numb your mouth, nose, throat, and voice box (larynx). You may also be given one or more of the following:  ? A medicine to help you relax (sedative).  ? A medicine to control coughing.  ? A medicine to dry up any fluids in your lungs (secretions).  · A bronchoscope will be passed into your nose or mouth, and into your lungs. Your health care provider will examine your lungs.  · Samples of airway secretions may be collected for testing.  · If abnormal areas are seen in your airways, tissue samples may be removed for examination under a microscope (biopsy).  · If tissue samples are needed from the outer parts of the lung, a type of X-ray (fluoroscopy) may be used to guide the bronchoscope to these areas.  · If bleeding occurs, you may be given medicine to stop or decrease the bleeding.  The procedure may vary among health care providers and hospitals.  What happens after the procedure?  · Do not drive for 24 hours if you were given a sedative.  · Your blood pressure, heart rate, breathing rate, and blood oxygen level will be monitored until the medicines you were given  have worn off.  · You may have a chest X-ray to check for signs of pneumothorax.  · You will not be allowed to eat or drink anything for 2 hours after your procedure.  · If a biopsy was taken, it is up to you to get the results of your procedure. Ask your health care provider, or the department that is doing the procedure, when your results will be ready.  Summary  · Flexible bronchoscopy is a procedure that allows your health care provider to look closely at your lungs from the inside and take testing (diagnostic) samples if needed.  · Risks of flexible bronchoscopy include bleeding, infection, and pneumothorax.  · Before a flexible bronchoscopy, you will be given a medicine (local anesthetic) to numb your mouth, nose, throat, and voice box (larynx). Then, a bronchoscope will be passed into your nose or mouth, and into your lungs.  · After the procedure, your blood pressure, heart rate, breathing rate, and blood oxygen level will be monitored until the medicines you were given have worn off. You may have a chest X-ray to check for signs of pneumothorax.  · You will not be allowed to eat or drink anything for 2 hours after your procedure.  This information is not intended to replace advice given to you by your health care provider. Make sure you discuss any questions you have with your health care provider.  Document Released: 12/15/2001 Document Revised: 01/20/2018 Document Reviewed: 01/20/2018  [employer] Interactive Patient Education © 2020 [employer]  Novel Coronavirus Infection  Novel coronavirus, also known as 2019-nCoV, is a type of virus that causes respiratory illness. This may lead to inflammation and the buildup of mucus and fluids in the airway of the lungs (pneumonia). There are many different coronaviruses. Most of these viruses only affect animals, but sometimes these viruses can change and infect people.  What are the causes?  This illness is caused by a virus. You may catch the virus  by:  · Breathing in droplets from an infected person's cough or sneeze.  · Touching something, like a table or a doorknob, that was exposed to the virus (contaminated) and then touching your mouth, nose, or eyes.  · Being around animals that carry the virus, or eating uncooked or undercooked meat or animal products that contain the virus.  What increases the risk?  You are more likely to develop this condition if you:  · Live in or travel to an area with a novel coronavirus outbreak.  · Come in contact with a sick person who recently traveled to an area of the novel coronavirus outbreak.  · Provide care for or live with a person who is infected with the novel coronavirus.  What are the signs or symptoms?  The novel coronavirus causes respiratory illness that can lead to pneumonia. Symptoms of pneumonia may include:  · A fever.  · A cough.  · Difficulty breathing.  How is this diagnosed?  This condition may be diagnosed based on:  · Your signs and symptoms, especially if:  ? You live in an area with a novel coronavirus outbreak.  ? You recently traveled to or from an area where the virus is common.  ? You provide care for or live with a person who was diagnosed with the novel coronavirus.  · A physical exam.  · Lab tests, which may include:  ? A nasal swab to take a sample of fluid from your nose.  ? A throat swab to take a sample of fluid from your throat.  ? A sample of mucus from your lungs (sputum).  ? Blood tests.  How is this treated?  There is no medicine to treat the novel coronavirus. Your health care provider will talk with you about ways to treat your symptoms. This may include rest, fluids, and over-the-counter medicines.  Follow these instructions at home:  Lifestyle  · Use a cool-mist humidifier to add moisture to the air. This can help you breathe more easily.  · Do not use any products that contain nicotine or tobacco, such as cigarettes, e-cigarettes, and chewing tobacco. If you need help quitting,  ask your health care provider.  · Rest at home as told by your health care provider.  · Return to your normal activities as told by your health care provider. Ask your health care provider what activities are safe for you.  General instructions  · Take over-the-counter and prescription medicines only as told by your health care provider.  · Drink enough fluid to keep your urine pale yellow.  · Keep all follow-up visits as told by your health care provider. This is important.  How is this prevented?    There is no vaccine to help prevent the novel coronavirus infection. However, there are steps you can take to protect yourself and others from this virus.  To protect yourself:   · Do not travel to areas where novel coronavirus is a risk. The areas where the coronavirus is reported change often. To identify high-risk areas, check the CDC travel website: wwwnc.cdc.gov/travel/notices  · If you live in, or must travel to, an area where the coronavirus is a risk, take precautions to avoid infection.  ? Stay away from people who are sick.  ? Stay away from places where there are animals that may carry the virus. This includes places where animals and animal products are sold. Note that both living and dead animals can carry the virus.  ? Do not eat meat or fish in areas of a coronavirus outbreak. If you must eat fish or meat, make sure that it is cooked very well.  ? Wash your hands often with soap and water. If soap and water are not available, use an alcohol-based hand .  ? Avoid touching your mouth, face, eyes, or nose.  ? Wear a mask to protect yourself if you are around people who are sick or might be sick.  To protect others:  If you have symptoms, take steps to prevent the virus from spreading to others.  · If you think you have a coronavirus infection, contact your health care provider right away. Tell your health care team that you think you may have a novel coronavirus infection.  · Stay home. Leave your  house only to seek medical care.  · Do not travel while you are sick.  · Wash your hands often with soap and water. If soap and water are not available, use alcohol-based hand .  · Stay away from other members of your household. If possible, stay in your own room, separate from others. Use a different bathroom.  · Make sure that all people in your household wash their hands well and often.  · Cough or sneeze into a tissue or your sleeve or elbow. Do not cough or sneeze into your hand or into the air.  · Wear a face mask.  Where to find more information  · Centers for Disease Control and Prevention: www.cdc.gov/coronavirus/2019-ncov/index.html  · World Health Organization: www.who.int/health-topics/coronavirus  Contact a health care provider if:  · You have traveled to an area where novel coronavirus is a risk and you have symptoms of the infection.  · You have contact with someone who has traveled to an area where novel coronavirus is a risk and you have symptoms of the infection.  Get help right away if:  · You have trouble breathing.  · You have chest pain.  Summary  · Novel coronavirus is a type of virus that causes respiratory illness. This may lead to inflammation and the buildup of mucus and fluids in the airway of the lungs (pneumonia).  · You are more likely to develop this condition if you live in or travel to an area where there is an outbreak of the novel coronavirus.  · There is no medicine to treat novel coronavirus. Your health care provider will talk with you about ways to treat your symptoms. This may include rest, fluids, and over-the-counter medicines.  · Take steps to protect yourself and others from infection. Wash your hands often. Stay away from other people who are sick and from places where there are animals that may carry the virus. Wear a mask if you are sick or if you are exposed to people who may be sick.  This information is not intended to replace advice given to you by your  health care provider. Make sure you discuss any questions you have with your health care provider.  Document Released: 01/23/2020 Document Revised: 01/23/2020 Document Reviewed: 01/23/2020  [employer] Interactive Patient Education © 2020 [employer]

## 2020-03-12 NOTE — PROCEDURES
Pulmonary Function Test  Performed by: Darrian Silvestre MD  Authorized by: Darrian Silvestre MD      Pre Drug    FVC: 56%   FEV1: 50%   FEF 25-75%: 30%   FEV1/FVC: 68.76%   T%   RV: 101%   DLCO: 80%   D/VAsb: 138%

## 2020-03-13 ENCOUNTER — TELEPHONE (OUTPATIENT)
Dept: PULMONOLOGY | Facility: CLINIC | Age: 85
End: 2020-03-13

## 2020-03-13 NOTE — TELEPHONE ENCOUNTER
That will be ok as long as it is before the ebus.  It would be good to know what that shows anyway in case it brings up any concern about his ability to tolerate anesthesia

## 2020-03-13 NOTE — TELEPHONE ENCOUNTER
Pt is also having a Stress Test on the day of his EBUS and wants to know if this is going to interfere with the EBUS or if he needs to do it on a different day?

## 2020-03-17 ENCOUNTER — APPOINTMENT (OUTPATIENT)
Dept: PREADMISSION TESTING | Facility: HOSPITAL | Age: 85
End: 2020-03-17

## 2020-03-17 VITALS
HEIGHT: 69 IN | HEART RATE: 69 BPM | WEIGHT: 242.8 LBS | DIASTOLIC BLOOD PRESSURE: 57 MMHG | OXYGEN SATURATION: 96 % | SYSTOLIC BLOOD PRESSURE: 146 MMHG | BODY MASS INDEX: 35.96 KG/M2

## 2020-03-17 LAB
ALBUMIN UR-MCNC: 2.1 MG/DL
BASOPHILS # BLD AUTO: 0.14 10*3/MM3 (ref 0–0.2)
BASOPHILS NFR BLD AUTO: 1.4 % (ref 0–1.5)
CHOLEST SERPL-MCNC: 143 MG/DL (ref 0–200)
CREAT UR-MCNC: 97.4 MG/DL
DEPRECATED RDW RBC AUTO: 43 FL (ref 37–54)
DIGOXIN SERPL-MCNC: 1.2 NG/ML (ref 0.6–1.2)
EOSINOPHIL # BLD AUTO: 0.17 10*3/MM3 (ref 0–0.4)
EOSINOPHIL NFR BLD AUTO: 1.6 % (ref 0.3–6.2)
ERYTHROCYTE [DISTWIDTH] IN BLOOD BY AUTOMATED COUNT: 12.3 % (ref 12.3–15.4)
HBA1C MFR BLD: 5.5 % (ref 4.8–5.6)
HCT VFR BLD AUTO: 40 % (ref 37.5–51)
HDLC SERPL-MCNC: 55 MG/DL (ref 40–60)
HGB BLD-MCNC: 13.6 G/DL (ref 13–17.7)
IMM GRANULOCYTES # BLD AUTO: 0.51 10*3/MM3 (ref 0–0.05)
IMM GRANULOCYTES NFR BLD AUTO: 4.9 % (ref 0–0.5)
LDLC SERPL CALC-MCNC: 45 MG/DL (ref 0–100)
LDLC/HDLC SERPL: 0.81 {RATIO}
LYMPHOCYTES # BLD AUTO: 0.9 10*3/MM3 (ref 0.7–3.1)
LYMPHOCYTES NFR BLD AUTO: 8.7 % (ref 19.6–45.3)
MCH RBC QN AUTO: 32.4 PG (ref 26.6–33)
MCHC RBC AUTO-ENTMCNC: 34 G/DL (ref 31.5–35.7)
MCV RBC AUTO: 95.2 FL (ref 79–97)
MICROALBUMIN/CREAT UR: 21.6 MG/G
MONOCYTES # BLD AUTO: 0.78 10*3/MM3 (ref 0.1–0.9)
MONOCYTES NFR BLD AUTO: 7.6 % (ref 5–12)
NEUTROPHILS # BLD AUTO: 7.81 10*3/MM3 (ref 1.7–7)
NEUTROPHILS NFR BLD AUTO: 75.8 % (ref 42.7–76)
NRBC BLD AUTO-RTO: 0 /100 WBC (ref 0–0.2)
PLATELET # BLD AUTO: 181 10*3/MM3 (ref 140–450)
PMV BLD AUTO: 9.7 FL (ref 6–12)
PSA SERPL-MCNC: <0.014 NG/ML (ref 0–4)
RBC # BLD AUTO: 4.2 10*6/MM3 (ref 4.14–5.8)
T4 FREE SERPL-MCNC: 1.16 NG/DL (ref 0.93–1.7)
TRIGL SERPL-MCNC: 216 MG/DL (ref 0–150)
TSH SERPL DL<=0.05 MIU/L-ACNC: 2.92 UIU/ML (ref 0.27–4.2)
VLDLC SERPL-MCNC: 43.2 MG/DL
WBC NRBC COR # BLD: 10.31 10*3/MM3 (ref 3.4–10.8)

## 2020-03-17 PROCEDURE — 82570 ASSAY OF URINE CREATININE: CPT | Performed by: PEDIATRICS

## 2020-03-17 PROCEDURE — 80162 ASSAY OF DIGOXIN TOTAL: CPT | Performed by: PEDIATRICS

## 2020-03-17 PROCEDURE — 93005 ELECTROCARDIOGRAM TRACING: CPT

## 2020-03-17 PROCEDURE — 83036 HEMOGLOBIN GLYCOSYLATED A1C: CPT | Performed by: PEDIATRICS

## 2020-03-17 PROCEDURE — 84443 ASSAY THYROID STIM HORMONE: CPT | Performed by: PEDIATRICS

## 2020-03-17 PROCEDURE — 82043 UR ALBUMIN QUANTITATIVE: CPT | Performed by: PEDIATRICS

## 2020-03-17 PROCEDURE — 84439 ASSAY OF FREE THYROXINE: CPT | Performed by: PEDIATRICS

## 2020-03-17 PROCEDURE — 36415 COLL VENOUS BLD VENIPUNCTURE: CPT

## 2020-03-17 PROCEDURE — 85025 COMPLETE CBC W/AUTO DIFF WBC: CPT | Performed by: PEDIATRICS

## 2020-03-17 PROCEDURE — G0103 PSA SCREENING: HCPCS | Performed by: PEDIATRICS

## 2020-03-17 PROCEDURE — 80061 LIPID PANEL: CPT | Performed by: PEDIATRICS

## 2020-03-17 PROCEDURE — 93010 ELECTROCARDIOGRAM REPORT: CPT | Performed by: INTERNAL MEDICINE

## 2020-03-17 RX ORDER — SENNOSIDES 8.6 MG
650 CAPSULE ORAL DAILY
COMMUNITY

## 2020-03-17 RX ORDER — AMOXICILLIN 500 MG
1200 CAPSULE ORAL 2 TIMES DAILY
COMMUNITY

## 2020-03-17 NOTE — DISCHARGE INSTRUCTIONS
DAY OF SURGERY INSTRUCTIONS        YOUR SURGEON: ***    PROCEDURE: ***bronchoscopy with endobronchial ultrasound, biopsy, transbronchial needle aspirate    DATE OF SURGERY: ***3/18/2020    ARRIVAL TIME: AS DIRECTED BY OFFICE    YOU MAY TAKE THE FOLLOWING MEDICATION(S) THE MORNING OF SURGERY WITH A SIP OF WATER: ***carvedilol , tylenol (do not take lisinopril 24 hours prior to surgery per anesthesia)    ALL OTHER HOME MEDICATIONS CHECK WITH YOUR DOCTOR    DO NOT TAKE ANY ERECTILE DYSFUNCTION MEDICATIONS (EX:  CIALIS, VIAGRA) 24 HOURS PRIOR TO SURGERY              MANAGING PAIN AFTER SURGERY    We know you are probably wondering what your pain will be like after surgery.  Following surgery it is unrealistic to expect you will not have pain.   Pain is how our bodies let us know that something is wrong or cautions us to be careful.  That said, our goal is to make your pain tolerable.    Methods we may use to treat your pain include (oral or IV medications, PCAs, epidurals, nerve blocks, etc.)   While some procedures require IV pain medications for a short time after surgery, transitioning to pain medications by mouth allows for better management of pain.   Your nurse will encourage you to take oral pain medications whenever possible.  IV medications work almost immediately, but only last a short while.  Taking medications by mouth allows for a more constant level of medication in your blood stream for a longer period of time.      Once your pain is out of control it is harder to get back under control.  It is important you are aware when your next dose of pain medication is due.  If you are admitted, your nurse may write the time of your next dose on the white board in your room to help you remember.      We are interested in your pain and encourage you to inform us about aggravating factors during your visit.   Many times a simple repositioning every few hours can make a big difference.    If your physician  says it is okay, do not let your pain prevent you from getting out of bed. Be sure to call your nurse for assistance prior to getting up so you do not fall.      Before surgery, please decide your tolerable pain goal.  These faces help describe the pain ratings we use on a 0-10 scale.   Be prepared to tell us your goal and whether or not you take pain or anxiety medications at home.      BEFORE YOU COME TO THE HOSPITAL  (Pre-op instructions)  • Do not eat, drink, smoke or chew gum after midnight the night before surgery.  This also includes no mints.  • Morning of surgery take only the medicines you have been instructed with a sip of water unless otherwise instructed  by your physician.  • Do not shave, wear makeup or dark nail polish.  • Remove all jewelry including rings.  • Leave anything you consider valuable at home.  • Leave your suitcase in the car until after your surgery.  • Bring the following with you if applicable:  o Picture ID and insurance, Medicare or Medicaid cards  o Co-pay/deductible required by insurance (cash, check, credit card)  o Copy of advance directive, living will or power-of- documents if not brought to PAT  o CPAP or BIPAP mask and tubing  o Relaxation aids ( book, magazine), etc.  o Hearing aids                                 ON THE DAY OF SURGERY  · On the day of surgery check in at registration located at the main entrance of the hospital.   ? You will be registered and given a beeper with instructions where to wait in the main lobby.  ? When your beeper lights up and vibrates a member of the Outpatient Surgery staff will meet you at the double doors under the stair steps and escort you to your preoperative room.   · You may have cloth compression devices placed on your legs. These help to prevent blood clots and reduce swelling in your legs.  · An IV may be inserted into one of your veins.  · In the operating room, you may be given one or more of the following:  ? A  "medicine to help you relax (sedative).  ? A medicine to numb the area (local anesthetic).  ? A medicine to make you fall asleep (general anesthetic).  ? A medicine that is injected into an area of your body to numb everything below the injection site (regional anesthetic).  · Your surgical site will be marked or identified.  · You may be given an antibiotic through your IV to help prevent infection.  Contact a health care provider if you:  · Develop a fever of more than 100.4°F (38°C) or other feelings of illness during the 48 hours before your surgery.  · Have symptoms that get worse.  Have questions or concerns about your surgery    General Anesthesia/Surgery, Adult  General anesthesia is the use of medicines to make a person \"go to sleep\" (unconscious) for a medical procedure. General anesthesia must be used for certain procedures, and is often recommended for procedures that:  · Last a long time.  · Require you to be still or in an unusual position.  · Are major and can cause blood loss.  The medicines used for general anesthesia are called general anesthetics. As well as making you unconscious for a certain amount of time, these medicines:  · Prevent pain.  · Control your blood pressure.  · Relax your muscles.  Tell a health care provider about:  · Any allergies you have.  · All medicines you are taking, including vitamins, herbs, eye drops, creams, and over-the-counter medicines.  · Any problems you or family members have had with anesthetic medicines.  · Types of anesthetics you have had in the past.  · Any blood disorders you have.  · Any surgeries you have had.  · Any medical conditions you have.  · Any recent upper respiratory, chest, or ear infections.  · Any history of:  ? Heart or lung conditions, such as heart failure, sleep apnea, asthma, or chronic obstructive pulmonary disease (COPD).  ?  service.  ? Depression or anxiety.  · Any tobacco or drug use, including marijuana or alcohol " use.  · Whether you are pregnant or may be pregnant.  What are the risks?  Generally, this is a safe procedure. However, problems may occur, including:  · Allergic reaction.  · Lung and heart problems.  · Inhaling food or liquid from the stomach into the lungs (aspiration).  · Nerve injury.  · Air in the bloodstream, which can lead to stroke.  · Extreme agitation or confusion (delirium) when you wake up from the anesthetic.  · Waking up during your procedure and being unable to move. This is rare.  These problems are more likely to develop if you are having a major surgery or if you have an advanced or serious medical condition. You can prevent some of these complications by answering all of your health care provider's questions thoroughly and by following all instructions before your procedure.  General anesthesia can cause side effects, including:  · Nausea or vomiting.  · A sore throat from the breathing tube.  · Hoarseness.  · Wheezing or coughing.  · Shaking chills.  · Tiredness.  · Body aches.  · Anxiety.  · Sleepiness or drowsiness.  · Confusion or agitation.  RISKS AND COMPLICATIONS OF SURGERY  Your health care provider will discuss possible risks and complications with you before surgery. Common risks and complications include:    · Problems due to the use of anesthetics.  · Blood loss and replacement (does not apply to minor surgical procedures).  · Temporary increase in pain due to surgery.  · Uncorrected pain or problems that the surgery was meant to correct.  · Infection.  · New damage.    What happens before the procedure?    Medicines  Ask your health care provider about:  · Changing or stopping your regular medicines. This is especially important if you are taking diabetes medicines or blood thinners.  · Taking medicines such as aspirin and ibuprofen. These medicines can thin your blood. Do not take these medicines unless your health care provider tells you to take them.  · Taking over-the-counter  medicines, vitamins, herbs, and supplements. Do not take these during the week before your procedure unless your health care provider approves them.  General instructions  · Starting 3-6 weeks before the procedure, do not use any products that contain nicotine or tobacco, such as cigarettes and e-cigarettes. If you need help quitting, ask your health care provider.  · If you brush your teeth on the morning of the procedure, make sure to spit out all of the toothpaste.  · Tell your health care provider if you become ill or develop a cold, cough, or fever.  · If instructed by your health care provider, bring your sleep apnea device with you on the day of your surgery (if applicable).  · Ask your health care provider if you will be going home the same day, the following day, or after a longer hospital stay.  ? Plan to have someone take you home from the hospital or clinic.  ? Plan to have a responsible adult care for you for at least 24 hours after you leave the hospital or clinic. This is important.  What happens during the procedure?  · You will be given anesthetics through both of the following:  ? A mask placed over your nose and mouth.  ? An IV in one of your veins.  · You may receive a medicine to help you relax (sedative).  · After you are unconscious, a breathing tube may be inserted down your throat to help you breathe. This will be removed before you wake up.  · An anesthesia specialist will stay with you throughout your procedure. He or she will:  ? Keep you comfortable and safe by continuing to give you medicines and adjusting the amount of medicine that you get.  ? Monitor your blood pressure, pulse, and oxygen levels to make sure that the anesthetics do not cause any problems.  The procedure may vary among health care providers and hospitals.  What happens after the procedure?  · Your blood pressure, temperature, heart rate, breathing rate, and blood oxygen level will be monitored until the medicines you  were given have worn off.  · You will wake up in a recovery area. You may wake up slowly.  · If you feel anxious or agitated, you may be given medicine to help you calm down.  · If you will be going home the same day, your health care provider may check to make sure you can walk, drink, and urinate.  · Your health care provider will treat any pain or side effects you have before you go home.  · Do not drive for 24 hours if you were given a sedative.  Summary  · General anesthesia is used to keep you still and prevent pain during a procedure.  · It is important to tell your healthcare provider about your medical history and any surgeries you have had, and previous experience with anesthesia.  · Follow your healthcare provider’s instructions about when to stop eating, drinking, or taking certain medicines before your procedure.  · Plan to have someone take you home from the hospital or clinic.  This information is not intended to replace advice given to you by your health care provider. Make sure you discuss any questions you have with your health care provider.  Document Released: 03/26/2009 Document Revised: 08/03/2018 Document Reviewed: 08/03/2018  [email or web address] Interactive Patient Education © 2019 [email or web address]      Fall Prevention in Hospitals, Adult  As a hospital patient, your condition and the treatments you receive can increase your risk for falls. Some additional risk factors for falls in a hospital include:  · Being in an unfamiliar environment.  · Being on bed rest.  · Your surgery.  · Taking certain medicines.  · Your tubing requirements, such as intravenous (IV) therapy or catheters.  It is important that you learn how to decrease fall risks while at the hospital. Below are important tips that can help prevent falls.  SAFETY TIPS FOR PREVENTING FALLS  Talk about your risk of falling.  · Ask your health care provider why you are at risk for falling. Is it your medicine, illness, tubing placement, or something  else?  · Make a plan with your health care provider to keep you safe from falls.  · Ask your health care provider or pharmacist about side effects of your medicines. Some medicines can make you dizzy or affect your coordination.  Ask for help.  · Ask for help before getting out of bed. You may need to press your call button.  · Ask for assistance in getting safely to the toilet.  · Ask for a walker or cane to be put at your bedside. Ask that most of the side rails on your bed be placed up before your health care provider leaves the room.  · Ask family or friends to sit with you.  · Ask for things that are out of your reach, such as your glasses, hearing aids, telephone, bedside table, or call button.  Follow these tips to avoid falling:  · Stay lying or seated, rather than standing, while waiting for help.  · Wear rubber-soled slippers or shoes whenever you walk in the hospital.  · Avoid quick, sudden movements.  ¨ Change positions slowly.  ¨ Sit on the side of your bed before standing.  ¨ Stand up slowly and wait before you start to walk.  · Let your health care provider know if there is a spill on the floor.  · Pay careful attention to the medical equipment, electrical cords, and tubes around you.  · When you need help, use your call button by your bed or in the bathroom. Wait for one of your health care providers to help you.  · If you feel dizzy or unsure of your footing, return to bed and wait for assistance.  · Avoid being distracted by the TV, telephone, or another person in your room.  · Do not lean or support yourself on rolling objects, such as IV poles or bedside tables.     This information is not intended to replace advice given to you by your health care provider. Make sure you discuss any questions you have with your health care provider.     Document Released: 12/15/2001 Document Revised: 01/08/2016 Document Reviewed: 08/25/2013  [employer] Interactive Patient Education ©2016 [employer]             PATIENT/FAMILY/RESPONSIBLE PARTY VERBALIZES UNDERSTANDING OF ABOVE EDUCATION.  COPY OF PAIN SCALE GIVEN AND REVIEWED WITH VERBALIZED UNDERSTANDING.

## 2020-03-19 PROBLEM — Z12.11 ENCOUNTER FOR SCREENING FOR MALIGNANT NEOPLASM OF COLON: Status: RESOLVED | Noted: 2018-11-15 | Resolved: 2020-03-19

## 2020-03-27 ENCOUNTER — APPOINTMENT (OUTPATIENT)
Dept: GENERAL RADIOLOGY | Age: 85
DRG: 287 | End: 2020-03-27
Payer: MEDICARE

## 2020-03-27 ENCOUNTER — HOSPITAL ENCOUNTER (INPATIENT)
Age: 85
LOS: 2 days | Discharge: HOME | DRG: 287 | End: 2020-03-30
Attending: EMERGENCY MEDICINE | Admitting: HOSPITALIST
Payer: MEDICARE

## 2020-03-27 PROBLEM — I25.9 CHEST PAIN DUE TO MYOCARDIAL ISCHEMIA: Status: ACTIVE | Noted: 2020-03-27

## 2020-03-27 PROBLEM — E87.5 HYPERKALEMIA: Status: ACTIVE | Noted: 2020-03-27

## 2020-03-27 LAB
ALBUMIN SERPL-MCNC: 4.4 G/DL (ref 3.5–5.2)
ALP BLD-CCNC: 38 U/L (ref 40–130)
ALT SERPL-CCNC: 7 U/L (ref 5–41)
ANION GAP SERPL CALCULATED.3IONS-SCNC: 13 MMOL/L (ref 7–19)
AST SERPL-CCNC: 10 U/L (ref 5–40)
BASOPHILS ABSOLUTE: 0.1 K/UL (ref 0–0.2)
BASOPHILS RELATIVE PERCENT: 0.6 % (ref 0–1)
BILIRUB SERPL-MCNC: <0.2 MG/DL (ref 0.2–1.2)
BUN BLDV-MCNC: 37 MG/DL (ref 8–23)
CALCIUM SERPL-MCNC: 9.8 MG/DL (ref 8.8–10.2)
CHLORIDE BLD-SCNC: 96 MMOL/L (ref 98–111)
CHOLESTEROL, TOTAL: 154 MG/DL (ref 160–199)
CO2: 27 MMOL/L (ref 22–29)
CREAT SERPL-MCNC: 1.1 MG/DL (ref 0.5–1.2)
EOSINOPHILS ABSOLUTE: 0.1 K/UL (ref 0–0.6)
EOSINOPHILS RELATIVE PERCENT: 1 % (ref 0–5)
GFR NON-AFRICAN AMERICAN: >60
GLUCOSE BLD-MCNC: 125 MG/DL (ref 74–109)
GLUCOSE BLD-MCNC: 186 MG/DL (ref 70–99)
HBA1C MFR BLD: 5.7 % (ref 4–6)
HCT VFR BLD CALC: 38 % (ref 42–52)
HDLC SERPL-MCNC: 46 MG/DL (ref 55–121)
HEMOGLOBIN: 12.9 G/DL (ref 14–18)
IMMATURE GRANULOCYTES #: 0.5 K/UL
LDL CHOLESTEROL CALCULATED: 51 MG/DL
LYMPHOCYTES ABSOLUTE: 0.9 K/UL (ref 1.1–4.5)
LYMPHOCYTES RELATIVE PERCENT: 6.2 % (ref 20–40)
MAGNESIUM: 2.1 MG/DL (ref 1.6–2.4)
MCH RBC QN AUTO: 32.9 PG (ref 27–31)
MCHC RBC AUTO-ENTMCNC: 33.9 G/DL (ref 33–37)
MCV RBC AUTO: 96.9 FL (ref 80–94)
MONOCYTES ABSOLUTE: 1.1 K/UL (ref 0–0.9)
MONOCYTES RELATIVE PERCENT: 7.6 % (ref 0–10)
NEUTROPHILS ABSOLUTE: 11.9 K/UL (ref 1.5–7.5)
NEUTROPHILS RELATIVE PERCENT: 81.2 % (ref 50–65)
PDW BLD-RTO: 12 % (ref 11.5–14.5)
PERFORMED ON: ABNORMAL
PHOSPHORUS: 3.8 MG/DL (ref 2.5–4.5)
PLATELET # BLD: 164 K/UL (ref 130–400)
PMV BLD AUTO: 10.1 FL (ref 9.4–12.4)
POTASSIUM SERPL-SCNC: 5.1 MMOL/L (ref 3.5–5)
PRO-BNP: 814 PG/ML (ref 0–1800)
RBC # BLD: 3.92 M/UL (ref 4.7–6.1)
SODIUM BLD-SCNC: 136 MMOL/L (ref 136–145)
TOTAL PROTEIN: 6.9 G/DL (ref 6.6–8.7)
TRIGL SERPL-MCNC: 285 MG/DL (ref 0–149)
TROPONIN: <0.01 NG/ML (ref 0–0.03)
TROPONIN: <0.01 NG/ML (ref 0–0.03)
TSH REFLEX FT4: 2.84 UIU/ML (ref 0.35–5.5)
WBC # BLD: 14.7 K/UL (ref 4.8–10.8)

## 2020-03-27 PROCEDURE — 84443 ASSAY THYROID STIM HORMONE: CPT

## 2020-03-27 PROCEDURE — 2700000000 HC OXYGEN THERAPY PER DAY

## 2020-03-27 PROCEDURE — 36415 COLL VENOUS BLD VENIPUNCTURE: CPT

## 2020-03-27 PROCEDURE — 84100 ASSAY OF PHOSPHORUS: CPT

## 2020-03-27 PROCEDURE — 80053 COMPREHEN METABOLIC PANEL: CPT

## 2020-03-27 PROCEDURE — 83735 ASSAY OF MAGNESIUM: CPT

## 2020-03-27 PROCEDURE — 93005 ELECTROCARDIOGRAM TRACING: CPT | Performed by: INTERNAL MEDICINE

## 2020-03-27 PROCEDURE — 85025 COMPLETE CBC W/AUTO DIFF WBC: CPT

## 2020-03-27 PROCEDURE — 94640 AIRWAY INHALATION TREATMENT: CPT

## 2020-03-27 PROCEDURE — G0378 HOSPITAL OBSERVATION PER HR: HCPCS

## 2020-03-27 PROCEDURE — 83880 ASSAY OF NATRIURETIC PEPTIDE: CPT

## 2020-03-27 PROCEDURE — 80061 LIPID PANEL: CPT

## 2020-03-27 PROCEDURE — 71045 X-RAY EXAM CHEST 1 VIEW: CPT

## 2020-03-27 PROCEDURE — 6360000002 HC RX W HCPCS: Performed by: HOSPITALIST

## 2020-03-27 PROCEDURE — 84484 ASSAY OF TROPONIN QUANT: CPT

## 2020-03-27 PROCEDURE — 99285 EMERGENCY DEPT VISIT HI MDM: CPT

## 2020-03-27 PROCEDURE — 83036 HEMOGLOBIN GLYCOSYLATED A1C: CPT

## 2020-03-27 PROCEDURE — 94660 CPAP INITIATION&MGMT: CPT

## 2020-03-27 RX ORDER — FUROSEMIDE 40 MG/1
60 TABLET ORAL DAILY
Status: DISCONTINUED | OUTPATIENT
Start: 2020-03-27 | End: 2020-03-30 | Stop reason: HOSPADM

## 2020-03-27 RX ORDER — CARVEDILOL 12.5 MG/1
12.5 TABLET ORAL 2 TIMES DAILY WITH MEALS
Status: DISCONTINUED | OUTPATIENT
Start: 2020-03-27 | End: 2020-03-30 | Stop reason: HOSPADM

## 2020-03-27 RX ORDER — POTASSIUM CHLORIDE 7.45 MG/ML
10 INJECTION INTRAVENOUS PRN
Status: DISCONTINUED | OUTPATIENT
Start: 2020-03-27 | End: 2020-03-30 | Stop reason: HOSPADM

## 2020-03-27 RX ORDER — DOXAZOSIN MESYLATE 4 MG/1
4 TABLET ORAL DAILY
Status: DISCONTINUED | OUTPATIENT
Start: 2020-03-27 | End: 2020-03-30 | Stop reason: HOSPADM

## 2020-03-27 RX ORDER — NICOTINE POLACRILEX 4 MG
15 LOZENGE BUCCAL PRN
Status: DISCONTINUED | OUTPATIENT
Start: 2020-03-27 | End: 2020-03-30 | Stop reason: HOSPADM

## 2020-03-27 RX ORDER — SPIRONOLACTONE 50 MG/1
50 TABLET, FILM COATED ORAL DAILY
Status: DISCONTINUED | OUTPATIENT
Start: 2020-03-27 | End: 2020-03-30 | Stop reason: HOSPADM

## 2020-03-27 RX ORDER — NITROGLYCERIN 0.4 MG/1
0.4 TABLET SUBLINGUAL EVERY 5 MIN PRN
Status: DISCONTINUED | OUTPATIENT
Start: 2020-03-27 | End: 2020-03-30 | Stop reason: HOSPADM

## 2020-03-27 RX ORDER — INSULIN GLARGINE 100 [IU]/ML
10 INJECTION, SOLUTION SUBCUTANEOUS NIGHTLY
Status: DISCONTINUED | OUTPATIENT
Start: 2020-03-27 | End: 2020-03-30 | Stop reason: HOSPADM

## 2020-03-27 RX ORDER — DEXTROSE MONOHYDRATE 50 MG/ML
100 INJECTION, SOLUTION INTRAVENOUS PRN
Status: DISCONTINUED | OUTPATIENT
Start: 2020-03-27 | End: 2020-03-30 | Stop reason: HOSPADM

## 2020-03-27 RX ORDER — CHOLESTYRAMINE LIGHT 4 G/5.7G
4 POWDER, FOR SUSPENSION ORAL DAILY
Status: DISCONTINUED | OUTPATIENT
Start: 2020-03-27 | End: 2020-03-30 | Stop reason: HOSPADM

## 2020-03-27 RX ORDER — HYDROCHLOROTHIAZIDE 12.5 MG/1
12.5 CAPSULE, GELATIN COATED ORAL DAILY
Status: DISCONTINUED | OUTPATIENT
Start: 2020-03-27 | End: 2020-03-30 | Stop reason: HOSPADM

## 2020-03-27 RX ORDER — POTASSIUM CHLORIDE 20 MEQ/1
40 TABLET, EXTENDED RELEASE ORAL PRN
Status: DISCONTINUED | OUTPATIENT
Start: 2020-03-27 | End: 2020-03-30 | Stop reason: HOSPADM

## 2020-03-27 RX ORDER — ASPIRIN 81 MG/1
81 TABLET ORAL DAILY
Status: DISCONTINUED | OUTPATIENT
Start: 2020-03-28 | End: 2020-03-30 | Stop reason: HOSPADM

## 2020-03-27 RX ORDER — CHOLECALCIFEROL (VITAMIN D3) 125 MCG
1000 CAPSULE ORAL DAILY
Status: DISCONTINUED | OUTPATIENT
Start: 2020-03-27 | End: 2020-03-30 | Stop reason: HOSPADM

## 2020-03-27 RX ORDER — M-VIT,TX,IRON,MINS/CALC/FOLIC 27MG-0.4MG
1 TABLET ORAL DAILY
Status: DISCONTINUED | OUTPATIENT
Start: 2020-03-27 | End: 2020-03-30 | Stop reason: HOSPADM

## 2020-03-27 RX ORDER — SODIUM CHLORIDE 0.9 % (FLUSH) 0.9 %
10 SYRINGE (ML) INJECTION EVERY 12 HOURS SCHEDULED
Status: DISCONTINUED | OUTPATIENT
Start: 2020-03-27 | End: 2020-03-30 | Stop reason: HOSPADM

## 2020-03-27 RX ORDER — DEXTROSE MONOHYDRATE 25 G/50ML
12.5 INJECTION, SOLUTION INTRAVENOUS PRN
Status: DISCONTINUED | OUTPATIENT
Start: 2020-03-27 | End: 2020-03-30 | Stop reason: HOSPADM

## 2020-03-27 RX ORDER — PRAZOSIN HYDROCHLORIDE 2 MG/1
2 CAPSULE ORAL NIGHTLY
Status: DISCONTINUED | OUTPATIENT
Start: 2020-03-27 | End: 2020-03-30 | Stop reason: HOSPADM

## 2020-03-27 RX ORDER — WHEAT DEXTRIN 3 G/3.8 G
4 POWDER (GRAM) ORAL
Status: DISCONTINUED | OUTPATIENT
Start: 2020-03-27 | End: 2020-03-27 | Stop reason: CLARIF

## 2020-03-27 RX ORDER — POLYETHYLENE GLYCOL 3350 17 G/17G
17 POWDER, FOR SOLUTION ORAL DAILY PRN
Status: DISCONTINUED | OUTPATIENT
Start: 2020-03-27 | End: 2020-03-30 | Stop reason: HOSPADM

## 2020-03-27 RX ORDER — DIGOXIN 125 MCG
125 TABLET ORAL EVERY OTHER DAY
Status: DISCONTINUED | OUTPATIENT
Start: 2020-03-27 | End: 2020-03-30 | Stop reason: HOSPADM

## 2020-03-27 RX ORDER — FLUOXETINE HYDROCHLORIDE 20 MG/1
40 CAPSULE ORAL DAILY
Status: DISCONTINUED | OUTPATIENT
Start: 2020-03-27 | End: 2020-03-30 | Stop reason: HOSPADM

## 2020-03-27 RX ORDER — ALBUTEROL SULFATE 2.5 MG/3ML
2.5 SOLUTION RESPIRATORY (INHALATION)
Status: DISCONTINUED | OUTPATIENT
Start: 2020-03-27 | End: 2020-03-30 | Stop reason: HOSPADM

## 2020-03-27 RX ORDER — ARFORMOTEROL TARTRATE 15 UG/2ML
15 SOLUTION RESPIRATORY (INHALATION) 2 TIMES DAILY
Status: DISCONTINUED | OUTPATIENT
Start: 2020-03-27 | End: 2020-03-30 | Stop reason: HOSPADM

## 2020-03-27 RX ORDER — ACETAMINOPHEN 650 MG/1
650 SUPPOSITORY RECTAL EVERY 6 HOURS PRN
Status: DISCONTINUED | OUTPATIENT
Start: 2020-03-27 | End: 2020-03-30 | Stop reason: HOSPADM

## 2020-03-27 RX ORDER — ACETAMINOPHEN 325 MG/1
650 TABLET ORAL EVERY 6 HOURS PRN
Status: DISCONTINUED | OUTPATIENT
Start: 2020-03-27 | End: 2020-03-30 | Stop reason: HOSPADM

## 2020-03-27 RX ORDER — AMLODIPINE BESYLATE 5 MG/1
5 TABLET ORAL DAILY
Status: DISCONTINUED | OUTPATIENT
Start: 2020-03-27 | End: 2020-03-30 | Stop reason: HOSPADM

## 2020-03-27 RX ORDER — LISINOPRIL 10 MG/1
40 TABLET ORAL DAILY
Status: DISCONTINUED | OUTPATIENT
Start: 2020-03-27 | End: 2020-03-30 | Stop reason: HOSPADM

## 2020-03-27 RX ORDER — ONDANSETRON 4 MG/1
4 TABLET, ORALLY DISINTEGRATING ORAL EVERY 8 HOURS PRN
Status: DISCONTINUED | OUTPATIENT
Start: 2020-03-27 | End: 2020-03-30 | Stop reason: HOSPADM

## 2020-03-27 RX ORDER — BUDESONIDE 0.5 MG/2ML
0.5 INHALANT ORAL EVERY 12 HOURS
Status: DISCONTINUED | OUTPATIENT
Start: 2020-03-27 | End: 2020-03-30 | Stop reason: HOSPADM

## 2020-03-27 RX ORDER — ONDANSETRON 2 MG/ML
4 INJECTION INTRAMUSCULAR; INTRAVENOUS EVERY 6 HOURS PRN
Status: DISCONTINUED | OUTPATIENT
Start: 2020-03-27 | End: 2020-03-30 | Stop reason: HOSPADM

## 2020-03-27 RX ORDER — ATORVASTATIN CALCIUM 40 MG/1
40 TABLET, FILM COATED ORAL NIGHTLY
Status: DISCONTINUED | OUTPATIENT
Start: 2020-03-27 | End: 2020-03-30 | Stop reason: HOSPADM

## 2020-03-27 RX ORDER — METOLAZONE 2.5 MG/1
2.5 TABLET ORAL DAILY
Status: DISCONTINUED | OUTPATIENT
Start: 2020-03-27 | End: 2020-03-30 | Stop reason: HOSPADM

## 2020-03-27 RX ORDER — BUPROPION HYDROCHLORIDE 150 MG/1
300 TABLET ORAL EVERY MORNING
Status: DISCONTINUED | OUTPATIENT
Start: 2020-03-28 | End: 2020-03-30 | Stop reason: HOSPADM

## 2020-03-27 RX ORDER — FLUOXETINE HYDROCHLORIDE 20 MG/1
20 CAPSULE ORAL DAILY
Status: DISCONTINUED | OUTPATIENT
Start: 2020-03-27 | End: 2020-03-30 | Stop reason: HOSPADM

## 2020-03-27 RX ORDER — POTASSIUM CHLORIDE 750 MG/1
10 TABLET, EXTENDED RELEASE ORAL DAILY
Status: DISCONTINUED | OUTPATIENT
Start: 2020-03-27 | End: 2020-03-30 | Stop reason: HOSPADM

## 2020-03-27 RX ORDER — SODIUM CHLORIDE 0.9 % (FLUSH) 0.9 %
10 SYRINGE (ML) INJECTION PRN
Status: DISCONTINUED | OUTPATIENT
Start: 2020-03-27 | End: 2020-03-30 | Stop reason: HOSPADM

## 2020-03-27 RX ORDER — MAGNESIUM SULFATE IN WATER 40 MG/ML
2 INJECTION, SOLUTION INTRAVENOUS PRN
Status: DISCONTINUED | OUTPATIENT
Start: 2020-03-27 | End: 2020-03-30 | Stop reason: HOSPADM

## 2020-03-27 RX ADMIN — ARFORMOTEROL TARTRATE 15 MCG: 15 SOLUTION RESPIRATORY (INHALATION) at 21:38

## 2020-03-27 RX ADMIN — BUDESONIDE 500 MCG: 0.5 INHALANT RESPIRATORY (INHALATION) at 21:38

## 2020-03-27 RX ADMIN — IPRATROPIUM BROMIDE 0.5 MG: 0.5 SOLUTION RESPIRATORY (INHALATION) at 21:38

## 2020-03-27 ASSESSMENT — PAIN SCALES - GENERAL
PAINLEVEL_OUTOF10: 0
PAINLEVEL_OUTOF10: 3

## 2020-03-27 NOTE — ED PROVIDER NOTES
by mouth 2 times daily. FLUOXETINE (PROZAC) 20 MG CAPSULE    Take 1 capsule by mouth daily Along with the 40 mg tablet    FLUOXETINE (PROZAC) 40 MG CAPSULE    Take 1 capsule by mouth daily    FLUTICASONE-UMECLIDIN-VILANT (TRELEGY ELLIPTA) 100-62.5-25 MCG/INH AEPB    Inhale 1 puff into the lungs daily    FUROSEMIDE (LASIX) 40 MG TABLET    Take 1.5 tablets by mouth daily    HYDROCHLOROTHIAZIDE (MICROZIDE) 12.5 MG CAPSULE    TAKE 1 CAPSULE BY MOUTH EVERY DAY IN THE MORNING    LANCETS MISC    1 each by Does not apply route daily Accu Chek Guid3 Lancets    LEUPROLIDE (LUPRON) 30 MG INJECTION    Inject 30 mg into the muscle once Every 4 months    LISINOPRIL (PRINIVIL;ZESTRIL) 40 MG TABLET    Take 1 tablet by mouth daily    METFORMIN (GLUCOPHAGE) 500 MG TABLET    Take 1 tablet by mouth 2 times daily (with meals)    METOLAZONE (ZAROXOLYN) 2.5 MG TABLET    Take 1 tablet by mouth daily    MULTIPLE VITAMINS-MINERALS (THERAPEUTIC MULTIVITAMIN-MINERALS) TABLET    Take 1 tablet by mouth daily    MYRBETRIQ 25 MG TB24    Take 25 mg by mouth daily     ONE TOUCH LANCETS MISC    1 each by Does not apply route daily    OXYGEN    Inhale 2 L into the lungs nightly    POTASSIUM CHLORIDE (KLOR-CON 10) 10 MEQ EXTENDED RELEASE TABLET    Take 1 tablet by mouth daily    PRAZOSIN (MINIPRESS) 2 MG CAPSULE    Take 1 capsule by mouth nightly    RIVAROXABAN (XARELTO) 20 MG TABS TABLET    Take 1 tablet by mouth daily (with breakfast)    SPIRONOLACTONE (ALDACTONE) 50 MG TABLET    TAKE 1 TABLET BY MOUTH EVERY DAY    TERAZOSIN (HYTRIN) 5 MG CAPSULE    Take 1 capsule by mouth nightly    VITAMIN B-12 (CYANOCOBALAMIN) 1000 MCG TABLET    Take 1,000 mcg by mouth daily    WELCHOL 625 MG TABLET    TAKE 3 TABLETS BY MOUTH 2 TIMES DAILY (WITH MEALS)    WHEAT DEXTRIN (BENEFIBER) POWD    Take 4 g by mouth 3 times daily (with meals)       ALLERGIES     Patient has no known allergies.     FAMILY HISTORY       Family History   Problem Relation Age of Onset    pulses. Heart sounds: Normal heart sounds. No murmur. Pulmonary:      Effort: Pulmonary effort is normal. No respiratory distress. Breath sounds: Normal breath sounds. No stridor. Abdominal:      General: There is no distension. Palpations: Abdomen is soft. Tenderness: There is no abdominal tenderness. There is no guarding. Musculoskeletal:      Right lower leg: No edema. Left lower leg: No edema. Skin:     Coloration: Skin is not pale. Findings: No rash. Neurological:      Mental Status: He is alert and oriented to person, place, and time. Psychiatric:         Behavior: Behavior is cooperative. DIAGNOSTIC RESULTS     EKG: All EKG's areinterpreted by the Emergency Department Physician who either signs or Co-signs this chart in the absence of a cardiologist.    [phone]: Paced @ 88    RADIOLOGY:  Non-plain film images such as CT, Ultrasound and MRI are read by the radiologist. Plain radiographic images are visualized and preliminarily interpreted bythe emergency physician with the below findings:      XR CHEST PORTABLE   Final Result   1. Cardiomegaly. 2. No acute cardiopulmonary process.    Signed by Dr Rina Fox on 3/27/2020 4:12 PM              LABS:  Labs Reviewed   COMPREHENSIVE METABOLIC PANEL - Abnormal; Notable for the following components:       Result Value    Potassium 5.1 (*)     Chloride 96 (*)     Glucose 125 (*)     BUN 37 (*)     Alkaline Phosphatase 38 (*)     All other components within normal limits   CBC WITH AUTO DIFFERENTIAL - Abnormal; Notable for the following components:    WBC 14.7 (*)     RBC 3.92 (*)     Hemoglobin 12.9 (*)     Hematocrit 38.0 (*)     MCV 96.9 (*)     MCH 32.9 (*)     Neutrophils % 81.2 (*)     Lymphocytes % 6.2 (*)     Neutrophils Absolute 11.9 (*)     Lymphocytes Absolute 0.9 (*)     Monocytes Absolute 1.10 (*)     All other components within normal limits   LIPID PANEL - Abnormal; Notable for the following components:

## 2020-03-27 NOTE — ED NOTES
Bed: 01-A  Expected date:   Expected time:   Means of arrival:   Comments:  JULIANA Pierson, RN  03/27/20 1525

## 2020-03-28 ENCOUNTER — APPOINTMENT (OUTPATIENT)
Dept: NUCLEAR MEDICINE | Age: 85
DRG: 287 | End: 2020-03-28
Payer: MEDICARE

## 2020-03-28 PROBLEM — I25.9 ISCHEMIC HEART DISEASE DUE TO CORONARY ARTERY OBSTRUCTION (HCC): Status: ACTIVE | Noted: 2020-03-28

## 2020-03-28 PROBLEM — I24.0 ISCHEMIC HEART DISEASE DUE TO CORONARY ARTERY OBSTRUCTION (HCC): Status: ACTIVE | Noted: 2020-03-28

## 2020-03-28 LAB
ANION GAP SERPL CALCULATED.3IONS-SCNC: 12 MMOL/L (ref 7–19)
BASOPHILS ABSOLUTE: 0.1 K/UL (ref 0–0.2)
BASOPHILS RELATIVE PERCENT: 0.4 % (ref 0–1)
BUN BLDV-MCNC: 38 MG/DL (ref 8–23)
CALCIUM SERPL-MCNC: 9.4 MG/DL (ref 8.8–10.2)
CHLORIDE BLD-SCNC: 97 MMOL/L (ref 98–111)
CO2: 24 MMOL/L (ref 22–29)
CREAT SERPL-MCNC: 1.1 MG/DL (ref 0.5–1.2)
EOSINOPHILS ABSOLUTE: 0 K/UL (ref 0–0.6)
EOSINOPHILS RELATIVE PERCENT: 0.2 % (ref 0–5)
GFR NON-AFRICAN AMERICAN: >60
GLUCOSE BLD-MCNC: 108 MG/DL (ref 70–99)
GLUCOSE BLD-MCNC: 136 MG/DL (ref 70–99)
GLUCOSE BLD-MCNC: 138 MG/DL (ref 70–99)
GLUCOSE BLD-MCNC: 147 MG/DL (ref 70–99)
GLUCOSE BLD-MCNC: 161 MG/DL (ref 74–109)
HCT VFR BLD CALC: 35.5 % (ref 42–52)
HEMOGLOBIN: 11.8 G/DL (ref 14–18)
IMMATURE GRANULOCYTES #: 0.3 K/UL
LV EF: 45 %
LVEF MODALITY: NORMAL
LYMPHOCYTES ABSOLUTE: 0.7 K/UL (ref 1.1–4.5)
LYMPHOCYTES RELATIVE PERCENT: 4.2 % (ref 20–40)
MCH RBC QN AUTO: 32.5 PG (ref 27–31)
MCHC RBC AUTO-ENTMCNC: 33.2 G/DL (ref 33–37)
MCV RBC AUTO: 97.8 FL (ref 80–94)
MONOCYTES ABSOLUTE: 1.8 K/UL (ref 0–0.9)
MONOCYTES RELATIVE PERCENT: 11.6 % (ref 0–10)
NEUTROPHILS ABSOLUTE: 12.8 K/UL (ref 1.5–7.5)
NEUTROPHILS RELATIVE PERCENT: 81.7 % (ref 50–65)
PDW BLD-RTO: 12.2 % (ref 11.5–14.5)
PERFORMED ON: ABNORMAL
PLATELET # BLD: 150 K/UL (ref 130–400)
PMV BLD AUTO: 10.4 FL (ref 9.4–12.4)
POTASSIUM REFLEX MAGNESIUM: 4.9 MMOL/L (ref 3.5–5)
RBC # BLD: 3.63 M/UL (ref 4.7–6.1)
SODIUM BLD-SCNC: 133 MMOL/L (ref 136–145)
TROPONIN: <0.01 NG/ML (ref 0–0.03)
TROPONIN: <0.01 NG/ML (ref 0–0.03)
WBC # BLD: 15.6 K/UL (ref 4.8–10.8)

## 2020-03-28 PROCEDURE — 3430000000 HC RX DIAGNOSTIC RADIOPHARMACEUTICAL: Performed by: HOSPITALIST

## 2020-03-28 PROCEDURE — 94660 CPAP INITIATION&MGMT: CPT

## 2020-03-28 PROCEDURE — 84484 ASSAY OF TROPONIN QUANT: CPT

## 2020-03-28 PROCEDURE — A9500 TC99M SESTAMIBI: HCPCS | Performed by: HOSPITALIST

## 2020-03-28 PROCEDURE — 2140000000 HC CCU INTERMEDIATE R&B

## 2020-03-28 PROCEDURE — C8929 TTE W OR WO FOL WCON,DOPPLER: HCPCS

## 2020-03-28 PROCEDURE — 2580000003 HC RX 258: Performed by: HOSPITALIST

## 2020-03-28 PROCEDURE — 6370000000 HC RX 637 (ALT 250 FOR IP): Performed by: HOSPITALIST

## 2020-03-28 PROCEDURE — 80048 BASIC METABOLIC PNL TOTAL CA: CPT

## 2020-03-28 PROCEDURE — 2700000000 HC OXYGEN THERAPY PER DAY

## 2020-03-28 PROCEDURE — 6360000002 HC RX W HCPCS: Performed by: HOSPITALIST

## 2020-03-28 PROCEDURE — 78452 HT MUSCLE IMAGE SPECT MULT: CPT

## 2020-03-28 PROCEDURE — 94640 AIRWAY INHALATION TREATMENT: CPT

## 2020-03-28 PROCEDURE — 6360000004 HC RX CONTRAST MEDICATION: Performed by: NURSE PRACTITIONER

## 2020-03-28 PROCEDURE — 93005 ELECTROCARDIOGRAM TRACING: CPT | Performed by: HOSPITALIST

## 2020-03-28 PROCEDURE — 85025 COMPLETE CBC W/AUTO DIFF WBC: CPT

## 2020-03-28 PROCEDURE — 99223 1ST HOSP IP/OBS HIGH 75: CPT | Performed by: INTERNAL MEDICINE

## 2020-03-28 PROCEDURE — 36415 COLL VENOUS BLD VENIPUNCTURE: CPT

## 2020-03-28 PROCEDURE — 82947 ASSAY GLUCOSE BLOOD QUANT: CPT

## 2020-03-28 RX ORDER — ALBUTEROL SULFATE 2.5 MG/3ML
2.5 SOLUTION RESPIRATORY (INHALATION) EVERY 6 HOURS PRN
Status: DISCONTINUED | OUTPATIENT
Start: 2020-03-28 | End: 2020-03-30 | Stop reason: HOSPADM

## 2020-03-28 RX ORDER — AMINOPHYLLINE DIHYDRATE 25 MG/ML
50 INJECTION, SOLUTION INTRAVENOUS PRN
Status: ACTIVE | OUTPATIENT
Start: 2020-03-28 | End: 2020-03-28

## 2020-03-28 RX ORDER — SODIUM CHLORIDE 9 MG/ML
500 INJECTION, SOLUTION INTRAVENOUS CONTINUOUS PRN
Status: ACTIVE | OUTPATIENT
Start: 2020-03-28 | End: 2020-03-28

## 2020-03-28 RX ORDER — METOPROLOL TARTRATE 5 MG/5ML
5 INJECTION INTRAVENOUS EVERY 5 MIN PRN
Status: ACTIVE | OUTPATIENT
Start: 2020-03-28 | End: 2020-03-28

## 2020-03-28 RX ORDER — SODIUM CHLORIDE 0.9 % (FLUSH) 0.9 %
10 SYRINGE (ML) INJECTION PRN
Status: ACTIVE | OUTPATIENT
Start: 2020-03-28 | End: 2020-03-28

## 2020-03-28 RX ORDER — ATROPINE SULFATE 0.1 MG/ML
0.5 INJECTION INTRAVENOUS EVERY 5 MIN PRN
Status: ACTIVE | OUTPATIENT
Start: 2020-03-28 | End: 2020-03-28

## 2020-03-28 RX ORDER — NITROGLYCERIN 0.4 MG/1
0.4 TABLET SUBLINGUAL EVERY 5 MIN PRN
Status: ACTIVE | OUTPATIENT
Start: 2020-03-28 | End: 2020-03-28

## 2020-03-28 RX ADMIN — FUROSEMIDE 60 MG: 40 TABLET ORAL at 11:57

## 2020-03-28 RX ADMIN — ATORVASTATIN CALCIUM 40 MG: 40 TABLET, FILM COATED ORAL at 20:29

## 2020-03-28 RX ADMIN — ASPIRIN 81 MG: 81 TABLET, COATED ORAL at 11:58

## 2020-03-28 RX ADMIN — HYDROCHLOROTHIAZIDE 12.5 MG: 12.5 CAPSULE ORAL at 11:57

## 2020-03-28 RX ADMIN — FLUOXETINE HYDROCHLORIDE 20 MG: 20 CAPSULE ORAL at 11:56

## 2020-03-28 RX ADMIN — PRAZOSIN HYDROCHLORIDE 2 MG: 2 CAPSULE ORAL at 20:29

## 2020-03-28 RX ADMIN — SODIUM CHLORIDE, PRESERVATIVE FREE 10 ML: 5 INJECTION INTRAVENOUS at 20:30

## 2020-03-28 RX ADMIN — Medication 10 ML: at 11:15

## 2020-03-28 RX ADMIN — CHOLESTYRAMINE 4 G: 4 POWDER, FOR SUSPENSION ORAL at 12:00

## 2020-03-28 RX ADMIN — TETRAKIS(2-METHOXYISOBUTYLISOCYANIDE)COPPER(I) TETRAFLUOROBORATE 30 MILLICURIE: 1 INJECTION, POWDER, LYOPHILIZED, FOR SOLUTION INTRAVENOUS at 10:38

## 2020-03-28 RX ADMIN — MULTIPLE VITAMINS W/ MINERALS TAB 1 TABLET: TAB at 11:58

## 2020-03-28 RX ADMIN — MIRABEGRON 25 MG: 25 TABLET, FILM COATED, EXTENDED RELEASE ORAL at 11:56

## 2020-03-28 RX ADMIN — BUDESONIDE 500 MCG: 0.5 INHALANT RESPIRATORY (INHALATION) at 07:12

## 2020-03-28 RX ADMIN — AMLODIPINE BESYLATE 5 MG: 5 TABLET ORAL at 11:58

## 2020-03-28 RX ADMIN — REGADENOSON 0.4 MG: 0.08 INJECTION, SOLUTION INTRAVENOUS at 09:57

## 2020-03-28 RX ADMIN — PERFLUTREN 1.65 MG: 6.52 INJECTION, SUSPENSION INTRAVENOUS at 11:15

## 2020-03-28 RX ADMIN — IPRATROPIUM BROMIDE 0.5 MG: 0.5 SOLUTION RESPIRATORY (INHALATION) at 19:14

## 2020-03-28 RX ADMIN — ARFORMOTEROL TARTRATE 15 MCG: 15 SOLUTION RESPIRATORY (INHALATION) at 19:13

## 2020-03-28 RX ADMIN — DOXAZOSIN 4 MG: 4 TABLET ORAL at 11:56

## 2020-03-28 RX ADMIN — CARVEDILOL 12.5 MG: 12.5 TABLET, FILM COATED ORAL at 11:58

## 2020-03-28 RX ADMIN — INSULIN GLARGINE 10 UNITS: 100 INJECTION, SOLUTION SUBCUTANEOUS at 20:30

## 2020-03-28 RX ADMIN — TETRAKIS(2-METHOXYISOBUTYLISOCYANIDE)COPPER(I) TETRAFLUOROBORATE 10 MILLICURIE: 1 INJECTION, POWDER, LYOPHILIZED, FOR SOLUTION INTRAVENOUS at 10:37

## 2020-03-28 RX ADMIN — CYANOCOBALAMIN TAB 500 MCG 1000 MCG: 500 TAB at 11:56

## 2020-03-28 RX ADMIN — ARFORMOTEROL TARTRATE 15 MCG: 15 SOLUTION RESPIRATORY (INHALATION) at 07:12

## 2020-03-28 RX ADMIN — LISINOPRIL 40 MG: 10 TABLET ORAL at 11:56

## 2020-03-28 RX ADMIN — BUDESONIDE 500 MCG: 0.5 INHALANT RESPIRATORY (INHALATION) at 19:13

## 2020-03-28 RX ADMIN — SPIRONOLACTONE 50 MG: 50 TABLET ORAL at 11:57

## 2020-03-28 RX ADMIN — RIVAROXABAN 20 MG: 20 TABLET, FILM COATED ORAL at 11:57

## 2020-03-28 RX ADMIN — METOLAZONE 2.5 MG: 2.5 TABLET ORAL at 11:56

## 2020-03-28 RX ADMIN — IPRATROPIUM BROMIDE 0.5 MG: 0.5 SOLUTION RESPIRATORY (INHALATION) at 07:12

## 2020-03-28 RX ADMIN — BUPROPION HYDROCHLORIDE 300 MG: 150 TABLET, FILM COATED, EXTENDED RELEASE ORAL at 11:58

## 2020-03-28 RX ADMIN — FLUOXETINE HYDROCHLORIDE 40 MG: 20 CAPSULE ORAL at 11:59

## 2020-03-28 RX ADMIN — IPRATROPIUM BROMIDE 0.5 MG: 0.5 SOLUTION RESPIRATORY (INHALATION) at 14:03

## 2020-03-28 RX ADMIN — SODIUM CHLORIDE, PRESERVATIVE FREE 10 ML: 5 INJECTION INTRAVENOUS at 12:01

## 2020-03-28 RX ADMIN — CARVEDILOL 12.5 MG: 12.5 TABLET, FILM COATED ORAL at 18:01

## 2020-03-28 RX ADMIN — POTASSIUM CHLORIDE 10 MEQ: 10 TABLET, EXTENDED RELEASE ORAL at 11:58

## 2020-03-28 ASSESSMENT — PAIN SCALES - GENERAL: PAINLEVEL_OUTOF10: 0

## 2020-03-28 NOTE — PROGRESS NOTES
Case D/E EP and ED RN in detail:  nonspecific chest pain  Hx CAD  Hx pacer  Mediastinal mass recently discovered  Was to have stress test to evaluate for cardiac clearance prior to cardiothoracic surgery for the aforementioned mass  TnI x 2 - in ED  EKG negative in ED: Paced, No ST change    Orders entered both for \"PCU\" CardiacTele Terry and for the RAMON Scan in AM, safe to order now as per negative TnI x2  Home meds reconciled  Changed Metformin over to Insulin Regimen  POCT [id number]  Hypoglycemic Orders Set as per pt safety  Changed home puffer regimen over to nebs for in hospital  BiPAP ordered with RT to tirate as setting unknown other than 2LPM QHS continuous    ASA daily 81  Lipitor 40mg PO QHS - NOT on home statin  Tele   Cardio consult in AM  EKG PRN    Case D/W PA    Further details to follow in formal admission note

## 2020-03-28 NOTE — CONSULTS
has been diagnosed with a mediastinal mass and is here for cardiac risk stratification\"     Additionally,  It was noted by the same observer at that time:    \"Cardiac risk stratification for upcoming surgery/ procedure  Relative Cardiac Risk Index = 2  Patient is able to do at least 3 METs of activity  Elevated risk for major adverse cardiac events  We will check Lexiscan nuclear stress test to rule out myocardial ischemia\"    He has not yet had his lexiscan and came into the ED last night with chest discomfort. Cardiology was asked to see him regarding these symptoms and findings and to consider those in relationship to possible optimal diagnosis and treatment options. FEATURES OF THIS HISTORY OF PRESENT ILLNESS (SYMPTOMS AND FINDINGS) INCLUDE:       1. Location: The chest discomfort was located in the central to left chest without radiation to the back, throat and left shoulder. 2. Duration: The chest discomfort began yesterday and lasted minutes. It occurred at rest.   3. Quality: The chest discomfort was described as pressure, fullness and tightness. It was not crushing. It was dull. 4. Severity: It was described as mild but progessive   5. Timing The symptoms began at rest, yesterday. 6. Modifying Factors: Modifying features included:   none   7. Associated Signs and Symptoms: There was not  associated manifestations such as nausea, vomiting, diaphoresis, presyncope, syncope, dizzyness, weakness, cold sweats, or shortness of air. 8. Context: As noted above in the context of the presentation. It  was not  suggestive of myocardial ischemia. When being examined this morning, in PCU, # [phone] with Bakari Pozo RN, there was not on going or continuous symptoms of exertional chest discomfort, unusual or change in shortness of air, presyncope or syncope.          CARDIAC RISK PROFILE:      Risk Factor Yes / No / Unknown       Gender Male   Cigarette Use No   Family History of Cardiovascular Disease Yes: heart attack   Diabetes Mellitus yes   Hypercholesteremia yes   Hypertension yes          Cardiac Specific Problems:    Specialty Problems        Cardiology Problems    Chronic atrial fibrillation        Hypertension        Mixed hyperlipidemia        Chronic diastolic heart failure (HCC)        Mitral regurgitation        Pulmonary hypertension (HCC)        Tricuspid regurgitation        Bradycardia        Sinoatrial node dysfunction (HCC)        * (Principal) Chest pain due to myocardial ischemia                PRIOR CARDIAC PROBLEM LIST  (IF APPLICABLE):    1/10/8902  Echo  Normal LVFX  5/16/2018  lexiscan negative for myocardial ischemia, EF 46  %   11/14/2018  Chest CT ([facility]) \"  . Small left-sided and moderate right-sided pleural effusion with   bibasilar compressive atelectasis. No evidence of acute consolidative   pneumonia. There is some bronchial wall thickening noted within the   lungs suggesting an element of bronchitis. 2. There are some enlarged right paratracheal nodes present. There is   also a focus of soft tissue nodularity within the lower right   paratracheal bobbi group suspicious for an enlarged bobbi mass. Some   right hilar nodes are also present. These could be reactive in nature   but would warrant follow-up. 3. Coronary calcifications in the LAD, circumflex and right coronary   distribution. There is moderate cardiomegaly. .     11/15/2018  Echo  Normal LVFX    12/27/2018 (Jonnie Li) \"  An ill-defined filling defect in the dilated right atrium may   represent flow phenomenon or a mass? . This may be clinically   correlated.      3/15/19 LUCILLE severely enlarged RA and LA, severe TR, mild MR, mild AI, no evidence of RA mass  3/15/19  sino - atrial node dysfunction with numerous pauses > 2 seconds without negative chronotropic agents  3/15/19  VVI pacemaker      Past Medical History:    Past Medical History:   Diagnosis Date    Anxiety     Arthritis     Atrial fibrillation ([address]  Blood circulation, collateral     Cancer (HCC)     prostate, had treatment    Cellulitis     left leg    CHF (congestive heart failure) (HCC)     Chronic kidney disease     COPD (chronic obstructive pulmonary disease) (HCC)     Depression     Hyperlipidemia     Hypertension     Lung mass     Neuromuscular disorder (HCC)     Neuropathy     Other disorders of kidney and ureter in diseases classified elsewhere     Palliative care patient 11/15/2018    Pneumonia     Type II or unspecified type diabetes mellitus without mention of complication, not stated as uncontrolled          Past Surgical History:    Past Surgical History:   Procedure Laterality Date    COLONOSCOPY      EYE SURGERY      EYE SURGERY      HERNIA REPAIR      umbilical with Dr Mont Collet Medications:   Prior to Admission medications    Medication Sig Start Date End Date Taking? Authorizing Provider   Wheat Dextrin (BENEFIBER) POWD Take 4 g by mouth 3 times daily (with meals) 2/18/20  Yes ALISON AlonzoIsabel Shouts, DO   spironolactone (ALDACTONE) 50 MG tablet TAKE 1 TABLET BY MOUTH EVERY DAY 2/4/20  Yes ISAAC Marcos   blood glucose monitor kit and supplies Test once a day for symptoms of irregular blood glucose. 2/3/20  Yes ALISON AlonzoIsabel Shouts, DO   ONE TOUCH LANCETS MISC 1 each by Does not apply route daily 2/3/20  Yes ALISON AlonzoIsabel Shouts, DO   blood glucose monitor strips Test once daily for symptoms of irregular blood glucose.  2/3/20  Yes ALISON Oneal, DO   FLUoxetine (PROZAC) 40 MG capsule Take 1 capsule by mouth daily 1/28/20  Yes ISAAC Quinn   terazosin (HYTRIN) 5 MG capsule Take 1 capsule by mouth nightly 1/21/20  Yes ISAAC Quinn   fluticasone-umeclidin-vilant (TRELEGY ELLIPTA) 388-06.5-66 MCG/INH AEPB Inhale 1 puff into the lungs daily 1/15/20  Yes ISAAC Marquez   WELCHOL 625 MG tablet TAKE 3 TABLETS BY MOUTH 2 TIMES DAILY (WITH MEALS) 1/9/20  Yes ALISON Khan DO   amLODIPine (NORVASC) 5 MG tablet Take 1 tablet by mouth daily 12/6/19  Yes ISAAC Levine   albuterol sulfate HFA (PROAIR HFA) 108 (90 Base) MCG/ACT inhaler Inhale 2 puffs into the lungs every 6 hours as needed for Wheezing 12/4/19  Yes ISAAC Easton   hydrochlorothiazide (MICROZIDE) 12.5 MG capsule TAKE 1 CAPSULE BY MOUTH EVERY DAY IN THE MORNING 11/25/19  Yes ALISON Khan DO   furosemide (LASIX) 40 MG tablet Take 1.5 tablets by mouth daily  Patient taking differently: Take 60 mg by mouth daily 1.5 tablets daily for a total of 60mg 11/11/19  Yes ALISON Khan DO   digoxin (LANOXIN) 125 MCG tablet Take 1 tablet by mouth every other day 10/17/19  Yes ALISON Khan DO   celecoxib (CELEBREX) 200 MG capsule Take 1 capsule by mouth daily 10/17/19  Yes ALISON Khan DO   buPROPion (WELLBUTRIN XL) 150 MG extended release tablet Take 2 tablets by mouth every morning 9/10/19  Yes ISAAC Levine   lisinopril (PRINIVIL;ZESTRIL) 40 MG tablet Take 1 tablet by mouth daily 8/26/19  Yes ALISON Khan DO   metOLazone (ZAROXOLYN) 2.5 MG tablet Take 1 tablet by mouth daily 8/12/19  Yes ISAAC Levine   carvedilol (COREG) 12.5 MG tablet Take 1 tablet by mouth 2 times daily (with meals) 7/16/19  Yes ALISON Khan DO   Dulaglutide (TRULICITY) 0.83 BD/3.1RR SOPN INJECT 1 PEN EVERY WEEK 6/26/19  Yes ISAAC Easton   potassium chloride (KLOR-CON 10) 10 MEQ extended release tablet Take 1 tablet by mouth daily 6/19/19  Yes ISAAC Easton   Multiple Vitamins-Minerals (THERAPEUTIC MULTIVITAMIN-MINERALS) tablet Take 1 tablet by mouth daily   Yes Historical Provider, MD   metFORMIN (GLUCOPHAGE) 500 MG tablet Take 1 tablet by mouth 2 times daily (with meals) 5/24/19  Yes B Sedonia Spear, DO   Lancets MISC 1 each by Does not apply route daily Accu Chek Guid3 Lancets 4/29/19  Yes ALISON Porter, DO   FLUoxetine (PROZAC) 20 MG capsule Take 1 capsule by mouth daily Along with the 40 mg tablet 4/29/19  Yes ALISON Porter DO   OXYGEN Inhale 2 L into the lungs nightly   Yes Historical Provider, MD   BiPAP Machine MISC by Does not apply route nightly   Yes Historical Provider, MD   cetirizine (ZYRTEC) 10 MG tablet Take 1 tablet by mouth daily 11/17/18  Yes Nance Crigler, DO   prazosin (MINIPRESS) 2 MG capsule Take 1 capsule by mouth nightly 8/20/18  Yes ISAAC Parikh   vitamin B-12 (CYANOCOBALAMIN) 1000 MCG tablet Take 1,000 mcg by mouth daily   Yes Historical Provider, MD   enzalutamide [place]) 40 MG capsule Take 4 tablets daily   Yes Historical Provider, MD   rivaroxaban (XARELTO) 20 MG TABS tablet Take 1 tablet by mouth daily (with breakfast) 5/9/17  Yes ALISON Porter DO   leuprolide (LUPRON) 30 MG injection Inject 30 mg into the muscle once Every 4 months   Yes Historical Provider, MD   MYRBETRIQ 25 MG TB24 Take 25 mg by mouth daily  1/25/16  Yes Historical Provider, MD   FISH OIL Take 1 capsule by mouth 2 times daily.    Yes Historical Provider, MD        Facility Administered Medications:    amLODIPine  5 mg Oral Daily    buPROPion  300 mg Oral QAM    carvedilol  12.5 mg Oral BID WC    digoxin  125 mcg Oral Every Other Day    furosemide  60 mg Oral Daily    hydrochlorothiazide  12.5 mg Oral Daily    lisinopril  40 mg Oral Daily    metOLazone  2.5 mg Oral Daily    therapeutic multivitamin-minerals  1 tablet Oral Daily    potassium chloride  10 mEq Oral Daily    prazosin  2 mg Oral Nightly    rivaroxaban  20 mg Oral Daily with breakfast    spironolactone  50 mg Oral Daily    doxazosin  4 mg Oral Daily    cholestyramine light  4 g Oral Daily    vitamin B-12  1,000 mcg Oral Daily    ipratropium  0.5 mg Nebulization 4x daily    Arformoterol Tartrate  15 mcg Nebulization BID    budesonide  0.5 mg Nebulization Q12H REVIEW OF SYSTEMS:     Except as noted in the HPI, all other systems are negative        PHYSICAL EXAMINATION:     BP (!) 124/58   Pulse 60   Temp 97.2 °F (36.2 °C) (Temporal)   Resp 20   Ht 5' 10\" (1.778 m)   Wt 240 lb (108.9 kg)   SpO2 97%   BMI 34.44 kg/m²     GENERAL - well developed and well nourished, in no amount of generalized distress; is an active participant in this examination  HEENT -  PERRLA, Hearing appears normal, conjunctiva and lids are normal, ears and nose appear normal  NECK - no thyromegaly, no JVD, trachea is in the midline  CARDIOVASCULAR - PMI is in the left mid line clavicular position, Variable S1, normal S2, without obvious murmur or gallop   PULMONARY - No respiratory distress. scattered wheezes and rales.   Breath sounds in both  lung fields are Normal  ABDOMEN  - soft, non tender, no rebound, no hepatomegaly or splenomegaly  MUSCULOSKELETAL  - Prone/Supine, digitals and nails are without clubbing or cyanosis  EXTREMITIES - trace edema  NEUROLOGIC - cranial nerves, II-XII, are normal  SKIN - turgor is normal, no rash  PSYCHIATRIC - was normal mood and affect, alert and orientated x 3, judgement and insight appear appropriate      LABORATORY EVALUATION & TESTING:    I have personally reviewed and interpreted the results of the following diagnostic testing      EKG and or Telemetry:  which was personally reviewed me:  Atrial fibrillation rhythm,   Pulse Readings from Last 1 Encounters:   03/28/20 60    bpm,  without Acute changes    Troponin:  negative myocardial necrosis (  <0.01); the creatinine is normal    CBC:   Recent Labs     03/27/20  1520 03/28/20  0141   WBC 14.7* 15.6*   HGB 12.9* 11.8*   HCT 38.0* 35.5*   MCV 96.9* 97.8*    150     BMP:   Recent Labs     03/27/20  1520 03/28/20  0141    133*   K 5.1* 4.9   CL 96* 97*   CO2 27 24   PHOS 3.8  --    BUN 37* 38*   CREATININE 1.1 1.1     Cardiac Enzymes:   Recent Labs     03/27/20  1733 03/27/20  5593 regurgitation        Bradycardia        Sinoatrial node dysfunction (HCC)        * (Principal) Chest pain due to myocardial ischemia               Yes: lexiscan Continue current medications:    Yes:                 2. Sinoatrial node dysfunction Initial presentation during this evaluation   Review and summation of old records:    Specific details regarding the atrial fibrillation / atrial flutter (if known):             TYPE DURATION NONVALVULAR    VALVULAR PRIOR TREATMENT ANTI-  COAGULATION            long term persistent      years   Nonvalvular      2/26/2018  Echo  Normal LVFX  5/16/2018  lexiscan negative for myocardial ischemia, EF 46  %   11/14/2018  Chest CT ([facility]) \"  . Small left-sided and moderate right-sided pleural effusion with   bibasilar compressive atelectasis. No evidence of acute consolidative   pneumonia. There is some bronchial wall thickening noted within the   lungs suggesting an element of bronchitis. 2. There are some enlarged right paratracheal nodes present. There is   also a focus of soft tissue nodularity within the lower right   paratracheal bobbi group suspicious for an enlarged bobbi mass. Some   right hilar nodes are also present. These could be reactive in nature   but would warrant follow-up. 3. Coronary calcifications in the LAD, circumflex and right coronary   distribution. There is moderate cardiomegaly. .     11/15/2018  Echo  Normal LVFX    12/27/2018 (Clarnce Cardinal) \"  An ill-defined filling defect in the dilated right atrium may   represent flow phenomenon or a mass? . This may be clinically   correlated. 3/15/19 LUCILLE severely enlarged RA and LA, severe TR, mild MR, mild AI, no evidence of RA mass  3/15/19  sino - atrial node dysfunction with numerous pauses > 2 seconds without negative chronotropic agents  3/15/19  VVI pacemaker     Xarelto       Yes: echo Continue current medications:    Yes:            3.  Concern regarding systemic blood pressure Initial presentation

## 2020-03-28 NOTE — H&P
tablet Take 1 tablet by mouth daily (with breakfast) 5/9/17  Yes ALISON Valverde,    leuprolide (LUPRON) 30 MG injection Inject 30 mg into the muscle once Every 4 months   Yes Historical Provider, MD   MYRBETRIQ 25 MG TB24 Take 25 mg by mouth daily  1/25/16  Yes Historical Provider, MD   FISH OIL Take 1 capsule by mouth 2 times daily. Yes Historical Provider, MD     Allergies:    Patient has no known allergies. Social History:    The patient currently lives at an assisted living facility. Tobacco:   reports that he has never smoked. He has never used smokeless tobacco.  Alcohol:   reports no history of alcohol use. Illicit Drugs: denies    Family History:      Problem Relation Age of Onset    Heart Attack Mother     Cancer Father      Review of Systems:   Constitutional / general: Denies fever / chills / sweats  Head:  Denies headache / neck stiffness / trauma / visual change  Eyes:  Denies blurry vision / acute visual change or loss / itching / redness  ENT: Denies sore throat / hoarseness / nasal drainage / ear pain  CV: + chest pain / palpitations/+ orthopnea +diaphoresis  Respiratory:  Denies cough / +shortness of breath / Denies sputum / hemoptysis  GI: Denies nausea / vomiting / abdominal pain / diarrhea / constipation  :  Denies dysuria / hesitancy / urgency / hematuria   Neuro: Denies paralysis / syncope / seizure / dysphagia / headache / paresthesias  Musculoskeletal:  Denies muscle weakness /joint stiffness / pain  Vascular: Denies edema / claudication / varicosities  Heme / endocrine: Denies easy bruising / bleeding / excessive sweating / heat or cold intolerance  Psychiatric:  Denies depression / anxiety / insomnia / mood changes  Skin:  Denies new rashes / lesions / skin hair or nail changes    14 point review of systems is negative except as specifically addressed above.     Physical Examination:  /62   Pulse 72   Temp 98.4 °F (36.9 °C)   Resp 21   Ht 5' 10\" (1.778 m)   Wt Hypertension-controlled, monitor      Chronic atrial fibrillation-rate controlled, anticoagulated      COPD (chronic obstructive pulmonary disease) (HCC)-no exacerbation, currently at baseline         Type 2 diabetes mellitus with diabetic polyneuropathy (HCC)-SSI, accuchecks, hypoglycemia tx orders      Mixed hyperlipidemia-statin added      Chronic diastolic heart failure ([facility])-daily weights, I/O's      Sinoatrial node dysfunction ([facility]) s/p pacemaker-noted, recently interrogated      Mediastinal mass-noted, followed as OP at [facility]       Hyperkalemia-Monitor, if higher in AM give Kayexalate     Further orders per clinical course/attending     Signed:  Mindy Zapata PA-C         ADDENDUM: 06:14    Dr. Theodore Ibarra seen in hospital, informed that his patient is here, he will see them later today

## 2020-03-28 NOTE — PLAN OF CARE
Problem: Falls - Risk of:  Goal: Will remain free from falls  Description: Will remain free from falls  3/28/2020 1633 by Zack Ramos RN  Outcome: Ongoing  3/28/2020 0406 by Stephan Duval RN  Outcome: Ongoing  Goal: Absence of physical injury  Description: Absence of physical injury  3/28/2020 1633 by Zack Ramos RN  Outcome: Ongoing  3/28/2020 0406 by Stephan Duval RN  Outcome: Ongoing     Problem: Pain:  Goal: Pain level will decrease  Description: Pain level will decrease  3/28/2020 1633 by Zack Ramos RN  Outcome: Ongoing  3/28/2020 0406 by Stephan Duval RN  Outcome: Ongoing  Goal: Control of acute pain  Description: Control of acute pain  3/28/2020 1633 by Zack Ramos RN  Outcome: Ongoing  3/28/2020 0406 by Stephan Duval RN  Outcome: Ongoing     Problem: Cardiac:  Goal: Ability to maintain vital signs within normal range will improve  Description: Ability to maintain vital signs within normal range will improve  Outcome: Ongoing  Goal: Cardiovascular alteration will improve  Description: Cardiovascular alteration will improve  Outcome: Ongoing     Problem: Health Behavior:  Goal: Will modify at least one risk factor affecting health status  Description: Will modify at least one risk factor affecting health status  Outcome: Ongoing  Goal: Identification of resources available to assist in meeting health care needs will improve  Description: Identification of resources available to assist in meeting health care needs will improve  Outcome: Ongoing

## 2020-03-28 NOTE — PROGRESS NOTES
Katy Durbin MD        ondansetron (ZOFRAN-ODT) disintegrating tablet 4 mg  4 mg Oral Q8H PRN Katy Durbin MD        Or    ondansetron [facility]) injection 4 mg  4 mg Intravenous Q6H PRN Katy Durbin MD        aspirin EC tablet 81 mg  81 mg Oral Daily Katy Durbin MD        nitroGLYCERIN (NITROSTAT) SL tablet 0.4 mg  0.4 mg Sublingual Q5 Min PRN Katy Durbin MD            Labs:     Recent Labs     03/27/20  1520 03/28/20  0141   WBC 14.7* 15.6*   RBC 3.92* 3.63*   HGB 12.9* 11.8*   HCT 38.0* 35.5*   MCV 96.9* 97.8*   MCH 32.9* 32.5*   MCHC 33.9 33.2    150     Recent Labs     03/27/20  1520 03/28/20  0141    133*   K 5.1* 4.9   ANIONGAP 13 12   CL 96* 97*   CO2 27 24   BUN 37* 38*   CREATININE 1.1 1.1   GLUCOSE 125* 161*   CALCIUM 9.8 9.4     Recent Labs     03/27/20  1520   MG 2.1   PHOS 3.8     Recent Labs     03/27/20  1520   AST 10   ALT 7   BILITOT <0.2   ALKPHOS 38*     ABGs:No results for input(s): PH, PO2, PCO2, HCO3, BE, O2SAT in the last 72 hours. Troponin T:   Recent Labs     03/27/20  1733 03/27/20  2234 03/28/20  0141   TROPONINI <0.01 <0.01 <0.01     INR: No results for input(s): INR in the last 72 hours. Lactic Acid: No results for input(s): LACTA in the last 72 hours.     Objective:   Vitals: BP (!) 124/58   Pulse 60   Temp 97.2 °F (36.2 °C) (Temporal)   Resp 20   Ht 5' 10\" (1.778 m)   Wt 240 lb (108.9 kg)   SpO2 97%   BMI 34.44 kg/m²   24HR INTAKE/OUTPUT:      Intake/Output Summary (Last 24 hours) at 3/28/2020 1156  Last data filed at 3/28/2020 0835  Gross per 24 hour   Intake --   Output 300 ml   Net -300 ml     General appearance: alert and cooperative with exam  HEENT: AT/NC  Lungs: no increased work of breathing, \"clear to auscultation bilaterally\" without rales, rhonchi or wheezes  Heart: regular rate and rhythm, S1, S2 normal, no murmur, click, rub or gallop  Abdomen: soft, non-tender; bowel sounds normal; no masses,  no organomegaly  Extremities: extremities normal, atraumatic, no cyanosis or edema  Neurologic: no focal neurologic deficits, normal sensation, alert and oriented, affect and mood appropriate  Skin: no rashes, nodules    Assessment and Plan:   Principal Problem:    Chest pain due to myocardial ischemia  Active Problems:    Hypertension    Chronic atrial fibrillation    COPD (chronic obstructive pulmonary disease) (HCC)    Chronic anticoagulation    Type 2 diabetes mellitus with diabetic polyneuropathy (HCC)    Mixed hyperlipidemia    Chronic diastolic heart failure (HCC)    Pacemaker    Sinoatrial node dysfunction (HCC)    Mediastinal mass    Hyperkalemia  Resolved Problems:    * No resolved hospital problems. *    Chest pain/CAD: Aspirin/Statin. Positive lexiscan for ischemia. [facility] planned for Monday with Dr. Robert Pope. PRN pain management. Troponins negative x4. Chronic Atrial Fibrillation: RC/AC    COPD: Bronchodilators. O2 PRN. Not in acute exacerbation. On 3L home O2. DM: HbA1c 5.7. ISS. Monitor BG and adjust PRN. HLD: Statin    Chronic HFpEF: Diuretics at home doses. Monitor I's/O's. Follow up repeat TTE. SA node dysfunction s/p PPM: Noted. Mediastinal Mass: Noted. Previously planned outpatient lexiscan for cardiac risk stratification.     Advance Directive: Full Code    DVT prophylaxis: Xarelto    Discharge planning: TBD      Signed:  Florence Donaldson MD 3/28/2020 11:56 AM  Rounding Hospitalist

## 2020-03-29 LAB
ALBUMIN SERPL-MCNC: 3.7 G/DL (ref 3.5–5.2)
ALP BLD-CCNC: 29 U/L (ref 40–130)
ALT SERPL-CCNC: 5 U/L (ref 5–41)
ANION GAP SERPL CALCULATED.3IONS-SCNC: 13 MMOL/L (ref 7–19)
AST SERPL-CCNC: 7 U/L (ref 5–40)
BASOPHILS ABSOLUTE: 0.1 K/UL (ref 0–0.2)
BASOPHILS RELATIVE PERCENT: 0.6 % (ref 0–1)
BILIRUB SERPL-MCNC: 0.5 MG/DL (ref 0.2–1.2)
BUN BLDV-MCNC: 41 MG/DL (ref 8–23)
CALCIUM SERPL-MCNC: 9.5 MG/DL (ref 8.8–10.2)
CHLORIDE BLD-SCNC: 98 MMOL/L (ref 98–111)
CO2: 25 MMOL/L (ref 22–29)
CREAT SERPL-MCNC: 1.2 MG/DL (ref 0.5–1.2)
EOSINOPHILS ABSOLUTE: 0.1 K/UL (ref 0–0.6)
EOSINOPHILS RELATIVE PERCENT: 1.1 % (ref 0–5)
GFR NON-AFRICAN AMERICAN: 58
GLUCOSE BLD-MCNC: 106 MG/DL (ref 74–109)
GLUCOSE BLD-MCNC: 116 MG/DL (ref 70–99)
GLUCOSE BLD-MCNC: 128 MG/DL (ref 70–99)
GLUCOSE BLD-MCNC: 130 MG/DL (ref 70–99)
GLUCOSE BLD-MCNC: 133 MG/DL (ref 70–99)
HCT VFR BLD CALC: 33.8 % (ref 42–52)
HEMOGLOBIN: 11 G/DL (ref 14–18)
IMMATURE GRANULOCYTES #: 0.3 K/UL
LYMPHOCYTES ABSOLUTE: 0.9 K/UL (ref 1.1–4.5)
LYMPHOCYTES RELATIVE PERCENT: 10.9 % (ref 20–40)
MCH RBC QN AUTO: 32.6 PG (ref 27–31)
MCHC RBC AUTO-ENTMCNC: 32.5 G/DL (ref 33–37)
MCV RBC AUTO: 100.3 FL (ref 80–94)
MONOCYTES ABSOLUTE: 0.9 K/UL (ref 0–0.9)
MONOCYTES RELATIVE PERCENT: 10.7 % (ref 0–10)
NEUTROPHILS ABSOLUTE: 6.2 K/UL (ref 1.5–7.5)
NEUTROPHILS RELATIVE PERCENT: 73.2 % (ref 50–65)
PDW BLD-RTO: 12.2 % (ref 11.5–14.5)
PERFORMED ON: ABNORMAL
PLATELET # BLD: 128 K/UL (ref 130–400)
PMV BLD AUTO: 10.2 FL (ref 9.4–12.4)
POTASSIUM REFLEX MAGNESIUM: 4.3 MMOL/L (ref 3.5–5)
RBC # BLD: 3.37 M/UL (ref 4.7–6.1)
SODIUM BLD-SCNC: 136 MMOL/L (ref 136–145)
TOTAL PROTEIN: 6.3 G/DL (ref 6.6–8.7)
TROPONIN: <0.01 NG/ML (ref 0–0.03)
WBC # BLD: 8.5 K/UL (ref 4.8–10.8)

## 2020-03-29 PROCEDURE — 85025 COMPLETE CBC W/AUTO DIFF WBC: CPT

## 2020-03-29 PROCEDURE — 80053 COMPREHEN METABOLIC PANEL: CPT

## 2020-03-29 PROCEDURE — 2580000003 HC RX 258: Performed by: HOSPITALIST

## 2020-03-29 PROCEDURE — 2140000000 HC CCU INTERMEDIATE R&B

## 2020-03-29 PROCEDURE — 94660 CPAP INITIATION&MGMT: CPT

## 2020-03-29 PROCEDURE — 2700000000 HC OXYGEN THERAPY PER DAY

## 2020-03-29 PROCEDURE — 84484 ASSAY OF TROPONIN QUANT: CPT

## 2020-03-29 PROCEDURE — 36415 COLL VENOUS BLD VENIPUNCTURE: CPT

## 2020-03-29 PROCEDURE — 6360000002 HC RX W HCPCS: Performed by: HOSPITALIST

## 2020-03-29 PROCEDURE — 94640 AIRWAY INHALATION TREATMENT: CPT

## 2020-03-29 PROCEDURE — 99233 SBSQ HOSP IP/OBS HIGH 50: CPT | Performed by: INTERNAL MEDICINE

## 2020-03-29 PROCEDURE — 82947 ASSAY GLUCOSE BLOOD QUANT: CPT

## 2020-03-29 PROCEDURE — 6370000000 HC RX 637 (ALT 250 FOR IP): Performed by: HOSPITALIST

## 2020-03-29 RX ADMIN — DOXAZOSIN 4 MG: 4 TABLET ORAL at 08:19

## 2020-03-29 RX ADMIN — INSULIN GLARGINE 10 UNITS: 100 INJECTION, SOLUTION SUBCUTANEOUS at 21:06

## 2020-03-29 RX ADMIN — POTASSIUM CHLORIDE 10 MEQ: 10 TABLET, EXTENDED RELEASE ORAL at 08:19

## 2020-03-29 RX ADMIN — PRAZOSIN HYDROCHLORIDE 2 MG: 2 CAPSULE ORAL at 21:05

## 2020-03-29 RX ADMIN — FLUOXETINE HYDROCHLORIDE 20 MG: 20 CAPSULE ORAL at 08:18

## 2020-03-29 RX ADMIN — FUROSEMIDE 60 MG: 40 TABLET ORAL at 08:20

## 2020-03-29 RX ADMIN — METOLAZONE 2.5 MG: 2.5 TABLET ORAL at 08:19

## 2020-03-29 RX ADMIN — ATORVASTATIN CALCIUM 40 MG: 40 TABLET, FILM COATED ORAL at 21:05

## 2020-03-29 RX ADMIN — CYANOCOBALAMIN TAB 500 MCG 1000 MCG: 500 TAB at 08:19

## 2020-03-29 RX ADMIN — SPIRONOLACTONE 50 MG: 50 TABLET ORAL at 08:19

## 2020-03-29 RX ADMIN — SODIUM CHLORIDE, PRESERVATIVE FREE 10 ML: 5 INJECTION INTRAVENOUS at 08:20

## 2020-03-29 RX ADMIN — FLUOXETINE HYDROCHLORIDE 40 MG: 20 CAPSULE ORAL at 08:21

## 2020-03-29 RX ADMIN — BUDESONIDE 500 MCG: 0.5 INHALANT RESPIRATORY (INHALATION) at 19:28

## 2020-03-29 RX ADMIN — ARFORMOTEROL TARTRATE 15 MCG: 15 SOLUTION RESPIRATORY (INHALATION) at 07:07

## 2020-03-29 RX ADMIN — BUDESONIDE 500 MCG: 0.5 INHALANT RESPIRATORY (INHALATION) at 07:07

## 2020-03-29 RX ADMIN — RIVAROXABAN 20 MG: 20 TABLET, FILM COATED ORAL at 08:18

## 2020-03-29 RX ADMIN — SODIUM CHLORIDE, PRESERVATIVE FREE 10 ML: 5 INJECTION INTRAVENOUS at 21:05

## 2020-03-29 RX ADMIN — AMLODIPINE BESYLATE 5 MG: 5 TABLET ORAL at 08:20

## 2020-03-29 RX ADMIN — IPRATROPIUM BROMIDE 0.5 MG: 0.5 SOLUTION RESPIRATORY (INHALATION) at 07:08

## 2020-03-29 RX ADMIN — LISINOPRIL 40 MG: 10 TABLET ORAL at 08:19

## 2020-03-29 RX ADMIN — CARVEDILOL 12.5 MG: 12.5 TABLET, FILM COATED ORAL at 08:19

## 2020-03-29 RX ADMIN — BUPROPION HYDROCHLORIDE 300 MG: 150 TABLET, FILM COATED, EXTENDED RELEASE ORAL at 08:19

## 2020-03-29 RX ADMIN — DIGOXIN 125 MCG: 125 TABLET ORAL at 08:19

## 2020-03-29 RX ADMIN — IPRATROPIUM BROMIDE 0.5 MG: 0.5 SOLUTION RESPIRATORY (INHALATION) at 11:32

## 2020-03-29 RX ADMIN — ARFORMOTEROL TARTRATE 15 MCG: 15 SOLUTION RESPIRATORY (INHALATION) at 19:28

## 2020-03-29 RX ADMIN — CHOLESTYRAMINE 4 G: 4 POWDER, FOR SUSPENSION ORAL at 08:19

## 2020-03-29 RX ADMIN — IPRATROPIUM BROMIDE 0.5 MG: 0.5 SOLUTION RESPIRATORY (INHALATION) at 19:28

## 2020-03-29 RX ADMIN — ASPIRIN 81 MG: 81 TABLET, COATED ORAL at 08:19

## 2020-03-29 RX ADMIN — MULTIPLE VITAMINS W/ MINERALS TAB 1 TABLET: TAB at 08:19

## 2020-03-29 RX ADMIN — HYDROCHLOROTHIAZIDE 12.5 MG: 12.5 CAPSULE ORAL at 08:20

## 2020-03-29 RX ADMIN — IPRATROPIUM BROMIDE 0.5 MG: 0.5 SOLUTION RESPIRATORY (INHALATION) at 14:38

## 2020-03-29 RX ADMIN — MIRABEGRON 25 MG: 25 TABLET, FILM COATED, EXTENDED RELEASE ORAL at 08:19

## 2020-03-29 RX ADMIN — CARVEDILOL 12.5 MG: 12.5 TABLET, FILM COATED ORAL at 17:06

## 2020-03-29 ASSESSMENT — PAIN SCALES - GENERAL
PAINLEVEL_OUTOF10: 0

## 2020-03-29 NOTE — PROGRESS NOTES
at 03/29/20 0819    doxazosin (CARDURA) tablet 4 mg  4 mg Oral Daily Tere Diaz MD   4 mg at 03/29/20 8216    cholestyramine light packet 4 g  4 g Oral Daily Tere Diaz MD   4 g at 03/29/20 3395    vitamin B-12 (CYANOCOBALAMIN) tablet 1,000 mcg  1,000 mcg Oral Daily Tere Diaz MD   1,000 mcg at 03/29/20 0819    albuterol (PROVENTIL) nebulizer solution 2.5 mg  2.5 mg Nebulization Q1H PRN Tere Diaz MD        ipratropium (ATROVENT) 0.02 % nebulizer solution 0.5 mg  0.5 mg Nebulization 4x daily Tere Diaz MD   0.5 mg at 03/29/20 0708    Arformoterol Tartrate (BROVANA) nebulizer solution 15 mcg  15 mcg Nebulization BID Tere Diaz MD   15 mcg at 03/29/20 0707    budesonide (PULMICORT) nebulizer suspension 500 mcg  0.5 mg Nebulization Q12H Tere Diaz MD   500 mcg at 03/29/20 0707    insulin glargine (LANTUS) injection vial 10 Units  10 Units Subcutaneous Nightly Tere Diaz MD   10 Units at 03/28/20 2030    insulin lispro (HUMALOG) injection vial 0-12 Units  0-12 Units Subcutaneous TID WC Tere Diaz MD        insulin lispro (HUMALOG) injection vial 0-6 Units  0-6 Units Subcutaneous Nightly Tere Diaz MD        glucose (GLUTOSE) 40 % oral gel 15 g  15 g Oral PRN Tere Diaz MD        dextrose 50 % IV solution  12.5 g Intravenous PRN Tere Diaz MD        glucagon (rDNA) injection 1 mg  1 mg Intramuscular PRN Tere Diaz MD        dextrose 5 % solution  100 mL/hr Intravenous PRN Tere Diaz MD        FLUoxetine (PROZAC) capsule 40 mg  40 mg Oral Daily Tere Diaz MD   40 mg at 03/29/20 9096    FLUoxetine (PROZAC) capsule 20 mg  20 mg Oral Daily Tere Diaz MD   20 mg at 03/29/20 0818    enzalutamide (XTANDI) capsule 120 mg  120 mg Oral Daily Tere Diaz MD        [facility]) extended release tablet 25 mg  25 mg Oral Daily Tere Diaz MD   25 mg at 03/29/20 0819    sodium chloride flush 0.9 % injection 10 mL  10 mL Intravenous 2 medications:        yes                     Summary of hospital course thus far:       Date Clinical Event Plan of Treatment           3/28/2020 Admitted with chest discomfort, lexiscan 3/28/20 Positive for anterior septal myocardial ischemia, EF 43%, 3% ischemic myocardium on stress, low risk findings, AUC indication 15, AUC score 4, Adalberto Antonio MD)    For cath on Monday, 3/30/2020   03/29/20    No chest discomfort today For cath tomorrow, orders written                                  CONSIDERATIONS, THOUGHTS, AND PLANS:     4. Continue present medications except for changes as noted above  5. Continue to monitor rhythm  6. Further orders per clinical course. Date of the Proposed Procedure:  03/30/20      Proposed Procedure:  Selective left heart and coronary arteriography with possible percutaneous coronary interventon, (femoral approach), 03/30/20      :  COSMO Anderson MD    Indications:  3/28/20 lexiscan Positive for anterior septal myocardial ischemia, EF 43%, 3% ischemic myocardium on stress, low risk findings, AUC indication 15, AUC score 4, (MD Krystle)     American Society of Anesthesiologists (ASA) Classification:  III    Plan of Sedation:  Moderate Sedation    Mallampati Classification:  II    I have examined this patient on 03/29/20  in PCU # [phone] in the presence of [facility] RN and find no interval changes since the original History and Physical / Consult as noted written by myself on 03/29/20     With the constellation of symptoms and these findings, I recommend cardiac catheterization and possibility of percutaneous coronary intervention. I discussed with him  in detail  the risks, benefits and alternatives to this procedure. The risks mentioned to him include but are not limited to:  vascular complications in ~ 3%, stroke <1%, renal dysfunction <5%, myocardial infarction <1%, coronary dissection <1%, need for emergency open heart surgery <1, and death <1% .   He appeared to understand, had no questions, and agreed to proceed with this plan. Additionally, there are risks associated with moderate sedation which includes transient drop in blood pressure and need for assisted ventilation. This procedure is scheduled for 03/30/20 .     Osorio Thomas MD

## 2020-03-29 NOTE — PROGRESS NOTES
[facility]        Hospitalist Progress Note  3/29/2020 9:04 AM  Subjective:   Admit Date: 3/27/2020  PCP: Gracia Romnao DO    Chief Complaint: Chest pain    Subjective: Patient seen and examined at bedside talking on the phone. NAD. Currently denies chest pain or shortness of breath. Appears comfortable. [facility]: 81-year-old male with history of COPD on 3 L of home oxygen and history of CAD presenting for chest pain with unremarkable troponins and EKGs but with ischemia found on stress test performed 3/28/2020. Cardiology is on board with plans for left heart catheterization 3/30/2020. Patient was planned for outpatient stress test for mediastinal mass that he needs mediastinoscopy for with cardiothoracic surgery but presented with chest pain prior to that evaluation. Patient follows with Dr. Kael Brock pulmonology as an outpatient for mediastinal mass. Margarito Sand performed and patient with evidence of ischemia. Planned for [facility] 3/30/2020 with Dr. Dilan Crocker. ROS: 14 point review of systems is negative except as specifically addressed above.     DIET CARDIAC; Carb Control: 4 carb choices (60 gms)/meal; No Caffeine    Intake/Output Summary (Last 24 hours) at 3/29/2020 0904  Last data filed at 3/29/2020 0446  Gross per 24 hour   Intake 730 ml   Output 250 ml   Net 480 ml     Medications:   dextrose       Current Facility-Administered Medications   Medication Dose Route Frequency Provider Last Rate Last Dose    albuterol (PROVENTIL) nebulizer solution 2.5 mg  2.5 mg Nebulization Q6H PRN Eleazar Adams MD        amLODIPine (NORVASC) tablet 5 mg  5 mg Oral Daily Eleazar Adams MD   5 mg at 03/29/20 0820    buPROPion (WELLBUTRIN XL) extended release tablet 300 mg  300 mg Oral QAM Eleazar Adams MD   300 mg at 03/29/20 0819    carvedilol (COREG) tablet 12.5 mg  12.5 mg Oral BID WC Eleazar Adams MD   12.5 mg at 03/29/20 0819    digoxin (LANOXIN) tablet 125 mcg  125 mcg Oral Every [facility]) packet 17 g  17 g Oral Daily PRN Vic Ramon MD        ondansetron (ZOFRAN-ODT) disintegrating tablet 4 mg  4 mg Oral Q8H PRN Vic Ramon MD        Or    ondansetron [facility]) injection 4 mg  4 mg Intravenous Q6H PRN Vic Ramon MD        aspirin EC tablet 81 mg  81 mg Oral Daily Vic Ramon MD   81 mg at 03/29/20 0819    nitroGLYCERIN (NITROSTAT) SL tablet 0.4 mg  0.4 mg Sublingual Q5 Min PRN Vic Ramon MD            Labs:     Recent Labs     03/27/20  1520 03/28/20  0141 03/29/20  0554   WBC 14.7* 15.6* 8.5   RBC 3.92* 3.63* 3.37*   HGB 12.9* 11.8* 11.0*   HCT 38.0* 35.5* 33.8*   MCV 96.9* 97.8* 100.3*   MCH 32.9* 32.5* 32.6*   MCHC 33.9 33.2 32.5*    150 128*     Recent Labs     03/27/20  1520 03/28/20  0141 03/29/20  0554    133* 136   K 5.1* 4.9 4.3   ANIONGAP 13 12 13   CL 96* 97* 98   CO2 27 24 25   BUN 37* 38* 41*   CREATININE 1.1 1.1 1.2   GLUCOSE 125* 161* 106   CALCIUM 9.8 9.4 9.5     Recent Labs     03/27/20  1520   MG 2.1   PHOS 3.8     Recent Labs     03/27/20  1520 03/29/20  0554   AST 10 7   ALT 7 5   BILITOT <0.2 0.5   ALKPHOS 38* 29*     ABGs:No results for input(s): PH, PO2, PCO2, HCO3, BE, O2SAT in the last 72 hours. Troponin T:   Recent Labs     03/27/20  2234 03/28/20  0141 03/29/20  0554   TROPONINI <0.01 <0.01 <0.01     INR: No results for input(s): INR in the last 72 hours. Lactic Acid: No results for input(s): LACTA in the last 72 hours.     Objective:   Vitals: /69   Pulse 61   Temp 97 °F (36.1 °C) (Temporal)   Resp 14   Ht 5' 10\" (1.778 m)   Wt 233 lb 9 oz (105.9 kg)   SpO2 96%   BMI 33.51 kg/m²   24HR INTAKE/OUTPUT:      Intake/Output Summary (Last 24 hours) at 3/29/2020 0904  Last data filed at 3/29/2020 0446  Gross per 24 hour   Intake 730 ml   Output 250 ml   Net 480 ml     General appearance: alert and cooperative with exam  HEENT: AT/NC  Lungs: no increased work of breathing, \"clear to auscultation bilaterally\" without rales,

## 2020-03-30 ENCOUNTER — TELEPHONE (OUTPATIENT)
Dept: ADMINISTRATIVE | Age: 85
End: 2020-03-30

## 2020-03-30 VITALS
SYSTOLIC BLOOD PRESSURE: 130 MMHG | TEMPERATURE: 96.9 F | DIASTOLIC BLOOD PRESSURE: 72 MMHG | HEIGHT: 70 IN | RESPIRATION RATE: 18 BRPM | OXYGEN SATURATION: 99 % | WEIGHT: 231.6 LBS | BODY MASS INDEX: 33.16 KG/M2 | HEART RATE: 61 BPM

## 2020-03-30 PROBLEM — I25.10 CAD (CORONARY ARTERY DISEASE): Status: ACTIVE | Noted: 2020-03-30

## 2020-03-30 LAB
ALBUMIN SERPL-MCNC: 3.7 G/DL (ref 3.5–5.2)
ALP BLD-CCNC: 30 U/L (ref 40–130)
ALT SERPL-CCNC: 6 U/L (ref 5–41)
ANION GAP SERPL CALCULATED.3IONS-SCNC: 13 MMOL/L (ref 7–19)
AST SERPL-CCNC: 7 U/L (ref 5–40)
BASOPHILS ABSOLUTE: 0.1 K/UL (ref 0–0.2)
BASOPHILS RELATIVE PERCENT: 0.8 % (ref 0–1)
BILIRUB SERPL-MCNC: 0.4 MG/DL (ref 0.2–1.2)
BUN BLDV-MCNC: 42 MG/DL (ref 8–23)
CALCIUM SERPL-MCNC: 9.8 MG/DL (ref 8.8–10.2)
CHLORIDE BLD-SCNC: 99 MMOL/L (ref 98–111)
CO2: 25 MMOL/L (ref 22–29)
CREAT SERPL-MCNC: 1.1 MG/DL (ref 0.5–1.2)
EKG P AXIS: -47 DEGREES
EKG P AXIS: NORMAL DEGREES
EKG P AXIS: NORMAL DEGREES
EKG P-R INTERVAL: 560 MS
EKG P-R INTERVAL: NORMAL MS
EKG P-R INTERVAL: NORMAL MS
EKG Q-T INTERVAL: 392 MS
EKG Q-T INTERVAL: 420 MS
EKG Q-T INTERVAL: 456 MS
EKG QRS DURATION: 170 MS
EKG QRS DURATION: 174 MS
EKG QRS DURATION: 192 MS
EKG QTC CALCULATION (BAZETT): 420 MS
EKG QTC CALCULATION (BAZETT): 441 MS
EKG QTC CALCULATION (BAZETT): 459 MS
EKG T AXIS: 38 DEGREES
EKG T AXIS: 52 DEGREES
EKG T AXIS: 58 DEGREES
EOSINOPHILS ABSOLUTE: 0.2 K/UL (ref 0–0.6)
EOSINOPHILS RELATIVE PERCENT: 1.8 % (ref 0–5)
GFR NON-AFRICAN AMERICAN: >60
GLUCOSE BLD-MCNC: 101 MG/DL (ref 74–109)
GLUCOSE BLD-MCNC: 116 MG/DL (ref 70–99)
GLUCOSE BLD-MCNC: 98 MG/DL (ref 70–99)
HCT VFR BLD CALC: 34 % (ref 42–52)
HEMOGLOBIN: 11.4 G/DL (ref 14–18)
IMMATURE GRANULOCYTES #: 0.4 K/UL
LV EF: 43 %
LVEF MODALITY: NORMAL
LYMPHOCYTES ABSOLUTE: 1.2 K/UL (ref 1.1–4.5)
LYMPHOCYTES RELATIVE PERCENT: 12.1 % (ref 20–40)
MCH RBC QN AUTO: 32.7 PG (ref 27–31)
MCHC RBC AUTO-ENTMCNC: 33.5 G/DL (ref 33–37)
MCV RBC AUTO: 97.4 FL (ref 80–94)
MONOCYTES ABSOLUTE: 1 K/UL (ref 0–0.9)
MONOCYTES RELATIVE PERCENT: 10.5 % (ref 0–10)
NEUTROPHILS ABSOLUTE: 7 K/UL (ref 1.5–7.5)
NEUTROPHILS RELATIVE PERCENT: 70.4 % (ref 50–65)
PDW BLD-RTO: 12 % (ref 11.5–14.5)
PERFORMED ON: ABNORMAL
PERFORMED ON: NORMAL
PLATELET # BLD: 131 K/UL (ref 130–400)
PMV BLD AUTO: 9.9 FL (ref 9.4–12.4)
POTASSIUM REFLEX MAGNESIUM: 4.6 MMOL/L (ref 3.5–5)
POTASSIUM SERPL-SCNC: 4.6 MMOL/L (ref 3.5–5)
RBC # BLD: 3.49 M/UL (ref 4.7–6.1)
SODIUM BLD-SCNC: 137 MMOL/L (ref 136–145)
TOTAL PROTEIN: 5.9 G/DL (ref 6.6–8.7)
TROPONIN: <0.01 NG/ML (ref 0–0.03)
WBC # BLD: 10 K/UL (ref 4.8–10.8)

## 2020-03-30 PROCEDURE — C1760 CLOSURE DEV, VASC: HCPCS

## 2020-03-30 PROCEDURE — 6370000000 HC RX 637 (ALT 250 FOR IP): Performed by: INTERNAL MEDICINE

## 2020-03-30 PROCEDURE — 82947 ASSAY GLUCOSE BLOOD QUANT: CPT

## 2020-03-30 PROCEDURE — 85025 COMPLETE CBC W/AUTO DIFF WBC: CPT

## 2020-03-30 PROCEDURE — 36415 COLL VENOUS BLD VENIPUNCTURE: CPT

## 2020-03-30 PROCEDURE — 99152 MOD SED SAME PHYS/QHP 5/>YRS: CPT | Performed by: INTERNAL MEDICINE

## 2020-03-30 PROCEDURE — 6370000000 HC RX 637 (ALT 250 FOR IP): Performed by: HOSPITALIST

## 2020-03-30 PROCEDURE — 2709999900 HC NON-CHARGEABLE SUPPLY

## 2020-03-30 PROCEDURE — 6360000002 HC RX W HCPCS: Performed by: HOSPITALIST

## 2020-03-30 PROCEDURE — 93010 ELECTROCARDIOGRAM REPORT: CPT | Performed by: INTERNAL MEDICINE

## 2020-03-30 PROCEDURE — 6360000004 HC RX CONTRAST MEDICATION: Performed by: HOSPITALIST

## 2020-03-30 PROCEDURE — C1894 INTRO/SHEATH, NON-LASER: HCPCS

## 2020-03-30 PROCEDURE — 94760 N-INVAS EAR/PLS OXIMETRY 1: CPT

## 2020-03-30 PROCEDURE — 93288 INTERROG EVL PM/LDLS PM IP: CPT | Performed by: INTERNAL MEDICINE

## 2020-03-30 PROCEDURE — 4A023N7 MEASUREMENT OF CARDIAC SAMPLING AND PRESSURE, LEFT HEART, PERCUTANEOUS APPROACH: ICD-10-PCS | Performed by: INTERNAL MEDICINE

## 2020-03-30 PROCEDURE — B2111ZZ FLUOROSCOPY OF MULTIPLE CORONARY ARTERIES USING LOW OSMOLAR CONTRAST: ICD-10-PCS | Performed by: INTERNAL MEDICINE

## 2020-03-30 PROCEDURE — 2700000000 HC OXYGEN THERAPY PER DAY

## 2020-03-30 PROCEDURE — 93458 L HRT ARTERY/VENTRICLE ANGIO: CPT | Performed by: INTERNAL MEDICINE

## 2020-03-30 PROCEDURE — 80053 COMPREHEN METABOLIC PANEL: CPT

## 2020-03-30 PROCEDURE — 94640 AIRWAY INHALATION TREATMENT: CPT

## 2020-03-30 PROCEDURE — 99024 POSTOP FOLLOW-UP VISIT: CPT | Performed by: INTERNAL MEDICINE

## 2020-03-30 PROCEDURE — 84484 ASSAY OF TROPONIN QUANT: CPT

## 2020-03-30 PROCEDURE — B2151ZZ FLUOROSCOPY OF LEFT HEART USING LOW OSMOLAR CONTRAST: ICD-10-PCS | Performed by: INTERNAL MEDICINE

## 2020-03-30 PROCEDURE — 93005 ELECTROCARDIOGRAM TRACING: CPT | Performed by: EMERGENCY MEDICINE

## 2020-03-30 PROCEDURE — 6360000002 HC RX W HCPCS

## 2020-03-30 PROCEDURE — 94660 CPAP INITIATION&MGMT: CPT

## 2020-03-30 RX ORDER — ASPIRIN 81 MG/1
81 TABLET ORAL DAILY
Qty: 30 TABLET | Refills: 0 | Status: ON HOLD | OUTPATIENT
Start: 2020-03-31 | End: 2021-05-04 | Stop reason: HOSPADM

## 2020-03-30 RX ORDER — ISOSORBIDE MONONITRATE 60 MG/1
60 TABLET, EXTENDED RELEASE ORAL DAILY
Qty: 30 TABLET | Refills: 0 | Status: SHIPPED | OUTPATIENT
Start: 2020-03-30 | End: 2020-04-06 | Stop reason: SDUPTHER

## 2020-03-30 RX ORDER — NITROGLYCERIN 0.4 MG/1
TABLET SUBLINGUAL
Qty: 25 TABLET | Refills: 0 | Status: SHIPPED | OUTPATIENT
Start: 2020-03-30

## 2020-03-30 RX ORDER — ATORVASTATIN CALCIUM 40 MG/1
40 TABLET, FILM COATED ORAL NIGHTLY
Qty: 30 TABLET | Refills: 0 | Status: SHIPPED | OUTPATIENT
Start: 2020-03-30 | End: 2020-04-06 | Stop reason: SDUPTHER

## 2020-03-30 RX ORDER — SODIUM CHLORIDE 0.9 % (FLUSH) 0.9 %
10 SYRINGE (ML) INJECTION PRN
Status: DISCONTINUED | OUTPATIENT
Start: 2020-03-30 | End: 2020-03-30 | Stop reason: HOSPADM

## 2020-03-30 RX ORDER — SODIUM CHLORIDE 0.9 % (FLUSH) 0.9 %
10 SYRINGE (ML) INJECTION EVERY 12 HOURS SCHEDULED
Status: DISCONTINUED | OUTPATIENT
Start: 2020-03-30 | End: 2020-03-30 | Stop reason: HOSPADM

## 2020-03-30 RX ORDER — ISOSORBIDE MONONITRATE 60 MG/1
60 TABLET, EXTENDED RELEASE ORAL DAILY
Status: DISCONTINUED | OUTPATIENT
Start: 2020-03-30 | End: 2020-03-30 | Stop reason: HOSPADM

## 2020-03-30 RX ADMIN — CYANOCOBALAMIN TAB 500 MCG 1000 MCG: 500 TAB at 11:45

## 2020-03-30 RX ADMIN — CARVEDILOL 12.5 MG: 12.5 TABLET, FILM COATED ORAL at 11:49

## 2020-03-30 RX ADMIN — ISOSORBIDE MONONITRATE 60 MG: 60 TABLET, EXTENDED RELEASE ORAL at 13:05

## 2020-03-30 RX ADMIN — IPRATROPIUM BROMIDE 0.5 MG: 0.5 SOLUTION RESPIRATORY (INHALATION) at 11:12

## 2020-03-30 RX ADMIN — BUPROPION HYDROCHLORIDE 300 MG: 150 TABLET, FILM COATED, EXTENDED RELEASE ORAL at 11:42

## 2020-03-30 RX ADMIN — FUROSEMIDE 60 MG: 40 TABLET ORAL at 11:45

## 2020-03-30 RX ADMIN — FLUOXETINE HYDROCHLORIDE 40 MG: 20 CAPSULE ORAL at 11:49

## 2020-03-30 RX ADMIN — MIRABEGRON 25 MG: 25 TABLET, FILM COATED, EXTENDED RELEASE ORAL at 11:41

## 2020-03-30 RX ADMIN — POTASSIUM CHLORIDE 10 MEQ: 10 TABLET, EXTENDED RELEASE ORAL at 11:42

## 2020-03-30 RX ADMIN — ARFORMOTEROL TARTRATE 15 MCG: 15 SOLUTION RESPIRATORY (INHALATION) at 07:02

## 2020-03-30 RX ADMIN — CHOLESTYRAMINE 4 G: 4 POWDER, FOR SUSPENSION ORAL at 11:43

## 2020-03-30 RX ADMIN — SPIRONOLACTONE 50 MG: 50 TABLET ORAL at 11:41

## 2020-03-30 RX ADMIN — ASPIRIN 81 MG: 81 TABLET, COATED ORAL at 11:41

## 2020-03-30 RX ADMIN — DOXAZOSIN 4 MG: 4 TABLET ORAL at 11:48

## 2020-03-30 RX ADMIN — FLUOXETINE HYDROCHLORIDE 20 MG: 20 CAPSULE ORAL at 11:50

## 2020-03-30 RX ADMIN — BUDESONIDE 500 MCG: 0.5 INHALANT RESPIRATORY (INHALATION) at 07:02

## 2020-03-30 RX ADMIN — MULTIPLE VITAMINS W/ MINERALS TAB 1 TABLET: TAB at 11:42

## 2020-03-30 RX ADMIN — IPRATROPIUM BROMIDE 0.5 MG: 0.5 SOLUTION RESPIRATORY (INHALATION) at 07:02

## 2020-03-30 RX ADMIN — LISINOPRIL 40 MG: 10 TABLET ORAL at 11:43

## 2020-03-30 RX ADMIN — AMLODIPINE BESYLATE 5 MG: 5 TABLET ORAL at 11:48

## 2020-03-30 RX ADMIN — IOPAMIDOL 116 ML: 612 INJECTION, SOLUTION INTRAVENOUS at 09:55

## 2020-03-30 NOTE — DISCHARGE SUMMARY
admitted to Hospitalist service and underwent [place] which was abnormal. Cardiology was consulted who performed Cardiac catheterization on 03/30/2020 which revealed moderate diffuse disease. Recommendations given for medical management. Aspirin, Lipitor, Imdur added to his medication regimen. At this time Mr. Clara Dominguez is stable for discharge home. Significant Diagnostic Studies:   Xr Chest Portable  1. Cardiomegaly. 2. No acute cardiopulmonary process. Signed by Dr Dionte Phelps on 3/27/2020 4:12 PM    Nm Cardiac Stress Test Nuclear Imaging  Impression: There is anterior septal ischemia, with a calculated ejection fraction of 43 % with a 3 % of ischemic burden. Suggest: cardiac catheterization if symptomatic Signed by Dr Teresa Patel on 3/28/2020 12:46 PM    Echocardiogram  LV is normal in size with mildly reduced LV systolic function. LV ejection   fraction estimated at 45%. Probable grade 2 diastolic dysfunction. RV is normal in size with normal systolic function. Severe left atrial enlargement. Mild right atrial enlargement. Pacer wires noted right-sided chambers. Mitral valve is mildly thickened with normal leaflet mobility. No   stenosis. Mild mitral regurgitation. Aortic valve is trileaflet with mild leaflet sclerosis but normal leaflet   mobility. No stenosis. No regurgitation noted. Mild tricuspid regurgitation.     Cardiac catheterization 03/30/2020  Summary:    1.  Successful femoral artery ultrasound  2.  Successful femoral artery arteriogram  3.  Supervision of the administration of moderate conscious   sedation  4.  Mooderate diffuse disease, between 50 and 70%, throughout the   entire coronary tree  5.  70% stenosis at the bifurcation of the LAD and the 1st   diagonal branch  6.  Left ventricular function is mildly depressed with a visually   estimated ejection fraction of 45%  RECOMMENDATIONS:  1.  Risk Factor Modification  2.  On-going Medical Therapy    Pertinent Labs:   CBC:

## 2020-03-30 NOTE — PROGRESS NOTES
Palliative Care/Spiritual Care: Met with pt to initiate Palliative Care. Pt says he has a mass behind his heart and was supposed to get it biopsied but is unable due to current Covid 19 virus. Pt says he developed pain at the top of his chest at home. Advance Directives: Pt says he has a POA, saying his son Yun Galeana is his POA, copy requested. Pain/other symptoms: Pt says he is not currently having pain. Social/Spiritual: Pt says he is [employer].        Pt/family discussion r/t goals: Pt says he has three sons, three daughters, grandchildren, and great-grandchildren. Pt says he lives at [facility] assisted living. Pt says at home he ambulates with a cane and a motorized chair. Pt says he cares for himself and still drives some. Pt's goal is to determine the cause of the mass and to return home with previous quality of life and daily living skills. Provided spiritual care with sustaining presence, nurtured hope, and prayer. Pt expressed gratitude for spiritual care.     Electronically signed by Myrna Gonzalez on 3/30/2020 at 10:32 AM

## 2020-03-30 NOTE — PROGRESS NOTES
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 Reason for initial evaluation or the patient's initial complaint    1. Reason for Hospital Admission: Chest discomfort        2. Reason for Cardiology Consultation: SA node dysfunction and abnormal nuclear cardiac stress test         SUBJECTIVE:      Chief Complaint / Reason for the Visit   Follow up of:  Chest discomfort and SA node dysfunction and abnormal nuclear cardiac stress test and systemic arterial hypertension    Family present and in room during examination:  No    At the time of the examination, the patient was:  Lying in bed after the cath      Specialty Problems        Cardiology Problems    Chronic atrial fibrillation        Hypertension        Mixed hyperlipidemia        Chronic diastolic heart failure (HCC)        Mitral regurgitation        Pulmonary hypertension (HCC)        Tricuspid regurgitation        Bradycardia        Sinoatrial node dysfunction (HCC)        * (Principal) Chest pain due to myocardial ischemia        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 Marilou Ward is generally feeling unchanged.   Cath with moderate obstructive pulmonary disease) ([facility] 05/21/2015     Priority: Low    NEIDA (obstructive sleep apnea) 05/21/2015     Priority: Low    Asbestosis ([facility] 05/21/2015     Priority: Low    Metabolic alkalosis with respiratory acidosis 05/21/2015     Priority: Low    Chronic anticoagulation 05/21/2015     Priority: Low    Acne rosacea 05/21/2015     Priority: Low    Hypertension      Priority: Low    Neuropathy      Priority: Low    Chronic atrial fibrillation      Priority: Low    Anxiety      Priority: Low    Depression      Priority: Low     Current Facility-Administered Medications   Medication Dose Route Frequency Provider Last Rate Last Dose    sodium chloride flush 0.9 % injection 10 mL  10 mL Intravenous 2 times per day Crystal Mccormick MD        sodium chloride flush 0.9 % injection 10 mL  10 mL Intravenous PRN Crystal Mccormick MD        sodium chloride flush 0.9 % injection 10 mL  10 mL Intravenous PRN ISAAC Padilla        isosorbide mononitrate (IMDUR) extended release tablet 60 mg  60 mg Oral Daily Crystal Mccomrick MD        albuterol (PROVENTIL) nebulizer solution 2.5 mg  2.5 mg Nebulization Q6H PRN Shawn Eldridge MD        amLODIPine (NORVASC) tablet 5 mg  5 mg Oral Daily Shawn Eldridge MD   5 mg at 03/29/20 0820    buPROPion (WELLBUTRIN XL) extended release tablet 300 mg  300 mg Oral QAM Shawn Eldridge MD   300 mg at 03/29/20 0819    carvedilol (COREG) tablet 12.5 mg  12.5 mg Oral BID WC Shawn Eldridge MD   12.5 mg at 03/29/20 1706    digoxin (LANOXIN) tablet 125 mcg  125 mcg Oral Every Other Day Shawn Eldridge MD   125 mcg at 03/29/20 0206    furosemide (LASIX) tablet 60 mg  60 mg Oral Daily Shawn Eldridge MD   60 mg at 03/29/20 0820    [Held by provider] hydrochlorothiazide (MICROZIDE) capsule 12.5 mg  12.5 mg Oral Daily Shawn Eldridge MD   12.5 mg at 03/29/20 0820    lisinopril (PRINIVIL;ZESTRIL) tablet 40 mg  40 mg Oral Daily Shawn Eldridge MD   40 mg at 03/29/20 0819    [Held by solution  12.5 g Intravenous PRN Merlinda Brisker, MD        glucagon (rDNA) injection 1 mg  1 mg Intramuscular PRN Merlinda Brisker, MD        dextrose 5 % solution  100 mL/hr Intravenous PRN Merlinda Brisker, MD        FLUoxetine (PROZAC) capsule 40 mg  40 mg Oral Daily Merlinda Brisker, MD   40 mg at 03/29/20 4684    FLUoxetine (PROZAC) capsule 20 mg  20 mg Oral Daily Merlinda Brisker, MD   20 mg at 03/29/20 0818    enzalutamide (XTANDI) capsule 120 mg  120 mg Oral Daily Merlinda Brisker, MD        mirabegron [facility]) extended release tablet 25 mg  25 mg Oral Daily Merlinda Brisker, MD   25 mg at 03/29/20 3579    sodium chloride flush 0.9 % injection 10 mL  10 mL Intravenous 2 times per day Merlinda Brisker, MD   10 mL at 03/29/20 2105    sodium chloride flush 0.9 % injection 10 mL  10 mL Intravenous PRN Merlinda Brisker, MD        acetaminophen (TYLENOL) tablet 650 mg  650 mg Oral Q6H PRN Merlinda Brisker, MD        Or    acetaminophen (TYLENOL) suppository 650 mg  650 mg Rectal Q6H PRN Merlinda Brisker, MD        atorvastatin (LIPITOR) tablet 40 mg  40 mg Oral Nightly Merlinda Brisker, MD   40 mg at 03/29/20 2105    magnesium sulfate 2 g in 50 mL IVPB premix  2 g Intravenous PRN Merlinda Brisker, MD        potassium chloride (KLOR-CON M) extended release tablet 40 mEq  40 mEq Oral PRN Merlinda Brisker, MD        Or    potassium bicarb-citric acid (EFFER-K) effervescent tablet 40 mEq  40 mEq Oral PRN Merlinda Brisker, MD        Or    potassium chloride 10 mEq/100 mL IVPB (Peripheral Line)  10 mEq Intravenous PRN Merlinda Brisker, MD        polyethylene glycol [facility]) packet 17 g  17 g Oral Daily PRN Merlinda Brisker, MD        ondansetron (ZOFRAN-ODT) disintegrating tablet 4 mg  4 mg Oral Q8H PRN Merlinda Brisker, MD        Or    ondansetron [facility]) injection 4 mg  4 mg Intravenous Q6H PRN Merlinda Brisker, MD        aspirin EC tablet 81 mg  81 mg Oral Daily Merlinda Brisker, MD   81 mg at 03/29/20 0819    nitroGLYCERIN (NITROSTAT) SL tablet

## 2020-03-30 NOTE — TELEPHONE ENCOUNTER
[facility] called to inform office that they accept option to do a Virtual Visit. Patient has been advised how to create a MyChart. Please call patient with any changes/questions/concerns. Pt is scheduled for a  1wk U, DC: 3/30/2020 DX: Chest Pain. Yash Banres was advised to let the pt know they may receive a call from the office to schedule a VV. I scheduled to pt on 4/6/2020 with Dr. Ramón Clayton.

## 2020-03-30 NOTE — PLAN OF CARE
Problem: Falls - Risk of:  Goal: Will remain free from falls  Description: Will remain free from falls  Outcome: Completed  Goal: Absence of physical injury  Description: Absence of physical injury  Outcome: Completed     Problem: Pain:  Goal: Pain level will decrease  Description: Pain level will decrease  Outcome: Completed  Goal: Control of acute pain  Description: Control of acute pain  Outcome: Completed     Problem: Cardiac:  Goal: Ability to maintain vital signs within normal range will improve  Description: Ability to maintain vital signs within normal range will improve  Outcome: Completed  Goal: Cardiovascular alteration will improve  Description: Cardiovascular alteration will improve  Outcome: Completed     Problem: Health Behavior:  Goal: Will modify at least one risk factor affecting health status  Description: Will modify at least one risk factor affecting health status  Outcome: Completed  Goal: Identification of resources available to assist in meeting health care needs will improve  Description: Identification of resources available to assist in meeting health care needs will improve  Outcome: Completed

## 2020-03-30 NOTE — PROCEDURES
Cardiac Risk Profile -       Risk Factor Yes / No / Unknown         Gender Male   Cigarette Use No   Family History of Cardiovascular Disease Yes: heart attack   Diabetes Mellitus yes   Hypercholesteremia yes   Hypertension yes         Prior Cardiac History -    2/26/2018  Echo  Normal LVFX  5/16/2018  lexiscan negative for myocardial ischemia, EF 46  %   11/14/2018  Chest CT ([facility]) \"  . Small left-sided and moderate right-sided pleural effusion with   bibasilar compressive atelectasis. No evidence of acute consolidative   pneumonia. There is some bronchial wall thickening noted within the   lungs suggesting an element of bronchitis. 2. There are some enlarged right paratracheal nodes present. There is   also a focus of soft tissue nodularity within the lower right   paratracheal bobbi group suspicious for an enlarged bobbi mass. Some   right hilar nodes are also present. These could be reactive in nature   but would warrant follow-up. 3. Coronary calcifications in the LAD, circumflex and right coronary   distribution. There is moderate cardiomegaly. .     11/15/2018  Echo  Normal LVFX    12/27/2018 ([facility]) \"  An ill-defined filling defect in the dilated right atrium may   represent flow phenomenon or a mass? . This may be clinically   correlated.      3/15/19 LUCILLE severely enlarged RA and LA, severe TR, mild MR, mild AI, no evidence of RA mass  3/15/19  Sino - atrial node dysfunction with numerous pauses > 2 seconds without negative chronotropic agents  3/15/19  VVI pacemaker  3/28/20 lexiscan Positive for anterior septal myocardial ischemia, EF 43%, 3% ischemic myocardium on stress, low risk findings, AUC indication 15, AUC score 4, Keena Ty MD)   3/30/20  Cath  Moderate diffuse disease, 70% mid LAD at the diagonal, normal LVFX    Indications for Cardiac Catheterization -  3/28/20 lexiscan Positive for anterior septal myocardial ischemia, EF 43%, 3% ischemic myocardium on stress, low risk findings, AUC indication 15, AUC score 4, Shyanne Quintero MD) , Diagnostic Catheterization Appropriate Use Criteria Description, [facility] 2012;59:[id number]    After obtaining informed written consent, the patient was brought to the catheterization laboratory where the right groin was prepared in the usual sterile fashion. Moderate Conscious Sedation Protocol Used During this Procedure -    An independent trained observer, registered nurse, administered medications at my direction. We, myself and the independent trained observer / registered nurse, monitored the patients's level of consciousness, vital signs, and physiological status, (including ECG and pulse oximetry) throughout the procedure duration. (See start and stop times below). Anesthesia: Moderate   Sedation: 1 mg Midazolam (Versed)  0 mcg Fentanyl   Start time: 09:39   Stop time: 09:53   ASA Class: III   Estimated blood loss < 5 ml           Additionally, please review the \"[facility] Hemodynamic Procedure Report\", which is generated electronically through the [employer]. This report includes additional details regarding this procedure including, but not limited to:     1. Patient Data,   2. Admission,   3. Procedure,   4. Hemodynamics,   5. Vital Signs,   6. Medications, including conscious sedation medications given during the procedure (as noted above),   7. Procedure Log,   8. Device Usage,   9. Signature Audit [place], and,   10. Signatures. It should be noted, that I sign the \"Signatures\" line electronically through the \"HPF Def Portals\" tab on my computer. 2% lidocaine was injected above the common femoral artery. Utilizing ultrasound assistance and the Seldinger technique, the common femoral artery was cannulated and bright red pulsatile blood returned. Using fluoroscopy guidance, the J-tip wire was placed in the common femoral artery. A 6-Fr sheath was inserted.  Utilizing 6-Sami Dominic catheters, selective coronary arteriography was performed followed by left ventriculography. At this stage, the equipment was removed. A right femoral arteriogram was performed to ensure patency of the vessel so that a vascular access closure device could be deployed. A 6-Citizen of [place] Mynx vascular access closure device was then inserted. Hemostasis was achieved. A sterile dressing was applied. He was taken to his room in stable and satisfactory condition. Family was not available for consultation regarding the results of the cardiac catheterization. Complications:  none      Results:    Hemodynamics:      Site Pressure mmHg        Left ventricular pressure 140/7 mmHg   Left ventricular end-diastolic pressure (LVEDP) 13 mmHg   Aortic pressure 134/54 mmHg   Mean aortic pressure 85 mmHg       Angiography:    Coronary Arteries:    Left Main Coronary Artery:  A large vessel which arises from the left sinus of Valsalva. It divides into the left anterior descending coronary artery and the left circumflex. There is mild diffuse disease throughout the entire length of the vessel. Left Anterior Coronary Artery:  The LAD is a moderate sized vessel with several diagonal branches. There is a 70% stenosis in the proximal to mid portion of this vessel at the take off of the 1st diagonal.  There is moderate diffuse disease, between 50 and 70%, throughout the remainder of the vessel. Left Circumflex Coronary Artery:  The LCx is a moderate sized vessel with several marginal branches. There is moderate diffuse disease, between 50 and 70%, throughout the entire length of the vessel. Right Coronary Artery:  The RCA is a moderate sized dominant vessel which arises from the right sinus of Valsalva. There is mild diffuse disease throughout the entire length of the vessel. Left Ventriculogram:  The left ventriculogram is obtained in the right oblique projection. There is mild global hypokinesis. The estimated visual ejection fraction is 45%.   There is no mitral regurgitation nor is there a pull back gradient seen across the aortic valve. Summary:      1. Successful femoral artery ultrasound  2. Successful femoral artery arteriogram  3. Supervision of the administration of moderate conscious sedation  4. Mooderate diffuse disease, between 48 and 70%, throughout the entire coronary tree  5.  70% stenosis at the bifurcation of the LAD and the 1st diagonal branch  6. Left ventricular function is mildly depressed with a visually estimated ejection fraction of 45%      RECOMMENDATIONS:    1. Risk Factor Modification  2.   On-going Medical Therapy      Electronically signed by Armani Feng MD on 3/30/20

## 2020-03-31 ENCOUNTER — TELEPHONE (OUTPATIENT)
Dept: PRIMARY CARE CLINIC | Age: 85
End: 2020-03-31

## 2020-03-31 ENCOUNTER — CARE COORDINATION (OUTPATIENT)
Dept: CASE MANAGEMENT | Age: 85
End: 2020-03-31

## 2020-03-31 LAB
LV EF: 45 %
LVEF MODALITY: NORMAL

## 2020-03-31 NOTE — TELEPHONE ENCOUNTER
[facility] Transitions Initial Follow Up Call    Outreach made within 2 business days of discharge: Yes    Patient: Sarah Garcia Patient : 1935   MRN: [id number]  Reason for Admission: There are no discharge diagnoses documented for the most recent discharge. Discharge Date: 3/30/20       Spoke with: Left message for patient to return call at convenience regarding hospital discharge.        Scheduled appointment with PCP within 7-14 days    Follow Up  Future Appointments   Date Time Provider Addie Doss   2020 11:30 AM [address]   2020  1:00 PM ISAAC Kenyon [facility] [place]   2020  2:00 PM [address] [facility], DO Mercy Edouard [place]   9/15/2020  3:00 PM ISAAC Nascimento [facility] [place]       Agatha Duff

## 2020-04-01 ENCOUNTER — CARE COORDINATION (OUTPATIENT)
Dept: CASE MANAGEMENT | Age: 85
End: 2020-04-01

## 2020-04-01 PROCEDURE — 1111F DSCHRG MED/CURRENT MED MERGE: CPT | Performed by: PEDIATRICS

## 2020-04-01 NOTE — CARE COORDINATION
[facility] Transitions Follow Up Call    2020    Patient: Daryn Espino  Patient : 1935   MRN: [id number]  Reason for Admission:   Discharge Date: 3/30/20 RARS: Readmission Risk Score: 28         Spoke with: [facility] Transitions Subsequent and Final Call    Subsequent and Final Calls  Care Transitions Interventions  Other Interventions: Follow Up : Followed up with patient regarding medications and discussion with his pharmacy, and call was disconnected. Tried multiple times to make connection again, unable to do so. Will follow up at a later time.    Future Appointments   Date Time Provider Addie Doss   2020 11:30 AM [place], DO Joyce Edouard [place]   2020  1:00 PM ISAAC Gamboa [facility] [place]   2020  2:00 PM DO Joyce Parra [place]   9/15/2020  3:00 PM ISAAC [facility] [place]       Arielle Mensah RN

## 2020-04-01 NOTE — CARE COORDINATION
[facility] Transitions Initial Follow Up Call    Call within 2 business days of discharge: Yes    Patient: Kevin Oviedo Patient : 1935   MRN: [id number]  Reason for Admission: Chest Pain  Discharge Date: 3/30/20 RARS: Readmission Risk Score: 35      Last Discharge [facility]       Complaint Diagnosis Description Type Department Provider    3/27/20 Chest Pain Chest pain, unspecified type ED to Hosp-Admission (Discharged) (ADMITTED) L CARMELLA Amaya MD; Amos Cook. .. Spoke with: [address] [place] [address] [place]: [place] [address]    Non-face-to-face services provided:  Reviewed encounter information for continuity of care prior to follow up phone call - chart notes, consults, progress notes, test results, med list, appointments, AVS, other information. Care Transitions 24 Hour Call    Do you have any ongoing symptoms?:  No  Do you have a copy of your discharge instructions?:  Yes  Do you have all of your prescriptions and are they filled?:  Yes  Have you been contacted by a [id number] [employer] Pharmacist?:  No  Have you scheduled your follow up appointment?:  Yes  How are you going to get to your appointment?:  Car - family or friend to transport  Were you discharged with any Home Care or Post Acute Services:  No  Patient Home Equipment:  Oxygen, CPAP  Do you have support at home?:  Partner/Spouse/SO  Do you feel like you have everything you need to keep you well at home?:  Yes  Are you an active caregiver in your home?:  No  Care Transitions Interventions         Follow Up : Spoke with patient for initial follow up after discharge call for CTC. He states he is doing fair. [facility] is on lockdown he says, not allowing any visitors. He says he did get his medications but as we went over them, he had not gotten them all. Some were sent to [facility] and he no longer lives there, uses [facility] on [address]. He says his appetite is good.  Says he has no [facility] department [address]: ([phone]) and PCP office for questions related to their healthcare. CTN/ACM provided contact information for future reference.     Reviewed and educated patient on any new and changed medications related to discharge diagnosis     Plan for follow-up call in 3-5 days based on severity of symptoms and risk factors    Moni Bowles RN

## 2020-04-02 ENCOUNTER — TELEPHONE (OUTPATIENT)
Dept: PRIMARY CARE CLINIC | Age: 85
End: 2020-04-02

## 2020-04-02 ENCOUNTER — TELEPHONE (OUTPATIENT)
Dept: CARDIAC SURGERY | Facility: CLINIC | Age: 85
End: 2020-04-02

## 2020-04-02 ENCOUNTER — CARE COORDINATION (OUTPATIENT)
Dept: CASE MANAGEMENT | Age: 85
End: 2020-04-02

## 2020-04-02 RX ORDER — LANCETS 30 GAUGE
1 EACH MISCELLANEOUS DAILY
Qty: 100 EACH | Refills: 0 | Status: SHIPPED | OUTPATIENT
Start: 2020-04-02 | End: 2021-04-14

## 2020-04-02 RX ORDER — GLUCOSAMINE HCL/CHONDROITIN SU 500-400 MG
CAPSULE ORAL
Qty: 100 STRIP | Refills: 3 | Status: SHIPPED | OUTPATIENT
Start: 2020-04-02 | End: 2021-11-17

## 2020-04-02 RX ORDER — PEN NEEDLE, DIABETIC 31 GX5/16"
NEEDLE, DISPOSABLE MISCELLANEOUS
Qty: 100 EACH | Refills: 0 | Status: ON HOLD | OUTPATIENT
Start: 2020-04-02 | End: 2021-09-13 | Stop reason: HOSPADM

## 2020-04-02 NOTE — TELEPHONE ENCOUNTER
FAUSTINO Nassar at [facility] called, pt needs glucometer and supplies-lancets, strips, alcohol pads. He needs it to [facility] and delivered since [facility] is on quarantine.

## 2020-04-02 NOTE — TELEPHONE ENCOUNTER
Patient was originally seen by Dr. Allen in February of 2019. Dr. Allen referred patient to pulmonology but patient no showed with them twice. In June of 2019 I called Dr. Grover's office to let them know. The patient has not be re referred to us by Dr. Grover and has also been referred to Dr. Silvestre and has been seen. He was scheduled for EBUS but that was cancelled due to COVID-19. Called Dr. Silvestre's office and spoke with his nurse regarding their plan. They are planning to reassess scheduling patient for EBUS in 2 weeks, but if patient did not have this done before 4/12, he would need another office visit with them.

## 2020-04-02 NOTE — TELEPHONE ENCOUNTER
Noted. I discussed this with Dr. Aly yesterday. Plan to review imaging early next week and go from there.

## 2020-04-02 NOTE — TELEPHONE ENCOUNTER
Tara Laurent called this afternoon to check and see how his appt for 4/6/2020 with Dr. Astrid Moore is going to go ahead. Patient declined MyChart. Please call patient back to discuss. Thank you.

## 2020-04-06 ENCOUNTER — VIRTUAL VISIT (OUTPATIENT)
Dept: PRIMARY CARE CLINIC | Age: 85
End: 2020-04-06
Payer: MEDICARE

## 2020-04-06 PROCEDURE — 99443 PR PHYS/QHP TELEPHONE EVALUATION 21-30 MIN: CPT | Performed by: PEDIATRICS

## 2020-04-06 RX ORDER — ATORVASTATIN CALCIUM 40 MG/1
40 TABLET, FILM COATED ORAL NIGHTLY
Qty: 90 TABLET | Refills: 3 | Status: SHIPPED | OUTPATIENT
Start: 2020-04-06 | End: 2021-03-30

## 2020-04-06 RX ORDER — ISOSORBIDE MONONITRATE 60 MG/1
60 TABLET, EXTENDED RELEASE ORAL DAILY
Qty: 30 TABLET | Refills: 0 | Status: SHIPPED | OUTPATIENT
Start: 2020-04-06 | End: 2020-05-26

## 2020-04-06 ASSESSMENT — ENCOUNTER SYMPTOMS
DIARRHEA: 1
SORE THROAT: 0
VOMITING: 0
CHEST TIGHTNESS: 0
SHORTNESS OF BREATH: 0
ABDOMINAL PAIN: 0
COUGH: 0
WHEEZING: 0
BACK PAIN: 0
NAUSEA: 0

## 2020-04-06 NOTE — PROGRESS NOTES
03/29/2020 12.2     Platelets 39/35/9282 128*    MPV 03/29/2020 10.2     Neutrophils % 03/29/2020 73.2*    Lymphocytes % 03/29/2020 10.9*    Monocytes % 03/29/2020 10.7*    Eosinophils % 03/29/2020 1.1     Basophils % 03/29/2020 0.6     Neutrophils Absolute 03/29/2020 6.2     Immature Granulocytes # 03/29/2020 0.3     Lymphocytes Absolute 03/29/2020 0.9*    Monocytes Absolute 03/29/2020 0.90     Eosinophils Absolute 03/29/2020 0.10     Basophils Absolute 03/29/2020 0.10     Troponin 03/29/2020 <0.01     POC Glucose 03/28/2020 138*    Performed on 03/28/2020 AccuChek     POC Glucose 03/29/2020 130*    Performed on 03/29/2020 AccuChek     Sodium 03/29/2020 136     Potassium reflex Magnesi* 03/29/2020 4.3     Chloride 03/29/2020 98     CO2 03/29/2020 25     Anion Gap 03/29/2020 13     Glucose 03/29/2020 106     BUN 03/29/2020 41*    CREATININE 03/29/2020 1.2     GFR Non- 03/29/2020 58*    Calcium 03/29/2020 9.5     Total Protein 03/29/2020 6.3*    Alb 03/29/2020 3.7     Total Bilirubin 03/29/2020 0.5     Alkaline Phosphatase 03/29/2020 29*    ALT 03/29/2020 5     AST 03/29/2020 7     POC Glucose 03/29/2020 116*    Performed on 03/29/2020 AccuChek     POC Glucose 03/29/2020 128*    Performed on 03/29/2020 AccuChek     Sodium 03/30/2020 137     Potassium reflex Magnesi* 03/30/2020 4.6     Chloride 03/30/2020 99     CO2 03/30/2020 25     Anion Gap 03/30/2020 13     Glucose 03/30/2020 101     BUN 03/30/2020 42*    CREATININE 03/30/2020 1.1     GFR Non- 03/30/2020 >60     Calcium 03/30/2020 9.8     WBC 03/30/2020 10.0     RBC 03/30/2020 3.49*    Hemoglobin 03/30/2020 11.4*    Hematocrit 03/30/2020 34.0*    MCV 03/30/2020 97.4*    MCH 03/30/2020 32.7*    MCHC 03/30/2020 33.5     RDW 03/30/2020 12.0     Platelets 12/34/7689 131     MPV 03/30/2020 9.9     Neutrophils % 03/30/2020 70.4*    Lymphocytes % 03/30/2020 12.1*    Monocytes % (NITROSTAT) 0.4 MG SL tablet up to max of 3 total doses. If no relief after 1 dose, call 911. 25 tablet 0    Wheat Dextrin (BENEFIBER) POWD Take 4 g by mouth 3 times daily (with meals) 1 Can 5    spironolactone (ALDACTONE) 50 MG tablet TAKE 1 TABLET BY MOUTH EVERY DAY 90 tablet 1    blood glucose monitor kit and supplies Test once a day for symptoms of irregular blood glucose. 1 kit 0    ONE TOUCH LANCETS MISC 1 each by Does not apply route daily 100 each 3    blood glucose monitor strips Test once daily for symptoms of irregular blood glucose.  100 strip 1    FLUoxetine (PROZAC) 40 MG capsule Take 1 capsule by mouth daily 90 capsule 3    terazosin (HYTRIN) 5 MG capsule Take 1 capsule by mouth nightly 90 capsule 3    fluticasone-umeclidin-vilant (TRELEGY ELLIPTA) 100-62.5-25 MCG/INH AEPB Inhale 1 puff into the lungs daily 1 each 11    WELCHOL 625 MG tablet TAKE 3 TABLETS BY MOUTH 2 TIMES DAILY (WITH MEALS) 540 tablet 1    amLODIPine (NORVASC) 5 MG tablet Take 1 tablet by mouth daily 90 tablet 3    albuterol sulfate HFA (PROAIR HFA) 108 (90 Base) MCG/ACT inhaler Inhale 2 puffs into the lungs every 6 hours as needed for Wheezing 8.5 Inhaler 5    hydrochlorothiazide (MICROZIDE) 12.5 MG capsule TAKE 1 CAPSULE BY MOUTH EVERY DAY IN THE MORNING (Patient not taking: Reported on 4/1/2020) 90 capsule 3    furosemide (LASIX) 40 MG tablet Take 1.5 tablets by mouth daily (Patient taking differently: Take 60 mg by mouth daily 1.5 tablets daily for a total of 60mg) 135 tablet 3    digoxin (LANOXIN) 125 MCG tablet Take 1 tablet by mouth every other day 90 tablet 3    buPROPion (WELLBUTRIN XL) 150 MG extended release tablet Take 2 tablets by mouth every morning 180 tablet 3    lisinopril (PRINIVIL;ZESTRIL) 40 MG tablet Take 1 tablet by mouth daily 90 tablet 3    metOLazone (ZAROXOLYN) 2.5 MG tablet Take 1 tablet by mouth daily 90 tablet 1    carvedilol (COREG) 12.5 MG tablet Take 1 tablet by mouth 2 times daily (with meals) 180 tablet 3    Dulaglutide (TRULICITY) 0.38 LL/9.2DO SOPN INJECT 1 PEN EVERY WEEK 12 pen 3    potassium chloride (KLOR-CON 10) 10 MEQ extended release tablet Take 1 tablet by mouth daily 90 tablet 3    Multiple Vitamins-Minerals (THERAPEUTIC MULTIVITAMIN-MINERALS) tablet Take 1 tablet by mouth daily      metFORMIN (GLUCOPHAGE) 500 MG tablet Take 1 tablet by mouth 2 times daily (with meals) 180 tablet 3    Lancets MISC 1 each by Does not apply route daily Accu Chek Guid3 Lancets (Patient not taking: Reported on 4/1/2020) 100 each 5    FLUoxetine (PROZAC) 20 MG capsule Take 1 capsule by mouth daily Along with the 40 mg tablet 90 capsule 3    OXYGEN Inhale 3 L into the lungs nightly       BiPAP Machine MISC by Does not apply route nightly      cetirizine (ZYRTEC) 10 MG tablet Take 1 tablet by mouth daily 30 tablet 0    prazosin (MINIPRESS) 2 MG capsule Take 1 capsule by mouth nightly 90 capsule 3    vitamin B-12 (CYANOCOBALAMIN) 1000 MCG tablet Take 1,000 mcg by mouth daily      enzalutamide (XTANDI) 40 MG capsule Take 4 tablets daily      rivaroxaban (XARELTO) 20 MG TABS tablet Take 1 tablet by mouth daily (with breakfast) 30 tablet 11    leuprolide (LUPRON) 30 MG injection Inject 30 mg into the muscle once Every 4 months      MYRBETRIQ 25 MG TB24 Take 25 mg by mouth daily       FISH OIL Take 1 capsule by mouth 2 times daily. No current facility-administered medications for this visit. Allergies: Patient has no known allergies.      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 ([facility] 3/28/2020    Lung mass     Neuromuscular disorder (HCC)     Neuropathy     Other disorders of kidney and ureter in diseases classified elsewhere     Palliative care patient 11/15/2018    Pneumonia     Type II or unspecified type diabetes mellitus without mention of complication, not stated as uncontrolled        Family History   Problem Relation Age of Onset    Heart Attack Mother     Cancer Father        Past Surgical History:   Procedure Laterality Date    COLONOSCOPY      EYE SURGERY      EYE SURGERY      HERNIA REPAIR      umbilical with Dr Aaron Ray         Social History     Tobacco Use    Smoking status: Never Smoker    Smokeless tobacco: Never Used   Substance Use Topics    Alcohol use: No        Review of Systems   Constitutional: Positive for fatigue. Negative for chills and fever (improving.). HENT: Negative for congestion, ear pain and sore throat. Eyes: Negative for visual disturbance. Respiratory: Negative for cough, chest tightness, shortness of breath and wheezing. Cardiovascular: Negative for chest pain (none since his discharge), palpitations and leg swelling. Gastrointestinal: Positive for diarrhea (when he eats out.). Negative for abdominal pain, nausea and vomiting. Endocrine: Negative for polyuria. Genitourinary: Negative for frequency and urgency. Musculoskeletal: Positive for arthralgias (worse since stopping celebrex. ). Negative for back pain and neck pain. Skin: Negative for rash. Neurological: Negative for dizziness and headaches. Psychiatric/Behavioral: Positive for decreased concentration, dysphoric mood ( he is concerned about his wife.) and sleep disturbance (struggling with depression making worse). Negative for self-injury and suicidal ideas. The patient is nervous/anxious. Physical Exam  This was not performed due to teleconference visit.       ASSESSMENT      ICD-10-CM 1. Type 2 diabetes mellitus with diabetic polyneuropathy, without long-term current use of insulin (HCC) E11.42    2. Essential hypertension I10    3. Mixed hyperlipidemia E78.2 atorvastatin (LIPITOR) 40 MG tablet   4. Chronic obstructive pulmonary disease, unspecified COPD type ([address] J44.9    5. Chronic diastolic heart failure (HCC) I50.32    6. Chronic atrial fibrillation I48.20    7. Chronic anticoagulation Z79.01    8. Chest mass R22.2    9. Pulmonary hypertension (HCC) I27.20    10. Prostate cancer ([address] C61    11. Chest pain, unspecified type R07.9 isosorbide mononitrate (IMDUR) 60 MG extended release tablet         PLAN    1. Type 2 diabetes mellitus with diabetic polyneuropathy, without long-term current use of insulin (formerly [facility])  This is excellently controlled based upon most recent hemoglobin A1c    2. Essential hypertension  Appropriately controlled by hospital records. He is tolerating isosorbide mononitrate without any symptoms of hypotension    3. Mixed hyperlipidemia  Tolerating the increase in atorvastatin without any issues. - atorvastatin (LIPITOR) 40 MG tablet; Take 1 tablet by mouth nightly  Dispense: 90 tablet; Refill: 3    4. Chronic obstructive pulmonary disease, unspecified COPD type ([address]  Stable on present medication regimen. Breathing is normal for him    5. Chronic diastolic heart failure ([address]  He is weighing himself on a regular basis. No symptoms of heart failure at present  He is clinically compensated    6. Chronic atrial fibrillation  No symptoms of palpitations. He is appropriately rate controlled and anticoagulated. He is now on Xarelto in addition to aspirin    7. Chronic anticoagulation  As above    8. Chest mass  Pending work-up by cardiothoracic surgery and pulmonary    9. Pulmonary hypertension ([address]  He is taking appropriate precautions and using his inhalers as well as is treatment of sleep apnea    10.  Prostate cancer ([address]  Presently undetectable based upon most recent labs    6. Chest pain, unspecified type  Recently hospitalized at [facility] as mentioned above. His recommendations were medical management from cardiology standpoint. He did have a 70% lesion in the LAD. - isosorbide mononitrate (IMDUR) 60 MG extended release tablet; Take 1 tablet by mouth daily  Dispense: 30 tablet; Refill: 0      No orders of the defined types were placed in this encounter. Return in about 1 month (around 5/6/2020). Due to patients co-morbidities and risk for potential exposure of COVID 19 pandemic, Telehealth was initiated. 29 minutes were spent on the phone with patient. Provider performed history of present illness and review of systems. Diagnosis and treatment plan was discussed with patient. Pharmacy of choice was reviewed along with past medical history, medication allergies, and current medications. Education provided to patient or patient parents/guardian with current illness diagnosis as well as when to seek additional healthcare due to changing or for worsening symptoms. Patient voiced understanding.

## 2020-04-06 NOTE — PATIENT INSTRUCTIONS
adult-strength or 2 to 4 low-dose aspirin. Wait for an ambulance. Do not try to drive yourself.    Call your doctor today if:    · You have any trouble breathing.     · Your chest pain gets worse.     · You are dizzy or lightheaded, or you feel like you may faint.     · You are not getting better as expected.     · You are having new or different chest pain. Where can you learn more? Go to https://TP TherapeuticspeVenture Technologies.BoomBoom Prints. org and sign in to your Canvace account. Enter A120 in the Aptara box to learn more about \"Chest Pain: Care Instructions. \"     If you do not have an account, please click on the \"Sign Up Now\" link. Current as of: June 26, 2019Content Version: 12.4  © 6296-8468 Healthwise, Incorporated. Care instructions adapted under license by [facility] ([facility]). If you have questions about a medical condition or this instruction, always ask your healthcare professional. [place] [address] any warranty or liability for your use of this information. Patient Education        Angina: Care Instructions  Your Care Instructions    You have a problem called angina. Angina happens when there is not enough blood flow to your heart muscle. Angina is a sign of coronary artery disease (CAD). CAD occurs when blood vessels that supply the heart become narrowed. Having CAD increases your risk of a heart attack. Chest pain or pressure is the most common symptom of angina. But some people have other symptoms, like:  · Pain, pressure, or a strange feeling in the back, neck, jaw, or upper belly, or in one or both shoulders or arms. · Shortness of breath. · Nausea or vomiting. · Lightheadedness or sudden weakness. · Fast or irregular heartbeat. Women are somewhat more likely than men to have angina symptoms like shortness of breath, nausea, and back or jaw pain. Angina can be dangerous. That's why it is important to pay attention to your symptoms.  Know what is typical for you, learn

## 2020-04-07 ENCOUNTER — CARE COORDINATION (OUTPATIENT)
Dept: CASE MANAGEMENT | Age: 85
End: 2020-04-07

## 2020-04-13 ENCOUNTER — CARE COORDINATION (OUTPATIENT)
Dept: CASE MANAGEMENT | Age: 85
End: 2020-04-13

## 2020-04-13 NOTE — CARE COORDINATION
[facility] Transitions Follow Up Call    2020    Patient: Génesis Yanez  Patient : 1935   MRN: [id number]  Reason for Admission:   Discharge Date: 3/30/20 RARS: Readmission Risk Score: 28         Spoke with: 52 [facility] Transitions Subsequent and Final Call    Subsequent and Final Calls  Do you have any ongoing symptoms?:  No  Have your medications changed?:  No  Do you have any questions related to your medications?:  No  Do you currently have any active services?:  No  Do you have any needs or concerns that I can assist you with?:  No  Identified Barriers:  None  Care Transitions Interventions  Other Interventions: Follow Up : Spoke with patient today for follow up CTC call. He says he is doing ok. Says they are still confined to their rooms at [facility]. He says he is eating well. Did ask about his blood sugars, as he has gotten a new meter, and he says he has not checked it. He says that he tried to use it but it did not work. He states it needs setting up. Did advise him that most now days do not, you just stick the 'piano keys end' into the meter and provide a drop of blood on the end and it should read. He says this did not work for him. Advised him to read the instructions that came with it and it should explain how to set it up. He says he did have a caregiver that came in a couple times a week, but she has not been in a while. Advised him to call the office and see if someone could come down to assist in getting it set up. He says he will. No problems at this time. Says he is doing well. Encouraged to follow CDC guidelines we had discussed regarding COVID-19. Will follow up at a later time.    Future Appointments   Date Time Provider Addie Doss   2020  1:00 PM ISAAC Keen Cardio [facility]   2020  2:00 PM [address] [facility],[address], DO [address] [facility]   9/15/2020  3:00 PM ISAAC Glass Cardio [place]       David Alcala RN

## 2020-04-17 ENCOUNTER — CARE COORDINATION (OUTPATIENT)
Dept: CASE MANAGEMENT | Age: 85
End: 2020-04-17

## 2020-04-17 NOTE — CARE COORDINATION
[facility] Transitions Follow Up Call    2020    Patient: Errol Crespo  Patient : 1935   MRN: [id number]  Reason for Admission:   Discharge Date: 3/30/20 RARS: Readmission Risk Score: 28         Spoke with: N/A    Care Transitions Subsequent and Final Call    Subsequent and Final Calls  Care Transitions Interventions  Other Interventions: Follow Up : Attempted to make contact with Treva Broussard for a follow up call without success. Unable to leave a message regarding intent of call and call back information. Will try again my next business day.      Future Appointments   Date Time Provider Addie Doss   2020  1:00 PM ISAAC Adkins Cardio [place]   2020  2:00 PM [address] [facility],[address], DO 5995 [facility]   9/15/2020  3:00 PM ISAAC Lares [facility] Cardio [place]       Casimiro Kruse RN

## 2020-04-20 ENCOUNTER — CARE COORDINATION (OUTPATIENT)
Dept: CASE MANAGEMENT | Age: 85
End: 2020-04-20

## 2020-04-20 NOTE — CARE COORDINATION
[facility] Transitions Follow Up Call    2020    Patient: Blas Diaz  Patient : 1935   MRN: [id number]  Reason for Admission:   Discharge Date: 3/30/20 RARS: Readmission Risk Score: 28         Spoke with: [facility] Transitions Subsequent and Final Call    Subsequent and Final Calls  Do you have any ongoing symptoms?:  No  Have your medications changed?:  No  Do you have any questions related to your medications?:  No  Do you currently have any active services?:  No  Do you have any needs or concerns that I can assist you with?:  No  Identified Barriers:  None  Care Transitions Interventions  Other Interventions: Follow Up : Spoke with patient for follow up call today. He says he is doing well. Says they are still quarantined at [facility]. Seems to be getting somewhat depressed being cooped up. Advised him to open the shades, let some Sunshine in, as it is a pretty day outside. Asked if he had a patio or balcony, and he said yes, so did advise him to get outside and get some fresh air, and that might help some. He says he might. He says his appetite is good, but he states they have laid off the cook at the facility, and meals are coming in prepared by an outside source. (?), he says it is not as good as what he had been getting served. Did inquire about his blood sugars, and he has not asked anyone to assist in showing him how to work his glucometer. CTN will place call to facility to see if there is someone to assist in helping him get it set up. No current problems he says, \"just waiting it out. \" Encouraged to call with any problems or issues prn. Will follow up at a later time.    Future Appointments   Date Time Provider Addie Doss   2020  1:00 PM ISAAC Christina Cardio [facility]   2020  2:00 PM Amy Thapa DO [address]  [facility]   9/15/2020  3:00 PM ISAAC Flynn Cardio [facility]       Tonny Kaminski, DESHAWN

## 2020-04-23 ENCOUNTER — TELEPHONE (OUTPATIENT)
Dept: CARDIAC SURGERY | Facility: CLINIC | Age: 85
End: 2020-04-23

## 2020-04-24 ASSESSMENT — ENCOUNTER SYMPTOMS
DIARRHEA: 0
SHORTNESS OF BREATH: 1
NAUSEA: 0
VOMITING: 0
ABDOMINAL PAIN: 0

## 2020-04-27 NOTE — TELEPHONE ENCOUNTER
Tried to call patient last week. No answer, left message for patient to call office. See other telephone encounter. Will try to call again.

## 2020-04-28 ENCOUNTER — CARE COORDINATION (OUTPATIENT)
Dept: CASE MANAGEMENT | Age: 85
End: 2020-04-28

## 2020-04-28 NOTE — CARE COORDINATION
[facility] Transitions Follow Up Call    2020    Patient: Gerson Osborne  Patient : 1935   MRN: [id number]  Reason for Admission:   Discharge Date: 3/30/20 RARS: Readmission Risk Score: 28         Spoke with: N/A    Care Transitions Subsequent and Final Call    Subsequent and Final Calls  Care Transitions Interventions  Other Interventions: Follow Up : Attempted to make contact with [facility] for a CTN call without success. Unable to leave a message regarding intent of call and call back information. Will try again my next business day.      Future Appointments   Date Time Provider Addie Doss   2020  1:00 PM ISAAC Mcneill Cardio [place]   2020  2:00 PM [address] [facility],[address], DO 5995 [facility]   9/15/2020  3:00 PM ISAAC Weaver [facility] Cardio [facility]       Monico Robin RN

## 2020-04-29 ENCOUNTER — CARE COORDINATION (OUTPATIENT)
Dept: CASE MANAGEMENT | Age: 85
End: 2020-04-29

## 2020-04-29 NOTE — CARE COORDINATION
[facility] Transitions Follow Up Call    2020    Patient: Zeb Guillaume  Patient : 1935   MRN: [id number]  Reason for Admission:   Discharge Date: 3/30/20 RARS: Readmission Risk Score: 28         Spoke with: [address] [place] [address] Transitions Subsequent and Final Call    Subsequent and Final Calls  Do you have any ongoing symptoms?:  No  Have your medications changed?:  No  Do you have any questions related to your medications?:  No  Do you currently have any active services?:  No  Do you have any needs or concerns that I can assist you with?:  No  Identified Barriers:  None  Care Transitions Interventions  Other Interventions: Follow Up : Spoke with patient today for CTC follow up call. He says he is doing well today. Someone did come down and assist him in getting his glucometer to work, and he says his blood sugars have been good. Today was 120. He was very thankful for the help in getting it set up. He says his appetite is good, the food this last week at [facility] has been more palatable for him. He thinks they are getting it from another source, and he does enjoy it better. He has no s/s of COVID-19, and they are still quarantined at [facility]. No problems or concerns today, patient seems in good spirits. Encouraged to call CTN with any problems or issues that may come up prn. Will discharge from CTN services at this time.    Future Appointments   Date Time Provider Addie Doss   2020  1:00 PM ISAAC Tejada Cardio [facility]   2020  2:00 PM Kassie Duggan DO [address]  [facility] [employer] [facility]   9/15/2020  3:00 PM ISAAC Urrutia Cardio [place]       Kamilla Lemus RN

## 2020-05-06 ENCOUNTER — TELEPHONE (OUTPATIENT)
Dept: PULMONOLOGY | Facility: CLINIC | Age: 85
End: 2020-05-06

## 2020-05-06 NOTE — TELEPHONE ENCOUNTER
Delphine said that [facility] is scheduling surgeries beginning today and they call the patient to schedule.  Are you ready to get pt on your schedule, if so what day do you want them to schedule it for?

## 2020-05-07 ENCOUNTER — TELEPHONE (OUTPATIENT)
Dept: UROLOGY | Facility: CLINIC | Age: 85
End: 2020-05-07

## 2020-05-14 ENCOUNTER — VIRTUAL VISIT (OUTPATIENT)
Dept: PRIMARY CARE CLINIC | Age: 85
End: 2020-05-14
Payer: MEDICARE

## 2020-05-14 PROCEDURE — 99443 PR PHYS/QHP TELEPHONE EVALUATION 21-30 MIN: CPT | Performed by: PEDIATRICS

## 2020-05-14 RX ORDER — HYDROCHLOROTHIAZIDE 12.5 MG/1
12.5 CAPSULE, GELATIN COATED ORAL DAILY
Qty: 30 CAPSULE | Refills: 11 | Status: ON HOLD
Start: 2020-05-14 | End: 2021-05-04 | Stop reason: HOSPADM

## 2020-05-14 ASSESSMENT — ENCOUNTER SYMPTOMS
BACK PAIN: 0
DIARRHEA: 1
WHEEZING: 0
NAUSEA: 0
COUGH: 0
CHEST TIGHTNESS: 0
ABDOMINAL PAIN: 0
SHORTNESS OF BREATH: 0
SORE THROAT: 0
VOMITING: 0

## 2020-05-14 NOTE — PATIENT INSTRUCTIONS
chips and pretzels. · [place] Gretchen fries, pizza, tacos, and other fast foods. · Pickles, olives, ketchup, and other condiments, especially soy sauce, unless labeled sodium-free or low-sodium. If you cannot cook for yourself  · Have family members or friends help you, or have someone cook low-sodium meals. · Check with your local senior nutrition program to find out where meals are served and whether they offer a low-sodium option. You can often find these programs through your local health department or hospital.  · Have meals delivered to your home. Most [facility] have a Meals on [employer]. These programs provide one hot meal a day for older adults, delivered to their homes. Ask whether these meals are low-sodium. Let them know that you are on a low-sodium diet. Where can you learn more? Go to https://iClinicalpenetoeweb.Minteos. org and sign in to your AdMoment account. Enter A166 in the "[employer] box to learn more about \"Limiting Sodium With Heart Failure: Care Instructions. \"     If you do not have an account, please click on the \"Sign Up Now\" link. Current as of: December 15, 2019Content Version: 12.4  © 9212-0156 Healthwise, Incorporated. Care instructions adapted under license by [facility] ([facility]). If you have questions about a medical condition or this instruction, always ask your healthcare professional. [place] [address] any warranty or liability for your use of this information. Patient Education        Benign Prostatic Hyperplasia: Care Instructions  Your Care Instructions    Benign prostatic hyperplasia, or BPH, is an enlarged prostate gland. The prostate is a small gland that makes some of the fluid in semen. Prostate enlargement happens to almost all men as they age. It is usually not serious. BPH does not cause prostate cancer. As the prostate gets bigger, it may partly block the flow of urine.  You may have a hard time getting a urine stream started or completely stopped. BPH can cause dribbling. You may have a weak urine stream, or you may have to urinate more often than you used to, especially at night. Most men find these problems easy to manage. You do not need treatment unless your symptoms bother you a lot or you have other problems, such as bladder infections or stones. In these cases, medicines may help. Surgery is not needed unless the urine flow is blocked or the symptoms do not get better with medicine. Follow-up care is a key part of your treatment and safety. Be sure to make and go to all appointments, and call your doctor if you are having problems. It's also a good idea to know your test results and keep a list of the medicines you take. How can you care for yourself at home? · Take plenty of time to urinate. Try to relax. · Try \"double voiding. \" Urinate as much you can, relax for a few moments, and then try to urinate again. · Sit on the toilet to urinate. · Read or think of other things while you are waiting. · Turn on a faucet, or try to picture running water. Some men find that this helps get their urine flowing. · If dribbling is a problem, wash your penis daily to avoid skin irritation and infection. · Avoid caffeine and alcohol. These drinks will increase how often you need to urinate. Spread your fluid intake throughout the day. If the urge to urinate often wakes you at night, limit your fluid intake in the evening. Urinate right before you go to bed. · Many over-the-counter cold and allergy medicines can make the symptoms of BPH worse. Avoid antihistamines, decongestants, and allergy pills, if you can. Read the warnings on the package. · If you take any prescription medicines, especially tranquilizers or antidepressants, ask your doctor or pharmacist whether they can cause urination problems. There may be other medicines you can use that do not cause urinary problems. · Be safe with medicines. Take your medicines exactly as prescribed.

## 2020-05-14 NOTE — PROGRESS NOTES
failure:  Medication:              Aldactone 50 mg daily              Zaroxolyn 2.5 mg daily              Lasix 60 mg daily  Daily weight: every day  Sodium restriction: watching. He has been stable here. Weight is up slightly but not seeming to have much fluid.        COPD:  He notes that his breathing is much better. Medication:              Trelegy 1 puff daily  Symptoms: as above. 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 This is normal        Prostate cancer: This is stable and undetectable.        Loose stools  He is trying to regularly use of questran. We also discussed dietary fiber. He is having problems with his insurance not wanting to cover Benifiber. He still has bm after eating, but no longer diarrhea  He is able to make it to [place].        DJD  This is much worse since stoping his celebrex. He is wanting to go back on Celebrex. He has no contraindications. He is better with this.      vitals were not taken for this visit. There is no height or weight on file to calculate BMI. I have reviewed the following with the Mr. Shannan Miller   Lab Review  No results displayed because visit has over 200 results. Hospital Outpatient Visit on 02/25/2020   Component Date Value    POC Creatinine 02/25/2020 1.1     GFR Non- 02/25/2020 >60     Sample Type 02/25/2020 Venous     Performed on 02/25/2020 [facility]      Copies of these are in the chart.     Current Outpatient Medications   Medication Sig Dispense Refill    Psyllium (KONSYL) 28.3 % POWD Take 4 g by mouth 2 times daily (with meals) 1 Bottle 11    hydroCHLOROthiazide (MICROZIDE) 12.5 MG capsule Take 1 capsule by mouth daily 30 capsule 11    atorvastatin (LIPITOR) 40 MG tablet Take 1 tablet by mouth nightly 90 tablet 3    isosorbide mononitrate (IMDUR) 60 MG extended release tablet Take 1 tablet by mouth daily 30 tablet 0    blood glucose monitor strips Test one times a day & as needed for symptoms of irregular

## 2020-05-26 ENCOUNTER — TELEPHONE (OUTPATIENT)
Dept: PRIMARY CARE CLINIC | Age: 85
End: 2020-05-26

## 2020-05-26 RX ORDER — ISOSORBIDE MONONITRATE 60 MG/1
60 TABLET, EXTENDED RELEASE ORAL DAILY
Qty: 30 TABLET | Refills: 5 | Status: SHIPPED | OUTPATIENT
Start: 2020-05-26 | End: 2020-12-16 | Stop reason: SDUPTHER

## 2020-05-26 NOTE — TELEPHONE ENCOUNTER
Received fax from pharmacy requesting refill on pts medication(s). Pt was last seen in office on 3/4/2020  and has a follow up scheduled for 7/14/2020. Will send request to  Dr. Jaime Posadas  for patient. Requested Prescriptions     Pending Prescriptions Disp Refills    isosorbide mononitrate (IMDUR) 60 MG extended release tablet [Pharmacy Med Name: ISOSORBIDE MO 60MGTAB] 30 tablet 0     Sig: TAKE 1 TABLET BY MOUTH ONCE DAILY.

## 2020-05-26 NOTE — TELEPHONE ENCOUNTER
Called Dr. Grover's office and notified that we have tried to get ahold of patient multiple times and left multiple messages to call office and have not heard from patient. Left message on nurse line.

## 2020-05-27 ENCOUNTER — PREP FOR SURGERY (OUTPATIENT)
Dept: OTHER | Facility: HOSPITAL | Age: 85
End: 2020-05-27

## 2020-05-27 ENCOUNTER — TELEPHONE (OUTPATIENT)
Dept: PULMONOLOGY | Facility: CLINIC | Age: 85
End: 2020-05-27

## 2020-05-27 DIAGNOSIS — R59.0 MEDIASTINAL ADENOPATHY: Primary | ICD-10-CM

## 2020-05-27 RX ORDER — SODIUM CHLORIDE 0.9 % (FLUSH) 0.9 %
3 SYRINGE (ML) INJECTION EVERY 12 HOURS SCHEDULED
Status: CANCELLED | OUTPATIENT
Start: 2020-05-27

## 2020-05-27 RX ORDER — SODIUM CHLORIDE 0.9 % (FLUSH) 0.9 %
10 SYRINGE (ML) INJECTION AS NEEDED
Status: CANCELLED | OUTPATIENT
Start: 2020-05-27

## 2020-06-02 ENCOUNTER — TELEPHONE (OUTPATIENT)
Dept: UROLOGY | Facility: CLINIC | Age: 85
End: 2020-06-02

## 2020-06-02 NOTE — TELEPHONE ENCOUNTER
Received letter from [facility] stating the pt is having chest pain from xtandi.  I have sent the front a message and ask them to scheduled the pt for his follow up with Dr skelton.

## 2020-06-03 RX ORDER — SPIRONOLACTONE 50 MG/1
TABLET, FILM COATED ORAL
Qty: 90 TABLET | Refills: 3 | Status: ON HOLD
Start: 2020-06-03 | End: 2021-05-04 | Stop reason: HOSPADM

## 2020-06-09 RX ORDER — FLUOXETINE HYDROCHLORIDE 20 MG/1
20 CAPSULE ORAL DAILY
Qty: 90 CAPSULE | Refills: 3 | Status: SHIPPED | OUTPATIENT
Start: 2020-06-09 | End: 2021-07-07

## 2020-06-12 ENCOUNTER — TELEPHONE (OUTPATIENT)
Dept: CARDIOLOGY | Age: 85
End: 2020-06-12

## 2020-06-12 PROCEDURE — 93296 REM INTERROG EVL PM/IDS: CPT | Performed by: CLINICAL NURSE SPECIALIST

## 2020-06-12 PROCEDURE — 93294 REM INTERROG EVL PM/LDLS PM: CPT | Performed by: CLINICAL NURSE SPECIALIST

## 2020-06-15 RX ORDER — FLUTICASONE PROPIONATE 50 MCG
2 SPRAY, SUSPENSION (ML) NASAL DAILY
Qty: 1 BOTTLE | Refills: 3 | Status: SHIPPED | OUTPATIENT
Start: 2020-06-15 | End: 2021-03-24

## 2020-06-15 RX ORDER — MIRABEGRON 25 MG/1
25 TABLET, FILM COATED, EXTENDED RELEASE ORAL DAILY
Qty: 30 TABLET | Refills: 5 | Status: SHIPPED | OUTPATIENT
Start: 2020-06-15 | End: 2021-03-17

## 2020-06-15 NOTE — TELEPHONE ENCOUNTER
Received fax from pharmacy requesting refill on pts medication(s). Pt was last seen in office on 3/4/2020  and has a follow up scheduled for 7/14/2020. Will send request to  Dr. Shirley Service  for patient.      Requested Prescriptions     Pending Prescriptions Disp Refills    MYRBETRIQ 25 MG TB24 30 tablet 5     Sig: Take 1 tablet by mouth daily

## 2020-06-16 NOTE — PROGRESS NOTES
Subjective    Mr. Castro is 85 y.o. male    Chief Complaint: Prostate Cancer    History of Present Illness    85-year-old male established patient previously treated by Dr. 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  He reports doing well without bone pain.  Denies LUTS.  No hematuria.  Timing is PSA was drawn 3/17/2020 and remained undetectable. He has stable urgency with urge incontinence which is not bothersome to him.  Associated symptoms he denies hematuria or flank pain or bone pain.          The following portions of the patient's history were reviewed and updated as appropriate: allergies, current medications, past family history, past medical history, past social history, past surgical history and problem list.    Review of Systems   Constitutional: Negative for chills and fever.   Gastrointestinal: Negative for abdominal pain, anal bleeding and blood in stool.   Genitourinary: Negative for dysuria, frequency, hematuria and urgency.   All other systems reviewed and are negative.        Current Outpatient Medications:   •  acetaminophen (TYLENOL) 650 MG 8 hr tablet, Take 650 mg by mouth Daily., Disp: , Rfl:   •  amLODIPine (NORVASC) 10 MG tablet, Take 10 mg by mouth Daily., Disp: , Rfl:   •  buPROPion XL (WELLBUTRIN XL) 150 MG 24 hr tablet, Take 300 mg by mouth Daily., Disp: , Rfl:   •  carvedilol (COREG) 12.5 MG tablet, Take 12.5 mg by mouth 2 (two) times a day with meals., Disp: , Rfl:   •  celecoxib (CeleBREX) 200 MG capsule, Take 200 mg by mouth 2 (Two) Times a Day., Disp: , Rfl:   •  digoxin (LANOXIN) 125 MCG tablet, Take 125 mcg by mouth every day., Disp: , Rfl:   •  Dulaglutide (TRULICITY) 0.75 MG/0.5ML solution pen-injector, Inject 0.5 mL under the skin into the appropriate area as directed 1 (One) Time Per Week. Saturday., Disp: , Rfl:   •  enzalutamide (XTANDI) 40 MG chemo capsule, Take 4 capsules by mouth Daily., Disp: 120 capsule, Rfl: 10  •  FLUoxetine  (PROzac) 20 MG capsule, Take 20 mg by mouth Daily. Take with Prozac 40 mg (total dose of 60 mg daily)., Disp: , Rfl:   •  FLUoxetine (PROZAC) 40 MG capsule, Take 40 mg by mouth Daily. Take with Prozac 20 mg (total dose of 60 mg daily)., Disp: , Rfl:   •  fluticasone (FLONASE) 50 MCG/ACT nasal spray, 2 sprays into each nostril daily. Administer 2 sprays in each nostril for each dose., Disp: , Rfl:   •  Fluticasone-Umeclidin-Vilant 100-62.5-25 MCG/INH aerosol powder , Inhale 1 each Daily., Disp: , Rfl:   •  furosemide (LASIX) 40 MG tablet, Take 60 mg by mouth Daily., Disp: , Rfl:   •  hydrochlorothiazide (HYDRODIURIL) 12.5 MG tablet, Take 1 tablet by mouth Daily., Disp: 30 tablet, Rfl: 1  •  leuprolide (LUPRON) 30 MG injection, Inject 30 mg into the shoulder, thigh, or buttocks Every 4 (Four) Months., Disp: , Rfl:   •  lisinopril (PRINIVIL,ZESTRIL) 40 MG tablet, Take 40 mg by mouth daily., Disp: , Rfl:   •  metFORMIN (GLUCOPHAGE) 500 MG tablet, Take 500 mg by mouth 2 (Two) Times a Day With Meals., Disp: , Rfl:   •  metolazone (ZAROXOLYN) 2.5 MG tablet, Take 2.5 mg by mouth daily., Disp: , Rfl:   •  Multiple Vitamins-Minerals (MULTIVITAMIN ADULT) tablet, Take 1 tablet by mouth Daily., Disp: , Rfl:   •  MYRBETRIQ 25 MG tablet sustained-release 24 hour 24 hr tablet, TAKE 1 TABLET BY MOUTH EVERY DAY, Disp: 90 tablet, Rfl: 3  •  O2 (OXYGEN), Inhale 3 L/min Continuous., Disp: , Rfl:   •  Omega-3 Fatty Acids (FISH OIL) 1200 MG capsule capsule, Take 1,200 mg by mouth 2 (Two) Times a Day., Disp: , Rfl:   •  potassium chloride (K-DUR,KLOR-CON) 10 MEQ CR tablet, Take 10 mEq by mouth Daily., Disp: , Rfl:   •  prazosin (MINIPRESS) 2 MG capsule, Take 2 mg by mouth Every Night., Disp: , Rfl:   •  rivaroxaban (XARELTO) 20 MG tablet, Take 20 mg by mouth daily., Disp: , Rfl:   •  spironolactone (ALDACTONE) 50 MG tablet, Take 50 mg by mouth Daily., Disp: , Rfl:   •  terazosin (HYTRIN) 5 MG capsule, Take 5 mg by mouth every night., Disp:  , Rfl:     Past Medical History:   Diagnosis Date   • Acute kidney injury (CMS/HCC) 7/26/2019   • Acute on chronic respiratory failure with hypoxia and hypercapnia (CMS/[facility]) 7/26/2019   • Arthritis    • Atrial fibrillation (CMS/[facility])     HISTORY OF   • Cancer (CMS/[facility])     prostate   • Cellulitis     both legs   • CHF (congestive heart failure) (CMS/[facility])    • Depression    • Diabetes mellitus (CMS/[facility])    • Hematuria    • History of cardioversion     PER PATIENT REPORT   • Hypertension    • Obesity    • On home oxygen therapy     3 liters   • Overactive bladder    • Pacemaker    • Sleep apnea     bipap   • SOB (shortness of breath)    • Stage 2 moderate COPD by GOLD classification (CMS/[facility]) 3/4/2019       Past Surgical History:   Procedure Laterality Date   • CATARACT EXTRACTION Right    • HERNIA REPAIR     • INCISION AND DRAINAGE LEG Left 7/19/2019    Procedure: INCISION AND DRAINAGE, LEFT FOOT;  Surgeon: Juan Antonio Uriarte DPM;  Location: [facility] OR;  Service: Podiatry   • INSERTION PROSTATE RADIATION SEED     • JOINT REPLACEMENT     • PACEMAKER IMPLANTATION     • REPLACEMENT TOTAL KNEE BILATERAL         Social History     Socioeconomic History   • Marital status:      Spouse name: Not on file   • Number of children: Not on file   • Years of education: Not on file   • Highest education level: Not on file   Tobacco Use   • Smoking status: Never Smoker   • Smokeless tobacco: Never Used   Substance and Sexual Activity   • Alcohol use: No   • Drug use: No   • Sexual activity: Defer       Family History   Problem Relation Age of Onset   • Prostate cancer Son    • Cancer Father    • Arthritis Mother    • Depression Mother    • Hearing loss Mother    • Heart disease Mother    • Hypertension Mother        Objective    There were no vitals taken for this visit.    Physical Exam  Constitutional: Well nourished, Well developed; No apparent distress; Vital reviewed as above  Psychiatric: Appropriate affect; Alert and  oriented  Eyes: Unremarkable  Musculoskeletal: Normal gait and station  GI: Abdomen is soft, non-tender  Respiratory: No distress; Unlabored movement; No accessory musculature needed with symmetric movements  Skin: No pallor or diaphoresis  Lymphatic: No adenopathy neck or groin      Results for orders placed or performed in visit on 03/17/20   Hemoglobin A1c   Result Value Ref Range    Hemoglobin A1C 5.50 4.80 - 5.60 %   TSH   Result Value Ref Range    TSH 2.920 0.270 - 4.200 uIU/mL   T4, Free   Result Value Ref Range    Free T4 1.16 0.93 - 1.70 ng/dL   Lipid Panel   Result Value Ref Range    Total Cholesterol 143 0 - 200 mg/dL    Triglycerides 216 (H) 0 - 150 mg/dL    HDL Cholesterol 55 40 - 60 mg/dL    LDL Cholesterol  45 0 - 100 mg/dL    VLDL Cholesterol 43.2 mg/dL    LDL/HDL Ratio 0.81    PSA Screen   Result Value Ref Range    PSA <0.014 0.000 - 4.000 ng/mL   Digoxin Level   Result Value Ref Range    Digoxin 1.20 0.60 - 1.20 ng/mL   Microalbumin / Creatinine Urine Ratio - Urine, Clean Catch   Result Value Ref Range    Microalbumin/Creatinine Ratio 21.6 mg/g    Creatinine, Urine 97.4 mg/dL    Microalbumin, Urine 2.1 mg/dL   CBC Auto Differential   Result Value Ref Range    WBC 10.31 3.40 - 10.80 10*3/mm3    RBC 4.20 4.14 - 5.80 10*6/mm3    Hemoglobin 13.6 13.0 - 17.7 g/dL    Hematocrit 40.0 37.5 - 51.0 %    MCV 95.2 79.0 - 97.0 fL    MCH 32.4 26.6 - 33.0 pg    MCHC 34.0 31.5 - 35.7 g/dL    RDW 12.3 12.3 - 15.4 %    RDW-SD 43.0 37.0 - 54.0 fl    MPV 9.7 6.0 - 12.0 fL    Platelets 181 140 - 450 10*3/mm3    Neutrophil % 75.8 42.7 - 76.0 %    Lymphocyte % 8.7 (L) 19.6 - 45.3 %    Monocyte % 7.6 5.0 - 12.0 %    Eosinophil % 1.6 0.3 - 6.2 %    Basophil % 1.4 0.0 - 1.5 %    Immature Grans % 4.9 (H) 0.0 - 0.5 %    Neutrophils, Absolute 7.81 (H) 1.70 - 7.00 10*3/mm3    Lymphocytes, Absolute 0.90 0.70 - 3.10 10*3/mm3    Monocytes, Absolute 0.78 0.10 - 0.90 10*3/mm3    Eosinophils, Absolute 0.17 0.00 - 0.40 10*3/mm3     Basophils, Absolute 0.14 0.00 - 0.20 10*3/mm3    Immature Grans, Absolute 0.51 (H) 0.00 - 0.05 10*3/mm3    nRBC 0.0 0.0 - 0.2 /100 WBC     Assessment and Plan    Diagnoses and all orders for this visit:    Prostate cancer (CMS/[facility])  -     POC Urinalysis Dipstick, Multipro  -     PSA DIAGNOSTIC; Future      Doing well overall given his age and castrate resistant prostate cancer with continued good PSA control.  Recommend continue Xtandi and Lupron.  He was written again for 6-month Lupron today. Follow-up with me in October with pre-clinic PSA or sooner as needed.      This document has been signed by EVER Valverde MD on June 18, 2020 17:12

## 2020-06-18 ENCOUNTER — OFFICE VISIT (OUTPATIENT)
Dept: UROLOGY | Facility: CLINIC | Age: 85
End: 2020-06-18

## 2020-06-18 VITALS — WEIGHT: 252.2 LBS | TEMPERATURE: 97.2 F | HEIGHT: 70 IN | BODY MASS INDEX: 36.11 KG/M2

## 2020-06-18 DIAGNOSIS — C61 PROSTATE CANCER (HCC): Primary | ICD-10-CM

## 2020-06-18 PROBLEM — G47.33 OBSTRUCTIVE SLEEP APNEA: Status: ACTIVE | Noted: 2020-06-18

## 2020-06-18 PROBLEM — G47.34 NOCTURNAL HYPOXEMIA: Status: RESOLVED | Noted: 2019-06-05 | Resolved: 2020-06-18

## 2020-06-18 PROBLEM — E66.01 MORBID (SEVERE) OBESITY DUE TO EXCESS CALORIES (HCC): Status: ACTIVE | Noted: 2020-06-18

## 2020-06-18 LAB
BILIRUB BLD-MCNC: NEGATIVE MG/DL
CLARITY, POC: CLEAR
COLOR UR: YELLOW
GLUCOSE UR STRIP-MCNC: NEGATIVE MG/DL
KETONES UR QL: NEGATIVE
LEUKOCYTE EST, POC: NEGATIVE
NITRITE UR-MCNC: NEGATIVE MG/ML
PH UR: 5 [PH] (ref 5–8)
PROT UR STRIP-MCNC: NEGATIVE MG/DL
RBC # UR STRIP: ABNORMAL /UL
SP GR UR: 1.02 (ref 1–1.03)
UROBILINOGEN UR QL: NORMAL

## 2020-06-18 PROCEDURE — 81003 URINALYSIS AUTO W/O SCOPE: CPT | Performed by: UROLOGY

## 2020-06-18 PROCEDURE — 99213 OFFICE O/P EST LOW 20 MIN: CPT | Performed by: UROLOGY

## 2020-06-18 NOTE — PATIENT INSTRUCTIONS

## 2020-06-18 NOTE — PROGRESS NOTES
RASHIDA Rodriguez  [facility]   Res[facility]  [address] [place] [address]  Phone: [phone]  Fax: [phone]     Prakash Castro is a 85 y.o. male.   CC:   Chief Complaint   Patient presents with   • COPD        HPI: Mr. Castro is being evaluated today to obtain history and physical prior to undergoing a bronchoscopy with endobronchial ultrasound and biopsy with Dr. Silvestre for mediastinal adenopathy.  Arrangements were made for this previously however this had to be canceled due to COVID.  He is asymptomatic from a pulmonary standpoint.  He is doing well on the Trelegy that Dr. Silvestre provided to him.  He would like additional samples if we have them.  He has a rescue inhaler but has not had to use it.  He has known restrictive lung disease, obstructive sleep apnea and occupational risk factors.  He was employed as an ,  and  in the past.  He has been prescribed BiPAP plus oxygen for management of his sleep apnea.  He is compliant with this and does benefit from it.  His oxygen is set at 3 L.  Consideration for surgery complicated by his history of heart disease and diabetes.  He is on Xarelto, fish oil and Celebrex which will need to be held.  He checks his blood sugar daily and reports it to be under good control.  His most recent A1c in March of this year was 5.7.    The following portions of the patient's history were reviewed and updated as appropriate: allergies, current medications, past family history, past medical history, past social history, past surgical history and problem list.    Past Medical History:   Diagnosis Date   • Acute kidney injury (CMS/HCC) 7/26/2019   • Acute on chronic respiratory failure with hypoxia and hypercapnia (CMS/HCC) 7/26/2019   • Arthritis    • Atrial fibrillation (CMS/HCC)     HISTORY OF   • Cancer (CMS/HCC)     prostate   • Cellulitis     both legs   • CHF (congestive heart failure) (CMS/HCC)     • Depression    • Diabetes mellitus (CMS/[facility])    • Hematuria    • History of cardioversion     PER PATIENT REPORT   • Hypertension    • Obesity    • Obstructive sleep apnea 6/18/2020    BiPAP +3 L   • On home oxygen therapy     3 liters   • Overactive bladder    • Pacemaker    • Sleep apnea     bipap   • SOB (shortness of breath)    • Stage 2 moderate COPD by GOLD classification (CMS/[facility]) 3/4/2019       Prior to Admission medications    Medication Sig Start Date End Date Taking? Authorizing Provider   acetaminophen (TYLENOL) 650 MG 8 hr tablet Take 650 mg by mouth Daily.    Yaakov Seymour MD   amLODIPine (NORVASC) 10 MG tablet Take 10 mg by mouth Daily.    Yaakov Seymour MD   buPROPion XL (WELLBUTRIN XL) 150 MG 24 hr tablet Take 300 mg by mouth Daily.    Yaakov Seymour MD   carvedilol (COREG) 12.5 MG tablet Take 12.5 mg by mouth 2 (two) times a day with meals.    Yaakov Seymour MD   celecoxib (CeleBREX) 200 MG capsule Take 200 mg by mouth 2 (Two) Times a Day.    Yaakov Seymour MD   digoxin (LANOXIN) 125 MCG tablet Take 125 mcg by mouth every day.    Yaakov Seymour MD   Dulaglutide (TRULICITY) 0.75 MG/0.5ML solution pen-injector Inject 0.5 mL under the skin into the appropriate area as directed 1 (One) Time Per Week. Saturday.    Yaakov Seymour MD   enzalutamide (XTANDI) 40 MG chemo capsule Take 4 capsules by mouth Daily. 12/18/19   Aleksandr Valverde MD   FLUoxetine (PROzac) 20 MG capsule Take 20 mg by mouth Daily. Take with Prozac 40 mg (total dose of 60 mg daily).    Yaakov Seymour MD   FLUoxetine (PROZAC) 40 MG capsule Take 40 mg by mouth Daily. Take with Prozac 20 mg (total dose of 60 mg daily).    Yaakov Seymour MD   fluticasone (FLONASE) 50 MCG/ACT nasal spray 2 sprays into each nostril daily. Administer 2 sprays in each nostril for each dose.    Yaakov Seymour MD   Fluticasone-Umeclidin-Vilant 100-62.5-25 MCG/INH aerosol powder  Inhale 1  each Daily.    Yaakov Seymour MD   furosemide (LASIX) 40 MG tablet Take 60 mg by mouth Daily.    Yaakov Seymour MD   hydrochlorothiazide (HYDRODIURIL) 12.5 MG tablet Take 1 tablet by mouth Daily. 8/2/19   Jessica Khan APRN   leuprolide (LUPRON) 30 MG injection Inject 30 mg into the shoulder, thigh, or buttocks Every 4 (Four) Months.    Yaakov Seymour MD   lisinopril (PRINIVIL,ZESTRIL) 40 MG tablet Take 40 mg by mouth daily.    Yaakov Seymour MD   metFORMIN (GLUCOPHAGE) 500 MG tablet Take 500 mg by mouth 2 (Two) Times a Day With Meals.    Yaakov Seymour MD   metolazone (ZAROXOLYN) 2.5 MG tablet Take 2.5 mg by mouth daily.    Yaakov Seymour MD   Multiple Vitamins-Minerals (MULTIVITAMIN ADULT) tablet Take 1 tablet by mouth Daily.    Yaakov Seymour MD   MYRBETRIQ 25 MG tablet sustained-release 24 hour 24 hr tablet TAKE 1 TABLET BY MOUTH EVERY DAY 12/13/19   Aleksandr Valverde MD   O2 (OXYGEN) Inhale 3 L/min Continuous.    Yaakov Seymour MD   Omega-3 Fatty Acids (FISH OIL) 1200 MG capsule capsule Take 1,200 mg by mouth 2 (Two) Times a Day.    Yaakov Seymour MD   potassium chloride (K-DUR,KLOR-CON) 10 MEQ CR tablet Take 10 mEq by mouth Daily.    Yaakov Seymour MD   prazosin (MINIPRESS) 2 MG capsule Take 2 mg by mouth Every Night.    Yaakov Seymour MD   rivaroxaban (XARELTO) 20 MG tablet Take 20 mg by mouth daily.    Yaakov Seymour MD   spironolactone (ALDACTONE) 50 MG tablet Take 50 mg by mouth Daily.    Yaakov Seymour MD   terazosin (HYTRIN) 5 MG capsule Take 5 mg by mouth every night.    Yaakov Seymour MD       Family History   Problem Relation Age of Onset   • Prostate cancer Son    • Cancer Father    • Arthritis Mother    • Depression Mother    • Hearing loss Mother    • Heart disease Mother    • Hypertension Mother        Social History     Socioeconomic History   • Marital status:      Spouse name: Not on  "file   • Number of children: Not on file   • Years of education: Not on file   • Highest education level: Not on file   Tobacco Use   • Smoking status: Never Smoker   • Smokeless tobacco: Never Used   Substance and Sexual Activity   • Alcohol use: No   • Drug use: No   • Sexual activity: Defer       Review of Systems   Constitutional: Positive for fatigue. Negative for activity change, chills and fever.   HENT: Negative for congestion, postnasal drip, rhinorrhea, sinus pressure, sore throat and trouble swallowing.    Eyes: Negative for blurred vision, double vision and pain.   Respiratory: Positive for cough and shortness of breath. Negative for chest tightness and wheezing.    Cardiovascular: Negative for chest pain, palpitations and leg swelling.   Gastrointestinal: Negative for abdominal distention, constipation, diarrhea, nausea and vomiting.   Endocrine: Negative for polydipsia, polyphagia and polyuria.   Genitourinary: Negative for dysuria, frequency and urgency.   Musculoskeletal: Positive for gait problem. Negative for arthralgias, back pain and joint swelling.   Skin: Negative for color change, dry skin, rash and skin lesions.   Allergic/Immunologic: Negative for environmental allergies, food allergies and immunocompromised state.   Neurological: Negative for dizziness, seizures, speech difficulty, weakness, light-headedness, memory problem and confusion.   Hematological: Negative for adenopathy. Does not bruise/bleed easily.   Psychiatric/Behavioral: Negative for sleep disturbance, negative for hyperactivity and depressed mood. The patient is not nervous/anxious.        /80   Pulse 90   Temp 98.1 °F (36.7 °C)   Ht 177.8 cm (70\")   Wt 114 kg (251 lb)   SpO2 94% Comment: RA  BMI 36.01 kg/m²     Physical Exam   Constitutional: He is oriented to person, place, and time. He appears well-developed and well-nourished. No distress. He is morbidly obese.  HENT:   Head: Normocephalic and atraumatic. "   Right Ear: External ear normal.   Left Ear: External ear normal.   Nose: Nose normal.   Mouth/Throat: Oropharynx is clear and moist. No oropharyngeal exudate.   Mask on   Eyes: Pupils are equal, round, and reactive to light. Conjunctivae and EOM are normal. Right eye exhibits no discharge. Left eye exhibits no discharge.   Neck: Normal range of motion. Neck supple. No JVD present.   Cardiovascular: Normal rate and regular rhythm.   No murmur heard.  Pacemaker   Pulmonary/Chest: Effort normal and breath sounds normal. No respiratory distress. He has no wheezes.   Abdominal: Soft. Bowel sounds are normal. He exhibits no distension. There is no tenderness.   Musculoskeletal: Normal range of motion. He exhibits no edema or deformity.   Ambulating with a cane, chronic   Neurological: He is alert and oriented to person, place, and time. He displays normal reflexes. No cranial nerve deficit. Coordination normal.   Skin: Skin is warm and dry. No rash noted. He is not diaphoretic. No erythema.   Psychiatric: He has a normal mood and affect. His behavior is normal. Thought content normal.   Nursing note and vitals reviewed.      Pulmonary Functions Testing Results:    PFT Values        Some values may be hidden. Unless noted otherwise, only the newest values recorded on each date are displayed.         Old Values PFT Results 3/12/20   No data to display.      Pre Drug PFT Results 3/12/20   FVC 56   FEV1 50   FEF 25-75% 30   FEV1/FVC 68.76      Post Drug PFT Results 3/12/20   No data to display.      Other Tests PFT Results 3/12/20   TLC 77      DLCO 80   D/VAsb 138           Results for orders placed in visit on 20   Pulmonary Function Test    Narrative Charles Rothman MA     3/12/2020  3:48 PM  Pulmonary Function Test  Performed by: Darrian Silvestre MD  Authorized by: Darrian Silvestre MD      Pre Drug    FVC: 56%   FEV1: 50%   FEF 25-75%: 30%   FEV1/FVC: 68.76%   T%   RV: 101%   DLCO: 80%   D/VAsb:  138%         Most recent PFTs per my interpretation in March of this year showed moderate obstructive disease with reduced mid flows, elevated residual volume and total lung capacity consistent with gas trapping and obstruction, normal diffusion    CXR: My interpretation of most recent CT [facility] at [facility] in February 2020 shows enlarged mediastinal nodes, a large right paratracheal node, some small effusion on the right with associating atelectasis, gallstones        Problem List Items Addressed This Visit        Cardiovascular and Mediastinum    Atrial fibrillation (CMS/HCC)    Mediastinal adenopathy - Primary       Respiratory    COPD, group A, by GOLD 2017 classification (CMS/[facility])    Relevant Medications    Fluticasone-Umeclidin-Vilant (Trelegy Ellipta) 100-62.5-25 MCG/INH aerosol powder     Centrilobular emphysema (CMS/[facility])    Relevant Medications    Fluticasone-Umeclidin-Vilant (Trelegy Ellipta) 100-62.5-25 MCG/INH aerosol powder     Obstructive sleep apnea    Overview     BiPAP +3 L            Digestive    Morbid (severe) obesity due to excess calories (CMS/[facility])      Other Visit Diagnoses     Chronic combined systolic and diastolic heart failure (CMS/[facility])            Patient's Body mass index is 36.01 kg/m². BMI is above normal parameters. Recommendations include: educational material.      Assessment/Plan         He is stable from a COPD and emphysema standpoint on Trelegy.  Additional samples provided.  History and physical completed for scheduled EBUS with Dr. Silvestre for further work-up of his mediastinal adenopathy.  He has preoperative labs ordered for today.  He has COVID testing ordered for Friday.  He is on Xarelto, fish oil and Celebrex.  He will be contacted per surgery protocol on how to place these medications on hold for the procedure.  Dr. Silvestre will reach out to the patient with results of the test when those become available.  He Golden has follow-up scheduled with Dr. Silvestre.  He will keep that  scheduled follow-up.  He will call sooner if needed.    Brooke Greenfield, RASHIDA  6/29/2020  10:38    Return for KEEP F/U AS SCHEDULED.

## 2020-06-24 ENCOUNTER — INFUSION (OUTPATIENT)
Dept: ONCOLOGY | Facility: HOSPITAL | Age: 85
End: 2020-06-24

## 2020-06-24 ENCOUNTER — TELEPHONE (OUTPATIENT)
Dept: UROLOGY | Facility: CLINIC | Age: 85
End: 2020-06-24

## 2020-06-24 VITALS
SYSTOLIC BLOOD PRESSURE: 109 MMHG | HEART RATE: 66 BPM | OXYGEN SATURATION: 96 % | DIASTOLIC BLOOD PRESSURE: 50 MMHG | HEIGHT: 70 IN | RESPIRATION RATE: 18 BRPM | BODY MASS INDEX: 36.33 KG/M2 | TEMPERATURE: 97.4 F | WEIGHT: 253.8 LBS

## 2020-06-24 DIAGNOSIS — C61 CANCER OF PROSTATE (HCC): Primary | ICD-10-CM

## 2020-06-24 PROCEDURE — 96402 CHEMO HORMON ANTINEOPL SQ/IM: CPT

## 2020-06-24 PROCEDURE — 25010000002 LEUPROLIDE 45 MG KIT: Performed by: UROLOGY

## 2020-06-24 RX ADMIN — LEUPROLIDE ACETATE 45 MG: KIT at 15:08

## 2020-06-24 NOTE — TELEPHONE ENCOUNTER
----- Message from Omaira Rivas sent at 6/24/2020 10:54 AM CDT -----  Regarding: [facility] pt  [facility] called wanting to know if mr rock is off his xtandi he hasn't had any since may and they want to know if his therapy was completed. Their call back number is [phone]

## 2020-06-24 NOTE — TELEPHONE ENCOUNTER
Called the number back and they wanted to confirm that he was still suppose to be getting xtandi.  I told them per our last note that it states he is to continue

## 2020-06-25 ENCOUNTER — TRANSCRIBE ORDERS (OUTPATIENT)
Dept: ADMINISTRATIVE | Facility: HOSPITAL | Age: 85
End: 2020-06-25

## 2020-06-25 DIAGNOSIS — Z01.818 PRE-OP TESTING: Primary | ICD-10-CM

## 2020-06-29 ENCOUNTER — OFFICE VISIT (OUTPATIENT)
Dept: PULMONOLOGY | Facility: CLINIC | Age: 85
End: 2020-06-29

## 2020-06-29 ENCOUNTER — APPOINTMENT (OUTPATIENT)
Dept: PREADMISSION TESTING | Facility: HOSPITAL | Age: 85
End: 2020-06-29

## 2020-06-29 VITALS
HEART RATE: 66 BPM | OXYGEN SATURATION: 92 % | DIASTOLIC BLOOD PRESSURE: 53 MMHG | HEIGHT: 70 IN | RESPIRATION RATE: 20 BRPM | SYSTOLIC BLOOD PRESSURE: 100 MMHG | WEIGHT: 251.1 LBS | BODY MASS INDEX: 35.95 KG/M2

## 2020-06-29 VITALS
WEIGHT: 251 LBS | HEIGHT: 70 IN | SYSTOLIC BLOOD PRESSURE: 132 MMHG | DIASTOLIC BLOOD PRESSURE: 80 MMHG | OXYGEN SATURATION: 94 % | TEMPERATURE: 98.1 F | BODY MASS INDEX: 35.93 KG/M2 | HEART RATE: 90 BPM

## 2020-06-29 DIAGNOSIS — I48.0 PAROXYSMAL ATRIAL FIBRILLATION (HCC): ICD-10-CM

## 2020-06-29 DIAGNOSIS — R59.0 MEDIASTINAL ADENOPATHY: Primary | ICD-10-CM

## 2020-06-29 DIAGNOSIS — G47.33 OBSTRUCTIVE SLEEP APNEA: ICD-10-CM

## 2020-06-29 DIAGNOSIS — J44.9 COPD, GROUP A, BY GOLD 2017 CLASSIFICATION (HCC): ICD-10-CM

## 2020-06-29 DIAGNOSIS — J43.2 CENTRILOBULAR EMPHYSEMA (HCC): ICD-10-CM

## 2020-06-29 DIAGNOSIS — I50.42 CHRONIC COMBINED SYSTOLIC AND DIASTOLIC HEART FAILURE (HCC): ICD-10-CM

## 2020-06-29 DIAGNOSIS — E66.01 MORBID (SEVERE) OBESITY DUE TO EXCESS CALORIES (HCC): ICD-10-CM

## 2020-06-29 LAB
ANION GAP SERPL CALCULATED.3IONS-SCNC: 10 MMOL/L (ref 5–15)
BUN BLD-MCNC: 27 MG/DL (ref 8–23)
BUN/CREAT SERPL: 23.5 (ref 7–25)
CALCIUM SPEC-SCNC: 10 MG/DL (ref 8.6–10.5)
CHLORIDE SERPL-SCNC: 103 MMOL/L (ref 98–107)
CO2 SERPL-SCNC: 27 MMOL/L (ref 22–29)
CREAT BLD-MCNC: 1.15 MG/DL (ref 0.76–1.27)
DEPRECATED RDW RBC AUTO: 47.5 FL (ref 37–54)
ERYTHROCYTE [DISTWIDTH] IN BLOOD BY AUTOMATED COUNT: 12.8 % (ref 12.3–15.4)
GFR SERPL CREATININE-BSD FRML MDRD: 61 ML/MIN/1.73
GLUCOSE BLD-MCNC: 95 MG/DL (ref 65–99)
HCT VFR BLD AUTO: 32.5 % (ref 37.5–51)
HGB BLD-MCNC: 10.6 G/DL (ref 13–17.7)
MCH RBC QN AUTO: 33.2 PG (ref 26.6–33)
MCHC RBC AUTO-ENTMCNC: 32.6 G/DL (ref 31.5–35.7)
MCV RBC AUTO: 101.9 FL (ref 79–97)
PLATELET # BLD AUTO: 174 10*3/MM3 (ref 140–450)
PMV BLD AUTO: 10.2 FL (ref 6–12)
POTASSIUM BLD-SCNC: 5.8 MMOL/L (ref 3.5–5.2)
RBC # BLD AUTO: 3.19 10*6/MM3 (ref 4.14–5.8)
SODIUM BLD-SCNC: 140 MMOL/L (ref 136–145)
WBC NRBC COR # BLD: 11.93 10*3/MM3 (ref 3.4–10.8)

## 2020-06-29 PROCEDURE — 36415 COLL VENOUS BLD VENIPUNCTURE: CPT

## 2020-06-29 PROCEDURE — 80048 BASIC METABOLIC PNL TOTAL CA: CPT | Performed by: INTERNAL MEDICINE

## 2020-06-29 PROCEDURE — 99214 OFFICE O/P EST MOD 30 MIN: CPT | Performed by: NURSE PRACTITIONER

## 2020-06-29 PROCEDURE — 85027 COMPLETE CBC AUTOMATED: CPT | Performed by: INTERNAL MEDICINE

## 2020-06-29 NOTE — PATIENT INSTRUCTIONS

## 2020-06-30 ENCOUNTER — TELEPHONE (OUTPATIENT)
Dept: PULMONOLOGY | Facility: CLINIC | Age: 85
End: 2020-06-30

## 2020-07-03 ENCOUNTER — LAB (OUTPATIENT)
Dept: LAB | Facility: HOSPITAL | Age: 85
End: 2020-07-03

## 2020-07-03 PROCEDURE — C9803 HOPD COVID-19 SPEC COLLECT: HCPCS | Performed by: INTERNAL MEDICINE

## 2020-07-03 PROCEDURE — U0003 INFECTIOUS AGENT DETECTION BY NUCLEIC ACID (DNA OR RNA); SEVERE ACUTE RESPIRATORY SYNDROME CORONAVIRUS 2 (SARS-COV-2) (CORONAVIRUS DISEASE [COVID-19]), AMPLIFIED PROBE TECHNIQUE, MAKING USE OF HIGH THROUGHPUT TECHNOLOGIES AS DESCRIBED BY CMS-2020-01-R: HCPCS | Performed by: INTERNAL MEDICINE

## 2020-07-04 LAB
COVID LABCORP PRIORITY: NORMAL
SARS-COV-2 RNA RESP QL NAA+PROBE: NOT DETECTED

## 2020-07-06 ENCOUNTER — ANESTHESIA EVENT (OUTPATIENT)
Dept: PERIOP | Facility: HOSPITAL | Age: 85
End: 2020-07-06

## 2020-07-06 ENCOUNTER — ANESTHESIA (OUTPATIENT)
Dept: PERIOP | Facility: HOSPITAL | Age: 85
End: 2020-07-06

## 2020-07-06 ENCOUNTER — HOSPITAL ENCOUNTER (OUTPATIENT)
Facility: HOSPITAL | Age: 85
Setting detail: HOSPITAL OUTPATIENT SURGERY
Discharge: HOME | End: 2020-07-06
Attending: INTERNAL MEDICINE | Admitting: INTERNAL MEDICINE

## 2020-07-06 VITALS
HEART RATE: 61 BPM | RESPIRATION RATE: 16 BRPM | SYSTOLIC BLOOD PRESSURE: 164 MMHG | DIASTOLIC BLOOD PRESSURE: 85 MMHG | OXYGEN SATURATION: 100 % | TEMPERATURE: 97.6 F

## 2020-07-06 DIAGNOSIS — R59.0 MEDIASTINAL ADENOPATHY: ICD-10-CM

## 2020-07-06 LAB
GLUCOSE BLDC GLUCOMTR-MCNC: 107 MG/DL (ref 70–130)
GLUCOSE BLDC GLUCOMTR-MCNC: 115 MG/DL (ref 70–130)
KOH PREP NAIL: ABNORMAL

## 2020-07-06 PROCEDURE — 87205 SMEAR GRAM STAIN: CPT | Performed by: INTERNAL MEDICINE

## 2020-07-06 PROCEDURE — 88305 TISSUE EXAM BY PATHOLOGIST: CPT | Performed by: INTERNAL MEDICINE

## 2020-07-06 PROCEDURE — 87220 TISSUE EXAM FOR FUNGI: CPT | Performed by: INTERNAL MEDICINE

## 2020-07-06 PROCEDURE — 25010000002 PROPOFOL 10 MG/ML EMULSION: Performed by: NURSE ANESTHETIST, CERTIFIED REGISTERED

## 2020-07-06 PROCEDURE — 31652 BRONCH EBUS SAMPLNG 1/2 NODE: CPT | Performed by: INTERNAL MEDICINE

## 2020-07-06 PROCEDURE — 87116 MYCOBACTERIA CULTURE: CPT | Performed by: INTERNAL MEDICINE

## 2020-07-06 PROCEDURE — 88333 PATH CONSLTJ SURG CYTO XM 1: CPT | Performed by: INTERNAL MEDICINE

## 2020-07-06 PROCEDURE — 88172 CYTP DX EVAL FNA 1ST EA SITE: CPT | Performed by: INTERNAL MEDICINE

## 2020-07-06 PROCEDURE — 87102 FUNGUS ISOLATION CULTURE: CPT | Performed by: INTERNAL MEDICINE

## 2020-07-06 PROCEDURE — C1726 CATH, BAL DIL, NON-VASCULAR: HCPCS | Performed by: INTERNAL MEDICINE

## 2020-07-06 PROCEDURE — 82962 GLUCOSE BLOOD TEST: CPT

## 2020-07-06 PROCEDURE — 87206 SMEAR FLUORESCENT/ACID STAI: CPT | Performed by: INTERNAL MEDICINE

## 2020-07-06 PROCEDURE — 25010000002 SUCCINYLCHOLINE PER 20 MG: Performed by: NURSE ANESTHETIST, CERTIFIED REGISTERED

## 2020-07-06 PROCEDURE — 87070 CULTURE OTHR SPECIMN AEROBIC: CPT | Performed by: INTERNAL MEDICINE

## 2020-07-06 PROCEDURE — 88112 CYTOPATH CELL ENHANCE TECH: CPT | Performed by: INTERNAL MEDICINE

## 2020-07-06 RX ORDER — NEOSTIGMINE METHYLSULFATE 5 MG/5 ML
SYRINGE (ML) INTRAVENOUS AS NEEDED
Status: DISCONTINUED | OUTPATIENT
Start: 2020-07-06 | End: 2020-07-06 | Stop reason: SURG

## 2020-07-06 RX ORDER — ESMOLOL HYDROCHLORIDE 10 MG/ML
INJECTION INTRAVENOUS AS NEEDED
Status: DISCONTINUED | OUTPATIENT
Start: 2020-07-06 | End: 2020-07-06 | Stop reason: SURG

## 2020-07-06 RX ORDER — SODIUM CHLORIDE 0.9 % (FLUSH) 0.9 %
10 SYRINGE (ML) INJECTION AS NEEDED
Status: DISCONTINUED | OUTPATIENT
Start: 2020-07-06 | End: 2020-07-06 | Stop reason: HOSPADM

## 2020-07-06 RX ORDER — SODIUM CHLORIDE 0.9 % (FLUSH) 0.9 %
3 SYRINGE (ML) INJECTION AS NEEDED
Status: DISCONTINUED | OUTPATIENT
Start: 2020-07-06 | End: 2020-07-06 | Stop reason: HOSPADM

## 2020-07-06 RX ORDER — ROCURONIUM BROMIDE 10 MG/ML
INJECTION, SOLUTION INTRAVENOUS AS NEEDED
Status: DISCONTINUED | OUTPATIENT
Start: 2020-07-06 | End: 2020-07-06 | Stop reason: SURG

## 2020-07-06 RX ORDER — PROPOFOL 10 MG/ML
VIAL (ML) INTRAVENOUS AS NEEDED
Status: DISCONTINUED | OUTPATIENT
Start: 2020-07-06 | End: 2020-07-06 | Stop reason: SURG

## 2020-07-06 RX ORDER — SUCCINYLCHOLINE CHLORIDE 20 MG/ML
INJECTION INTRAMUSCULAR; INTRAVENOUS AS NEEDED
Status: DISCONTINUED | OUTPATIENT
Start: 2020-07-06 | End: 2020-07-06 | Stop reason: SURG

## 2020-07-06 RX ORDER — SODIUM CHLORIDE 0.9 % (FLUSH) 0.9 %
3 SYRINGE (ML) INJECTION EVERY 12 HOURS SCHEDULED
Status: DISCONTINUED | OUTPATIENT
Start: 2020-07-06 | End: 2020-07-06 | Stop reason: HOSPADM

## 2020-07-06 RX ORDER — OXYCODONE AND ACETAMINOPHEN 7.5; 325 MG/1; MG/1
2 TABLET ORAL EVERY 4 HOURS PRN
Status: DISCONTINUED | OUTPATIENT
Start: 2020-07-06 | End: 2020-07-06 | Stop reason: HOSPADM

## 2020-07-06 RX ORDER — SODIUM CHLORIDE, SODIUM LACTATE, POTASSIUM CHLORIDE, CALCIUM CHLORIDE 600; 310; 30; 20 MG/100ML; MG/100ML; MG/100ML; MG/100ML
100 INJECTION, SOLUTION INTRAVENOUS CONTINUOUS
Status: DISCONTINUED | OUTPATIENT
Start: 2020-07-06 | End: 2020-07-06 | Stop reason: HOSPADM

## 2020-07-06 RX ORDER — FENTANYL CITRATE 50 UG/ML
25 INJECTION, SOLUTION INTRAMUSCULAR; INTRAVENOUS
Status: DISCONTINUED | OUTPATIENT
Start: 2020-07-06 | End: 2020-07-06 | Stop reason: HOSPADM

## 2020-07-06 RX ORDER — NALOXONE HCL 0.4 MG/ML
0.4 VIAL (ML) INJECTION AS NEEDED
Status: DISCONTINUED | OUTPATIENT
Start: 2020-07-06 | End: 2020-07-06 | Stop reason: HOSPADM

## 2020-07-06 RX ORDER — SODIUM CHLORIDE 0.9 % (FLUSH) 0.9 %
3-10 SYRINGE (ML) INJECTION AS NEEDED
Status: DISCONTINUED | OUTPATIENT
Start: 2020-07-06 | End: 2020-07-06 | Stop reason: HOSPADM

## 2020-07-06 RX ORDER — SODIUM CHLORIDE, SODIUM LACTATE, POTASSIUM CHLORIDE, CALCIUM CHLORIDE 600; 310; 30; 20 MG/100ML; MG/100ML; MG/100ML; MG/100ML
1000 INJECTION, SOLUTION INTRAVENOUS CONTINUOUS
Status: DISCONTINUED | OUTPATIENT
Start: 2020-07-06 | End: 2020-07-06 | Stop reason: HOSPADM

## 2020-07-06 RX ORDER — OXYCODONE AND ACETAMINOPHEN 10; 325 MG/1; MG/1
1 TABLET ORAL ONCE AS NEEDED
Status: DISCONTINUED | OUTPATIENT
Start: 2020-07-06 | End: 2020-07-06 | Stop reason: HOSPADM

## 2020-07-06 RX ORDER — LIDOCAINE HYDROCHLORIDE 10 MG/ML
0.5 INJECTION, SOLUTION EPIDURAL; INFILTRATION; INTRACAUDAL; PERINEURAL ONCE AS NEEDED
Status: DISCONTINUED | OUTPATIENT
Start: 2020-07-06 | End: 2020-07-06 | Stop reason: HOSPADM

## 2020-07-06 RX ORDER — ONDANSETRON 2 MG/ML
4 INJECTION INTRAMUSCULAR; INTRAVENOUS ONCE AS NEEDED
Status: DISCONTINUED | OUTPATIENT
Start: 2020-07-06 | End: 2020-07-06 | Stop reason: HOSPADM

## 2020-07-06 RX ORDER — LABETALOL HYDROCHLORIDE 5 MG/ML
5 INJECTION, SOLUTION INTRAVENOUS
Status: DISCONTINUED | OUTPATIENT
Start: 2020-07-06 | End: 2020-07-06 | Stop reason: HOSPADM

## 2020-07-06 RX ORDER — FLUMAZENIL 0.1 MG/ML
0.2 INJECTION INTRAVENOUS AS NEEDED
Status: DISCONTINUED | OUTPATIENT
Start: 2020-07-06 | End: 2020-07-06 | Stop reason: HOSPADM

## 2020-07-06 RX ADMIN — ESMOLOL HYDROCHLORIDE 40 MG: 10 INJECTION, SOLUTION INTRAVENOUS at 13:38

## 2020-07-06 RX ADMIN — SUCCINYLCHOLINE CHLORIDE 100 MG: 20 INJECTION, SOLUTION INTRAMUSCULAR; INTRAVENOUS at 13:38

## 2020-07-06 RX ADMIN — Medication 3 MG: at 14:03

## 2020-07-06 RX ADMIN — GLYCOPYRROLATE 0.3 MG: 0.2 INJECTION, SOLUTION INTRAMUSCULAR; INTRAVENOUS at 14:03

## 2020-07-06 RX ADMIN — ROCURONIUM BROMIDE 20 MG: 10 INJECTION INTRAVENOUS at 13:49

## 2020-07-06 RX ADMIN — PROPOFOL 100 MG: 10 INJECTION, EMULSION INTRAVENOUS at 13:38

## 2020-07-06 RX ADMIN — SODIUM CHLORIDE, POTASSIUM CHLORIDE, SODIUM LACTATE AND CALCIUM CHLORIDE 1000 ML: 600; 310; 30; 20 INJECTION, SOLUTION INTRAVENOUS at 12:40

## 2020-07-06 NOTE — ANESTHESIA PROCEDURE NOTES
Airway  Date/Time: 7/6/2020 1:39 PM  Airway not difficult    General Information and Staff    Patient location during procedure: OR  CRNA: Adin Manriquez CRNA    Indications and Patient Condition  Indications for airway management: airway protection    Preoxygenated: yes  Mask difficulty assessment: 0 - not attempted    Final Airway Details  Final airway type: endotracheal airway      Successful airway: ETT  Cuffed: yes   Successful intubation technique: direct laryngoscopy  Facilitating devices/methods: intubating stylet  Blade: Jazmin  Blade size: 3  ETT size (mm): 8.0  Cormack-Lehane Classification: grade IIa - partial view of glottis  Placement verified by: chest auscultation and capnometry   Cuff volume (mL): 6  Measured from: lips  ETT/EBT  to lips (cm): 22  Number of attempts at approach: 1  Assessment: lips, teeth, and gum same as pre-op and atraumatic intubation    Additional Comments  ATRAUMATIC INTUBATION

## 2020-07-06 NOTE — ANESTHESIA PREPROCEDURE EVALUATION
Anesthesia Evaluation     Patient summary reviewed   no history of anesthetic complications:  NPO Solid Status: > 6 hours             Airway   Mallampati: II  TM distance: >3 FB  Neck ROM: full  Dental    (+) poor dentition        Pulmonary    (+) COPD, home oxygen, sleep apnea (bipap),     ROS comment: Mediastinal adenopathy  Cardiovascular     ECG reviewed  PT is on anticoagulation therapy  Patient on routine beta blocker and Beta blocker given within 24 hours of surgery    (+) pacemaker (medtronic) pacemaker, hypertension, dysrhythmias Atrial Fib, CHF ,     ROS comment: Off xarelto 7/3    Neuro/Psych- negative ROS  GI/Hepatic/Renal/Endo    (+) obesity,   renal disease CRI, diabetes mellitus,     Musculoskeletal     Abdominal    Substance History      OB/GYN          Other   arthritis,                      Anesthesia Plan    ASA 3     general     intravenous induction     Anesthetic plan, all risks, benefits, and alternatives have been provided, discussed and informed consent has been obtained with: patient.

## 2020-07-06 NOTE — DISCHARGE INSTRUCTIONS

## 2020-07-06 NOTE — NURSING NOTE
Tried to interrogate PM  several times with 2 different machines. [employer] is sending out a rep to interrogate PM and check our machines.

## 2020-07-06 NOTE — OP NOTE
Bronchoscopy Procedure Note (Endobronchial Ultrasound Bronchoscopy)    Date of Operation: 07/06/20  Pre-op Diagnosis: Mediastinal adenopathy  Post-op Diagnosis: Same  Surgeon: Darrian Silvestre MD  Anesthesia: General    Operation: Flexible fiberoptic bronchoscopy Bronchoscopy, Diagnostic, Endobronchial ultrasound and Transbronchial needle aspirate was performed  Findings: station 4r adenopathy  Specimen: wash, tbna station 4r  Estimated Blood Loss: Minimal  Complications: none    Indications and History:  The patient is a 85 y.o.  male with Mediastinal adenopathy.  The risks, benefits, complications, treatment options and expected outcomes were discussed with the patient.  The possibilities of reaction to medication, pulmonary aspiration, perforation of a viscus, bleeding, failure to diagnose a condition and creating a complication requiring transfusion or operation were discussed with the patient who freely signed the consent.  Time out was taken prior to the procedure.    Description of Procedure:    After the induction of general anesthesia, the patient was positioned supine and the bronchoscope was passed through the endotracheal tube.  The scope was then passed into the trachea.     The trachea, mainstem bronchi, RUL, RML, Bronchus Intermedius, RLL, BECKY, BECKY upper division and lingula, and LLL, and all primary segmental airways were examined and were normal except as described below:    Endobronchial findings:  Trachea: Normal mucosa  Lorraine: Normal mucosa  Right main bronchus: Normal mucosa  Right upper lobe bronchus: Normal mucosa  Right middle lobe bronchus: Normal mucosa  Right lower lobe bronchus: Normal mucosa  Left main bronchus: Normal mucosa  Left upper lobe bronchus: Normal mucosa  Left lower lobe bronchus: Normal mucosa    The conventional bronchoscope was removed and the EBUS scope was inserted and the mediastinum was examined via ultrasound.  Findings: station 4 adenopathy approx 1.8 cm size.   TBNA under ultrasound guidance was collected x3  from station 4r, Rapid onsite evaluation: histiocytes, no malignancy.  After satisfactory confirmation of no persistent abnormal bleeding, the bronchoscope was removed.    The Patient was taken to the Recovery Room in satisfactory condition.      Darrian Silvestre MD at 14:12, 7/6/2020

## 2020-07-06 NOTE — ANESTHESIA POSTPROCEDURE EVALUATION
Patient: Prakash Castro    Procedure Summary     Date:  07/06/20 Room / Location:   [facility]  /  [facility]    Anesthesia Start:  1335 Anesthesia Stop:  1410    Procedure:  BRONCHOSCOPY WITH ENDOBRONCHIAL ULTRASOUND (N/A Bronchus) Diagnosis:       Mediastinal adenopathy      (Mediastinal adenopathy [R59.0])    Surgeon:  Darrian Silvestre MD Provider:  Adin Manriquez CRNA    Anesthesia Type:  general ASA Status:  3          Anesthesia Type: general    Vitals  Vitals Value Taken Time   /68 7/6/2020  3:46 PM   Temp 97.6 °F (36.4 °C) 7/6/2020  3:45 PM   Pulse 60 7/6/2020  3:48 PM   Resp 20 7/6/2020  3:45 PM   SpO2 99 % 7/6/2020  3:48 PM   Vitals shown include unvalidated device data.        Post Anesthesia Care and Evaluation    Patient location during evaluation: PACU  Patient participation: complete - patient participated  Level of consciousness: responsive to light touch  Pain management: adequate  Airway patency: patent  Anesthetic complications: No anesthetic complications  PONV Status: none  Cardiovascular status: acceptable  Respiratory status: acceptable  Hydration status: acceptable

## 2020-07-08 ENCOUNTER — TELEPHONE (OUTPATIENT)
Dept: PRIMARY CARE CLINIC | Age: 85
End: 2020-07-08

## 2020-07-08 LAB
BACTERIA SPEC RESP CULT: NORMAL
CYTO UR: NORMAL
GRAM STN SPEC: NORMAL
LAB AP CASE REPORT: NORMAL
LAB AP CLINICAL INFORMATION: NORMAL
Lab: NORMAL
PATH REPORT.FINAL DX SPEC: NORMAL
PATH REPORT.GROSS SPEC: NORMAL

## 2020-07-08 NOTE — TELEPHONE ENCOUNTER
pts GF called, pt was supposed to call yesterday. On the foot that he had surgery on a few months ago, he has noticed a large callous around 2 inches long and it has cracked open and bleeding. She is not there with him, he is on quarantine for 2 weeks due to going to the hospital for a procedure on Monday and he in a senior living facility. She doesn't know it if draining any other color or odor, etc. He just knows of the blood due to it being on his bed sheets. She said that she might be able to get his aid to take a picture of it on Friday, but that would be the earliest.   They called  bc he has seen him in the past, but needs a new referral.   I explained that there was not much if anything we can do for his foot without seeing it. That with him having DM that can complicate things. But I would ask and see what your recommendations are.

## 2020-07-08 NOTE — PROGRESS NOTES
Let pt know this looked ok. We did not see cancer cells.  We will need to continue following this up and repeat the ct later in the year.  Waiting on rest of culture tests.  Nothing else to do for now.  Keep follow up as planned

## 2020-07-09 NOTE — TELEPHONE ENCOUNTER
Ordered HH. I dont know if they can go out there or not. They will have to review referral and call pt first to see if he qualifies. I dont see why not since he is unable to drive. But you never know. I called and left message for Jamari Valentin to inform her of this I told her that I will let her know ASAP if [facility] can not go and assess him. That if she can have his /caretaker take of picture of that, we can get it somehow.

## 2020-07-10 ENCOUNTER — TELEPHONE (OUTPATIENT)
Dept: PRIMARY CARE CLINIC | Age: 85
End: 2020-07-10

## 2020-07-11 ENCOUNTER — TELEPHONE (OUTPATIENT)
Dept: PRIMARY CARE CLINIC | Age: 85
End: 2020-07-11

## 2020-07-13 LAB — HBA1C MFR BLD: 5.2 % (ref 4–6)

## 2020-07-14 ENCOUNTER — VIRTUAL VISIT (OUTPATIENT)
Dept: PRIMARY CARE CLINIC | Age: 85
End: 2020-07-14
Payer: MEDICARE

## 2020-07-14 PROCEDURE — G8926 SPIRO NO PERF OR DOC: HCPCS | Performed by: PEDIATRICS

## 2020-07-14 PROCEDURE — G8428 CUR MEDS NOT DOCUMENT: HCPCS | Performed by: PEDIATRICS

## 2020-07-14 PROCEDURE — G8417 CALC BMI ABV UP PARAM F/U: HCPCS | Performed by: PEDIATRICS

## 2020-07-14 PROCEDURE — 1123F ACP DISCUSS/DSCN MKR DOCD: CPT | Performed by: PEDIATRICS

## 2020-07-14 PROCEDURE — 3023F SPIROM DOC REV: CPT | Performed by: PEDIATRICS

## 2020-07-14 PROCEDURE — 1036F TOBACCO NON-USER: CPT | Performed by: PEDIATRICS

## 2020-07-14 PROCEDURE — 4040F PNEUMOC VAC/ADMIN/RCVD: CPT | Performed by: PEDIATRICS

## 2020-07-14 PROCEDURE — 99214 OFFICE O/P EST MOD 30 MIN: CPT | Performed by: PEDIATRICS

## 2020-07-14 RX ORDER — METOLAZONE 2.5 MG/1
2.5 TABLET ORAL DAILY
Qty: 90 TABLET | Refills: 1 | Status: ON HOLD
Start: 2020-07-14 | End: 2021-05-04 | Stop reason: HOSPADM

## 2020-07-14 ASSESSMENT — ENCOUNTER SYMPTOMS
NAUSEA: 0
COUGH: 0
SHORTNESS OF BREATH: 0
SORE THROAT: 0
CHEST TIGHTNESS: 0
WHEEZING: 0
VOMITING: 0
BACK PAIN: 0
DIARRHEA: 1
ABDOMINAL PAIN: 0

## 2020-07-14 NOTE — PROGRESS NOTES
[address] [facility], [address]  Phone [phone]   Fax [phone]      OFFICE VISIT: 2020    Trevon Blank-: 1935      HPI  Reason For Visit:  Raymond Hillman is a 81 y.o. Hypertension; Hyperlipidemia; and Diabetes    Patient presents via telephone conference on follow-up for multiple health issues. Present concerns:  He is having problems getting in to see his Podiatrist, (Dr. Arian Stephens in [place])  He needs a referral for him. Diabetes Mellitus Type 2  Diet compliance:  compliant most of the time  Nutrition Consultation Needed:  no  Med Type:              Metformin 500 bid              trulicity 9.47JE weekly  Medication compliance:  compliant all of the time  Weight trend: fluctuating.  stable by his account  Current exercise: no  Checkin times daily  Home blood sugar records: not checking recently  Low BG:  no  Eye exam current (within one year): yes  Checking Feet regularly:  yes - He has a sore on his L heel. He had operation on his foot a couple months ago. This was at [facility] health is helping him. ACE/ARB:  yes - lisinopril  Aspirin: No: but recommended  Tobacco history: He  reports that he has never smoked.  He has never used smokeless tobacco.    Lab Results   Component Value Date    LABA1C 5.2 2020    LABA1C 5.7 2020    LABA1C 5.9 2019     Lab Results   Component Value Date    LABMICR 2.70 2019    CREATININE 1.1 2020       Hypertension:   BP today was   BP Readings from Last 1 Encounters:   20 130/72      Recent BP readings:    BP Readings from Last 3 Encounters:   20 130/72   03/10/20 (!) 116/56   20 122/80     Medication              Lisinopril 40 mg daily              Carvedilol 12.5 mg twice daily                          Hydrochlorothiazide 12.5 mg daily              Amlodipine 5 mg daily                  Imdur 30mg daily                   Medication compliance:  compliant most of Performed on 02/25/2020 [facility]      Copies of these are in the chart. Current Outpatient Medications   Medication Sig Dispense Refill    metOLazone (ZAROXOLYN) 2.5 MG tablet Take 1 tablet by mouth daily 90 tablet 1    MYRBETRIQ 25 MG TB24 Take 1 tablet by mouth daily 30 tablet 5    fluticasone (FLONASE) 50 MCG/ACT nasal spray 2 sprays by Nasal route daily 1 Bottle 3    FLUoxetine (PROZAC) 20 MG capsule Take 1 capsule by mouth daily 90 capsule 3    spironolactone (ALDACTONE) 50 MG tablet TAKE 1 TABLET BY MOUTH EVERY DAY 90 tablet 3    metFORMIN (GLUCOPHAGE) 500 MG tablet Take 1 tablet by mouth 2 times daily (with meals) 180 tablet 3    isosorbide mononitrate (IMDUR) 60 MG extended release tablet Take 1 tablet by mouth daily 30 tablet 5    Psyllium (KONSYL) 28.3 % POWD Take 4 g by mouth 2 times daily (with meals) 1 Bottle 11    hydroCHLOROthiazide (MICROZIDE) 12.5 MG capsule Take 1 capsule by mouth daily 30 capsule 11    atorvastatin (LIPITOR) 40 MG tablet Take 1 tablet by mouth nightly 90 tablet 3    blood glucose monitor strips Test one times a day & as needed for symptoms of irregular blood glucose. DX: E11.9 100 strip 3    Lancets MISC 1 each by Does not apply route daily DX: E11.9 100 each 0    Alcohol Swabs (ALCOHOL PREP) PADS Use daily with glucose checks DX: E11.9 100 each 0    blood glucose monitor kit and supplies Test one times a day & as needed for symptoms of irregular blood glucose. DX:E11.9 1 kit 0    aspirin 81 MG EC tablet Take 1 tablet by mouth daily 30 tablet 0    nitroGLYCERIN (NITROSTAT) 0.4 MG SL tablet up to max of 3 total doses. If no relief after 1 dose, call 911. 25 tablet 0    Wheat Dextrin (BENEFIBER) POWD Take 4 g by mouth 3 times daily (with meals) 1 Can 5    blood glucose monitor kit and supplies Test once a day for symptoms of irregular blood glucose.  1 kit 0    ONE TOUCH LANCETS MISC 1 each by Does not apply route daily 100 each 3    blood glucose monitor strips Test once daily for symptoms of irregular blood glucose.  100 strip 1    FLUoxetine (PROZAC) 40 MG capsule Take 1 capsule by mouth daily 90 capsule 3    terazosin (HYTRIN) 5 MG capsule Take 1 capsule by mouth nightly 90 capsule 3    fluticasone-umeclidin-vilant (TRELEGY ELLIPTA) 100-62.5-25 MCG/INH AEPB Inhale 1 puff into the lungs daily 1 each 11    WELCHOL 625 MG tablet TAKE 3 TABLETS BY MOUTH 2 TIMES DAILY (WITH MEALS) 540 tablet 1    amLODIPine (NORVASC) 5 MG tablet Take 1 tablet by mouth daily 90 tablet 3    albuterol sulfate HFA (PROAIR HFA) 108 (90 Base) MCG/ACT inhaler Inhale 2 puffs into the lungs every 6 hours as needed for Wheezing 8.5 Inhaler 5    hydrochlorothiazide (MICROZIDE) 12.5 MG capsule TAKE 1 CAPSULE BY MOUTH EVERY DAY IN THE MORNING (Patient not taking: Reported on 4/1/2020) 90 capsule 3    furosemide (LASIX) 40 MG tablet Take 1.5 tablets by mouth daily (Patient taking differently: Take 60 mg by mouth daily 1.5 tablets daily for a total of 60mg) 135 tablet 3    digoxin (LANOXIN) 125 MCG tablet Take 1 tablet by mouth every other day 90 tablet 3    buPROPion (WELLBUTRIN XL) 150 MG extended release tablet Take 2 tablets by mouth every morning 180 tablet 3    lisinopril (PRINIVIL;ZESTRIL) 40 MG tablet Take 1 tablet by mouth daily 90 tablet 3    carvedilol (COREG) 12.5 MG tablet Take 1 tablet by mouth 2 times daily (with meals) 180 tablet 3    Dulaglutide (TRULICITY) 5.35 HM/7.0PN SOPN INJECT 1 PEN EVERY WEEK 12 pen 3    potassium chloride (KLOR-CON 10) 10 MEQ extended release tablet Take 1 tablet by mouth daily 90 tablet 3    Multiple Vitamins-Minerals (THERAPEUTIC MULTIVITAMIN-MINERALS) tablet Take 1 tablet by mouth daily      Lancets MISC 1 each by Does not apply route daily Accu Chek Guid3 Lancets (Patient not taking: Reported on 4/1/2020) 100 each 5    OXYGEN Inhale 3 L into the lungs nightly       BiPAP Machine MISC by Does not apply route nightly      cetirizine (ZYRTEC) 10 daily  Dispense: 90 tablet; Refill: 1    2. Type 2 diabetes mellitus with diabetic polyneuropathy, without long-term current use of insulin (HCC)  Excellently controlled on present medication regimen. Most recent hemoglobin A1c was 5.2  - External Referral To Podiatry    3. Essential hypertension  He is not monitoring his blood pressure on a regular basis but when he does go to physician appointments he states his blood pressure is well controlled    4. Mixed hyperlipidemia  This was excellently controlled as of March 2020    5. Chronic obstructive pulmonary disease, unspecified COPD type ([facility] [address]  He is doing fairly well on his present medication regimen    6. Chronic atrial fibrillation  No recent palpitations or symptoms    7. Coronary artery disease involving native coronary artery of native heart without angina pectoris  He denies any anginal symptoms    8. Mediastinal mass  This is reportedly a benign mass and no further follow-up required      Orders Placed This Encounter   Procedures    External Referral To Podiatry        Return in about 3 months (around 10/14/2020) for 30. Due to patients co-morbidities and risk for potential exposure of COVID 19 pandemic. Patient was contacted and agreed to proceed with a telephone virtual visit. The risks and benefits of converting to a telephone virtual visit were discussed in light of the current infectious disease epidemic. Patient also understood that insurance coverage and co-pays are up to their individual insurance plans. Provider performed history of present illness and review of systems. Diagnosis and treatment plan was discussed with patient. Pharmacy of choice was reviewed along with past medical history, medication allergies, and current medications. Education provided to patient or patient parents/guardian with current illness diagnosis as well as when to seek additional healthcare due to changing or for worsening symptoms.  Patient voiced understanding. 25 minutes were spent on the phone with patient. Budd Leventhal is a 81 y.o. male being evaluated by a Virtual Visit (Telephone visit) encounter to address concerns as mentioned above. A caregiver was present when appropriate. Due to this being a TeleHealth encounter (During [place]-11 public [facility] emergency), evaluation of the following organ systems was limited: Vitals/Constitutional/EENT/Resp/CV/GI//MS/Neuro/Skin/Heme-Lymph-Imm. Pursuant to the emergency declaration under the [address] [place] [address], [address] [place] [address] authority and the [employer] Act, this Virtual Visit was conducted with patient's (and/or legal guardian's) consent, to reduce the patient's risk of exposure to COVID-19 and provide necessary medical care. The patient (and/or legal guardian) has also been advised to contact this office for worsening conditions or problems, and seek emergency medical treatment and/or call 911 if deemed necessary. Patient identification was verified at the start of the visit: Yes    Total time spent for this encounter: 25m    Services were provided through a video synchronous discussion virtually to substitute for in-person clinic visit. Patient and provider were located at their individual homes. --HEATH Rosas DO on 7/14/2020 at 12:38 PM    An electronic signature was used to authenticate this note.

## 2020-07-15 ENCOUNTER — TELEPHONE (OUTPATIENT)
Dept: PRIMARY CARE CLINIC | Age: 85
End: 2020-07-15

## 2020-07-15 NOTE — TELEPHONE ENCOUNTER
Pt called stating that his Celebrex was removed due to drug interaction. Would like alternate med please.

## 2020-07-17 ENCOUNTER — TELEPHONE (OUTPATIENT)
Dept: PULMONOLOGY | Facility: CLINIC | Age: 85
End: 2020-07-17

## 2020-07-17 NOTE — TELEPHONE ENCOUNTER
This patient has an appointment on 8/5/20 with Dr. Aly. They were wanting to know if he needs to keep this appointment?

## 2020-07-20 ENCOUNTER — TELEPHONE (OUTPATIENT)
Dept: VASCULAR SURGERY | Facility: CLINIC | Age: 85
End: 2020-07-20

## 2020-07-22 NOTE — TELEPHONE ENCOUNTER
Received fax from pharmacy requesting refill on pts medication(s). Pt was last seen in office on 3/4/2020  and has a follow up scheduled for 10/14/2020. Will send request to  Pharmacy  for patient.      Requested Prescriptions     Pending Prescriptions Disp Refills    XARELTO 20 MG TABS tablet [Pharmacy Med Name: Norm Solorzanoks 20 MG TABLET] 90 tablet 0     Sig: TAKE 1 TABLET ONCE DAILY WITH BREAKFAST

## 2020-07-22 NOTE — TELEPHONE ENCOUNTER
Received fax from pharmacy requesting refill on pts medication(s). Pt was last seen in office on 3/4/2020  and has a follow up scheduled for 10/14/2020. Will send request to  Dr. Neel Cosby  for patient.      Requested Prescriptions     Pending Prescriptions Disp Refills    potassium chloride (KLOR-CON M) 10 MEQ extended release tablet [Pharmacy Med Name: POTASSIUM CL ER 10 MEQ TABLET] 90 tablet 3     Sig: Take 1 tablet by mouth daily

## 2020-07-23 RX ORDER — POTASSIUM CHLORIDE 750 MG/1
10 TABLET, EXTENDED RELEASE ORAL DAILY
Qty: 90 TABLET | Refills: 3 | Status: SHIPPED | OUTPATIENT
Start: 2020-07-23 | End: 2021-04-14

## 2020-07-24 ENCOUNTER — TELEPHONE (OUTPATIENT)
Dept: PULMONOLOGY | Facility: CLINIC | Age: 85
End: 2020-07-24

## 2020-07-24 NOTE — TELEPHONE ENCOUNTER
Patient has an atypical [employer] jack the culture from the bronch washing done on 7/6/20.  It will have to be grown out before it can be sent [facility].

## 2020-07-29 ENCOUNTER — TELEPHONE (OUTPATIENT)
Dept: PRIMARY CARE CLINIC | Age: 85
End: 2020-07-29

## 2020-07-29 NOTE — TELEPHONE ENCOUNTER
Received call from Yadira Vallejo with [facility]. She sees pt for wound on left heel. This requires daily dressing change. Pt is not able to do this himself. He has a care taker 2 days a week but this is not getting done by care taker for some reason. So it is getting changed every 3 days. She reports the would looks good. Reason she called was that pt is complaining of arthritic pain. Pt was on celebrex but had to stop this because of his blood thinner. She was wanting to know if we could give the pt some Tramadol as he reports his pain at times is an 8-9 out of 10. She can be reached at [phone] for any questions. Will send to provider for further recommendations.

## 2020-07-30 RX ORDER — CELECOXIB 200 MG/1
200 CAPSULE ORAL 2 TIMES DAILY
Qty: 60 CAPSULE | Refills: 11 | Status: ON HOLD
Start: 2020-07-30 | End: 2021-05-04 | Stop reason: HOSPADM

## 2020-07-30 NOTE — TELEPHONE ENCOUNTER
He does not need to stop Celebrex due to his blood thinner Celebrex has no platelet inhibition at all. He can continue his Celebrex as before. Whoever told him to stop Celebrex does not understand the mechanism of action of the medication.

## 2020-08-14 ENCOUNTER — TELEPHONE (OUTPATIENT)
Dept: VASCULAR SURGERY | Facility: CLINIC | Age: 85
End: 2020-08-14

## 2020-08-14 NOTE — TELEPHONE ENCOUNTER
Spoke with  Matthew reminding him of his appointment for Monday, August 17th, 2020 at 230 pm with Marc FIELD.  Matthew confirmed he would be here.

## 2020-08-17 ENCOUNTER — OFFICE VISIT (OUTPATIENT)
Dept: PODIATRY | Facility: CLINIC | Age: 85
End: 2020-08-17

## 2020-08-17 VITALS
OXYGEN SATURATION: 96 % | HEART RATE: 61 BPM | WEIGHT: 252 LBS | DIASTOLIC BLOOD PRESSURE: 60 MMHG | SYSTOLIC BLOOD PRESSURE: 110 MMHG | BODY MASS INDEX: 36.08 KG/M2 | HEIGHT: 70 IN

## 2020-08-17 DIAGNOSIS — E11.42 TYPE 2 DIABETES MELLITUS WITH DIABETIC POLYNEUROPATHY, WITHOUT LONG-TERM CURRENT USE OF INSULIN (HCC): ICD-10-CM

## 2020-08-17 DIAGNOSIS — B35.1 ONYCHOMYCOSIS: Primary | ICD-10-CM

## 2020-08-17 LAB
FUNGUS WND CULT: ABNORMAL
FUNGUS WND CULT: ABNORMAL
MYCOBACTERIUM SPEC CULT: NORMAL
NIGHT BLUE STAIN TISS: NORMAL
NIGHT BLUE STAIN TISS: NORMAL

## 2020-08-17 PROCEDURE — 11721 DEBRIDE NAIL 6 OR MORE: CPT | Performed by: NURSE PRACTITIONER

## 2020-08-17 PROCEDURE — 99213 OFFICE O/P EST LOW 20 MIN: CPT | Performed by: NURSE PRACTITIONER

## 2020-08-17 NOTE — PATIENT INSTRUCTIONS
Obesity, Adult  Obesity is having too much body fat. Being obese means that your weight is more than what is healthy for you.  BMI is a number that explains how much body fat you have. If you have a BMI of 30 or more, you are obese. Obesity is often caused by eating or drinking more calories than your body uses. Changing your lifestyle can help you lose weight.  Obesity can cause serious health problems, such as:  · Stroke.  · Coronary artery disease (CAD).  · Type 2 diabetes.  · Some types of cancer, including cancers of the colon, breast, uterus, and gallbladder.  · Osteoarthritis.  · High blood pressure (hypertension).  · High cholesterol.  · Sleep apnea.  · Gallbladder stones.  · Infertility problems.  What are the causes?  · Eating meals each day that are high in calories, sugar, and fat.  · Being born with genes that may make you more likely to become obese.  · Having a medical condition that causes obesity.  · Taking certain medicines.  · Sitting a lot (having a sedentary lifestyle).  · Not getting enough sleep.  · Drinking a lot of drinks that have sugar in them.  What increases the risk?  · Having a family history of obesity.  · Being an  woman.  · Being a  man.  · Living in an area with limited access to:  ? [place], recreation centers, or sidewalks.  ? Healthy food choices, such as grocery stores and [employer] markets.  What are the signs or symptoms?  The main sign is having too much body fat.  How is this treated?  · Treatment for this condition often includes changing your lifestyle. Treatment may include:  ? Changing your diet. This may include making a healthy meal plan.  ? Exercise. This may include activity that causes your heart to beat faster (aerobic exercise) and strength training. Work with your doctor to design a program that works for you.  ? Medicine to help you lose weight. This may be used if you are not able to lose 1 pound a week after 6 weeks of healthy eating and  more exercise.  ? Treating conditions that cause the obesity.  ? Surgery. Options may include gastric banding and gastric bypass. This may be done if:  § Other treatments have not helped to improve your condition.  § You have a BMI of 40 or higher.  § You have life-threatening health problems related to obesity.  Follow these instructions at home:  Eating and drinking    · Follow advice from your doctor about what to eat and drink. Your doctor may tell you to:  ? Limit fast food, sweets, and processed snack foods.  ? Choose low-fat options. For example, choose low-fat milk instead of whole milk.  ? Eat 5 or more servings of fruits or vegetables each day.  ? Eat at home more often. This gives you more control over what you eat.  ? Choose healthy foods when you eat out.  ? Learn to read food labels. This will help you learn how much food is in 1 serving.  ? Keep low-fat snacks available.  ? Avoid drinks that have a lot of sugar in them. These include soda, fruit juice, iced tea with sugar, and flavored milk.  · Drink enough water to keep your pee (urine) pale yellow.  · Do not go on fad diets.  Physical activity  · Exercise often, as told by your doctor. Most adults should get up to 150 minutes of moderate-intensity exercise every week.Ask your doctor:  ? What types of exercise are safe for you.  ? How often you should exercise.  · Warm up and stretch before being active.  · Do slow stretching after being active (cool down).  · Rest between times of being active.  Lifestyle  · Work with your doctor and a food expert (dietitian) to set a weight-loss goal that is best for you.  · Limit your screen time.  · Find ways to reward yourself that do not involve food.  · Do not drink alcohol if:  ? Your doctor tells you not to drink.  ? You are pregnant, may be pregnant, or are planning to become pregnant.  · If you drink alcohol:  ? Limit how much you use to:  § 0-1 drink a day for women.  § 0-2 drinks a day for men.  ? Be  aware of how much alcohol is in your drink. In the [place] one drink equals one 12 oz bottle of beer (355 mL), one 5 oz glass of wine (148 mL), or one 1½ oz glass of hard liquor (44 mL).  General instructions  · Keep a weight-loss journal. This can help you keep track of:  ? The food that you eat.  ? How much exercise you get.  · Take over-the-counter and prescription medicines only as told by your doctor.  · Take vitamins and supplements only as told by your doctor.  · Think about joining a support group.  · Keep all follow-up visits as told by your doctor. This is important.  Contact a doctor if:  · You cannot meet your weight loss goal after you have changed your diet and lifestyle for 6 weeks.  Get help right away if you:  · Are having trouble breathing.  · Are having thoughts of harming yourself.  Summary  · Obesity is having too much body fat.  · Being obese means that your weight is more than what is healthy for you.  · Work with your doctor to set a weight-loss goal.  · Get regular exercise as told by your doctor.  This information is not intended to replace advice given to you by your health care provider. Make sure you discuss any questions you have with your health care provider.  Document Released: 03/11/2013 Document Revised: 08/22/2019 Document Reviewed: 08/22/2019  Elsevier Patient Education © 2020 [employer]    Diabetes Mellitus and Foot Care  Foot care is an important part of your health, especially when you have diabetes. Diabetes may cause you to have problems because of poor blood flow (circulation) to your feet and legs, which can cause your skin to:  · Become thinner and drier.  · Break more easily.  · Heal more slowly.  · Peel and crack.  You may also have nerve damage (neuropathy) in your legs and feet, causing decreased feeling in them. This means that you may not notice minor injuries to your feet that could lead to more serious problems. Noticing and addressing any potential problems  early is the best way to prevent future foot problems.  How to care for your feet  Foot hygiene  · Wash your feet daily with warm water and mild soap. Do not use hot water. Then, pat your feet and the areas between your toes until they are completely dry. Do not soak your feet as this can dry your skin.  · Trim your toenails straight across. Do not dig under them or around the cuticle. File the edges of your nails with an emery board or nail file.  · Apply a moisturizing lotion or petroleum jelly to the skin on your feet and to dry, brittle toenails. Use lotion that does not contain alcohol and is unscented. Do not apply lotion between your toes.  Shoes and socks  · Wear clean socks or stockings every day. Make sure they are not too tight. Do not wear knee-high stockings since they may decrease blood flow to your legs.  · Wear shoes that fit properly and have enough cushioning. Always look in your shoes before you put them on to be sure there are no objects inside.  · To break in new shoes, wear them for just a few hours a day. This prevents injuries on your feet.  Wounds, scrapes, corns, and calluses  · Check your feet daily for blisters, cuts, bruises, sores, and redness. If you cannot see the bottom of your feet, use a mirror or ask someone for help.  · Do not cut corns or calluses or try to remove them with medicine.  · If you find a minor scrape, cut, or break in the skin on your feet, keep it and the skin around it clean and dry. You may clean these areas with mild soap and water. Do not clean the area with peroxide, alcohol, or iodine.  · If you have a wound, scrape, corn, or callus on your foot, look at it several times a day to make sure it is healing and not infected. Check for:  ? Redness, swelling, or pain.  ? Fluid or blood.  ? Warmth.  ? Pus or a bad smell.  General instructions  · Do not cross your legs. This may decrease blood flow to your feet.  · Do not use heating pads or hot water bottles on your  feet. They may burn your skin. If you have lost feeling in your feet or legs, you may not know this is happening until it is too late.  · Protect your feet from hot and cold by wearing shoes, such as at the beach or on hot pavement.  · Schedule a complete foot exam at least once a year (annually) or more often if you have foot problems. If you have foot problems, report any cuts, sores, or bruises to your health care provider immediately.  Contact a health care provider if:  · You have a medical condition that increases your risk of infection and you have any cuts, sores, or bruises on your feet.  · You have an injury that is not healing.  · You have redness on your legs or feet.  · You feel burning or tingling in your legs or feet.  · You have pain or cramps in your legs and feet.  · Your legs or feet are numb.  · Your feet always feel cold.  · You have pain around a toenail.  Get help right away if:  · You have a wound, scrape, corn, or callus on your foot and:  ? You have pain, swelling, or redness that gets worse.  ? You have fluid or blood coming from the wound, scrape, corn, or callus.  ? Your wound, scrape, corn, or callus feels warm to the touch.  ? You have pus or a bad smell coming from the wound, scrape, corn, or callus.  ? You have a fever.  ? You have a red line going up your leg.  Summary  · Check your feet every day for cuts, sores, red spots, swelling, and blisters.  · Moisturize feet and legs daily.  · Wear shoes that fit properly and have enough cushioning.  · If you have foot problems, report any cuts, sores, or bruises to your health care provider immediately.  · Schedule a complete foot exam at least once a year (annually) or more often if you have foot problems.  This information is not intended to replace advice given to you by your health care provider. Make sure you discuss any questions you have with your health care provider.  Document Released: 12/15/2001 Document Revised: 01/30/2019  Document Reviewed: 01/19/2018  Elsevier Patient Education © 2020 Elsevier Inc.

## 2020-08-17 NOTE — PROGRESS NOTES
[facility] - PODIATRY    Today's Date: 08/17/20    Patient Name: Prakash Castro  MRN: [id number]  CSN: [id number]  PCP: Gabriele Grover DO  Referring Provider: Gabriele Grover DO    SUBJECTIVE     Chief Complaint   Patient presents with   • Establish Care     diabetic foot and nail care - pt c/o long, thickened, toenails , pt stated being treated with home health for wound on left heel- admits to neuropathy, pain scale 2/10    • Diabetes     last stated blood sugar reading 127mg/dl - PCP Dr. Grover last visit 7/14/20     HPI: Prakash Castro, a 85 y.o.male, comes to clinic as a(n) new patient presenting for diabetic foot exam. Patient has h/o COPD, Arthritis, AF, CHF, CA, obesity, OAB, MARY, HTN, DM II with neuropthy, Depression. Patient is NIDDM with last stated BG level of 127mg/dl. Patient states that he previously saw Dr. Fan for foot care in [place], however, he now lived in [place]; relates it has been approximately 4 months since he last received care. Denies pain in feet but relates neuropathic sensations. Notes that toenails are long, thick, and crumbly and due to mobility and body habitus he is unable to reach his nails and would not feel comfortable caring for them anyway. Has a lesion on the left heel that he reports has been treated by home health nurse on a weekly basis; there is a bandage on the area today.  Admits pain at 2/10 level and described as shooting, numbness and tingling. Relates previous treatment(s) including foot care by a podiatrist. Denies any constitutional symptoms. No other pedal complaints at this time.    Past Medical History:   Diagnosis Date   • Acute kidney injury (CMS/HCC) 7/26/2019   • Acute on chronic respiratory failure with hypoxia and hypercapnia (CMS/HCC) 7/26/2019   • Arthritis    • Atrial fibrillation (CMS/HCC)     HISTORY OF   • Cancer (CMS/HCC)     prostate   • Cellulitis     both legs   • CHF (congestive heart failure) (CMS/HCC)    •  Depression    • Diabetes mellitus (CMS/[facility])    • Hematuria    • History of cardioversion     PER PATIENT REPORT   • Hypertension    • Obesity    • Obstructive sleep apnea 6/18/2020    BiPAP +3 L   • On home oxygen therapy     3 liters   • Overactive bladder    • Pacemaker    • Sleep apnea     bipap   • SOB (shortness of breath)    • Stage 2 moderate COPD by GOLD classification (CMS/[facility]) 3/4/2019     Past Surgical History:   Procedure Laterality Date   • BRONCHOSCOPY N/A 7/6/2020    Procedure: BRONCHOSCOPY WITH ENDOBRONCHIAL ULTRASOUND;  Surgeon: Darrian Silvestre MD;  Location:  [facility] OR;  Service: Pulmonary;  Laterality: N/A;  pre mediastinal adenopathy  post mediastical adenopathy  dr rg beebe   • CATARACT EXTRACTION Right    • HERNIA REPAIR     • INCISION AND DRAINAGE LEG Left 7/19/2019    Procedure: INCISION AND DRAINAGE, LEFT FOOT;  Surgeon: Juan Antonio Uriarte DPM;  Location:  [facility] OR;  Service: Podiatry   • INSERTION PROSTATE RADIATION SEED     • JOINT REPLACEMENT     • PACEMAKER IMPLANTATION     • REPLACEMENT TOTAL KNEE BILATERAL       Family History   Problem Relation Age of Onset   • Prostate cancer Son    • Cancer Father    • Arthritis Mother    • Depression Mother    • Hearing loss Mother    • Heart disease Mother    • Hypertension Mother      Social History     Socioeconomic History   • Marital status:      Spouse name: Not on file   • Number of children: Not on file   • Years of education: Not on file   • Highest education level: Not on file   Tobacco Use   • Smoking status: Never Smoker   • Smokeless tobacco: Never Used   Substance and Sexual Activity   • Alcohol use: No   • Drug use: No   • Sexual activity: Defer     No Known Allergies  Current Outpatient Medications   Medication Sig Dispense Refill   • acetaminophen (TYLENOL) 650 MG 8 hr tablet Take 650 mg by mouth Daily.     • amLODIPine (NORVASC) 10 MG tablet Take 10 mg by mouth Daily.     • buPROPion XL (WELLBUTRIN XL) 150 MG  24 hr tablet Take 300 mg by mouth Daily.     • carvedilol (COREG) 12.5 MG tablet Take 12.5 mg by mouth 2 (two) times a day with meals.     • celecoxib (CeleBREX) 200 MG capsule Take 200 mg by mouth 2 (Two) Times a Day.     • digoxin (LANOXIN) 125 MCG tablet Take 125 mcg by mouth every day.     • Dulaglutide (TRULICITY) 0.75 MG/0.5ML solution pen-injector Inject 0.5 mL under the skin into the appropriate area as directed 1 (One) Time Per Week. Saturday.     • enzalutamide (XTANDI) 40 MG chemo capsule Take 4 capsules by mouth Daily. 120 capsule 10   • FLUoxetine (PROzac) 20 MG capsule Take 20 mg by mouth Daily. Take with Prozac 40 mg (total dose of 60 mg daily).     • FLUoxetine (PROZAC) 40 MG capsule Take 40 mg by mouth Daily. Take with Prozac 20 mg (total dose of 60 mg daily).     • fluticasone (FLONASE) 50 MCG/ACT nasal spray 2 sprays into each nostril daily. Administer 2 sprays in each nostril for each dose.     • Fluticasone-Umeclidin-Vilant 100-62.5-25 MCG/INH aerosol powder  Inhale 1 each Daily.     • furosemide (LASIX) 40 MG tablet Take 60 mg by mouth Daily.     • hydrochlorothiazide (HYDRODIURIL) 12.5 MG tablet Take 1 tablet by mouth Daily. 30 tablet 1   • leuprolide (LUPRON) 30 MG injection Inject 30 mg into the appropriate muscle as directed by prescriber Every 6 (Six) Months.     • lisinopril (PRINIVIL,ZESTRIL) 40 MG tablet Take 40 mg by mouth daily.     • metFORMIN (GLUCOPHAGE) 500 MG tablet Take 500 mg by mouth 2 (Two) Times a Day With Meals.     • metolazone (ZAROXOLYN) 2.5 MG tablet Take 2.5 mg by mouth daily.     • Multiple Vitamins-Minerals (MULTIVITAMIN ADULT) tablet Take 1 tablet by mouth Daily.     • MYRBETRIQ 25 MG tablet sustained-release 24 hour 24 hr tablet TAKE 1 TABLET BY MOUTH EVERY DAY 90 tablet 3   • O2 (OXYGEN) Inhale 4 L/min Continuous.     • Omega-3 Fatty Acids (FISH OIL) 1200 MG capsule capsule Take 1,200 mg by mouth 2 (Two) Times a Day.     • potassium chloride (K-DUR,KLOR-CON) 10  MEQ CR tablet Take 10 mEq by mouth Daily.     • prazosin (MINIPRESS) 2 MG capsule Take 2 mg by mouth Every Night.     • rivaroxaban (XARELTO) 20 MG tablet Take 20 mg by mouth daily.     • spironolactone (ALDACTONE) 50 MG tablet Take 50 mg by mouth Daily.     • terazosin (HYTRIN) 5 MG capsule Take 5 mg by mouth every night.       No current facility-administered medications for this visit.      Review of Systems   Constitutional: Negative for chills and fever.   HENT: Negative for congestion.    Respiratory: Negative for shortness of breath.    Cardiovascular: Negative for chest pain and leg swelling.   Gastrointestinal: Negative for constipation, diarrhea, nausea and vomiting.   Musculoskeletal: Positive for arthralgias and gait problem. Negative for myalgias.   Skin: Negative for wound.        Lesion to left heel   Neurological: Positive for numbness.       OBJECTIVE     Vitals:    08/17/20 1426   BP: 110/60   Pulse: 61   SpO2: 96%       PHYSICAL EXAM  GEN:   Accompanied by none.     Foot/Ankle Exam:       General:   Diabetic Foot Exam Performed    Appearance: obesity    Orientation: AAOx3    Affect: appropriate    Gait: shuffling    Assistance: wheelchair    Shoe Gear:  Diabetic shoes    VASCULAR      Right Foot Vascularity   Dorsalis pedis:  2+  Posterior tibial:  2+  Skin Temperature: warm    Edema Grading:  Trace and non-pitting  CFT:  3  Pedal Hair Growth:  Absent  Varicosities: mild varicosities       Left Foot Vascularity   Dorsalis pedis:  2+  Posterior tibial:  2+  Skin Temperature: warm    Edema Grading:  Trace and non-pitting  CFT:  3  Pedal Hair Growth:  Absent  Varicosities: mild varicosities        NEUROLOGIC     Right Foot Neurologic   Light touch sensation:  Diminished  Vibratory sensation:  Diminished  Hot/Cold sensation: diminished    Protective Sensation using [place] Monofilament:  0     Left Foot Neurologic   Light touch sensation:  Diminished  Vibratory sensation:   Diminished  Hot/cold sensation: diminished    Protective Sensation using [place]-Tunde Monofilament:  0     MUSCULOSKELETAL      Right Foot Musculoskeletal    Amputation   Right toes amputated: No    Ecchymosis:  None  Tenderness: none    Hallux valgus: No    Hallux limitus: Yes       Left Foot Musculoskeletal    Amputation   Left toes amputated: No    Ecchymosis:  None  Tenderness: none    Hallux valgus: No    Hallux limitus: Yes       MUSCLE STRENGTH     Right Foot Muscle Strength   Foot dorsiflexion:  4  Foot plantar flexion:  4  Foot inversion:  4  Foot eversion:  4     Left Foot Muscle Strength   Foot dorsiflexion:  4  Foot plantar flexion:  4  Foot inversion:  4  Foot eversion:  4     DERMATOLOGIC     Right Foot Dermatologic   Skin: dry skin and atrophic    Nails: onychomycosis, abnormally thick, subungual debris and dystrophic nails       Left Foot Dermatologic   Skin: dry skin, skin changes and atrophic    Nails: onychomycosis, abnormally thick, subungual debris and dystrophic nails       Image:       RADIOLOGY/NUCLEAR:  No results found.    LABORATORY/CULTURE RESULTS:      PATHOLOGY RESULTS:       ASSESSMENT/PLAN     Prakash was seen today for establish care and diabetes.    Diagnoses and all orders for this visit:    Onychomycosis    Type 2 diabetes mellitus with diabetic polyneuropathy, without long-term current use of insulin (CMS/[facility])    BMI 36.0-36.9,adult      Comprehensive lower extremity examination and evaluation was performed.  Discussed findings and treatment plan including risks, benefits, and treatment options with patient in detail. Patient agreed with treatment plan.  After verbal consent obtained, nail(s) x10 debrided of length and thickness with nail nipper without incidence  Patient may maintain nails and calluses at home utilizing emery board or pumice stone between visits as needed  Reviewed at home diabetic foot care including daily foot checks   Continue home health follow-ups and  routine care on lesion to left heel. Notify us of any changes to this area for further evaluation.   Patient's Body mass index is 36.08 kg/m². BMI is above normal parameters. Recommendations include: educational material.  Continue diabetic monitoring and control under direction of PCP.    An After Visit Summary was printed and given to the patient at discharge, including (if requested) any available informative/educational handouts regarding diagnosis, treatment, or medications. All questions were answered to patient/family satisfaction. Should symptoms fail to improve or worsen they agree to call or return to clinic or to go to the Emergency Department. Discussed the importance of following up with any needed screening tests/labs/specialist appointments and any requested follow-up recommended by me today. Importance of maintaining follow-up discussed and patient accepts that missed appointments can delay diagnosis and potentially lead to worsening of conditions.  Return in about 3 months (around 11/17/2020)., or sooner if acute issues arise.    Lab Frequency Next Occurrence   Follow Anesthesia Guidelines / Protocol Once 03/12/2020   Obtain Informed Consent Once 03/25/2020   Follow Anesthesia Guidelines / Protocol Once 05/27/2020   Obtain Informed Consent Once 06/01/2020   PSA DIAGNOSTIC Once 09/20/2020       This document has been electronically signed by RASHIDA Webber on August 17, 2020 14:56

## 2020-08-24 RX ORDER — DULAGLUTIDE 0.75 MG/.5ML
0.75 INJECTION, SOLUTION SUBCUTANEOUS
Qty: 12 PEN | Refills: 3 | Status: SHIPPED | OUTPATIENT
Start: 2020-08-24 | End: 2020-12-01

## 2020-08-27 RX ORDER — LISINOPRIL 40 MG/1
40 TABLET ORAL DAILY
Qty: 90 TABLET | Refills: 3 | Status: ON HOLD
Start: 2020-08-27 | End: 2021-05-04 | Stop reason: HOSPADM

## 2020-09-08 ENCOUNTER — TELEPHONE (OUTPATIENT)
Dept: CARDIOLOGY | Age: 85
End: 2020-09-08

## 2020-09-08 NOTE — TELEPHONE ENCOUNTER
Called pt to inquire about stress test that was ordered and not completed and pt did not answer. Left vm for pt to call back when available.

## 2020-09-16 ENCOUNTER — OFFICE VISIT (OUTPATIENT)
Dept: CARDIOLOGY | Age: 85
End: 2020-09-16
Payer: MEDICARE

## 2020-09-16 VITALS
OXYGEN SATURATION: 95 % | BODY MASS INDEX: 35.65 KG/M2 | SYSTOLIC BLOOD PRESSURE: 112 MMHG | DIASTOLIC BLOOD PRESSURE: 60 MMHG | HEIGHT: 70 IN | HEART RATE: 64 BPM | WEIGHT: 249 LBS

## 2020-09-16 PROCEDURE — 1123F ACP DISCUSS/DSCN MKR DOCD: CPT | Performed by: CLINICAL NURSE SPECIALIST

## 2020-09-16 PROCEDURE — G8427 DOCREV CUR MEDS BY ELIG CLIN: HCPCS | Performed by: CLINICAL NURSE SPECIALIST

## 2020-09-16 PROCEDURE — 1036F TOBACCO NON-USER: CPT | Performed by: CLINICAL NURSE SPECIALIST

## 2020-09-16 PROCEDURE — 4040F PNEUMOC VAC/ADMIN/RCVD: CPT | Performed by: CLINICAL NURSE SPECIALIST

## 2020-09-16 PROCEDURE — G8417 CALC BMI ABV UP PARAM F/U: HCPCS | Performed by: CLINICAL NURSE SPECIALIST

## 2020-09-16 PROCEDURE — 99213 OFFICE O/P EST LOW 20 MIN: CPT | Performed by: CLINICAL NURSE SPECIALIST

## 2020-09-16 PROCEDURE — 93279 PRGRMG DEV EVAL PM/LDLS PM: CPT | Performed by: CLINICAL NURSE SPECIALIST

## 2020-09-16 ASSESSMENT — ENCOUNTER SYMPTOMS
COUGH: 0
EYE REDNESS: 0
NAUSEA: 0
SHORTNESS OF BREATH: 1
WHEEZING: 0
CHEST TIGHTNESS: 0
FACIAL SWELLING: 0
ABDOMINAL PAIN: 0
VOMITING: 0

## 2020-09-16 NOTE — PROGRESS NOTES
[facility], [address], [address] [address]  Phone: [phone]  Fax: [phone]    OFFICE VISIT:  2020    Mirza Milner - : 1935    Reason For Visit:  Andreina Parham is a 81 y.o. male who is here for 6 Month Follow-Up (pt is not having any cardiac issues today)    HPI  Patient is here for follow-up history of chronic atrial fibrillation, chronic diastolic heart failure, sinoatrial node dysfunction, pacemaker, sleep apnea, hypertension. The patient is chronically short of breath which is unchanged. He denies any signs of fluid retention such as sudden weight gain, orthopnea, PND. He has some mild lower extremity edema which is unchanged. He is watching his weight and sodium closely. He denies chest pain, orthopnea, PND, unusual edema, or palpitations. He currently lives alone with paid  Assistance. Patient has been diagnosed with a mediastinal mass and is here for cardiac risk stratification     BValentin Rondon DO is PCP.   iMrza Milner has the following history as recorded in [facility]:    Patient Active Problem List    Diagnosis Date Noted    Chest discomfort      Priority: High    CAD (coronary artery disease) 2020    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 ([address] 2020    Chest pain due to myocardial ischemia 2020    Hyperkalemia 2020    Mediastinal mass 03/10/2020    Sinoatrial node dysfunction ([address] 2019    Bradycardia 03/15/2019    Pacemaker 03/15/2019    Chronic diastolic heart failure ([address] 11/15/2018    Macrocytic anemia 11/15/2018    Sepsis ([address] 11/15/2018    Vitamin D deficiency 11/15/2018    Mitral regurgitation 11/15/2018    Tricuspid regurgitation 11/15/2018    Pulmonary hypertension ([address] 11/15/2018    Bronchitis, acute 11/15/2018    Chronic respiratory failure with hypoxia ([address] 2018    Mixed hyperlipidemia 02/15/2018    Prostate cancer ([address] 02/15/2018  Recurrent major depressive disorder, in partial remission ([facility] 02/15/2018    Type 2 diabetes mellitus with diabetic polyneuropathy ([facility] 11/25/2015    Mixed restrictive and obstructive lung disease ([facility] 05/21/2015    COPD (chronic obstructive pulmonary disease) ([facility] 05/21/2015    NEIDA (obstructive sleep apnea) 05/21/2015    Asbestosis ([facility] 89/39/1237    Metabolic alkalosis with respiratory acidosis 05/21/2015    Chronic anticoagulation 05/21/2015    Acne rosacea 05/21/2015    Hypertension     Neuropathy     Chronic atrial fibrillation     Anxiety     Depression      Past Medical History:   Diagnosis Date    Anxiety     Arthritis     Atrial fibrillation ([facility]     Blood circulation, collateral     Cancer (HCC)     prostate, had treatment    Cellulitis     left leg    CH[facility]congestive heart failure) [facility]     Chronic kidney disease     COPD (chronic obstructive pulmonary disease) ([facility])     Depression     Hyperlipidemia     Hypertension     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 ([facility] 3/28/2020    Lung mass     Neuromuscular disorder ([facility])     Neuropathy     Other disorders of kidney and ureter in diseases classified elsewhere     Palliative care patient 11/15/2018    Pneumonia     Type II or unspecified type diabetes mellitus without mention of complication, not stated as uncontrolled      Past Surgical History:   Procedure Laterality Date    COLONOSCOPY      EYE SURGERY      EYE SURGERY      HERNIA REPAIR      umbilical with Dr Clemens Enter       Family History   Problem Relation Age of Onset    Heart Attack Mother     Cancer Father      Social History     Tobacco Use    Smoking status: Never Smoker    Smokeless tobacco: Never Used   Substance Use Topics    Alcohol use: No      Current Outpatient Medications   Medication Sig Dispense Refill    lisinopril (PRINIVIL;ZESTRIL) 40 MG tablet Take 1 tablet by mouth daily 90 tablet 3    Dulaglutide (TRULICITY) 4.00 YB/6.1EP SOPN Inject 0.75 mg into the skin every 7 days INJECT 1 PEN EVERY WEEK 12 pen 3    celecoxib (CELEBREX) 200 MG capsule Take 1 capsule by mouth 2 times daily 60 capsule 11    potassium chloride (KLOR-CON M) 10 MEQ extended release tablet Take 1 tablet by mouth daily 90 tablet 3    rivaroxaban (XARELTO) 20 MG TABS tablet Take 1 tablet by mouth daily (with breakfast) 90 tablet 3    metOLazone (ZAROXOLYN) 2.5 MG tablet Take 1 tablet by mouth daily 90 tablet 1    MYRBETRIQ 25 MG TB24 Take 1 tablet by mouth daily 30 tablet 5    fluticasone (FLONASE) 50 MCG/ACT nasal spray 2 sprays by Nasal route daily 1 Bottle 3    FLUoxetine (PROZAC) 20 MG capsule Take 1 capsule by mouth daily 90 capsule 3    spironolactone (ALDACTONE) 50 MG tablet TAKE 1 TABLET BY MOUTH EVERY DAY 90 tablet 3    metFORMIN (GLUCOPHAGE) 500 MG tablet Take 1 tablet by mouth 2 times daily (with meals) 180 tablet 3    isosorbide mononitrate (IMDUR) 60 MG extended release tablet Take 1 tablet by mouth daily 30 tablet 5    Psyllium (KONSYL) 28.3 % POWD Take 4 g by mouth 2 times daily (with meals) 1 Bottle 11    hydroCHLOROthiazide (MICROZIDE) 12.5 MG capsule Take 1 capsule by mouth daily 30 capsule 11    atorvastatin (LIPITOR) 40 MG tablet Take 1 tablet by mouth nightly 90 tablet 3    blood glucose monitor strips Test one times a day & as needed for symptoms of irregular blood glucose. DX: E11.9 100 strip 3    Lancets MISC 1 each by Does not apply route daily DX: E11.9 100 each 0    Alcohol Swabs (ALCOHOL PREP) PADS Use daily with glucose checks DX: E11.9 100 each 0    blood glucose monitor kit and supplies Test one times a day & as needed for symptoms of irregular blood glucose. DX:E11.9 1 kit 0    aspirin 81 MG EC tablet Take 1 tablet by mouth daily 30 tablet 0    nitroGLYCERIN (NITROSTAT) 0.4 MG SL tablet up to max of 3 total doses.  If no relief after 1 dose, call 911. 25 tablet 0    Wheat Dextrin (BENEFIBER) POWD Take 4 g by mouth 3 times daily (with meals) 1 Can 5    blood glucose monitor kit and supplies Test once a day for symptoms of irregular blood glucose. 1 kit 0    ONE TOUCH LANCETS MISC 1 each by Does not apply route daily 100 each 3    blood glucose monitor strips Test once daily for symptoms of irregular blood glucose.  100 strip 1    FLUoxetine (PROZAC) 40 MG capsule Take 1 capsule by mouth daily (Patient taking differently: Take 240 mg by mouth daily ) 90 capsule 3    terazosin (HYTRIN) 5 MG capsule Take 1 capsule by mouth nightly 90 capsule 3    fluticasone-umeclidin-vilant (TRELEGY ELLIPTA) 100-62.5-25 MCG/INH AEPB Inhale 1 puff into the lungs daily 1 each 11    WELCHOL 625 MG tablet TAKE 3 TABLETS BY MOUTH 2 TIMES DAILY (WITH MEALS) 540 tablet 1    amLODIPine (NORVASC) 5 MG tablet Take 1 tablet by mouth daily 90 tablet 3    albuterol sulfate HFA (PROAIR HFA) 108 (90 Base) MCG/ACT inhaler Inhale 2 puffs into the lungs every 6 hours as needed for Wheezing 8.5 Inhaler 5    hydrochlorothiazide (MICROZIDE) 12.5 MG capsule TAKE 1 CAPSULE BY MOUTH EVERY DAY IN THE MORNING 90 capsule 3    furosemide (LASIX) 40 MG tablet Take 1.5 tablets by mouth daily (Patient taking differently: Take 60 mg by mouth daily 1.5 tablets daily for a total of 60mg) 135 tablet 3    digoxin (LANOXIN) 125 MCG tablet Take 1 tablet by mouth every other day 90 tablet 3    buPROPion (WELLBUTRIN XL) 150 MG extended release tablet Take 2 tablets by mouth every morning 180 tablet 3    carvedilol (COREG) 12.5 MG tablet Take 1 tablet by mouth 2 times daily (with meals) 180 tablet 3    potassium chloride (KLOR-CON 10) 10 MEQ extended release tablet Take 1 tablet by mouth daily 90 tablet 3    Multiple Vitamins-Minerals (THERAPEUTIC MULTIVITAMIN-MINERALS) tablet Take 1 tablet by mouth daily      Lancets MISC 1 each by Does not apply route daily Accu Chek Guid3 Lancets 100 each 5    OXYGEN Inhale 3 L into the lungs nightly       BiPAP Machine MISC by Does not apply route nightly      cetirizine (ZYRTEC) 10 MG tablet Take 1 tablet by mouth daily 30 tablet 0    prazosin (MINIPRESS) 2 MG capsule Take 1 capsule by mouth nightly 90 capsule 3    vitamin B-12 (CYANOCOBALAMIN) 1000 MCG tablet Take 1,000 mcg by mouth daily      enzalutamide (XTANDI) 40 MG capsule Take 4 tablets daily      leuprolide (LUPRON) 30 MG injection Inject 30 mg into the muscle once Every 4 months      FISH OIL Take 1 capsule by mouth 2 times daily. No current facility-administered medications for this visit. Allergies: Patient has no known allergies. Review of Systems  Review of Systems   Constitutional: Positive for fatigue. Negative for activity change, diaphoresis, fever and unexpected weight change. HENT: Negative for facial swelling and nosebleeds. Eyes: Negative for redness and visual disturbance. Respiratory: Positive for shortness of breath (chronic). Negative for cough, chest tightness and wheezing. Cardiovascular: Positive for leg swelling (mild). Negative for chest pain and palpitations. Gastrointestinal: Negative for abdominal pain, nausea and vomiting. Endocrine: Negative for cold intolerance and heat intolerance. Genitourinary: Negative for dysuria and hematuria. Musculoskeletal: Negative for arthralgias and myalgias. Skin: Negative for pallor and rash. Neurological: Negative for dizziness, seizures, syncope, weakness and light-headedness. Hematological: Does not bruise/bleed easily. Psychiatric/Behavioral: Negative for agitation. The patient is not nervous/anxious.          C/o short term memory issues       Objective  Vital Signs - /60   Pulse 64   Ht 5' 10\" (1.778 m)   Wt 249 lb (112.9 kg)   SpO2 95%   BMI 35.73 kg/m²    Wt Readings from Last 3 Encounters:   09/16/20 249 lb (112.9 kg)   03/30/20 231 lb 9.6 oz (105.1 kg)   03/10/20 244 lb (110.7 kg) Physical Exam  Vitals signs and nursing note reviewed. Constitutional:       General: He is not in acute distress. Appearance: He is well-developed. Comments: Deconditioned   HENT:      Head: Normocephalic and atraumatic. Right Ear: Hearing and external ear normal.      Left Ear: Hearing and external ear normal.      Nose: Nose normal.   Eyes:      General:         Right eye: No discharge. Left eye: No discharge. Pupils: Pupils are equal, round, and reactive to light. Neck:      Musculoskeletal: Neck supple. No muscular tenderness. Thyroid: No thyromegaly. Vascular: No carotid bruit or JVD. Trachea: No tracheal deviation. Cardiovascular:      Rate and Rhythm: Normal rate and regular rhythm. Heart sounds: Murmur (1/6 systolic murmur) present. No friction rub. No gallop. Comments: No carotid bruit  Pulmonary:      Effort: Pulmonary effort is normal. No respiratory distress. Breath sounds: Normal breath sounds. No wheezing or rales. Abdominal:      Palpations: Abdomen is soft. Tenderness: There is no abdominal tenderness. Musculoskeletal:         General: Swelling present. No deformity. Right lower leg: Edema (trace) present. Left lower leg: Edema (trace) present. Comments: Ambulates with cane   Skin:     General: Skin is warm and dry. Findings: No rash. Neurological:      Mental Status: He is alert and oriented to person, place, and time. Cranial Nerves: No cranial nerve deficit. Psychiatric:         Behavior: Behavior normal.         Judgment: Judgment normal.     Data:  LUCILLE 3/15/19  This transesophageal echocardiogram revealed:   1.   Severe bi - atrial enlargement  2. Bi - atrial \"smoke\" despite the use of Xarelto  3. Mild aortic and mitral regurgitation  4. Severe tricuspid regurgitation  5.    No obvious right atrial mass      Echo 11/18  Summary   Normal left ventricular size and systolic function EF 55-60%. Mild   concentric left ventricular hypertrophy with grade 3 diastolic   dysfunction. Severely dilated left atrium. Thickened tricuspid aortic   valve with calcific nodule on the right cusp but preserved cusp   separation. No evidence of stenosis or insufficiency. Mildly thickened but   mobile mitral leaflets with mild-to-moderate regurgitation. Markedly   enlarged right atrium. Right ventricle appears normal size with preserved   systolic function. Mild to moderate tricuspid regurgitation with moderate   pulmonary hypertension and an RVSP estimate of 64 mmHg. The IVC is dilated   with poor inspiratory collapse consistent with elevated right atrial   pressure. No pericardial effusion present. Aortic root dimensions are   within normal limits. Nicki 3/28/20  Impression:    There is anterior septal ischemia, with a calculated ejection fraction    of 43 % with a 3 % of ischemic burden. Suggest: cardiac catheterization if symptomatic    Signed by Dr Lionel Kuo on 3/28/2020 12:46 PM      Heart Cath 3/31/20  Summary:       1. Successful femoral artery ultrasound  2. Successful femoral artery arteriogram  3. Supervision of the administration of moderate conscious sedation  4. Mooderate diffuse disease, between 48 and 70%, throughout the entire coronary tree  5.  70% stenosis at the bifurcation of the LAD and the 1st diagonal branch  6. Left ventricular function is mildly depressed with a visually estimated ejection fraction of 45%     RECOMMENDATIONS:     1. Risk Factor Modification  2. On-going Medical Therapy    Lab Results   Component Value Date    CHOL 154 (L) 03/27/2020    TRIG 285 (H) 03/27/2020    HDL 46 (L) 03/27/2020    LDLCALC 51 03/27/2020     Lab Results   Component Value Date    ALT 6 03/30/2020    AST 7 03/30/2020     Assessment:     Diagnosis Orders   1. Chronic atrial fibrillation     2. Pacemaker     3. Sinoatrial node dysfunction (HCC)     4.  Chronic diastolic heart failure ([address]

## 2020-09-16 NOTE — PATIENT INSTRUCTIONS
Return in about 6 months (around 3/16/2021) for APRN. - Weigh daily and report weight gain of 3lbs or more in 24hrs or 5lbs in one week. - Call for increasing shortness of breath or increasing swelling in feet and legs.     (This could mean you are retaining too much fluid)  - 2000mg low sodium diet  - Fluid restriction of 1500ml per day (about 6 cups of fluid per day)

## 2020-09-17 RX ORDER — CARVEDILOL 12.5 MG/1
TABLET ORAL
Qty: 180 TABLET | Refills: 0 | Status: SHIPPED | OUTPATIENT
Start: 2020-09-17 | End: 2021-01-04

## 2020-09-17 NOTE — TELEPHONE ENCOUNTER
Received fax from pharmacy requesting refill on pts medication(s). Pt was last seen in office on 7/14/2020  and has a follow up scheduled for 10/14/2020. Will send request to  Dr. Kiley Forte  for authorization.      Requested Prescriptions     Pending Prescriptions Disp Refills    carvedilol (COREG) 12.5 MG tablet [Pharmacy Med Name: CARVEDILOL 12.5 MG TABLET SD] 180 tablet 0     Sig: TAKE 1 TABLET TWICE DAILY WITH MEALS

## 2020-09-20 ENCOUNTER — RESULTS ENCOUNTER (OUTPATIENT)
Dept: UROLOGY | Facility: CLINIC | Age: 85
End: 2020-09-20

## 2020-09-20 DIAGNOSIS — C61 PROSTATE CANCER (HCC): ICD-10-CM

## 2020-09-24 ENCOUNTER — APPOINTMENT (OUTPATIENT)
Dept: LAB | Facility: HOSPITAL | Age: 85
End: 2020-09-24

## 2020-09-25 LAB — PSA SERPL-MCNC: <0.014 NG/ML (ref 0–4)

## 2020-10-01 ENCOUNTER — OFFICE VISIT (OUTPATIENT)
Dept: UROLOGY | Facility: CLINIC | Age: 85
End: 2020-10-01

## 2020-10-01 VITALS — HEIGHT: 70 IN | TEMPERATURE: 98 F | BODY MASS INDEX: 36.11 KG/M2 | WEIGHT: 252.2 LBS

## 2020-10-01 DIAGNOSIS — E11.42 TYPE 2 DIABETES MELLITUS WITH DIABETIC POLYNEUROPATHY, WITHOUT LONG-TERM CURRENT USE OF INSULIN (HCC): ICD-10-CM

## 2020-10-01 DIAGNOSIS — I10 ESSENTIAL HYPERTENSION: ICD-10-CM

## 2020-10-01 DIAGNOSIS — J44.9 COPD, GROUP A, BY GOLD 2017 CLASSIFICATION (HCC): ICD-10-CM

## 2020-10-01 DIAGNOSIS — C61 PROSTATE CANCER (HCC): Primary | ICD-10-CM

## 2020-10-01 LAB
BILIRUB BLD-MCNC: NEGATIVE MG/DL
CLARITY, POC: CLEAR
COLOR UR: YELLOW
GLUCOSE UR STRIP-MCNC: NEGATIVE MG/DL
KETONES UR QL: NEGATIVE
LEUKOCYTE EST, POC: NEGATIVE
NITRITE UR-MCNC: NEGATIVE MG/ML
PH UR: 5 [PH] (ref 5–8)
PROT UR STRIP-MCNC: NEGATIVE MG/DL
RBC # UR STRIP: NEGATIVE /UL
SP GR UR: 1.01 (ref 1–1.03)
UROBILINOGEN UR QL: NORMAL

## 2020-10-01 PROCEDURE — 81003 URINALYSIS AUTO W/O SCOPE: CPT | Performed by: UROLOGY

## 2020-10-01 PROCEDURE — 99213 OFFICE O/P EST LOW 20 MIN: CPT | Performed by: UROLOGY

## 2020-10-01 RX ORDER — ISOSORBIDE MONONITRATE 60 MG/1
TABLET, EXTENDED RELEASE ORAL
COMMUNITY
Start: 2020-08-17

## 2020-10-01 RX ORDER — ATORVASTATIN CALCIUM 40 MG/1
TABLET, FILM COATED ORAL
COMMUNITY
Start: 2020-07-30

## 2020-10-01 RX ORDER — AMLODIPINE BESYLATE 5 MG/1
TABLET ORAL
COMMUNITY
Start: 2020-09-14 | End: 2021-07-07

## 2020-10-13 ENCOUNTER — OFFICE VISIT (OUTPATIENT)
Dept: PULMONOLOGY | Facility: CLINIC | Age: 85
End: 2020-10-13

## 2020-10-13 VITALS
BODY MASS INDEX: 36.08 KG/M2 | SYSTOLIC BLOOD PRESSURE: 164 MMHG | WEIGHT: 252 LBS | HEART RATE: 86 BPM | OXYGEN SATURATION: 91 % | TEMPERATURE: 98.6 F | DIASTOLIC BLOOD PRESSURE: 80 MMHG | HEIGHT: 70 IN

## 2020-10-13 DIAGNOSIS — I50.32 CHRONIC DIASTOLIC CONGESTIVE HEART FAILURE (HCC): ICD-10-CM

## 2020-10-13 DIAGNOSIS — Z23 NEED FOR IMMUNIZATION AGAINST INFLUENZA: ICD-10-CM

## 2020-10-13 DIAGNOSIS — J43.2 CENTRILOBULAR EMPHYSEMA (HCC): ICD-10-CM

## 2020-10-13 DIAGNOSIS — R59.0 MEDIASTINAL ADENOPATHY: Primary | ICD-10-CM

## 2020-10-13 DIAGNOSIS — J96.11 CHRONIC RESPIRATORY FAILURE WITH HYPOXIA (HCC): ICD-10-CM

## 2020-10-13 DIAGNOSIS — G47.33 OBSTRUCTIVE SLEEP APNEA: ICD-10-CM

## 2020-10-13 DIAGNOSIS — J44.9 COPD, GROUP A, BY GOLD 2017 CLASSIFICATION (HCC): ICD-10-CM

## 2020-10-13 DIAGNOSIS — E66.01 MORBID (SEVERE) OBESITY DUE TO EXCESS CALORIES (HCC): ICD-10-CM

## 2020-10-13 PROCEDURE — 90694 VACC AIIV4 NO PRSRV 0.5ML IM: CPT | Performed by: INTERNAL MEDICINE

## 2020-10-13 PROCEDURE — G0008 ADMIN INFLUENZA VIRUS VAC: HCPCS | Performed by: INTERNAL MEDICINE

## 2020-10-13 PROCEDURE — 99214 OFFICE O/P EST MOD 30 MIN: CPT | Performed by: INTERNAL MEDICINE

## 2020-10-13 NOTE — PROGRESS NOTES
Subjective   Prakash Castro is a 85 y.o. male.     Background: Pt with right paratracheal mass, hx prostate cancer, copd with nocturnal hypoxemia, sleep apnea and obestiy.  FEV! 53% pred 2015.  Pt canceled  initial evaluation 7/2019    Chief Complaint   Patient presents with   • Centrilobular emphysema     Follow-up   • Mediastinal Adenopathy        History of Present Illness   He follows up and has several concerns today.  He says he has a few questions.  Lately he has been on oxygen around-the-clock.  He says he gets overheated if he moves around too much and then he vomits and then has to rest and then improves.  She he has some associated mild shortness of breath with this going on for a few months, moderate severity, enough to raise his concern, not associated with any changes in environment or changes in medications.  There are no other aggravating or alleviating factors.  He has been on oxygen and has a conserving device.  [facility]  He thinks it is not working right and we did work with his machine today and indeed his conserving device was not allowing any oxygen flow out at all so we switched him over to a concentrator in the office.  With further manipulation of his tank we were able to get him to get onto continuous flow but he is going to need to get home on the small tank so we left him on the concentrator for the rest of the visit.  He thinks he is due for another overnight oximetry for the oxygen.  He uses this hooked to CPAP at night.  He would like a flu shot.  He is also here to follow-up regarding mediastinal adenopathy which he says is been there for many years, for which we took him to a bronchoscopy with E bus earlier in the summer with negative findings.    Medical/Family/Social History   has a past medical history of Acute kidney injury (CMS/HCC) (7/26/2019), Acute on chronic respiratory failure with hypoxia and hypercapnia (CMS/HCC) (7/26/2019), Arthritis, Atrial  fibrillation (CMS/[facility]), Cancer (CMS/[facility]), Cellulitis, CHF (congestive heart failure) (CMS/[facility]), Depression, Diabetes mellitus (CMS/[facility]), Hematuria, History of cardioversion, Hypertension, Obesity, Obstructive sleep apnea (6/18/2020), On home oxygen therapy, Overactive bladder, Pacemaker, Sleep apnea, SOB (shortness of breath), and Stage 2 moderate COPD by GOLD classification (CMS/[facility]) (3/4/2019).   has a past surgical history that includes Replacement total knee bilateral; Hernia repair; Joint replacement; incision and drainage leg (Left, 7/19/2019); Cataract extraction (Right); Insertion prostate radiation seed; Pacemaker Implantation; and Bronchoscopy (N/A, 7/6/2020).  family history includes Arthritis in his mother; Cancer in his father; Depression in his mother; Hearing loss in his mother; Heart disease in his mother; Hypertension in his mother; Prostate cancer in his son.   reports that he has never smoked. He has never used smokeless tobacco. He reports that he does not drink alcohol or use drugs.  No Known Allergies  Medications    Current Outpatient Medications:   •  acetaminophen (TYLENOL) 650 MG 8 hr tablet, Take 650 mg by mouth Daily., Disp: , Rfl:   •  amLODIPine (NORVASC) 10 MG tablet, Take 10 mg by mouth Daily., Disp: , Rfl:   •  amLODIPine (NORVASC) 5 MG tablet, , Disp: , Rfl:   •  atorvastatin (LIPITOR) 40 MG tablet, , Disp: , Rfl:   •  buPROPion XL (WELLBUTRIN XL) 150 MG 24 hr tablet, Take 300 mg by mouth Daily., Disp: , Rfl:   •  carvedilol (COREG) 12.5 MG tablet, Take 12.5 mg by mouth 2 (two) times a day with meals., Disp: , Rfl:   •  celecoxib (CeleBREX) 200 MG capsule, Take 200 mg by mouth 2 (Two) Times a Day., Disp: , Rfl:   •  digoxin (LANOXIN) 125 MCG tablet, Take 125 mcg by mouth every day., Disp: , Rfl:   •  Dulaglutide (TRULICITY) 0.75 MG/0.5ML solution pen-injector, Inject 0.5 mL under the skin into the appropriate area as directed 1 (One) Time Per Week. Saturday., Disp: , Rfl:   •   enzalutamide (XTANDI) 40 MG chemo capsule, Take 4 capsules by mouth Daily., Disp: 120 capsule, Rfl: 10  •  FLUoxetine (PROzac) 20 MG capsule, Take 20 mg by mouth Daily. Take with Prozac 40 mg (total dose of 60 mg daily)., Disp: , Rfl:   •  FLUoxetine (PROZAC) 40 MG capsule, Take 40 mg by mouth Daily. Take with Prozac 20 mg (total dose of 60 mg daily)., Disp: , Rfl:   •  fluticasone (FLONASE) 50 MCG/ACT nasal spray, 2 sprays into each nostril daily. Administer 2 sprays in each nostril for each dose., Disp: , Rfl:   •  Fluticasone-Umeclidin-Vilant 100-62.5-25 MCG/INH aerosol powder , Inhale 1 each Daily., Disp: , Rfl:   •  furosemide (LASIX) 40 MG tablet, Take 60 mg by mouth Daily., Disp: , Rfl:   •  hydrochlorothiazide (HYDRODIURIL) 12.5 MG tablet, Take 1 tablet by mouth Daily., Disp: 30 tablet, Rfl: 1  •  isosorbide mononitrate (IMDUR) 60 MG 24 hr tablet, , Disp: , Rfl:   •  leuprolide (LUPRON) 30 MG injection, Inject 30 mg into the appropriate muscle as directed by prescriber Every 6 (Six) Months., Disp: , Rfl:   •  lisinopril (PRINIVIL,ZESTRIL) 40 MG tablet, Take 40 mg by mouth daily., Disp: , Rfl:   •  metFORMIN (GLUCOPHAGE) 500 MG tablet, Take 500 mg by mouth 2 (Two) Times a Day With Meals., Disp: , Rfl:   •  metolazone (ZAROXOLYN) 2.5 MG tablet, Take 2.5 mg by mouth daily., Disp: , Rfl:   •  Multiple Vitamins-Minerals (MULTIVITAMIN ADULT) tablet, Take 1 tablet by mouth Daily., Disp: , Rfl:   •  MYRBETRIQ 25 MG tablet sustained-release 24 hour 24 hr tablet, TAKE 1 TABLET BY MOUTH EVERY DAY, Disp: 90 tablet, Rfl: 3  •  O2 (OXYGEN), Inhale 4 L/min Continuous., Disp: , Rfl:   •  Omega-3 Fatty Acids (FISH OIL) 1200 MG capsule capsule, Take 1,200 mg by mouth 2 (Two) Times a Day., Disp: , Rfl:   •  potassium chloride (K-DUR,KLOR-CON) 10 MEQ CR tablet, Take 10 mEq by mouth Daily., Disp: , Rfl:   •  prazosin (MINIPRESS) 2 MG capsule, Take 2 mg by mouth Every Night., Disp: , Rfl:   •  rivaroxaban (XARELTO) 20 MG tablet,  "Take 20 mg by mouth daily., Disp: , Rfl:   •  spironolactone (ALDACTONE) 50 MG tablet, Take 50 mg by mouth Daily., Disp: , Rfl:   •  terazosin (HYTRIN) 5 MG capsule, Take 5 mg by mouth every night., Disp: , Rfl:     Review of Systems   Constitutional: Negative for chills and fever.   Cardiovascular: Negative for chest pain.   Gastrointestinal: Negative for nausea and vomiting.       Objective   /80 (BP Location: Left arm, Patient Position: Sitting, Cuff Size: Adult)   Pulse 86   Temp 98.6 °F (37 °C) (Infrared)   Ht 177.8 cm (70\")   Wt 114 kg (252 lb)   SpO2 91%   BMI 36.16 kg/m²   Physical Exam  Constitutional:       General: He is not in acute distress.     Appearance: He is well-developed. He is not ill-appearing, toxic-appearing or diaphoretic.      Interventions: Face mask in place.   Eyes:      General: No scleral icterus.  Neck:      Vascular: No JVD.      Trachea: No tracheal deviation.   Cardiovascular:      Rate and Rhythm: Normal rate and regular rhythm.   Pulmonary:      Effort: No respiratory distress.   Abdominal:      Palpations: Abdomen is soft.      Tenderness: There is no abdominal tenderness. There is no guarding.   Musculoskeletal:      Right lower leg: Edema present.      Left lower leg: Edema present.   Skin:     General: Skin is warm and dry.   Psychiatric:         Behavior: Behavior normal.       -----------------------------------------------------------------------------------------------  EXAMINATION:  CT CHEST W CONTRAST  2/25/2020 11:05 AM  HISTORY: J44.9. COPD. Pulmonary hypertension. Chronic respiratory  failure with hypoxia. Asbestosis.   COMPARISON: 12/27/2018.  DLP: 876 mGy-cm. Automated exposure control was utilized.  TECHNIQUE: Spiral CT was performed of the chest with contrast.  Multiplanar images were reconstructed.  MEDIASTINUM/VASCULAR: There is ill-defined soft tissue density in the  mediastinum on the right side predominantly in the right paratracheal  and " pretracheal region. This measures about 5.5 cm on image 59 of  series 2 and measures about 5.2 cm previously. It extends about 7.7 cm  cephalocaudal and previously measured about 5.6 cm in the same  dimension. This is worrisome for confluent area of lymphadenopathy in  the mediastinum. This surrounds the superior vena cava and extends to  the margin of the brachiocephalic vein. There is atheromatous disease  of the thoracic aorta and coronary arteries. There is cardiomegaly.  The pulmonary arteries are dilated. There is fatty infiltration of the  atrial septum. There is a pacemaker/AICD wire extending to the right  ventricular apex.  LUNGS: There is a small right pleural effusion. There is linear  atelectasis in the right lower lobe. There is no dense airspace  consolidation.  BONES AND SOFT TISSUES: There are degenerative changes of the spine.  No acute bony abnormality is seen.  UPPER ABDOMEN: There is either calcification in the wall of the  gallbladder versus small gallstones located dependently. There is no  gallbladder wall thickening. The visualized solid organs in the upper  abdomen are unremarkable. There is underdistention of the stomach.  IMPRESSION:  1. Confluent ill-defined soft tissue density in the mediastinum  predominantly in the right paratracheal and pretracheal region. Size  measurements are listed above. This area is increased in size compared  to the previous study. This could be infectious or neoplastic.  2. Atheromatous disease of the thoracic aorta. Coronary artery  disease. Cardiomegaly. Fatty infiltration of the atrial septum.  Dilated pulmonary arteries.  3. Small right pleural effusion. Mild right lower lobe atelectasis.  4. Calcification within the wall of the gallbladder versus small  gallstones layering dependently.  Signed by Dr Andrey Andrade on 2/25/2020 11:19 AM     -----------------------------------------------------------------------------------------------  PFT Values         Some values may be hidden. Unless noted otherwise, only the newest values recorded on each date are displayed.         Old Values PFT Results 3/12/20   No data to display.      Pre Drug PFT Results 3/12/20   FVC 56   FEV1 50   FEF 25-75% 30   FEV1/FVC 68.76      Post Drug PFT Results 3/12/20   No data to display.      Other Tests PFT Results 3/12/20   TLC 77      DLCO 80   D/VAsb 138                -----------------------------------------------------------------------------------------------  Assessment/Plan   Problem List Items Addressed This Visit        Pulmonary Problems    COPD, group A, by GOLD 2017 classification (CMS/[facility])    Centrilobular emphysema (CMS/[facility])    Obstructive sleep apnea    Overview     BiPAP +3 L            Other    Mediastinal adenopathy - Primary    Relevant Orders    CT Chest With Contrast    Chronic diastolic congestive heart failure (CMS/HCC)    Morbid (severe) obesity due to excess calories (CMS/[facility])      Other Visit Diagnoses     Chronic respiratory failure with hypoxia (CMS/[facility])        Relevant Orders    Overnight Sleep Oximetry Study    Need for immunization against influenza        Relevant Orders    Fluad Quad 65+ yrs ([id number]) (Completed)        Patient's Body mass index is 36.16 kg/m². BMI is above normal parameters. Recommendations include: referral to primary care.      He does not have significant COPD symptoms right now but he does have hypoxemia and requires oxygen.  Troubleshooting on his device as above.  We have asked him to call his DME supplier to get this fixed.  Get home to his concentrator from here.  Uncertain what the etiology of the sensation of overheating followed by vomiting is.  He does have history of heart disease but no chest pain.  He will discuss this with primary care.  We gave him Fluad quad flu vaccine today  He anticipates a virtual visit with Dr. Grover tomorrow.  We will get a copy this note to Dr. Grover and the patient will go over  some of these above issues with him also tomorrow.  Will plan follow up ct chest to evaluate adenopathy and sensation of heat and vomiting  Troubleshoot nonfunctioning o2 tank here in office.  Conserving device appears malfunctioning.       Electronically signed by Darrian Silvestre MD, 10/13/2020, 18:25 CDT

## 2020-10-14 ENCOUNTER — VIRTUAL VISIT (OUTPATIENT)
Dept: PRIMARY CARE CLINIC | Age: 85
End: 2020-10-14
Payer: MEDICARE

## 2020-10-14 PROCEDURE — 4040F PNEUMOC VAC/ADMIN/RCVD: CPT | Performed by: PEDIATRICS

## 2020-10-14 PROCEDURE — 99215 OFFICE O/P EST HI 40 MIN: CPT | Performed by: PEDIATRICS

## 2020-10-14 PROCEDURE — G8428 CUR MEDS NOT DOCUMENT: HCPCS | Performed by: PEDIATRICS

## 2020-10-14 PROCEDURE — 1123F ACP DISCUSS/DSCN MKR DOCD: CPT | Performed by: PEDIATRICS

## 2020-10-14 ASSESSMENT — ENCOUNTER SYMPTOMS
COUGH: 0
SORE THROAT: 0
ABDOMINAL PAIN: 0
VOMITING: 0
SHORTNESS OF BREATH: 1
DIARRHEA: 1
WHEEZING: 0
BACK PAIN: 0
NAUSEA: 0
CHEST TIGHTNESS: 0

## 2020-10-14 NOTE — PROGRESS NOTES
[facility], [address]  Phone [phone]   Fax [phone]      OFFICE VISIT: 10/14/2020    Ari Blank-: 1935      HPI  Reason For Visit:  Suyapa Meneses is a 81 y.o. Diabetes; Hypertension; Hyperlipidemia; and Congestive Heart Failure    Patient presents via telephone conferencing on follow-up for multiple health issues. Present concerns:  He notes for the past couple months, he gets \"overheated\" easily  It does not take much exertion to get to that point. He is short of breath and becomes diaphoretic. He will sometimes get \"dry heaves\"  He does not have any chest pains. He denies any pain or pressure sensation at all. Diabetes Mellitus Type 2  Diet compliance:  compliant most of the time  Nutrition Consultation Needed:  no  Med Type:              Metformin 500 bid              trulicity 6.07OY weekly  Medication compliance:  compliant all of the time  Weight trend: fluctuating.  stable by his account. 250.4  Current exercise: no  Checkin times daily  Home blood sugar records: not checking recently. He has been running a little higher in 140's   Low BG:  no  Eye exam current (within one year): yes  Checking Feet regularly:  yes - He has a sore on his L heel. He had operation on his foot a couple months ago. This was at [facility] is helping him. ACE/ARB:  yes - lisinopril  Aspirin: No: but recommended   Tobacco history: He  reports that he has never smoked.  He has never used smokeless tobacco.    Lab Results   Component Value Date    LABA1C 5.2 2020    LABA1C 5.7 2020    LABA1C 5.9 2019     Lab Results   Component Value Date    LABMICR 2.70 2019    CREATININE 1.1 2020       Hypertension:   BP today was   BP Readings from Last 1 Encounters:   20 112/60      Recent BP readings:    BP Readings from Last 3 Encounters:   20 112/60   20 130/72   03/10/20 (!) 116/56 Medication              Lisinopril 40 mg daily              Carvedilol 12.5 mg twice daily                          Hydrochlorothiazide 12.5 mg daily              Amlodipine 5 mg daily                  Imdur 30mg daily      Medication compliance:  compliant most of the time  Home blood pressure monitoring: No.    He is not adherent to a low sodium diet. Symptoms: none  Laboratory:  Lab Results   Component Value Date    BUN 42 (H) 03/30/2020    CREATININE 1.1 03/30/2020       Hyperlipidemia:   Medication:   atorvastatin (Lipitor)  Low Fat, Low Choleterol Diet:  yes - yes to some degree  Myalgias or GI upset: no  The patient exercises rarely. Laboratory:    Lab Results   Component Value Date    CHOL 154 (L) 03/27/2020    TRIG 285 (H) 03/27/2020    HDL 46 (L) 03/27/2020    LDLCALC 51 03/27/2020      Lab Results   Component Value Date    ALT 6 03/30/2020    AST 7 03/30/2020       Coronary Artery Disease:  Medications:   Beta-blockade: Carvedilol 12.5 mg twice daily   RAAS Therapy: Lisinopril 40 mg daily   Lipid Management: Atorvastatin 40 mg nightly   Platelet Inhibition: Aspirin 81 mg daily   Anti-anginal:  Imdur 60 mg daily plus nitroglycerin 0.4 mg sublingual as needed  Symptoms: He is having symptoms that are highly suspicious for anginal equivalent with shortness of breath and diaphoresis as well as nausea with minimal exertion  Nitroglycerin use: He has not been using his nitroglycerin with these episodes  Cardiology follow-up: He just saw cardiology last month. No cardiology follow-up in the near future. We are going to set up cardiology referral for evaluation by Dr. Ezio Packer  Additional studies: He had cardiac work-up in March 2020,    2D echo Summary   LV is normal in size with mildly reduced LV systolic function. LV ejection   fraction estimated at 45%. Probable grade 2 diastolic dysfunction. RV is normal in size with normal systolic function. Severe left atrial enlargement.    Mild right atrial enlargement. Pacer wires noted right-sided chambers. Mitral valve is mildly thickened with normal leaflet mobility. No   stenosis. Mild mitral regurgitation. Aortic valve is trileaflet with mild leaflet sclerosis but normal leaflet   mobility. No stenosis. No regurgitation noted. Mild tricuspid regurgitation. Signature      ----------------------------------------------------------------   Electronically signed by Ralph Rob MD(Interpreting physician)   on 03/28/2020 12:43 PM   ----------------------------------------------------------------      Findings      Mitral Valve   Mitral annular calcification is present. Mitral valve leaflets are mildly thickened with preserved leaflet   mobility. Mild mitral regurgitation is present. Aortic Valve   Mild aortic sclerosis with normal cusp separation. Aortic valve appears to be tricuspid. Tricuspid Valve   Mild tricuspid regurgitation with estimated RVSP of 43 mm Hg. Pulmonic Valve   The pulmonic valve was not well visualized. Left Atrium   Severely dilated left atrium. Left Ventricle   Normal left ventricular wall thickness. Left ventricular ejection fraction is visually estimated at 45 %. No evidence of left ventricular mass or thrombus noted. Right Atrium   Mildly enlarged right atrium size. Right Ventricle   Pacer wire visualized in right ventricle. Pericardial Effusion   No evidence of significant pericardial effusion is noted. Pleural Effusion   No evidence of pleural effusion. Miscellaneous   Aortic root dimension within normal limits. IVC normal.   Definity contrast was utilized to enhance endocardial definition.      M-Mode Measurements (cm)      LVIDd: 5.7 cm                        LVIDs: 4.4 cm   IVSd: 0.8 cm   LVPWd: 0.8 cm                        AO Root Dimension: 3.4 cm   % Ejection Fraction: 45 %            LA: 5.2 cm                                        LVOT: 2.2 cm     Doppler Measurements:                                    MV Peak E-Wave: 94.1 cm/s   TR Velocity:231 cm/s   TR Gradient:21.34 mmHg        MV Peak Gradient: 3.54 mmHg   _________________________________________________________________      Nicki 3/28/20  Impression:    There is anterior septal ischemia, with a calculated ejection fraction    of 43 % with a 3 % of ischemic burden. Suggest: cardiac catheterization if symptomatic    Signed by Dr Praful Grant on 3/28/2020 12:46 PM        Left Heart catheterization  Angiography:     Coronary Arteries:     Left Main Coronary Artery:  A large vessel which arises from the left sinus of Valsalva. It divides into the left anterior descending coronary artery and the left circumflex. There is mild diffuse disease throughout the entire length of the vessel.      Left Anterior Coronary Artery:  The LAD is a moderate sized vessel with several diagonal branches. There is a 70% stenosis in the proximal to mid portion of this vessel at the take off of the 1st diagonal.  There is moderate diffuse disease, between 50 and 70%, throughout the remainder of the vessel.      Left Circumflex Coronary Artery:  The LCx is a moderate sized vessel with several marginal branches. There is moderate diffuse disease, between 50 and 70%, throughout the entire length of the vessel.      Right Coronary Artery:  The RCA is a moderate sized dominant vessel which arises from the right sinus of Valsalva. There is mild diffuse disease throughout the entire length of the vessel.         Left Ventriculogram:  The left ventriculogram is obtained in the right oblique projection. There is mild global hypokinesis. The estimated visual ejection fraction is 45%. There is no mitral regurgitation nor is there a pull back gradient seen across the aortic valve.        Summary:       1. Successful femoral artery ultrasound  2. Successful femoral artery arteriogram  3.   Supervision of the administration of moderate conscious sedation  4. Mooderate diffuse disease, between 48 and 70%, throughout the entire coronary tree  5.  70% stenosis at the bifurcation of the LAD and the 1st diagonal branch  6. Left ventricular function is mildly depressed with a visually estimated ejection fraction of 45%        RECOMMENDATIONS:     1. Risk Factor Modification  2. On-going Medical Therapy  Electronically signed by Ganga Carrizales MD on 3/30/20          Congestive Heart Failure:  Type:  Ischemic cardiomyopathy   Systolic and Diastolic   HFrEF   Primary rhythm: Ventricular paced  Medication:   Beta-blockade: Carvedilol 12.5 mg twice daily   R AAS therapy: Lisinopril 40 mg daily   Diuretics  Zaroxolyn 2.5 mg daily      Lasix 60 mg daily      Hydrochlorothiazide 12.5 mg daily      Spironolactone 50 mg daily   Aldosterone inhibition: Spironolactone 50 mg daily   Additional meds: Imdur 60 mg daily      Lanoxin 125 mcg every other day  Symptoms: Marked dyspnea on exertion, peripheral edema, he denies orthopnea  Cardiology follow-up: He had cardiology follow-up appointment on 9/16/2020 (visit benign)  Baseline pro-BNP: proBNP was 814 on 3/27/2020  Most recent echo: 3/28/2020 as noted above. COPD:  Medication:   Trelegy Ellipta 1 inhalation daily. Symptoms: increasing shortness of breath. He is having trouble with his oxygen regulator. He had this unit evaluated today. He has an overnight pulse ox tonight  He is pending a sleep study. He is using his BIPAP every night. He had his flu shot from Dr. Terrance De Oliveira yesterday       vitals were not taken for this visit. There is no height or weight on file to calculate BMI. I have reviewed the following with the Mr. Kristen Dudley   Lab Review  Orders Only on 07/13/2020   Component Date Value    Hemoglobin A1C 07/13/2020 5.2      Copies of these are in the chart.     Current Outpatient Medications   Medication Sig Dispense Refill    carvedilol (COREG) 12.5 MG tablet TAKE 1 TABLET TWICE DAILY WITH MEALS 180 tablet 0    lisinopril (PRINIVIL;ZESTRIL) 40 MG tablet Take 1 tablet by mouth daily 90 tablet 3    Dulaglutide (TRULICITY) 5.00 KX/9.8YB SOPN Inject 0.75 mg into the skin every 7 days INJECT 1 PEN EVERY WEEK 12 pen 3    celecoxib (CELEBREX) 200 MG capsule Take 1 capsule by mouth 2 times daily 60 capsule 11    potassium chloride (KLOR-CON M) 10 MEQ extended release tablet Take 1 tablet by mouth daily 90 tablet 3    rivaroxaban (XARELTO) 20 MG TABS tablet Take 1 tablet by mouth daily (with breakfast) 90 tablet 3    metOLazone (ZAROXOLYN) 2.5 MG tablet Take 1 tablet by mouth daily 90 tablet 1    MYRBETRIQ 25 MG TB24 Take 1 tablet by mouth daily 30 tablet 5    fluticasone (FLONASE) 50 MCG/ACT nasal spray 2 sprays by Nasal route daily 1 Bottle 3    FLUoxetine (PROZAC) 20 MG capsule Take 1 capsule by mouth daily 90 capsule 3    spironolactone (ALDACTONE) 50 MG tablet TAKE 1 TABLET BY MOUTH EVERY DAY 90 tablet 3    metFORMIN (GLUCOPHAGE) 500 MG tablet Take 1 tablet by mouth 2 times daily (with meals) 180 tablet 3    isosorbide mononitrate (IMDUR) 60 MG extended release tablet Take 1 tablet by mouth daily 30 tablet 5    Psyllium (KONSYL) 28.3 % POWD Take 4 g by mouth 2 times daily (with meals) 1 Bottle 11    hydroCHLOROthiazide (MICROZIDE) 12.5 MG capsule Take 1 capsule by mouth daily 30 capsule 11    atorvastatin (LIPITOR) 40 MG tablet Take 1 tablet by mouth nightly 90 tablet 3    blood glucose monitor strips Test one times a day & as needed for symptoms of irregular blood glucose. DX: E11.9 100 strip 3    Lancets MISC 1 each by Does not apply route daily DX: E11.9 100 each 0    Alcohol Swabs (ALCOHOL PREP) PADS Use daily with glucose checks DX: E11.9 100 each 0    blood glucose monitor kit and supplies Test one times a day & as needed for symptoms of irregular blood glucose.  DX:E11.9 1 kit 0    aspirin 81 MG EC tablet Take 1 tablet by mouth daily 30 tablet 0    nitroGLYCERIN (NITROSTAT) 0.4 MG SL tablet up to max of 3 total doses. If no relief after 1 dose, call 911. 25 tablet 0    Wheat Dextrin (BENEFIBER) POWD Take 4 g by mouth 3 times daily (with meals) 1 Can 5    blood glucose monitor kit and supplies Test once a day for symptoms of irregular blood glucose. 1 kit 0    ONE TOUCH LANCETS MISC 1 each by Does not apply route daily 100 each 3    blood glucose monitor strips Test once daily for symptoms of irregular blood glucose.  100 strip 1    FLUoxetine (PROZAC) 40 MG capsule Take 1 capsule by mouth daily (Patient taking differently: Take 240 mg by mouth daily ) 90 capsule 3    terazosin (HYTRIN) 5 MG capsule Take 1 capsule by mouth nightly 90 capsule 3    fluticasone-umeclidin-vilant (TRELEGY ELLIPTA) 100-62.5-25 MCG/INH AEPB Inhale 1 puff into the lungs daily 1 each 11    WELCHOL 625 MG tablet TAKE 3 TABLETS BY MOUTH 2 TIMES DAILY (WITH MEALS) 540 tablet 1    amLODIPine (NORVASC) 5 MG tablet Take 1 tablet by mouth daily 90 tablet 3    albuterol sulfate HFA (PROAIR HFA) 108 (90 Base) MCG/ACT inhaler Inhale 2 puffs into the lungs every 6 hours as needed for Wheezing 8.5 Inhaler 5    hydrochlorothiazide (MICROZIDE) 12.5 MG capsule TAKE 1 CAPSULE BY MOUTH EVERY DAY IN THE MORNING 90 capsule 3    furosemide (LASIX) 40 MG tablet Take 1.5 tablets by mouth daily (Patient taking differently: Take 60 mg by mouth daily 1.5 tablets daily for a total of 60mg) 135 tablet 3    digoxin (LANOXIN) 125 MCG tablet Take 1 tablet by mouth every other day 90 tablet 3    buPROPion (WELLBUTRIN XL) 150 MG extended release tablet Take 2 tablets by mouth every morning 180 tablet 3    potassium chloride (KLOR-CON 10) 10 MEQ extended release tablet Take 1 tablet by mouth daily 90 tablet 3    Multiple Vitamins-Minerals (THERAPEUTIC MULTIVITAMIN-MINERALS) tablet Take 1 tablet by mouth daily      Lancets MISC 1 each by Does not apply route daily Accu Chek Guid3 Lancets 100 each 5    OXYGEN Inhale 3 Coronary artery disease involving native coronary artery of native heart without angina pectoris  I25.10    9. Angina of effort [facility])  I20.8 Kyle Rosas MD, Cardiology, [facility] [place]   10. APONTE (dyspnea on exertion)  R06.00 Kyle Rosas MD, Cardiology, [facility] [place]   11. Jose Yang MD, Cardiology, [place]   12. Fatigue, unspecified type  R53.83 TSH without Reflex     T4, Free         PLAN    1. Type 2 diabetes mellitus with diabetic polyneuropathy, without long-term current use of insulin ([facility]  He notes that blood sugars are not very well controlled. He has been eating more desserts lately. Hemoglobin A1c has been excellently controlled but he has not had any evaluation in over 6 months. We will recheck hemoglobin A1c to ensure that risk factors are optimally controlled. Our goal is a hemoglobin A1c 6.5 or less  - Comprehensive Metabolic Panel; Future  - Hemoglobin A1C; Future  - Microalbumin / Creatinine Urine Ratio; Future    2. 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 Other associated symptoms include diaphoresis and nausea with minimal exertion. He denies any chest pain or pressure symptoms. He is diabetic as noted above. He does have a significant ischemic burden with 70% lesions at a bifurcation of first diagonal/LAD, as well as diffuse coronary disease. I am concerned as his symptoms may be an anginal equivalent. He is on anti-angina medications now with Imdur 60 mg daily. I would like to consider repeat cardiac catheterization to see if there is any stent double lesion. If there is not, and he is not eligible for bypass, then we may consider Ranexa in addition to his Imdur. I will set him up with Dr. Jewel Glynn MD, Cardiology, [place]  - Lipid Panel; Future  - Microalbumin / Creatinine Urine Ratio; Future    3.  Pulmonary hypertension ([facility]  He does have a history of pulmonary hypertension. He also has COPD. He also has obstructive sleep apnea. Most recent echo showed RVSP at 43 mmHg. Comment on the right ventricle stated only that a pacer wire was present    4. Chronic obstructive pulmonary disease, unspecified COPD type ([facility]  Stable on trelogy at present. He is following with pulmonary. He does have both restrictive and obstructive lung disease by PFT    5. NEIDA (obstructive sleep apnea)  He is on BiPAP and using this faithfully. Pulmonary is going to repeat a sleep study that is pending in the near future  He does use oxygen in line with his BiPAP. He also has oxygen to use on an ambulatory basis    6. Mixed hyperlipidemia  Lipids were excellently controlled as of March 27, 2020 with an LDL of 51 and an HDL of 46. We will reassess lipids to ensure that this remains controlled. Recommendations from previous cath were medical management. - Lipid Panel; Future    7. Essential hypertension  Blood pressure is well controlled by self-report. Continue present medication regimen  - CBC Auto Differential; Future  - Comprehensive Metabolic Panel; Future  - Microalbumin / Creatinine Urine Ratio; Future    8. Coronary artery disease involving native coronary artery of native heart without angina pectoris  Known coronary artery disease as mentioned above. We will obtain additional cardiology input on his present symptoms that are suspicious for an anginal equivalent    9. [facility]  As above  - Elsa Chopra MD, Cardiology, [place]    10. APONTE (dyspnea on exertion)  As above  - Elsa Chopra MD, Cardiology, [place]    11. Diaphoresis  As above  - Elsa Chopra MD, Cardiology, [place]    12. Fatigue, unspecified type  Check other related labs to ensure no other cause of his fatigue  - TSH without Reflex;  Future  - T4, Free; Future      Orders Placed This Encounter necessary medical care. The patient (and/or legal guardian) has also been advised to contact this office for worsening conditions or problems, and seek emergency medical treatment and/or call 911 if deemed necessary. Patient identification was verified at the start of the visit: Yes    Total time spent for this encounter: >45m    Services were provided through a video synchronous discussion virtually to substitute for in-person clinic visit. Patient and provider were located at their individual homes. --B. Wilbur Phoenix, DO on 10/14/2020 at 12:44 PM    An electronic signature was used to authenticate this note.

## 2020-10-19 DIAGNOSIS — J96.11 CHRONIC RESPIRATORY FAILURE WITH HYPOXIA (HCC): ICD-10-CM

## 2020-10-19 NOTE — PROGRESS NOTES
Test results discussed with patient.  Patient voiced understanding.  This was done on his bipap with no oxygen bled in.  Also they did exchange his portable regulator.  I encouraged the pt to make sure it worked at home before venturing out.  He stated he would.

## 2020-10-21 ENCOUNTER — TELEPHONE (OUTPATIENT)
Dept: PRIMARY CARE CLINIC | Age: 85
End: 2020-10-21

## 2020-10-21 NOTE — TELEPHONE ENCOUNTER
Pt called, needs a new lift chair. [facility]. Wants the leather one.     Faxed through [address] to [phone] [facility]

## 2020-11-11 RX ORDER — BLOOD-GLUCOSE METER
EACH MISCELLANEOUS
Qty: 100 STRIP | Refills: 3 | Status: SHIPPED | OUTPATIENT
Start: 2020-11-11 | End: 2021-05-24

## 2020-11-11 NOTE — TELEPHONE ENCOUNTER
Received fax from pharmacy requesting refill on pts medication(s). Pt was last seen in office on 10/14/2020  and has a follow up scheduled for 1/14/2021. Will send request to  Dr. Mk Gonzalez  for patient.      Requested Prescriptions     Pending Prescriptions Disp Refills    blood glucose test strips (EASY TOUCH TEST) strip [Pharmacy Med Name: EASY TOUCH STRIPS] 100 strip 0     Sig: USE TO TEST BLOOD SUGAR ONCE DAILY

## 2020-11-30 RX ORDER — BUPROPION HYDROCHLORIDE 150 MG/1
TABLET ORAL
Qty: 180 TABLET | Refills: 0 | Status: SHIPPED | OUTPATIENT
Start: 2020-11-30 | End: 2021-02-25

## 2020-11-30 RX ORDER — DIGOXIN 125 MCG
TABLET ORAL
Qty: 90 TABLET | Refills: 0 | Status: SHIPPED | OUTPATIENT
Start: 2020-11-30 | End: 2021-02-25

## 2020-11-30 NOTE — TELEPHONE ENCOUNTER
Received fax from pharmacy requesting refill on pts medication(s). Pt was last seen in office on 10/14/2020  and has a follow up scheduled for 11/27/2020. Will send request to  Winston Winter  for authorization.      Requested Prescriptions     Pending Prescriptions Disp Refills    buPROPion (WELLBUTRIN XL) 150 MG extended release tablet [Pharmacy Med Name: BUPROPION HCL  MG TABLET] 180 tablet 0     Sig: TAKE 2 TABLETS DAILY IN THE MORNING

## 2020-11-30 NOTE — TELEPHONE ENCOUNTER
Received fax from pharmacy requesting refill on pts medication(s). Pt was last seen in office on 10/14/2020  and has a follow up scheduled for 1/14/2021. Will send request to  Dr. Karlie Donnelly  for authorization.      Requested Prescriptions     Pending Prescriptions Disp Refills    digoxin (LANOXIN) 125 MCG tablet [Pharmacy Med Name: DIGOXIN 125 MCG TABLET *DD] 90 tablet 0     Sig: TAKE 1 TABLET BY MOUTH ONCE DAILY

## 2020-12-01 RX ORDER — DULAGLUTIDE 0.75 MG/.5ML
0.75 INJECTION, SOLUTION SUBCUTANEOUS
Qty: 6 ML | Refills: 3 | Status: SHIPPED
Start: 2020-12-01 | End: 2021-05-24 | Stop reason: ALTCHOICE

## 2020-12-01 NOTE — TELEPHONE ENCOUNTER
Received fax from pharmacy requesting refill on pts medication(s). Pt was last seen in office on 10/14/2020  and has a follow up scheduled for 1/14/2021. Will send request to  Dr. Ramsey Howard  for patient.      Requested Prescriptions     Pending Prescriptions Disp Refills    TRULICITY 4.58 THANH/0.3EQ SOPN [Pharmacy Med Name: TRULICITY 2.50 MU/5.7 ML PEN] 4 mL 0     Sig: INJECT 1 PEN WEEKLY AS DIRECTED

## 2020-12-04 ENCOUNTER — TELEPHONE (OUTPATIENT)
Dept: PODIATRY | Facility: CLINIC | Age: 85
End: 2020-12-04

## 2020-12-04 NOTE — TELEPHONE ENCOUNTER
Spoke with patient to see if he would like to reschedule his cancelled appt.Pt stated he would like to wait a little longer.

## 2020-12-16 RX ORDER — AMLODIPINE BESYLATE 5 MG/1
5 TABLET ORAL DAILY
Qty: 90 TABLET | Refills: 3 | Status: SHIPPED | OUTPATIENT
Start: 2020-12-16 | End: 2022-01-03 | Stop reason: SDUPTHER

## 2020-12-16 RX ORDER — ISOSORBIDE MONONITRATE 60 MG/1
60 TABLET, EXTENDED RELEASE ORAL DAILY
Qty: 90 TABLET | Refills: 3 | Status: ON HOLD | OUTPATIENT
Start: 2020-12-16 | End: 2021-05-04 | Stop reason: SDUPTHER

## 2020-12-16 NOTE — TELEPHONE ENCOUNTER
Received fax from pharmacy requesting refill on pts medication(s). Pt was last seen in office on 10/14/2020  and has a follow up scheduled for 1/14/2021. Will send request to  Dr. Mk Gonzalez  for patient.      Requested Prescriptions     Pending Prescriptions Disp Refills    isosorbide mononitrate (IMDUR) 60 MG extended release tablet 30 tablet 5     Sig: Take 1 tablet by mouth daily

## 2020-12-18 ENCOUNTER — TELEPHONE (OUTPATIENT)
Dept: CARDIOLOGY | Age: 85
End: 2020-12-18

## 2020-12-18 PROCEDURE — 93294 REM INTERROG EVL PM/LDLS PM: CPT | Performed by: CLINICAL NURSE SPECIALIST

## 2020-12-18 PROCEDURE — 93296 REM INTERROG EVL PM/IDS: CPT | Performed by: CLINICAL NURSE SPECIALIST

## 2020-12-18 NOTE — TELEPHONE ENCOUNTER
I spoke with the patient regarding their [facility] report that is due to be sent in. They verbalized an understanding and will be sending a report in as soon as they are able.

## 2020-12-23 RX ORDER — FUROSEMIDE 40 MG/1
TABLET ORAL
Qty: 135 TABLET | Refills: 1 | Status: ON HOLD
Start: 2020-12-23 | End: 2021-05-04 | Stop reason: HOSPADM

## 2020-12-23 NOTE — TELEPHONE ENCOUNTER
Received fax from pharmacy requesting refill on pts medication(s). Pt was last seen in office on 10/14/2020  and has a follow up scheduled for 1/14/2021. Will send request to  Dr. Shanique Cabezas  for patient.      Requested Prescriptions     Pending Prescriptions Disp Refills    furosemide (LASIX) 40 MG tablet [Pharmacy Med Name: FUROSEMIDE 40 MG TAB *DD] 135 tablet 0     Sig: TAKE 1 & 1/2 TABLETS DAILY

## 2020-12-28 ENCOUNTER — INFUSION (OUTPATIENT)
Dept: ONCOLOGY | Facility: HOSPITAL | Age: 85
End: 2020-12-28

## 2020-12-28 VITALS
DIASTOLIC BLOOD PRESSURE: 59 MMHG | TEMPERATURE: 96.9 F | HEART RATE: 68 BPM | RESPIRATION RATE: 18 BRPM | WEIGHT: 247 LBS | OXYGEN SATURATION: 98 % | SYSTOLIC BLOOD PRESSURE: 113 MMHG | BODY MASS INDEX: 35.36 KG/M2 | HEIGHT: 70 IN

## 2020-12-28 DIAGNOSIS — C61 CANCER OF PROSTATE (HCC): Primary | ICD-10-CM

## 2020-12-28 PROCEDURE — 25010000002 LEUPROLIDE 45 MG KIT: Performed by: UROLOGY

## 2020-12-28 PROCEDURE — 96402 CHEMO HORMON ANTINEOPL SQ/IM: CPT

## 2020-12-28 RX ADMIN — LEUPROLIDE ACETATE 45 MG: KIT at 14:46

## 2020-12-29 NOTE — TELEPHONE ENCOUNTER
Pt called, he wants to know if you will refill this. I told him that we have never filled this for him, but I would ask. He has a regular urologist if you want him to get it from him.  It is  at [facility]

## 2021-01-04 RX ORDER — CARVEDILOL 12.5 MG/1
12.5 TABLET ORAL 2 TIMES DAILY WITH MEALS
Qty: 180 TABLET | Refills: 3 | Status: SHIPPED | OUTPATIENT
Start: 2021-01-04

## 2021-01-12 DIAGNOSIS — E78.2 MIXED HYPERLIPIDEMIA: ICD-10-CM

## 2021-01-12 RX ORDER — COLESEVELAM 180 1/1
1875 TABLET ORAL 2 TIMES DAILY WITH MEALS
Qty: 540 TABLET | Refills: 3 | Status: SHIPPED
Start: 2021-01-12 | End: 2021-01-21 | Stop reason: CLARIF

## 2021-01-12 NOTE — TELEPHONE ENCOUNTER
Received fax from pharmacy requesting refill on pts medication(s). Pt was last seen in office on 10/14/2020  and has a follow up scheduled for 1/14/2021. Will send request to  Dr. Reji Ambriz  for patient.      Requested Prescriptions     Pending Prescriptions Disp Refills    [address] 625 MG tablet [Pharmacy Med Name: [address] 625MG TABS] 540 tablet 0     Sig: TAKE 3 TABLETS BY MOUTH TWICE DAILY WITH MEALS

## 2021-01-14 ENCOUNTER — VIRTUAL VISIT (OUTPATIENT)
Dept: PRIMARY CARE CLINIC | Age: 86
End: 2021-01-14
Payer: MEDICARE

## 2021-01-14 DIAGNOSIS — I10 ESSENTIAL HYPERTENSION: ICD-10-CM

## 2021-01-14 DIAGNOSIS — I50.42 CHRONIC COMBINED SYSTOLIC AND DIASTOLIC CONGESTIVE HEART FAILURE (HCC): ICD-10-CM

## 2021-01-14 DIAGNOSIS — J30.0 VASOMOTOR RHINITIS: ICD-10-CM

## 2021-01-14 DIAGNOSIS — R53.83 FATIGUE, UNSPECIFIED TYPE: ICD-10-CM

## 2021-01-14 DIAGNOSIS — E11.42 TYPE 2 DIABETES MELLITUS WITH DIABETIC POLYNEUROPATHY, WITHOUT LONG-TERM CURRENT USE OF INSULIN (HCC): Primary | ICD-10-CM

## 2021-01-14 DIAGNOSIS — E78.2 MIXED HYPERLIPIDEMIA: ICD-10-CM

## 2021-01-14 DIAGNOSIS — I25.10 CORONARY ARTERY DISEASE INVOLVING NATIVE CORONARY ARTERY OF NATIVE HEART WITHOUT ANGINA PECTORIS: ICD-10-CM

## 2021-01-14 DIAGNOSIS — J44.9 CHRONIC OBSTRUCTIVE PULMONARY DISEASE, UNSPECIFIED COPD TYPE (HCC): ICD-10-CM

## 2021-01-14 PROCEDURE — 99443 PR PHYS/QHP TELEPHONE EVALUATION 21-30 MIN: CPT | Performed by: PEDIATRICS

## 2021-01-14 RX ORDER — AZELASTINE 1 MG/ML
2 SPRAY, METERED NASAL 2 TIMES DAILY
Qty: 4 BOTTLE | Refills: 3 | Status: SHIPPED | OUTPATIENT
Start: 2021-01-14 | End: 2021-09-24 | Stop reason: ALTCHOICE

## 2021-01-14 ASSESSMENT — ENCOUNTER SYMPTOMS
BACK PAIN: 0
CHEST TIGHTNESS: 0
COUGH: 0
NAUSEA: 0
DIARRHEA: 0
SHORTNESS OF BREATH: 1
SORE THROAT: 0
VOMITING: 0
ABDOMINAL PAIN: 0
WHEEZING: 0

## 2021-01-14 NOTE — PROGRESS NOTES
Hydrochlorothiazide 12.5 mg daily                                                  Spironolactone 50 mg daily              Aldosterone inhibition: Spironolactone 50 mg daily              Additional meds:         Imdur 60 mg daily                                                  Lanoxin 125 mcg every other day  Symptoms: Marked dyspnea on exertion, peripheral edema, he denies orthopnea  Cardiology follow-up: He had cardiology follow-up appointment on 9/16/2020 (visit benign)  Baseline pro-BNP: proBNP was 814 on 3/27/2020  Most recent echo: 3/28/2020 as noted above.        COPD:  Medication:              Trelegy Ellipta 1 inhalation daily. Symptoms: increasing shortness of breath. He is having trouble with his oxygen regulator. He had this unit evaluated today. He has an overnight pulse ox tonight  He is pending a sleep study. He is using his BIPAP every night. He does follow with pulmonary       vitals were not taken for this visit. There is no height or weight on file to calculate BMI. I have reviewed the following with the Mr. Amber Rock   Lab Review  No visits with results within 6 Month(s) from this visit. Latest known visit with results is:   Orders Only on 07/13/2020   Component Date Value    Hemoglobin A1C 07/13/2020 5.2      Copies of these are in the chart.     Current Outpatient Medications   Medication Sig Dispense Refill    azelastine (ASTELIN) 0.1 % nasal spray 2 sprays by Nasal route 2 times daily Use in each nostril as directed 4 Bottle 3    colesevelam (WELCHOL) 625 MG tablet Take 3 tablets by mouth 2 times daily (with meals) 540 tablet 3    carvedilol (COREG) 12.5 MG tablet Take 1 tablet by mouth 2 times daily (with meals) 180 tablet 3    enzalutamide (XTANDI) 40 MG capsule Take 4 tablets daily 360 capsule 1    furosemide (LASIX) 40 MG tablet TAKE 1 & 1/2 TABLETS DAILY 135 tablet 1    isosorbide mononitrate (IMDUR) 60 MG extended release tablet Take 1 tablet by mouth daily 90 tablet  Alcohol Swabs (ALCOHOL PREP) PADS Use daily with glucose checks DX: E11.9 100 each 0    blood glucose monitor kit and supplies Test one times a day & as needed for symptoms of irregular blood glucose. DX:E11.9 1 kit 0    aspirin 81 MG EC tablet Take 1 tablet by mouth daily 30 tablet 0    nitroGLYCERIN (NITROSTAT) 0.4 MG SL tablet up to max of 3 total doses. If no relief after 1 dose, call 911. 25 tablet 0    Wheat Dextrin (BENEFIBER) POWD Take 4 g by mouth 3 times daily (with meals) 1 Can 5    blood glucose monitor kit and supplies Test once a day for symptoms of irregular blood glucose.  1 kit 0    ONE TOUCH LANCETS MISC 1 each by Does not apply route daily 100 each 3    FLUoxetine (PROZAC) 40 MG capsule Take 1 capsule by mouth daily (Patient taking differently: Take 240 mg by mouth daily ) 90 capsule 3    terazosin (HYTRIN) 5 MG capsule Take 1 capsule by mouth nightly 90 capsule 3    fluticasone-umeclidin-vilant (TRELEGY ELLIPTA) 100-62.5-25 MCG/INH AEPB Inhale 1 puff into the lungs daily 1 each 11    albuterol sulfate HFA (PROAIR HFA) 108 (90 Base) MCG/ACT inhaler Inhale 2 puffs into the lungs every 6 hours as needed for Wheezing 8.5 Inhaler 5    hydrochlorothiazide (MICROZIDE) 12.5 MG capsule TAKE 1 CAPSULE BY MOUTH EVERY DAY IN THE MORNING 90 capsule 3    potassium chloride (KLOR-CON 10) 10 MEQ extended release tablet Take 1 tablet by mouth daily 90 tablet 3    Multiple Vitamins-Minerals (THERAPEUTIC MULTIVITAMIN-MINERALS) tablet Take 1 tablet by mouth daily      Lancets MISC 1 each by Does not apply route daily Accu Chek Guid3 Lancets 100 each 5    OXYGEN Inhale 3 L into the lungs nightly       BiPAP Machine MISC by Does not apply route nightly      cetirizine (ZYRTEC) 10 MG tablet Take 1 tablet by mouth daily 30 tablet 0    prazosin (MINIPRESS) 2 MG capsule Take 1 capsule by mouth nightly 90 capsule 3    vitamin B-12 (CYANOCOBALAMIN) 1000 MCG tablet Take 1,000 mcg by mouth daily      enzalutamide (XTANDI) 40 MG capsule Take 4 tablets daily      leuprolide (LUPRON) 30 MG injection Inject 30 mg into the muscle once Every 4 months      FISH OIL Take 1 capsule by mouth 2 times daily. No current facility-administered medications for this visit. Allergies: Patient has no known allergies. Past Medical History:   Diagnosis Date    Anxiety     Arthritis     Atrial fibrillation ([facility]     Blood circulation, collateral     Cancer (HCC)     prostate, had treatment    Cellulitis     left leg    CHF (congestive heart failure) ([facility])     Chronic kidney disease     COPD (chronic obstructive pulmonary disease) (HCC)     Depression     Hyperlipidemia     Hypertension     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 ([facility] 3/28/2020    Lung mass     Neuromuscular disorder ([facility])     Neuropathy     Other disorders of kidney and ureter in diseases classified elsewhere     Palliative care patient 11/15/2018    Pneumonia     Type II or unspecified type diabetes mellitus without mention of complication, not stated as uncontrolled        Family History   Problem Relation Age of Onset    Heart Attack Mother     Cancer Father        Past Surgical History:   Procedure Laterality Date    COLONOSCOPY      EYE SURGERY      EYE SURGERY      HERNIA REPAIR      umbilical with Dr Otis Harvey         Social History     Tobacco Use    Smoking status: Never Smoker    Smokeless tobacco: Never Used   Substance Use Topics    Alcohol use: No        Review of Systems   Constitutional: Positive for fatigue. Negative for chills and fever. HENT: Negative for congestion, ear pain and sore throat. Eyes: Negative for visual disturbance. Respiratory: Positive for shortness of breath (baseline). Negative for cough, chest tightness and wheezing. Cardiovascular: Negative for chest pain, palpitations and leg swelling. Gastrointestinal: Negative for abdominal pain, diarrhea, nausea and vomiting. Endocrine: Negative for polyuria. Genitourinary: Negative for frequency and urgency. Musculoskeletal: Positive for arthralgias (worse since stopping celebrex. ). Negative for back pain and neck pain. Skin: Negative for rash. Neurological: Negative for dizziness and headaches. Psychiatric/Behavioral: Negative for decreased concentration, dysphoric mood (stable), self-injury, sleep disturbance (usually pretty good. ) and suicidal ideas. The patient is nervous/anxious (off and on, but not a problem right now. ). Physical Exam  Physical exam was not performed today as this was a telephone conference visit      ASSESSMENT      ICD-10-CM    1. Type 2 diabetes mellitus with diabetic polyneuropathy, without long-term current use of insulin ([facility])  E11.42 Hemoglobin A1C     Microalbumin / Creatinine Urine Ratio     Diabetic Shoe   2. Essential hypertension  I10 CBC Auto Differential     Comprehensive Metabolic Panel     Microalbumin / Creatinine Urine Ratio   3. Mixed hyperlipidemia  E78.2 Lipid Panel   4. Coronary artery disease involving native coronary artery of native heart without angina pectoris  I25.10    5. Chronic combined systolic and diastolic congestive heart failure (HCC)  I50.42    6. Chronic obstructive pulmonary disease, unspecified COPD type ([facility]  J44.9    7. Vasomotor rhinitis  J30.0 azelastine (ASTELIN) 0.1 % nasal spray   8. Fatigue, unspecified type  R53.83 T4, Free     TSH without Reflex         PLAN    1. Type 2 diabetes mellitus with diabetic polyneuropathy, without long-term current use of insulin ([facility])  He has not been as diligent in checking his blood sugars. He will do a little better job with this. He also needs a pair of diabetic shoes. We will send in the appropriate forms to at home medical today. We obviously did not do a foot exam given that this is a telephone call visit.   He does state that his feet are now healed up. He did have an ulcer on his left heel in the past.  He does qualify for diabetic shoes and has in the past as well. We will check routine labs to ensure stability  - Hemoglobin A1C; Future  - Microalbumin / Creatinine Urine Ratio; Future  - Diabetic Shoe    2. Essential hypertension  Blood pressure has been excellently controlled. Continue the same present medications  - CBC Auto Differential; Future  - Comprehensive Metabolic Panel; Future  - Microalbumin / Creatinine Urine Ratio; Future    3. Mixed hyperlipidemia  Recheck lipids on present medication regimen. We need to ensure stability in order to control risk  - Lipid Panel; Future    4. Coronary artery disease involving native coronary artery of native heart without angina pectoris  No recent angina symptoms. 5. Chronic combined systolic and diastolic congestive heart failure ([address]  He is watching his weight on a daily basis. He is also adjusting his diuretics accordingly. He is trying to maintain a low-sodium diet. Risk factors are fairly well controlled at this point time he appears to be relatively well compensated based upon historical and self-report data    6. Chronic obstructive pulmonary disease, unspecified COPD type ([address]  He is stable on inhalers and his breathing has not changed. 7. Vasomotor rhinitis  We will try adding Astelin nasal spray. This will be 2 sprays each nostril twice daily. We did discuss that this medication can be drying to the nasal mucosa. He will try to utilize ibuprofen prior to minimize this process  - azelastine (ASTELIN) 0.1 % nasal spray; 2 sprays by Nasal route 2 times daily Use in each nostril as directed  Dispense: 4 Bottle; Refill: 3    8. Fatigue, unspecified type  Recheck thyroid  - T4, Free; Future  - TSH without Reflex;  Future      Orders Placed This Encounter   Procedures    Diabetic Shoe    CBC Auto Differential    Comprehensive Metabolic Panel    Hemoglobin A1C    Lipid Panel    Microalbumin / Creatinine Urine Ratio    T4, Free    TSH without Reflex        Return in about 3 months (around 4/14/2021) for 30. Due to patients co-morbidities and risk for potential exposure of COVID 19 pandemic. Patient was contacted and agreed to proceed with a telephone virtual visit. The risks and benefits of converting to a telephone virtual visit were discussed in light of the current infectious disease epidemic. Patient also understood that insurance coverage and co-pays are up to their individual insurance plans. Provider performed history of present illness and review of systems. Diagnosis and treatment plan was discussed with patient. Pharmacy of choice was reviewed along with past medical history, medication allergies, and current medications. Education provided to patient or patient parents/guardian with current illness diagnosis as well as when to seek additional healthcare due to changing or for worsening symptoms. Patient voiced understanding. 30 minutes were spent on the phone with patient. Teresa Whitmore is a 81 y.o. male being evaluated by a Virtual Visit (Telephone visit) encounter to address concerns as mentioned above. A caregiver was present when appropriate. Due to this being a TeleHealth encounter (During GGZIS-07 public health emergency), evaluation of the following organ systems was limited: Vitals/Constitutional/EENT/Resp/CV/GI//MS/Neuro/Skin/Heme-Lymph-Imm. Pursuant to the emergency declaration under the [address] [place] [address], [address] [place] [address] authority and the [employer] Act, this Virtual Visit was conducted with patient's (and/or legal guardian's) consent, to reduce the patient's risk of exposure to COVID-19 and provide necessary medical care.   The patient (and/or legal guardian) has also been advised to contact this office for worsening conditions or problems, and seek emergency medical treatment and/or call 911 if deemed necessary. Patient identification was verified at the start of the visit: Yes    Total time spent for this encounter: 30m    Services were provided through a video synchronous discussion virtually to substitute for in-person clinic visit. Patient and provider were located at their individual homes. --HEATH Teague DO on 1/14/2021 at 12:51 PM    An electronic signature was used to authenticate this note.

## 2021-01-20 NOTE — TELEPHONE ENCOUNTER
Received a denial from [facility] on pts Welchol. This medication cannot be approved because     The drug you asked for is non-formulary. If you have not tried the preferred drugs, please talk to your health care provider about prescribing one of these for you. These are the preferred drugs:    - atorvastatin tablet  - cholestyramine(with sugar) oral powder  - Cholestyramine Light oral powder  - ezetimibe tablet  - Prevalite oral powder    Will send these to provider for further recommendations.

## 2021-01-21 RX ORDER — CHOLESTYRAMINE 4 G/9G
1 POWDER, FOR SUSPENSION ORAL DAILY
Qty: 30 PACKET | Refills: 5 | Status: SHIPPED | OUTPATIENT
Start: 2021-01-21 | End: 2021-05-24

## 2021-02-03 RX ORDER — LANCETS 28 GAUGE
EACH MISCELLANEOUS
Qty: 100 EACH | Refills: 3 | Status: ON HOLD
Start: 2021-02-03 | End: 2021-09-13 | Stop reason: HOSPADM

## 2021-02-03 NOTE — TELEPHONE ENCOUNTER
Received fax from pharmacy requesting refill on pts medication(s). Pt was last seen in office on 1/14/2021  and has a follow up scheduled for 4/14/2021. Will send request to  Dr. Elana Vasques  for patient.      Requested Prescriptions     Pending Prescriptions Disp Refills    Easy Touch Lancets 28G/Twist MISC [Pharmacy Med Name: EASY TOUCH 28GA LANCET]  0     Sig: USE TO TEST BLOOD SUGAR ONCE DAILY

## 2021-02-04 DIAGNOSIS — N40.0 BENIGN NODULAR PROSTATIC HYPERPLASIA WITHOUT LOWER URINARY TRACT SYMPTOMS: ICD-10-CM

## 2021-02-04 RX ORDER — TERAZOSIN 5 MG/1
5 CAPSULE ORAL NIGHTLY
Qty: 90 CAPSULE | Refills: 3 | Status: ON HOLD
Start: 2021-02-04 | End: 2021-05-04 | Stop reason: HOSPADM

## 2021-02-04 NOTE — TELEPHONE ENCOUNTER
Received fax from pharmacy requesting refill on pts medication(s). Pt was last seen in office on 1/14/2021  and has a follow up scheduled for 4/14/2021. Will send request to  Dr. Fidelina Damon  for patient. Requested Prescriptions     Pending Prescriptions Disp Refills    terazosin (HYTRIN) 5 MG capsule [Pharmacy Med Name: TERAZOSIN 5MG CAP] 90 capsule 0     Sig: TAKE 1 CAPSULE BY MOUTH DAILY AT BEDTIME.

## 2021-02-17 DIAGNOSIS — J43.9 MIXED RESTRICTIVE AND OBSTRUCTIVE LUNG DISEASE (HCC): ICD-10-CM

## 2021-02-17 DIAGNOSIS — J98.4 MIXED RESTRICTIVE AND OBSTRUCTIVE LUNG DISEASE (HCC): ICD-10-CM

## 2021-02-17 DIAGNOSIS — J44.9 CHRONIC OBSTRUCTIVE PULMONARY DISEASE, UNSPECIFIED COPD TYPE (HCC): ICD-10-CM

## 2021-02-17 RX ORDER — FLUTICASONE FUROATE, UMECLIDINIUM BROMIDE AND VILANTEROL TRIFENATATE 100; 62.5; 25 UG/1; UG/1; UG/1
POWDER RESPIRATORY (INHALATION)
Qty: 1 EACH | Refills: 11 | Status: SHIPPED | OUTPATIENT
Start: 2021-02-17 | End: 2021-12-09 | Stop reason: SDUPTHER

## 2021-02-17 NOTE — TELEPHONE ENCOUNTER
Requested Prescriptions     Pending Prescriptions Disp Refills    [address] 100-62.5-25 MCG/INH AEPB [Pharmacy Med Name: [address] 100-62.5-25] 1 each 11     Sig: INHALE 1 PUFF ONCE DAILY

## 2021-02-25 DIAGNOSIS — F33.1 MODERATE EPISODE OF RECURRENT MAJOR DEPRESSIVE DISORDER (HCC): ICD-10-CM

## 2021-02-25 RX ORDER — BUPROPION HYDROCHLORIDE 150 MG/1
300 TABLET ORAL EVERY MORNING
Qty: 180 TABLET | Refills: 3 | Status: SHIPPED | OUTPATIENT
Start: 2021-02-25 | End: 2022-01-03

## 2021-02-25 RX ORDER — DIGOXIN 125 MCG
125 TABLET ORAL DAILY
Qty: 90 TABLET | Refills: 3 | Status: ON HOLD
Start: 2021-02-25 | End: 2021-05-04 | Stop reason: HOSPADM

## 2021-02-25 NOTE — TELEPHONE ENCOUNTER
Received fax from pharmacy requesting refill on pts medication(s). Pt was last seen in office on 1/14/2021  and has a follow up scheduled for 2/25/2021. Will send request to  Dr. Miah Romo  for patient.      Requested Prescriptions     Pending Prescriptions Disp Refills    digoxin (LANOXIN) 125 MCG tablet [Pharmacy Med Name: DIGOXIN 125 MCG TABLET *DD] 90 tablet 0     Sig: TAKE 1 TABLET BY MOUTH ONCE DAILY

## 2021-03-16 ENCOUNTER — OFFICE VISIT (OUTPATIENT)
Dept: CARDIOLOGY CLINIC | Age: 86
End: 2021-03-16
Payer: MEDICARE

## 2021-03-16 VITALS
OXYGEN SATURATION: 98 % | HEART RATE: 60 BPM | WEIGHT: 245 LBS | BODY MASS INDEX: 35.07 KG/M2 | DIASTOLIC BLOOD PRESSURE: 60 MMHG | SYSTOLIC BLOOD PRESSURE: 118 MMHG | HEIGHT: 70 IN

## 2021-03-16 DIAGNOSIS — I49.5 SA NODE DYSFUNCTION (HCC): ICD-10-CM

## 2021-03-16 DIAGNOSIS — Z95.0 PACEMAKER: ICD-10-CM

## 2021-03-16 DIAGNOSIS — Z79.01 CHRONIC ANTICOAGULATION: ICD-10-CM

## 2021-03-16 DIAGNOSIS — E78.5 HYPERLIPIDEMIA, UNSPECIFIED HYPERLIPIDEMIA TYPE: ICD-10-CM

## 2021-03-16 DIAGNOSIS — I25.10 CORONARY ARTERY DISEASE INVOLVING NATIVE CORONARY ARTERY OF NATIVE HEART WITHOUT ANGINA PECTORIS: Primary | ICD-10-CM

## 2021-03-16 DIAGNOSIS — I10 ESSENTIAL HYPERTENSION: ICD-10-CM

## 2021-03-16 DIAGNOSIS — I48.20 CHRONIC ATRIAL FIBRILLATION (HCC): ICD-10-CM

## 2021-03-16 DIAGNOSIS — I50.32 CHRONIC DIASTOLIC HEART FAILURE (HCC): ICD-10-CM

## 2021-03-16 PROCEDURE — 93279 PRGRMG DEV EVAL PM/LDLS PM: CPT | Performed by: NURSE PRACTITIONER

## 2021-03-16 PROCEDURE — 4040F PNEUMOC VAC/ADMIN/RCVD: CPT | Performed by: NURSE PRACTITIONER

## 2021-03-16 PROCEDURE — G8484 FLU IMMUNIZE NO ADMIN: HCPCS | Performed by: NURSE PRACTITIONER

## 2021-03-16 PROCEDURE — 1036F TOBACCO NON-USER: CPT | Performed by: NURSE PRACTITIONER

## 2021-03-16 PROCEDURE — 99214 OFFICE O/P EST MOD 30 MIN: CPT | Performed by: NURSE PRACTITIONER

## 2021-03-16 PROCEDURE — G8417 CALC BMI ABV UP PARAM F/U: HCPCS | Performed by: NURSE PRACTITIONER

## 2021-03-16 PROCEDURE — G8427 DOCREV CUR MEDS BY ELIG CLIN: HCPCS | Performed by: NURSE PRACTITIONER

## 2021-03-16 PROCEDURE — 1123F ACP DISCUSS/DSCN MKR DOCD: CPT | Performed by: NURSE PRACTITIONER

## 2021-03-16 NOTE — PROGRESS NOTES
Dear Dr. Patty Hannah DO,    Thank you for allowing me to participate in the care of Mr. Sheela Torres. He presents today at the [address] [place] [address]. As you know, Mr. Denise Gomez is a 81 y.o. male with history of hypertension, hyperlipidemia, diabetes, atrial fibrillation, chronic anticoagulation, COPD, and CAD who presents with the chief complaint of follow-up for chronic cardiac conditions. He is a former patient of Dr. Bartolome Meneses. Last seen, he has been stable from a cardiac standpoint. He denies any exertional chest pain. He has APONTE which is chronic and stable from his COPD. He denies any fast or slow heart rhythms. He denies any bleeding issues on the Xarelto. he is sleeping on 1 pillow at night. he is not waking gasping for air. he denies any swelling. he has been compliant with his medications. his BP has been controlled. PCP follows labs and lipids. He otherwise denies chest pain, SOA, PND, orthopnea, syncope or near syncope. He has no other complaints. Review of Systems   Constitutional: Negative for fever, chills, diaphoresis, activity change, appetite change, fatigue and unexpected weight change. Eyes: Negative for photophobia, pain, redness and visual disturbance. Respiratory: Positive for APONTE (chronic, stable). Negative for apnea, cough, chest tightness, shortness of breath, wheezing and stridor. Cardiovascular: Negative for chest pain, palpitations and leg swelling. Gastrointestinal: Negative for abdominal distention. Genitourinary: Negative for dysuria, urgency and frequency. Musculoskeletal: Negative for myalgias, arthralgias and gait problem. Skin: Negative for color change, pallor, rash and wound. Neurological: Negative for dizziness, tremors, speech difficulty, weakness and numbness. Hematological: Does not bruise/bleed easily. Psychiatric/Behavioral: Negative.         Past Medical History:   Diagnosis Date    Anxiety     Arthritis     Atrial fibrillation ([facility]     Blood circulation, collateral     Cancer (HCC)     prostate, had treatment    Cellulitis     left leg    CHF (congestive heart failure) (HCC)     Chronic kidney disease     COPD (chronic obstructive pulmonary disease) (HCC)     Depression     Hyperlipidemia     Hypertension     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 ([facility] 3/28/2020    Lung mass     Neuromuscular disorder (HCC)     Neuropathy     Other disorders of kidney and ureter in diseases classified elsewhere     Palliative care patient 11/15/2018    Pneumonia     Type II or unspecified type diabetes mellitus without mention of complication, not stated as uncontrolled        Past Surgical History:   Procedure Laterality Date    COLONOSCOPY      EYE SURGERY      EYE SURGERY      HERNIA REPAIR      umbilical with Dr Rashard Kirkland         Family History   Problem Relation Age of Onset    Heart Attack Mother     Cancer Father        Social History     Socioeconomic History    Marital status:       Spouse name: Not on file    Number of children: Not on file    Years of education: Not on file    Highest education level: Not on file   Occupational History    Not on file   Social Needs    Financial resource strain: Not on file    Food insecurity     Worry: Not on file     Inability: Not on file    Transportation needs     Medical: Not on file     Non-medical: Not on file   Tobacco Use    Smoking status: Never Smoker    Smokeless tobacco: Never Used   Substance and Sexual Activity    Alcohol use: No    Drug use: No    Sexual activity: Not on file   Lifestyle    Physical activity     Days per week: Not on file     Minutes per session: Not on file    Stress: Not on file   Relationships    Social connections     Talks on phone: Not on file     Gets together: Not on file     Attends Confucianism service: Not on file     Active member of club or organization: Not on file     Attends meetings of clubs or organizations: Not on file     Relationship status: Not on file    Intimate partner violence     Fear of current or ex partner: Not on file     Emotionally abused: Not on file     Physically abused: Not on file     Forced sexual activity: Not on file   Other Topics Concern    Not on file   Social History Narrative    Not on file       No Known Allergies      Current Outpatient Medications:     digoxin (LANOXIN) 125 MCG tablet, Take 1 tablet by mouth daily, Disp: 90 tablet, Rfl: 3    buPROPion (WELLBUTRIN XL) 150 MG extended release tablet, Take 2 tablets by mouth every morning, Disp: 180 tablet, Rfl: 3    TRELEGY ELLIPTA 100-62.5-25 MCG/INH AEPB, INHALE 1 PUFF ONCE DAILY, Disp: 1 each, Rfl: 11    terazosin (HYTRIN) 5 MG capsule, Take 1 capsule by mouth nightly, Disp: 90 capsule, Rfl: 3    Easy Touch Lancets 28G/Twist MISC, Use to test Blood sugar daily, Disp: 100 each, Rfl: 3    cholestyramine (QUESTRAN) 4 g packet, Take 1 packet by mouth daily, Disp: 30 packet, Rfl: 5    azelastine (ASTELIN) 0.1 % nasal spray, 2 sprays by Nasal route 2 times daily Use in each nostril as directed, Disp: 4 Bottle, Rfl: 3    carvedilol (COREG) 12.5 MG tablet, Take 1 tablet by mouth 2 times daily (with meals), Disp: 180 tablet, Rfl: 3    enzalutamide (XTANDI) 40 MG capsule, Take 4 tablets daily, Disp: 360 capsule, Rfl: 1    furosemide (LASIX) 40 MG tablet, TAKE 1 & 1/2 TABLETS DAILY, Disp: 135 tablet, Rfl: 1    isosorbide mononitrate (IMDUR) 60 MG extended release tablet, Take 1 tablet by mouth daily, Disp: 90 tablet, Rfl: 3    amLODIPine (NORVASC) 5 MG tablet, Take 1 tablet by mouth daily, Disp: 90 tablet, Rfl: 3    Dulaglutide (TRULICITY) 8.20 HJ/9.3UA SOPN, Inject 0.75 mg as directed every 7 days, Disp: 6 mL, Rfl: 3    blood glucose test strips (EASY TOUCH TEST) strip, USE TO TEST BLOOD SUGAR ONCE DAILY, Disp: 100 strip, Rfl: 3    Lift Chair MISC, by Does not apply route Prefers leather At [address] faxed through [employer], Disp: 1 each, Rfl: 0    lisinopril (PRINIVIL;ZESTRIL) 40 MG tablet, Take 1 tablet by mouth daily, Disp: 90 tablet, Rfl: 3    celecoxib (CELEBREX) 200 MG capsule, Take 1 capsule by mouth 2 times daily, Disp: 60 capsule, Rfl: 11    potassium chloride (KLOR-CON M) 10 MEQ extended release tablet, Take 1 tablet by mouth daily, Disp: 90 tablet, Rfl: 3    rivaroxaban (XARELTO) 20 MG TABS tablet, Take 1 tablet by mouth daily (with breakfast), Disp: 90 tablet, Rfl: 3    metOLazone (ZAROXOLYN) 2.5 MG tablet, Take 1 tablet by mouth daily, Disp: 90 tablet, Rfl: 1    MYRBETRIQ 25 MG TB24, Take 1 tablet by mouth daily, Disp: 30 tablet, Rfl: 5    fluticasone (FLONASE) 50 MCG/ACT nasal spray, 2 sprays by Nasal route daily, Disp: 1 Bottle, Rfl: 3    spironolactone (ALDACTONE) 50 MG tablet, TAKE 1 TABLET BY MOUTH EVERY DAY, Disp: 90 tablet, Rfl: 3    metFORMIN (GLUCOPHAGE) 500 MG tablet, Take 1 tablet by mouth 2 times daily (with meals), Disp: 180 tablet, Rfl: 3    Psyllium (KONSYL) 28.3 % POWD, Take 4 g by mouth 2 times daily (with meals), Disp: 1 Bottle, Rfl: 11    hydroCHLOROthiazide (MICROZIDE) 12.5 MG capsule, Take 1 capsule by mouth daily, Disp: 30 capsule, Rfl: 11    atorvastatin (LIPITOR) 40 MG tablet, Take 1 tablet by mouth nightly, Disp: 90 tablet, Rfl: 3    blood glucose monitor strips, Test one times a day & as needed for symptoms of irregular blood glucose. DX: E11.9, Disp: 100 strip, Rfl: 3    Lancets MISC, 1 each by Does not apply route daily DX: E11.9, Disp: 100 each, Rfl: 0    Alcohol Swabs (ALCOHOL PREP) PADS, Use daily with glucose checks DX: E11.9, Disp: 100 each, Rfl: 0    blood glucose monitor kit and supplies, Test one times a day & as needed for symptoms of irregular blood glucose.  DX:E11.9, Disp: 1 kit, Rfl: 0    aspirin 81 MG EC tablet, Take 1 tablet by mouth daily, Disp: 30 tablet, Rfl: 0    nitroGLYCERIN (NITROSTAT) 0.4 MG SL tablet, up to max of 3 total doses. If no relief after 1 dose, call 911., Disp: 25 tablet, Rfl: 0    Wheat Dextrin (BENEFIBER) POWD, Take 4 g by mouth 3 times daily (with meals), Disp: 1 Can, Rfl: 5    blood glucose monitor kit and supplies, Test once a day for symptoms of irregular blood glucose., Disp: 1 kit, Rfl: 0    FLUoxetine (PROZAC) 40 MG capsule, Take 1 capsule by mouth daily (Patient taking differently: Take 40 mg by mouth ), Disp: 90 capsule, Rfl: 3    albuterol sulfate HFA (PROAIR HFA) 108 (90 Base) MCG/ACT inhaler, Inhale 2 puffs into the lungs every 6 hours as needed for Wheezing, Disp: 8.5 Inhaler, Rfl: 5    potassium chloride (KLOR-CON 10) 10 MEQ extended release tablet, Take 1 tablet by mouth daily, Disp: 90 tablet, Rfl: 3    Multiple Vitamins-Minerals (THERAPEUTIC MULTIVITAMIN-MINERALS) tablet, Take 1 tablet by mouth daily, Disp: , Rfl:     Lancets MISC, 1 each by Does not apply route daily Accu Chek Guid3 Lancets, Disp: 100 each, Rfl: 5    OXYGEN, Inhale 3 L into the lungs nightly , Disp: , Rfl:     BiPAP Machine MISC, by Does not apply route nightly, Disp: , Rfl:     cetirizine (ZYRTEC) 10 MG tablet, Take 1 tablet by mouth daily, Disp: 30 tablet, Rfl: 0    prazosin (MINIPRESS) 2 MG capsule, Take 1 capsule by mouth nightly, Disp: 90 capsule, Rfl: 3    vitamin B-12 (CYANOCOBALAMIN) 1000 MCG tablet, Take 1,000 mcg by mouth daily, Disp: , Rfl:     enzalutamide (XTANDI) 40 MG capsule, Take 4 tablets daily, Disp: , Rfl:     leuprolide (LUPRON) 30 MG injection, Inject 30 mg into the muscle once Every 4 months, Disp: , Rfl:     FISH OIL, Take 1 capsule by mouth 2 times daily. , Disp: , Rfl:     FLUoxetine (PROZAC) 20 MG capsule, Take 1 capsule by mouth daily (Patient not taking: Reported on 3/16/2021), Disp: 90 capsule, Rfl: 3    PE:  Vitals:    03/16/21 1502   BP: 118/60   Pulse: 60   SpO2: 98%       Estimated body mass index is 35.15 kg/m² as calculated from the following:    Height as of this encounter: 5' 10\" (1.778 m). Weight as of this encounter: 245 lb (111.1 kg). Constitutional: He is oriented to person, place, and time. He appears well-developed and well-nourished in no acute distress. Neck:  Neck supple without JVD present. No trachea deviation present. No thyromegaly present. Eyes:Conjunctivae and EOM are normal. Pupils equal and reactive to light. ENT:Hearing appears normal.conjunctiva and lids are normal, ears and nose appear normal.  Cardiovascular: Normal rate, Normal rhythm, normal heart sounds. No murmur ascultated. No gallop and no friction rub. No carotid bruits. Trace peripheral edema. Pulmonary/Chest:  Lungs clear to auscultation bilaterally without evidence of respiratory distress. He without wheezes. He without rales or ronchi. Musculoskeletal: Normal range of motion. He uses a cane as an assitive device. Head is normocephalic and atraumatic. Skin: Skin is warm and dry without rash or pallor. Psychiatric:He is alert and oriented to person, place, and time. He has a normal mood and affect. His behavior is normal. Thought content normal.       Lab Results   Component Value Date    CREATININE 1.1 03/30/2020    CREATININE 1.2 03/29/2020    CREATININE 1.1 03/28/2020    HGB 11.4 03/30/2020    HGB 11.0 03/29/2020    HGB 11.8 03/28/2020    PROBNP 814 03/27/2020    PROBNP 799 12/21/2018    PROBNP 1,405 11/14/2018       Pacemaker interrogated  Presenting rhythm: ,   98.8%  Battey voltage 3.01 V  Lead status:  Lead impedance within range and stable  Sensing:    On auto check R waves 9.3 mV  Thresholds: Ventricular 0.5 V@ 0.4ms  Observations:  1 episode SVT  Reprogramming for sensitivity and threshold testing  /[facility]  [facility]           Assessment, Recommendations, & Plan:  81 y.o. male with CAD, HTN, HLD, chronic atrial fibrillation, chronic anticoagulation, chronic diastolic heart failure, SA node dysfunction, & pacemaker    CAD-on ASA, Coreg, lisinopril, & Lipitor, no changes    HTN-controlled, no changes    HLD-followed by PCP, on Lipitor door, no changes  The panel from 3/27/2020 reviewed  Total cholesterol-154  Triglycerides-285  HDL-46  LDL-51    Chronic afib/chronic anticoagulation-Rate controlled on  Coreg and digoxin. Anticoagulated on Xarelto without bleeding issues. No changes    Chronic diastolic heart failure-on lisinopril, Coreg, Lasix and Zaroxolyn-no signs of fluid overload, monitor    SA node dysfunction/pacemaker-pacemaker is functioning appropriately. Next [facility] scheduled for 6/17/2021      Disposition - RTC in 6 months with Dr. Zakia García sooner if needed      Please do not hesitate to contact me for any questions or concerns.     Sincerely yours,    ISAAC Choe

## 2021-03-17 NOTE — TELEPHONE ENCOUNTER
Received fax from pharmacy requesting refill on pts medication(s). Pt was last seen in office on 1/14/2021  and has a follow up scheduled for 4/14/2021. Will send request to  Dr. Anmol Booker  for patient.      Requested Prescriptions     Pending Prescriptions Disp Refills    mirabegron (MYRBETRIQ) 25 MG TB24 [Pharmacy Med Name: Logabriellea Spies ER 25 MG TABLET] 90 tablet 3     Sig: Take 1 tablet by mouth daily

## 2021-03-24 DIAGNOSIS — J32.9 SINUSITIS: ICD-10-CM

## 2021-03-24 NOTE — TELEPHONE ENCOUNTER
Received fax from pharmacy requesting refill on pts medication(s). Pt was last seen in office on 2021  and has a follow up scheduled for 2021. Will send request to  Dr. Brijesh Devlin  for authorization.      Requested Prescriptions     Pending Prescriptions Disp Refills    fluticasone (FLONASE) 50 MCG/ACT nasal spray [Pharmacy Med Name: FLUTICASONE 50 MCG NASAL *DD] 16 g 11     Si sprays by Nasal route daily

## 2021-03-25 RX ORDER — FLUTICASONE PROPIONATE 50 MCG
2 SPRAY, SUSPENSION (ML) NASAL DAILY
Qty: 16 G | Refills: 11 | Status: SHIPPED | OUTPATIENT
Start: 2021-03-25 | End: 2021-09-24 | Stop reason: ALTCHOICE

## 2021-03-29 DIAGNOSIS — E78.2 MIXED HYPERLIPIDEMIA: ICD-10-CM

## 2021-03-30 RX ORDER — ATORVASTATIN CALCIUM 40 MG/1
40 TABLET, FILM COATED ORAL NIGHTLY
Qty: 90 TABLET | Refills: 3 | Status: SHIPPED | OUTPATIENT
Start: 2021-03-30

## 2021-03-30 NOTE — TELEPHONE ENCOUNTER
Received fax from pharmacy requesting refill on pts medication(s). Pt was last seen in office on 1/14/2021  and has a follow up scheduled for 4/14/2021. Will send request to  Dr. Silvana Fam  for patient.      Requested Prescriptions     Pending Prescriptions Disp Refills    atorvastatin (LIPITOR) 40 MG tablet [Pharmacy Med Name: ATORVASTATIN 40 MG TABLET] 90 tablet 0     Sig: TAKE 1 TABLET BY MOUTH AT BEDTIME

## 2021-04-12 NOTE — PROGRESS NOTES
"Background:  Pt with right paratracheal mass, hx prostate cancer, copd with nocturnal hypoxemia, sleep apnea and obestiy.  FEV1 53% pred 2015.  Pt canceled  initial evaluation 7/2019   Chief Complaint  COPD    Subjective    History of Present Illness       Prakash Castro presents to [facility].  He has been ok with his prostate cancer hx. There is hx lesion in right paratracheal space that has been incompletely worked up.  Inhalers are working ok.  Trelegy is good.  Rarely needs albuterol.  Moderate exertional dyspnea is stable       Objective     Vital Signs:   /84   Pulse 82   Ht 177.8 cm (70\")   Wt 111 kg (245 lb)   SpO2 93% Comment: RA  BMI 35.15 kg/m²   Physical Exam  Pulmonary:      Effort: Pulmonary effort is normal. No respiratory distress.      Breath sounds: No wheezing.   Skin:     Comments: Few excoriations        Result Review  Data Reviewed:{ Labs  Result Review  Imaging  Media :23}       PFT Values        Some values may be hidden. Unless noted otherwise, only the newest values recorded on each date are displayed.         Old Values PFT Results 3/12/20   No data to display.      Pre Drug PFT Results 3/12/20   FVC 56   FEV1 50   FEF 25-75% 30   FEV1/FVC 68.76      Post Drug PFT Results 3/12/20   No data to display.      Other Tests PFT Results 3/12/20   TLC 77      DLCO 80   D/VAsb 138                      Assessment and Plan  {CC Problem List  Visit Diagnosis  ROS  Review (Popup)  Health Maintenance  Quality  BestPractice  Medications  SmartSets  SnapShot Encounters  Media :23}   Problem List Items Addressed This Visit        Pulmonary Problems    Centrilobular emphysema (CMS/HCC)    Relevant Medications    albuterol sulfate  (90 Base) MCG/ACT inhaler    Other Relevant Orders    CT Chest With Contrast Diagnostic    Obstructive sleep apnea    Overview     BiPAP +3 L            Other    Mediastinal adenopathy    " Relevant Orders    CT Chest With Contrast Diagnostic    POC Creatinine      Other Visit Diagnoses     Chronic respiratory failure with hypoxia (CMS/HCC)    -  Primary    Stage 2 moderate COPD by GOLD classification (CMS/HCC), with exacerbation        Relevant Medications    albuterol sulfate  (90 Base) MCG/ACT inhaler      emphysema and copd are stable; continue trelegy and albuterol; follow up spirometry once pandemic is less of a hindrance  Continue oxygen for resp failure.  Compliant and benefiting  Paratracheal lesion needs workup; repeat ct chest with contrast. Will check creatinine for ct.    Follow Up {Instructions Charge Capture  Follow-up Communications :23}   Return in about 2 months (around 6/13/2021).  Patient was given instructions and counseling regarding his condition or for health maintenance advice. Please see specific information pulled into the AVS if appropriate.    Electronically signed by Darrian Silvestre MD, 4/13/2021, 21:19 CDT

## 2021-04-13 ENCOUNTER — OFFICE VISIT (OUTPATIENT)
Dept: PULMONOLOGY | Facility: CLINIC | Age: 86
End: 2021-04-13

## 2021-04-13 VITALS
DIASTOLIC BLOOD PRESSURE: 84 MMHG | SYSTOLIC BLOOD PRESSURE: 122 MMHG | OXYGEN SATURATION: 93 % | BODY MASS INDEX: 35.07 KG/M2 | HEIGHT: 70 IN | HEART RATE: 82 BPM | WEIGHT: 245 LBS

## 2021-04-13 DIAGNOSIS — J43.2 CENTRILOBULAR EMPHYSEMA (HCC): ICD-10-CM

## 2021-04-13 DIAGNOSIS — G47.33 OBSTRUCTIVE SLEEP APNEA: ICD-10-CM

## 2021-04-13 DIAGNOSIS — J96.11 CHRONIC RESPIRATORY FAILURE WITH HYPOXIA (HCC): Primary | ICD-10-CM

## 2021-04-13 DIAGNOSIS — R59.0 MEDIASTINAL ADENOPATHY: ICD-10-CM

## 2021-04-13 DIAGNOSIS — J44.9 STAGE 2 MODERATE COPD BY GOLD CLASSIFICATION (HCC): ICD-10-CM

## 2021-04-13 PROCEDURE — 99214 OFFICE O/P EST MOD 30 MIN: CPT | Performed by: INTERNAL MEDICINE

## 2021-04-13 RX ORDER — ALBUTEROL SULFATE 90 UG/1
2 AEROSOL, METERED RESPIRATORY (INHALATION) EVERY 4 HOURS PRN
COMMUNITY
End: 2021-08-24 | Stop reason: SDUPTHER

## 2021-04-14 ENCOUNTER — VIRTUAL VISIT (OUTPATIENT)
Dept: PRIMARY CARE CLINIC | Age: 86
End: 2021-04-14
Payer: MEDICARE

## 2021-04-14 DIAGNOSIS — J44.9 CHRONIC OBSTRUCTIVE PULMONARY DISEASE, UNSPECIFIED COPD TYPE (HCC): ICD-10-CM

## 2021-04-14 DIAGNOSIS — I50.42 CHRONIC COMBINED SYSTOLIC AND DIASTOLIC CONGESTIVE HEART FAILURE (HCC): ICD-10-CM

## 2021-04-14 DIAGNOSIS — I25.10 CORONARY ARTERY DISEASE INVOLVING NATIVE CORONARY ARTERY OF NATIVE HEART WITHOUT ANGINA PECTORIS: ICD-10-CM

## 2021-04-14 DIAGNOSIS — R53.83 FATIGUE, UNSPECIFIED TYPE: ICD-10-CM

## 2021-04-14 DIAGNOSIS — K21.9 GASTROESOPHAGEAL REFLUX DISEASE, UNSPECIFIED WHETHER ESOPHAGITIS PRESENT: Primary | ICD-10-CM

## 2021-04-14 DIAGNOSIS — Z12.5 ENCOUNTER FOR SCREENING FOR MALIGNANT NEOPLASM OF PROSTATE: ICD-10-CM

## 2021-04-14 DIAGNOSIS — L85.3 XEROSIS CUTIS: ICD-10-CM

## 2021-04-14 DIAGNOSIS — C61 PROSTATE CANCER (HCC): ICD-10-CM

## 2021-04-14 DIAGNOSIS — E11.42 TYPE 2 DIABETES MELLITUS WITH DIABETIC POLYNEUROPATHY, WITHOUT LONG-TERM CURRENT USE OF INSULIN (HCC): ICD-10-CM

## 2021-04-14 DIAGNOSIS — Z00.00 ROUTINE GENERAL MEDICAL EXAMINATION AT A HEALTH CARE FACILITY: ICD-10-CM

## 2021-04-14 DIAGNOSIS — I10 ESSENTIAL HYPERTENSION: ICD-10-CM

## 2021-04-14 DIAGNOSIS — F33.41 RECURRENT MAJOR DEPRESSIVE DISORDER, IN PARTIAL REMISSION (HCC): ICD-10-CM

## 2021-04-14 DIAGNOSIS — E78.2 MIXED HYPERLIPIDEMIA: ICD-10-CM

## 2021-04-14 DIAGNOSIS — R11.2 NON-INTRACTABLE VOMITING WITH NAUSEA, UNSPECIFIED VOMITING TYPE: ICD-10-CM

## 2021-04-14 DIAGNOSIS — I48.20 CHRONIC ATRIAL FIBRILLATION (HCC): ICD-10-CM

## 2021-04-14 PROCEDURE — 1036F TOBACCO NON-USER: CPT | Performed by: PEDIATRICS

## 2021-04-14 PROCEDURE — G8427 DOCREV CUR MEDS BY ELIG CLIN: HCPCS | Performed by: PEDIATRICS

## 2021-04-14 PROCEDURE — 3023F SPIROM DOC REV: CPT | Performed by: PEDIATRICS

## 2021-04-14 PROCEDURE — 99214 OFFICE O/P EST MOD 30 MIN: CPT | Performed by: PEDIATRICS

## 2021-04-14 PROCEDURE — G8926 SPIRO NO PERF OR DOC: HCPCS | Performed by: PEDIATRICS

## 2021-04-14 PROCEDURE — G0439 PPPS, SUBSEQ VISIT: HCPCS | Performed by: PEDIATRICS

## 2021-04-14 PROCEDURE — 1123F ACP DISCUSS/DSCN MKR DOCD: CPT | Performed by: PEDIATRICS

## 2021-04-14 PROCEDURE — G8417 CALC BMI ABV UP PARAM F/U: HCPCS | Performed by: PEDIATRICS

## 2021-04-14 PROCEDURE — 4040F PNEUMOC VAC/ADMIN/RCVD: CPT | Performed by: PEDIATRICS

## 2021-04-14 RX ORDER — PANTOPRAZOLE SODIUM 40 MG/1
40 TABLET, DELAYED RELEASE ORAL
Qty: 30 TABLET | Refills: 5 | Status: SHIPPED | OUTPATIENT
Start: 2021-04-14 | End: 2021-10-28

## 2021-04-14 RX ORDER — TRIAMCINOLONE ACETONIDE 5 MG/G
OINTMENT TOPICAL
Qty: 60 G | Refills: 5 | Status: SHIPPED | OUTPATIENT
Start: 2021-04-14 | End: 2021-04-21

## 2021-04-14 RX ORDER — ONDANSETRON 4 MG/1
4 TABLET, FILM COATED ORAL 3 TIMES DAILY PRN
Qty: 30 TABLET | Refills: 0 | Status: SHIPPED | OUTPATIENT
Start: 2021-04-14 | End: 2021-05-24

## 2021-04-14 ASSESSMENT — ENCOUNTER SYMPTOMS
CHEST TIGHTNESS: 0
SHORTNESS OF BREATH: 1
SORE THROAT: 0
VOMITING: 1
NAUSEA: 0
BACK PAIN: 0
DIARRHEA: 0
COUGH: 0
ABDOMINAL PAIN: 0
WHEEZING: 0

## 2021-04-14 ASSESSMENT — PATIENT HEALTH QUESTIONNAIRE - PHQ9
1. LITTLE INTEREST OR PLEASURE IN DOING THINGS: 1
SUM OF ALL RESPONSES TO PHQ QUESTIONS 1-9: 2
SUM OF ALL RESPONSES TO PHQ QUESTIONS 1-9: 2

## 2021-04-14 ASSESSMENT — LIFESTYLE VARIABLES: HOW OFTEN DO YOU HAVE A DRINK CONTAINING ALCOHOL: 0

## 2021-04-14 NOTE — PROGRESS NOTES
[address] [facility], [address]  Phone [phone]   Fax [phone]      OFFICE VISIT: 2021    Shreya Blank-: 1935      HPI  Reason For Visit:  Lili Francois is a 81 y.o. Medicare AWV, Rash (dry scale like skin on both arm, itches constantly), and Emesis (vomitting for the past few weeks. Most of the time it is as he is eating or after he eats. He has no appetite. )    He is complaining of dry skin on arms and legs. This is very itchy  This is not red mostly. He has been using lubriderm and aveno but this is not helping. Diabetes Mellitus Type 2  Diet compliance:  compliant most of the time  Nutrition Consultation Needed:  no  Med Type:              Metformin 500 bid              trulicity 1.84ZH weekly  Medication compliance:  compliant all of the time  Weight trend: fluctuating.  stable by his account.    Current exercise: no  Checkin times daily  Home blood sugar records: not checking recently. Low BG:  no  Eye exam current (within one year): yes  Checking Feet regularly:  yes - and now no sores  Home health is helping him. ACE/ARB:  yes - lisinopril  Aspirin: No: but recommended   Tobacco history: He  reports that he has never smoked.  He has never used smokeless tobacco.    Lab Results   Component Value Date    LABA1C 5.2 2020    LABA1C 5.7 2020    LABA1C 5.9 2019     Lab Results   Component Value Date    LABMICR 2.70 2019    CREATININE 1.1 2020       Hypertension:   BP today was   BP Readings from Last 1 Encounters:   21 118/60      Recent BP readings:    BP Readings from Last 3 Encounters:   21 118/60   20 112/60   20 130/72     Medication              Lisinopril 40 mg daily              Carvedilol 12.5 mg twice daily                          Hydrochlorothiazide 12.5 mg daily              Amlodipine 5 mg daily                  Imdur 30mg daily         Medication compliance:  compliant most of the time  Home blood pressure monitoring: No.    He is not adherent to a low sodium diet. Symptoms: none  Laboratory:  Lab Results   Component Value Date    BUN 42 (H) 03/30/2020    CREATININE 1.1 03/30/2020       Hyperlipidemia:   Medication:   atorvastatin (Lipitor)  Low Fat, Low Choleterol Diet:  yes - he tries some  Myalgias or GI upset: no  The patient exercises rarely. Laboratory:    Lab Results   Component Value Date    CHOL 154 (L) 03/27/2020    TRIG 285 (H) 03/27/2020    HDL 46 (L) 03/27/2020    LDLCALC 51 03/27/2020      Lab Results   Component Value Date    ALT 6 03/30/2020    AST 7 03/30/2020       Coronary Artery Disease:  Medications:              Beta-blockade:            Carvedilol 12.5 mg twice daily              RAAS Therapy:           Lisinopril 40 mg daily              Lipid Management:     Atorvastatin 40 mg nightly              Platelet Inhibition:       Aspirin 81 mg daily              Anti-anginal:                Imdur 60 mg daily plus nitroglycerin 0.4 mg sublingual as needed  Symptoms: He is having symptoms that are highly suspicious for anginal equivalent with shortness of breath and diaphoresis as well as nausea with minimal exertion  Nitroglycerin use: He has not been using his nitroglycerin with these episodes  Cardiology follow-up: He just saw cardiology last month.               cardiology follow-up in the near future.   We are going to set up cardiology referral for evaluation by [facility]  Additional studies: No recent        Congestive Heart Failure:  Type:   Ischemic cardiomyopathy              Systolic and Diastolic              HFrEF   Primary rhythm:          Ventricular paced  Medication:              Beta-blockade:           Carvedilol 12.5 mg twice daily              R AAS therapy:           Lisinopril 40 mg daily              Diuretics                      Zaroxolyn 2.5 mg daily                                                  Lasix 60 mg daily                                                  Hydrochlorothiazide 12.5 mg daily                                                  Spironolactone 50 mg daily              Aldosterone inhibition: Spironolactone 50 mg daily              Additional meds:         Imdur 60 mg daily                                                  Lanoxin 125 mcg every other day  Symptoms: Marked dyspnea on exertion, peripheral edema, he denies orthopnea  Cardiology follow-up: He had cardiology follow-up appointment on 9/16/2020 (visit benign)  Baseline pro-BNP: proBNP was 814 on 3/27/2020  Most recent echo: 3/28/2020 as noted above.        COPD:  Medication:              Trelegy Ellipta 1 inhalation daily. Symptoms: increasing shortness of breath. He is using his BIPAP every night. He does follow with pulmonary        GERD  We discussed starting ppi.       vitals were not taken for this visit. There is no height or weight on file to calculate BMI. I have reviewed the following with the  Thalia Buck   Lab Review  No visits with results within 6 Month(s) from this visit. Latest known visit with results is:   Orders Only on 07/13/2020   Component Date Value    Hemoglobin A1C 07/13/2020 5.2      Copies of these are in the chart. Current Outpatient Medications   Medication Sig Dispense Refill    triamcinolone (ARISTOCORT) 0.5 % ointment Apply topically 2 times daily.  60 g 5    pantoprazole (PROTONIX) 40 MG tablet Take 1 tablet by mouth every morning (before breakfast) 30 tablet 5    ondansetron (ZOFRAN) 4 MG tablet Take 1 tablet by mouth 3 times daily as needed for Nausea or Vomiting 30 tablet 0    atorvastatin (LIPITOR) 40 MG tablet Take 1 tablet by mouth nightly 90 tablet 3    fluticasone (FLONASE) 50 MCG/ACT nasal spray 2 sprays by Nasal route daily 16 g 11    mirabegron (MYRBETRIQ) 25 MG TB24 Take 1 tablet by mouth daily 90 tablet 3    digoxin (LANOXIN) 125 MCG tablet Take 1 tablet by mouth daily 90 tablet 3  buPROPion (WELLBUTRIN XL) 150 MG extended release tablet Take 2 tablets by mouth every morning 180 tablet 3    TRELEGY ELLIPTA 100-62.5-25 MCG/INH AEPB INHALE 1 PUFF ONCE DAILY 1 each 11    terazosin (HYTRIN) 5 MG capsule Take 1 capsule by mouth nightly 90 capsule 3    Easy Touch Lancets 28G/Twist MISC Use to test Blood sugar daily 100 each 3    cholestyramine (QUESTRAN) 4 g packet Take 1 packet by mouth daily 30 packet 5    azelastine (ASTELIN) 0.1 % nasal spray 2 sprays by Nasal route 2 times daily Use in each nostril as directed 4 Bottle 3    carvedilol (COREG) 12.5 MG tablet Take 1 tablet by mouth 2 times daily (with meals) 180 tablet 3    enzalutamide (XTANDI) 40 MG capsule Take 4 tablets daily 360 capsule 1    furosemide (LASIX) 40 MG tablet TAKE 1 & 1/2 TABLETS DAILY 135 tablet 1    isosorbide mononitrate (IMDUR) 60 MG extended release tablet Take 1 tablet by mouth daily 90 tablet 3    amLODIPine (NORVASC) 5 MG tablet Take 1 tablet by mouth daily 90 tablet 3    Dulaglutide (TRULICITY) 9.37 IA/0.1FO SOPN Inject 0.75 mg as directed every 7 days 6 mL 3    blood glucose test strips (EASY TOUCH TEST) strip USE TO TEST BLOOD SUGAR ONCE DAILY 100 strip 3    Lift Chair MISC by Does not apply route Prefers leather  [facility] faxed through [employer] 1 each 0    lisinopril (PRINIVIL;ZESTRIL) 40 MG tablet Take 1 tablet by mouth daily 90 tablet 3    celecoxib (CELEBREX) 200 MG capsule Take 1 capsule by mouth 2 times daily 60 capsule 11    rivaroxaban (XARELTO) 20 MG TABS tablet Take 1 tablet by mouth daily (with breakfast) 90 tablet 3    metOLazone (ZAROXOLYN) 2.5 MG tablet Take 1 tablet by mouth daily 90 tablet 1    FLUoxetine (PROZAC) 20 MG capsule Take 1 capsule by mouth daily 90 capsule 3    spironolactone (ALDACTONE) 50 MG tablet TAKE 1 TABLET BY MOUTH EVERY DAY 90 tablet 3    metFORMIN (GLUCOPHAGE) 500 MG tablet Take 1 tablet by mouth 2 times daily (with meals) 180 tablet 3    Psyllium (KONSYL) 28.3 % POWD Take 4 g by mouth 2 times daily (with meals) 1 Bottle 11    hydroCHLOROthiazide (MICROZIDE) 12.5 MG capsule Take 1 capsule by mouth daily 30 capsule 11    blood glucose monitor strips Test one times a day & as needed for symptoms of irregular blood glucose. DX: E11.9 100 strip 3    Alcohol Swabs (ALCOHOL PREP) PADS Use daily with glucose checks DX: E11.9 100 each 0    blood glucose monitor kit and supplies Test one times a day & as needed for symptoms of irregular blood glucose. DX:E11.9 1 kit 0    aspirin 81 MG EC tablet Take 1 tablet by mouth daily 30 tablet 0    nitroGLYCERIN (NITROSTAT) 0.4 MG SL tablet up to max of 3 total doses. If no relief after 1 dose, call 911. 25 tablet 0    Wheat Dextrin (BENEFIBER) POWD Take 4 g by mouth 3 times daily (with meals) 1 Can 5    blood glucose monitor kit and supplies Test once a day for symptoms of irregular blood glucose.  1 kit 0    FLUoxetine (PROZAC) 40 MG capsule Take 1 capsule by mouth daily (Patient taking differently: Take 40 mg by mouth ) 90 capsule 3    albuterol sulfate HFA (PROAIR HFA) 108 (90 Base) MCG/ACT inhaler Inhale 2 puffs into the lungs every 6 hours as needed for Wheezing 8.5 Inhaler 5    potassium chloride (KLOR-CON 10) 10 MEQ extended release tablet Take 1 tablet by mouth daily 90 tablet 3    Multiple Vitamins-Minerals (THERAPEUTIC MULTIVITAMIN-MINERALS) tablet Take 1 tablet by mouth daily      Lancets MISC 1 each by Does not apply route daily Accu Chek Guid3 Lancets 100 each 5    OXYGEN Inhale 3 L into the lungs nightly       BiPAP Machine MISC by Does not apply route nightly      cetirizine (ZYRTEC) 10 MG tablet Take 1 tablet by mouth daily 30 tablet 0    prazosin (MINIPRESS) 2 MG capsule Take 1 capsule by mouth nightly 90 capsule 3    vitamin B-12 (CYANOCOBALAMIN) 1000 MCG tablet Take 1,000 mcg by mouth daily      leuprolide (LUPRON) 30 MG injection Inject 30 mg into the muscle once Every 4 months      FISH OIL Take 1 capsule by mouth 2 times daily. No current facility-administered medications for this visit. Allergies: Patient has no known allergies. Past Medical History:   Diagnosis Date    Anxiety     Arthritis     Atrial fibrillation ([address]     Blood circulation, collateral     Cancer (HCC)     prostate, had treatment    Cellulitis     left leg    CHF (congestive heart failure) (HCC)     Chronic kidney disease     COPD (chronic obstructive pulmonary disease) (HCC)     Depression     Hyperlipidemia     Hypertension     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 ([address] 3/28/2020    Lung mass     Neuromuscular disorder (HCC)     Neuropathy     Other disorders of kidney and ureter in diseases classified elsewhere     Palliative care patient 11/15/2018    Pneumonia     Type II or unspecified type diabetes mellitus without mention of complication, not stated as uncontrolled        Family History   Problem Relation Age of Onset    Heart Attack Mother     Cancer Father        Past Surgical History:   Procedure Laterality Date    COLONOSCOPY      EYE SURGERY      EYE SURGERY      HERNIA REPAIR      umbilical with Dr Kiara Lyons         Social History     Tobacco Use    Smoking status: Never Smoker    Smokeless tobacco: Never Used   Substance Use Topics    Alcohol use: No        Review of Systems   Constitutional: Positive for fatigue. Negative for chills and fever. HENT: Negative for congestion, ear pain and sore throat. Eyes: Negative for visual disturbance. Respiratory: Positive for shortness of breath (baseline). Negative for cough, chest tightness and wheezing. Cardiovascular: Negative for chest pain, palpitations and leg swelling. Gastrointestinal: Positive for vomiting (with eating at times. ). Negative for abdominal pain, diarrhea and nausea. Endocrine: Negative for polyuria. MG tablet   15. Routine general medical examination at a health care facility  Z00.00          PLAN    1. Gastroesophageal reflux disease, unspecified whether esophagitis present  We are going to try Protonix 40 mg daily in the morning to see if this makes a difference in his symptoms. We will also give him some Zofran to take on a as needed basis for his nausea. We also discussed the importance of making sure that he does not have any significant constipation as this could exacerbate his GERD  - pantoprazole (PROTONIX) 40 MG tablet; Take 1 tablet by mouth every morning (before breakfast)  Dispense: 30 tablet; Refill: 5    2. Xerosis cutis  Recommend trying some triamcinolone ointment underneath his emollient of choice  - triamcinolone (ARISTOCORT) 0.5 % ointment; Apply topically 2 times daily. Dispense: 60 g; Refill: 5    3. Recurrent major depressive disorder, in partial remission ([facility]  He is doing fairly well on a combination of Wellbutrin  mg and fluoxetine  - T4, Free; Future  - TSH without Reflex; Future    4. Chronic atrial fibrillation (HCC)  Rate controlled and appropriately  anticoagulated with Xarelto   - T4, Free; Future  - TSH without Reflex; Future    5. Prostate cancer [facility])  Check screening PSA to make sure that this remains negligible  - PSA screening; Future    6. Type 2 diabetes mellitus with diabetic polyneuropathy, without long-term current use of insulin (HCC)  The current medical regimen is effective;  continue present plan and medications. - Comprehensive Metabolic Panel; Future  - Hemoglobin A1C; Future  - Microalbumin / Creatinine Urine Ratio; Future    7. Essential hypertension  Controlled based upon home measurements  - CBC Auto Differential; Future  - Comprehensive Metabolic Panel; Future  - Microalbumin / Creatinine Urine Ratio; Future    8. Mixed hyperlipidemia  We do need to recheck lipids  - Lipid Panel; Future    9.  Chronic combined systolic and diastolic congestive heart failure (HCC)  Stable from a breathing standpoint. He is watching his sodium intake and has not had any recent problems with edema or orthopnea    10. Coronary artery disease involving native coronary artery of native heart without angina pectoris  No anginal symptoms reported    11. Chronic obstructive pulmonary disease, unspecified COPD type ([address]  Doing well on present inhaler regimen. He is using them faithfully and has not had any problems    12. Fatigue, unspecified type  Check thyroid values with routine labs  - T4, Free; Future  - TSH without Reflex; Future    13. Encounter for screening for malignant neoplasm of prostate   Make sure the PSA remains negligible  - PSA screening; Future    14. Non-intractable vomiting with nausea, unspecified vomiting type  We will give him some Zofran that he can take on a as needed basis for his nausea and vomiting    15. Routine general medical examination at a health care facility  Routine age-appropriate anticipatory guidance was provided. Annual wellness visit was performed in a separate note and issues were addressed as identified. Orders Placed This Encounter   Procedures    CBC Auto Differential    Comprehensive Metabolic Panel    Hemoglobin A1C    Lipid Panel    Microalbumin / Creatinine Urine Ratio    T4, Free    TSH without Reflex    PSA screening        Return in 3 months (on 2021) for Medicare Annual Wellness Visit in 1 year, 30. Medicare Annual Wellness Visit  Name: Alice Vásquez Date: 2021   MRN: [id number] Sex: Male   Age: 81 y.o. Ethnicity: Non-/Non    : 1935 Race: Rita Bal is here for Medicare AWV, Rash (dry scale like skin on both arm, itches constantly), and Emesis (vomitting for the past few weeks. Most of the time it is as he is eating or after he eats.  He has no appetite. )    Screenings for behavioral, psychosocial and functional/safety risks, and cognitive dysfunction are all negative except as indicated below. These results, as well as other patient data from the [address] form, are documented in Flowsheets linked to this Encounter. No Known Allergies      Prior to Visit Medications    Medication Sig Taking? Authorizing Provider   triamcinolone (ARISTOCORT) 0.5 % ointment Apply topically 2 times daily.  Yes ALISON Heller, DO   pantoprazole (PROTONIX) 40 MG tablet Take 1 tablet by mouth every morning (before breakfast) Yes ALISON Heller, DO   ondansetron (ZOFRAN) 4 MG tablet Take 1 tablet by mouth 3 times daily as needed for Nausea or Vomiting Yes ALISON Heller, DO   atorvastatin (LIPITOR) 40 MG tablet Take 1 tablet by mouth nightly Yes ALISON Heller DO   fluticasone (FLONASE) 50 MCG/ACT nasal spray 2 sprays by Nasal route daily Yes ALISON Heller, DO   mirabegron (MYRBETRIQ) 25 MG TB24 Take 1 tablet by mouth daily Yes ALISON Heller, DO   digoxin (LANOXIN) 125 MCG tablet Take 1 tablet by mouth daily Yes ALISON Heller DO   buPROPion (WELLBUTRIN XL) 150 MG extended release tablet Take 2 tablets by mouth every morning Yes ALISON Heller, DO   TRELEGY ELLIPTA 100-62.5-25 MCG/INH AEPB INHALE 1 PUFF ONCE DAILY Yes ALISON Heller DO   terazosin (HYTRIN) 5 MG capsule Take 1 capsule by mouth nightly Yes ISAAC Lovelace   Easy Touch Lancets 28G/Twist MISC Use to test Blood sugar daily Yes ALISON Heller DO   cholestyramine (QUESTRAN) 4 g packet Take 1 packet by mouth daily Yes ALISON Heller DO   azelastine (ASTELIN) 0.1 % nasal spray 2 sprays by Nasal route 2 times daily Use in each nostril as directed Yes ALISON Heller DO   carvedilol (COREG) 12.5 MG tablet Take 1 tablet by mouth 2 times daily (with meals) Yes ALISON Heller DO   enzalutamide Eyvonne Buttner) 40 MG capsule Take 4 tablets daily Yes ISAAC Smith   furosemide (LASIX) 40 MG tablet TAKE 1 & 1/2 TABLETS DAILY Yes Meg Coker ISAAC   isosorbide mononitrate (IMDUR) 60 MG extended release tablet Take 1 tablet by mouth daily Yes ISAAC Barry   amLODIPine (NORVASC) 5 MG tablet Take 1 tablet by mouth daily Yes ISAAC Barry   Dulaglutide (TRULICITY) 4.06 CT/3.5MG SOPN Inject 0.75 mg as directed every 7 days Yes ALISON Garvey DO   blood glucose test strips (EASY TOUCH TEST) strip USE TO TEST BLOOD SUGAR ONCE DAILY Yes ISAAC Barry   Lift Chair MISC by Does not apply route Prefers leather  At [facility] faxed through [address] Yes ALISON Garvey, DO   lisinopril (PRINIVIL;ZESTRIL) 40 MG tablet Take 1 tablet by mouth daily Yes ISAAC [place]   celecoxib (CELEBREX) 200 MG capsule Take 1 capsule by mouth 2 times daily Yes ALISON Garvey, DO   rivaroxaban (XARELTO) 20 MG TABS tablet Take 1 tablet by mouth daily (with breakfast) Yes ALISON Garvey, DO   metOLazone (ZAROXOLYN) 2.5 MG tablet Take 1 tablet by mouth daily Yes ALISON Garvey, DO   FLUoxetine (PROZAC) 20 MG capsule Take 1 capsule by mouth daily Yes ALISON Noyolaon, DO   spironolactone (ALDACTONE) 50 MG tablet TAKE 1 TABLET BY MOUTH EVERY DAY Yes ALISON Noyolaon, DO   metFORMIN (GLUCOPHAGE) 500 MG tablet Take 1 tablet by mouth 2 times daily (with meals) Yes ALISON Garvey, DO   Psyllium (KONSYL) 28.3 % POWD Take 4 g by mouth 2 times daily (with meals) Yes ALISON Noyolaon, DO   hydroCHLOROthiazide (MICROZIDE) 12.5 MG capsule Take 1 capsule by mouth daily Yes ALISON Noyolaon, DO   blood glucose monitor strips Test one times a day & as needed for symptoms of irregular blood glucose. DX: E11.9 Yes ALISON Garvey, DO   Alcohol Swabs (ALCOHOL PREP) PADS Use daily with glucose checks DX: E11.9 Yes ALISON Garvey, DO   blood glucose monitor kit and supplies Test one times a day & as needed for symptoms of irregular blood glucose.  DX:E11.9 Yes B Jenni Moorhead, DO   aspirin 81 MG EC tablet Take 1 tablet by mouth daily Yes Jose Zabala PA-C   nitroGLYCERIN (NITROSTAT) 0.4 MG SL tablet up to max of 3 total doses. If no relief after 1 dose, call 911. Yes Jose Zabala PA-C   Wheat Dextrin (BENEFIBER) POWD Take 4 g by mouth 3 times daily (with meals) Yes ALISON Donovan DO   blood glucose monitor kit and supplies Test once a day for symptoms of irregular blood glucose. Yes ALISON Donovan DO   FLUoxetine (PROZAC) 40 MG capsule Take 1 capsule by mouth daily  Patient taking differently: Take 40 mg by mouth  Yes ISAAC Rios   albuterol sulfate HFA (PROAIR HFA) 108 (90 Base) MCG/ACT inhaler Inhale 2 puffs into the lungs every 6 hours as needed for Wheezing Yes ISAAC Ghosh   potassium chloride (KLOR-CON 10) 10 MEQ extended release tablet Take 1 tablet by mouth daily Yes ISAAC Ghosh   Multiple Vitamins-Minerals (THERAPEUTIC MULTIVITAMIN-MINERALS) tablet Take 1 tablet by mouth daily Yes Historical Provider, MD   Lancets MISC 1 each by Does not apply route daily Accu Chek Guid3 Lancets Yes ALISON Donovan DO   OXYGEN Inhale 3 L into the lungs nightly  Yes Historical Provider, MD   BiPAP Machine MISC by Does not apply route nightly Yes Historical Provider, MD   cetirizine (ZYRTEC) 10 MG tablet Take 1 tablet by mouth daily Yes Doc Villegas,    prazosin (MINIPRESS) 2 MG capsule Take 1 capsule by mouth nightly Yes ISAAC Ghosh   vitamin B-12 (CYANOCOBALAMIN) 1000 MCG tablet Take 1,000 mcg by mouth daily Yes Historical Provider, MD   leuprolide (LUPRON) 30 MG injection Inject 30 mg into the muscle once Every 4 months Yes Historical Provider, MD   FISH OIL Take 1 capsule by mouth 2 times daily.  Yes Historical Provider, MD         Past Medical History:   Diagnosis Date    Anxiety     Arthritis     Atrial fibrillation ([address]     Blood circulation, collateral     Cancer (HCC)     prostate, had treatment    Cellulitis left leg    CHF (congestive heart failure) (HCC)     Chronic kidney disease     COPD (chronic obstructive pulmonary disease) (HCC)     Depression     Hyperlipidemia     Hypertension     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 ([facility] [address] 3/28/2020    Lung mass     Neuromuscular disorder (HCC)     Neuropathy     Other disorders of kidney and ureter in diseases classified elsewhere     Palliative care patient 11/15/2018    Pneumonia     Type II or unspecified type diabetes mellitus without mention of complication, not stated as uncontrolled        Past Surgical History:   Procedure Laterality Date    COLONOSCOPY      EYE SURGERY      EYE SURGERY      HERNIA REPAIR      umbilical with Dr Bacilio Bueno           Family History   Problem Relation Age of Onset    Heart Attack Mother     Cancer Father        CareTeam (Including outside providers/suppliers regularly involved in providing care):   Patient Care Team:  [address], DO as PCP - General (Internal Medicine)  [address] [facility],[address], DO as PCP - [facility]  Monica Calzada MD as Consulting Physician (Cardiology)    Wt Readings from Last 3 Encounters:   03/16/21 245 lb (111.1 kg)   09/16/20 249 lb (112.9 kg)   03/30/20 231 lb 9.6 oz (105.1 kg)     There were no vitals filed for this visit. There is no height or weight on file to calculate BMI. Based upon direct observation of the patient, evaluation of cognition reveals recent and remote memory intact. Physical exam was not performed as this was a telephone conference visit    Patient's complete Health Risk Assessment and screening values have been reviewed and are found in Flowsheets. The following problems were reviewed today and where indicated follow up appointments were made and/or referrals ordered.     Positive Risk Factor Screenings with Interventions:            General Health and ACP:  General  In general, how would you say your health is?: Good  In the past 7 days, have you experienced any of the following? New or Increased Pain, New or Increased Fatigue, Loneliness, Social Isolation, Stress or Anger?: None of These  Do you get the social and emotional support that you need?: Yes  Do you have a Living Will?: Yes  Advance Directives     Power of [address] Will ACP-Advance Directive ACP-Power of     Not on File Not on File Not on File Not on File      General Health Risk Interventions:  · no issue identified    Health Habits/Nutrition:  Health Habits/Nutrition  Do you exercise for at least 20 minutes 2-3 times per week?: (!) No  Have you lost any weight without trying in the past 3 months?: No  Do you eat only one meal per day?: No  Have you seen the dentist within the past year?: (!) No     Health Habits/Nutrition Interventions:  · Inadequate physical activity:  educational materials provided to promote increased physical activity  · Dental exam overdue:  patient encouraged to make appointment with his/her dentist      ADL:  ADLs  In the past 7 days, did you need help from others to perform any of the following everyday activities? Eating, dressing, grooming, bathing, toileting, or walking/balance?: None  In the past 7 days, did you need help from others to take care of any of the following? Laundry, housekeeping, banking/finances, shopping, telephone use, food preparation, transportation, or taking medications?: [employer], Housekeeping, Shopping, Food Preparation, Transportation, Taking Medications  ADL Interventions:  · he has support coming into the home.     Personalized Preventive Plan   Current Health Maintenance Status  Immunization History   Administered Date(s) Administered    Influenza Virus Vaccine 11/25/2015    Influenza, High Dose (Fluzone 65 yrs and older) 12/06/2016, 11/15/2017, 01/18/2019    Influenza, Triv, inactivated, subunit, adjuvanted, IM (Fluad 65 yrs Creatinine Urine Ratio; Future    Mixed hyperlipidemia  -     Lipid Panel; Future    Chronic combined systolic and diastolic congestive heart failure (HCC)    Coronary artery disease involving native coronary artery of native heart without angina pectoris    Chronic obstructive pulmonary disease, unspecified COPD type (HCC)    Fatigue, unspecified type  -     T4, Free; Future  -     TSH without Reflex; Future    Encounter for screening for malignant neoplasm of prostate   -     PSA screening; Future    Non-intractable vomiting with nausea, unspecified vomiting type  -     ondansetron (ZOFRAN) 4 MG tablet;  Take 1 tablet by mouth 3 times daily as needed for Nausea or Vomiting    Routine general medical examination at a health care facility

## 2021-04-14 NOTE — PATIENT INSTRUCTIONS
Personalized Preventive Plan for Dorothea Nichols - 4/14/2021  Medicare offers a range of preventive health benefits. Some of the tests and screenings are paid in full while other may be subject to a deductible, co-insurance, and/or copay. Some of these benefits include a comprehensive review of your medical history including lifestyle, illnesses that may run in your family, and various assessments and screenings as appropriate. After reviewing your medical record and screening and assessments performed today your provider may have ordered immunizations, labs, imaging, and/or referrals for you. A list of these orders (if applicable) as well as your Preventive Care list are included within your After Visit Summary for your review. Other Preventive Recommendations:    · A preventive eye exam performed by an eye specialist is recommended every 1-2 years to screen for glaucoma; cataracts, macular degeneration, and other eye disorders. · A preventive dental visit is recommended every 6 months. · Try to get at least 150 minutes of exercise per week or 10,000 steps per day on a pedometer . · Order or download the FREE \"Exercise & Physical Activity: Your Everyday Guide\" from [employer]. Call 9-389.868.4856 or search [employer] online. · You need 3108-0409 mg of calcium and 0426-8193 IU of vitamin D per day. It is possible to meet your calcium requirement with diet alone, but a vitamin D supplement is usually necessary to meet this goal.  · When exposed to the sun, use a sunscreen that protects against both UVA and UVB radiation with an SPF of 30 or greater. Reapply every 2 to 3 hours or after sweating, drying off with a towel, or swimming. · Always wear a seat belt when traveling in a car. Always wear a helmet when riding a bicycle or motorcycle.

## 2021-04-21 ENCOUNTER — APPOINTMENT (OUTPATIENT)
Dept: CT IMAGING | Facility: HOSPITAL | Age: 86
End: 2021-04-21

## 2021-04-26 ENCOUNTER — APPOINTMENT (OUTPATIENT)
Dept: GENERAL RADIOLOGY | Age: 86
DRG: 682 | End: 2021-04-26
Payer: MEDICARE

## 2021-04-26 ENCOUNTER — TELEPHONE (OUTPATIENT)
Dept: PULMONOLOGY | Facility: CLINIC | Age: 86
End: 2021-04-26

## 2021-04-26 ENCOUNTER — APPOINTMENT (OUTPATIENT)
Dept: CT IMAGING | Age: 86
DRG: 682 | End: 2021-04-26
Payer: MEDICARE

## 2021-04-26 ENCOUNTER — HOSPITAL ENCOUNTER (EMERGENCY)
Age: 86
Discharge: HOME | DRG: 682 | End: 2021-04-26
Payer: MEDICARE

## 2021-04-26 VITALS
RESPIRATION RATE: 18 BRPM | HEART RATE: 59 BPM | OXYGEN SATURATION: 94 % | TEMPERATURE: 97.6 F | WEIGHT: 247 LBS | DIASTOLIC BLOOD PRESSURE: 51 MMHG | HEIGHT: 70 IN | BODY MASS INDEX: 35.36 KG/M2 | SYSTOLIC BLOOD PRESSURE: 107 MMHG

## 2021-04-26 DIAGNOSIS — Z79.01 ANTICOAGULATED: ICD-10-CM

## 2021-04-26 DIAGNOSIS — D72.829 LEUKOCYTOSIS, UNSPECIFIED TYPE: ICD-10-CM

## 2021-04-26 DIAGNOSIS — S22.42XA CLOSED FRACTURE OF MULTIPLE RIBS OF LEFT SIDE, INITIAL ENCOUNTER: ICD-10-CM
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
## 2021-04-26 DIAGNOSIS — S09.90XA CLOSED HEAD INJURY, INITIAL ENCOUNTER: Primary | ICD-10-CM

## 2021-04-26 DIAGNOSIS — N18.9 CHRONIC KIDNEY DISEASE, UNSPECIFIED CKD STAGE: ICD-10-CM

## 2021-04-26 DIAGNOSIS — S00.83XA CONTUSION OF FACE, INITIAL ENCOUNTER: ICD-10-CM

## 2021-04-26 DIAGNOSIS — S01.81XA FACIAL LACERATION, INITIAL ENCOUNTER: ICD-10-CM

## 2021-04-26 LAB
ALBUMIN SERPL-MCNC: 4.2 G/DL (ref 3.5–5.2)
ALP BLD-CCNC: 40 U/L (ref 40–130)
ALT SERPL-CCNC: 7 U/L (ref 5–41)
ANION GAP SERPL CALCULATED.3IONS-SCNC: 10 MMOL/L (ref 7–19)
AST SERPL-CCNC: 11 U/L (ref 5–40)
BASOPHILS ABSOLUTE: 0.1 K/UL (ref 0–0.2)
BASOPHILS RELATIVE PERCENT: 0.7 % (ref 0–1)
BILIRUB SERPL-MCNC: 0.3 MG/DL (ref 0.2–1.2)
BILIRUBIN URINE: NEGATIVE
BLOOD, URINE: NEGATIVE
BUN BLDV-MCNC: 41 MG/DL (ref 8–23)
CALCIUM SERPL-MCNC: 9.3 MG/DL (ref 8.8–10.2)
CHLORIDE BLD-SCNC: 103 MMOL/L (ref 98–111)
CLARITY: CLEAR
CO2: 25 MMOL/L (ref 22–29)
COLOR: YELLOW
CREAT SERPL-MCNC: 1.5 MG/DL (ref 0.5–1.2)
EOSINOPHILS ABSOLUTE: 0.3 K/UL (ref 0–0.6)
EOSINOPHILS RELATIVE PERCENT: 1.4 % (ref 0–5)
GFR AFRICAN AMERICAN: 54
GFR NON-AFRICAN AMERICAN: 44
GLUCOSE BLD-MCNC: 146 MG/DL (ref 74–109)
GLUCOSE URINE: NEGATIVE MG/DL
HCT VFR BLD CALC: 30.8 % (ref 42–52)
HEMOGLOBIN: 10.1 G/DL (ref 14–18)
IMMATURE GRANULOCYTES #: 1.2 K/UL
INR BLD: 1.16 (ref 0.88–1.18)
KETONES, URINE: ABNORMAL MG/DL
LEUKOCYTE ESTERASE, URINE: NEGATIVE
LYMPHOCYTES ABSOLUTE: 0.8 K/UL (ref 1.1–4.5)
LYMPHOCYTES RELATIVE PERCENT: 4.2 % (ref 20–40)
MCH RBC QN AUTO: 34.6 PG (ref 27–31)
MCHC RBC AUTO-ENTMCNC: 32.8 G/DL (ref 33–37)
MCV RBC AUTO: 105.5 FL (ref 80–94)
MONOCYTES ABSOLUTE: 1.5 K/UL (ref 0–0.9)
MONOCYTES RELATIVE PERCENT: 8.3 % (ref 0–10)
NEUTROPHILS ABSOLUTE: 14.4 K/UL (ref 1.5–7.5)
NEUTROPHILS RELATIVE PERCENT: 78.9 % (ref 50–65)
NITRITE, URINE: NEGATIVE
PDW BLD-RTO: 13.2 % (ref 11.5–14.5)
PH UA: 5 (ref 5–8)
PLATELET # BLD: 193 K/UL (ref 130–400)
PMV BLD AUTO: 9.8 FL (ref 9.4–12.4)
POTASSIUM REFLEX MAGNESIUM: 5.5 MMOL/L (ref 3.5–5)
PROTEIN UA: NEGATIVE MG/DL
PROTHROMBIN TIME: 14.8 SEC (ref 12–14.6)
RBC # BLD: 2.92 M/UL (ref 4.7–6.1)
SODIUM BLD-SCNC: 138 MMOL/L (ref 136–145)
SPECIFIC GRAVITY UA: 1.02 (ref 1–1.03)
TOTAL PROTEIN: 6.8 G/DL (ref 6.6–8.7)
TROPONIN: <0.01 NG/ML (ref 0–0.03)
UROBILINOGEN, URINE: 0.2 E.U./DL
WBC # BLD: 18.3 K/UL (ref 4.8–10.8)

## 2021-04-26 PROCEDURE — 71101 X-RAY EXAM UNILAT RIBS/CHEST: CPT

## 2021-04-26 PROCEDURE — 84484 ASSAY OF TROPONIN QUANT: CPT

## 2021-04-26 PROCEDURE — 90715 TDAP VACCINE 7 YRS/> IM: CPT | Performed by: NURSE PRACTITIONER

## 2021-04-26 PROCEDURE — 80053 COMPREHEN METABOLIC PANEL: CPT

## 2021-04-26 PROCEDURE — 85610 PROTHROMBIN TIME: CPT

## 2021-04-26 PROCEDURE — 93005 ELECTROCARDIOGRAM TRACING: CPT | Performed by: NURSE PRACTITIONER

## 2021-04-26 PROCEDURE — 70450 CT HEAD/BRAIN W/O DYE: CPT

## 2021-04-26 PROCEDURE — 6370000000 HC RX 637 (ALT 250 FOR IP): Performed by: NURSE PRACTITIONER

## 2021-04-26 PROCEDURE — 36415 COLL VENOUS BLD VENIPUNCTURE: CPT

## 2021-04-26 PROCEDURE — 99283 EMERGENCY DEPT VISIT LOW MDM: CPT

## 2021-04-26 PROCEDURE — 72125 CT NECK SPINE W/O DYE: CPT

## 2021-04-26 PROCEDURE — 70486 CT MAXILLOFACIAL W/O DYE: CPT

## 2021-04-26 PROCEDURE — 81003 URINALYSIS AUTO W/O SCOPE: CPT

## 2021-04-26 PROCEDURE — 90471 IMMUNIZATION ADMIN: CPT | Performed by: NURSE PRACTITIONER

## 2021-04-26 PROCEDURE — 96374 THER/PROPH/DIAG INJ IV PUSH: CPT

## 2021-04-26 PROCEDURE — 6360000002 HC RX W HCPCS: Performed by: NURSE PRACTITIONER

## 2021-04-26 PROCEDURE — 96375 TX/PRO/DX INJ NEW DRUG ADDON: CPT

## 2021-04-26 PROCEDURE — 85025 COMPLETE CBC W/AUTO DIFF WBC: CPT

## 2021-04-26 RX ORDER — POLYETHYLENE GLYCOL 3350 17 G/17G
17 POWDER, FOR SOLUTION ORAL DAILY
Qty: 527 G | Refills: 0 | Status: SHIPPED | OUTPATIENT
Start: 2021-04-26 | End: 2021-05-06

## 2021-04-26 RX ORDER — ONDANSETRON 2 MG/ML
4 INJECTION INTRAMUSCULAR; INTRAVENOUS ONCE
Status: COMPLETED | OUTPATIENT
Start: 2021-04-26 | End: 2021-04-26

## 2021-04-26 RX ORDER — LIDOCAINE HYDROCHLORIDE 10 MG/ML
5 INJECTION, SOLUTION EPIDURAL; INFILTRATION; INTRACAUDAL; PERINEURAL ONCE
Status: DISCONTINUED | OUTPATIENT
Start: 2021-04-26 | End: 2021-04-26 | Stop reason: HOSPADM

## 2021-04-26 RX ORDER — DOCUSATE SODIUM 100 MG/1
100 CAPSULE, LIQUID FILLED ORAL 2 TIMES DAILY
Qty: 20 CAPSULE | Refills: 0 | Status: SHIPPED | OUTPATIENT
Start: 2021-04-26 | End: 2021-05-06

## 2021-04-26 RX ORDER — HYDROMORPHONE HYDROCHLORIDE 1 MG/ML
1 INJECTION, SOLUTION INTRAMUSCULAR; INTRAVENOUS; SUBCUTANEOUS ONCE
Status: COMPLETED | OUTPATIENT
Start: 2021-04-26 | End: 2021-04-26

## 2021-04-26 RX ORDER — HYDROCODONE BITARTRATE AND ACETAMINOPHEN 10; 325 MG/1; MG/1
1 TABLET ORAL ONCE
Status: COMPLETED | OUTPATIENT
Start: 2021-04-26 | End: 2021-04-26

## 2021-04-26 RX ORDER — HYDROCODONE BITARTRATE AND ACETAMINOPHEN 10; 325 MG/1; MG/1
1 TABLET ORAL EVERY 6 HOURS PRN
Qty: 12 TABLET | Refills: 0 | Status: SHIPPED | OUTPATIENT
Start: 2021-04-26 | End: 2021-04-26 | Stop reason: SDUPTHER

## 2021-04-26 RX ORDER — HYDROCODONE BITARTRATE AND ACETAMINOPHEN 10; 325 MG/1; MG/1
1 TABLET ORAL EVERY 6 HOURS PRN
Qty: 12 TABLET | Refills: 0 | Status: ON HOLD | OUTPATIENT
Start: 2021-04-26 | End: 2021-05-04 | Stop reason: HOSPADM

## 2021-04-26 RX ADMIN — HYDROCODONE BITARTRATE AND ACETAMINOPHEN 1 TABLET: 10; 325 TABLET ORAL at 21:07

## 2021-04-26 RX ADMIN — TETANUS TOXOID, REDUCED DIPHTHERIA TOXOID AND ACELLULAR PERTUSSIS VACCINE, ADSORBED 0.5 ML: 5; 2.5; 8; 8; 2.5 SUSPENSION INTRAMUSCULAR at 18:20

## 2021-04-26 RX ADMIN — HYDROMORPHONE HYDROCHLORIDE 1 MG: 1 INJECTION, SOLUTION INTRAMUSCULAR; INTRAVENOUS; SUBCUTANEOUS at 18:19

## 2021-04-26 RX ADMIN — ONDANSETRON HYDROCHLORIDE 4 MG: 2 SOLUTION INTRAMUSCULAR; INTRAVENOUS at 18:19

## 2021-04-26 ASSESSMENT — PAIN SCALES - GENERAL: PAINLEVEL_OUTOF10: 7

## 2021-04-26 ASSESSMENT — ENCOUNTER SYMPTOMS
BACK PAIN: 0
SHORTNESS OF BREATH: 0
ABDOMINAL PAIN: 0

## 2021-04-26 NOTE — ED PROVIDER NOTES
[facility] EMERGENCY DEPT  eMERGENCY dEPARTMENT eNCOUnter      Pt Name: Timmy Green  MRN: [id number]  Maria De Jesusgfjeferson 1935  Date of evaluation: 4/26/2021  Provider: [address]       Chief Complaint   Patient presents with    Fall    Head Injury     on blood thinners    Rib Injury         HISTORY OF PRESENT ILLNESS   (Location/Symptom, Timing/Onset,Context/Setting, Quality, Duration, Modifying Factors, Severity)  Note limiting factors. Ashley Ch a 81 y.o. male who presents to the emergency department for evaluation of fall, head and rib injury. Pt tells me that he fell from ground level opening a garage door. He relates that he isn't sure why he fell. He denies syncope as well as any associated chest pain or shortness of breath. He has had no fevers or recent illness per patient. He has bruising around left side of face/periorbital area with small laceration to left lateral eyebrow. He denies any back pain or abdominal pain. He tells me that he has rib pain along left side of chest. He shows me a small abrasion to left lateral hand. He does not endorse any extremity pain. He gives history of atrial fibrillation on xarelto. [facility]    Nursing Notes were reviewed. REVIEW OF SYSTEMS    (2-9 systems for level 4, 10 or more for level 5)     Review of Systems   Constitutional: Negative for fever. Respiratory: Negative for shortness of breath. Cardiovascular: Positive for chest pain (left lower rib pain). Gastrointestinal: Negative for abdominal pain. Musculoskeletal: Negative for back pain and neck pain. Neurological: Negative for weakness and numbness. All other systems reviewed and are negative. A complete review of systems was performed and is negative except as noted above in the HPI.        PAST MEDICAL HISTORY     Past Medical History:   Diagnosis Date    Anxiety     Arthritis     Atrial fibrillation ([address]     Blood circulation, collateral     Cancer (HCC) prostate, had treatment    Cellulitis     left leg    [facility]     Chronic kidney disease     COPD [facility]chronic obstructive pulmonary disease) ([facility]     Depression     Hyperlipidemia     Hypertension     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 ([facility] 3/28/2020    Lung mass     Neuromuscular disorder (HCC)     Neuropathy     Other disorders of kidney and ureter in diseases classified elsewhere     Palliative care patient 11/15/2018    Pneumonia     Type II or unspecified type diabetes mellitus without mention of complication, not stated as uncontrolled          SURGICAL HISTORY       Past Surgical History:   Procedure Laterality Date    COLONOSCOPY      EYE SURGERY      EYE SURGERY      HERNIA REPAIR      umbilical with Dr Maria Teresa Camacho       Discharge Medication List as of 4/26/2021  9:41 PM      CONTINUE these medications which have NOT CHANGED    Details   pantoprazole (PROTONIX) 40 MG tablet Take 1 tablet by mouth every morning (before breakfast), Disp-30 tablet, R-5Normal      ondansetron (ZOFRAN) 4 MG tablet Take 1 tablet by mouth 3 times daily as needed for Nausea or Vomiting, Disp-30 tablet, R-0Normal      atorvastatin (LIPITOR) 40 MG tablet Take 1 tablet by mouth nightly, Disp-90 tablet, R-3Normal      fluticasone (FLONASE) 50 MCG/ACT nasal spray 2 sprays by Nasal route daily, Disp-16 g, R-11Normal      mirabegron (MYRBETRIQ) 25 MG TB24 Take 1 tablet by mouth daily, Disp-90 tablet, R-3Normal      digoxin (LANOXIN) 125 MCG tablet Take 1 tablet by mouth daily, Disp-90 tablet, R-3Normal      buPROPion (WELLBUTRIN XL) 150 MG extended release tablet Take 2 tablets by mouth every morning, Disp-180 tablet, R-3Normal      TRELEGY ELLIPTA 100-62.5-25 MCG/INH AEPB INHALE 1 PUFF ONCE DAILY, Disp-1 each, R-11Normal      terazosin (HYTRIN) 5 MG capsule Take 1 capsule by mouth nightly, Disp-90 capsule, R-3Normal      !! Easy Touch Lancets 28G/Twist MISC Disp-100 each, R-3, NormalUse to test Blood sugar daily      cholestyramine (QUESTRAN) 4 g packet Take 1 packet by mouth daily, Disp-30 packet, R-5Normal      azelastine (ASTELIN) 0.1 % nasal spray 2 sprays by Nasal route 2 times daily Use in each nostril as directed, Disp-4 Bottle, R-3Normal      carvedilol (COREG) 12.5 MG tablet Take 1 tablet by mouth 2 times daily (with meals), Disp-180 tablet, R-3Normal      enzalutamide (XTANDI) 40 MG capsule Take 4 tablets daily, Disp-360 capsule, R-1Normal      furosemide (LASIX) 40 MG tablet TAKE 1 & 1/2 TABLETS DAILY, Disp-135 tablet, R-1Normal      isosorbide mononitrate (IMDUR) 60 MG extended release tablet Take 1 tablet by mouth daily, Disp-90 tablet, R-3Normal      amLODIPine (NORVASC) 5 MG tablet Take 1 tablet by mouth daily, Disp-90 tablet, R-3Normal      Dulaglutide (TRULICITY) 6.96 RG/7.7IB SOPN Inject 0.75 mg as directed every 7 days, Disp-6 mL, R-3Normal      !! blood glucose test strips (EASY TOUCH TEST) strip USE TO TEST BLOOD SUGAR ONCE DAILY, Disp-100 strip, R-3Normal      Lift Chair [facility] Starting Wed 10/21/2020, Disp-1 each, R-0, NO PRINTPrefers leather [facility] faxed through [facility]      lisinopril (PRINIVIL;ZESTRIL) 40 MG tablet Take 1 tablet by mouth daily, Disp-90 tablet,R-3Normal      celecoxib (CELEBREX) 200 MG capsule Take 1 capsule by mouth 2 times daily, Disp-60 capsule,R-11Normal      rivaroxaban (XARELTO) 20 MG TABS tablet Take 1 tablet by mouth daily (with breakfast), Disp-90 tablet,R-3Normal      metOLazone (ZAROXOLYN) 2.5 MG tablet Take 1 tablet by mouth daily, Disp-90 tablet,R-1Normal      !!  FLUoxetine (PROZAC) 20 MG capsule Take 1 capsule by mouth daily, Disp-90 capsule, R-3Normal      spironolactone (ALDACTONE) 50 MG tablet TAKE 1 TABLET BY MOUTH EVERY DAY, Disp-90 tablet, R-3Normal      metFORMIN (GLUCOPHAGE) 500 MG tablet Take 1 tablet by mouth 2 times prazosin (MINIPRESS) 2 MG capsule Take 1 capsule by mouth nightly, Disp-90 capsule, R-3Normal      vitamin B-12 (CYANOCOBALAMIN) 1000 MCG tablet Take 1,000 mcg by mouth dailyHistorical Med      leuprolide (LUPRON) 30 MG injection Inject 30 mg into the muscle once Every 4 monthsHistorical Med      FISH OIL Take 1 capsule by mouth 2 times daily. !! - Potential duplicate medications found. Please discuss with provider. ALLERGIES     Patient has no known allergies. FAMILY HISTORY       Family History   Problem Relation Age of Onset    Heart Attack Mother     Cancer Father           SOCIAL HISTORY       Social History     Socioeconomic History    Marital status:       Spouse name: Not on file    Number of children: Not on file    Years of education: Not on file    Highest education level: Not on file   Occupational History    Not on file   Social Needs    Financial resource strain: Not on file    Food insecurity     Worry: Not on file     Inability: Not on file    Transportation needs     Medical: Not on file     Non-medical: Not on file   Tobacco Use    Smoking status: Never Smoker    Smokeless tobacco: Never Used   Substance and Sexual Activity    Alcohol use: No    Drug use: No    Sexual activity: Not on file   Lifestyle    Physical activity     Days per week: Not on file     Minutes per session: Not on file    Stress: Not on file   Relationships    Social connections     Talks on phone: Not on file     Gets together: Not on file     Attends Rastafari service: Not on file     Active member of club or organization: Not on file     Attends meetings of clubs or organizations: Not on file     Relationship status: Not on file    Intimate partner violence     Fear of current or ex partner: Not on file     Emotionally abused: Not on file     Physically abused: Not on file     Forced sexual activity: Not on file   Other Topics Concern    Not on file   Social History Narrative    Not on file       SCREENINGS    Donis Coma Scale  Eye Opening: Spontaneous  Best Verbal Response: Oriented  Best Motor Response: Obeys commands  [place] Coma Scale Score: 15        PHYSICAL EXAM    (up to 7 for level 4, 8 or more for level 5)     ED Triage Vitals [04/26/21 1652]   BP Temp Temp src Pulse Resp SpO2 Height Weight   (!) 109/58 97.6 °F (36.4 °C) -- 59 18 97 % 5' 10\" (1.778 m) 247 lb (112 kg)       Physical Exam  Vitals signs reviewed. HENT:      Head: Normocephalic. Right Ear: External ear normal.      Left Ear: External ear normal.   Eyes:      Conjunctiva/sclera: Conjunctivae normal.      Pupils: Pupils are equal, round, and reactive to light. Neck:      Musculoskeletal: Normal range of motion. Comments: No direct ttp cervical spine  Cardiovascular:      Rate and Rhythm: Normal rate and regular rhythm. Heart sounds: Normal heart sounds. Pulmonary:      Effort: Pulmonary effort is normal.      Breath sounds: Normal breath sounds. Chest:      Chest wall: Tenderness (left lower lateral rib area reproducing subjective symptoms of pain. Pacemaker left upper chest wall) present. Abdominal:      General: Bowel sounds are normal.      Palpations: Abdomen is soft. Musculoskeletal: Normal range of motion. Skin:     General: Skin is warm and dry. Neurological:      Mental Status: He is alert and oriented to person, place, and time.          DIAGNOSTIC RESULTS     EKG: All EKG's are interpreted by the Emergency Department Physician who either signs or Co-signs this chart in the absence of acardiologist.    Vpaced rhythm hr 61b/min      RADIOLOGY:   Non-plain film images such as CT, Ultrasound andMRI are read by the radiologist. Plain radiographic images are visualized and preliminarily interpreted by the emergency physician with the below findings:        Interpretation per the Radiologist below, if available at the time of this note:    XR RIBS LEFT INCLUDE CHEST (MIN 3 VIEWS) Final Result   Impression:   1. No acute cardiopulmonary finding. 2.  Acute mildly displaced fractures of the LEFT seventh and eighth   posterolateral ribs. Signed by Dr Carroll Meléndez on 4/26/2021 6:50 PM      CT FACIAL BONES WO CONTRAST   Final Result   Impression:   1. No acute facial fracture. 2.  LEFT supraorbital soft tissue contusion/hematoma. Signed by Dr Carroll Meléndez on 4/26/2021 6:59 PM      CT Cervical Spine WO Contrast   Final Result   Impression:   1. No acute fracture. 2.  Reversal of normal cervical lordosis and 2 mm anterolisthesis of   C3 on C4, likely degenerative. 3.  Advanced multilevel cervical spine degenerative change. Signed by Dr Carroll Meléndez on 4/26/2021 7:13 PM      CT HEAD WO CONTRAST   Final Result   Impression:   1. No acute intracranial findings. 2.  LEFT supraorbital soft tissue contusion.    Signed by Dr Carroll Meléndez on 4/26/2021 6:54 PM            ED BEDSIDE ULTRASOUND:   Performed by ED Physician - none    LABS:  Labs Reviewed   CBC WITH AUTO DIFFERENTIAL - Abnormal; Notable for the following components:       Result Value    WBC 18.3 (*)     RBC 2.92 (*)     Hemoglobin 10.1 (*)     Hematocrit 30.8 (*)     .5 (*)     MCH 34.6 (*)     MCHC 32.8 (*)     Neutrophils % 78.9 (*)     Lymphocytes % 4.2 (*)     Neutrophils Absolute 14.4 (*)     Lymphocytes Absolute 0.8 (*)     Monocytes Absolute 1.50 (*)     All other components within normal limits   COMPREHENSIVE METABOLIC PANEL W/ REFLEX TO MG FOR LOW K - Abnormal; Notable for the following components:    Potassium reflex Magnesium 5.5 (*)     Glucose 146 (*)     BUN 41 (*)     CREATININE 1.5 (*)     GFR Non- 44 (*)     GFR  54 (*)     All other components within normal limits   PROTIME-INR - Abnormal; Notable for the following components:    Protime 14.8 (*)     All other components within normal limits   URINE RT REFLEX TO CULTURE - Abnormal; Notable for the following Sutures    Suture size:  5-0    Suture material:  Prolene    Suture technique:  Simple interrupted    Number of sutures:  3  Approximation:     Approximation:  Close  Post-procedure details:     Dressing:  Open (no dressing)    Patient tolerance of procedure: Tolerated well, no immediate complications        FINAL IMPRESSION     1. Closed head injury, initial encounter    2. Leukocytosis, unspecified type    3. Closed fracture of multiple ribs of left side, initial encounter    4. Facial laceration, initial encounter    5. Contusion of face, initial encounter    6. Anticoagulated    7. Fall, initial encounter    8. Chronic kidney disease, unspecified CKD stage          DISPOSITION/PLAN   DISPOSITION Decision To Discharge 04/26/2021 09:27:29 PM      PATIENT REFERRED TO:  Carla Foreman,   [address] [facility]  [phone]    In 3 days  For suture removal-7days, recheck wbc 3 days      DISCHARGE MEDICATIONS:    Attestation: The Prescription Monitoring Report for this patient was reviewed today. ([id number]) ISAAC Orellana)  Periodic Controlled Substance Monitoring: Possible medication side effects, risk of tolerance/dependence & alternative treatments discussed., No signs of potential drug abuse or diversion identified. ISAAC Orellana)  Discharge Medication List as of 4/26/2021  9:41 PM           Medication List      START taking these medications    docusate sodium 100 MG capsule  Commonly known as: Colace  Take 1 capsule by mouth 2 times daily for 10 days     HYDROcodone-acetaminophen  MG per tablet  Commonly known as: Norco  Take 1 tablet by mouth every 6 hours as needed for Pain for up to 3 days.  Intended supply: 3 days     polyethylene glycol 17 g packet  Commonly known as: MiraLax  Take 17 g by mouth daily for 10 days        ASK your doctor about these medications    albuterol sulfate  (90 Base) MCG/ACT inhaler  Commonly known as: ProAir HFA  Inhale 2 puffs into the lungs every 6 hours as needed for Wheezing     Alcohol Prep Pads  Use daily with glucose checks DX: E11.9     amLODIPine 5 MG tablet  Commonly known as: NORVASC  Take 1 tablet by mouth daily     aspirin 81 MG EC tablet  Take 1 tablet by mouth daily     atorvastatin 40 MG tablet  Commonly known as: LIPITOR  Take 1 tablet by mouth nightly     azelastine 0.1 % nasal spray  Commonly known as: ASTELIN  2 sprays by Nasal route 2 times daily Use in each nostril as directed     Benefiber Powd  Take 4 g by mouth 3 times daily (with meals)     BiPAP Machine Misc     * blood glucose monitor kit and supplies  Test once a day for symptoms of irregular blood glucose. * blood glucose monitor kit and supplies  Test one times a day & as needed for symptoms of irregular blood glucose. DX:E11.9     * blood glucose test strips  Test one times a day & as needed for symptoms of irregular blood glucose.   DX: E11.9     * Easy Touch Test strip  Generic drug: blood glucose test strips  USE TO TEST BLOOD SUGAR ONCE DAILY     buPROPion 150 MG extended release tablet  Commonly known as: WELLBUTRIN XL  Take 2 tablets by mouth every morning     carvedilol 12.5 MG tablet  Commonly known as: COREG  Take 1 tablet by mouth 2 times daily (with meals)     celecoxib 200 MG capsule  Commonly known as: CeleBREX  Take 1 capsule by mouth 2 times daily     cetirizine 10 MG tablet  Commonly known as: ZYRTEC  Take 1 tablet by mouth daily     cholestyramine 4 g packet  Commonly known as: Questran  Take 1 packet by mouth daily     digoxin 125 MCG tablet  Commonly known as: LANOXIN  Take 1 tablet by mouth daily     enzalutamide 40 MG capsule  Commonly known as: XTANDI  Take 4 tablets daily     FISH OIL     * FLUoxetine 40 MG capsule  Commonly known as: PROZAC  Take 1 capsule by mouth daily     * FLUoxetine 20 MG capsule  Commonly known as: PROZAC  Take 1 capsule by mouth daily     fluticasone 50 MCG/ACT nasal spray  Commonly known as: FLONASE  2 sprays by HYTRIN  Take 1 capsule by mouth nightly     therapeutic multivitamin-minerals tablet     Trelegy Ellipta 100-62.5-25 MCG/INH Aepb  Generic drug: fluticasone-umeclidin-vilant  INHALE 1 PUFF ONCE DAILY     Trulicity 0.02 OU/0.3FI Sopn  Generic drug: Dulaglutide  Inject 0.75 mg as directed every 7 days     vitamin B-12 1000 MCG tablet  Commonly known as: CYANOCOBALAMIN         * This list has 8 medication(s) that are the same as other medications prescribed for you. Read the directions carefully, and ask your doctor or other care provider to review them with you.                Where to Get Your Medications      These medications were sent to [facility], [address] [place] [address] RD., [address] [facility] [place] [address]    Hours: 24-hours Phone: [phone]   · docusate sodium 100 MG capsule  · HYDROcodone-acetaminophen  MG per tablet  · polyethylene glycol 17 g packet         (Pleasenote that portions of this note were completed with a voice recognition program.  Efforts were made to edit the dictations but occasionally words are mis-transcribed.)              Amira Decree, APRN  04/26/21 [id number]

## 2021-04-27 ENCOUNTER — TELEPHONE (OUTPATIENT)
Dept: PRIMARY CARE CLINIC | Age: 86
End: 2021-04-27

## 2021-04-27 ENCOUNTER — CARE COORDINATION (OUTPATIENT)
Dept: CARE COORDINATION | Age: 86
End: 2021-04-27

## 2021-04-27 DIAGNOSIS — S09.90XA INJURY OF HEAD, INITIAL ENCOUNTER: Primary | ICD-10-CM

## 2021-04-27 LAB
EKG P AXIS: NORMAL DEGREES
EKG P-R INTERVAL: NORMAL MS
EKG Q-T INTERVAL: 428 MS
EKG QRS DURATION: 152 MS
EKG QTC CALCULATION (BAZETT): 429 MS
EKG T AXIS: NORMAL DEGREES

## 2021-04-27 PROCEDURE — 93010 ELECTROCARDIOGRAM REPORT: CPT | Performed by: INTERNAL MEDICINE

## 2021-04-27 NOTE — ED NOTES
Gave patient incentive spirometer, ambrosio voiced understanding of use.        Fiona Lima RN  04/26/21 [id number]

## 2021-04-27 NOTE — TELEPHONE ENCOUNTER
Received a call from [facility] [address] at [address] who states that he received a call from [facility] ([facility]) stating that pt had a fall in his apartment yesterday after there visit and had to go to ER for stitches above the left eye and he has some broken ribs on the left side.  They have to call and report this to the entire care team. If any questions they can be reached at [phone]    Will send to provider

## 2021-04-27 NOTE — CARE COORDINATION
ACM unable to reach Mr. Terrance Hardin this morning, left a voicemail requesting call back. Pt does not have any approved contacts locally. ACM contacted [facility] [place] as patient receives services from the agency through [facility] waiver. Per Katie Mccormick at [facility] [place]:  Pt receives a visit from support personnel 4-5 days/week in afternoon. A staff person is scheduled to see patient this afternoon. He has grocery shopping assistance and they can provide transportation support as well.   Electronically signed by Cely Meyer RN on 4/27/2021 at 10:55 AM

## 2021-04-29 ENCOUNTER — APPOINTMENT (OUTPATIENT)
Dept: GENERAL RADIOLOGY | Age: 86
DRG: 682 | End: 2021-04-29
Payer: MEDICARE

## 2021-04-29 ENCOUNTER — APPOINTMENT (OUTPATIENT)
Dept: CT IMAGING | Age: 86
DRG: 682 | End: 2021-04-29
Payer: MEDICARE

## 2021-04-29 ENCOUNTER — HOSPITAL ENCOUNTER (INPATIENT)
Age: 86
LOS: 5 days | Discharge: NURSING, REHAB OR LONG-TERM CARE | DRG: 682 | End: 2021-05-04
Attending: EMERGENCY MEDICINE
Payer: MEDICARE

## 2021-04-29 DIAGNOSIS — T46.0X1A POISONING BY DIGITALIS GLYCOSIDE, ACCIDENTAL OR UNINTENTIONAL, INITIAL ENCOUNTER: ICD-10-CM

## 2021-04-29 DIAGNOSIS — R07.9 CHEST PAIN, UNSPECIFIED TYPE: ICD-10-CM

## 2021-04-29 DIAGNOSIS — S22.42XA CLOSED FRACTURE OF MULTIPLE RIBS OF LEFT SIDE, INITIAL ENCOUNTER: ICD-10-CM

## 2021-04-29 DIAGNOSIS — J94.2 HEMOTHORAX ON LEFT: ICD-10-CM

## 2021-04-29 DIAGNOSIS — S09.90XA CLOSED HEAD INJURY, INITIAL ENCOUNTER: Primary | ICD-10-CM

## 2021-04-29 DIAGNOSIS — E87.5 HYPERKALEMIA: ICD-10-CM

## 2021-04-29 DIAGNOSIS — D62 ANEMIA DUE TO ACUTE BLOOD LOSS: ICD-10-CM

## 2021-04-29 DIAGNOSIS — S22.42XD CLOSED FRACTURE OF MULTIPLE RIBS OF LEFT SIDE WITH ROUTINE HEALING, SUBSEQUENT ENCOUNTER: ICD-10-CM

## 2021-04-29 PROBLEM — N17.9 AKI (ACUTE KIDNEY INJURY) (HCC): Status: ACTIVE | Noted: 2021-04-29

## 2021-04-29 LAB
ALBUMIN SERPL-MCNC: 3.8 G/DL (ref 3.5–5.2)
ALP BLD-CCNC: 48 U/L (ref 40–130)
ALT SERPL-CCNC: 5 U/L (ref 5–41)
ANION GAP SERPL CALCULATED.3IONS-SCNC: 10 MMOL/L (ref 7–19)
APTT: 28 SEC (ref 26–36.2)
AST SERPL-CCNC: 6 U/L (ref 5–40)
BASOPHILS ABSOLUTE: 0.1 K/UL (ref 0–0.2)
BASOPHILS RELATIVE PERCENT: 0.4 % (ref 0–1)
BILIRUB SERPL-MCNC: 0.4 MG/DL (ref 0.2–1.2)
BILIRUBIN URINE: NEGATIVE
BLOOD, URINE: NEGATIVE
BUN BLDV-MCNC: 53 MG/DL (ref 8–23)
CALCIUM SERPL-MCNC: 9.2 MG/DL (ref 8.8–10.2)
CHLORIDE BLD-SCNC: 101 MMOL/L (ref 98–111)
CLARITY: CLEAR
CO2: 23 MMOL/L (ref 22–29)
COLOR: YELLOW
CREAT SERPL-MCNC: 1.9 MG/DL (ref 0.5–1.2)
CREATININE URINE: 47.7 MG/DL (ref 4.2–622)
DIGOXIN LEVEL: 2.7 NG/ML (ref 0.6–1.2)
EOSINOPHIL,URINE: NORMAL
EOSINOPHILS ABSOLUTE: 0 K/UL (ref 0–0.6)
EOSINOPHILS RELATIVE PERCENT: 0.3 % (ref 0–5)
GFR AFRICAN AMERICAN: 41
GFR NON-AFRICAN AMERICAN: 34
GLUCOSE BLD-MCNC: 109 MG/DL (ref 70–99)
GLUCOSE BLD-MCNC: 128 MG/DL (ref 74–109)
GLUCOSE BLD-MCNC: 130 MG/DL (ref 70–99)
GLUCOSE URINE: NEGATIVE MG/DL
HCT VFR BLD CALC: 27.6 % (ref 42–52)
HEMOGLOBIN: 8.9 G/DL (ref 14–18)
IMMATURE GRANULOCYTES #: 0.9 K/UL
INR BLD: 1.12 (ref 0.88–1.18)
KETONES, URINE: NEGATIVE MG/DL
LEUKOCYTE ESTERASE, URINE: NEGATIVE
LYMPHOCYTES ABSOLUTE: 0.8 K/UL (ref 1.1–4.5)
LYMPHOCYTES RELATIVE PERCENT: 6 % (ref 20–40)
MCH RBC QN AUTO: 34.1 PG (ref 27–31)
MCHC RBC AUTO-ENTMCNC: 32.2 G/DL (ref 33–37)
MCV RBC AUTO: 105.7 FL (ref 80–94)
MONOCYTES ABSOLUTE: 1.1 K/UL (ref 0–0.9)
MONOCYTES RELATIVE PERCENT: 8.3 % (ref 0–10)
NEUTROPHILS ABSOLUTE: 10.5 K/UL (ref 1.5–7.5)
NEUTROPHILS RELATIVE PERCENT: 78.1 % (ref 50–65)
NITRITE, URINE: NEGATIVE
OSMOLALITY URINE: 354 MOSM/KG (ref 250–1200)
PDW BLD-RTO: 13.2 % (ref 11.5–14.5)
PERFORMED ON: ABNORMAL
PERFORMED ON: ABNORMAL
PH UA: 5 (ref 5–8)
PLATELET # BLD: 175 K/UL (ref 130–400)
PMV BLD AUTO: 10.4 FL (ref 9.4–12.4)
POTASSIUM REFLEX MAGNESIUM: 6.1 MMOL/L (ref 3.5–5)
PROTEIN UA: NEGATIVE MG/DL
PROTHROMBIN TIME: 14.4 SEC (ref 12–14.6)
RBC # BLD: 2.61 M/UL (ref 4.7–6.1)
SARS-COV-2, NAAT: NOT DETECTED
SODIUM BLD-SCNC: 134 MMOL/L (ref 136–145)
SODIUM URINE: 87 MMOL/L
SPECIFIC GRAVITY UA: 1.01 (ref 1–1.03)
TOTAL PROTEIN: 6.4 G/DL (ref 6.6–8.7)
TROPONIN: 0.01 NG/ML (ref 0–0.03)
UROBILINOGEN, URINE: 0.2 E.U./DL
WBC # BLD: 13.4 K/UL (ref 4.8–10.8)

## 2021-04-29 PROCEDURE — 2580000003 HC RX 258: Performed by: NURSE PRACTITIONER

## 2021-04-29 PROCEDURE — 1210000000 HC MED SURG R&B

## 2021-04-29 PROCEDURE — 94660 CPAP INITIATION&MGMT: CPT

## 2021-04-29 PROCEDURE — 36415 COLL VENOUS BLD VENIPUNCTURE: CPT

## 2021-04-29 PROCEDURE — 6360000002 HC RX W HCPCS: Performed by: NURSE PRACTITIONER

## 2021-04-29 PROCEDURE — 94640 AIRWAY INHALATION TREATMENT: CPT

## 2021-04-29 PROCEDURE — 87205 SMEAR GRAM STAIN: CPT

## 2021-04-29 PROCEDURE — 85610 PROTHROMBIN TIME: CPT

## 2021-04-29 PROCEDURE — 2580000003 HC RX 258: Performed by: HOSPITALIST

## 2021-04-29 PROCEDURE — 6370000000 HC RX 637 (ALT 250 FOR IP): Performed by: NURSE PRACTITIONER

## 2021-04-29 PROCEDURE — 6360000002 HC RX W HCPCS: Performed by: HOSPITALIST

## 2021-04-29 PROCEDURE — 2500000003 HC RX 250 WO HCPCS: Performed by: NURSE PRACTITIONER

## 2021-04-29 PROCEDURE — 80053 COMPREHEN METABOLIC PANEL: CPT

## 2021-04-29 PROCEDURE — 93005 ELECTROCARDIOGRAM TRACING: CPT | Performed by: NURSE PRACTITIONER

## 2021-04-29 PROCEDURE — 84300 ASSAY OF URINE SODIUM: CPT

## 2021-04-29 PROCEDURE — 83935 ASSAY OF URINE OSMOLALITY: CPT

## 2021-04-29 PROCEDURE — 70450 CT HEAD/BRAIN W/O DYE: CPT

## 2021-04-29 PROCEDURE — 87635 SARS-COV-2 COVID-19 AMP PRB: CPT

## 2021-04-29 PROCEDURE — 0HQ1XZZ REPAIR FACE SKIN, EXTERNAL APPROACH: ICD-10-PCS | Performed by: NURSE PRACTITIONER

## 2021-04-29 PROCEDURE — 71250 CT THORAX DX C-: CPT

## 2021-04-29 PROCEDURE — 6370000000 HC RX 637 (ALT 250 FOR IP): Performed by: HOSPITALIST

## 2021-04-29 PROCEDURE — 82947 ASSAY GLUCOSE BLOOD QUANT: CPT

## 2021-04-29 PROCEDURE — 72125 CT NECK SPINE W/O DYE: CPT

## 2021-04-29 PROCEDURE — 84484 ASSAY OF TROPONIN QUANT: CPT

## 2021-04-29 PROCEDURE — 81003 URINALYSIS AUTO W/O SCOPE: CPT

## 2021-04-29 PROCEDURE — 80162 ASSAY OF DIGOXIN TOTAL: CPT

## 2021-04-29 PROCEDURE — 71045 X-RAY EXAM CHEST 1 VIEW: CPT

## 2021-04-29 PROCEDURE — 2700000000 HC OXYGEN THERAPY PER DAY

## 2021-04-29 PROCEDURE — 82570 ASSAY OF URINE CREATININE: CPT

## 2021-04-29 PROCEDURE — 74176 CT ABD & PELVIS W/O CONTRAST: CPT

## 2021-04-29 PROCEDURE — 85730 THROMBOPLASTIN TIME PARTIAL: CPT

## 2021-04-29 PROCEDURE — 99283 EMERGENCY DEPT VISIT LOW MDM: CPT

## 2021-04-29 PROCEDURE — 85025 COMPLETE CBC W/AUTO DIFF WBC: CPT

## 2021-04-29 RX ORDER — ONDANSETRON 4 MG/1
4 TABLET, ORALLY DISINTEGRATING ORAL EVERY 8 HOURS PRN
Status: DISCONTINUED | OUTPATIENT
Start: 2021-04-29 | End: 2021-05-04 | Stop reason: HOSPADM

## 2021-04-29 RX ORDER — ISOSORBIDE MONONITRATE 60 MG/1
60 TABLET, EXTENDED RELEASE ORAL DAILY
Status: DISCONTINUED | OUTPATIENT
Start: 2021-04-29 | End: 2021-04-30

## 2021-04-29 RX ORDER — NALOXONE HYDROCHLORIDE 0.4 MG/ML
0.4 INJECTION, SOLUTION INTRAMUSCULAR; INTRAVENOUS; SUBCUTANEOUS PRN
Status: DISCONTINUED | OUTPATIENT
Start: 2021-04-29 | End: 2021-05-04 | Stop reason: HOSPADM

## 2021-04-29 RX ORDER — SODIUM CHLORIDE 9 MG/ML
INJECTION, SOLUTION INTRAVENOUS CONTINUOUS
Status: DISCONTINUED | OUTPATIENT
Start: 2021-04-29 | End: 2021-04-29 | Stop reason: SDUPTHER

## 2021-04-29 RX ORDER — ATORVASTATIN CALCIUM 40 MG/1
40 TABLET, FILM COATED ORAL NIGHTLY
Status: DISCONTINUED | OUTPATIENT
Start: 2021-04-29 | End: 2021-05-04 | Stop reason: HOSPADM

## 2021-04-29 RX ORDER — SODIUM CHLORIDE 0.9 % (FLUSH) 0.9 %
5-40 SYRINGE (ML) INJECTION EVERY 12 HOURS SCHEDULED
Status: DISCONTINUED | OUTPATIENT
Start: 2021-04-29 | End: 2021-05-04 | Stop reason: HOSPADM

## 2021-04-29 RX ORDER — WHEAT DEXTRIN 3 G/3.8 G
4 POWDER (GRAM) ORAL
Status: DISCONTINUED | OUTPATIENT
Start: 2021-04-29 | End: 2021-04-30

## 2021-04-29 RX ORDER — ASPIRIN 81 MG/1
81 TABLET ORAL DAILY
Status: DISCONTINUED | OUTPATIENT
Start: 2021-04-30 | End: 2021-05-04 | Stop reason: HOSPADM

## 2021-04-29 RX ORDER — SODIUM CHLORIDE 9 MG/ML
25 INJECTION, SOLUTION INTRAVENOUS PRN
Status: DISCONTINUED | OUTPATIENT
Start: 2021-04-29 | End: 2021-05-04 | Stop reason: HOSPADM

## 2021-04-29 RX ORDER — HEPARIN SODIUM 5000 [USP'U]/ML
5000 INJECTION, SOLUTION INTRAVENOUS; SUBCUTANEOUS EVERY 8 HOURS SCHEDULED
Status: DISCONTINUED | OUTPATIENT
Start: 2021-04-29 | End: 2021-05-04 | Stop reason: HOSPADM

## 2021-04-29 RX ORDER — AZELASTINE 1 MG/ML
2 SPRAY, METERED NASAL 2 TIMES DAILY
Status: DISCONTINUED | OUTPATIENT
Start: 2021-04-29 | End: 2021-04-29

## 2021-04-29 RX ORDER — NITROGLYCERIN 0.4 MG/1
0.4 TABLET SUBLINGUAL EVERY 5 MIN PRN
Status: DISCONTINUED | OUTPATIENT
Start: 2021-04-29 | End: 2021-05-04 | Stop reason: HOSPADM

## 2021-04-29 RX ORDER — POLYETHYLENE GLYCOL 3350 17 G/17G
17 POWDER, FOR SOLUTION ORAL DAILY PRN
Status: DISCONTINUED | OUTPATIENT
Start: 2021-04-29 | End: 2021-05-04 | Stop reason: HOSPADM

## 2021-04-29 RX ORDER — INSULIN GLARGINE 100 [IU]/ML
15 INJECTION, SOLUTION SUBCUTANEOUS NIGHTLY
Status: DISCONTINUED | OUTPATIENT
Start: 2021-04-29 | End: 2021-05-04 | Stop reason: HOSPADM

## 2021-04-29 RX ORDER — CHOLECALCIFEROL (VITAMIN D3) 125 MCG
1000 CAPSULE ORAL DAILY
Status: DISCONTINUED | OUTPATIENT
Start: 2021-04-30 | End: 2021-05-04 | Stop reason: HOSPADM

## 2021-04-29 RX ORDER — CARVEDILOL 12.5 MG/1
12.5 TABLET ORAL 2 TIMES DAILY WITH MEALS
Status: DISCONTINUED | OUTPATIENT
Start: 2021-04-29 | End: 2021-05-04 | Stop reason: HOSPADM

## 2021-04-29 RX ORDER — DEXTROSE MONOHYDRATE 25 G/50ML
25 INJECTION, SOLUTION INTRAVENOUS ONCE
Status: COMPLETED | OUTPATIENT
Start: 2021-04-29 | End: 2021-04-29

## 2021-04-29 RX ORDER — AMLODIPINE BESYLATE 5 MG/1
5 TABLET ORAL DAILY
Status: DISCONTINUED | OUTPATIENT
Start: 2021-04-30 | End: 2021-05-04 | Stop reason: HOSPADM

## 2021-04-29 RX ORDER — NICOTINE POLACRILEX 4 MG
15 LOZENGE BUCCAL PRN
Status: DISCONTINUED | OUTPATIENT
Start: 2021-04-29 | End: 2021-05-04 | Stop reason: HOSPADM

## 2021-04-29 RX ORDER — ONDANSETRON 2 MG/ML
4 INJECTION INTRAMUSCULAR; INTRAVENOUS EVERY 6 HOURS PRN
Status: DISCONTINUED | OUTPATIENT
Start: 2021-04-29 | End: 2021-05-04 | Stop reason: HOSPADM

## 2021-04-29 RX ORDER — SODIUM POLYSTYRENE SULFONATE 15 G/60ML
45 SUSPENSION ORAL; RECTAL ONCE
Status: COMPLETED | OUTPATIENT
Start: 2021-04-29 | End: 2021-04-29

## 2021-04-29 RX ORDER — SODIUM CHLORIDE 0.9 % (FLUSH) 0.9 %
5-40 SYRINGE (ML) INJECTION PRN
Status: DISCONTINUED | OUTPATIENT
Start: 2021-04-29 | End: 2021-05-04 | Stop reason: HOSPADM

## 2021-04-29 RX ORDER — PRAZOSIN HYDROCHLORIDE 2 MG/1
2 CAPSULE ORAL NIGHTLY
Status: DISCONTINUED | OUTPATIENT
Start: 2021-04-29 | End: 2021-05-04 | Stop reason: HOSPADM

## 2021-04-29 RX ORDER — CHOLESTYRAMINE 4 G/9G
1 POWDER, FOR SUSPENSION ORAL DAILY
Status: DISCONTINUED | OUTPATIENT
Start: 2021-04-29 | End: 2021-05-04 | Stop reason: HOSPADM

## 2021-04-29 RX ORDER — POLYETHYLENE GLYCOL 3350 17 G/17G
17 POWDER, FOR SOLUTION ORAL DAILY PRN
Status: DISCONTINUED | OUTPATIENT
Start: 2021-04-29 | End: 2021-05-03 | Stop reason: SDUPTHER

## 2021-04-29 RX ORDER — ALBUTEROL SULFATE 2.5 MG/3ML
2.5 SOLUTION RESPIRATORY (INHALATION) EVERY 4 HOURS PRN
Status: DISCONTINUED | OUTPATIENT
Start: 2021-04-29 | End: 2021-05-04 | Stop reason: HOSPADM

## 2021-04-29 RX ORDER — CALCIUM GLUCONATE 94 MG/ML
1000 INJECTION, SOLUTION INTRAVENOUS ONCE
Status: COMPLETED | OUTPATIENT
Start: 2021-04-29 | End: 2021-04-29

## 2021-04-29 RX ORDER — ACETAMINOPHEN 325 MG/1
650 TABLET ORAL EVERY 6 HOURS PRN
Status: DISCONTINUED | OUTPATIENT
Start: 2021-04-29 | End: 2021-05-04 | Stop reason: HOSPADM

## 2021-04-29 RX ORDER — DOXAZOSIN MESYLATE 4 MG/1
4 TABLET ORAL NIGHTLY
Status: DISCONTINUED | OUTPATIENT
Start: 2021-04-29 | End: 2021-05-04 | Stop reason: HOSPADM

## 2021-04-29 RX ORDER — DIGOXIN 125 MCG
125 TABLET ORAL DAILY
Status: DISCONTINUED | OUTPATIENT
Start: 2021-04-30 | End: 2021-04-30

## 2021-04-29 RX ORDER — MECOBALAMIN 5000 MCG
5 TABLET,DISINTEGRATING ORAL NIGHTLY PRN
Status: DISCONTINUED | OUTPATIENT
Start: 2021-04-29 | End: 2021-05-04 | Stop reason: HOSPADM

## 2021-04-29 RX ORDER — M-VIT,TX,IRON,MINS/CALC/FOLIC 27MG-0.4MG
1 TABLET ORAL DAILY
Status: DISCONTINUED | OUTPATIENT
Start: 2021-04-30 | End: 2021-05-04 | Stop reason: HOSPADM

## 2021-04-29 RX ORDER — ALBUTEROL SULFATE 2.5 MG/3ML
2.5 SOLUTION RESPIRATORY (INHALATION) EVERY 6 HOURS PRN
Status: DISCONTINUED | OUTPATIENT
Start: 2021-04-29 | End: 2021-05-03 | Stop reason: SDUPTHER

## 2021-04-29 RX ORDER — SODIUM CHLORIDE 9 MG/ML
INJECTION, SOLUTION INTRAVENOUS CONTINUOUS
Status: DISCONTINUED | OUTPATIENT
Start: 2021-04-29 | End: 2021-05-01

## 2021-04-29 RX ORDER — DOCUSATE SODIUM 100 MG/1
100 CAPSULE, LIQUID FILLED ORAL 2 TIMES DAILY
Status: DISCONTINUED | OUTPATIENT
Start: 2021-04-29 | End: 2021-05-04 | Stop reason: HOSPADM

## 2021-04-29 RX ORDER — ARFORMOTEROL TARTRATE 15 UG/2ML
15 SOLUTION RESPIRATORY (INHALATION) 2 TIMES DAILY
Status: DISCONTINUED | OUTPATIENT
Start: 2021-04-29 | End: 2021-05-04 | Stop reason: HOSPADM

## 2021-04-29 RX ORDER — PANTOPRAZOLE SODIUM 40 MG/1
40 TABLET, DELAYED RELEASE ORAL
Status: DISCONTINUED | OUTPATIENT
Start: 2021-04-30 | End: 2021-05-04 | Stop reason: HOSPADM

## 2021-04-29 RX ORDER — BUDESONIDE 0.5 MG/2ML
0.5 INHALANT ORAL EVERY 12 HOURS
Status: DISCONTINUED | OUTPATIENT
Start: 2021-04-29 | End: 2021-05-04 | Stop reason: HOSPADM

## 2021-04-29 RX ORDER — ACETAMINOPHEN 650 MG/1
650 SUPPOSITORY RECTAL EVERY 6 HOURS PRN
Status: DISCONTINUED | OUTPATIENT
Start: 2021-04-29 | End: 2021-05-04 | Stop reason: HOSPADM

## 2021-04-29 RX ADMIN — DOCUSATE SODIUM 100 MG: 100 CAPSULE, LIQUID FILLED ORAL at 22:22

## 2021-04-29 RX ADMIN — SODIUM CHLORIDE: 9 INJECTION, SOLUTION INTRAVENOUS at 22:23

## 2021-04-29 RX ADMIN — SODIUM CHLORIDE: 9 INJECTION, SOLUTION INTRAVENOUS at 19:43

## 2021-04-29 RX ADMIN — SODIUM BICARBONATE 25 MEQ: 84 INJECTION, SOLUTION INTRAVENOUS at 19:43

## 2021-04-29 RX ADMIN — INSULIN HUMAN 5 UNITS: 100 INJECTION, SOLUTION PARENTERAL at 19:45

## 2021-04-29 RX ADMIN — CARVEDILOL 12.5 MG: 12.5 TABLET, FILM COATED ORAL at 22:22

## 2021-04-29 RX ADMIN — INSULIN GLARGINE 15 UNITS: 100 INJECTION, SOLUTION SUBCUTANEOUS at 22:25

## 2021-04-29 RX ADMIN — ATORVASTATIN CALCIUM 40 MG: 40 TABLET, FILM COATED ORAL at 22:22

## 2021-04-29 RX ADMIN — SODIUM POLYSTYRENE SULFONATE 45 G: 15 SUSPENSION ORAL; RECTAL at 19:43

## 2021-04-29 RX ADMIN — DEXTROSE MONOHYDRATE 25 G: 25 INJECTION, SOLUTION INTRAVENOUS at 19:43

## 2021-04-29 RX ADMIN — PRAZOSIN HYDROCHLORIDE 2 MG: 2 CAPSULE ORAL at 22:26

## 2021-04-29 RX ADMIN — DOXAZOSIN 4 MG: 4 TABLET ORAL at 22:22

## 2021-04-29 RX ADMIN — IPRATROPIUM BROMIDE 0.5 MG: 0.5 SOLUTION RESPIRATORY (INHALATION) at 22:37

## 2021-04-29 RX ADMIN — ARFORMOTEROL TARTRATE 15 MCG: 15 SOLUTION RESPIRATORY (INHALATION) at 22:37

## 2021-04-29 RX ADMIN — ISOSORBIDE MONONITRATE 60 MG: 60 TABLET, EXTENDED RELEASE ORAL at 22:22

## 2021-04-29 RX ADMIN — CHOLESTYRAMINE 4 G: 4 POWDER, FOR SUSPENSION ORAL at 22:21

## 2021-04-29 RX ADMIN — HEPARIN SODIUM 5000 UNITS: 5000 INJECTION INTRAVENOUS; SUBCUTANEOUS at 22:25

## 2021-04-29 RX ADMIN — BUDESONIDE 500 MCG: 0.5 SUSPENSION RESPIRATORY (INHALATION) at 22:37

## 2021-04-29 RX ADMIN — CALCIUM GLUCONATE 1000 MG: 98 INJECTION, SOLUTION INTRAVENOUS at 19:43

## 2021-04-29 ASSESSMENT — ENCOUNTER SYMPTOMS
CONSTIPATION: 0
SHORTNESS OF BREATH: 0
COLOR CHANGE: 1
ABDOMINAL PAIN: 0
NAUSEA: 0
SHORTNESS OF BREATH: 1
CHEST TIGHTNESS: 0
COUGH: 0
BACK PAIN: 0
DIARRHEA: 0
ABDOMINAL DISTENTION: 0
VOMITING: 0
BLOOD IN STOOL: 0
WHEEZING: 0

## 2021-04-29 ASSESSMENT — PAIN DESCRIPTION - ORIENTATION
ORIENTATION: LEFT
ORIENTATION: LEFT

## 2021-04-29 ASSESSMENT — PAIN DESCRIPTION - LOCATION
LOCATION: RIB CAGE
LOCATION: RIB CAGE

## 2021-04-29 NOTE — ED PROVIDER NOTES
[facility] 3 EUGENIE/VAS/MED  EMERGENCY DEPARTMENT ENCOUNTER      Pt Name: Kristine Brandt  MRN: [id number]  Maria De Jesusgfjeferson 1935  Date of evaluation: 4/29/2021  Provider: ISAAC Dickens [id number]       Chief Complaint   Patient presents with    Fall         HISTORY OF PRESENT ILLNESS   (Location/Symptom, Timing/Onset, Context/Setting, Quality, Duration, Modifying Factors, Severity)  Note limiting factors. Kristine Brandt is a 81 y.o. male who presents to the emergency department with complaint of fall. This is actually his 3rd fall this week. During first fall was seen in ED. Injured left side of face, bruise to arm and had 2 broken ribs on left. After second fall he was not seen. Today's fall occurred in restroom while trying to pull up his pants. HPI    Nursing Notes were reviewed. REVIEW OF SYSTEMS    (2-9 systems for level 4, 10 or more for level 5)     Review of Systems   Constitutional: Negative for chills and fever. Respiratory: Negative for chest tightness, shortness of breath and wheezing. Cardiovascular: Negative for chest pain and leg swelling. Gastrointestinal: Negative for abdominal pain, diarrhea, nausea and vomiting. Genitourinary: Negative for flank pain and urgency. Musculoskeletal: Negative for back pain, gait problem, joint swelling, neck pain and neck stiffness. Neurological: Positive for weakness. Negative for dizziness, syncope, light-headedness, numbness and headaches. Hematological: Bruises/bleeds easily. Except as noted above the remainder of the review of systems was reviewed and negative.        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 ([facility] [address] 3/28/2020    Lung mass     Neuromuscular disorder (HCC)     Neuropathy     Other disorders of kidney and ureter in diseases classified elsewhere     Palliative care patient 11/15/2018    Pneumonia     Type II or unspecified type diabetes mellitus without mention of complication, not stated as uncontrolled          SURGICAL HISTORY       Past Surgical History:   Procedure Laterality Date    COLONOSCOPY      EYE SURGERY      EYE SURGERY      HERNIA REPAIR      umbilical with Dr Cris Delgado       Current Discharge Medication List      CONTINUE these medications which have NOT CHANGED    Details   HYDROcodone-acetaminophen (NORCO)  MG per tablet Take 1 tablet by mouth every 6 hours as needed for Pain for up to 3 days. Intended supply: 3 days  Qty: 12 tablet, Refills: 0    Associated Diagnoses: Closed fracture of multiple ribs of left side, initial encounter;  Facial laceration, initial encounter; Contusion of face, initial encounter      pantoprazole (PROTONIX) 40 MG tablet Take 1 tablet by mouth every morning (before breakfast)  Qty: 30 tablet, Refills: 5    Associated Diagnoses: Gastroesophageal reflux disease, unspecified whether esophagitis present      atorvastatin (LIPITOR) 40 MG tablet Take 1 tablet by mouth nightly  Qty: 90 tablet, Refills: 3    Associated Diagnoses: Mixed hyperlipidemia      fluticasone (FLONASE) 50 MCG/ACT nasal spray 2 sprays by Nasal route daily  Qty: 16 g, Refills: 11    Associated Diagnoses: Sinusitis      mirabegron (MYRBETRIQ) 25 MG TB24 Take 1 tablet by mouth daily  Qty: 90 tablet, Refills: 3      digoxin (LANOXIN) 125 MCG tablet Take 1 tablet by mouth daily  Qty: 90 tablet, Refills: 3      buPROPion (WELLBUTRIN XL) 150 MG extended release tablet Take 2 tablets by mouth every morning  Qty: 180 tablet, Refills: 3    Associated Diagnoses: Moderate episode of recurrent major depressive disorder ([facility]      TRELEGY ELLIPTA 100-62.5-25 MCG/INH AEPB INHALE 1 PUFF ONCE DAILY  Qty: 1 each, Refills: 11    Associated Diagnoses: Chronic obstructive pulmonary disease, unspecified COPD type ([facility] [address]  Mixed restrictive and obstructive lung disease (HCC)      terazosin (HYTRIN) 5 MG capsule Take 1 capsule by mouth nightly  Qty: 90 capsule, Refills: 3    Associated Diagnoses: Benign nodular prostatic hyperplasia without lower urinary tract symptoms      cholestyramine (QUESTRAN) 4 g packet Take 1 packet by mouth daily  Qty: 30 packet, Refills: 5      azelastine (ASTELIN) 0.1 % nasal spray 2 sprays by Nasal route 2 times daily Use in each nostril as directed  Qty: 4 Bottle, Refills: 3    Associated Diagnoses: Vasomotor rhinitis      carvedilol (COREG) 12.5 MG tablet Take 1 tablet by mouth 2 times daily (with meals)  Qty: 180 tablet, Refills: 3      enzalutamide (XTANDI) 40 MG capsule Take 4 tablets daily  Qty: 360 capsule, Refills: 1      furosemide (LASIX) 40 MG tablet TAKE 1 & 1/2 TABLETS DAILY  Qty: 135 tablet, Refills: 1      isosorbide mononitrate (IMDUR) 60 MG extended release tablet Take 1 tablet by mouth daily  Qty: 90 tablet, Refills: 3    Associated Diagnoses: Chest pain, unspecified type      amLODIPine (NORVASC) 5 MG tablet Take 1 tablet by mouth daily  Qty: 90 tablet, Refills: 3    Associated Diagnoses: Essential hypertension      Dulaglutide (TRULICITY) 4.62 TQ/8.8NT SOPN Inject 0.75 mg as directed every 7 days  Qty: 6 mL, Refills: 3    Associated Diagnoses: Type 2 diabetes mellitus with diabetic polyneuropathy, without long-term current use of insulin (HCC)      lisinopril (PRINIVIL;ZESTRIL) 40 MG tablet Take 1 tablet by mouth daily  Qty: 90 tablet, Refills: 3      celecoxib (CELEBREX) 200 MG capsule Take 1 capsule by mouth 2 times daily  Qty: 60 capsule, Refills: 11 rivaroxaban (XARELTO) 20 MG TABS tablet Take 1 tablet by mouth daily (with breakfast)  Qty: 90 tablet, Refills: 3      metOLazone (ZAROXOLYN) 2.5 MG tablet Take 1 tablet by mouth daily  Qty: 90 tablet, Refills: 1    Associated Diagnoses: Chronic combined systolic and diastolic congestive heart failure (HCC)      !! FLUoxetine (PROZAC) 20 MG capsule Take 1 capsule by mouth daily  Qty: 90 capsule, Refills: 3      spironolactone (ALDACTONE) 50 MG tablet TAKE 1 TABLET BY MOUTH EVERY DAY  Qty: 90 tablet, Refills: 3      metFORMIN (GLUCOPHAGE) 500 MG tablet Take 1 tablet by mouth 2 times daily (with meals)  Qty: 180 tablet, Refills: 3    Associated Diagnoses: Type 2 diabetes mellitus with diabetic polyneuropathy, without long-term current use of insulin ([facility])      Psyllium (KONSYL) 28.3 % POWD Take 4 g by mouth 2 times daily (with meals)  Qty: 1 Bottle, Refills: 11    Associated Diagnoses: Drug-induced constipation      hydroCHLOROthiazide (MICROZIDE) 12.5 MG capsule Take 1 capsule by mouth daily  Qty: 30 capsule, Refills: 11    Associated Diagnoses: Essential hypertension; Peripheral edema      aspirin 81 MG EC tablet Take 1 tablet by mouth daily  Qty: 30 tablet, Refills: 0      potassium chloride (KLOR-CON 10) 10 MEQ extended release tablet Take 1 tablet by mouth daily  Qty: 90 tablet, Refills: 3      Multiple Vitamins-Minerals (THERAPEUTIC MULTIVITAMIN-MINERALS) tablet Take 1 tablet by mouth daily      OXYGEN Inhale 3 L into the lungs nightly       cetirizine (ZYRTEC) 10 MG tablet Take 1 tablet by mouth daily  Qty: 30 tablet, Refills: 0      prazosin (MINIPRESS) 2 MG capsule Take 1 capsule by mouth nightly  Qty: 90 capsule, Refills: 3    Associated Diagnoses: Middle insomnia      vitamin B-12 (CYANOCOBALAMIN) 1000 MCG tablet Take 1,000 mcg by mouth daily      FISH OIL Take 1 capsule by mouth 2 times daily.       docusate sodium (COLACE) 100 MG capsule Take 1 capsule by mouth 2 times daily for 10 days  Qty: 20 capsule, Refills: 0      polyethylene glycol (MIRALAX) 17 g packet Take 17 g by mouth daily for 10 days  Qty: 527 g, Refills: 0      ondansetron (ZOFRAN) 4 MG tablet Take 1 tablet by mouth 3 times daily as needed for Nausea or Vomiting  Qty: 30 tablet, Refills: 0    Associated Diagnoses: Non-intractable vomiting with nausea, unspecified vomiting type      !! Easy Touch Lancets 28G/Twist MISC Use to test Blood sugar daily  Qty: 100 each, Refills: 3      !! 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 ! blood glucose monitor strips Test one times a day & as needed for symptoms of irregular blood glucose. DX: E11.9  Qty: 100 strip, Refills: 3    Comments: Brand per patient preference. May round up to next available package size. Alcohol Swabs (ALCOHOL PREP) PADS Use daily with glucose checks DX: E11.9  Qty: 100 each, Refills: 0      !! blood glucose monitor kit and supplies Test one times a day & as needed for symptoms of irregular blood glucose. DX:E11.9  Qty: 1 kit, Refills: 0    Comments: Brand per patient preference. May round up to next available package size. PLEASE DELIVER TO PT      nitroGLYCERIN (NITROSTAT) 0.4 MG SL tablet up to max of 3 total doses. If no relief after 1 dose, call 911. Qty: 25 tablet, Refills: 0      Wheat Dextrin (BENEFIBER) POWD Take 4 g by mouth 3 times daily (with meals)  Qty: 1 Can, Refills: 5    Associated Diagnoses: Loose stools      !! blood glucose monitor kit and supplies Test once a day for symptoms of irregular blood glucose. Qty: 1 kit, Refills: 0    Comments: Brand per patient preference. May round up to next available package size. !! FLUoxetine (PROZAC) 40 MG capsule Take 1 capsule by mouth daily  Qty: 90 capsule, Refills: 3      albuterol sulfate HFA (PROAIR HFA) 108 (90 Base) MCG/ACT inhaler Inhale 2 puffs into the lungs every 6 hours as needed for Wheezing  Qty: 8.5 Inhaler, Refills: 5    Associated Diagnoses: Chronic obstructive pulmonary disease, unspecified COPD type ([facility] [address]      ! ! Lancets MISC 1 each by Does not apply route daily Accu Chek Guid3 Lancets  Qty: 100 each, Refills: 5    Associated Diagnoses: Type 2 diabetes mellitus with diabetic polyneuropathy, without long-term current use of insulin ([facility])      BiPAP Machine MISC by Does not apply route nightly      leuprolide (LUPRON) 30 MG injection Inject 30 mg into the muscle once Every 4 months       !! - Potential duplicate medications found. Please discuss with provider. ALLERGIES     Patient has no known allergies. FAMILY HISTORY       Family History   Problem Relation Age of Onset    Heart Attack Mother     Cancer Father           SOCIAL HISTORY       Social History     Socioeconomic History    Marital status:       Spouse name: Not on file    Number of children: Not on file    Years of education: Not on file    Highest education level: Not on file   Occupational History    Not on file   Social Needs    Financial resource strain: Not on file    Food insecurity     Worry: Not on file     Inability: Not on file    Transportation needs     Medical: Not on file     Non-medical: Not on file   Tobacco Use    Smoking status: Never Smoker 7th/8th ribs at site of known recent rib fractures. Abdominal:      General: Bowel sounds are normal.   Musculoskeletal: Normal range of motion. Skin:     Capillary Refill: Capillary refill takes 2 to 3 seconds. Comments: Scattered bruising to upper and lower extremities bilaterally. Neurological:      General: No focal deficit present. Mental Status: He is alert and oriented to person, place, and time. DIAGNOSTIC RESULTS     EKG: All EKG's are interpreted by the Emergency Department Physician who either signs or Co-signs this chart in the absence of a cardiologist.    Ventricular paced complexes rate 60    RADIOLOGY:   Non-plain film images such as CT, Ultrasound and MRI are read by the radiologist. Plain radiographic images are visualized and preliminarily interpreted by the emergency physician with the below findings:        Interpretation per the Radiologist below, if available at the time of this note:    CT ABDOMEN PELVIS WO CONTRAST Additional Contrast? None   Final Result   Impression:   1. Acute LEFT sixth through ninth rib fractures. No pneumothorax. Small LEFT hemothorax. 2.  Mild LEFT chest wall soft tissue contusion. 3.  No acute traumatic finding in the abdomen or pelvis identified. However, there is some trace fluid in the LEFT paracolic gutter which   could be related to an occult injury. 4.  Significant decrease in mediastinal infiltrative soft tissue   predominantly in the RIGHT paratracheal region. 5.  Enlarged heart. Signed by Dr Chi Medel on 4/29/2021 8:05 PM      CT CHEST WO CONTRAST   Final Result   Impression:   1. Acute LEFT sixth through ninth rib fractures. No pneumothorax. Small LEFT hemothorax. 2.  Mild LEFT chest wall soft tissue contusion. 3.  No acute traumatic finding in the abdomen or pelvis identified. However, there is some trace fluid in the LEFT paracolic gutter which   could be related to an occult injury.    4.  Significant decrease in mediastinal infiltrative soft tissue   predominantly in the RIGHT paratracheal region. 5.  Enlarged heart. Signed by Dr Darshan Jama on 4/29/2021 8:05 PM      CT CERVICAL SPINE WO CONTRAST   Final Result   1. No evidence of cervical spine fracture. 2.  Multilevel degenerative change. 3.  Chronic reversal of normal cervical lordosis. Signed by Dr Darshan Jama on 4/29/2021 7:48 PM      CT Head WO Contrast   Final Result   No acute intracranial findings. Small LEFT supraorbital soft tissue contusion. Signed by Dr Darshan Jama on 4/29/2021 6:35 PM      XR CHEST PORTABLE   Final Result   Impression:   No acute findings.    Signed by Dr Darshan Jama on 4/29/2021 6:28 PM            ED BEDSIDE ULTRASOUND:   Performed by ED Physician - none    LABS:  Labs Reviewed   COMPREHENSIVE METABOLIC PANEL W/ REFLEX TO MG FOR LOW K - Abnormal; Notable for the following components:       Result Value    Sodium 134 (*)     Potassium reflex Magnesium 6.1 (*)     Glucose 128 (*)     BUN 53 (*)     CREATININE 1.9 (*)     GFR Non- 34 (*)     GFR  41 (*)     Total Protein 6.4 (*)     All other components within normal limits    Narrative:     CALL  [facility] tel. ,  Chemistry results called to and read back by Almita/DESHAWN/ER, 04/29/2021 18:04,  by SONNY   CBC WITH AUTO DIFFERENTIAL - Abnormal; Notable for the following components:    WBC 13.4 (*)     RBC 2.61 (*)     Hemoglobin 8.9 (*)     Hematocrit 27.6 (*)     .7 (*)     MCH 34.1 (*)     MCHC 32.2 (*)     Neutrophils % 78.1 (*)     Lymphocytes % 6.0 (*)     Neutrophils Absolute 10.5 (*)     Lymphocytes Absolute 0.8 (*)     Monocytes Absolute 1.10 (*)     All other components within normal limits   DIGOXIN LEVEL - Abnormal; Notable for the following components:    Digoxin Lvl 2.7 (*)     All other components within normal limits    Narrative:     CALL  [employer] tel. ,  Chemistry results called to and read back by Albert/RN/ER, 04/29/2021 19:53, by  Shy Zhao   POCT GLUCOSE - Abnormal; Notable for the following components:    POC Glucose 109 (*)     All other components within normal limits   POCT GLUCOSE - Abnormal; Notable for the following components:    POC Glucose 130 (*)     All other components within normal limits   COVID-19, RAPID   TROPONIN   PROTIME-INR   URINALYSIS   APTT   EOSINOPHIL SMEAR URINE   OSMOLALITY, URINE   CREATININE, RANDOM URINE   SODIUM, URINE, RANDOM   DIGOXIN LEVEL   BASIC METABOLIC PANEL W/ REFLEX TO MG FOR LOW K   CBC WITH AUTO DIFFERENTIAL   DIGOXIN LEVEL   POCT GLUCOSE   POCT GLUCOSE   POCT GLUCOSE   POCT GLUCOSE   POCT GLUCOSE   POCT GLUCOSE   POCT GLUCOSE   POCT GLUCOSE   POCT GLUCOSE   POCT GLUCOSE   POCT GLUCOSE   POCT GLUCOSE   POCT GLUCOSE   POCT GLUCOSE   POCT GLUCOSE       All other labs were within normal range or not returned as of this dictation. EMERGENCY DEPARTMENT COURSE and DIFFERENTIAL DIAGNOSIS/MDM:   Vitals:    Vitals:    04/29/21 1629 04/29/21 1900 04/29/21 2045 04/29/21 2121   BP: (!) 99/48 (!) 111/53 119/63 129/66   Pulse: 61 65 61 60   Resp: 16 15 14 16   Temp: 98.5 °F (36.9 °C)   96.1 °F (35.6 °C)   TempSrc:    Temporal   SpO2: 98% 97% 96% 100%   Weight:    235 lb 3 oz (106.7 kg)     Multiple recent falls with hemothorax, contusion to left eye, acute blood loss anemia and hyperkalemia.        MDM  Number of Diagnoses or Management Options     Amount and/or Complexity of Data Reviewed  Clinical lab tests: reviewed  Tests in the radiology section of CPT®: reviewed  Decide to obtain previous medical records or to obtain history from someone other than the patient: yes  Obtain history from someone other than the patient: yes  Review and summarize past medical records: yes  Discuss the patient with other providers: yes  Independent visualization of images, tracings, or specimens: yes    Critical Care  Total time providing critical care: 30-74 minutes        REASSESSMENT CRITICAL CARE TIME   Total Critical Care time was 70 minutes, excluding separately reportable procedures. There was a high probability of clinically significant/life threatening deterioration in the patient's condition which required my urgent intervention. CONSULTS:  IP CONSULT TO NEPHROLOGY    PROCEDURES:  Unless otherwise noted below, none     Procedures         FINAL IMPRESSION      1. Closed head injury, initial encounter    2. Hemothorax on left    3. Anemia due to acute blood loss    4. Hyperkalemia          DISPOSITION/PLAN   DISPOSITION Admitted 04/29/2021 06:58:49 PM      PATIENT REFERRED TO:  No follow-up provider specified. DISCHARGE MEDICATIONS:  Current Discharge Medication List        Controlled Substances Monitoring:     RX Monitoring 4/26/2021   Attestation The Prescription Monitoring Report for this patient was reviewed today. Periodic Controlled Substance Monitoring Possible medication side effects, risk of tolerance/dependence & alternative treatments discussed. ;No signs of potential drug abuse or diversion identified.        (Please note that portions of this note were completed with a voice recognition program.  Efforts were made to edit the dictations but occasionally words are mis-transcribed.)    ISAAC Mittal CNP (electronically signed)  Attending Emergency Physician         ISAAC Mittal CNP  04/29/21 2443

## 2021-04-29 NOTE — ED TRIAGE NOTES
Pt here after falling at assisted living facility. Pt states he has left rib pain and right elbow pain.

## 2021-04-29 NOTE — ED PROVIDER NOTES
Attending Supervisory Note/Shared Visit   I have personally performed a face to face diagnostic evaluation on this patient. I have reviewed the mid-levels findings and agree. Briefly, patient is an 60-year-old male presents due to fall. Complains of generalized weakness and increased falls over the past week. Has some bruising to his face and arm from a prior fall. Denies any pain from his fall today. Was seen here previously following fall 3 days ago and found to have left-sided rib fractures. Still has some discomfort with this but said it is no different from when he was seen here 3 days ago. Denies any new injuries from falling today. Lungs are clear. Abdomen soft. No tenderness arms or legs. No tenderness in the C, T or L-spine. Pelvis stable. A&Ox3. No weakness or numbness. Dr. Noam Dickson, hospitalist, actually already admitted the patient before I saw the patient. He had spoken with NP, Blas Carver, who then informed me about the patient. Patient has evidence of worsening renal failure and hyperkalemia. Has some mild anemia. Denies any black or bloody stools, however, hemoglobin will need to be trended and still need to be guaiac tested. Given his multiple falls instructed Laxmi to add on CT scans of abdomen pelvis chest and C-spine. Dr. Noam Dickson has already admitted the patient and will follow up on the pending scans. His hyperkalemia has already been treated but upon reviewing his medication list it looks like he is on digoxin so I will add on digoxin level. Patient's already been admitted upstairs but upon reviewing results of his scans does look like he has a small left hemothorax and left-sided rib fractures. Patient had told me that he had no injury to his left ribs today and discomfort had been there since his fall a few days ago so I suspect the small left hemothorax is probably from that.   However, contacted Dr. Noam Dickson and notified him about these results so that he can consult general surgery concerning the small hemothorax seen on CT. Also noticed the digoxin level is elevated. Spoke with poison control concerning patient. After speaking with poison control they spoke with  with poison control who recommended no Digibind and to just continue monitoring vitals and keep on telemetry. They recommended continued monitoring of digoxin level and potassium. I called notify Dr. Sidra Kebede about recommendations from poison control above. FINAL IMPRESSION      1. Closed head injury, initial encounter    2. Hemothorax on left    3. Anemia due to acute blood loss    4. Hyperkalemia    5. Poisoning by digitalis glycoside, accidental or unintentional, initial encounter    6.  Closed fracture of multiple ribs of left side with routine healing, subsequent encounter          Luis Alberto Florian MD  Attending Emergency Physician       Luis Alberto Florian MD  04/30/21 0884

## 2021-04-30 ENCOUNTER — CARE COORDINATION (OUTPATIENT)
Dept: CARE COORDINATION | Age: 86
End: 2021-04-30

## 2021-04-30 ENCOUNTER — APPOINTMENT (OUTPATIENT)
Dept: ULTRASOUND IMAGING | Age: 86
DRG: 682 | End: 2021-04-30
Payer: MEDICARE

## 2021-04-30 ENCOUNTER — TELEPHONE (OUTPATIENT)
Dept: CARDIOLOGY CLINIC | Age: 86
End: 2021-04-30
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
## 2021-04-30 PROBLEM — T46.0X1A DIGOXIN TOXICITY: Status: ACTIVE | Noted: 2021-04-30

## 2021-04-30 PROBLEM — E87.5 HYPERKALEMIA: Status: RESOLVED | Noted: 2020-03-27 | Resolved: 2021-04-30
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
## 2021-04-30 PROBLEM — J94.2 HEMOTHORAX ON LEFT: Status: ACTIVE | Noted: 2021-04-30

## 2021-04-30 PROBLEM — S22.42XA FRACTURE OF MULTIPLE RIBS OF LEFT SIDE: Status: ACTIVE | Noted: 2021-04-30

## 2021-04-30 LAB
ANION GAP SERPL CALCULATED.3IONS-SCNC: 12 MMOL/L (ref 7–19)
BASOPHILS ABSOLUTE: 0.1 K/UL (ref 0–0.2)
BASOPHILS RELATIVE PERCENT: 0.5 % (ref 0–1)
BUN BLDV-MCNC: 47 MG/DL (ref 8–23)
CALCIUM SERPL-MCNC: 9.1 MG/DL (ref 8.8–10.2)
CALCIUM SERPL-MCNC: 9.1 MG/DL (ref 8.8–10.2)
CHLORIDE BLD-SCNC: 102 MMOL/L (ref 98–111)
CO2: 24 MMOL/L (ref 22–29)
CREAT SERPL-MCNC: 1.7 MG/DL (ref 0.5–1.2)
DIGOXIN LEVEL: 1.7 NG/ML (ref 0.6–1.2)
DIGOXIN LEVEL: 2 NG/ML (ref 0.6–1.2)
EOSINOPHILS ABSOLUTE: 0.1 K/UL (ref 0–0.6)
EOSINOPHILS RELATIVE PERCENT: 0.6 % (ref 0–5)
GFR AFRICAN AMERICAN: 47
GFR NON-AFRICAN AMERICAN: 38
GLUCOSE BLD-MCNC: 107 MG/DL (ref 74–109)
GLUCOSE BLD-MCNC: 116 MG/DL (ref 70–99)
GLUCOSE BLD-MCNC: 119 MG/DL (ref 70–99)
GLUCOSE BLD-MCNC: 130 MG/DL (ref 70–99)
GLUCOSE BLD-MCNC: 153 MG/DL (ref 70–99)
HCT VFR BLD CALC: 27.1 % (ref 42–52)
HEMOGLOBIN: 8.7 G/DL (ref 14–18)
IMMATURE GRANULOCYTES #: 0.7 K/UL
LV EF: 40 %
LVEF MODALITY: NORMAL
LYMPHOCYTES ABSOLUTE: 0.8 K/UL (ref 1.1–4.5)
LYMPHOCYTES RELATIVE PERCENT: 7.6 % (ref 20–40)
MAGNESIUM: 1.9 MG/DL (ref 1.6–2.4)
MCH RBC QN AUTO: 33.7 PG (ref 27–31)
MCHC RBC AUTO-ENTMCNC: 32.1 G/DL (ref 33–37)
MCV RBC AUTO: 105 FL (ref 80–94)
MONOCYTES ABSOLUTE: 1 K/UL (ref 0–0.9)
MONOCYTES RELATIVE PERCENT: 9.6 % (ref 0–10)
NEUTROPHILS ABSOLUTE: 8.2 K/UL (ref 1.5–7.5)
NEUTROPHILS RELATIVE PERCENT: 75.1 % (ref 50–65)
PDW BLD-RTO: 13.1 % (ref 11.5–14.5)
PERFORMED ON: ABNORMAL
PHOSPHORUS: 3.8 MG/DL (ref 2.5–4.5)
PLATELET # BLD: 161 K/UL (ref 130–400)
PMV BLD AUTO: 10.5 FL (ref 9.4–12.4)
POTASSIUM REFLEX MAGNESIUM: 4.8 MMOL/L (ref 3.5–5)
POTASSIUM SERPL-SCNC: 4.7 MMOL/L (ref 3.5–5)
PRO-BNP: 1401 PG/ML (ref 0–1800)
RBC # BLD: 2.58 M/UL (ref 4.7–6.1)
SODIUM BLD-SCNC: 138 MMOL/L (ref 136–145)
WBC # BLD: 10.9 K/UL (ref 4.8–10.8)

## 2021-04-30 PROCEDURE — 99222 1ST HOSP IP/OBS MODERATE 55: CPT | Performed by: INTERNAL MEDICINE

## 2021-04-30 PROCEDURE — 80162 ASSAY OF DIGOXIN TOTAL: CPT

## 2021-04-30 PROCEDURE — 6360000002 HC RX W HCPCS: Performed by: HOSPITALIST

## 2021-04-30 PROCEDURE — 83880 ASSAY OF NATRIURETIC PEPTIDE: CPT

## 2021-04-30 PROCEDURE — 2580000003 HC RX 258: Performed by: HOSPITALIST

## 2021-04-30 PROCEDURE — 84100 ASSAY OF PHOSPHORUS: CPT

## 2021-04-30 PROCEDURE — 6370000000 HC RX 637 (ALT 250 FOR IP): Performed by: STUDENT IN AN ORGANIZED HEALTH CARE EDUCATION/TRAINING PROGRAM

## 2021-04-30 PROCEDURE — 6360000004 HC RX CONTRAST MEDICATION: Performed by: INTERNAL MEDICINE

## 2021-04-30 PROCEDURE — 94640 AIRWAY INHALATION TREATMENT: CPT

## 2021-04-30 PROCEDURE — 6370000000 HC RX 637 (ALT 250 FOR IP): Performed by: HOSPITALIST

## 2021-04-30 PROCEDURE — 76770 US EXAM ABDO BACK WALL COMP: CPT

## 2021-04-30 PROCEDURE — 36415 COLL VENOUS BLD VENIPUNCTURE: CPT

## 2021-04-30 PROCEDURE — 80048 BASIC METABOLIC PNL TOTAL CA: CPT

## 2021-04-30 PROCEDURE — 85025 COMPLETE CBC W/AUTO DIFF WBC: CPT

## 2021-04-30 PROCEDURE — 2700000000 HC OXYGEN THERAPY PER DAY

## 2021-04-30 PROCEDURE — C8929 TTE W OR WO FOL WCON,DOPPLER: HCPCS

## 2021-04-30 PROCEDURE — 82947 ASSAY GLUCOSE BLOOD QUANT: CPT

## 2021-04-30 PROCEDURE — 1210000000 HC MED SURG R&B

## 2021-04-30 PROCEDURE — 99221 1ST HOSP IP/OBS SF/LOW 40: CPT | Performed by: THORACIC SURGERY (CARDIOTHORACIC VASCULAR SURGERY)

## 2021-04-30 PROCEDURE — 84132 ASSAY OF SERUM POTASSIUM: CPT

## 2021-04-30 PROCEDURE — 82310 ASSAY OF CALCIUM: CPT

## 2021-04-30 PROCEDURE — 83735 ASSAY OF MAGNESIUM: CPT

## 2021-04-30 RX ORDER — LIDOCAINE 4 G/G
1 PATCH TOPICAL DAILY
Status: DISCONTINUED | OUTPATIENT
Start: 2021-04-30 | End: 2021-05-04 | Stop reason: HOSPADM

## 2021-04-30 RX ORDER — COLESEVELAM 180 1/1
1875 TABLET ORAL EVERY EVENING
COMMUNITY
End: 2021-09-24

## 2021-04-30 RX ORDER — ISOSORBIDE MONONITRATE 30 MG/1
30 TABLET, EXTENDED RELEASE ORAL DAILY
Status: DISCONTINUED | OUTPATIENT
Start: 2021-05-01 | End: 2021-05-04 | Stop reason: HOSPADM

## 2021-04-30 RX ORDER — HYDROCODONE BITARTRATE AND ACETAMINOPHEN 5; 325 MG/1; MG/1
1 TABLET ORAL EVERY 4 HOURS PRN
Status: DISCONTINUED | OUTPATIENT
Start: 2021-04-30 | End: 2021-05-04 | Stop reason: HOSPADM

## 2021-04-30 RX ORDER — HYDROCODONE BITARTRATE AND ACETAMINOPHEN 5; 325 MG/1; MG/1
2 TABLET ORAL EVERY 4 HOURS PRN
Status: DISCONTINUED | OUTPATIENT
Start: 2021-04-30 | End: 2021-05-04 | Stop reason: HOSPADM

## 2021-04-30 RX ORDER — HYDROMORPHONE HYDROCHLORIDE 1 MG/ML
0.5 INJECTION, SOLUTION INTRAMUSCULAR; INTRAVENOUS; SUBCUTANEOUS EVERY 4 HOURS PRN
Status: DISCONTINUED | OUTPATIENT
Start: 2021-04-30 | End: 2021-05-04 | Stop reason: HOSPADM

## 2021-04-30 RX ORDER — CALCIUM CARBONATE 200(500)MG
500 TABLET,CHEWABLE ORAL 3 TIMES DAILY PRN
Status: DISCONTINUED | OUTPATIENT
Start: 2021-04-30 | End: 2021-05-04 | Stop reason: HOSPADM

## 2021-04-30 RX ADMIN — DOCUSATE SODIUM 100 MG: 100 CAPSULE, LIQUID FILLED ORAL at 21:28

## 2021-04-30 RX ADMIN — ISOSORBIDE MONONITRATE 60 MG: 60 TABLET, EXTENDED RELEASE ORAL at 09:27

## 2021-04-30 RX ADMIN — HYDROCODONE BITARTRATE AND ACETAMINOPHEN 1 TABLET: 5; 325 TABLET ORAL at 21:31

## 2021-04-30 RX ADMIN — IPRATROPIUM BROMIDE 0.5 MG: 0.5 SOLUTION RESPIRATORY (INHALATION) at 14:48

## 2021-04-30 RX ADMIN — ARFORMOTEROL TARTRATE 15 MCG: 15 SOLUTION RESPIRATORY (INHALATION) at 06:13

## 2021-04-30 RX ADMIN — IPRATROPIUM BROMIDE 0.5 MG: 0.5 SOLUTION RESPIRATORY (INHALATION) at 06:13

## 2021-04-30 RX ADMIN — PRAZOSIN HYDROCHLORIDE 2 MG: 2 CAPSULE ORAL at 21:28

## 2021-04-30 RX ADMIN — PERFLUTREN 1.65 MG: 6.52 INJECTION, SUSPENSION INTRAVENOUS at 16:30

## 2021-04-30 RX ADMIN — IPRATROPIUM BROMIDE 0.5 MG: 0.5 SOLUTION RESPIRATORY (INHALATION) at 18:12

## 2021-04-30 RX ADMIN — BUDESONIDE 500 MCG: 0.5 SUSPENSION RESPIRATORY (INHALATION) at 06:13

## 2021-04-30 RX ADMIN — SODIUM CHLORIDE: 9 INJECTION, SOLUTION INTRAVENOUS at 06:08

## 2021-04-30 RX ADMIN — ARFORMOTEROL TARTRATE 15 MCG: 15 SOLUTION RESPIRATORY (INHALATION) at 18:13

## 2021-04-30 RX ADMIN — INSULIN GLARGINE 15 UNITS: 100 INJECTION, SOLUTION SUBCUTANEOUS at 21:32

## 2021-04-30 RX ADMIN — CALCIUM CARBONATE (ANTACID) CHEW TAB 500 MG 500 MG: 500 CHEW TAB at 01:46

## 2021-04-30 RX ADMIN — CARVEDILOL 12.5 MG: 12.5 TABLET, FILM COATED ORAL at 09:40

## 2021-04-30 RX ADMIN — Medication 1 TABLET: at 09:26

## 2021-04-30 RX ADMIN — AMLODIPINE BESYLATE 5 MG: 5 TABLET ORAL at 09:27

## 2021-04-30 RX ADMIN — PANTOPRAZOLE SODIUM 40 MG: 40 TABLET, DELAYED RELEASE ORAL at 06:08

## 2021-04-30 RX ADMIN — CARVEDILOL 12.5 MG: 12.5 TABLET, FILM COATED ORAL at 18:07

## 2021-04-30 RX ADMIN — CYANOCOBALAMIN TAB 500 MCG 1000 MCG: 500 TAB at 09:26

## 2021-04-30 RX ADMIN — CHOLESTYRAMINE 4 G: 4 POWDER, FOR SUSPENSION ORAL at 09:25

## 2021-04-30 RX ADMIN — ATORVASTATIN CALCIUM 40 MG: 40 TABLET, FILM COATED ORAL at 21:28

## 2021-04-30 RX ADMIN — ONDANSETRON HYDROCHLORIDE 4 MG: 2 SOLUTION INTRAMUSCULAR; INTRAVENOUS at 01:46

## 2021-04-30 RX ADMIN — BUDESONIDE 500 MCG: 0.5 SUSPENSION RESPIRATORY (INHALATION) at 18:12

## 2021-04-30 ASSESSMENT — ENCOUNTER SYMPTOMS
NAUSEA: 0
BLOOD IN STOOL: 0
SHORTNESS OF BREATH: 0
ABDOMINAL PAIN: 1
CONSTIPATION: 0
DIARRHEA: 0
ABDOMINAL PAIN: 0
VOMITING: 0
COUGH: 0
BACK PAIN: 0
WHEEZING: 0
ABDOMINAL DISTENTION: 0
COLOR CHANGE: 0

## 2021-04-30 ASSESSMENT — PAIN SCALES - GENERAL: PAINLEVEL_OUTOF10: 6

## 2021-04-30 NOTE — CARE COORDINATION
Attempted to see pt re: dc plans and high readmit risk. . Pt not in room, gone for testing . Will follow up at a later time.   Electronically signed by Michael Perez RN on 4/30/2021 at 1:08 PM

## 2021-04-30 NOTE — PROGRESS NOTES
UROBILINOGEN 0.2   BILIRUBINUR Negative   BLOODU Negative   GLUCOSEU Negative     RAD:   Ct Abdomen Pelvis Wo Contrast Additional Contrast? None    Result Date: 4/29/2021    Impression: 1. Acute LEFT sixth through ninth rib fractures. No pneumothorax. Small LEFT hemothorax. 2.  Mild LEFT chest wall soft tissue contusion. 3.  No acute traumatic finding in the abdomen or pelvis identified. However, there is some trace fluid in the LEFT paracolic gutter which could be related to an occult injury. 4.  Significant decrease in mediastinal infiltrative soft tissue predominantly in the RIGHT paratracheal region. 5.  Enlarged heart. Signed by Dr Karen Cabrera on 4/29/2021 8:05 PM    Ct Head Wo Contrast    Result Date: 4/29/2021    No acute intracranial findings. Small LEFT supraorbital soft tissue contusion. Signed by Dr Karen Cabrera on 4/29/2021 6:35 PM    Ct Chest Wo Contrast    Result Date: 4/29/2021    Impression: 1. Acute LEFT sixth through ninth rib fractures. No pneumothorax. Small LEFT hemothorax. 2.  Mild LEFT chest wall soft tissue contusion. 3.  No acute traumatic finding in the abdomen or pelvis identified. However, there is some trace fluid in the LEFT paracolic gutter which could be related to an occult injury. 4.  Significant decrease in mediastinal infiltrative soft tissue predominantly in the RIGHT paratracheal region. 5.  Enlarged heart. Signed by Dr Karen Cabrera on 4/29/2021 8:05 PM      Ct Cervical Spine Wo Contrast    Result Date: 4/29/2021    1. No evidence of cervical spine fracture. 2.  Multilevel degenerative change. 3.  Chronic reversal of normal cervical lordosis. Signed by Dr Karen Cabrera on 4/29/2021 7:48 PM    Us Renal Complete    Result Date: 4/30/2021    1. Unremarkable appearance of the left kidney. 2. Normal appearance of the right kidney except for a 9 mm cyst along the lateral cortex at the interpolar level, which appears benign. No hydronephrosis is identified.  Signed by Dr Marcelino Bowser Efren Auguste on 4/30/2021 1:52 PM    Xr Chest Portable    Result Date: 4/29/2021    Impression: No acute findings. Signed by Dr Geovany Fuentes on 4/29/2021 6:28 PM        Micro:    Rapid, COVID- neagtive     Assessment/Plan   Principal Problem:    NELSY (acute kidney injury) ([facility] [address]  Active Problems:    Essential hypertension    Chronic atrial fibrillation    COPD (chronic obstructive pulmonary disease) ([facility])    Type 2 diabetes mellitus with diabetic polyneuropathy (HCC)    Mixed hyperlipidemia    Hemothorax on left    Fracture of multiple ribs of left side    Fall at home    Digoxin toxicity  Resolved Problems:    Hyperkalemia    Principal Problem:    NELSY (acute kidney injury) ([facility] [address]-               - Continue IVFs               - monitor I's and O's               - Monitor labs               - Avoid nephrotoxic agents    - Nephrology following      - Renal US, Left kidney- Unremarkable, Right kidney- normal appearance except for benign 9mm cyst along the lateral cortex at the interpolar level, no hydronephrosis.      Active Problems:   Hemothroax on left 2/2 Fracture of multiple ribs of left side 2/2 fall at home-    - pain control    - Incentive spirometry    - CT surgery consultation, recommends analgesics, limit fluid intake, no need for chest tube insertion, no need for rib stabilization.    - HOLD heparin for now       Hyperkalemia- resolved, K+ 4.8 today, Received Calcium gluconate, D50, Regular Insulin, Sodium bicarbonate, and 45g Kayexalate in ED, monitor      Essential hypertension- monitor, stable, continue meds as per cardiology recommendations        Chronic atrial fibrillation/ Digoxin Toxicity- - takes Digoxin at home, on hold currently given elevated digoxin level at 1.7, monitor, cardiology following, recommends discontinuing digoxin. Xarelto on hold given frequent falls and small left hemothorax. Will need to weigh benefits of anticoagulation with recurrent falls.  Continue coreg 12.5mg BID, hold off on ACE/ARBs decreased Imdur to 30mg once daily. ECHO ordered by cardiology. COPD (chronic obstructive pulmonary disease) ([facility] [address]- noted, continue home therapy        Type 2 diabetes mellitus with diabetic polyneuropathy ([facility] [address]- continue home insulin, hypoglycemia treatment protocol in place, glucose monitoring        Mixed hyperlipidemia- continue statin         DVT Prophylaxis: SCDs, hold Heparin for now     GI prophylaxis: on Protonix       Further Orders per Clinical course/attending.      Electronically signed by ISAAC Shannon CNP on 4/30/2021 at 3:41 PM

## 2021-04-30 NOTE — CONSULTS
Renal Consult Note    Thank you to requesting provider: Dr Jamal Hyatt, for asking us to see Tashia Salazar    Reason for consultation: Acute kidney injury    Chief Complaint: Recurrent falls    History of Presenting Illness      Patient is an 56-year-old man with a past medical history of diabetes, hypertension, atrial fibrillation, congestive heart failure, COPD, chronic kidney disease stage IIIa, and prostate cancer. About a week ago he sustained a fall with facial trauma. He has a left black eye. He reported two more falls. Patient complains of some dyspnea but had no recent fever nausea vomiting or diarrhea. His initial work-up in the emergency room showed a creatinine at 1.9 that was up from a previous baseline at 1.1. His serum potassium was 6.1. His head CT showed a small left supraorbital soft tissue contusion but no acute intracranial bleeding. A chest CT showed a small left hemothorax. Patient received medical management for his hyperkalemia with Kayexalate bicarbonate regular insulin IV and dextrose along with calcium gluconate. His repeat potassium was 4.7. Renal service was consulted to manage his acute kidney injury. Patient is now a good historian. He denies any dysuria. He admitted using Celebrex. He was also on potassium pills along with Aldactone at home.     Past Medical/Surgical History      Active Ambulatory Problems     Diagnosis Date Noted    Essential hypertension     Neuropathy     Chronic atrial fibrillation     Anxiety     Depression     Mixed restrictive and obstructive lung disease ([address] 05/21/2015    COPD (chronic obstructive pulmonary disease) ([facility] 05/21/2015    NEIDA (obstructive sleep apnea) 05/21/2015    Asbestosis ([facility] [address] [facility] 37/09/1340    Metabolic alkalosis with respiratory acidosis 05/21/2015    Chronic anticoagulation 05/21/2015    Acne rosacea 05/21/2015    Type 2 diabetes mellitus with diabetic polyneuropathy ([address] 11/25/2015    Mixed hyperlipidemia difficulty with urination  Musculoskeletal:  No joint swelling, no redness  Integumentary:  No Rash, no itching, traumatic left black eye  Neurological: Generalized l weakness, No new sensory deficit  Psychiatric:  No depression, no confusion  Endocrine:  No polyuria, no polydipsia       Medications        Current Facility-Administered Medications:     calcium carbonate (TUMS) chewable tablet 500 mg, 500 mg, Oral, TID PRN, Edgar Dobson MD, 500 mg at 04/30/21 0146    psyllium (METAMUCIL) 58.12 % packet 1 packet, 1 packet, Oral, Daily with breakfast, Edgar Dobson MD    lidocaine 4 % external patch 1 patch, 1 patch, Transdermal, Daily, Javier Reyes MD, 1 patch at 04/30/21 0925    HYDROcodone-acetaminophen (NORCO) 5-325 MG per tablet 1 tablet, 1 tablet, Oral, Q4H PRN **OR** HYDROcodone-acetaminophen (NORCO) 5-325 MG per tablet 2 tablet, 2 tablet, Oral, Q4H PRN, Javier Reyes MD    HYDROmorphone HCl PF (DILAUDID) injection 0.5 mg, 0.5 mg, Intravenous, Q4H PRN, MD Johanny Mcmahonuth ON 5/1/2021] isosorbide mononitrate (IMDUR) extended release tablet 30 mg, 30 mg, Oral, Daily, Bev Crockett MD    albuterol (PROVENTIL) nebulizer solution 2.5 mg, 2.5 mg, Nebulization, Q6H PRN, Laxmi Jade, APRN - CNP    0.9 % sodium chloride infusion, , Intravenous, Continuous, Javier Reyes MD, Last Rate: 100 mL/hr at 04/30/21 0749, Rate Change at 04/30/21 0749    amLODIPine (NORVASC) tablet 5 mg, 5 mg, Oral, Daily, Edgar Dobson MD, 5 mg at 04/30/21 9517    [Held by provider] aspirin EC tablet 81 mg, 81 mg, Oral, Daily, Edgar Dobson MD    atorvastatin (LIPITOR) tablet 40 mg, 40 mg, Oral, Nightly, Edgar Dobson MD, 40 mg at 04/29/21 2222    albuterol (PROVENTIL) nebulizer solution 2.5 mg, 2.5 mg, Nebulization, Q4H PRN, Edgar Dobson MD    cholestyramine Deette Able) packet 4 g, 1 packet, Oral, Daily, Edgar Dobson MD, 4 g at 04/30/21 0925    docusate sodium (COLACE) capsule 100 mg, 100 mg, Oral, chloride flush 0.9 % injection 5-40 mL, 5-40 mL, Intravenous, PRN, Beny Chavez MD    0.9 % sodium chloride infusion, 25 mL, Intravenous, PRN, MD Nikki Najeraflores Bruno  [Held by provider] heparin (porcine) injection 5,000 Units, 5,000 Units, Subcutaneous, 3 times per day, Beny Chavez MD, 5,000 Units at 21 2225    polyethylene glycol (GLYCOLAX) packet 17 g, 17 g, Oral, Daily PRN, Beny Chavez MD    melatonin disintegrating tablet 5 mg, 5 mg, Oral, Nightly PRN, Beny Chavez MD    acetaminophen (TYLENOL) tablet 650 mg, 650 mg, Oral, Q6H PRN **OR** acetaminophen (TYLENOL) suppository 650 mg, 650 mg, Rectal, Q6H PRN, Beny Chavez MD    ondansetron (ZOFRAN-ODT) disintegrating tablet 4 mg, 4 mg, Oral, Q8H PRN **OR** ondansetron (ZOFRAN) injection 4 mg, 4 mg, Intravenous, Q6H PRN, Beny Chavez MD, 4 mg at 21 0146    polyethylene glycol (GLYCOLAX) packet 17 g, 17 g, Oral, Daily PRN, Beny Chavez MD  Outpatient Medications Marked as Taking for the 21 encounter [facility] Encounter)   Medication Sig Dispense Refill    [] HYDROcodone-acetaminophen (NORCO)  MG per tablet Take 1 tablet by mouth every 6 hours as needed for Pain for up to 3 days.  Intended supply: 3 days 12 tablet 0    pantoprazole (PROTONIX) 40 MG tablet Take 1 tablet by mouth every morning (before breakfast) 30 tablet 5    atorvastatin (LIPITOR) 40 MG tablet Take 1 tablet by mouth nightly 90 tablet 3    fluticasone (FLONASE) 50 MCG/ACT nasal spray 2 sprays by Nasal route daily 16 g 11    mirabegron (MYRBETRIQ) 25 MG TB24 Take 1 tablet by mouth daily 90 tablet 3    digoxin (LANOXIN) 125 MCG tablet Take 1 tablet by mouth daily 90 tablet 3    buPROPion (WELLBUTRIN XL) 150 MG extended release tablet Take 2 tablets by mouth every morning 180 tablet 3    TRELEGY ELLIPTA 100-62.5-25 MCG/INH AEPB INHALE 1 PUFF ONCE DAILY 1 each 11    terazosin (HYTRIN) 5 MG capsule Take 1 capsule by mouth nightly 90 capsule 3    cholestyramine (QUESTRAN) 4 g packet Take 1 packet by mouth daily 30 packet 5    azelastine (ASTELIN) 0.1 % nasal spray 2 sprays by Nasal route 2 times daily Use in each nostril as directed 4 Bottle 3    carvedilol (COREG) 12.5 MG tablet Take 1 tablet by mouth 2 times daily (with meals) 180 tablet 3    enzalutamide (XTANDI) 40 MG capsule Take 4 tablets daily 360 capsule 1    furosemide (LASIX) 40 MG tablet TAKE 1 & 1/2 TABLETS DAILY 135 tablet 1    isosorbide mononitrate (IMDUR) 60 MG extended release tablet Take 1 tablet by mouth daily 90 tablet 3    amLODIPine (NORVASC) 5 MG tablet Take 1 tablet by mouth daily 90 tablet 3    Dulaglutide (TRULICITY) 7.34 YA/7.9MQ SOPN Inject 0.75 mg as directed every 7 days 6 mL 3    lisinopril (PRINIVIL;ZESTRIL) 40 MG tablet Take 1 tablet by mouth daily 90 tablet 3    celecoxib (CELEBREX) 200 MG capsule Take 1 capsule by mouth 2 times daily 60 capsule 11    rivaroxaban (XARELTO) 20 MG TABS tablet Take 1 tablet by mouth daily (with breakfast) 90 tablet 3    metOLazone (ZAROXOLYN) 2.5 MG tablet Take 1 tablet by mouth daily 90 tablet 1    FLUoxetine (PROZAC) 20 MG capsule Take 1 capsule by mouth daily 90 capsule 3    spironolactone (ALDACTONE) 50 MG tablet TAKE 1 TABLET BY MOUTH EVERY DAY 90 tablet 3    metFORMIN (GLUCOPHAGE) 500 MG tablet Take 1 tablet by mouth 2 times daily (with meals) 180 tablet 3    Psyllium (KONSYL) 28.3 % POWD Take 4 g by mouth 2 times daily (with meals) 1 Bottle 11    hydroCHLOROthiazide (MICROZIDE) 12.5 MG capsule Take 1 capsule by mouth daily 30 capsule 11    aspirin 81 MG EC tablet Take 1 tablet by mouth daily 30 tablet 0    potassium chloride (KLOR-CON 10) 10 MEQ extended release tablet Take 1 tablet by mouth daily 90 tablet 3    Multiple Vitamins-Minerals (THERAPEUTIC MULTIVITAMIN-MINERALS) tablet Take 1 tablet by mouth daily      OXYGEN Inhale 3 L into the lungs nightly       cetirizine (ZYRTEC) 10 MG tablet Take 1 tablet by mouth daily 30 tablet 0    prazosin (MINIPRESS) 2 MG capsule Take 1 capsule by mouth nightly 90 capsule 3    vitamin B-12 (CYANOCOBALAMIN) 1000 MCG tablet Take 1,000 mcg by mouth daily      FISH OIL Take 1 capsule by mouth 2 times daily. Allergies   Patient has no known allergies. Family History       Family History   Problem Relation Age of Onset    Heart Attack Mother     Cancer Father      Family history negative for kidney disease. Social History      Social History     Socioeconomic History    Marital status:      Spouse name: Not on file    Number of children: Not on file    Years of education: Not on file    Highest education level: Not on file   Occupational History    Not on file   Social Needs    Financial resource strain: Not on file    Food insecurity     Worry: Not on file     Inability: Not on file    Transportation needs     Medical: Not on file     Non-medical: Not on file   Tobacco Use    Smoking status: Never Smoker    Smokeless tobacco: Never Used   Substance and Sexual Activity    Alcohol use: No    Drug use: No    Sexual activity: Not on file   Lifestyle    Physical activity     Days per week: Not on file     Minutes per session: Not on file    Stress: Not on file   Relationships    Social connections     Talks on phone: Not on file     Gets together: Not on file     Attends [employer]ism service: Not on file     Active member of club or organization: Not on file     Attends meetings of clubs or organizations: Not on file     Relationship status: Not on file    Intimate partner violence     Fear of current or ex partner: Not on file     Emotionally abused: Not on file     Physically abused: Not on file     Forced sexual activity: Not on file   Other Topics Concern    Not on file   Social History Narrative    Not on file       Physical Exam     Blood pressure 127/61, pulse 57, temperature 96.6 °F (35.9 °C), temperature source Temporal, resp.  rate 18, weight 235 lb 3 oz (106.7 kg), SpO2 96 %. General:  NAD, A+Ox3, ill-appearing, normal body habitus  HEENT: Normocephalic, sign of facial trauma with ecchymosis around left eye and temple  Neck: No masses, no jugular venous distention  Chest:  CTAB, good respiratory effort, good air movement  CV:  RRR, soft systolic murmur  Abdomen:  NTND, soft, +BS, no hepatosplenomegaly  Extremities:  No peripheral edema  Neurological:  Moving all four extremities  Lymphatics:  No palpable lymph nodes   Skin:  No rash, no jaundice  Psychiatric:  Normal insight and judgement    Data     Recent Labs     04/29/21  1704 04/30/21  0050   WBC 13.4* 10.9*   HGB 8.9* 8.7*   HCT 27.6* 27.1*   .7* 105.0*    161     Recent Labs     04/29/21  1704 04/30/21  0050   * 138   K 6.1* 4.7  4.8    102   CO2 23 24   GLUCOSE 128* 107   PHOS  --  3.8   MG  --  1.9   BUN 53* 47*   CREATININE 1.9* 1.7*   LABGLOM 34* 38*   GFRAA 41* 47*       Assessment:  1. Acute kidney injury-likely prerenal azotemia  2. Hyperkalemia  3. Recurrent falls  4. Type 2 diabetes  5. Congestive heart failure  6. COPD      Plan:  · At this time, we need to avoid potassium supplements and Aldactone. Agree with gentle IV hydration. And follow-up labs. Avoid hypotension nephrotoxins. Will check uric acid level and CK enzymes level. We will also get a renal ultrasound.     Thank you for asking us to participate in the management of your patient, please do not hesitate to contact me for any concerns regarding my recommendations as outlined above.    -----------------------------  Electronically signed by Sammy Rainey MD on 4/30/21 at 11:52 AM CDT

## 2021-04-30 NOTE — PROGRESS NOTES
PHARMACY NOTE  Blanche Schaffer was ordered Azelastine nasal spray. Per the [address], this medication is non-formulary and not stocked by pharmacy. It has been discontinued. The medication can be reordered at discharge.      Electronically signed by Bennett Veliz [address] [place] [address] on 4/29/2021 at 9:47 PM

## 2021-04-30 NOTE — CARE COORDINATION
Date / Time of Evaluation: 4/30/2021 2:06 PM  Assessment Completed by: Neftali Ott    Patient Admission Status: Inpatient [101]    Maureencrescencio  [address] [phone] (home)   Telephone Information:   Mobile [phone]       (Best Practice:  Have patient / caregiver verify above address and phone number by stating out loud their current address and reachable phone number.)  Is above information correct? yes      Current PCP:  Kelly Mota, DO  PCP verified?   yes    Initial Assessment Completed at bedside with:  patient    Emergency Contacts:  Extended Emergency Contact Information  Primary Emergency Contact: Oneil Padilla [address] [facility] Phone: [phone]  Mobile Phone: [phone]  Relation: Child  Secondary Emergency [facility] [address] [facility] Phone: [phone]  Mobile Phone: [phone]  Relation: Child    Advance Directives: Code Status:  Full Code    Financial:  Payor: Tashi Mickey / Plan: León Salcido / Product Type: *No Product type* /     Pre-Cert required for SNF:  yes    Have Long Term Care Insurance:  na    Pharmacy:   [address] [address] [facility] [phone]  [address] [place] [address]  Phone: [phone] Fax: [address] [place] [address] [phone] Tammy Cruz [phone]  [address] [address]  Phone: [phone] Fax: [phone]  [facility] [address], [address] [place] [address] [phone] - f [phone]  [facility] [address]  Phone: [phone] Fax: [phone]    [address] [place] [address] [phone] - f [phone]  [address]  [address]  Phone: [phone] Fax: [phone] - [address], [address] [address]  Phone: [phone] Fax: [phone]      Potential assistance purchasing medications? no    ADLS:  Support System:  Family Members    Current Home Environment:  Home alone- in senior living at [facility]   Steps:  no    Plans to RETURN to current housing: no- wants short term rehab  Barriers to RETURNING to current housing:  mobility    Currently ACTIVE with [address]:  no  [address]:      DME Provider:  Has a walker and cane     Has a pulse oximetry unit at home: yes    Had [address] prior to admission:  Yes nc at [address]:  At [address] of need to bring portable home O2 tank to hospital on day of DISCHARGE:  Pt stated he has no family in the area  Name of person committed to bringing portable tank at discharge: Active with HD/PD prior to admission: no  Nephrologist:    HD Center:      Transition Plan:  from [facility] independent senior living    Transportation PLAN for Discharge:  Unknown     Factors facilitating achievement of predicted outcomes:     Barriers to discharge: Additional CM/SW Notes: spoke with pt re: dc plans- pt lives at home alone, in senior housing. Sustained a fall at home and now has broken ribs. Pt would like short term rehab upon dc and consented to referals to the 4 SNF's here in [facility]. Pt has [facility] Medicare an will require a precert. Will follow for any   Acceptances. Pt/ot has been ordered. Electronically signed by Rigo Rea RN on 4/30/2021 at 2:21 PM      Lili Francois and/or his family were provided with choice of provider.         Rigo Rea RN  [facility]  Care Management Department  Ph:  [phone]   Fax:

## 2021-04-30 NOTE — CONSULTS
Congest[facility] failure ([facility] 05/21/2015    Encounter for screening for malignant neoplasm of colon 11/15/2018    Elevated d-dimer 11/15/2018    PAF (paroxysmal atrial fibrillation) ([facility] 03/13/2019    Hyperkalemia 03/27/2020           Past Medical History:   Diagnosis Date    Arthritis      Atrial fibrillation (HCC)      Blood circulation, collateral      Cellulitis      CHF (congestive heart failure) (HCC)      Chronic kidney disease      Hyperlipidemia      Hypertension      Lung mass      Neuromuscular disorder (HCC)      Other disorders of kidney and ureter in diseases classified elsewhere      Palliative care patient 11/15/2018    Pneumonia           Home Medications         Prior to Admission medications    Medication                                                       Sig Start Date End Date Taking? Authorizing Provider   HYDROcodone-acetaminophen (NORCO)  MG per tablet Take 1 tablet by mouth every 6 hours as needed for Pain for up to 3 days.  Intended supply: 3 days 4/26/21 4/29/21 Yes ISAAC Zaman   pantoprazole (PROTONIX) 40 MG tablet Take 1 tablet by mouth every morning (before breakfast) 4/14/21   Yes ALISON Schmitz DO   atorvastatin (LIPITOR) 40 MG tablet Take 1 tablet by mouth nightly 3/30/21   Yes ALISON Schmitz DO   fluticasone [facility]) 50 MCG/ACT nasal spray 2 sprays by Nasal route daily 3/25/21   Yes ALISON Schmitz DO   mirabegron (MYRBETRIQ) 25 MG TB24 Take 1 tablet by mouth daily 3/17/21   Yes ALISON Schmitz DO   digoxin (LANOXIN) 125 MCG tablet Take 1 tablet by mouth daily 2/25/21   Yes ALISON Schmitz DO   buPROPion (WELLBUTRIN XL) 150 MG extended release tablet Take 2 tablets by mouth every morning 2/25/21   Yes ALISON Schmitz DO   TRELEGY ELLIPTA 100-62.5-25 MCG/INH AEPB INHALE 1 PUFF ONCE DAILY 2/17/21   Yes ALISON Schmitz DO   terazosin (HYTRIN) 5 MG capsule Take 1 capsule by mouth nightly 2/4/21   Yes Meg Coker, ISAAC   cholestyramine (QUESTRAN) 4 g packet Take 1 packet by mouth daily 1/21/21   Yes ALISON Matosa Sloorlin, DO   azelastine (ASTELIN) 0.1 % nasal spray 2 sprays by Nasal route 2 times daily Use in each nostril as directed 1/14/21   Yes ALISON Matosa Sloorlin, DO   carvedilol (COREG) 12.5 MG tablet Take 1 tablet by mouth 2 times daily (with meals) 1/4/21   Yes ALISON Matosa Sloorlin, DO   enzalutamide [place]) 40 MG capsule Take 4 tablets daily 12/29/20   Yes ISAAC Nicole   furosemide (LASIX) 40 MG tablet TAKE 1 & 1/2 TABLETS DAILY 12/23/20   Yes ISAAC Lovelace   isosorbide mononitrate (IMDUR) 60 MG extended release tablet Take 1 tablet by mouth daily 12/16/20   Yes ISAAC Rign   amLODIPine (NORVASC) 5 MG tablet Take 1 tablet by mouth daily 12/16/20   Yes ISAAC Ring   Dulaglutide (TRULICITY) 3.43 CS/2.2TU SOPN Inject 0.75 mg as directed every 7 days 12/1/20   Yes ALISON Cruz, DO   lisinopril (PRINIVIL;ZESTRIL) 40 MG tablet Take 1 tablet by mouth daily 8/27/20   Yes ISAAC Nicole   celecoxib (CELEBREX) 200 MG capsule Take 1 capsule by mouth 2 times daily 7/30/20   Yes ALISON Matosa Anthony, DO   rivaroxaban (XARELTO) 20 MG TABS tablet Take 1 tablet by mouth daily (with breakfast) 7/23/20   Yes ALISON Aikenlia Sloraimb, DO   metOLazone (ZAROXOLYN) 2.5 MG tablet Take 1 tablet by mouth daily 7/14/20   Yes ALISON Matosa Sloraimb, DO   FLUoxetine (PROZAC) 20 MG capsule Take 1 capsule by mouth daily 6/9/20   Yes ALISON Aikenlia Sloraimb, DO   spironolactone (ALDACTONE) 50 MG tablet TAKE 1 TABLET BY MOUTH EVERY DAY 6/3/20   Yes ALISON Matosa Sloraimb, DO   metFORMIN (GLUCOPHAGE) 500 MG tablet Take 1 tablet by mouth 2 times daily (with meals) 6/2/20   Yes B Artelia Slocumb, DO   Psyllium (KONSYL) 28.3 % POWD Take 4 g by mouth 2 times daily (with meals) 5/14/20   Yes B Artelia Slocumb, DO   hydroCHLOROthiazide (MICROZIDE) 12.5 MG capsule Take 1 capsule by mouth daily 5/14/20   Yes B Julieth Montano, DO   aspirin 81 MG EC tablet Take 1 tablet by mouth daily 3/31/20   Yes Asiya Vargas PA-C   potassium chloride (KLOR-CON 10) 10 MEQ extended release tablet Take 1 tablet by mouth daily 6/19/19   Yes ISAAC Mckeon   Multiple Vitamins-Minerals (THERAPEUTIC MULTIVITAMIN-MINERALS) tablet Take 1 tablet by mouth daily     Yes Historical Provider, MD   OXYGEN Inhale 3 L into the lungs nightly      Yes Historical Provider, MD   cetirizine (ZYRTEC) 10 MG tablet Take 1 tablet by mouth daily 11/17/18   Yes Rossy Rival,    prazosin (MINIPRESS) 2 MG capsule Take 1 capsule by mouth nightly 8/20/18   Yes ISAAC Xiong   vitamin B-12 (CYANOCOBALAMIN) 1000 MCG tablet Take 1,000 mcg by mouth daily     Yes Historical Provider, MD   FISH OIL Take 1 capsule by mouth 2 times daily.     Yes Historical Provider, MD   docusate sodium (COLACE) 100 MG capsule Take 1 capsule by mouth 2 times daily for 10 days 4/26/21 5/6/21   ISAAC Valentin   polyethylene glycol (MIRALAX) 17 g packet Take 17 g by mouth daily for 10 days 4/26/21 5/6/21   ISAAC Valentin   ondansetron (ZOFRAN) 4 MG tablet Take 1 tablet by mouth 3 times daily as needed for Nausea or Vomiting 4/14/21     B [place] Comber, DO   Easy Touch Lancets 28G/Twist MISC Use to test Blood sugar daily 2/3/21     B [place] Comber, DO   blood glucose test strips (EASY TOUCH TEST) strip USE TO TEST BLOOD SUGAR ONCE DAILY 11/11/20     ISAAC Mckeon   Lift Chair [id number] [facility] by Does not apply route Prefers leather  At [address] faxed through [facility] 10/21/20     B [place] Comber, DO   blood glucose monitor strips Test one times a day & as needed for symptoms of irregular blood glucose.   DX: E11.9 4/2/20     B [place] Comber, DO   Alcohol Swabs (ALCOHOL PREP) PADS Use daily with glucose checks DX: E11.9 4/2/20     B [place] Comber, DO   blood glucose monitor kit and supplies Test one times a day & as Pulse  Av.6  Min: 57  Max: 65  BLOOD PRESSURE RANGE:  Systolic (17PNN), NICOLÁS:036 , Min:99 , CARLOTA:113   ; Diastolic (28ZFU), PYB:96, Min:48, Max:68    PULSE OXIMETRY RANGE: SpO2  Av.1 %  Min: 96 %  Max: 100 %    No intake/output data recorded. HEENT:  Left periorbital hematoma with swollen left eyelid, trachea midline  CHEST: breath sounds bilaterally, tenderness over left chest wall  CARDIOVASCULAR: regular rate and rhythm, no murmur  ABDOMEN: soft, although movement transmits to left rib fractures with guarding  EXTREMITIES: no edema, left 1st digit hematoma at base of thumb  VASCULAR: no carotid bruits, no DP/PT pulses palpable  NEURO: within normal limits    LABS:  Lab Results   Component Value Date     2021    K 4.8 2021    K 4.7 2021     2021    CO2 24 2021    BUN 47 (H) 2021    CREATININE 1.7 (H) 2021    GLUCOSE 107 2021    CALCIUM 9.1 2021    CALCIUM 9.1 2021    PROT 6.4 (L) 2021    LABALBU 3.8 2021    BILITOT 0.4 2021    ALKPHOS 48 2021    AST 6 2021    ALT 5 2021    LABGLOM 38 (A) 2021    GFRAA 47 (L) 2021    GLOB 2.6 2017     Lab Results   Component Value Date    WBC 10.9 (H) 2021    HGB 8.7 (L) 2021    HCT 27.1 (L) 2021    .0 (H) 2021     2021     Lab Results   Component Value Date    INR 1.12 2021    INR 1.16 2021    PROTIME 14.4 2021    PROTIME 14.8 (H) 2021     CHEST XRAY:  Radiology report  No acute findings. CT OF CHEST: Radiology report  1.  Acute LEFT sixth through ninth rib fractures. No pneumothorax. Small LEFT hemothorax. 2.  Mild LEFT chest wall soft tissue contusion. 3.  No acute traumatic finding in the abdomen or pelvis identified. However, there is some trace fluid in the LEFT paracolic gutter which   could be related to an occult injury.    4.  Significant decrease in mediastinal infiltrative soft tissue   predominantly in the RIGHT paratracheal region. 5.  Enlarged heart. ASSESSMENT: Blunt chest wall trauma with left sixth through ninth rib fractures with small hemothorax, no pneumothorax. No evidence of paradoxical chest wall motion or use of accessory muscles. No present evidence of underlying pulmonary contusion. PLAN: Analgesics. Limit fluid intake. No need for chest tube insertion. No need for rib stabilization. Thank you for this consultation.   Electronically signed by Arnaud Pereira MD on 4/30/21 at 12:26 PM CDT

## 2021-04-30 NOTE — PLAN OF CARE
Problem: Pain:  Goal: Pain level will decrease  Description: Pain level will decrease  Outcome: Ongoing  Goal: Control of acute pain  Description: Control of acute pain  Outcome: Ongoing  Goal: Control of chronic pain  Description: Control of chronic pain  Outcome: Ongoing  Goal: Patient's pain/discomfort is manageable  Description: Patient's pain/discomfort is manageable  Outcome: Ongoing     Problem: Falls - Risk of:  Goal: Will remain free from falls  Description: Will remain free from falls  Outcome: Ongoing  Goal: Absence of physical injury  Description: Absence of physical injury  Outcome: Ongoing     Problem: ABCDS Injury Assessment  Goal: Absence of physical injury  Outcome: Ongoing     Problem: Infection:  Goal: Will remain free from infection  Description: Will remain free from infection  Outcome: Ongoing     Problem: Safety:  Goal: Free from accidental physical injury  Description: Free from accidental physical injury  Outcome: Ongoing  Goal: Free from intentional harm  Description: Free from intentional harm  Outcome: Ongoing     Problem: Daily Care:  Goal: Daily care needs are met  Description: Daily care needs are met  Outcome: Ongoing     Problem: Skin Integrity:  Goal: Skin integrity will stabilize  Description: Skin integrity will stabilize  Outcome: Ongoing     Problem: Discharge Planning:  Goal: Patients continuum of care needs are met  Description: Patients continuum of care needs are met  Outcome: Ongoing

## 2021-04-30 NOTE — H&P
[facility] - History & Physical      PCP: Diana Preston DO    Date of Admission: 4/29/2021    Date of Service: 4/29/2021    Chief Complaint:  Fall at home     History Of Present Illness: The patient is a 81 y.o. male with past medical history of DM, HTN, Afib, CHF, COPD, HLD, CKD, Prostate CA, and Lung mass who presented to [facility] ED complaining of fall at assisted living facility. Mr. Deon Oseguera states he was using bathroom and turned around and fell. Denies tripping, dizziness, lightheadedness, or change in vision prior to fall. States he has fallen before as well. Denies fever, chills, nausea, vomiting, diarrhea, chest pain, or any recent illnesses. States he does get SOB at times due to his COPD and requires 4L Oxygen all the time. Workup in ED revealed Na 131, K+ 6.1, Cr 1.9 baseline 1.1 (3/30/20), negative troponin, Digoxin 2.7, WBC 13.4, hgb 8.9, negative Cxray. CT of head showed small left supraorbital soft tissue contusion, no acute intracranial findings. CT of Cervical spine unremarkable. CT of chest/abd showed acute Left sixth through ninth rib fractures, no pneumothorax, small left hemothorax, mild left chest wall soft tissue contusion, trace fluid in Left paracolic gutter. Received Calcium gluconate, D50, Regular Insulin, Sodium bicarbonate, and 45g Kayexalate in ED.        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 Authorizing Provider   HYDROcodone-acetaminophen (NORCO)  MG per tablet Take 1 tablet by mouth every 6 hours as needed for Pain for up to 3 days.  Intended supply: 3 days 4/26/21 4/29/21 Yes ISAAC Peterson   pantoprazole (PROTONIX) 40 MG tablet Take 1 tablet by mouth every morning (before breakfast) 4/14/21  [facility]   atorvastatin (LIPITOR) 40 MG tablet Take 1 tablet by mouth nightly 3/30/21  Yes [facility], DO   fluticasone [facility]) 50 MCG/ACT nasal spray 2 sprays by Nasal route daily 3/25/21  Yes [facility], DO   mirabegron (MYRBETRIQ) 25 MG TB24 Take 1 tablet by mouth daily 3/17/21  Yes [facility] DO   digoxin (LANOXIN) 125 MCG tablet Take 1 tablet by mouth daily 2/25/21  Yes [facility], DO   buPROPion (WELLBUTRIN XL) 150 MG extended release tablet Take 2 tablets by mouth every morning 2/25/21  Yes [facility], DO   TRELEGY ELLIPTA 100-62.5-25 MCG/INH AEPB INHALE 1 PUFF ONCE DAILY 2/17/21  Yes ALISON Worthy Kam DO   terazosin (HYTRIN) 5 MG capsule Take 1 capsule by mouth nightly 2/4/21  Yes ISAAC Lovelace   cholestyramine (QUESTRAN) 4 g packet Take 1 packet by mouth daily 1/21/21  Yes [facility] DO   azelastine (ASTELIN) 0.1 % nasal spray 2 sprays by Nasal route 2 times daily Use in each nostril as directed 1/14/21  Yes [facility], DO   carvedilol (COREG) 12.5 MG tablet Take 1 tablet by mouth 2 times daily (with meals) 1/4/21  Yes ALISON Amezcua DO   enzalutamide [facility]) 40 MG capsule Take 4 tablets daily 12/29/20  Yes Toney Drivers, ISAAC   furosemide (LASIX) 40 MG tablet TAKE 1 & 1/2 TABLETS DAILY 12/23/20  Yes ISAAC Lovelace   isosorbide mononitrate (IMDUR) 60 MG extended release tablet Take 1 tablet by mouth daily 12/16/20  Yes ISAAC Gonzalez   amLODIPine (NORVASC) 5 MG tablet Take 1 tablet by mouth daily 12/16/20  Yes ISAAC Gonzalez   Dulaglutide (TRULICITY) 4.42 PU/1.3QS SOPN Inject 0.75 mg as directed every 7 days 12/1/20  Yes ALISON Amezcua DO   lisinopril (PRINIVIL;ZESTRIL) 40 MG tablet Take 1 tablet by mouth daily 8/27/20  Yes Toney Theodore, ISAAC   celecoxib (CELEBREX) 200 MG capsule Take 1 capsule by mouth 2 times daily 7/30/20  Yes ALISON Amezcua DO   rivaroxaban (XARELTO) 20 MG TABS tablet Take 1 tablet by mouth daily (with breakfast) 7/23/20  Yes ALISON Amezcua DO   metOLazone (ZAROXOLYN) 2.5 MG tablet Take 1 tablet by mouth daily 7/14/20  Yes ALISON Amezcua DO   FLUoxetine (PROZAC) 20 MG capsule Take 1 capsule by mouth daily 6/9/20  Yes ALISON Amezcua DO   spironolactone (ALDACTONE) 50 MG tablet TAKE 1 TABLET BY MOUTH EVERY DAY 6/3/20  Yes ALISON Amezcua DO   metFORMIN (GLUCOPHAGE) 500 MG tablet Take 1 tablet by mouth 2 times daily (with meals) 6/2/20  Yes ALISON Amezcua DO   Psyllium (KONSYL) 28.3 % POWD Take 4 g by mouth 2 times daily (with meals) 5/14/20  Yes ALISON Amezcua DO   hydroCHLOROthiazide (MICROZIDE) 12.5 MG capsule Take 1 capsule by mouth daily 5/14/20  Yes ALISON Amezcua DO   aspirin 81 MG EC tablet Take 1 tablet by mouth daily 3/31/20  Yes Naa Wright PA-C   potassium chloride (KLOR-CON 10) 10 MEQ extended release tablet Take 1 tablet by mouth daily 6/19/19  Yes ISAAC Gonzalez   Multiple Vitamins-Minerals (THERAPEUTIC MULTIVITAMIN-MINERALS) tablet Take 1 tablet by mouth daily once a day for symptoms of irregular blood glucose. 2/3/20   ALISON Dill DO   FLUoxetine (PROZAC) 40 MG capsule Take 1 capsule by mouth daily  Patient taking differently: Take 40 mg by mouth  1/28/20   ISAAC Salazar   albuterol sulfate HFA (PROAIR HFA) 108 (90 Base) MCG/ACT inhaler Inhale 2 puffs into the lungs every 6 hours as needed for Wheezing 12/4/19   ISAAC Ivy   Lancets MISC 1 each by Does not apply route daily Accu Chek Guid3 Lancets 4/29/19   ALISON Dill DO   BiPAP Machine MISC by Does not apply route nightly    Historical Provider, MD   leuprolide (LUPRON) 30 MG injection Inject 30 mg into the muscle once Every 4 months    Historical Provider, MD       Allergies:    Patient has no known allergies. Social History:    The patient currently lives at [facility], assisted living facility,   Tobacco:   reports that he has never smoked. He has never used smokeless tobacco.  Alcohol:   reports no history of alcohol use. Illicit Drugs: denies    Family History:      Problem Relation Age of Onset    Heart Attack Mother     Cancer Father        Review of Systems   Constitutional: Negative for chills, diaphoresis, fatigue and fever. HENT: Negative for congestion and ear pain. Eyes: Negative for visual disturbance. Respiratory: Positive for shortness of breath. Negative for cough and wheezing. Cardiovascular: Negative for chest pain, palpitations and leg swelling. Gastrointestinal: Negative for abdominal distention, abdominal pain, blood in stool, constipation, diarrhea, nausea and vomiting. Endocrine: Negative for cold intolerance and heat intolerance. Genitourinary: Negative for difficulty urinating, flank pain, frequency and urgency. Musculoskeletal: Positive for arthralgias. Negative for myalgias. Right elbow bruising and minor pain from fall today    Skin: Positive for color change. Negative for wound.         Bruising to Left eye from previous fall. Bruising to right elbow from today's fall   Neurological: Negative for dizziness, syncope, weakness, light-headedness, numbness and headaches. Hematological: Does not bruise/bleed easily. Psychiatric/Behavioral: Negative for agitation, confusion and dysphoric mood. Physical Examination:   /63   Pulse 61   Temp 98.5 °F (36.9 °C)   Resp 14   SpO2 96%     Physical Exam  Constitutional:       General: He is not in acute distress. Appearance: Normal appearance. He is not ill-appearing. HENT:      Head: Normocephalic and atraumatic. Right Ear: External ear normal.      Left Ear: External ear normal.      Nose: Nose normal.      Mouth/Throat:      Mouth: Mucous membranes are moist.   Eyes:      Extraocular Movements: Extraocular movements intact. Conjunctiva/sclera: Conjunctivae normal.      Pupils: Pupils are equal, round, and reactive to light. Comments: Left periorbital bruising noted    Neck:      Musculoskeletal: Normal range of motion and neck supple. No muscular tenderness. Cardiovascular:      Rate and Rhythm: Normal rate. Rhythm irregular. Pulses: Normal pulses. Heart sounds: Normal heart sounds. Pulmonary:      Effort: Pulmonary effort is normal. No respiratory distress. Breath sounds: Normal breath sounds. No wheezing, rhonchi or rales. Abdominal:      General: Bowel sounds are normal. There is no distension. Palpations: Abdomen is soft. Tenderness: There is no abdominal tenderness. Musculoskeletal: Normal range of motion. General: No swelling, tenderness or deformity. Right lower leg: No edema. Left lower leg: No edema. Skin:     General: Skin is warm and dry. Findings: Bruising present. No lesion. Comments: scattered bruising noted    Neurological:      Mental Status: He is alert and oriented to person, place, and time.    Psychiatric:         Mood and Affect: Mood normal. Behavior: Behavior normal.         Thought Content: Thought content normal.          Diagnostic Data:  CBC:  Recent Labs     04/29/21  1704   WBC 13.4*   HGB 8.9*   HCT 27.6*        BMP:  Recent Labs     04/29/21  1704   *   K 6.1*      CO2 23   BUN 53*   CREATININE 1.9*   CALCIUM 9.2     Recent Labs     04/29/21  1704   AST 6   ALT 5   BILITOT 0.4   ALKPHOS 48     Coag Panel:   Recent Labs     04/29/21  1704   INR 1.12   PROTIME 14.4   APTT 28.0     Cardiac Enzymes:   Recent Labs     04/29/21  1704   TROPONINI 0.01       Urinalysis:  Lab Results   Component Value Date    NITRU Negative 04/29/2021    BLOODU Negative 04/29/2021    SPECGRAV 1.011 04/29/2021    GLUCOSEU Negative 04/29/2021       RAD:    Ct Abdomen Pelvis Wo Contrast Additional Contrast? None    Result Date: 4/29/2021    Impression: 1. Acute LEFT sixth through ninth rib fractures. No pneumothorax. Small LEFT hemothorax. 2.  Mild LEFT chest wall soft tissue contusion. 3.  No acute traumatic finding in the abdomen or pelvis identified. However, there is some trace fluid in the LEFT paracolic gutter which could be related to an occult injury. 4.  Significant decrease in mediastinal infiltrative soft tissue predominantly in the RIGHT paratracheal region. 5.  Enlarged heart. Signed by Dr Joby Keenan on 4/29/2021 8:05 PM      Ct Head Wo Contrast    Result Date: 4/29/2021    No acute intracranial findings. Small LEFT supraorbital soft tissue contusion. Signed by Dr Joby Keenan on 4/29/2021 6:35 PM      Ct Chest Wo Contrast    Result Date: 4/29/2021    Impression: 1. Acute LEFT sixth through ninth rib fractures. No pneumothorax. Small LEFT hemothorax. 2.  Mild LEFT chest wall soft tissue contusion. 3.  No acute traumatic finding in the abdomen or pelvis identified. However, there is some trace fluid in the LEFT paracolic gutter which could be related to an occult injury.  4.  Significant decrease in mediastinal infiltrative soft tissue predominantly in the RIGHT paratracheal region. 5.  Enlarged heart. Signed by Dr Yodit Juan on 4/29/2021 8:05 PM      Ct Cervical Spine Wo Contrast     Result Date: 4/29/2021    1. No evidence of cervical spine fracture. 2.  Multilevel degenerative change. 3.  Chronic reversal of normal cervical lordosis. Signed by Dr Yodit Juan on 4/29/2021 7:48 PM      Xr Chest Portable    Result Date: 4/29/2021    Impression: No acute findings. Signed by Dr Yodit Juan on 4/29/2021 6:28 PM      Assessment/Plan:  Principal Problem:    NELSY (acute kidney injury) ([facility]  Active Problems:    Essential hypertension    Chronic atrial fibrillation    COPD (chronic obstructive pulmonary disease) ([facility])    Type 2 diabetes mellitus with diabetic polyneuropathy ([facility])    Mixed hyperlipidemia    Hyperkalemia  Resolved Problems:    * No resolved hospital problems. *     Principal Problem:    NELSY (acute kidney injury) ([facility]-    - start IVFs    - monitor I's and O's    - Monitor labs    - Avoid nephrotoxic agents       Active Problems:     Hyperkalemia- Received Calcium gluconate, D50, Regular Insulin, Sodium bicarbonate, and 45g Kayexalate in ED, monitor     Essential hypertension- monitor, stable, resume home meds       Chronic atrial fibrillation- takes Digoxin at home, on hold currently given elevated digoxin level at 2.7       COPD (chronic obstructive pulmonary disease) ([facility])- noted, resume home therapy       Type 2 diabetes mellitus with diabetic polyneuropathy ([facility]- resume home insulin, hypoglycemia treatment protocol in place, glucose monitoring       Mixed hyperlipidemia- resume statin          Further Orders per Clinical course/attending.      Signed:  Electronically signed by ISAAC Mace CNP on 4/29/21 at 9:04 PM CDT

## 2021-04-30 NOTE — PROGRESS NOTES
Jackie Castillo arrived to room # 307. Presented with: NELSY  Mental Status: Patient is oriented and alert. Vitals:    04/29/21 2121   BP: 129/66   Pulse: 60   Resp: 16   Temp: 96.1 °F (35.6 °C)   SpO2: 100%     Patient safety contract and falls prevention contract reviewed with patient Yes. Oriented Patient to room. Call light within reach. Yes.   Needs, issues or concerns expressed at this time: no.      Electronically signed by Noy Solis RN on 4/29/2021 at 9:50 PM

## 2021-04-30 NOTE — ACP (ADVANCE CARE PLANNING)
Advance Care Planning     Advance Care Planning Activator (Inpatient)  Conversation Note      Date of ACP Conversation: 4/30/2021    Conversation Conducted with: Patient    ACP Activator: Nikki Kuhn      [facility] Decision Maker:     Primary Decision Maker: Bear Vazquez - Child - [phone]    Secondary Decision Maker: Jodi Thomason Child - [phone]    Secondary Decision Maker: Elkin Spicer - Child - [phone]    Care Preferences    Ventilation: \"If you were in your present state of health and suddenly became very ill and were unable to breathe on your own, what would your preference be about the use of a ventilator (breathing machine) if it were available to you? \"      Would the patient desire the use of ventilator (breathing machine)?: No    \"If your health worsens and it becomes clear that your chance of recovery is unlikely, what would your preference be about the use of a ventilator (breathing machine) if it were available to you? \"     Would the patient desire the use of ventilator (breathing machine)?: No    Resuscitation  \"CPR works best to restart the heart when there is a sudden event, like a heart attack, in someone who is otherwise healthy. Unfortunately, CPR does not typically restart the heart for people who have serious health conditions or who are very sick. \"    \"In the event your heart stopped as a result of an underlying serious health condition, would you want attempts to be made to restart your heart (answer \"yes\" for attempt to resuscitate) or would you prefer a natural death (answer \"no\" for do not attempt to resuscitate)? \" No       [] Yes   [x] No   Educated Patient / Anna Estrada regarding differences between Advance Directives and portable DNR orders.        Conversation Outcomes:  [x] ACP discussion completed  [x] Existing advance directive reviewed with patient; no changes to patient's previously recorded wishes     Pt says he has an AD/LW; this  requested a copy. Also informed pt's nurse Niurka about pt's desire to be a DNR.      Electronically signed by Ian Alberts on 4/30/2021 at 11:03 AM

## 2021-04-30 NOTE — PROGRESS NOTES
Palliative Care/Spiritual Care: Met with pt to initiate palliative care. During the first few minutes, dr Steph Raymundo came in and began his visit. Some information provided was shared by Dr Steph Raymundo and pt during their visit. Pt told dr Steph Raymundo he has fallen several times recently. The dr told him he has four broken ribs, the contusion on the side of his eye, and blood in his lung, that they are watching. Pt says the last time he fell he lost his balance. Pt told dr Steph Raymundo he has a life alert and had them call an ambulance. Pt is known to palliative care. Advance Directives: Pt says he has an AD/LW and has his son Dotty Pavon listed as primary, his daughter Charmayne Greenland and son Julissa Thacker listed as secondary. This  requested a copy of pt's AD/LW. Pt says he does not want CPR or Ventilator and says he told the nurse last night he wanted to be a DNR and does not want to be kept alive on a Ventilator. This  notified pts nurse Niurka about pt's code status. SEE ACP NOTE. Pain/other symptoms: Pt did not complain of pain but said it hurt when the dr pushed on his ribs. Social/Spiritual: Pt says he is Alev[employer].         Pt/family discussion r/t goals: Pt lives at [facility] and lives alone. He says he has six adult children and none of them live in this area, saying he moved here in January of 2020. Pt says prior to the falls he ambulated with a four footed cane, and could care for his daily needs. Pt says meals are provided. Pt's goal is to return home but says he doesn't want to go home until he is able to care for himself. Provided spiritual care with sustaining presence, nurtured hope, and prayer. Pt expressed gratitude for spiritual care.     Electronically signed by Darian Kirkpatrick on 4/30/2021 at 10:59 AM

## 2021-05-01 LAB
ANION GAP SERPL CALCULATED.3IONS-SCNC: 10 MMOL/L (ref 7–19)
BASOPHILS ABSOLUTE: 0.1 K/UL (ref 0–0.2)
BASOPHILS RELATIVE PERCENT: 0.5 % (ref 0–1)
BUN BLDV-MCNC: 32 MG/DL (ref 8–23)
CALCIUM SERPL-MCNC: 8.4 MG/DL (ref 8.8–10.2)
CHLORIDE BLD-SCNC: 102 MMOL/L (ref 98–111)
CO2: 24 MMOL/L (ref 22–29)
CREAT SERPL-MCNC: 1.2 MG/DL (ref 0.5–1.2)
DIGOXIN LEVEL: 1.5 NG/ML (ref 0.6–1.2)
EOSINOPHILS ABSOLUTE: 0.1 K/UL (ref 0–0.6)
EOSINOPHILS RELATIVE PERCENT: 0.5 % (ref 0–5)
GFR AFRICAN AMERICAN: >59
GFR NON-AFRICAN AMERICAN: 58
GLUCOSE BLD-MCNC: 108 MG/DL (ref 70–99)
GLUCOSE BLD-MCNC: 113 MG/DL (ref 70–99)
GLUCOSE BLD-MCNC: 123 MG/DL (ref 70–99)
GLUCOSE BLD-MCNC: 173 MG/DL (ref 70–99)
GLUCOSE BLD-MCNC: 94 MG/DL (ref 74–109)
HCT VFR BLD CALC: 25.9 % (ref 42–52)
HEMOGLOBIN: 8.5 G/DL (ref 14–18)
IMMATURE GRANULOCYTES #: 0.6 K/UL
LYMPHOCYTES ABSOLUTE: 0.9 K/UL (ref 1.1–4.5)
LYMPHOCYTES RELATIVE PERCENT: 8.6 % (ref 20–40)
MAGNESIUM: 1.8 MG/DL (ref 1.6–2.4)
MCH RBC QN AUTO: 33.5 PG (ref 27–31)
MCHC RBC AUTO-ENTMCNC: 32.8 G/DL (ref 33–37)
MCV RBC AUTO: 102 FL (ref 80–94)
MONOCYTES ABSOLUTE: 0.9 K/UL (ref 0–0.9)
MONOCYTES RELATIVE PERCENT: 9 % (ref 0–10)
NEUTROPHILS ABSOLUTE: 8 K/UL (ref 1.5–7.5)
NEUTROPHILS RELATIVE PERCENT: 76.1 % (ref 50–65)
PDW BLD-RTO: 13 % (ref 11.5–14.5)
PERFORMED ON: ABNORMAL
PLATELET # BLD: 172 K/UL (ref 130–400)
PMV BLD AUTO: 10.4 FL (ref 9.4–12.4)
POTASSIUM REFLEX MAGNESIUM: 3.6 MMOL/L (ref 3.5–5)
RBC # BLD: 2.54 M/UL (ref 4.7–6.1)
SODIUM BLD-SCNC: 136 MMOL/L (ref 136–145)
TOTAL CK: 20 U/L (ref 39–308)
URIC ACID, SERUM: 10 MG/DL (ref 3.4–7)
WBC # BLD: 10.5 K/UL (ref 4.8–10.8)

## 2021-05-01 PROCEDURE — 6370000000 HC RX 637 (ALT 250 FOR IP): Performed by: STUDENT IN AN ORGANIZED HEALTH CARE EDUCATION/TRAINING PROGRAM

## 2021-05-01 PROCEDURE — 85025 COMPLETE CBC W/AUTO DIFF WBC: CPT

## 2021-05-01 PROCEDURE — 6360000002 HC RX W HCPCS: Performed by: HOSPITALIST

## 2021-05-01 PROCEDURE — 84550 ASSAY OF BLOOD/URIC ACID: CPT

## 2021-05-01 PROCEDURE — 94640 AIRWAY INHALATION TREATMENT: CPT

## 2021-05-01 PROCEDURE — 82947 ASSAY GLUCOSE BLOOD QUANT: CPT

## 2021-05-01 PROCEDURE — 94660 CPAP INITIATION&MGMT: CPT

## 2021-05-01 PROCEDURE — 1210000000 HC MED SURG R&B

## 2021-05-01 PROCEDURE — 6370000000 HC RX 637 (ALT 250 FOR IP): Performed by: INTERNAL MEDICINE

## 2021-05-01 PROCEDURE — 97161 PT EVAL LOW COMPLEX 20 MIN: CPT

## 2021-05-01 PROCEDURE — 2580000003 HC RX 258: Performed by: STUDENT IN AN ORGANIZED HEALTH CARE EDUCATION/TRAINING PROGRAM

## 2021-05-01 PROCEDURE — 80162 ASSAY OF DIGOXIN TOTAL: CPT

## 2021-05-01 PROCEDURE — 6370000000 HC RX 637 (ALT 250 FOR IP): Performed by: HOSPITALIST

## 2021-05-01 PROCEDURE — 36415 COLL VENOUS BLD VENIPUNCTURE: CPT

## 2021-05-01 PROCEDURE — 82550 ASSAY OF CK (CPK): CPT

## 2021-05-01 PROCEDURE — 2700000000 HC OXYGEN THERAPY PER DAY

## 2021-05-01 PROCEDURE — 80048 BASIC METABOLIC PNL TOTAL CA: CPT

## 2021-05-01 PROCEDURE — 83735 ASSAY OF MAGNESIUM: CPT

## 2021-05-01 PROCEDURE — 2580000003 HC RX 258: Performed by: HOSPITALIST

## 2021-05-01 PROCEDURE — 97116 GAIT TRAINING THERAPY: CPT

## 2021-05-01 RX ORDER — ALLOPURINOL 100 MG/1
100 TABLET ORAL DAILY
Status: DISCONTINUED | OUTPATIENT
Start: 2021-05-01 | End: 2021-05-04 | Stop reason: HOSPADM

## 2021-05-01 RX ADMIN — ALLOPURINOL 100 MG: 100 TABLET ORAL at 17:36

## 2021-05-01 RX ADMIN — CARVEDILOL 12.5 MG: 12.5 TABLET, FILM COATED ORAL at 10:03

## 2021-05-01 RX ADMIN — DOCUSATE SODIUM 100 MG: 100 CAPSULE, LIQUID FILLED ORAL at 21:26

## 2021-05-01 RX ADMIN — BUDESONIDE 500 MCG: 0.5 SUSPENSION RESPIRATORY (INHALATION) at 18:21

## 2021-05-01 RX ADMIN — IPRATROPIUM BROMIDE 0.5 MG: 0.5 SOLUTION RESPIRATORY (INHALATION) at 06:14

## 2021-05-01 RX ADMIN — PSYLLIUM HUSK 1 PACKET: 3.4 POWDER ORAL at 10:02

## 2021-05-01 RX ADMIN — ISOSORBIDE MONONITRATE 30 MG: 30 TABLET, EXTENDED RELEASE ORAL at 10:03

## 2021-05-01 RX ADMIN — ARFORMOTEROL TARTRATE 15 MCG: 15 SOLUTION RESPIRATORY (INHALATION) at 18:20

## 2021-05-01 RX ADMIN — CARVEDILOL 12.5 MG: 12.5 TABLET, FILM COATED ORAL at 17:37

## 2021-05-01 RX ADMIN — CYANOCOBALAMIN TAB 500 MCG 1000 MCG: 500 TAB at 10:03

## 2021-05-01 RX ADMIN — AMLODIPINE BESYLATE 5 MG: 5 TABLET ORAL at 10:04

## 2021-05-01 RX ADMIN — Medication 1 TABLET: at 10:03

## 2021-05-01 RX ADMIN — HYDROCODONE BITARTRATE AND ACETAMINOPHEN 2 TABLET: 5; 325 TABLET ORAL at 10:15

## 2021-05-01 RX ADMIN — ATORVASTATIN CALCIUM 40 MG: 40 TABLET, FILM COATED ORAL at 21:26

## 2021-05-01 RX ADMIN — SODIUM CHLORIDE: 9 INJECTION, SOLUTION INTRAVENOUS at 06:08

## 2021-05-01 RX ADMIN — HYDROCODONE BITARTRATE AND ACETAMINOPHEN 2 TABLET: 5; 325 TABLET ORAL at 21:29

## 2021-05-01 RX ADMIN — BUDESONIDE 500 MCG: 0.5 SUSPENSION RESPIRATORY (INHALATION) at 06:14

## 2021-05-01 RX ADMIN — ARFORMOTEROL TARTRATE 15 MCG: 15 SOLUTION RESPIRATORY (INHALATION) at 06:14

## 2021-05-01 RX ADMIN — IPRATROPIUM BROMIDE 0.5 MG: 0.5 SOLUTION RESPIRATORY (INHALATION) at 18:20

## 2021-05-01 RX ADMIN — Medication 10 ML: at 21:26

## 2021-05-01 RX ADMIN — INSULIN GLARGINE 15 UNITS: 100 INJECTION, SOLUTION SUBCUTANEOUS at 21:32

## 2021-05-01 RX ADMIN — IPRATROPIUM BROMIDE 0.5 MG: 0.5 SOLUTION RESPIRATORY (INHALATION) at 10:21

## 2021-05-01 RX ADMIN — PRAZOSIN HYDROCHLORIDE 2 MG: 2 CAPSULE ORAL at 21:26

## 2021-05-01 RX ADMIN — IPRATROPIUM BROMIDE 0.5 MG: 0.5 SOLUTION RESPIRATORY (INHALATION) at 14:48

## 2021-05-01 RX ADMIN — PANTOPRAZOLE SODIUM 40 MG: 40 TABLET, DELAYED RELEASE ORAL at 06:08

## 2021-05-01 ASSESSMENT — PAIN DESCRIPTION - PROGRESSION: CLINICAL_PROGRESSION: NOT CHANGED

## 2021-05-01 ASSESSMENT — PAIN - FUNCTIONAL ASSESSMENT: PAIN_FUNCTIONAL_ASSESSMENT: PREVENTS OR INTERFERES SOME ACTIVE ACTIVITIES AND ADLS

## 2021-05-01 ASSESSMENT — PAIN SCALES - GENERAL
PAINLEVEL_OUTOF10: 0
PAINLEVEL_OUTOF10: 8
PAINLEVEL_OUTOF10: 10

## 2021-05-01 ASSESSMENT — PAIN DESCRIPTION - PAIN TYPE: TYPE: ACUTE PAIN

## 2021-05-01 NOTE — PROGRESS NOTES
PHYSICAL THERAPY  Daily Treatment Note    DATE:  2021  NAME:  Ashok Franks  :  1935  (81 y. o.,male)  MRN:  [id number]      HOME LIVING:       RESTRICTIONS/PRECAUTIONS:         OVERALL  ORIENTATION STATUS:       STRENGTH  Strength RLE  Strength RLE: WFL  Strength LLE  Strength LLE: WFL    ROM   PROM RLE (degrees)  RLE PROM: WFL  PROM LLE (degrees)  LLE PROM: WFL     BALANCE  Balance  Posture: Fair  Sitting - Static: Good  Sitting - Dynamic: Good  Standing - Static: Fair  Standing - Dynamic: Fair    BED MOBILITY  Bed Mobility  Scooting:  Moderate assistance    TRANSFERS  Transfers  Sit to Stand: Minimal Assistance  Stand to sit: Minimal Assistance  Bed to Chair: Minimal assistance    AMBULATION  Device: Rolling Walker  Assistance: Minimal assistance  Distance: 20 feet    STAIRS         COMMENTS:  Returned to chair at patient request  Call light within reach  Chair alarm activated  Patient instructed to request assistance before getting up  Nursing updated        Electronically signed by Pennie Beyer PT on 2021 at 10:33 AM

## 2021-05-01 NOTE — PROGRESS NOTES
Conjuntivae and eyelids appear normal, Sclerae : White without injection Subjective  General  Chart Reviewed: Yes  Patient assessed for rehabilitation services?: Yes  Diagnosis: Left rib fractures  Follows Commands: Within Functional Limits  Subjective  Subjective: States that he used a quad cane at home  Pain Screening  Patient Currently in Pain: Yes  Pain Assessment  Pain Assessment: 0-10  Pain Level: 8(Nursing notified)  Patient's Stated Pain Goal: No pain  Pain Type: Acute pain  Pain Location: Rib cage  Pain Orientation: Left  Pain Descriptors: Sharp  Pain Frequency: Intermittent  Pain Onset: On-going  Clinical Progression: Not changed  Functional Pain Assessment: Prevents or interferes some active activities and ADLs  Non-Pharmaceutical Pain Intervention(s): Repositioned  Vital Signs  Patient Currently in Pain: Yes  Pre Treatment Pain Screening  Intervention List: Patient able to continue with treatment    Orientation     Social/Functional History     Cognition        Objective          PROM RLE (degrees)  RLE PROM: WFL  PROM LLE (degrees)  LLE PROM: WFL  Strength RLE  Strength RLE: WFL  Strength LLE  Strength LLE: WFL        Bed mobility  Rolling to Left: Minimal assistance  Supine to Sit: Moderate assistance  Sit to Supine: Moderate assistance  Scooting:  Moderate assistance  Transfers  Sit to Stand: Minimal Assistance  Stand to sit: Minimal Assistance  Bed to Chair: Minimal assistance  Ambulation  Ambulation?: Yes  WB Status: WBAT  Ambulation 1  Device: Rolling Walker  Other Apparatus: O2  Assistance: Minimal assistance  Quality of Gait: Fair  Gait Deviations: Slow Estefania  Distance: 20 feet     Balance  Posture: Fair  Sitting - Static: Good  Sitting - Dynamic: Good  Standing - Static: Fair  Standing - Dynamic: [address]  Times per week: 7  Times per day: Daily  Plan weeks: 2  Current Treatment Recommendations: Strengthening, Functional Mobility Training, Transfer Training, Balance Training, Gait Training  Safety Devices  Type of devices: Call light within reach, Chair alarm in place, Gait belt, Left in chair, Nurse notified    G-Code       OutComes Score                                                  AM-PAC Score             Goals  Short term goals  Time Frame for Short term goals: 2 weeks  Short term goal 1: Transfer supine to edge of bed with modified independence  Short term goal 2: Transfer sit <> stand independently  Short term goal 3: Ambulate 400 feet with assistive device and CGA       Therapy Time   Individual Concurrent Group Co-treatment   Time In           Time Out           Minutes                   Jorden Hernandez PT       Electronically signed by Jorden Hernandez PT on 5/1/2021 at 10:33 AM

## 2021-05-01 NOTE — PROGRESS NOTES
Nephrology ([facility]) Progress Note    Patient:  Raya Westbrook  YOB: 1935  Date of Service: 5/1/2021  MRN: [id number]   Acct: [de-identified]   Primary Care Physician: Davida Garner DO  Advance Directive: Full Code  Admit Date: 4/29/2021       Hospital Day: 2  Referring Provider: Merly Nolen MD    Patient Seen, Chart, Consults notes, Labs, Radiology studies reviewed. Subjective:  Patient is an 80-year-old man with a past medical history of diabetes, hypertension, atrial fibrillation, congestive heart failure, COPD, chronic kidney disease stage IIIa, and prostate cancer. About a week ago he sustained a fall with facial trauma. He has a left black eye. He reported two more falls. Patient complains of some dyspnea but had no recent fever nausea vomiting or diarrhea. His initial work-up in the emergency room showed a creatinine at 1.9 that was up from a previous baseline at 1.1. His serum potassium was 6.1. His head CT showed a small left supraorbital soft tissue contusion but no acute intracranial bleeding. A chest CT showed a small left hemothorax. Patient received medical management for his hyperkalemia with Kayexalate bicarbonate regular insulin IV and dextrose along with calcium gluconate. His repeat potassium was 4.7. Renal service was consulted to manage his acute kidney injury. Patient is now a good historian. He denies any dysuria. He admitted using Celebrex. He was also on potassium pills along with Aldactone at home. Today, patient has been doing better. Offers no new complaints. Allergies:  Patient has no known allergies.     Medicines:  Current Facility-Administered Medications   Medication Dose Route Frequency Provider Last Rate Last Admin    calcium carbonate (TUMS) chewable tablet 500 mg  500 mg Oral TID PRN Monika Guadalupe MD   500 mg at 04/30/21 0146    psyllium (METAMUCIL) 58.12 % packet 1 packet  1 packet Oral Daily with tablet 1,000 mcg  1,000 mcg Oral Daily Edgar Dobson MD   1,000 mcg at 05/01/21 1003    budesonide (PULMICORT) nebulizer suspension 500 mcg  0.5 mg Nebulization Q12H Edgar Dobson MD   500 mcg at 05/01/21 4351    Arformoterol Tartrate (BROVANA) nebulizer solution 15 mcg  15 mcg Nebulization BID Edgar Dobson MD   15 mcg at 05/01/21 5276    ipratropium (ATROVENT) 0.02 % nebulizer solution 0.5 mg  0.5 mg Nebulization 4x daily Edgar Dobson MD   0.5 mg at 05/01/21 1021    insulin glargine (LANTUS) injection vial 15 Units  15 Units Subcutaneous Nightly Edgar Dobson MD   15 Units at 04/30/21 2132    insulin lispro (HUMALOG) injection vial 0-12 Units  0-12 Units Subcutaneous TID WC Edgar Dobson MD   2 Units at 04/30/21 3098    insulin lispro (HUMALOG) injection vial 0-6 Units  0-6 Units Subcutaneous Nightly Edgar Dobson MD        glucose (GLUTOSE) 40 % oral gel 15 g  15 g Oral PRN Edgar Dobson MD        glucagon (rDNA) injection 1 mg  1 mg Intramuscular PRN Edgar Dobson MD       [facility] [place] by provider] doxazosin (CARDURA) tablet 4 mg  4 mg Oral Nightly Edgar Dobson MD   4 mg at 04/29/21 2222    naloxone [facility]) injection 0.4 mg  0.4 mg Intravenous PRN Edgar Dobson MD        sodium chloride flush 0.9 % injection 5-40 mL  5-40 mL Intravenous 2 times per day Edgar Dobson MD        sodium chloride flush 0.9 % injection 5-40 mL  5-40 mL Intravenous PRN Edgar Dobson MD        0.9 % sodium chloride infusion  25 mL Intravenous PRN Edgar Dobson MD       [facility] [place] by provider] heparin (porcine) injection 5,000 Units  5,000 Units Subcutaneous 3 times per day Edgar Dobson MD   5,000 Units at 04/29/21 2225    polyethylene glycol (GLYCOLAX) packet 17 g  17 g Oral Daily PRN Edgar Dobson MD        melatonin disintegrating tablet 5 mg  5 mg Oral Nightly PRN Edgar Dobson MD        acetaminophen (TYLENOL) tablet 650 mg  650 mg Oral Q6H PRN Edgar Dobson MD        Or    acetaminophen (TYLENOL) suppository 650 mg  650 mg Rectal Q6H PRN Atiya Borjas MD        ondansetron (ZOFRAN-ODT) disintegrating tablet 4 mg  4 mg Oral Q8H PRN Atiya Borjas MD        Or    ondansetron [facility] injection 4 mg  4 mg Intravenous Q6H PRN Atiya Borjas MD   4 mg at 04/30/21 0146    polyethylene glycol (GLYCOLAX) packet 17 g  17 g Oral Daily PRN Atiya Borjas MD           Past Medical History:  Past Medical History:   Diagnosis Date    Anxiety     Arthritis     Atrial fibrillation ([facility] [address]     Blood circulation, collateral     Cancer (HCC)     prostate, had treatment    Cellulitis     left leg    CHF (congestive heart failure) (HCC)     Chronic kidney disease     COPD (chronic obstructive pulmonary disease) (HCC)     Depression     Hyperlipidemia     Hypertension     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 ([facility] [address] 3/28/2020    Lung mass     Neuromuscular disorder (HCC)     Neuropathy     Other disorders of kidney and ureter in diseases classified elsewhere     Palliative care patient 11/15/2018    Pneumonia     Type II or unspecified type diabetes mellitus without mention of complication, not stated as uncontrolled        Past Surgical History:  Past Surgical History:   Procedure Laterality Date    COLONOSCOPY      EYE SURGERY      EYE SURGERY      HERNIA REPAIR      umbilical with Dr Yuan Evans         Family History  Family History   Problem Relation Age of Onset    Heart Attack Mother     Cancer Father        Social History  Social History     Socioeconomic History    Marital status:       Spouse name: Not on file    Number of children: Not on file    Years of education: Not on file    Highest education level: Not on file   Occupational History    Not on file   Social Needs    Financial resource strain: Not on file    Food insecurity     Worry: Not on file     Inability: Not on file   [place] needs     Medical: Not on file     Non-medical: Not on file   Tobacco Use    Smoking status: Never Smoker    Smokeless tobacco: Never Used   Substance and Sexual Activity    Alcohol use: No    Drug use: No    Sexual activity: Not on file   Lifestyle    Physical activity     Days per week: Not on file     Minutes per session: Not on file    Stress: Not on file   Relationships    Social connections     Talks on phone: Not on file     Gets together: Not on file     Attends Religion service: Not on file     Active member of club or organization: Not on file     Attends meetings of clubs or organizations: Not on file     Relationship status: Not on file    Intimate partner violence     Fear of current or ex partner: Not on file     Emotionally abused: Not on file     Physically abused: Not on file     Forced sexual activity: Not on file   Other Topics Concern    Not on file   Social History Narrative    Not on file         Review of Systems:  History obtained from chart review and the patient  General ROS: No fever or chills  Respiratory ROS: No cough, shortness of breath, wheezing  Cardiovascular ROS: no chest pain or dyspnea on exertion  Gastrointestinal ROS: No abdominal pain or melena  Genito-Urinary ROS: No dysuria or hematuria  Musculoskeletal ROS: No joint pain or swelling         Objective:  Blood pressure 118/63, pulse 68, temperature 98.1 °F (36.7 °C), resp. rate 18, weight 236 lb 8 oz (107.3 kg), SpO2 96 %.     Intake/Output Summary (Last 24 hours) at 5/1/2021 1037  Last data filed at 5/1/2021 0905  Gross per 24 hour   Intake 1316 ml   Output 425 ml   Net 891 ml     General:  NAD, A+Ox3, ill-appearing, normal body habitus  HEENT: Normocephalic, sign of facial trauma with ecchymosis around left eye and temple  Neck: No masses, no jugular venous distention  Chest:  CTAB, good respiratory effort, good air movement  CV:  RRR, soft systolic murmur  Abdomen:  NTND, soft, +BS, no hepatosplenomegaly  Extremities:  No peripheral edema  Neurological:  Moving all four extremities  Psychiatric:  Normal insight and judgement    Labs:  BMP:   Recent Labs     04/29/21 1704 04/30/21 0050 05/01/21 0115   * 138 136   K 6.1* 4.7  4.8 3.6    102 102   CO2 23 24 24   PHOS  --  3.8  --    BUN 53* 47* 32*   CREATININE 1.9* 1.7* 1.2   CALCIUM 9.2 9.1  9.1 8.4*     CBC:   Recent Labs     04/29/21 1704 04/30/21 0050 05/01/21 0115   WBC 13.4* 10.9* 10.5   HGB 8.9* 8.7* 8.5*   HCT 27.6* 27.1* 25.9*   .7* 105.0* 102.0*    161 172     LIVER PROFILE:   Recent Labs     04/29/21 1704   AST 6   ALT 5   BILITOT 0.4   ALKPHOS 48     PT/INR:   Recent Labs     04/29/21 1704   PROTIME 14.4   INR 1.12     APTT:   Recent Labs     04/29/21 1704   APTT 28.0     BNP:  No results for input(s): BNP in the last 72 hours. Ionized Calcium:No results for input(s): IONCA in the last 72 hours. Magnesium:  Recent Labs     04/30/21 0050 05/01/21 0115   MG 1.9 1.8     Phosphorus:  Recent Labs     04/30/21 0050   PHOS 3.8     HgbA1C: No results for input(s): LABA1C in the last 72 hours. Hepatic:   Recent Labs     04/29/21 1704   ALKPHOS 48   ALT 5   AST 6   PROT 6.4*   BILITOT 0.4   LABALBU 3.8     Lactic Acid: No results for input(s): LACTA in the last 72 hours. Troponin:   Recent Labs     05/01/21 0115   CKTOTAL 20*     ABGs: No results for input(s): PH, PCO2, PO2, HCO3, O2SAT in the last 72 hours. CRP:  No results for input(s): CRP in the last 72 hours. Sed Rate:  No results for input(s): SEDRATE in the last 72 hours. Cultures:   No results for input(s): CULTURE in the last 72 hours. Radiology reports as per the Radiologist  Radiology: Ct Abdomen Pelvis Wo Contrast Additional Contrast? None    Result Date: 4/29/2021  Exam: CT CHEST WO CONTRAST, CT ABDOMEN PELVIS WO CONTRAST - 4/29/2021 6:14 PM Indication: Fall, trauma Comparison: 2/25/2000 DLP: 2117 mGy cm.  In order to have a CT radiation dose as low as reasonably achievable, Automated Exposure Control was utilized for adjustment of the mA and/or KV according to patient size. Findings: No depressed fracture. No thoracic spine fracture or malalignment. Moderate multilevel thoracic spine degenerative change. The central airways are clear. Significant decrease in confluent ill-defined soft tissue density in the mediastinum probably in the RIGHT paratracheal region. Small LEFT hemothorax. No RIGHT effusion or hemopneumothorax. No pneumothorax. No large pulmonary contusion. No change in 3 mm lingular pulmonary nodule on image 65. No enlarged thoracic lymph nodes. LEFT chest wall port cardiac device. Heart is enlarged. No pericardial effusion. No unenhanced evidence of major thoracic aortic injury. LEFT anterior sixth rib fracture. Segmental LEFT seventh rib fracture. LEFT posterior eighth rib fracture. LEFT anterior ninth rib fracture. No large acute soft tissue chest wall hematoma. Suspected mild contusion along the LEFT lateral anterior chest. Unenhanced liver appears unremarkable. Gallbladder and biliary tree appear normal. Pancreas, spleen, and adrenal glands are unremarkable. Small RIGHT renal low-density lesion, likely cyst. No urolithiasis or hydronephrosis. No focal urinary bladder wall thickening. Prostate radiation seeds. Trace fluid in LEFT paracolic gutter. Normal appendix. No evidence of bowel obstruction or active bowel inflammation. No evidence of mesenteric injury. No free pelvic fluid or pelvic mass. LEFT inguinal fat-containing hernia. Unenhanced abdominal aorta appears unremarkable other than for mild tortuosity and atherosclerotic change. No abdominal or pelvic lymphadenopathy. No major soft tissue contusion/hematoma. Lumbar spine vertebral body heights and alignment are maintained. No acute osseous findings. Impression: 1. Acute LEFT sixth through ninth rib fractures. No pneumothorax. Small LEFT hemothorax.  2.  Mild LEFT chest wall soft tissue contusion. 3.  No acute traumatic finding in the abdomen or pelvis identified. However, there is some trace fluid in the LEFT paracolic gutter which could be related to an occult injury. 4.  Significant decrease in mediastinal infiltrative soft tissue predominantly in the RIGHT paratracheal region. 5.  Enlarged heart. Signed by Dr Oneta Hammans on 4/29/2021 8:05 PM    Ct Head Wo Contrast    Result Date: 4/29/2021  Exam: CT HEAD WO CONTRAST - 4/29/2021 5:05 PM Indication: Fall Comparison: None available. DLP: 853 mGy cm. In order to have a CT radiation dose as low as reasonably achievable, Automated Exposure Control was utilized for adjustment of the mA and/or KV according to patient size. Findings: No evidence of intracranial hemorrhage. No loss of gray-white differentiation to suggest acute infarct. No midline shift or mass effect. Lateral ventricles are normal caliber. No acute orbital finding. Mastoid air cells and visualized paranasal sinuses are clear. No acute osseous findings. Small LEFT supraorbital soft tissue contusion. No acute intracranial findings. Small LEFT supraorbital soft tissue contusion. Signed by Dr Oneta Hammans on 4/29/2021 6:35 PM    Ct Chest Wo Contrast    Result Date: 4/29/2021  Exam: CT CHEST WO CONTRAST, CT ABDOMEN PELVIS WO CONTRAST - 4/29/2021 6:14 PM Indication: Fall, trauma Comparison: 2/25/2000 DLP: 2117 mGy cm. In order to have a CT radiation dose as low as reasonably achievable, Automated Exposure Control was utilized for adjustment of the mA and/or KV according to patient size. Findings: No depressed fracture. No thoracic spine fracture or malalignment. Moderate multilevel thoracic spine degenerative change. The central airways are clear. Significant decrease in confluent ill-defined soft tissue density in the mediastinum probably in the RIGHT paratracheal region. Small LEFT hemothorax. No RIGHT effusion or hemopneumothorax. No pneumothorax.  No large pulmonary contusion. No change in 3 mm lingular pulmonary nodule on image 65. No enlarged thoracic lymph nodes. LEFT chest wall port cardiac device. Heart is enlarged. No pericardial effusion. No unenhanced evidence of major thoracic aortic injury. LEFT anterior sixth rib fracture. Segmental LEFT seventh rib fracture. LEFT posterior eighth rib fracture. LEFT anterior ninth rib fracture. No large acute soft tissue chest wall hematoma. Suspected mild contusion along the LEFT lateral anterior chest. Unenhanced liver appears unremarkable. Gallbladder and biliary tree appear normal. Pancreas, spleen, and adrenal glands are unremarkable. Small RIGHT renal low-density lesion, likely cyst. No urolithiasis or hydronephrosis. No focal urinary bladder wall thickening. Prostate radiation seeds. Trace fluid in LEFT paracolic gutter. Normal appendix. No evidence of bowel obstruction or active bowel inflammation. No evidence of mesenteric injury. No free pelvic fluid or pelvic mass. LEFT inguinal fat-containing hernia. Unenhanced abdominal aorta appears unremarkable other than for mild tortuosity and atherosclerotic change. No abdominal or pelvic lymphadenopathy. No major soft tissue contusion/hematoma. Lumbar spine vertebral body heights and alignment are maintained. No acute osseous findings. Impression: 1. Acute LEFT sixth through ninth rib fractures. No pneumothorax. Small LEFT hemothorax. 2.  Mild LEFT chest wall soft tissue contusion. 3.  No acute traumatic finding in the abdomen or pelvis identified. However, there is some trace fluid in the LEFT paracolic gutter which could be related to an occult injury. 4.  Significant decrease in mediastinal infiltrative soft tissue predominantly in the RIGHT paratracheal region. 5.  Enlarged heart.  Signed by Dr Gini Street on 4/29/2021 8:05 PM    Ct Cervical Spine Wo Contrast    Result Date: 4/29/2021  Exam: CT CERVICAL SPINE WO CONTRAST - 4/29/2021 6:13 PM Indication: Fall, trauma Comparison: 4/26/2021 DLP: 709 mGy cm. In order to have a CT radiation dose as low as reasonably achievable, Automated Exposure Control was utilized for adjustment of the mA and/or KV according to patient size. Findings: Craniocervical relationships are maintained. The odontoid process is intact. Reversal of normal cervical lordosis and trace anterolisthesis of C3 on C4, unchanged. No acute cervical spine malalignment. Vertebral body heights are maintained. No acute fracture or subluxation. Moderate multilevel degenerative change. No prevertebral soft tissue swelling. No apical pneumothorax. 1.  No evidence of cervical spine fracture. 2.  Multilevel degenerative change. 3.  Chronic reversal of normal cervical lordosis. Signed by Dr Carroll Meléndez on 4/29/2021 7:48 PM    Us Renal Complete    Result Date: 4/30/2021  EXAMINATION: US RENAL COMPLETE 4/30/2021 1:49 PM HISTORY: Acute kidney injury. Report: Sonographic images of the kidneys were obtained bilaterally. COMPARISON: CT abdomen pelvis without contrast 4/29/2021. The right kidney measures 12.0 x 4.6 x 5.6 cm, with cortical echogenicity within normal limits. Cortical thickness ranges from 12.5 to 12.7 mm. No mass or hydronephrosis is identified. There is a 9 x 6 mm cyst along the lateral cortex which corresponds with a similar finding on CT. This appears benign. Color Doppler images demonstrate vascular flow within the right kidney. The left kidney measures approximately 12.6 x 5.9 x 5.5 cm, with cortical echogenicity within normal limits. Cortical thickness on the left varies from 12.0 to 15.2 mm. No mass or hydronephrosis is identified. Color Doppler images demonstrate vascular flow within the left kidney. Images of the bladder show incomplete bladder distention with mild circumferential wall thickening. 1. Unremarkable appearance of the left kidney.  2. Normal appearance of the right kidney except for a 9 mm cyst along the lateral cortex at the interpolar level, which appears benign. No hydronephrosis is identified. Signed by Dr Ale Phelps. Sandra on 4/30/2021 1:52 PM    Xr Chest Portable    Result Date: 4/29/2021  Exam: XR CHEST PORTABLE - 4/29/2021 4:24 PM Indication: Fall, weakness Comparison: 4/26/2021 Findings: Cardiac silhouette is enlarged but stable. LEFT chest wall single-lead pacing device. No pleural effusion, pneumothorax, or consolidation. No acute osseous finding. Impression: No acute findings. Signed by Dr Valdez Greenberg on 4/29/2021 6:28 PM       Assessment     1. Acute kidney injury-likely prerenal azotemia  2. Hyperkalemia  3. Recurrent falls  4. Type 2 diabetes  5. Congestive heart failure  6. COPD  7. Hyperuricemia. Plan:  Continue to avoid potassium supplements and Aldactone. Continue hydration by mouth. We will add allopurinol. Follow-up labs.

## 2021-05-01 NOTE — PROGRESS NOTES
given frequent falls and small left hemothorax. Will need to weigh benefits of anticoagulation with recurrent falls. Continue coreg 12.5mg BID, hold off on ACE/ARBs decreased Imdur to 30mg once daily. ECHO ordered by cardiology. CT surgery consulted for hemothorax, recommends analgesics, limit fluid intake, no need for chest tube insertion, no need for rib stabilization. Nephrology consulted for NELSY. Renal US showed unremarkable Left kidney, Right kidney with normal appearance except for benign 9mm cyst along the lateral cortex at the interpolar level, no hydronephrosis. Review of Systems:   14 point review of systems is negative except as specifically addressed above. Objective:   VITALS:  /63   Pulse 68   Temp 98.1 °F (36.7 °C)   Resp 18   Wt 236 lb 8 oz (107.3 kg)   SpO2 96%   BMI 33.93 kg/m²   24HR INTAKE/OUTPUT:      Intake/Output Summary (Last 24 hours) at 5/1/2021 1022  Last data filed at 5/1/2021 1558  Gross per 24 hour   Intake 1316 ml   Output 425 ml   Net 891 ml     General appearance: alert, appears stated age, cooperative and no distress, sitting up comfortably in bedside chair   Head: Normocephalic, without obvious abnormality, atraumatic  Eyes: conjunctivae/corneas clear. PERRL, EOM's intact. Left periorbital ecchymosis   Ears: normal external ears and nose, throat without exudate  Neck: no adenopathy, no carotid bruit, no JVD, supple, symmetrical, trachea midline   Lungs: diminished air entry to bases otherwise clear to auscultation bilaterally,no rales or wheezes   Heart: irregularly irregular, S1, S2 normal, no murmur  Abdomen:soft, obese, non-tender; non-distended, normal bowel sounds   Extremities:No lower extremity edema,  No erythema, no tenderness to palpation  Skin: Skin color, texture, turgor normal. No rashes or lesions  Lymphatic: No palpable lymph node enlargment  Neurologic: Alert and oriented X 3, generalized weakness and normal tone.  No focal deficits  Psychiatric: Calm, flat affect    Medications:      sodium chloride        psyllium  1 packet Oral Daily with breakfast    lidocaine  1 patch Transdermal Daily    isosorbide mononitrate  30 mg Oral Daily    amLODIPine  5 mg Oral Daily    [Held by provider] aspirin  81 mg Oral Daily    atorvastatin  40 mg Oral Nightly    cholestyramine  1 packet Oral Daily    docusate sodium  100 mg Oral BID    carvedilol  12.5 mg Oral BID WC    therapeutic multivitamin-minerals  1 tablet Oral Daily    pantoprazole  40 mg Oral QAM AC    prazosin  2 mg Oral Nightly    vitamin B-12  1,000 mcg Oral Daily    budesonide  0.5 mg Nebulization Q12H    Arformoterol Tartrate  15 mcg Nebulization BID    ipratropium  0.5 mg Nebulization 4x daily    insulin glargine  15 Units Subcutaneous Nightly    insulin lispro  0-12 Units Subcutaneous TID WC    insulin lispro  0-6 Units Subcutaneous Nightly    [Held by provider] doxazosin  4 mg Oral Nightly    sodium chloride flush  5-40 mL Intravenous 2 times per day    [Held by provider] heparin (porcine)  5,000 Units Subcutaneous 3 times per day     calcium carbonate, HYDROcodone 5 mg - acetaminophen **OR** HYDROcodone 5 mg - acetaminophen, HYDROmorphone, albuterol, albuterol, nitroGLYCERIN, glucose, glucagon (rDNA), naloxone, sodium chloride flush, sodium chloride, polyethylene glycol, melatonin, acetaminophen **OR** acetaminophen, ondansetron **OR** ondansetron, polyethylene glycol  DIET RENAL; Carb Control: 4 carb choices (60 gms)/meal; Low Sodium (2 GM);  Low Cholesterol     Lab and other Data:     Recent Labs     04/29/21  1704 04/30/21  0050 05/01/21  0115   WBC 13.4* 10.9* 10.5   HGB 8.9* 8.7* 8.5*    161 172     Recent Labs     04/29/21  1704 04/30/21  0050 05/01/21  0115   * 138 136   K 6.1* 4.7  4.8 3.6    102 102   CO2 23 24 24   BUN 53* 47* 32*   CREATININE 1.9* 1.7* 1.2   GLUCOSE 128* 107 94     Recent Labs     04/29/21  1704   AST 6   ALT 5   BILITOT 0.4   ALKPHOS 48     Troponin T:   Recent Labs     04/29/21  1704   TROPONINI 0.01     INR:   Recent Labs     04/29/21  1704   INR 1.12     UA:  Recent Labs     04/29/21  1915   COLORU YELLOW   PHUR 5.0   CLARITYU Clear   SPECGRAV 1.011   LEUKOCYTESUR Negative   UROBILINOGEN 0.2   BILIRUBINUR Negative   BLOODU Negative   GLUCOSEU Negative     RAD:   Ct Abdomen Pelvis Wo Contrast Additional Contrast? None  Impression: 1. Acute LEFT sixth through ninth rib fractures. No pneumothorax. Small LEFT hemothorax. 2.  Mild LEFT chest wall soft tissue contusion. 3.  No acute traumatic finding in the abdomen or pelvis identified. However, there is some trace fluid in the LEFT paracolic gutter which could be related to an occult injury. 4.  Significant decrease in mediastinal infiltrative soft tissue predominantly in the RIGHT paratracheal region. 5.  Enlarged heart. Signed by Dr Valdez Greenberg on 4/29/2021 8:05 PM    Ct Head Wo Contrast  No acute intracranial findings. Small LEFT supraorbital soft tissue contusion. Signed by Dr Valdez Greenberg on 4/29/2021 6:35 PM    Ct Chest Wo Contrast  Impression: 1. Acute LEFT sixth through ninth rib fractures. No pneumothorax. Small LEFT hemothorax. 2.  Mild LEFT chest wall soft tissue contusion. 3.  No acute traumatic finding in the abdomen or pelvis identified. However, there is some trace fluid in the LEFT paracolic gutter which could be related to an occult injury. 4.  Significant decrease in mediastinal infiltrative soft tissue predominantly in the RIGHT paratracheal region. 5.  Enlarged heart. Signed by Dr Valdez Greenberg on 4/29/2021 8:05 PM    Ct Cervical Spine Wo Contrast    Result Date: 4/29/2021  Exam: CT CERVICAL SPINE WO CONTRAST - 4/29/2021 6:13 PM Indication: Fall, trauma Comparison: 4/26/2021 DLP: 709 mGy cm. In order to have a CT radiation dose as low as reasonably achievable, Automated Exposure Control was utilized for adjustment of the mA and/or KV according to patient size.  Findings: Craniocervical relationships are maintained. The odontoid process is intact. Reversal of normal cervical lordosis and trace anterolisthesis of C3 on C4, unchanged. No acute cervical spine malalignment. Vertebral body heights are maintained. No acute fracture or subluxation. Moderate multilevel degenerative change. No prevertebral soft tissue swelling. No apical pneumothorax. 1.  No evidence of cervical spine fracture. 2.  Multilevel degenerative change. 3.  Chronic reversal of normal cervical lordosis. Signed by Dr Caden Holley on 4/29/2021 7:48 PM    Us Renal Complete  1. Unremarkable appearance of the left kidney. 2. Normal appearance of the right kidney except for a 9 mm cyst along the lateral cortex at the interpolar level, which appears benign. No hydronephrosis is identified. Signed by Dr Phuong Flores on 4/30/2021 1:52 PM    Xr Chest Portable  Impression: No acute findings. Signed by Dr Caden Holley on 4/29/2021 6:28 PM  Assessment/Plan   Principal Problem:    NELSY (acute kidney injury) ([address]  Active Problems:    Essential hypertension    Chronic atrial fibrillation    COPD (chronic obstructive pulmonary disease) ([facility])    Type 2 diabetes mellitus with diabetic polyneuropathy ([facility])    Mixed hyperlipidemia    Hemothorax on left    Fracture of multiple ribs of left side    Fall at home    Digoxin toxicity  Resolved Problems:    Hyperkalemia    Principal Problem:    NELSY (acute kidney injury) ([facility])-Nephrology following, improving, continue IVF's    Active Problems:    Hemothorax 2/2 multiple left sided rib fractures-IS, encourage deep breathing,  lidocaine patch ordered, CT surgery without need for operative intervention       Hyperkalemia-resolved, Aldactone discontinued      Essential hypertension-stable      Chronic atrial fibrillation-rate controlled, Cardiology with recommendation to discontinue digoxin. Xarelto on hold for now with frequent falls/hemothorax.        COPD (chronic obstructive pulmonary disease) (HCC)-no exacerbation      Type 2 diabetes mellitus with diabetic polyneuropathy (HCC)-stable, continue Lantus, SSI, hypoglycemia tx orders, accuchecks      Mixed hyperlipidemia-statin continued      Further orders per clinical course/attending     DVT Prophylaxis: SCD    GI prophylaxis:  Protonix    Maggie Wolff PA-C

## 2021-05-02 LAB
ANION GAP SERPL CALCULATED.3IONS-SCNC: 12 MMOL/L (ref 7–19)
BASOPHILS ABSOLUTE: 0.1 K/UL (ref 0–0.2)
BASOPHILS RELATIVE PERCENT: 0.7 % (ref 0–1)
BUN BLDV-MCNC: 26 MG/DL (ref 8–23)
CALCIUM SERPL-MCNC: 8.5 MG/DL (ref 8.8–10.2)
CHLORIDE BLD-SCNC: 101 MMOL/L (ref 98–111)
CO2: 24 MMOL/L (ref 22–29)
CREAT SERPL-MCNC: 1.2 MG/DL (ref 0.5–1.2)
EKG P AXIS: NORMAL DEGREES
EKG P-R INTERVAL: NORMAL MS
EKG Q-T INTERVAL: 402 MS
EKG QRS DURATION: 164 MS
EKG QTC CALCULATION (BAZETT): 401 MS
EKG T AXIS: -24 DEGREES
EOSINOPHILS ABSOLUTE: 0.2 K/UL (ref 0–0.6)
EOSINOPHILS RELATIVE PERCENT: 1.5 % (ref 0–5)
GFR AFRICAN AMERICAN: >59
GFR NON-AFRICAN AMERICAN: 58
GLUCOSE BLD-MCNC: 107 MG/DL (ref 70–99)
GLUCOSE BLD-MCNC: 107 MG/DL (ref 70–99)
GLUCOSE BLD-MCNC: 148 MG/DL (ref 70–99)
GLUCOSE BLD-MCNC: 199 MG/DL (ref 70–99)
GLUCOSE BLD-MCNC: 89 MG/DL (ref 74–109)
HCT VFR BLD CALC: 28.3 % (ref 42–52)
HEMOGLOBIN: 9.3 G/DL (ref 14–18)
IMMATURE GRANULOCYTES #: 0.6 K/UL
LYMPHOCYTES ABSOLUTE: 1.1 K/UL (ref 1.1–4.5)
LYMPHOCYTES RELATIVE PERCENT: 11.1 % (ref 20–40)
MAGNESIUM: 1.9 MG/DL (ref 1.6–2.4)
MCH RBC QN AUTO: 33.7 PG (ref 27–31)
MCHC RBC AUTO-ENTMCNC: 32.9 G/DL (ref 33–37)
MCV RBC AUTO: 102.5 FL (ref 80–94)
MONOCYTES ABSOLUTE: 1 K/UL (ref 0–0.9)
MONOCYTES RELATIVE PERCENT: 9.3 % (ref 0–10)
NEUTROPHILS ABSOLUTE: 7.3 K/UL (ref 1.5–7.5)
NEUTROPHILS RELATIVE PERCENT: 71.5 % (ref 50–65)
PDW BLD-RTO: 13 % (ref 11.5–14.5)
PERFORMED ON: ABNORMAL
PLATELET # BLD: 164 K/UL (ref 130–400)
PMV BLD AUTO: 10.2 FL (ref 9.4–12.4)
POTASSIUM REFLEX MAGNESIUM: 3.2 MMOL/L (ref 3.5–5)
POTASSIUM SERPL-SCNC: 3.3 MMOL/L (ref 3.5–5)
RBC # BLD: 2.76 M/UL (ref 4.7–6.1)
SODIUM BLD-SCNC: 137 MMOL/L (ref 136–145)
WBC # BLD: 10.2 K/UL (ref 4.8–10.8)

## 2021-05-02 PROCEDURE — 99231 SBSQ HOSP IP/OBS SF/LOW 25: CPT | Performed by: THORACIC SURGERY (CARDIOTHORACIC VASCULAR SURGERY)

## 2021-05-02 PROCEDURE — 85025 COMPLETE CBC W/AUTO DIFF WBC: CPT

## 2021-05-02 PROCEDURE — 94660 CPAP INITIATION&MGMT: CPT

## 2021-05-02 PROCEDURE — 82947 ASSAY GLUCOSE BLOOD QUANT: CPT

## 2021-05-02 PROCEDURE — 84132 ASSAY OF SERUM POTASSIUM: CPT

## 2021-05-02 PROCEDURE — 2580000003 HC RX 258: Performed by: HOSPITALIST

## 2021-05-02 PROCEDURE — 99232 SBSQ HOSP IP/OBS MODERATE 35: CPT | Performed by: INTERNAL MEDICINE

## 2021-05-02 PROCEDURE — 6370000000 HC RX 637 (ALT 250 FOR IP): Performed by: INTERNAL MEDICINE

## 2021-05-02 PROCEDURE — 2700000000 HC OXYGEN THERAPY PER DAY

## 2021-05-02 PROCEDURE — 83735 ASSAY OF MAGNESIUM: CPT

## 2021-05-02 PROCEDURE — 94640 AIRWAY INHALATION TREATMENT: CPT

## 2021-05-02 PROCEDURE — 1210000000 HC MED SURG R&B

## 2021-05-02 PROCEDURE — 6360000002 HC RX W HCPCS: Performed by: HOSPITALIST

## 2021-05-02 PROCEDURE — 80048 BASIC METABOLIC PNL TOTAL CA: CPT

## 2021-05-02 PROCEDURE — 93010 ELECTROCARDIOGRAM REPORT: CPT | Performed by: INTERNAL MEDICINE

## 2021-05-02 PROCEDURE — 36415 COLL VENOUS BLD VENIPUNCTURE: CPT

## 2021-05-02 PROCEDURE — 6370000000 HC RX 637 (ALT 250 FOR IP): Performed by: HOSPITALIST

## 2021-05-02 PROCEDURE — 6370000000 HC RX 637 (ALT 250 FOR IP): Performed by: STUDENT IN AN ORGANIZED HEALTH CARE EDUCATION/TRAINING PROGRAM

## 2021-05-02 RX ORDER — POTASSIUM CHLORIDE 20 MEQ/1
40 TABLET, EXTENDED RELEASE ORAL ONCE
Status: COMPLETED | OUTPATIENT
Start: 2021-05-02 | End: 2021-05-02

## 2021-05-02 RX ORDER — VALSARTAN 80 MG/1
40 TABLET ORAL DAILY
Status: DISCONTINUED | OUTPATIENT
Start: 2021-05-02 | End: 2021-05-04 | Stop reason: HOSPADM

## 2021-05-02 RX ORDER — POTASSIUM CHLORIDE 20 MEQ/1
20 TABLET, EXTENDED RELEASE ORAL ONCE
Status: COMPLETED | OUTPATIENT
Start: 2021-05-02 | End: 2021-05-02

## 2021-05-02 RX ADMIN — IPRATROPIUM BROMIDE 0.5 MG: 0.5 SOLUTION RESPIRATORY (INHALATION) at 10:56

## 2021-05-02 RX ADMIN — BUDESONIDE 500 MCG: 0.5 SUSPENSION RESPIRATORY (INHALATION) at 18:43

## 2021-05-02 RX ADMIN — IPRATROPIUM BROMIDE 0.5 MG: 0.5 SOLUTION RESPIRATORY (INHALATION) at 18:42

## 2021-05-02 RX ADMIN — POTASSIUM CHLORIDE 40 MEQ: 1500 TABLET, EXTENDED RELEASE ORAL at 11:56

## 2021-05-02 RX ADMIN — BUDESONIDE 500 MCG: 0.5 SUSPENSION RESPIRATORY (INHALATION) at 06:46

## 2021-05-02 RX ADMIN — HYDROCODONE BITARTRATE AND ACETAMINOPHEN 2 TABLET: 5; 325 TABLET ORAL at 07:15

## 2021-05-02 RX ADMIN — INSULIN GLARGINE 15 UNITS: 100 INJECTION, SOLUTION SUBCUTANEOUS at 20:57

## 2021-05-02 RX ADMIN — Medication 10 ML: at 20:56

## 2021-05-02 RX ADMIN — ATORVASTATIN CALCIUM 40 MG: 40 TABLET, FILM COATED ORAL at 20:55

## 2021-05-02 RX ADMIN — ALLOPURINOL 100 MG: 100 TABLET ORAL at 08:31

## 2021-05-02 RX ADMIN — CYANOCOBALAMIN TAB 500 MCG 1000 MCG: 500 TAB at 08:30

## 2021-05-02 RX ADMIN — ARFORMOTEROL TARTRATE 15 MCG: 15 SOLUTION RESPIRATORY (INHALATION) at 06:46

## 2021-05-02 RX ADMIN — ARFORMOTEROL TARTRATE 15 MCG: 15 SOLUTION RESPIRATORY (INHALATION) at 18:42

## 2021-05-02 RX ADMIN — Medication 1 TABLET: at 08:30

## 2021-05-02 RX ADMIN — POTASSIUM CHLORIDE 20 MEQ: 1500 TABLET, EXTENDED RELEASE ORAL at 05:06

## 2021-05-02 RX ADMIN — CARVEDILOL 12.5 MG: 12.5 TABLET, FILM COATED ORAL at 17:10

## 2021-05-02 RX ADMIN — DOCUSATE SODIUM 100 MG: 100 CAPSULE, LIQUID FILLED ORAL at 08:31

## 2021-05-02 RX ADMIN — INSULIN LISPRO 1 UNITS: 100 INJECTION, SOLUTION INTRAVENOUS; SUBCUTANEOUS at 20:57

## 2021-05-02 RX ADMIN — ISOSORBIDE MONONITRATE 30 MG: 30 TABLET, EXTENDED RELEASE ORAL at 08:32

## 2021-05-02 RX ADMIN — PSYLLIUM HUSK 1 PACKET: 3.4 POWDER ORAL at 08:30

## 2021-05-02 RX ADMIN — PANTOPRAZOLE SODIUM 40 MG: 40 TABLET, DELAYED RELEASE ORAL at 05:06

## 2021-05-02 RX ADMIN — IPRATROPIUM BROMIDE 0.5 MG: 0.5 SOLUTION RESPIRATORY (INHALATION) at 06:46

## 2021-05-02 RX ADMIN — IPRATROPIUM BROMIDE 0.5 MG: 0.5 SOLUTION RESPIRATORY (INHALATION) at 14:50

## 2021-05-02 RX ADMIN — AMLODIPINE BESYLATE 5 MG: 5 TABLET ORAL at 08:31

## 2021-05-02 RX ADMIN — PRAZOSIN HYDROCHLORIDE 2 MG: 2 CAPSULE ORAL at 20:55

## 2021-05-02 RX ADMIN — DOCUSATE SODIUM 100 MG: 100 CAPSULE, LIQUID FILLED ORAL at 20:55

## 2021-05-02 RX ADMIN — CARVEDILOL 12.5 MG: 12.5 TABLET, FILM COATED ORAL at 08:31

## 2021-05-02 ASSESSMENT — PAIN DESCRIPTION - ORIENTATION: ORIENTATION: LEFT

## 2021-05-02 ASSESSMENT — PAIN - FUNCTIONAL ASSESSMENT: PAIN_FUNCTIONAL_ASSESSMENT: PREVENTS OR INTERFERES SOME ACTIVE ACTIVITIES AND ADLS

## 2021-05-02 ASSESSMENT — PAIN DESCRIPTION - DESCRIPTORS
DESCRIPTORS: SHARP
DESCRIPTORS: SHARP

## 2021-05-02 ASSESSMENT — PAIN DESCRIPTION - LOCATION: LOCATION: RIB CAGE

## 2021-05-02 ASSESSMENT — PAIN SCALES - GENERAL: PAINLEVEL_OUTOF10: 10

## 2021-05-02 ASSESSMENT — PAIN DESCRIPTION - PAIN TYPE: TYPE: ACUTE PAIN

## 2021-05-02 ASSESSMENT — PAIN DESCRIPTION - FREQUENCY: FREQUENCY: INTERMITTENT

## 2021-05-02 NOTE — PROGRESS NOTES
[facility]    Patient:  Kyrie Sousa  YOB: 1935  Date of Service: 5/2/2021  MRN: [id number]   Acct: [de-identified]   Primary Care Physician: Annie Decker DO  Advance Directive: DNR-CC  Admit Date: 4/29/2021       Hospital Day: 3  Portions of this note have been copied forward, however, changed to reflect the most current clinical status of this patient. CHIEF COMPLAINT Fall at assisted living facility    SUBJECTIVE:  Mr. Eric Nayak continues to complain of generalized body aches. States lidocaine patch has helped. Cumulative Hospital Course: The patient is a 81 y. o. male with past medical history of DM, HTN, Afib, CHF, COPD, HLD, CKD, Prostate CA, and Lung mass who presented to [facility] ED complaining of fall at assisted living facility. Mr. Eric Nayak stated he was using bathroom and turned around and fell. Denied tripping, dizziness, lightheadedness, or change in vision prior to fall. Denied fever, chills, nausea, vomiting, diarrhea, chest pain, or any recent illnesses. States he does get SOB at times due to his COPD and requires 4L Oxygen all the time. Workup in ED revealed Na 131, K+ 6.1, Cr 1.9 baseline 1.1 (3/30/20), negative troponin, Digoxin 2.7, WBC 13.4, hgb 8.9, negative Cxray. CT of head showed small left supraorbital soft tissue contusion, no acute intracranial findings. CT of Cervical spine unremarkable. CT of chest/abd showed acute Left sixth through ninth rib fractures, no pneumothorax, small left hemothorax, mild left chest wall soft tissue contusion, trace fluid in Left paracolic gutter. Received Calcium gluconate, D50, Regular Insulin, Sodium bicarbonate, and 45g Kayexalate in ED. He was admitted to hospital medicine with NELSY/ Multiple rib fractures, and small left hemothorax. Started on IVFs. Home digoxin held. Cardiology consulted and recommends discontinuing digoxin. Xarelto on hold given frequent falls and small left hemothorax.  Will need to weigh benefits of breakfast    lidocaine  1 patch Transdermal Daily    isosorbide mononitrate  30 mg Oral Daily    amLODIPine  5 mg Oral Daily    [Held by provider] aspirin  81 mg Oral Daily    atorvastatin  40 mg Oral Nightly    cholestyramine  1 packet Oral Daily    docusate sodium  100 mg Oral BID    carvedilol  12.5 mg Oral BID WC    therapeutic multivitamin-minerals  1 tablet Oral Daily    pantoprazole  40 mg Oral QAM AC    prazosin  2 mg Oral Nightly    vitamin B-12  1,000 mcg Oral Daily    budesonide  0.5 mg Nebulization Q12H    Arformoterol Tartrate  15 mcg Nebulization BID    ipratropium  0.5 mg Nebulization 4x daily    insulin glargine  15 Units Subcutaneous Nightly    insulin lispro  0-12 Units Subcutaneous TID WC    insulin lispro  0-6 Units Subcutaneous Nightly    [Held by provider] doxazosin  4 mg Oral Nightly    sodium chloride flush  5-40 mL Intravenous 2 times per day    [Held by provider] heparin (porcine)  5,000 Units Subcutaneous 3 times per day     calcium carbonate, HYDROcodone 5 mg - acetaminophen **OR** HYDROcodone 5 mg - acetaminophen, HYDROmorphone, albuterol, albuterol, nitroGLYCERIN, glucose, glucagon (rDNA), naloxone, sodium chloride flush, sodium chloride, polyethylene glycol, melatonin, acetaminophen **OR** acetaminophen, ondansetron **OR** ondansetron, polyethylene glycol  DIET RENAL; Carb Control: 4 carb choices (60 gms)/meal; Low Sodium (2 GM);  Low Cholesterol     Lab and other Data:     Recent Labs     04/30/21  0050 05/01/21  0115 05/02/21  0253   WBC 10.9* 10.5 10.2   HGB 8.7* 8.5* 9.3*    172 164     Recent Labs     04/30/21  0050 05/01/21  0115 05/02/21  0253 05/02/21  0735    136 137  --    K 4.7  4.8 3.6 3.2* 3.3*    102 101  --    CO2 24 24 24  --    BUN 47* 32* 26*  --    CREATININE 1.7* 1.2 1.2  --    GLUCOSE 107 94 89  --      Recent Labs     04/29/21  1704   AST 6   ALT 5   BILITOT 0.4   ALKPHOS 48     Troponin T:   Recent Labs     04/29/21  1704 TROPONINI 0.01     INR:   Recent Labs     04/29/21  1704   INR 1.12     UA:  Recent Labs     04/29/21  1915   COLORU YELLOW   PHUR 5.0   CLARITYU Clear   SPECGRAV 1.011   LEUKOCYTESUR Negative   UROBILINOGEN 0.2   BILIRUBINUR Negative   BLOODU Negative   GLUCOSEU Negative     RAD:   Ct Abdomen Pelvis Wo Contrast Additional Contrast? None  Impression: 1. Acute LEFT sixth through ninth rib fractures. No pneumothorax. Small LEFT hemothorax. 2.  Mild LEFT chest wall soft tissue contusion. 3.  No acute traumatic finding in the abdomen or pelvis identified. However, there is some trace fluid in the LEFT paracolic gutter which could be related to an occult injury. 4.  Significant decrease in mediastinal infiltrative soft tissue predominantly in the RIGHT paratracheal region. 5.  Enlarged heart. Signed by Dr Oneta Hammans on 4/29/2021 8:05 PM    Ct Head Wo Contrast  No acute intracranial findings. Small LEFT supraorbital soft tissue contusion. Signed by Dr Oneta Hammans on 4/29/2021 6:35 PM    Ct Chest Wo Contrast  Impression: 1. Acute LEFT sixth through ninth rib fractures. No pneumothorax. Small LEFT hemothorax. 2.  Mild LEFT chest wall soft tissue contusion. 3.  No acute traumatic finding in the abdomen or pelvis identified. However, there is some trace fluid in the LEFT paracolic gutter which could be related to an occult injury. 4.  Significant decrease in mediastinal infiltrative soft tissue predominantly in the RIGHT paratracheal region. 5.  Enlarged heart. Signed by Dr Oneta Hammans on 4/29/2021 8:05 PM    Ct Cervical Spine Wo Contrast    Result Date: 4/29/2021  Exam: CT CERVICAL SPINE WO CONTRAST - 4/29/2021 6:13 PM Indication: Fall, trauma Comparison: 4/26/2021 DLP: 709 mGy cm. In order to have a CT radiation dose as low as reasonably achievable, Automated Exposure Control was utilized for adjustment of the mA and/or KV according to patient size. Findings: Craniocervical relationships are maintained.  The odontoid process is intact. Reversal of normal cervical lordosis and trace anterolisthesis of C3 on C4, unchanged. No acute cervical spine malalignment. Vertebral body heights are maintained. No acute fracture or subluxation. Moderate multilevel degenerative change. No prevertebral soft tissue swelling. No apical pneumothorax. 1.  No evidence of cervical spine fracture. 2.  Multilevel degenerative change. 3.  Chronic reversal of normal cervical lordosis. Signed by Dr Rakesh Smith on 4/29/2021 7:48 PM    Us Renal Complete  1. Unremarkable appearance of the left kidney. 2. Normal appearance of the right kidney except for a 9 mm cyst along the lateral cortex at the interpolar level, which appears benign. No hydronephrosis is identified. Signed by Dr Joaquin Flores on 4/30/2021 1:52 PM    Xr Chest Portable  Impression: No acute findings. Signed by Dr Rakesh Smith on 4/29/2021 6:28 PM  Assessment/Plan   Principal Problem:    NELSY (acute kidney injury) ([facility] [address]  Active Problems:    Essential hypertension    Chronic atrial fibrillation    COPD (chronic obstructive pulmonary disease) ([facility])    Type 2 diabetes mellitus with diabetic polyneuropathy ([facility])    Mixed hyperlipidemia    Hemothorax on left    Fracture of multiple ribs of left side    Fall at home    Digoxin toxicity  Resolved Problems:    Hyperkalemia    Principal Problem:    NELSY (acute kidney injury) ([facility])-Nephrology following, resolved, monitor off IVF's    Active Problems:    Hemothorax 2/2 multiple left sided rib fractures-IS, encourage deep breathing,  lidocaine patch ordered, CT surgery without need for operative intervention, stable       Hyperkalemia-resolved, now with hypokalemia-replace and monitor, Aldactone discontinued      Essential hypertension-stable      Chronic atrial fibrillation-rate controlled, Cardiology with recommendation to discontinue digoxin. Xarelto on hold for now with frequent falls/hemothorax.  Stable      COPD (chronic obstructive pulmonary disease) (HCC)-no exacerbation      Type 2 diabetes mellitus with diabetic polyneuropathy (HCC)-stable, continue Lantus, SSI, hypoglycemia tx orders, accuchecks      Mixed hyperlipidemia-statin continued      Further orders per clinical course/attending Plan for discharge once placement arrangements confirmed     DVT Prophylaxis: SCD    GI prophylaxis:  Protonix    Elías Da Silva PA-C

## 2021-05-02 NOTE — PROGRESS NOTES
Physical Therapy  Name: Hali Pruitt  MRN:  [id number]  Date of service:  5/2/2021 05/02/21 1443   General   Missed reason Conflicting appointment   General Comment   Comments CNA giving pt a bath.      Electronically signed by Nilda Coker PTA on 5/2/2021 at 2:44 PM

## 2021-05-02 NOTE — PROGRESS NOTES
CARDIOTHORACIC SURGERY PROGRESS NOTE      SUBJECTIVE:  No complaints of pain on breathing, only when he gets out of bed  /63   Pulse 59   Temp 97.4 °F (36.3 °C)   Resp 16   Wt 241 lb (109.3 kg)   SpO2 97%   BMI 34.58 kg/m²   Average, Min, and Max for last 24 hours Vitals:  TEMPERATURE:  Temp  Av.1 °F (36.2 °C)  Min: 96.3 °F (35.7 °C)  Max: 97.9 °F (36.6 °C)  RESPIRATIONS RANGE: Resp  Av  Min: 14  Max: 18  PULSE RANGE: Pulse  Av.8  Min: 59  Max: 60  BLOOD PRESSURE RANGE:  Systolic (62MTF), GSV:866 , Min:101 , KLP:036   ; Diastolic (50IPK), CMK:80, Min:49, Max:63    PULSE OXIMETRY RANGE: SpO2  Av %  Min: 96 %  Max: 100 %    I/O last 3 completed shifts: In: 840 [P.O.:840]  Out: 700 [Urine:700]    On O2 by NC @ 4l/min  HEENT: slow resolution of left periorbital ecchymosis  CHEST: breath sounds good bilaterally  CARDIOVASCULAR:regular  ABDOMEN: soft, no guarding today    LABS:  Lab Results   Component Value Date    WBC 10.2 2021    HGB 9.3 (L) 2021    HCT 28.3 (L) 2021    .5 (H) 2021     2021     Lab Results   Component Value Date     2021    K 3.3 2021    K 3.2 2021     2021    CO2 24 2021    BUN 26 2021    CREATININE 1.2 2021    GLUCOSE 89 2021    CALCIUM 8.5 2021         ASSESSMENT: Blunt chest wall trauma with left sixth through ninth rib fractures with small hemothorax, no pneumothorax. No paradoxical chest wall motion, use of accessory muscles, or evidence of underlying pulmonary contusion. On O2 at 4L/min by nasal cannula    PLAN: Continue analgesics. Limit fluid intake.     Electronically signed by Giulia Cabrera MD on 21 at 10:51 AM CDT

## 2021-05-02 NOTE — PLAN OF CARE
Problem: Pain:  Goal: Pain level will decrease  Description: Pain level will decrease  5/2/2021 1034 by Tom Cunningham RN  Outcome: Ongoing  5/2/2021 0102 by Concha Suarez RN  Outcome: Ongoing     Problem: Pain:  Goal: Control of acute pain  Description: Control of acute pain  5/2/2021 1034 by Tom Cunningham RN  Outcome: Ongoing  5/2/2021 0102 by Concha Suarez RN  Outcome: Ongoing     Problem: Pain:  Goal: Control of chronic pain  Description: Control of chronic pain  5/2/2021 1034 by Tom Cunningham RN  Outcome: Ongoing  5/2/2021 0102 by Concha Suarez RN  Outcome: Ongoing     Problem: Pain:  Goal: Patient's pain/discomfort is manageable  Description: Patient's pain/discomfort is manageable  5/2/2021 1034 by Tom Cunningham RN  Outcome: Ongoing  5/2/2021 0102 by Concha Suarez RN  Outcome: Ongoing     Problem: Falls - Risk of:  Goal: Will remain free from falls  Description: Will remain free from falls  5/2/2021 1034 by Tom Cunningham RN  Outcome: Ongoing  5/2/2021 0102 by Concha Suarez RN  Outcome: Ongoing  Goal: Absence of physical injury  Description: Absence of physical injury  5/2/2021 1034 by Tom Cunningham RN  Outcome: Ongoing  5/2/2021 0102 by Concha Suarez RN  Outcome: Ongoing     Problem: Falls - Risk of:  Goal: Absence of physical injury  Description: Absence of physical injury  5/2/2021 1034 by Tom Cunningham RN  Outcome: Ongoing  5/2/2021 0102 by Concha Suarez RN  Outcome: Ongoing     Problem: ABCDS Injury Assessment  Goal: Absence of physical injury  5/2/2021 1034 by Tom Cunningham RN  Outcome: Ongoing  5/2/2021 0102 by Concha Suarez RN  Outcome: Ongoing     Problem: Infection:  Goal: Will remain free from infection  Description: Will remain free from infection  5/2/2021 1034 by Tom Cunningham RN  Outcome: Ongoing  5/2/2021 0102 by Concha Suarez RN  Outcome: Ongoing     Problem: Safety:  Goal: Free from accidental physical injury  Description: Free from accidental physical injury  5/2/2021 1034 by Tom Cunningham,

## 2021-05-02 NOTE — PROGRESS NOTES
Cardiology Progress Note Amaris Baez MD      Patient:  Diana Morse  [id number]    Patient Active Problem List    Diagnosis Date Noted    Chest discomfort      Priority: High    Hemothorax on left 04/30/2021     Priority: Low    Fracture of multiple ribs of left side 04/30/2021     Priority: Low    Fall at home 04/30/2021     Priority: Low    Digoxin toxicity 04/30/2021     Priority: Low    NELSY (acute kidney injury) ([address] 04/29/2021     Priority: Low    CAD (coronary artery disease) 03/30/2020     Priority: Low     Overview Note:     5/16/2018  lexiscan negative for myocardial ischemia, EF 46  %   3/28/20 lexiscan Positive for anterior septal myocardial ischemia, EF 43%, 3% ischemic myocardium on stress, low risk findings, AUC indication 15, AUC score 4, Amanda Cannon MD)   3/30/20  Cath  Moderate diffuse disease, 70% mid LAD at the diagonal, normal LVFX      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 ([address] 03/28/2020     Priority: Low    Chest pain due to myocardial ischemia 03/27/2020     Priority: Low    Mediastinal mass 03/10/2020     Priority: Low    Sinoatrial node dysfunction (HCC) 04/25/2019     Priority: Low    Bradycardia 03/15/2019     Priority: Low    Pacemaker 03/15/2019     Priority: Low    Chronic combined systolic and diastolic congestive heart failure (HCC) 11/15/2018     Priority: Low    Macrocytic anemia 11/15/2018     Priority: Low    Sepsis ([address] 11/15/2018     Priority: Low    Vitamin D deficiency 11/15/2018     Priority: Low    Mitral regurgitation 11/15/2018     Priority: Low    Tricuspid regurgitation 11/15/2018     Priority: Low    Pulmonary hypertension ([address] 11/15/2018     Priority: Low    Bronchitis, acute 11/15/2018     Priority: Low    Mixed hyperlipidemia 02/15/2018     Priority: Low    Prostate cancer ([address] 02/15/2018     Priority: Low    Recurrent major depressive disorder, in partial remission ([address] 02/15/2018     Priority: Low    Type 2 diabetes mellitus with diabetic polyneuropathy ([facility] 11/25/2015     Priority: Low    Mixed restrictive and obstructive lung disease ([facility] 05/21/2015     Priority: Low    COPD (chronic obstructive pulmonary disease) ([facility] 05/21/2015     Priority: Low    NEIDA (obstructive sleep apnea) 05/21/2015     Priority: Low    Asbestosis ([facility] 05/21/2015     Priority: Low    Metabolic alkalosis with respiratory acidosis 05/21/2015     Priority: Low    Chronic anticoagulation 05/21/2015     Priority: Low    Acne rosacea 05/21/2015     Priority: Low    Essential hypertension      Priority: Low    Neuropathy      Priority: Low    Chronic atrial fibrillation      Priority: Low     Overview Note:     2/26/2018  Echo  Normal LVFX  5/16/2018  lexiscan negative for myocardial ischemia, EF 46  %   11/14/2018  Chest CT ([place]) \"  . Small left-sided and moderate right-sided pleural effusion with   bibasilar compressive atelectasis. No evidence of acute consolidative   pneumonia. There is some bronchial wall thickening noted within the   lungs suggesting an element of bronchitis. 2. There are some enlarged right paratracheal nodes present. There is   also a focus of soft tissue nodularity within the lower right   paratracheal bobbi group suspicious for an enlarged bobbi mass. Some   right hilar nodes are also present. These could be reactive in nature   but would warrant follow-up. 3. Coronary calcifications in the LAD, circumflex and right coronary   distribution. There is moderate cardiomegaly. .     11/15/2018  Echo  Normal LVFX    12/27/2018 ([facility]maris Score) \"  An ill-defined filling defect in the dilated right atrium may   represent flow phenomenon or a mass? . This may be clinically   correlated.      3/15/19 LUCILLE severely enlarged RA and LA, severe TR, mild MR, mild AI, no evidence of RA mass  3/15/19  sino - atrial node dysfunction with numerous pauses > 2 seconds without negative chronotropic agents  3/15/19  VVI pacemaker  Anxiety      Priority: Low    Depression      Priority: Low       Admit Date:  4/29/2021    Admission Problem List: Present on Admission:   [facility]SY [facility]) [facility]   Type 2 diabetes mellitus with diabetic polyneuropathy (HCC)   Mixed hyperlipidemia   Essential hypertension   Chronic atrial fibrillation   COPD (chronic obstructive pulmonary disease) ([facility])   (Resolved) Hyperkalemia   Hemothorax on left   Fracture of multiple ribs of left side   Fall at home   Digoxin toxicity      Cardiac Specific Data:  Specialty Problems        Cardiology Problems    Chronic atrial fibrillation        Essential hypertension        Mixed hyperlipidemia        Chronic combined systolic and diastolic congestive heart failure (HCC)        Mitral regurgitation        Pulmonary hypertension (HCC)        Tricuspid regurgitation        Bradycardia        Sinoatrial node dysfunction (HCC)        Chest pain due to myocardial ischemia        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 Coronary artery disease, catheterization 3/30/2020 with proximal LAD 60 to 70% bifurcation lesion, mid RCA 50 to 60%, ejection fraction 40 to 45%. 2.  Chronic atrial fibrillation with severe left atrial enlargement, prior single lead RV pacemaker 3/15/2019, on Xarelto. 3.  Diabetes mellitus, insulin requiring. 4.  COPD, in assisted living facility. 5.  Frequent falls. Subjective:  Mr. Tere Bass has been in bed. Reports no significant issues. Strong smell of urine. Objective:   /70   Pulse 60   Temp 98.1 °F (36.7 °C)   Resp 18   Wt 241 lb (109.3 kg)   SpO2 96%   BMI 34.58 kg/m²       Intake/Output Summary (Last 24 hours) at 5/2/2021 1510  Last data filed at 5/2/2021 1313  Gross per 24 hour   Intake 840 ml   Output 600 ml   Net 240 ml       Prior to Admission medications    Medication Sig Start Date End Date Taking?  Authorizing Provider colesevelam (WELCHOL) 625 MG tablet Take 1,875 mg by mouth every evening   Yes Historical Provider, MD   pantoprazole (PROTONIX) 40 MG tablet Take 1 tablet by mouth every morning (before breakfast) 4/14/21  Yes ALISON Yoder DO   atorvastatin (LIPITOR) 40 MG tablet Take 1 tablet by mouth nightly 3/30/21  Yes ALISON Yoder DO   fluticasone Odella Miyamoto) 50 MCG/ACT nasal spray 2 sprays by Nasal route daily 3/25/21  Yes B Derek Yoder DO   mirabegron (MYRBETRIQ) 25 MG TB24 Take 1 tablet by mouth daily 3/17/21  Yes ALISON Yoder DO   digoxin (LANOXIN) 125 MCG tablet Take 1 tablet by mouth daily 2/25/21  Yes ALISON Yoder DO   buPROPion (WELLBUTRIN XL) 150 MG extended release tablet Take 2 tablets by mouth every morning 2/25/21  Yes B Derek Yoder DO   Velinda Acron 100-62.5-25 MCG/INH AEPB INHALE 1 PUFF ONCE DAILY 2/17/21  Yes B Derek Yoder DO   terazosin (HYTRIN) 5 MG capsule Take 1 capsule by mouth nightly 2/4/21  Yes ISAAC Lovelace   cholestyramine (QUESTRAN) 4 g packet Take 1 packet by mouth daily 1/21/21  Yes ALISON Yoder DO   azelastine (ASTELIN) 0.1 % nasal spray 2 sprays by Nasal route 2 times daily Use in each nostril as directed 1/14/21  Yes B Derek Yoder DO   carvedilol (COREG) 12.5 MG tablet Take 1 tablet by mouth 2 times daily (with meals) 1/4/21  Yes ALISON Yoder DO   enzalutamide Seabron Aver) 40 MG capsule Take 4 tablets daily 12/29/20  Yes ISAAC Randolph   furosemide (LASIX) 40 MG tablet TAKE 1 & 1/2 TABLETS DAILY 12/23/20  Yes ISAAC Lovelace   isosorbide mononitrate (IMDUR) 60 MG extended release tablet Take 1 tablet by mouth daily 12/16/20  Yes ISAAC Castanon   amLODIPine (NORVASC) 5 MG tablet Take 1 tablet by mouth daily 12/16/20  Yes ISAAC Mcnair   Dulaglutide (TRULICITY) 1.37 FB/6.3VG SOPN Inject 0.75 mg as directed every 7 days 12/1/20  Yes B Derek Yoder DO   lisinopril (PRINIVIL;ZESTRIL) 40 MG tablet Take 1 tablet by mouth daily 8/27/20  Yes ISAAC Guerrero   celecoxib (CELEBREX) 200 MG capsule Take 1 capsule by mouth 2 times daily 7/30/20  Yes B Cleophus Rotunda, DO   rivaroxaban (XARELTO) 20 MG TABS tablet Take 1 tablet by mouth daily (with breakfast) 7/23/20  Yes B Cleophus Rotunda, DO   metOLazone (ZAROXOLYN) 2.5 MG tablet Take 1 tablet by mouth daily 7/14/20  Yes B Cleophus Rotunda, DO   FLUoxetine (PROZAC) 20 MG capsule Take 1 capsule by mouth daily 6/9/20  Yes B Cleophus Rotunda, DO   spironolactone (ALDACTONE) 50 MG tablet TAKE 1 TABLET BY MOUTH EVERY DAY 6/3/20  Yes B Cleophus Rotunda, DO   metFORMIN (GLUCOPHAGE) 500 MG tablet Take 1 tablet by mouth 2 times daily (with meals) 6/2/20  Yes B Cleophus Rotunda, DO   Psyllium (KONSYL) 28.3 % POWD Take 4 g by mouth 2 times daily (with meals) 5/14/20  Yes B Cleophus Rotunda, DO   hydroCHLOROthiazide (MICROZIDE) 12.5 MG capsule Take 1 capsule by mouth daily 5/14/20  Yes B Cleophus Rotunda, DO   aspirin 81 MG EC tablet Take 1 tablet by mouth daily 3/31/20  Yes Mine Bhakta PA-C   FLUoxetine (PROZAC) 40 MG capsule Take 1 capsule by mouth daily  Patient taking differently: Take 40 mg by mouth  1/28/20  Yes ISAAC Guerrero   potassium chloride (KLOR-CON 10) 10 MEQ extended release tablet Take 1 tablet by mouth daily 6/19/19  Yes ISAAC Mae   Multiple Vitamins-Minerals (THERAPEUTIC MULTIVITAMIN-MINERALS) tablet Take 1 tablet by mouth daily   Yes Historical Provider, MD   OXYGEN Inhale 3 L into the lungs nightly    Yes Historical Provider, MD   cetirizine (ZYRTEC) 10 MG tablet Take 1 tablet by mouth daily 11/17/18  Yes Flores Olivera,    prazosin (MINIPRESS) 2 MG capsule Take 1 capsule by mouth nightly 8/20/18  Yes ISAAC Vasquez   vitamin B-12 (CYANOCOBALAMIN) 1000 MCG tablet Take 1,000 mcg by mouth daily   Yes Historical Provider, MD   leuprolide (LUPRON) 30 MG injection Inject 30 mg into the muscle once Every 4 months   Yes Historical Provider, MD   FISH OIL Take 1 capsule by mouth 2 times daily. Yes Historical Provider, MD   docusate sodium (COLACE) 100 MG capsule Take 1 capsule by mouth 2 times daily for 10 days 4/26/21 5/6/21  ISAAC Price   polyethylene glycol (MIRALAX) 17 g packet Take 17 g by mouth daily for 10 days 4/26/21 5/6/21  ISAAC Price   ondansetron (ZOFRAN) 4 MG tablet Take 1 tablet by mouth 3 times daily as needed for Nausea or Vomiting 4/14/21   B Terry Asp, DO   Easy Touch Lancets 28G/Twist MISC Use to test Blood sugar daily 2/3/21   B Terry Asp, DO   blood glucose test strips (EASY TOUCH TEST) strip USE TO TEST BLOOD SUGAR ONCE DAILY 11/11/20   ISAAC Burton   Lift Chair MISC by Does not apply route Prefers leather  At [address] faxed through [facility] 10/21/20   B Terry Asp, DO   blood glucose monitor strips Test one times a day & as needed for symptoms of irregular blood glucose. DX: E11.9 4/2/20   B Terry Asp, DO   Alcohol Swabs (ALCOHOL PREP) PADS Use daily with glucose checks DX: E11.9 4/2/20   B Terry Asp, DO   blood glucose monitor kit and supplies Test one times a day & as needed for symptoms of irregular blood glucose. DX:E11.9 4/2/20   B Terry Asp, DO   nitroGLYCERIN (NITROSTAT) 0.4 MG SL tablet up to max of 3 total doses. If no relief after 1 dose, call 911. 3/30/20   Princess Hensley PA-C   Wheat Dextrin [employer] [facility]) POWD Take 4 g by mouth 3 times daily (with meals) 2/18/20   B Terry Asp, DO   blood glucose monitor kit and supplies Test once a day for symptoms of irregular blood glucose.  2/3/20   B Terry Asp, DO   albuterol sulfate HFA (PROAIR HFA) 108 (90 Base) MCG/ACT inhaler Inhale 2 puffs into the lungs every 6 hours as needed for Wheezing 12/4/19   Michael Gave, APRN   Lancets MISC 1 each by Does not apply route daily Accu Chek Guid3 Lancets 4/29/19   B Dimple Ponds, DO   BiPAP Machine MISC by Does not apply route nightly    Historical Provider, MD        valsartan  40 mg Oral Daily    allopurinol  100 mg Oral Daily    psyllium  1 packet Oral Daily with breakfast    lidocaine  1 patch Transdermal Daily    isosorbide mononitrate  30 mg Oral Daily    amLODIPine  5 mg Oral Daily    [Held by provider] aspirin  81 mg Oral Daily    atorvastatin  40 mg Oral Nightly    cholestyramine  1 packet Oral Daily    docusate sodium  100 mg Oral BID    carvedilol  12.5 mg Oral BID WC    therapeutic multivitamin-minerals  1 tablet Oral Daily    pantoprazole  40 mg Oral QAM AC    prazosin  2 mg Oral Nightly    vitamin B-12  1,000 mcg Oral Daily    budesonide  0.5 mg Nebulization Q12H    Arformoterol Tartrate  15 mcg Nebulization BID    ipratropium  0.5 mg Nebulization 4x daily    insulin glargine  15 Units Subcutaneous Nightly    insulin lispro  0-12 Units Subcutaneous TID WC    insulin lispro  0-6 Units Subcutaneous Nightly    [Held by provider] doxazosin  4 mg Oral Nightly    sodium chloride flush  5-40 mL Intravenous 2 times per day    [Held by provider] heparin (porcine)  5,000 Units Subcutaneous 3 times per day       TELEMETRY: Atrial fibrillation with paced rhythm    Physical Exam:      Physical Exam  Constitutional:       Appearance: He is well-developed. He is obese. HENT:      Mouth/Throat:      Pharynx: No oropharyngeal exudate. Eyes:      General: No scleral icterus. Right eye: No discharge. Left eye: No discharge. Comments: Left eye bruise   Neck:      Thyroid: No thyromegaly. Vascular: No JVD. Cardiovascular:      Rate and Rhythm: Normal rate and regular rhythm. Heart sounds: No murmur. No friction rub. No gallop. Comments: No JVD  Trace edema    Pulmonary:      Effort: No respiratory distress. Breath sounds: No stridor. No wheezing or rales.    Abdominal:      General: Bowel sounds are normal. There is no distension. Palpations: Abdomen is soft. There is no mass. Tenderness: There is no abdominal tenderness. There is no guarding or rebound. Comments: No palpable organomegaly   Musculoskeletal:         General: No deformity. Skin:     General: Skin is warm. Coloration: Skin is not pale. Findings: No erythema or rash. Neurological:      Mental Status: He is alert and oriented to person, place, and time. Motor: No abnormal muscle tone. Coordination: Coordination normal.      Deep Tendon Reflexes: Reflexes normal.                 Lab Data:  CBC:   Recent Labs     04/30/21 0050 05/01/21 0115 05/02/21 0253   WBC 10.9* 10.5 10.2   HGB 8.7* 8.5* 9.3*   HCT 27.1* 25.9* 28.3*   .0* 102.0* 102.5*    172 164     BMP:   Recent Labs     04/30/21 0050 05/01/21 0115 05/02/21 0253 05/02/21  0735    136 137  --    K 4.7  4.8 3.6 3.2* 3.3*    102 101  --    CO2 24 24 24  --    PHOS 3.8  --   --   --    BUN 47* 32* 26*  --    CREATININE 1.7* 1.2 1.2  --      LIVER PROFILE:   Recent Labs     04/29/21  1704   AST 6   ALT 5   BILITOT 0.4   ALKPHOS 48     PT/INR:   Recent Labs     04/29/21  1704   PROTIME 14.4   INR 1.12     APTT:   Recent Labs     04/29/21  1704   APTT 28.0     BNP:  No results for input(s): BNP in the last 72 hours.   CK, CKMB, Troponin: @LABRCNT (CKTOTAL:3, CKMB:3, TROPONINI:3)@    IMAGING:  Echo Complete 2d W Doppler W Color    Result Date: 5/1/2021  Transthoracic Echocardiography Report (TTE)  Demographics   Patient Name  Luisito Calhoun Date of Study         04/30/2021   MRN           [id number]          Gender                Male   Date of Birth 1935      Room Number           MHL-0307   Age           81 year(s)   Height:       70 inches       Referring Physician   Monty Rob MD   Weight:       235.01 pounds   Sonographer           Bing [place], RDCS   BSA:          2.24 m^2        Interpreting          Rosi Rob MD 4/29/2021 6:14 PM Indication: Fall, trauma Comparison: 2/25/2000 DLP: 2117 mGy cm. In order to have a CT radiation dose as low as reasonably achievable, Automated Exposure Control was utilized for adjustment of the mA and/or KV according to patient size. Findings: No depressed fracture. No thoracic spine fracture or malalignment. Moderate multilevel thoracic spine degenerative change. The central airways are clear. Significant decrease in confluent ill-defined soft tissue density in the mediastinum probably in the RIGHT paratracheal region. Small LEFT hemothorax. No RIGHT effusion or hemopneumothorax. No pneumothorax. No large pulmonary contusion. No change in 3 mm lingular pulmonary nodule on image 65. No enlarged thoracic lymph nodes. LEFT chest wall port cardiac device. Heart is enlarged. No pericardial effusion. No unenhanced evidence of major thoracic aortic injury. LEFT anterior sixth rib fracture. Segmental LEFT seventh rib fracture. LEFT posterior eighth rib fracture. LEFT anterior ninth rib fracture. No large acute soft tissue chest wall hematoma. Suspected mild contusion along the LEFT lateral anterior chest. Unenhanced liver appears unremarkable. Gallbladder and biliary tree appear normal. Pancreas, spleen, and adrenal glands are unremarkable. Small RIGHT renal low-density lesion, likely cyst. No urolithiasis or hydronephrosis. No focal urinary bladder wall thickening. Prostate radiation seeds. Trace fluid in LEFT paracolic gutter. Normal appendix. No evidence of bowel obstruction or active bowel inflammation. No evidence of mesenteric injury. No free pelvic fluid or pelvic mass. LEFT inguinal fat-containing hernia. Unenhanced abdominal aorta appears unremarkable other than for mild tortuosity and atherosclerotic change. No abdominal or pelvic lymphadenopathy. No major soft tissue contusion/hematoma. Lumbar spine vertebral body heights and alignment are maintained.  No acute osseous Molly Decker on 4/29/2021 6:35 PM    Ct Head Wo Contrast    Result Date: 4/26/2021  Exam: CT HEAD WO CONTRAST - 4/26/2021 5:35 PM Indication: Head injury, fall, trauma the LEFT forehead Comparison: None available. DLP: 828 mGy cm. In order to have a CT radiation dose as low as reasonably achievable, Automated Exposure Control was utilized for adjustment of the mA and/or KV according to patient size. Findings: No evidence of intracranial hemorrhage. No loss of gray-white differentiation to suggest acute infarct. No midline shift or mass effect. Lateral ventricles are nondilated. Chronic microvascular ischemic white matter change. Mild global cerebral volume loss. LEFT supraorbital soft tissue contusion/hematoma. No visible evidence of injury to the LEFT globe. Retroconal fat appears maintained. Mastoid air cells are clear. Mild LEFT maxillary sinus mucosal thickening. No evidence of skull fracture. Facial bones will be better assessed on same-day CT facial bone. Impression: 1. No acute intracranial findings. 2.  LEFT supraorbital soft tissue contusion. Signed by Dr Corwin Stevens on 4/26/2021 6:54 PM    Ct Facial Bones Wo Contrast    Result Date: 4/26/2021  Exam: CT FACIAL BONES WO CONTRAST - 4/26/2021 5:35 PM Indication: LEFT forehead injury, fall Comparison: None available. DLP: 463 mGy cm. In order to have a CT radiation dose as low as reasonably achievable, Automated Exposure Control was utilized for adjustment of the mA and/or KV according to patient size. Findings: Frontal sinuses intact. Maxillary buttresses are intact. Zygomatic arches are maintained. Pterygoid plates are intact. No visible temporal bone fracture. Mandible appears intact. No acute orbital fracture. No nasal bone fracture. No nasal septal fracture. LEFT supraorbital soft tissue contusion/hematoma. No evidence of injury to the LEFT globe. Extraocular muscles appear normal. Retroconal fat is maintained. Impression: 1. No acute facial fracture. 2.  LEFT supraorbital soft tissue contusion/hematoma. Signed by Dr Geovany Fuentes on 4/26/2021 6:59 PM    Ct Chest Wo Contrast    Result Date: 4/29/2021  Exam: CT CHEST WO CONTRAST, CT ABDOMEN PELVIS WO CONTRAST - 4/29/2021 6:14 PM Indication: Fall, trauma Comparison: 2/25/2000 DLP: 2117 mGy cm. In order to have a CT radiation dose as low as reasonably achievable, Automated Exposure Control was utilized for adjustment of the mA and/or KV according to patient size. Findings: No depressed fracture. No thoracic spine fracture or malalignment. Moderate multilevel thoracic spine degenerative change. The central airways are clear. Significant decrease in confluent ill-defined soft tissue density in the mediastinum probably in the RIGHT paratracheal region. Small LEFT hemothorax. No RIGHT effusion or hemopneumothorax. No pneumothorax. No large pulmonary contusion. No change in 3 mm lingular pulmonary nodule on image 65. No enlarged thoracic lymph nodes. LEFT chest wall port cardiac device. Heart is enlarged. No pericardial effusion. No unenhanced evidence of major thoracic aortic injury. LEFT anterior sixth rib fracture. Segmental LEFT seventh rib fracture. LEFT posterior eighth rib fracture. LEFT anterior ninth rib fracture. No large acute soft tissue chest wall hematoma. Suspected mild contusion along the LEFT lateral anterior chest. Unenhanced liver appears unremarkable. Gallbladder and biliary tree appear normal. Pancreas, spleen, and adrenal glands are unremarkable. Small RIGHT renal low-density lesion, likely cyst. No urolithiasis or hydronephrosis. No focal urinary bladder wall thickening. Prostate radiation seeds. Trace fluid in LEFT paracolic gutter. Normal appendix. No evidence of bowel obstruction or active bowel inflammation. No evidence of mesenteric injury. No free pelvic fluid or pelvic mass. LEFT inguinal fat-containing hernia.  Unenhanced abdominal aorta appears unremarkable other than for mild tortuosity and atherosclerotic change. No abdominal or pelvic lymphadenopathy. No major soft tissue contusion/hematoma. Lumbar spine vertebral body heights and alignment are maintained. No acute osseous findings. Impression: 1. Acute LEFT sixth through ninth rib fractures. No pneumothorax. Small LEFT hemothorax. 2.  Mild LEFT chest wall soft tissue contusion. 3.  No acute traumatic finding in the abdomen or pelvis identified. However, there is some trace fluid in the LEFT paracolic gutter which could be related to an occult injury. 4.  Significant decrease in mediastinal infiltrative soft tissue predominantly in the RIGHT paratracheal region. 5.  Enlarged heart. Signed by Dr Liliana Gan on 4/29/2021 8:05 PM    Ct Cervical Spine Wo Contrast    Result Date: 4/29/2021  Exam: CT CERVICAL SPINE WO CONTRAST - 4/29/2021 6:13 PM Indication: Fall, trauma Comparison: 4/26/2021 DLP: 709 mGy cm. In order to have a CT radiation dose as low as reasonably achievable, Automated Exposure Control was utilized for adjustment of the mA and/or KV according to patient size. Findings: Craniocervical relationships are maintained. The odontoid process is intact. Reversal of normal cervical lordosis and trace anterolisthesis of C3 on C4, unchanged. No acute cervical spine malalignment. Vertebral body heights are maintained. No acute fracture or subluxation. Moderate multilevel degenerative change. No prevertebral soft tissue swelling. No apical pneumothorax. 1.  No evidence of cervical spine fracture. 2.  Multilevel degenerative change. 3.  Chronic reversal of normal cervical lordosis. Signed by Dr Liliana Gan on 4/29/2021 7:48 PM    Ct Cervical Spine Wo Contrast    Result Date: 4/26/2021  Exam: CT CERVICAL SPINE WO CONTRAST - 4/26/2021 5:36 PM Indication: Head injury, fall Comparison: None available. DLP: 564 mGy cm.  In order to have a CT radiation dose as low as reasonably achievable, Automated Exposure Control was utilized for adjustment of the mA and/or KV according to patient size. Findings: Craniocervical relationships are maintained. The odontoid process is intact. Reversal of normal cervical lordosis. 2 mm anterolisthesis of C3 on C4, likely degenerative. Cervical vertebral body heights are maintained. No acute fracture. Advanced multilevel degenerative change greatest at C4-C5 and C5-C6. Multilevel neural foraminal stenosis. No area of severe central canal stenosis. No apical pneumothorax. Impression: 1. No acute fracture. 2.  Reversal of normal cervical lordosis and 2 mm anterolisthesis of C3 on C4, likely degenerative. 3.  Advanced multilevel cervical spine degenerative change. Signed by Dr Ronda Morton on 4/26/2021 7:13 PM    Us Renal Complete    Result Date: 4/30/2021  EXAMINATION: US RENAL COMPLETE 4/30/2021 1:49 PM HISTORY: Acute kidney injury. Report: Sonographic images of the kidneys were obtained bilaterally. COMPARISON: CT abdomen pelvis without contrast 4/29/2021. The right kidney measures 12.0 x 4.6 x 5.6 cm, with cortical echogenicity within normal limits. Cortical thickness ranges from 12.5 to 12.7 mm. No mass or hydronephrosis is identified. There is a 9 x 6 mm cyst along the lateral cortex which corresponds with a similar finding on CT. This appears benign. Color Doppler images demonstrate vascular flow within the right kidney. The left kidney measures approximately 12.6 x 5.9 x 5.5 cm, with cortical echogenicity within normal limits. Cortical thickness on the left varies from 12.0 to 15.2 mm. No mass or hydronephrosis is identified. Color Doppler images demonstrate vascular flow within the left kidney. Images of the bladder show incomplete bladder distention with mild circumferential wall thickening. 1. Unremarkable appearance of the left kidney. 2. Normal appearance of the right kidney except for a 9 mm cyst along the lateral cortex at the interpolar level, which appears benign. No hydronephrosis is identified. Signed by Dr Brunilda Hanson. Sandra on 4/30/2021 1:52 PM    Xr Chest Portable    Result Date: 4/29/2021  Exam: XR CHEST PORTABLE - 4/29/2021 4:24 PM Indication: Fall, weakness Comparison: 4/26/2021 Findings: Cardiac silhouette is enlarged but stable. LEFT chest wall single-lead pacing device. No pleural effusion, pneumothorax, or consolidation. No acute osseous finding. Impression: No acute findings. Signed by Dr Ale Bravo on 4/29/2021 6:28 PM        Assessment and Plan: This is a 81y.o. year old male with past medical history of coronary artery disease with prior catheterization 3/30/2020 with intermediate proximal LAD and RCA stenosis, medically managed, ejection fraction 40 to 45%, chronic atrial fibrillation on Xarelto, single lead RV pacemaker, diabetes mellitus, currently in assisted living presenting with recurrent falls which appear mechanical in nature with generalized debility, no reported syncope and no events noted on pacemaker interrogation. 1.  Renal functions continue to improve with creatinine down to 1.2. Potassium at 3.2. Can consider restarting ARB with Diovan at 40 mg daily given reduced EF of 40 to 45%. 2.  We will sign off. Patient to follow-up as an outpatient.     Donis Luther MD 5/2/2021 3:10 PM

## 2021-05-02 NOTE — PROGRESS NOTES
Nephrology ([facility]) Progress Note    Patient:  Ishan Baeza  YOB: 1935  Date of Service: 5/2/2021  MRN: [id number]   Acct: [de-identified]   Primary Care Physician: Chava Shea DO  Advance Directive: DNR-CC  Admit Date: 4/29/2021       Hospital Day: 3  Referring Provider: Merlyn Salter MD    Patient Seen, Chart, Consults notes, Labs, Radiology studies reviewed. Subjective:  Patient is an 60-year-old man with a past medical history of diabetes, hypertension, atrial fibrillation, congestive heart failure, COPD, chronic kidney disease stage IIIa, and prostate cancer. About a week ago he sustained a fall with facial trauma. He has a left black eye. He reported two more falls. Patient complains of some dyspnea but had no recent fever nausea vomiting or diarrhea. His initial work-up in the emergency room showed a creatinine at 1.9 that was up from a previous baseline at 1.1. His serum potassium was 6.1. His head CT showed a small left supraorbital soft tissue contusion but no acute intracranial bleeding. A chest CT showed a small left hemothorax. Patient received medical management for his hyperkalemia with Kayexalate bicarbonate regular insulin IV and dextrose along with calcium gluconate. His repeat potassium was 4.7. Renal service was consulted to manage his acute kidney injury. Patient is now a good historian. He denies any dysuria. He admitted using Celebrex. He was also on potassium pills along with Aldactone at home. Today, patient has been doing better. Offers no new complaints. Allergies:  Patient has no known allergies.     Medicines:  Current Facility-Administered Medications   Medication Dose Route Frequency Provider Last Rate Last Admin    valsartan (DIOVAN) tablet 40 mg  40 mg Oral Daily Deborah Benavides MD        potassium chloride (KLOR-CON M) extended release tablet 40 mEq  40 mEq Oral Once Merlyn Salter MD        allopurinol (ZYLOPRIM) tablet 100 mg  100 mg Oral Daily Meagan Henderson MD   100 mg at 05/02/21 0831    calcium carbonate (TUMS) chewable tablet 500 mg  500 mg Oral TID PRN Ness Fischer MD   500 mg at 04/30/21 0146    psyllium (METAMUCIL) 58.12 % packet 1 packet  1 packet Oral Daily with breakfast Ness Fischer MD   1 packet at 05/02/21 0830    lidocaine 4 % external patch 1 patch  1 patch Transdermal Daily Zara Banks MD   1 patch at 05/02/21 0830    [facility]amin[facility]) 5-325 MG per tablet 1 tablet  1 tablet Oral Q4H PRN Zara Banks MD   1 tablet at 04/30/21 2131    Or    HYDROcodone-acetaminophen (NORCO) 5-325 MG per tablet 2 tablet  2 tablet Oral Q4H PRN Zara Banks MD   2 tablet at 05/02/21 0715    HYDROmorphone HCl PF (DILAUDID) injection 0.5 mg  0.5 mg Intravenous Q4H PRN Zara Banks MD        isosorbide mononitrate (IMDUR) extended release tablet 30 mg  30 mg Oral Daily Lupe Shin MD   30 mg at 05/02/21 0832    albuterol (PROVENTIL) nebulizer solution 2.5 mg  2.5 mg Nebulization Q6H PRN ISAAC Powell - CNP        amLODIPine (NORVASC) tablet 5 mg  5 mg Oral Daily Ness Fischer MD   5 mg at 05/02/21 0831    [Held by provider] aspirin EC tablet 81 mg  81 mg Oral Daily Ness Fischer MD        atorvastatin (LIPITOR) tablet 40 mg  40 mg Oral Nightly Ness Fischer MD   40 mg at 05/01/21 2126    albuterol (PROVENTIL) nebulizer solution 2.5 mg  2.5 mg Nebulization Q4H PRN Ness Fischer MD        cholestyramine Majo Perales) packet 4 g  1 packet Oral Daily Ness Fischer MD   4 g at 04/30/21 5825    docusate sodium (COLACE) capsule 100 mg  100 mg Oral BID Ness Fischer MD   100 mg at 05/02/21 0831    carvedilol (COREG) tablet 12.5 mg  12.5 mg Oral BID  Ness Fischer MD   12.5 mg at 05/02/21 0831    therapeutic multivitamin-minerals 1 tablet  1 tablet Oral Daily Ness Fischer MD   1 tablet at 05/02/21 0830    nitroGLYCERIN (NITROSTAT) SL tablet 0.4 mg  0.4 mg Sublingual Q5 Min PRN Joby Hung MD        pantoprazole (PROTONIX) tablet 40 mg  40 mg Oral QAM AC Joby Hung MD   40 mg at 05/02/21 8547    prazosin (MINIPRESS) capsule 2 mg  2 mg Oral Nightly Joby Hung MD   2 mg at 05/01/21 2126    vitamin B-12 (CYANOCOBALAMIN) tablet 1,000 mcg  1,000 mcg Oral Daily Joby Hung MD   1,000 mcg at 05/02/21 0830    budesonide (PULMICORT) nebulizer suspension 500 mcg  0.5 mg Nebulization Q12H Joby Hung MD   500 mcg at 05/02/21 0646    Arformoterol Tartrate (BROVANA) nebulizer solution 15 mcg  15 mcg Nebulization BID Joby Hung MD   15 mcg at 05/02/21 0646    ipratropium (ATROVENT) 0.02 % nebulizer solution 0.5 mg  0.5 mg Nebulization 4x daily Joby Hung MD   0.5 mg at 05/02/21 1056    insulin glargine (LANTUS) injection vial 15 Units  15 Units Subcutaneous Nightly Joby Hung MD   15 Units at 05/01/21 2132    insulin lispro (HUMALOG) injection vial 0-12 Units  0-12 Units Subcutaneous TID WC Joby Hung MD   2 Units at 04/30/21 0322    insulin lispro (HUMALOG) injection vial 0-6 Units  0-6 Units Subcutaneous Nightly Joby Hung MD        glucose (GLUTOSE) 40 % oral gel 15 g  15 g Oral PRN Joby Hung MD        glucagon (rDNA) injection 1 mg  1 mg Intramuscular PRN MD Maddy Colincorinne[facility] by provider] doxazosin (CARDURA) tablet 4 mg  4 mg Oral Nightly Joby Hung MD   4 mg at 04/29/21 2222    naloxone [facility]) injection 0.4 mg  0.4 mg Intravenous PRN Joby Hung MD        sodium chloride flush 0.9 % injection 5-40 mL  5-40 mL Intravenous 2 times per day Joby Hung MD   10 mL at 05/01/21 2126    sodium chloride flush 0.9 % injection 5-40 mL  5-40 mL Intravenous PRN Joby Hung MD        0.9 % sodium chloride infusion  25 mL Intravenous PRN MD [facility] provider] heparin (porcine) injection 5,000 Units  5,000 Units Subcutaneous 3 times per day Joby Hung MD   5,000 Units at 04/29/21 31 75 62  polyethylene glycol (GLYCOLAX) packet 17 g  17 g Oral Daily PRN Ana Rosa Ashley MD        melatonin disintegrating tablet 5 mg  5 mg Oral Nightly PRN Ana Rosa Ashley MD        acetaminophen (TYLENOL) tablet 650 mg  650 mg Oral Q6H PRN Ana Rosa Ashley MD        Or    acetaminophen (TYLENOL) suppository 650 mg  650 mg Rectal Q6H PRN Ana Rosa Ashley MD        ondansetron (ZOFRAN-ODT) disintegrating tablet 4 mg  4 mg Oral Q8H PRN Ana Rosa Ashley MD        Or    ondansetron [facility] injection 4 mg  4 mg Intravenous Q6H PRN Ana Rosa Ashley MD   4 mg at 04/30/21 0146    polyethylene glycol (GLYCOLAX) packet 17 g  17 g Oral Daily PRN Ana Rosa Ashley MD           Past Medical History:  Past Medical History:   Diagnosis Date    Anxiety     Arthritis     [facility] fibrillation ([facility]     Blood circulation, collateral     Cancer (HCC)     prostate, had treatment    Cellulitis     left leg    CH[facility]estive [facility] failure) [facility]     Chronic kidney disease     COPD (chronic obstructive pulmonary disease) ([facility]     Depression     Hyperlipidemia     Hypertension     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 ([facility] 3/28/2020    Lung mass     Neuromuscular disorder (HCC)     Neuropathy     Other disorders of kidney and ureter in diseases classified elsewhere     Palliative care patient 11/15/2018    Pneumonia     Type II or unspecified type diabetes mellitus without mention of complication, not stated as uncontrolled        Past Surgical History:  Past Surgical History:   Procedure Laterality Date    COLONOSCOPY      EYE SURGERY      EYE SURGERY      HERNIA REPAIR      umbilical with Dr Roman Phelan History  Family History   Problem Relation Age of Onset    Heart Attack Mother     Cancer Father        Social History  Social History     Socioeconomic History    Marital status:       Spouse name: Not on file    Number of children: Not on file    Years of education: Not on file    Highest education level: Not on file   Occupational History    Not on file   Social Needs    Financial resource strain: Not on file    Food insecurity     Worry: Not on file     Inability: Not on file    Transportation needs     Medical: Not on file     Non-medical: Not on file   Tobacco Use    Smoking status: Never Smoker    Smokeless tobacco: Never Used   Substance and Sexual Activity    Alcohol use: No    Drug use: No    Sexual activity: Not on file   Lifestyle    Physical activity     Days per week: Not on file     Minutes per session: Not on file    Stress: Not on file   Relationships    Social connections     Talks on phone: Not on file     Gets together: Not on file     Attends Confucianist service: Not on file     Active member of club or organization: Not on file     Attends meetings of clubs or organizations: Not on file     Relationship status: Not on file    Intimate partner violence     Fear of current or ex partner: Not on file     Emotionally abused: Not on file     Physically abused: Not on file     Forced sexual activity: Not on file   Other Topics Concern    Not on file   Social History Narrative    Not on file         Review of Systems:  History obtained from chart review and the patient  General ROS: No fever or chills  Respiratory ROS: No cough, shortness of breath, wheezing  Cardiovascular ROS: no chest pain or dyspnea on exertion  Gastrointestinal ROS: No abdominal pain or melena  Genito-Urinary ROS: No dysuria or hematuria  Musculoskeletal ROS: No joint pain or swelling         Objective:  Blood pressure 118/70, pulse 60, temperature 98.1 °F (36.7 °C), resp. rate 18, weight 241 lb (109.3 kg), SpO2 96 %.     Intake/Output Summary (Last 24 hours) at 5/2/2021 1124  Last data filed at 5/2/2021 0915  Gross per 24 hour   Intake 840 ml   Output 700 ml   Net 140 ml     General:  NAD, A+Ox3, ill-appearing, normal body habitus  HEENT: Normocephalic, sign of facial trauma with ecchymosis around left eye and temple  Neck: No masses, no jugular venous distention  Chest:  CTAB, good respiratory effort, good air movement  CV:  RRR, soft systolic murmur  Abdomen:  NTND, soft, +BS, no hepatosplenomegaly  Extremities:  No peripheral edema  Neurological:  Moving all four extremities  Psychiatric:  Normal insight and judgement    Labs:  BMP:   Recent Labs     04/30/21 0050 05/01/21 0115 05/02/21  0253 05/02/21  0735    136 137  --    K 4.7  4.8 3.6 3.2* 3.3*    102 101  --    CO2 24 24 24  --    PHOS 3.8  --   --   --    BUN 47* 32* 26*  --    CREATININE 1.7* 1.2 1.2  --    CALCIUM 9.1  9.1 8.4* 8.5*  --      CBC:   Recent Labs     04/30/21 0050 05/01/21 0115 05/02/21  0253   WBC 10.9* 10.5 10.2   HGB 8.7* 8.5* 9.3*   HCT 27.1* 25.9* 28.3*   .0* 102.0* 102.5*    172 164     LIVER PROFILE:   Recent Labs     04/29/21  1704   AST 6   ALT 5   BILITOT 0.4   ALKPHOS 48     PT/INR:   Recent Labs     04/29/21  1704   PROTIME 14.4   INR 1.12     APTT:   Recent Labs     04/29/21  1704   APTT 28.0     BNP:  No results for input(s): BNP in the last 72 hours. Ionized Calcium:No results for input(s): IONCA in the last 72 hours. Magnesium:  Recent Labs     04/30/21 0050 05/01/21  0115 05/02/21  0253   MG 1.9 1.8 1.9     Phosphorus:  Recent Labs     04/30/21  0050   PHOS 3.8     HgbA1C: No results for input(s): LABA1C in the last 72 hours. Hepatic:   Recent Labs     04/29/21  1704   ALKPHOS 48   ALT 5   AST 6   PROT 6.4*   BILITOT 0.4   LABALBU 3.8     Lactic Acid: No results for input(s): LACTA in the last 72 hours. Troponin:   Recent Labs     05/01/21  0115   CKTOTAL 20*     ABGs: No results for input(s): PH, PCO2, PO2, HCO3, O2SAT in the last 72 hours. CRP:  No results for input(s): CRP in the last 72 hours. Sed Rate:  No results for input(s): SEDRATE in the last 72 hours.     Cultures:   No results for input(s): CULTURE in the last 72 hours. Radiology reports as per the Radiologist  Radiology: Ct Abdomen Pelvis Wo Contrast Additional Contrast? None    Result Date: 4/29/2021  Exam: CT CHEST WO CONTRAST, CT ABDOMEN PELVIS WO CONTRAST - 4/29/2021 6:14 PM Indication: Fall, trauma Comparison: 2/25/2000 DLP: 2117 mGy cm. In order to have a CT radiation dose as low as reasonably achievable, Automated Exposure Control was utilized for adjustment of the mA and/or KV according to patient size. Findings: No depressed fracture. No thoracic spine fracture or malalignment. Moderate multilevel thoracic spine degenerative change. The central airways are clear. Significant decrease in confluent ill-defined soft tissue density in the mediastinum probably in the RIGHT paratracheal region. Small LEFT hemothorax. No RIGHT effusion or hemopneumothorax. No pneumothorax. No large pulmonary contusion. No change in 3 mm lingular pulmonary nodule on image 65. No enlarged thoracic lymph nodes. LEFT chest wall port cardiac device. Heart is enlarged. No pericardial effusion. No unenhanced evidence of major thoracic aortic injury. LEFT anterior sixth rib fracture. Segmental LEFT seventh rib fracture. LEFT posterior eighth rib fracture. LEFT anterior ninth rib fracture. No large acute soft tissue chest wall hematoma. Suspected mild contusion along the LEFT lateral anterior chest. Unenhanced liver appears unremarkable. Gallbladder and biliary tree appear normal. Pancreas, spleen, and adrenal glands are unremarkable. Small RIGHT renal low-density lesion, likely cyst. No urolithiasis or hydronephrosis. No focal urinary bladder wall thickening. Prostate radiation seeds. Trace fluid in LEFT paracolic gutter. Normal appendix. No evidence of bowel obstruction or active bowel inflammation. No evidence of mesenteric injury. No free pelvic fluid or pelvic mass. LEFT inguinal fat-containing hernia.  Unenhanced abdominal aorta appears unremarkable other than for spine fracture or malalignment. Moderate multilevel thoracic spine degenerative change. The central airways are clear. Significant decrease in confluent ill-defined soft tissue density in the mediastinum probably in the RIGHT paratracheal region. Small LEFT hemothorax. No RIGHT effusion or hemopneumothorax. No pneumothorax. No large pulmonary contusion. No change in 3 mm lingular pulmonary nodule on image 65. No enlarged thoracic lymph nodes. LEFT chest wall port cardiac device. Heart is enlarged. No pericardial effusion. No unenhanced evidence of major thoracic aortic injury. LEFT anterior sixth rib fracture. Segmental LEFT seventh rib fracture. LEFT posterior eighth rib fracture. LEFT anterior ninth rib fracture. No large acute soft tissue chest wall hematoma. Suspected mild contusion along the LEFT lateral anterior chest. Unenhanced liver appears unremarkable. Gallbladder and biliary tree appear normal. Pancreas, spleen, and adrenal glands are unremarkable. Small RIGHT renal low-density lesion, likely cyst. No urolithiasis or hydronephrosis. No focal urinary bladder wall thickening. Prostate radiation seeds. Trace fluid in LEFT paracolic gutter. Normal appendix. No evidence of bowel obstruction or active bowel inflammation. No evidence of mesenteric injury. No free pelvic fluid or pelvic mass. LEFT inguinal fat-containing hernia. Unenhanced abdominal aorta appears unremarkable other than for mild tortuosity and atherosclerotic change. No abdominal or pelvic lymphadenopathy. No major soft tissue contusion/hematoma. Lumbar spine vertebral body heights and alignment are maintained. No acute osseous findings. Impression: 1. Acute LEFT sixth through ninth rib fractures. No pneumothorax. Small LEFT hemothorax. 2.  Mild LEFT chest wall soft tissue contusion. 3.  No acute traumatic finding in the abdomen or pelvis identified.  However, there is some trace fluid in the LEFT paracolic gutter which could be related to an occult injury. 4.  Significant decrease in mediastinal infiltrative soft tissue predominantly in the RIGHT paratracheal region. 5.  Enlarged heart. Signed by Dr Valarie Caputo on 4/29/2021 8:05 PM    Ct Cervical Spine Wo Contrast    Result Date: 4/29/2021  Exam: CT CERVICAL SPINE WO CONTRAST - 4/29/2021 6:13 PM Indication: Fall, trauma Comparison: 4/26/2021 DLP: 709 mGy cm. In order to have a CT radiation dose as low as reasonably achievable, Automated Exposure Control was utilized for adjustment of the mA and/or KV according to patient size. Findings: Craniocervical relationships are maintained. The odontoid process is intact. Reversal of normal cervical lordosis and trace anterolisthesis of C3 on C4, unchanged. No acute cervical spine malalignment. Vertebral body heights are maintained. No acute fracture or subluxation. Moderate multilevel degenerative change. No prevertebral soft tissue swelling. No apical pneumothorax. 1.  No evidence of cervical spine fracture. 2.  Multilevel degenerative change. 3.  Chronic reversal of normal cervical lordosis. Signed by Dr Valarie Caputo on 4/29/2021 7:48 PM    Us Renal Complete    Result Date: 4/30/2021  EXAMINATION: US RENAL COMPLETE 4/30/2021 1:49 PM HISTORY: Acute kidney injury. Report: Sonographic images of the kidneys were obtained bilaterally. COMPARISON: CT abdomen pelvis without contrast 4/29/2021. The right kidney measures 12.0 x 4.6 x 5.6 cm, with cortical echogenicity within normal limits. Cortical thickness ranges from 12.5 to 12.7 mm. No mass or hydronephrosis is identified. There is a 9 x 6 mm cyst along the lateral cortex which corresponds with a similar finding on CT. This appears benign. Color Doppler images demonstrate vascular flow within the right kidney. The left kidney measures approximately 12.6 x 5.9 x 5.5 cm, with cortical echogenicity within normal limits. Cortical thickness on the left varies from 12.0 to 15.2 mm.  No mass or hydronephrosis is identified. Color Doppler images demonstrate vascular flow within the left kidney. Images of the bladder show incomplete bladder distention with mild circumferential wall thickening. 1. Unremarkable appearance of the left kidney. 2. Normal appearance of the right kidney except for a 9 mm cyst along the lateral cortex at the interpolar level, which appears benign. No hydronephrosis is identified. Signed by Dr Suma Crane. Marcohogiacomo on 4/30/2021 1:52 PM    Xr Chest Portable    Result Date: 4/29/2021  Exam: XR CHEST PORTABLE - 4/29/2021 4:24 PM Indication: Fall, weakness Comparison: 4/26/2021 Findings: Cardiac silhouette is enlarged but stable. LEFT chest wall single-lead pacing device. No pleural effusion, pneumothorax, or consolidation. No acute osseous finding. Impression: No acute findings. Signed by Dr Phu Larry on 4/29/2021 6:28 PM       Assessment     1. Acute kidney injury-likely prerenal azotemia  2. Hyperkalemia  3. Recurrent falls  4. Type 2 diabetes  5. Congestive heart failure  6. COPD  7. Hyperuricemia. Plan:  Cautiously supplement potassium. May continue hydration by mouth. Continue allopurinol. Follow-up labs. Will sign off for now. Recall as needed.

## 2021-05-03 ENCOUNTER — APPOINTMENT (OUTPATIENT)
Dept: GENERAL RADIOLOGY | Age: 86
DRG: 682 | End: 2021-05-03
Payer: MEDICARE

## 2021-05-03 ENCOUNTER — TELEPHONE (OUTPATIENT)
Dept: ADMINISTRATIVE | Age: 86
End: 2021-05-03

## 2021-05-03 LAB
ANION GAP SERPL CALCULATED.3IONS-SCNC: 10 MMOL/L (ref 7–19)
BASOPHILS ABSOLUTE: 0.1 K/UL (ref 0–0.2)
BASOPHILS RELATIVE PERCENT: 0.4 % (ref 0–1)
BUN BLDV-MCNC: 28 MG/DL (ref 8–23)
CALCIUM SERPL-MCNC: 8.7 MG/DL (ref 8.8–10.2)
CHLORIDE BLD-SCNC: 102 MMOL/L (ref 98–111)
CO2: 24 MMOL/L (ref 22–29)
CREAT SERPL-MCNC: 1.2 MG/DL (ref 0.5–1.2)
DIGOXIN LEVEL: 0.9 NG/ML (ref 0.6–1.2)
EOSINOPHILS ABSOLUTE: 0.2 K/UL (ref 0–0.6)
EOSINOPHILS RELATIVE PERCENT: 1.4 % (ref 0–5)
GFR AFRICAN AMERICAN: >59
GFR NON-AFRICAN AMERICAN: 58
GLUCOSE BLD-MCNC: 111 MG/DL (ref 74–109)
GLUCOSE BLD-MCNC: 119 MG/DL (ref 70–99)
GLUCOSE BLD-MCNC: 140 MG/DL (ref 70–99)
GLUCOSE BLD-MCNC: 152 MG/DL (ref 70–99)
GLUCOSE BLD-MCNC: 156 MG/DL (ref 70–99)
HCT VFR BLD CALC: 27.5 % (ref 42–52)
HEMOGLOBIN: 9.2 G/DL (ref 14–18)
IMMATURE GRANULOCYTES #: 0.6 K/UL
LYMPHOCYTES ABSOLUTE: 1 K/UL (ref 1.1–4.5)
LYMPHOCYTES RELATIVE PERCENT: 8.6 % (ref 20–40)
MAGNESIUM: 2 MG/DL (ref 1.6–2.4)
MCH RBC QN AUTO: 34.1 PG (ref 27–31)
MCHC RBC AUTO-ENTMCNC: 33.5 G/DL (ref 33–37)
MCV RBC AUTO: 101.9 FL (ref 80–94)
MONOCYTES ABSOLUTE: 1.1 K/UL (ref 0–0.9)
MONOCYTES RELATIVE PERCENT: 9.3 % (ref 0–10)
NEUTROPHILS ABSOLUTE: 8.5 K/UL (ref 1.5–7.5)
NEUTROPHILS RELATIVE PERCENT: 75.4 % (ref 50–65)
PDW BLD-RTO: 13.1 % (ref 11.5–14.5)
PERFORMED ON: ABNORMAL
PLATELET # BLD: 176 K/UL (ref 130–400)
PMV BLD AUTO: 10.5 FL (ref 9.4–12.4)
POTASSIUM REFLEX MAGNESIUM: 3.4 MMOL/L (ref 3.5–5)
RBC # BLD: 2.7 M/UL (ref 4.7–6.1)
SODIUM BLD-SCNC: 136 MMOL/L (ref 136–145)
WBC # BLD: 11.3 K/UL (ref 4.8–10.8)

## 2021-05-03 PROCEDURE — 97116 GAIT TRAINING THERAPY: CPT

## 2021-05-03 PROCEDURE — 6370000000 HC RX 637 (ALT 250 FOR IP): Performed by: INTERNAL MEDICINE

## 2021-05-03 PROCEDURE — 82947 ASSAY GLUCOSE BLOOD QUANT: CPT

## 2021-05-03 PROCEDURE — 1210000000 HC MED SURG R&B

## 2021-05-03 PROCEDURE — 97530 THERAPEUTIC ACTIVITIES: CPT

## 2021-05-03 PROCEDURE — 6370000000 HC RX 637 (ALT 250 FOR IP): Performed by: HOSPITALIST

## 2021-05-03 PROCEDURE — 2700000000 HC OXYGEN THERAPY PER DAY

## 2021-05-03 PROCEDURE — 94640 AIRWAY INHALATION TREATMENT: CPT

## 2021-05-03 PROCEDURE — 80048 BASIC METABOLIC PNL TOTAL CA: CPT

## 2021-05-03 PROCEDURE — 36415 COLL VENOUS BLD VENIPUNCTURE: CPT

## 2021-05-03 PROCEDURE — 99231 SBSQ HOSP IP/OBS SF/LOW 25: CPT | Performed by: THORACIC SURGERY (CARDIOTHORACIC VASCULAR SURGERY)

## 2021-05-03 PROCEDURE — 80162 ASSAY OF DIGOXIN TOTAL: CPT

## 2021-05-03 PROCEDURE — 85025 COMPLETE CBC W/AUTO DIFF WBC: CPT

## 2021-05-03 PROCEDURE — 83735 ASSAY OF MAGNESIUM: CPT

## 2021-05-03 PROCEDURE — 94660 CPAP INITIATION&MGMT: CPT

## 2021-05-03 PROCEDURE — 6360000002 HC RX W HCPCS: Performed by: HOSPITALIST

## 2021-05-03 PROCEDURE — 2580000003 HC RX 258: Performed by: HOSPITALIST

## 2021-05-03 PROCEDURE — 6370000000 HC RX 637 (ALT 250 FOR IP): Performed by: STUDENT IN AN ORGANIZED HEALTH CARE EDUCATION/TRAINING PROGRAM

## 2021-05-03 RX ORDER — POTASSIUM CHLORIDE 20 MEQ/1
40 TABLET, EXTENDED RELEASE ORAL PRN
Status: DISCONTINUED | OUTPATIENT
Start: 2021-05-03 | End: 2021-05-04 | Stop reason: HOSPADM

## 2021-05-03 RX ORDER — POTASSIUM CHLORIDE 7.45 MG/ML
10 INJECTION INTRAVENOUS PRN
Status: DISCONTINUED | OUTPATIENT
Start: 2021-05-03 | End: 2021-05-04 | Stop reason: HOSPADM

## 2021-05-03 RX ORDER — POTASSIUM CHLORIDE 750 MG/1
40 TABLET, EXTENDED RELEASE ORAL ONCE
Status: COMPLETED | OUTPATIENT
Start: 2021-05-03 | End: 2021-05-03

## 2021-05-03 RX ADMIN — ATORVASTATIN CALCIUM 40 MG: 40 TABLET, FILM COATED ORAL at 20:41

## 2021-05-03 RX ADMIN — ARFORMOTEROL TARTRATE 15 MCG: 15 SOLUTION RESPIRATORY (INHALATION) at 18:16

## 2021-05-03 RX ADMIN — IPRATROPIUM BROMIDE 0.5 MG: 0.5 SOLUTION RESPIRATORY (INHALATION) at 06:07

## 2021-05-03 RX ADMIN — HYDROCODONE BITARTRATE AND ACETAMINOPHEN 2 TABLET: 5; 325 TABLET ORAL at 13:44

## 2021-05-03 RX ADMIN — ALLOPURINOL 100 MG: 100 TABLET ORAL at 08:07

## 2021-05-03 RX ADMIN — PANTOPRAZOLE SODIUM 40 MG: 40 TABLET, DELAYED RELEASE ORAL at 05:00

## 2021-05-03 RX ADMIN — IPRATROPIUM BROMIDE 0.5 MG: 0.5 SOLUTION RESPIRATORY (INHALATION) at 11:30

## 2021-05-03 RX ADMIN — PRAZOSIN HYDROCHLORIDE 2 MG: 2 CAPSULE ORAL at 20:41

## 2021-05-03 RX ADMIN — DOCUSATE SODIUM 100 MG: 100 CAPSULE, LIQUID FILLED ORAL at 08:07

## 2021-05-03 RX ADMIN — PSYLLIUM HUSK 1 PACKET: 3.4 POWDER ORAL at 08:09

## 2021-05-03 RX ADMIN — VALSARTAN 40 MG: 80 TABLET, FILM COATED ORAL at 08:08

## 2021-05-03 RX ADMIN — BUDESONIDE 500 MCG: 0.5 SUSPENSION RESPIRATORY (INHALATION) at 18:17

## 2021-05-03 RX ADMIN — POTASSIUM CHLORIDE 40 MEQ: 750 TABLET, EXTENDED RELEASE ORAL at 16:46

## 2021-05-03 RX ADMIN — Medication 10 ML: at 08:10

## 2021-05-03 RX ADMIN — BUDESONIDE 500 MCG: 0.5 SUSPENSION RESPIRATORY (INHALATION) at 06:07

## 2021-05-03 RX ADMIN — AMLODIPINE BESYLATE 5 MG: 5 TABLET ORAL at 08:08

## 2021-05-03 RX ADMIN — IPRATROPIUM BROMIDE 0.5 MG: 0.5 SOLUTION RESPIRATORY (INHALATION) at 18:17

## 2021-05-03 RX ADMIN — ARFORMOTEROL TARTRATE 15 MCG: 15 SOLUTION RESPIRATORY (INHALATION) at 06:07

## 2021-05-03 RX ADMIN — DOCUSATE SODIUM 100 MG: 100 CAPSULE, LIQUID FILLED ORAL at 20:41

## 2021-05-03 RX ADMIN — CYANOCOBALAMIN TAB 500 MCG 1000 MCG: 500 TAB at 08:08

## 2021-05-03 RX ADMIN — CARVEDILOL 12.5 MG: 12.5 TABLET, FILM COATED ORAL at 16:46

## 2021-05-03 RX ADMIN — Medication 1 TABLET: at 08:08

## 2021-05-03 RX ADMIN — CARVEDILOL 12.5 MG: 12.5 TABLET, FILM COATED ORAL at 08:07

## 2021-05-03 RX ADMIN — CHOLESTYRAMINE 4 G: 4 POWDER, FOR SUSPENSION ORAL at 08:09

## 2021-05-03 RX ADMIN — HYDROCODONE BITARTRATE AND ACETAMINOPHEN 1 TABLET: 5; 325 TABLET ORAL at 05:00

## 2021-05-03 RX ADMIN — ISOSORBIDE MONONITRATE 30 MG: 30 TABLET, EXTENDED RELEASE ORAL at 08:08

## 2021-05-03 RX ADMIN — INSULIN GLARGINE 15 UNITS: 100 INJECTION, SOLUTION SUBCUTANEOUS at 20:42

## 2021-05-03 RX ADMIN — IPRATROPIUM BROMIDE 0.5 MG: 0.5 SOLUTION RESPIRATORY (INHALATION) at 15:20

## 2021-05-03 ASSESSMENT — PAIN DESCRIPTION - DESCRIPTORS: DESCRIPTORS: SHARP

## 2021-05-03 ASSESSMENT — PAIN DESCRIPTION - PAIN TYPE: TYPE: ACUTE PAIN

## 2021-05-03 ASSESSMENT — PAIN DESCRIPTION - ORIENTATION: ORIENTATION: LEFT

## 2021-05-03 ASSESSMENT — PAIN DESCRIPTION - ONSET: ONSET: ON-GOING

## 2021-05-03 ASSESSMENT — PAIN DESCRIPTION - FREQUENCY: FREQUENCY: INTERMITTENT

## 2021-05-03 ASSESSMENT — PAIN DESCRIPTION - PROGRESSION: CLINICAL_PROGRESSION: NOT CHANGED

## 2021-05-03 NOTE — PROGRESS NOTES
Physical Therapy   Name: Juliet Harding  MRN:  [id number]  Date of service:  5/3/2021     05/03/21 1013   General   Chart Reviewed Yes   Subjective   Subjective States he needs to get to the bathroom and is feeling better today, but states he is still very sore for getting up. General Comment   Comments RN, Glory, indio PT. Pain Screening   Patient Currently in Pain Yes   Intervention List Patient able to continue with treatment   Pain Assessment   Pain Assessment 0-10   Pain Level 3   Patient's Stated Pain Goal No pain   Pain Type Acute pain   Pain Location Rib cage   Pain Orientation Left   Pain Descriptors Sharp   Pain Frequency Intermittent  (worse with movement)   Pain Onset On-going   Non-Pharmaceutical Pain Intervention(s) Repositioned; Ambulation/Increased Activity   Response to Pain Intervention Patient Satisfied   Oxygen Therapy   O2 Device Nasal cannula   O2 Flow Rate (L/min) 4 L/min   Bed Mobility   Supine to Sit Minimal assistance   Sit to Supine Unable to assess   Scooting Minimal assistance   Comment v. cues to roll to side and push up with LLE   Transfers   Sit to Stand Minimal Assistance   Stand to sit Minimal Assistance   Comment Mod A to get up from toilet due to low surface  (A needed to get cleaned up.)   Ambulation   Ambulation?  Yes   WB Status WBAT   Ambulation 1   Device Rolling Walker   Other Apparatus O2   Assistance Minimal assistance;Contact guard assistance  (improved to CGA with more amb.)   Quality of Gait Fair   Gait Deviations Slow Estefania   Distance 15', 100'   Balance   Posture Fair   Sitting - Static Good   Sitting - Dynamic Good   Standing - Static Fair   Standing - Dynamic Fair   Exercises   Comments AAROM L shoulder flexion x 10, AROM RUE x 10 shoulder flexion   Short term goals   Time Frame for Short term goals 2 weeks   Short term goal 1 Transfer supine to edge of bed with modified independence   Short term goal 2 Transfer sit <> stand independently   Short term goal 3

## 2021-05-03 NOTE — CARE COORDINATION
Went and spoke with pt re: dc plan,  [address]  has both offered beds. Pt choose [facility]  PT stated he had his Xtandi at home and would be able to bring it with him. He does not have any family orfriends here and would hope the office staff at [facility] could go to his apt and get his meds and ipad for him.   Will need to get the precert started first .  Will cont to follow  Electronically signed by Michael Pierce RN on 5/3/2021 at 10:15 AM

## 2021-05-03 NOTE — PROGRESS NOTES
[address] [facility]    Patient:  Nancy Hunt  YOB: 1935  Date of Service: 5/3/2021  MRN: [id number]   Acct: [de-identified]   Primary Care Physician: Paty Ruth DO  Advance Directive: DNR-CC  Admit Date: 4/29/2021       Hospital Day: 4  Portions of this note have been copied forward, however, changed to reflect the most current clinical status of this patient. CHIEF COMPLAINT Fall at assisted living facility    SUBJECTIVE:  Mr. Juan Núñez states he's feeling much better but still overall sore. Wants to know if sutures can be removed. Denies N/V. Appetite good. Cumulative Hospital Course: The patient is a 81 y. o. male with past medical history of DM, HTN, Afib, CHF, COPD, HLD, CKD, Prostate CA, and Lung mass who presented to [facility] ED complaining of fall at assisted living facility. Mr. Juan Núñez stated he was using bathroom and turned around and fell. Denied tripping, dizziness, lightheadedness, or change in vision prior to fall. Denied fever, chills, nausea, vomiting, diarrhea, chest pain, or any recent illnesses. States he does get SOB at times due to his COPD and requires 4L Oxygen all the time. Workup in ED revealed Na 131, K+ 6.1, Cr 1.9 baseline 1.1 (3/30/20), negative troponin, Digoxin 2.7, WBC 13.4, hgb 8.9, negative Cxray. CT of head showed small left supraorbital soft tissue contusion, no acute intracranial findings. CT of Cervical spine unremarkable. CT of chest/abd showed acute Left sixth through ninth rib fractures, no pneumothorax, small left hemothorax, mild left chest wall soft tissue contusion, trace fluid in Left paracolic gutter. Received Calcium gluconate, D50, Regular Insulin, Sodium bicarbonate, and 45g Kayexalate in ED. He was admitted to hospital medicine with NELSY/ Multiple rib fractures, and small left hemothorax. Started on IVFs. Home digoxin held. Cardiology consulted and recommends discontinuing digoxin. Xarelto on hold given frequent falls and small left hemothorax. --  1.2   GLUCOSE 94 89  --  111*     RAD:   Ct Abdomen Pelvis Wo Contrast Additional Contrast? None  Impression: 1. Acute LEFT sixth through ninth rib fractures. No pneumothorax. Small LEFT hemothorax. 2.  Mild LEFT chest wall soft tissue contusion. 3.  No acute traumatic finding in the abdomen or pelvis identified. However, there is some trace fluid in the LEFT paracolic gutter which could be related to an occult injury. 4.  Significant decrease in mediastinal infiltrative soft tissue predominantly in the RIGHT paratracheal region. 5.  Enlarged heart. Signed by Dr Jennifer Dumont on 4/29/2021 8:05 PM    Ct Head Wo Contrast  No acute intracranial findings. Small LEFT supraorbital soft tissue contusion. Signed by Dr Jennifer Dumont on 4/29/2021 6:35 PM    Ct Chest Wo Contrast  Impression: 1. Acute LEFT sixth through ninth rib fractures. No pneumothorax. Small LEFT hemothorax. 2.  Mild LEFT chest wall soft tissue contusion. 3.  No acute traumatic finding in the abdomen or pelvis identified. However, there is some trace fluid in the LEFT paracolic gutter which could be related to an occult injury. 4.  Significant decrease in mediastinal infiltrative soft tissue predominantly in the RIGHT paratracheal region. 5.  Enlarged heart. Signed by Dr Jennifer Dumont on 4/29/2021 8:05 PM    Ct Cervical Spine Wo Contrast    Result Date: 4/29/2021  Exam: CT CERVICAL SPINE WO CONTRAST - 4/29/2021 6:13 PM Indication: Fall, trauma Comparison: 4/26/2021 DLP: 709 mGy cm. In order to have a CT radiation dose as low as reasonably achievable, Automated Exposure Control was utilized for adjustment of the mA and/or KV according to patient size. Findings: Craniocervical relationships are maintained. The odontoid process is intact. Reversal of normal cervical lordosis and trace anterolisthesis of C3 on C4, unchanged. No acute cervical spine malalignment. Vertebral body heights are maintained. No acute fracture or subluxation.  Moderate multilevel degenerative change. No prevertebral soft tissue swelling. No apical pneumothorax. 1.  No evidence of cervical spine fracture. 2.  Multilevel degenerative change. 3.  Chronic reversal of normal cervical lordosis. Signed by Dr Love Ran on 4/29/2021 7:48 PM    Us Renal Complete  1. Unremarkable appearance of the left kidney. 2. Normal appearance of the right kidney except for a 9 mm cyst along the lateral cortex at the interpolar level, which appears benign. No hydronephrosis is identified. Signed by Dr Cindy Flores on 4/30/2021 1:52 PM    Xr Chest Portable  Impression: No acute findings. Signed by Dr Love Ran on 4/29/2021 6:28 PM  Assessment/Plan   Principal Problem:    NELSY (acute kidney injury) ([address]  Active Problems:    Essential hypertension    Chronic atrial fibrillation    COPD (chronic obstructive pulmonary disease) ([facility])    Type 2 diabetes mellitus with diabetic polyneuropathy ([facility])    Mixed hyperlipidemia    Hemothorax on left    Fracture of multiple ribs of left side    Fall at home    Digoxin toxicity  Resolved Problems:    Hyperkalemia    Principal Problem:    NELSY (acute kidney injury) ([facility])-resolved, continue to monitor off IVF's    Active Problems:    Hemothorax 2/2 multiple left sided rib fractures-IS, encourage deep breathing,  lidocaine patch ordered, CT surgery without need for operative intervention, stable       Hyperkalemia-resolved, now with hypokalemia-replace and monitor, Aldactone discontinued      Essential hypertension-stable      Chronic atrial fibrillation-rate controlled, Cardiology with recommendation to discontinue digoxin. Xarelto on hold for now with frequent falls/hemothorax.  stable      COPD (chronic obstructive pulmonary disease) ([facility])-no exacerbation      Type 2 diabetes mellitus with diabetic polyneuropathy ([facility])-stable, continue Lantus, SSI, hypoglycemia tx orders, accuchecks      Mixed hyperlipidemia-statin continued      Further orders per clinical course/attending.  DC once placement arrangements complete     DVT Prophylaxis: SCD    GI prophylaxis:  Protonix    Julia Walter PA-C

## 2021-05-03 NOTE — TELEPHONE ENCOUNTER
Pt called to cancel appt for today as he was in hospital and being released today, Pt also req that someone contact him about getting covid vaccine.

## 2021-05-03 NOTE — PROGRESS NOTES
CARDIOTHORACIC SURGERY PROGRESS NOTE      SUBJECTIVE:  Able to move around with walker without difficulty. No complaints of pain on breathing, only when he gets out of bed. BP (!) 118/56   Pulse 60   Temp 97.8 °F (36.6 °C) (Temporal)   Resp 18   Wt 244 lb 8 oz (110.9 kg)   SpO2 100%   BMI 35.08 kg/m²   Average, Min, and Max for last 24 hours Vitals:  TEMPERATURE:  Temp  Av.2 °F (36.2 °C)  Min: 96.5 °F (35.8 °C)  Max: 98.1 °F (36.7 °C)  RESPIRATIONS RANGE: Resp  Av.8  Min: 16  Max: 18  PULSE RANGE: Pulse  Av.5  Min: 59  Max: 60  BLOOD PRESSURE RANGE:  Systolic (41GKC), GST:326 , Min:102 , ISS:812   ; Diastolic (30OPQ), VTX:02, Min:54, Max:70    PULSE OXIMETRY RANGE: SpO2  Av.4 %  Min: 96 %  Max: 100 %    I/O last 3 completed shifts: In: 840 [P.O.:840]  Out: 600 [Urine:600]    CHEST: good breath sounds at bases, no crackles or wheezing. CARDIOVASCULAR: regular rhythm, no murmurs    LABS:  Lab Results   Component Value Date    WBC 11.3 (H) 2021    HGB 9.2 (L) 2021    HCT 27.5 (L) 2021    .9 (H) 2021     2021     Lab Results   Component Value Date     2021    K 3.4 2021     2021    CO2 24 2021    BUN 28 2021    CREATININE 1.2 2021    GLUCOSE 111 2021    CALCIUM 8.7 2021          ASSESSMENT: Blunt chest wall trauma with left sixth through ninth rib fractures with small hemothorax, no pneumothorax.    On 3.5 l/min O2 by nasal cannula    PLAN: Continue analgesics, ambulation.   Check CXR tomorrow    Electronically signed by Rosalinda Winkler MD on 5/3/21 at 10:08 AM CDT

## 2021-05-03 NOTE — PLAN OF CARE
Problem: Pain:  Goal: Pain level will decrease  Description: Pain level will decrease  5/2/2021 2307 by Renetta Morse RN  Outcome: Ongoing  5/2/2021 1034 by Carolynn James RN  Outcome: Ongoing  Goal: Control of acute pain  Description: Control of acute pain  5/2/2021 2307 by Renetta Morse RN  Outcome: Ongoing  5/2/2021 1034 by Carolynn James RN  Outcome: Ongoing  Goal: Control of chronic pain  Description: Control of chronic pain  5/2/2021 2307 by Renetta Morse RN  Outcome: Ongoing  5/2/2021 1034 by Carolynn James RN  Outcome: Ongoing  Goal: Patient's pain/discomfort is manageable  Description: Patient's pain/discomfort is manageable  5/2/2021 2307 by Renetta Morse RN  Outcome: Ongoing  5/2/2021 1034 by Carolynn James RN  Outcome: Ongoing     Problem: Falls - Risk of:  Goal: Will remain free from falls  Description: Will remain free from falls  5/2/2021 2307 by Renetta Morse RN  Outcome: Ongoing  5/2/2021 1034 by Carolynn James RN  Outcome: Ongoing  Goal: Absence of physical injury  Description: Absence of physical injury  5/2/2021 2307 by Renetta Morse RN  Outcome: Ongoing  5/2/2021 1034 by Carolynn James RN  Outcome: Ongoing     Problem: ABCDS Injury Assessment  Goal: Absence of physical injury  5/2/2021 2307 by Renetta Morse RN  Outcome: Ongoing  5/2/2021 1034 by Carolynn James RN  Outcome: Ongoing     Problem: Infection:  Goal: Will remain free from infection  Description: Will remain free from infection  5/2/2021 2307 by Renetta Morse RN  Outcome: Ongoing  5/2/2021 1034 by Carolynn James RN  Outcome: Ongoing     Problem: Safety:  Goal: Free from accidental physical injury  Description: Free from accidental physical injury  5/2/2021 2307 by Renetta Morse RN  Outcome: Ongoing  5/2/2021 1034 by Carolynn James RN  Outcome: Ongoing  Goal: Free from intentional harm  Description: Free from intentional harm  5/2/2021 2307 by Renetta Morse RN  Outcome: Ongoing  5/2/2021 1034 by Carolynn James RN  Outcome: Ongoing     Problem: Daily Care:  Goal: Daily care needs are met  Description: Daily care needs are met  5/2/2021 2307 by Catrachito Hall RN  Outcome: Ongoing  5/2/2021 1034 by Theodore Argueta RN  Outcome: Ongoing     Problem: Skin Integrity:  Goal: Skin integrity will stabilize  Description: Skin integrity will stabilize  5/2/2021 2307 by Catrachito Hall RN  Outcome: Ongoing  5/2/2021 1034 by Theodore Argueta RN  Outcome: Ongoing     Problem: Discharge Planning:  Goal: Patients continuum of care needs are met  Description: Patients continuum of care needs are met  5/2/2021 2307 by Catrachito Hall RN  Outcome: Ongoing  5/2/2021 1034 by Theodore Argueta RN  Outcome: Ongoing

## 2021-05-04 ENCOUNTER — APPOINTMENT (OUTPATIENT)
Dept: GENERAL RADIOLOGY | Age: 86
DRG: 682 | End: 2021-05-04
Payer: MEDICARE

## 2021-05-04 VITALS
HEART RATE: 60 BPM | WEIGHT: 246 LBS | DIASTOLIC BLOOD PRESSURE: 63 MMHG | RESPIRATION RATE: 18 BRPM | SYSTOLIC BLOOD PRESSURE: 121 MMHG | OXYGEN SATURATION: 100 % | BODY MASS INDEX: 35.3 KG/M2 | TEMPERATURE: 97.8 F

## 2021-05-04 LAB
ANION GAP SERPL CALCULATED.3IONS-SCNC: 10 MMOL/L (ref 7–19)
BASOPHILS ABSOLUTE: 0.1 K/UL (ref 0–0.2)
BASOPHILS RELATIVE PERCENT: 0.6 % (ref 0–1)
BUN BLDV-MCNC: 32 MG/DL (ref 8–23)
CALCIUM SERPL-MCNC: 8.3 MG/DL (ref 8.8–10.2)
CHLORIDE BLD-SCNC: 102 MMOL/L (ref 98–111)
CO2: 25 MMOL/L (ref 22–29)
CREAT SERPL-MCNC: 1.3 MG/DL (ref 0.5–1.2)
EOSINOPHILS ABSOLUTE: 0.3 K/UL (ref 0–0.6)
EOSINOPHILS RELATIVE PERCENT: 2.9 % (ref 0–5)
GFR AFRICAN AMERICAN: >59
GFR NON-AFRICAN AMERICAN: 52
GLUCOSE BLD-MCNC: 124 MG/DL (ref 70–99)
GLUCOSE BLD-MCNC: 125 MG/DL (ref 70–99)
GLUCOSE BLD-MCNC: 132 MG/DL (ref 70–99)
GLUCOSE BLD-MCNC: 98 MG/DL (ref 74–109)
HCT VFR BLD CALC: 24.3 % (ref 42–52)
HEMOGLOBIN: 8.1 G/DL (ref 14–18)
IMMATURE GRANULOCYTES #: 0.7 K/UL
LYMPHOCYTES ABSOLUTE: 1 K/UL (ref 1.1–4.5)
LYMPHOCYTES RELATIVE PERCENT: 9.6 % (ref 20–40)
MAGNESIUM: 1.9 MG/DL (ref 1.6–2.4)
MCH RBC QN AUTO: 33.5 PG (ref 27–31)
MCHC RBC AUTO-ENTMCNC: 33.3 G/DL (ref 33–37)
MCV RBC AUTO: 100.4 FL (ref 80–94)
MONOCYTES ABSOLUTE: 1 K/UL (ref 0–0.9)
MONOCYTES RELATIVE PERCENT: 9.8 % (ref 0–10)
NEUTROPHILS ABSOLUTE: 7.4 K/UL (ref 1.5–7.5)
NEUTROPHILS RELATIVE PERCENT: 70.8 % (ref 50–65)
PDW BLD-RTO: 13.2 % (ref 11.5–14.5)
PERFORMED ON: ABNORMAL
PLATELET # BLD: 160 K/UL (ref 130–400)
PMV BLD AUTO: 10.1 FL (ref 9.4–12.4)
POTASSIUM REFLEX MAGNESIUM: 3.4 MMOL/L (ref 3.5–5)
RBC # BLD: 2.42 M/UL (ref 4.7–6.1)
SODIUM BLD-SCNC: 137 MMOL/L (ref 136–145)
WBC # BLD: 10.4 K/UL (ref 4.8–10.8)

## 2021-05-04 PROCEDURE — 97116 GAIT TRAINING THERAPY: CPT

## 2021-05-04 PROCEDURE — 82947 ASSAY GLUCOSE BLOOD QUANT: CPT

## 2021-05-04 PROCEDURE — 2700000000 HC OXYGEN THERAPY PER DAY

## 2021-05-04 PROCEDURE — 6370000000 HC RX 637 (ALT 250 FOR IP): Performed by: STUDENT IN AN ORGANIZED HEALTH CARE EDUCATION/TRAINING PROGRAM

## 2021-05-04 PROCEDURE — 6370000000 HC RX 637 (ALT 250 FOR IP): Performed by: INTERNAL MEDICINE

## 2021-05-04 PROCEDURE — 97530 THERAPEUTIC ACTIVITIES: CPT

## 2021-05-04 PROCEDURE — 99231 SBSQ HOSP IP/OBS SF/LOW 25: CPT | Performed by: THORACIC SURGERY (CARDIOTHORACIC VASCULAR SURGERY)

## 2021-05-04 PROCEDURE — 2580000003 HC RX 258: Performed by: HOSPITALIST

## 2021-05-04 PROCEDURE — 85025 COMPLETE CBC W/AUTO DIFF WBC: CPT

## 2021-05-04 PROCEDURE — 6360000002 HC RX W HCPCS: Performed by: HOSPITALIST

## 2021-05-04 PROCEDURE — 6370000000 HC RX 637 (ALT 250 FOR IP): Performed by: HOSPITALIST

## 2021-05-04 PROCEDURE — 80048 BASIC METABOLIC PNL TOTAL CA: CPT

## 2021-05-04 PROCEDURE — 94660 CPAP INITIATION&MGMT: CPT

## 2021-05-04 PROCEDURE — 36415 COLL VENOUS BLD VENIPUNCTURE: CPT

## 2021-05-04 PROCEDURE — 6370000000 HC RX 637 (ALT 250 FOR IP): Performed by: PHYSICIAN ASSISTANT

## 2021-05-04 PROCEDURE — 83735 ASSAY OF MAGNESIUM: CPT

## 2021-05-04 PROCEDURE — 71046 X-RAY EXAM CHEST 2 VIEWS: CPT

## 2021-05-04 PROCEDURE — 94640 AIRWAY INHALATION TREATMENT: CPT

## 2021-05-04 RX ORDER — ALLOPURINOL 100 MG/1
100 TABLET ORAL DAILY
Qty: 30 TABLET | Refills: 0 | DISCHARGE
Start: 2021-05-05 | End: 2021-07-13

## 2021-05-04 RX ORDER — MECOBALAMIN 5000 MCG
5 TABLET,DISINTEGRATING ORAL NIGHTLY PRN
DISCHARGE
Start: 2021-05-04 | End: 2021-05-24

## 2021-05-04 RX ORDER — POTASSIUM CHLORIDE 20 MEQ/1
40 TABLET, EXTENDED RELEASE ORAL ONCE
Status: COMPLETED | OUTPATIENT
Start: 2021-05-04 | End: 2021-05-04

## 2021-05-04 RX ORDER — CALCIUM CARBONATE 200(500)MG
500 TABLET,CHEWABLE ORAL 3 TIMES DAILY PRN
DISCHARGE
Start: 2021-05-04 | End: 2021-05-24

## 2021-05-04 RX ORDER — HYDROCODONE BITARTRATE AND ACETAMINOPHEN 5; 325 MG/1; MG/1
1 TABLET ORAL EVERY 4 HOURS PRN
Qty: 12 TABLET | Refills: 0 | Status: SHIPPED | OUTPATIENT
Start: 2021-05-04 | End: 2021-05-07

## 2021-05-04 RX ORDER — LIDOCAINE 4 G/G
1 PATCH TOPICAL DAILY
DISCHARGE
Start: 2021-05-05 | End: 2021-09-24

## 2021-05-04 RX ORDER — ISOSORBIDE MONONITRATE 60 MG/1
30 TABLET, EXTENDED RELEASE ORAL DAILY
Qty: 90 TABLET | Refills: 0 | DISCHARGE
Start: 2021-05-04

## 2021-05-04 RX ORDER — VALSARTAN 40 MG/1
40 TABLET ORAL DAILY
Qty: 30 TABLET | Refills: 0 | DISCHARGE
Start: 2021-05-05 | End: 2021-05-24

## 2021-05-04 RX ADMIN — CHOLESTYRAMINE 4 G: 4 POWDER, FOR SUSPENSION ORAL at 07:54

## 2021-05-04 RX ADMIN — IPRATROPIUM BROMIDE 0.5 MG: 0.5 SOLUTION RESPIRATORY (INHALATION) at 14:07

## 2021-05-04 RX ADMIN — POTASSIUM CHLORIDE 40 MEQ: 1500 TABLET, EXTENDED RELEASE ORAL at 13:03

## 2021-05-04 RX ADMIN — CYANOCOBALAMIN TAB 500 MCG 1000 MCG: 500 TAB at 07:53

## 2021-05-04 RX ADMIN — ALLOPURINOL 100 MG: 100 TABLET ORAL at 07:54

## 2021-05-04 RX ADMIN — IPRATROPIUM BROMIDE 0.5 MG: 0.5 SOLUTION RESPIRATORY (INHALATION) at 06:49

## 2021-05-04 RX ADMIN — Medication 10 ML: at 07:55

## 2021-05-04 RX ADMIN — DOCUSATE SODIUM 100 MG: 100 CAPSULE, LIQUID FILLED ORAL at 07:54

## 2021-05-04 RX ADMIN — ISOSORBIDE MONONITRATE 30 MG: 30 TABLET, EXTENDED RELEASE ORAL at 07:54

## 2021-05-04 RX ADMIN — HYDROCODONE BITARTRATE AND ACETAMINOPHEN 1 TABLET: 5; 325 TABLET ORAL at 06:01

## 2021-05-04 RX ADMIN — PSYLLIUM HUSK 1 PACKET: 3.4 POWDER ORAL at 07:54

## 2021-05-04 RX ADMIN — ARFORMOTEROL TARTRATE 15 MCG: 15 SOLUTION RESPIRATORY (INHALATION) at 06:50

## 2021-05-04 RX ADMIN — IPRATROPIUM BROMIDE 0.5 MG: 0.5 SOLUTION RESPIRATORY (INHALATION) at 11:02

## 2021-05-04 RX ADMIN — HYDROCODONE BITARTRATE AND ACETAMINOPHEN 2 TABLET: 5; 325 TABLET ORAL at 16:35

## 2021-05-04 RX ADMIN — CARVEDILOL 12.5 MG: 12.5 TABLET, FILM COATED ORAL at 16:25

## 2021-05-04 RX ADMIN — VALSARTAN 40 MG: 80 TABLET, FILM COATED ORAL at 07:54

## 2021-05-04 RX ADMIN — Medication 1 TABLET: at 07:54

## 2021-05-04 RX ADMIN — PANTOPRAZOLE SODIUM 40 MG: 40 TABLET, DELAYED RELEASE ORAL at 05:59

## 2021-05-04 RX ADMIN — BUDESONIDE 500 MCG: 0.5 SUSPENSION RESPIRATORY (INHALATION) at 06:51

## 2021-05-04 ASSESSMENT — PAIN SCALES - GENERAL
PAINLEVEL_OUTOF10: 3
PAINLEVEL_OUTOF10: 0
PAINLEVEL_OUTOF10: 6
PAINLEVEL_OUTOF10: 8

## 2021-05-04 ASSESSMENT — PAIN DESCRIPTION - PAIN TYPE: TYPE: ACUTE PAIN

## 2021-05-04 ASSESSMENT — PAIN DESCRIPTION - PROGRESSION
CLINICAL_PROGRESSION: NOT CHANGED
CLINICAL_PROGRESSION: NOT CHANGED

## 2021-05-04 ASSESSMENT — PAIN DESCRIPTION - DESCRIPTORS: DESCRIPTORS: SHARP

## 2021-05-04 ASSESSMENT — PAIN DESCRIPTION - FREQUENCY: FREQUENCY: INTERMITTENT

## 2021-05-04 NOTE — TELEPHONE ENCOUNTER
They should be able to give him his vaccine there at the hospital.  I do not know any reason why he could not get it there, unless he is on steroids.

## 2021-05-04 NOTE — PLAN OF CARE
Problem: Pain:  Goal: Pain level will decrease  Description: Pain level will decrease  Outcome: Completed  Goal: Control of acute pain  Description: Control of acute pain  Outcome: Completed  Goal: Control of chronic pain  Description: Control of chronic pain  Outcome: Completed  Goal: Patient's pain/discomfort is manageable  Description: Patient's pain/discomfort is manageable  Outcome: Completed

## 2021-05-04 NOTE — DISCHARGE SUMMARY
Federica Plascencia  :  1935  MRN:  [id number]    Admit date:  2021  Discharge date:  2021    Discharging Physician:  Precious Guerrier MD    Advance Directive: [facility]    Consults: Dr. Jason Escalante surgery; Dr. Darron Mcgarry-Cardiology     Primary Care Physician:  Bee Rojas, DO    Discharge Diagnoses:    Principal Problem:    NELSY (acute kidney injury) [facility])  Active Problems:    Essential hypertension    Chronic atrial fibrillation    COPD (chronic obstructive pulmonary disease) ([address]    Type 2 diabetes mellitus with diabetic polyneuropathy ([address]    Mixed hyperlipidemia    Hemothorax on left    Fracture of multiple ribs of left side    Fall at home    Digoxin toxicity  Resolved Problems:    Hyperkalemia    Portions of this note have been copied forward, however, changed to reflect the most current clinical status of this patient. Hospital Course: The patient is a 81 y. o. male with past medical history of DM, HTN, Afib, CHF, COPD, HLD, CKD, Prostate CA, and Lung mass who presented to [facility] ED complaining of fall at assisted living facility. Mr. Emily Gee was using bathroom and turned around and fell. Denied tripping, dizziness, lightheadedness, or change in vision prior to fall. Denied fever, chills, nausea, vomiting, diarrhea, chest pain, or any recent illnesses. States he does get SOB at times due to his COPD and requires 4L Oxygen all the time. Workup in ED revealed Na 131, K+ 6.1, Cr 1.9 baseline 1.1 (3/30/20), negative troponin, Digoxin 2.7, WBC 13.4, hgb 8.9, negative Cxray. CT of head showed small left supraorbital soft tissue contusion, no acute intracranial findings. CT of Cervical spine unremarkable. CT of chest/abd showed acute Left sixth through ninth rib fractures, no pneumothorax, small left hemothorax, mild left chest wall soft tissue contusion, trace fluid in Left paracolic gutter.  Received Calcium gluconate, D50, Regular Insulin, Sodium bicarbonate, and 45g Kayexalate in ED. He was admitted to hospital medicine with NELSY/ Multiple rib fractures, and small left hemothorax. Started on IVFs. Home digoxin held for digoxin toxicity. Cardiology consulted and recommended discontinuing digoxin. Xarelto on hold given frequent falls and small left hemothorax. CT surgery consulted for hemothorax, recommends analgesics, limit fluid intake, no need for chest tube insertion, no need for rib stabilization. Nephrology consulted for NELSY. Renal US showed unremarkable Left kidney, Right kidney with normal appearance except for benign 9mm cyst along the lateral cortex at the interpolar level, no hydronephrosis. NELSY resolved and patient has been monitored off IVF's. Aspirin, Xarelto held on discharge. Will need to follow up with PCP, Cardiology in regard to continuing this medications given fall risk. At this time patient stable for discharge to [facility]. Significant Diagnostic Studies:   CXR 05/04/2021  Bibasilar atelectasis with probable mild lung contusion at  the left midlung zone and with a small volume of hemothorax as seen on  previous CT. There are acute displaced left-sided rib fractures. No  pneumothorax is identified. There is mild cardiomegaly without  evidence of overt CHF. Ct Abdomen Pelvis Wo Contrast Additional Contrast? None  Impression: 1. Acute LEFT sixth through ninth rib fractures. No pneumothorax. Small LEFT hemothorax. 2.  Mild LEFT chest wall soft tissue contusion. 3.  No acute traumatic finding in the abdomen or pelvis identified. However, there is some trace fluid in the LEFT paracolic gutter which could be related to an occult injury. 4.  Significant decrease in mediastinal infiltrative soft tissue predominantly in the RIGHT paratracheal region. 5.  Enlarged heart. Signed by Dr Carroll Meléndez on 4/29/2021 8:05 PM     Ct Head Wo Contrast  No acute intracranial findings. Small LEFT supraorbital soft tissue contusion.  Signed by Dr Neeta Tapia regular rate and rhythm, S1, S2 normal. No murmurs, gallops, or rubs auscultated. Abdomen:soft, non-tender; non-distended normal bowel sounds no masses, no organomegaly. Extremities: No clubbing or cyanosis. No peripheral edema. Peripheral pulses palpable. Neurologic: Generalized weakness, normal tone. Discharge Medications:       Devol Davon   Home Medication Instructions PWY:[id number]    Printed on:05/04/21 4182   Medication Information                      albuterol sulfate HFA (PROAIR HFA) 108 (90 Base) MCG/ACT inhaler  Inhale 2 puffs into the lungs every 6 hours as needed for Wheezing             Alcohol Swabs (ALCOHOL PREP) PADS  Use daily with glucose checks DX: E11.9             allopurinol (ZYLOPRIM) 100 MG tablet  Take 1 tablet by mouth daily             amLODIPine (NORVASC) 5 MG tablet  Take 1 tablet by mouth daily             atorvastatin (LIPITOR) 40 MG tablet  Take 1 tablet by mouth nightly             azelastine (ASTELIN) 0.1 % nasal spray  2 sprays by Nasal route 2 times daily Use in each nostril as directed             BiPAP Machine MISC  by Does not apply route nightly             blood glucose monitor kit and supplies  Test once a day for symptoms of irregular blood glucose. blood glucose monitor kit and supplies  Test one times a day & as needed for symptoms of irregular blood glucose. DX:E11.9             blood glucose monitor strips  Test one times a day & as needed for symptoms of irregular blood glucose.   DX: E11.9             blood glucose test strips (EASY TOUCH TEST) strip  USE TO TEST BLOOD SUGAR ONCE DAILY             buPROPion (WELLBUTRIN XL) 150 MG extended release tablet  Take 2 tablets by mouth every morning             calcium carbonate (TUMS) 500 MG chewable tablet  Take 1 tablet by mouth 3 times daily as needed for Heartburn             carvedilol (COREG) 12.5 MG tablet  Take 1 tablet by mouth 2 times daily (with meals)             cetirizine (ZYRTEC) 10 MG tablet  Take 1 tablet by mouth daily             cholestyramine (QUESTRAN) 4 g packet  Take 1 packet by mouth daily             colesevelam (WELCHOL) 625 MG tablet  Take 1,875 mg by mouth every evening             docusate sodium (COLACE) 100 MG capsule  Take 1 capsule by mouth 2 times daily for 10 days             Dulaglutide (TRULICITY) 4.49 SE/3.1VF SOPN  Inject 0.75 mg as directed every 7 days             Easy Touch Lancets 28G/Twist MISC  Use to test Blood sugar daily             enzalutamide (XTANDI) 40 MG capsule  Take 4 tablets daily             FLUoxetine (PROZAC) 20 MG capsule  Take 1 capsule by mouth daily             fluticasone (FLONASE) 50 MCG/ACT nasal spray  2 sprays by Nasal route daily             HYDROcodone-acetaminophen (NORCO) 5-325 MG per tablet  Take 1 tablet by mouth every 4 hours as needed for Pain for up to 3 days. isosorbide mononitrate (IMDUR) 60 MG extended release tablet  Take 0.5 tablets by mouth daily             Lancets MISC  1 each by Does not apply route daily Accu Chek Guid3 Lancets             leuprolide (LUPRON) 30 MG injection  Inject 30 mg into the muscle once Every 4 months             lidocaine 4 % external patch  Place 1 patch onto the skin daily             Lift Chair MISC  by Does not apply route Prefers leather  At [address] faxed through [employer]             melatonin 5 MG TBDP disintegrating tablet  Take 1 tablet by mouth nightly as needed (Insomnia)             metFORMIN (GLUCOPHAGE) 500 MG tablet  Take 1 tablet by mouth 2 times daily (with meals)             mirabegron (MYRBETRIQ) 25 MG TB24  Take 1 tablet by mouth daily             Multiple Vitamins-Minerals (THERAPEUTIC MULTIVITAMIN-MINERALS) tablet  Take 1 tablet by mouth daily             nitroGLYCERIN (NITROSTAT) 0.4 MG SL tablet  up to max of 3 total doses. If no relief after 1 dose, call 911.              ondansetron (ZOFRAN) 4 MG tablet  Take 1 tablet by mouth 3 times daily as needed for Nausea or Vomiting             OXYGEN  Inhale 3 L into the lungs nightly              pantoprazole (PROTONIX) 40 MG tablet  Take 1 tablet by mouth every morning (before breakfast)             polyethylene glycol (MIRALAX) 17 g packet  Take 17 g by mouth daily for 10 days             potassium chloride (KLOR-CON 10) 10 MEQ extended release tablet  Take 1 tablet by mouth daily             prazosin (MINIPRESS) 2 MG capsule  Take 1 capsule by mouth nightly             Psyllium (KONSYL) 28.3 % POWD  Take 4 g by mouth 2 times daily (with meals)             TRELEGY ELLIPTA 100-62.5-25 MCG/INH AEPB  INHALE 1 PUFF ONCE DAILY             valsartan (DIOVAN) 40 MG tablet  Take 1 tablet by mouth daily             vitamin B-12 (CYANOCOBALAMIN) 1000 MCG tablet  Take 1,000 mcg by mouth daily             Wheat Dextrin (BENEFIBER) POWD  Take 4 g by mouth 3 times daily (with meals)               Discharge Instructions: Follow up with Amalia Cantu DO in 3-5 days. Take medications as directed. Resume activity as tolerated. Diet: DIET RENAL; Carb Control: 4 carb choices (60 gms)/meal; Low Sodium (2 GM); Low Cholesterol     Disposition: Patient is medically stable and will be discharged to [place]. Time spent on discharge 50 minutes spent in assessing patient, reviewing medications, discussion with nursing, confirming safe discharge plan and preparation of discharge summary.     Signed:  Sarah Servin PA-C

## 2021-05-04 NOTE — PROGRESS NOTES
CARDIOTHORACIC SURGERY PROGRESS NOTE        SUBJECTIVE:  Woke up with pain on injured site this morning  /68   Pulse 63   Temp 97 °F (36.1 °C) (Temporal)   Resp 18   Wt 246 lb (111.6 kg)   SpO2 100%   BMI 35.30 kg/m²   Average, Min, and Max for last 24 hours Vitals:  TEMPERATURE:  Temp  Av.1 °F (36.2 °C)  Min: 96.8 °F (36 °C)  Max: 97.7 °F (36.5 °C)  RESPIRATIONS RANGE: Resp  Av  Min: 18  Max: 24  PULSE RANGE: Pulse  Av  Min: 60  Max: 68  BLOOD PRESSURE RANGE:  Systolic (67TXF), HFE:949 , Min:106 , XFN:489   ; Diastolic (35HMN), WRF:77, Min:54, Max:68    PULSE OXIMETRY RANGE: SpO2  Av.6 %  Min: 99 %  Max: 100 %    I/O last 3 completed shifts: In: 720 [P.O.:720]  Out: 600 [Urine:600]    CHEST: breath sounds bilaterally  CARDIOVASCULAR: regular rhythm without murmur  LABS:  Lab Results   Component Value Date    WBC 10.4 2021    HGB 8.1 (L) 2021    HCT 24.3 (L) 2021    .4 (H) 2021     2021     Lab Results   Component Value Date     2021    K 3.4 2021     2021    CO2 25 2021    BUN 32 2021    CREATININE 1.3 2021    GLUCOSE 98 2021    CALCIUM 8.3 2021      CHEST XRAY: pending    ASSESSMENT: Blunt chest wall trauma with left sixth through ninth rib fractures with small hemothorax, no pneumothorax  Blood loss anemia  ? PLAN: Continue analgesics, ambulation. Check CXR. Dr. Tae Santacruz will be covering in my absence as needed. Electronically signed by Ness Blum MD on 21 at 9:54 AM CDT    CXR - radiology report  Bibasilar atelectasis with probable mild lung contusion at   the left midlung zone and with a small volume of hemothorax as seen on   previous CT. There are acute displaced left-sided rib fractures. No   pneumothorax is identified. There is mild cardiomegaly without   evidence of overt CHF.

## 2021-05-04 NOTE — TELEPHONE ENCOUNTER
Called pt again today, he is still in pt at [facility]. He said that he will try the [facility] to get his COVID vaccine or [facility]. He states that once released from [facility], he will be going to [place] [employer]. I advised him that he might be able to get his vaccine there, but I was not sure. He said that he was told he couldn't get the vaccine at the hospital and they were unsure of where he could get it. He just wanted you to be aware that he had been in the hospital since last week and they also removed his stitches.

## 2021-05-04 NOTE — PROGRESS NOTES
Per Lexy, RT bipap settings 12/6 with 4LPM oxygen bled in.  Electronically signed by Caden Bang RN on 5/4/2021 at 12:09 PM

## 2021-05-05 ENCOUNTER — CARE COORDINATION (OUTPATIENT)
Dept: CARE COORDINATION | Age: 86
End: 2021-05-05

## 2021-05-05 NOTE — CARE COORDINATION
ACM attempted to contact patient 4/27 after ER visit on 4/26. Pt had fall at his apartment on 4/26 and was seen at ED. Pt returned to ED on 4/29 and was admitted for further care. Pt discharged to [facility] for short term rehab on 5/4. Due to current status at SNF, ACM will hold further contact attempts until patient has discharged home.   Electronically signed by Kelton Queen RN on 5/5/2021 at 8:13 AM

## 2021-05-14 ENCOUNTER — CARE COORDINATION (OUTPATIENT)
Dept: CARE COORDINATION | Age: 86
End: 2021-05-14

## 2021-05-14 NOTE — CARE COORDINATION
ACM call to [facility]: Spoke to Jazz in the Therapy Department. Per Jazz:  Mr. Dariel Francois is doing very well: He will DC home soon. SW will contact family to ask good time to have conference by phone or face to face about DC. Jazz reviewed notes on this patient and stated the plan is to DC next week but not a specific date yet. Last PT report: Mr. Dariel Francois is using a 2 wheel walker at [facility] w supervision. 150' w little to no assistance. He has soome pain r/t injuries from his original fall at home, but pt \"pushes through it\" per Jazz. ACM will follow to verify DC home next week and re-assess for support needs at that time.   Electronically signed by Raissa Levin RN on 5/14/2021 at 8:56 AM

## 2021-05-20 ENCOUNTER — CARE COORDINATION (OUTPATIENT)
Dept: CARE COORDINATION | Age: 86
End: 2021-05-20

## 2021-05-20 NOTE — CARE COORDINATION
ACM called [facility] for update on patient's rehab status. I spoke to María Hathaway - she stated that plan is for pt to DC back to his apartment tomorrow. María Hathaway will call and schedule f/u w PCP later today. Warden Rutledge is using his cane now, independent w transfers and ambulation. Has oxygen in place for home use upon discharge. Daughter involved and aware of DC plan. Plan:  ACM will attempt contact with patient after DC home to evaluate needs or barriers to healthcare outcomes and offer support as indicated.   Electronically signed by Phuong Langston RN on 5/20/2021 at 9:08 AM

## 2021-05-21 ENCOUNTER — TELEPHONE (OUTPATIENT)
Dept: PRIMARY CARE CLINIC | Age: 86
End: 2021-05-21

## 2021-05-21 NOTE — TELEPHONE ENCOUNTER
Akilah GOLDSMITH called, they want to know if we will sign off on his [facility] orders. Yes.

## 2021-05-24 ENCOUNTER — CARE COORDINATION (OUTPATIENT)
Dept: CARE COORDINATION | Age: 86
End: 2021-05-24

## 2021-05-24 RX ORDER — ASPIRIN 81 MG/1
81 TABLET ORAL DAILY
COMMUNITY

## 2021-05-24 RX ORDER — FUROSEMIDE 40 MG/1
40 TABLET ORAL DAILY
COMMUNITY
End: 2021-07-08

## 2021-05-24 RX ORDER — LISINOPRIL 40 MG/1
40 TABLET ORAL DAILY
COMMUNITY
End: 2021-09-23

## 2021-05-24 RX ORDER — CHLORAL HYDRATE 500 MG
1000 CAPSULE ORAL 2 TIMES DAILY
COMMUNITY

## 2021-05-24 RX ORDER — SENNOSIDES 8.6 MG
650 CAPSULE ORAL DAILY
Status: ON HOLD | COMMUNITY
End: 2021-09-13 | Stop reason: HOSPADM

## 2021-05-24 RX ORDER — TERAZOSIN 5 MG/1
5 CAPSULE ORAL NIGHTLY
COMMUNITY
End: 2021-12-16 | Stop reason: SDUPTHER

## 2021-05-24 ASSESSMENT — ENCOUNTER SYMPTOMS: DYSPNEA ASSOCIATED WITH: EXERTION

## 2021-05-24 NOTE — CARE COORDINATION
[place] [id number] Transitions 24 Hour Follow Up Call    2021    Patient: Tashia Salazar Patient : 1935    MRN: [id number]  Reason for Admission:    Principal Problem:    NELSY (acute kidney injury) [facility])  Active Problems:    Essential hypertension    Chronic atrial fibrillation    COPD (chronic obstructive pulmonary disease) ([facility]    Type 2 diabetes mellitus with diabetic polyneuropathy ([facility] [address]    Mixed hyperlipidemia    Hemothorax on left    Fracture of multiple ribs of left side    Fall at home    Digoxin toxicity  Resolved Problems:    Hyperkalemia   Discharge Date: 21 RARS: 96%           Spoke with: Sheryl 90: Russ Jordan discharged from [facility] on 21. He was admitted to [address] [place] [address] on Saturday and is expecting a nurse visit today. He has his medications from [place] [id number] - they are packaged and delivered weekly on . Pt is mobile with a cane and also has as walker if needed. He has a shower chair as well. Pt is current with [employer] services and stated the  can also drive him to appointments when needed. Al gets his meals at the dining room in his senior apartment center. He has home oxygen which he uses continuously and connects to his BiPAP at night as well. He has a finger oximeter, a digital scale, and a glucometer. His fasting blood sugar this morning was 98. He is aware of his appointment with his PCP on Wednesday and was informed of his cardiology appointment on Thursday. He is mainly concerned about a sore place on his right calf which he stated is weeping some clear fluid. He has had this before and is concerned he may have cellulitis. [facility] notified [facility] and spoke to Molly Dumont - she will visit patient today and assess his leg while there.  [facility] reviewed patient's medications with his pharmacy, [facility] - med list has been updated to reflect the current medications that [facility] ordered when patient discharged on 5/21. Non-face-to-face services provided:  Obtained and reviewed discharge summary and/or continuity of care documents  Communication with home health agencies or other community services the patient is currently using-Spoke to [facility]. Notified her of patient's c/o right calf is sore to touch and weeping. Contacted [facility] and completed medication review with this pharmacy that packages patient's meds. Education of patient/family/caregiver/guardian to support self-management-Pt encouraged to weigh daily each morning, check his fasting blood sugar and his SpO2. Reminded pt to use his incentive spirometer to keep lungs expanded. Assessment and support for treatment adherence and medication management-Pt gets his medications packaged by [facility]. Next delivery of weekly meds is scheduled for Wednesday.     Inpatient Assessment  Care Transitions 24 Hour Call    Schedule Follow Up Appointment with PCP: Completed  Do you have a copy of your discharge instructions?: Yes  Do you have all of your prescriptions and are they filled?: Yes  Have you been contacted by a [address]?: No  Have you scheduled your follow up appointment?: Yes  How are you going to get to your appointment?: Car - family or friend to transport  Were you discharged with any Home Care or Post Acute Services: Yes  Post Acute Services: [address] [place] [address] Rian Santamaria, Outpatient/Community Services  Patient Home Equipment: Oxygen, BiPAP, Other  Do you have support at home?: Alone  Do you feel like you have everything you need to keep you well at home?: Yes  Are you an active caregiver in your home?: No  Care Transitions Interventions    Pharmacist: Completed     Home Care Waiver: Completed            Follow Up  Future Appointments   Date Time Provider Addie Doss   5/26/2021 11:00 AM Bria Carpio, [facility]   5/27/2021 11:30 AM ISAAC Leonard NP [facility]   9/20/2021  2:30 PM Mick Aguilera MD [facility]       Elisha Meyer, RN

## 2021-05-24 NOTE — CARE COORDINATION
Ambulatory Care Coordination Note  5/24/2021  CM Risk Score: 7  Charlson 10 Year Mortality Risk Score: 100%     ACC: Rajesh Bob RN    Summary Note: ACM spoke to Al today. He is living at [facility] but recently was in rehab after a fall with rib fractures. He has progressed enough to discharge and is ambulatory with a cane at home right now. He has a walker if needed and uses a shower chair in his bathroom. Pt is on continuous oxygen, has a portable tank - but he does not wear his oxygen to meals. Pt has to get on an elevator to another floor and then walk to the dining area of his apartment complex, which gets him fatigued. He does not have a productive cough but does use a maintenance inhaler and has a rescue inhaler. He does not smoke. Al's meds are packaged by his pharmacy and he was not able to review meds during the call. He said home health has started working with him and he is expecting a visit later from his [place] [address]. He has a sore right calf and noted it has some fluid weeping as well. He is concerned about possibly having cellulitis. Al noted to ACM that his balance is not as good as in the past. Al's [employer]  is cleaning today and another person will be there again on Wednesday - he will ask that person to drive him to his appt with his PCP on 5/26. He is checking his FBS but has not checked his morning weights. He thinks he may be holding some fluid. He is taking his medications but stated he knows several have been changed. He weighed himself during the call and reported 257.2 lb on his digital scale. He has a bed rail that he would like to get attached to his bed but needs assistance to put it on. He gets up frequently at night to urinate but will use his urinal rather than walk to the bathroom. He said his falls have been during the day, not at night. Plan:  ACM encouraged that Al check his morning daily weight so he can see if there is fluid weight gain day to day.  Instructed him Housing: Apartment        Per the Fall Risk Screening, did the patient have 2 or more falls or 1 fall with injury in the past year?: Yes  How often do you think you are about to fall and you do NOT fall? For example, you grab something to stabilize yourself or hold onto a wall/furniture?: Occasionally  Use of a Mobility Aid: Yes  Issues with feet or shoes like numbness, edema, shoes not fitting: Yes  Changes in vision, poor vision or poor lighting in environment: No  Other Fall Risk: Yes  What other fall risks does the patient have?: pt reports balance problems     Frequent urination at night?: Yes  Do you use rails/bars?: Yes  Do you have a non-slip tub mat?: Yes     Are you experiencing loss of meaning?: No  Are you experiencing loss of hope and peace?: No     Thinking about your patient's physical health needs, are there any symptoms or problems (risk indicators) you are unsure about that require further investigation?: Moderate to severe symptoms or problems that impact on daily life   Are the patients physical health problems impacting on their mental well-being?: Moderate to severe impact upon mental well-being and preventing enjoyment of usual activities   Are there any problems with your patients lifestyle behaviors (alcohol, drugs, diet, exercise) that are impacting on physical or mental well-being?: Some mild concern of potential negative impact on well-being   Do you have any other concerns about your patients mental well-being?  How would you rate their severity and impact on the patient?: Mild problems - don't interfere with function   How would you rate their home environment in terms of safety and stability (including domestic violence, insecure housing, neighbor harassment)?: Consistently safe, supportive, stable, no identified problems   How do daily activities impact on the patient's well-being? (include current or anticipated unemployment, work, caregiving, access to transportation or other): Some general dissatisfaction but no concern   How would you rate their social network (family, work, friends)?: Restricted participation with some degree of social isolation   How would you rate their financial resources (including ability to afford all required medical care)?: Financially secure, some resource challenges   How wells does the patient now understand their health and well-being (symptoms, signs or risk factors) and what they need to do to manage their health?: Reasonable to good understanding and already engages in managing health or is willing to undertake better management   How well do you think your patient can engage in healthcare discussions? (Barriers include language, deafness, aphasia, alcohol or drug problems, learning difficulties, concentration): Adequate communication, with or without minor barriers   Do other services need to be involved to help this patient?: Other care/services in place and adequate   Are current services involved with this patient well-coordinated? (Include coordination with other services you are now recommendation): Required care/services in place and adequately coordinated   Suggested Interventions and Community Resources  Fall Risk Prevention: In Process Home Health Services: Completed (Comment: [facility])   Home Care Waiver: Completed   Physical Therapy: Completed   Zone Management Tools: In Process (5/24: DC home from [facility]. Significant med changes, self-monitoring FBS, will add daily wgt and SpO2 monitoring.  Keep f/u appts this week.)         Schedule an appointment with the patient's PCP, Set up/Review an Education Plan, Set up/Review Goals          Goals       Patient Stated (pt-stated)       Pt wants to remain independent    Barriers: impairment:  physical: General weakness and balance problems, lack of support, and overwhelmed by complexity of regimen  Plan for overcoming my barriers:   Pt is willing to work with therapy to improve his strength and 100-62.5-25 MCG/INH AEPB INHALE 1 PUFF ONCE DAILY 2/17/21   B Kamilah Rich, DO   Easy Touch Lancets 28G/Twist MISC Use to test Blood sugar daily 2/3/21   B Kamilah Hilario, DO   azelastine (ASTELIN) 0.1 % nasal spray 2 sprays by Nasal route 2 times daily Use in each nostril as directed 1/14/21   B Kamilha Hilario, DO   carvedilol (COREG) 12.5 MG tablet Take 1 tablet by mouth 2 times daily (with meals) 1/4/21   B Kamilah Hilario, DO   enzalutamide [place]) 40 MG capsule Take 4 tablets daily 12/29/20   ISAAC Burnett   amLODIPine (NORVASC) 5 MG tablet Take 1 tablet by mouth daily 12/16/20   ISAAC Ivan   Lift Chair MISC by Does not apply route Prefers leather  At [facility] faxed through [facility] 10/21/20   B Kamilah Rich, DO   FLUoxetine (PROZAC) 20 MG capsule Take 1 capsule by mouth daily 6/9/20   B Kamilah Rich, DO   metFORMIN (GLUCOPHAGE) 500 MG tablet Take 1 tablet by mouth 2 times daily (with meals) 6/2/20   B Kamilah Rich, DO   blood glucose monitor strips Test one times a day & as needed for symptoms of irregular blood glucose. DX: E11.9 4/2/20   B Kamilah Rich, DO   Alcohol Swabs (ALCOHOL PREP) PADS Use daily with glucose checks DX: E11.9 4/2/20   B Kamilah Rich, DO   nitroGLYCERIN (NITROSTAT) 0.4 MG SL tablet up to max of 3 total doses. If no relief after 1 dose, call 911. Patient not taking: Reported on 5/24/2021 3/30/20   Elías Da Silva PA-C   blood glucose monitor kit and supplies Test once a day for symptoms of irregular blood glucose.  2/3/20   B Kamilah Rich, DO   albuterol sulfate HFA (PROAIR HFA) 108 (90 Base) MCG/ACT inhaler Inhale 2 puffs into the lungs every 6 hours as needed for Wheezing 12/4/19   ISAAC Ivan   potassium chloride (KLOR-CON 10) 10 MEQ extended release tablet Take 1 tablet by mouth daily 6/19/19   ISAAC Ivan   Lancets MISC 1 each by Does not apply route daily Accu Chek Guid3 Rosemarie 4/29/19   ALISON Alfred, DO   OXYGEN Inhale 3 L into the lungs continuous     Historical Provider, MD   BiPAP Machine MISC by Does not apply route nightly    Historical Provider, MD   vitamin B-12 (CYANOCOBALAMIN) 1000 MCG tablet Take 1,000 mcg by mouth daily    Historical Provider, MD   leuprolide (LUPRON) 30 MG injection Inject 30 mg into the muscle once Every 4 months    Historical Provider, MD       Future Appointments   Date Time Provider Addie Cannoni   5/26/2021 11:00 AM ISAAC Rutherford [facility]   5/27/2021 11:30 AM ISAAC Bales NP [facility]   9/20/2021  2:30 PM MD NEENA Edwards [facility]     ,   Diabetes Assessment    Medic Alert ID: No  Meal Planning: Avoidance of concentrated sweets   How often do you test your blood sugar?: No Testing (Comment: Agreeable to start daily FBG with help from [place] RN)   Do you have barriers with adherence to non-pharmacologic self-management interventions?  (Nutrition/Exercise/Self-Monitoring): Yes   Have you ever had to go to the ED for symptoms of low blood sugar?: No          ,   Congestive Heart Failure Assessment    Are you currently restricting fluids?: No Restriction  Do you understand a low sodium diet?: No (Comment: Discussed today with pt and SO)  Do you understand how to read food labels?: Yes  How many restaurant meals do you eat per week?: 0  Do you salt your food before tasting it?: No         Symptoms:         and   COPD Assessment    Does the patient understand envrionmental exposure?: Yes  Is the patient able to verbalize Rescue vs. Long Acting medications?: Yes  Does the patient have a nebulizer?: No  Does the patient use a space with inhaled medications?: No            Symptoms:

## 2021-05-24 NOTE — TELEPHONE ENCOUNTER
[facility] Transitions Initial Follow Up Call    Outreach made within 2 business days of discharge: Yes    Patient: Raya Westbrook Patient : 1935   MRN: [id number]  Reason for Admission: There are no discharge diagnoses documented for the most recent discharge. Discharge Date: 21- just discharged from [facility]       Spoke with: no one.  LMTCB    Discharge department/facility:  [address]      Scheduled appointment with PCP within 7-14 days    Follow Up  Future Appointments   Date Time Provider Addie Doss   2021 11:00 AM ISAAC Mckeon [facility]   2021 11:30 AM ISAAC Ramachandran - NP [facility]   2021  2:30 PM MD NEENA Mcclellan [facility]       Ramya [address] [place]
Génesis Bernal, RN  NORMA Hernandez,     I called Sam Hollis today and enrolled him for support. He discharged home Friday from [facility]. A CT nurse in [facility] 86 me she would not call since I already had. I saw that you had tried to call Al, too. He told me he cannot get messages from his phone. I am not supposed to do CT calls but went ahead and did this one with a smart note and just filled in the blanks - it was too messy to do otherwise, I thought. Anyway - does the CT call need to be linked to the hospital stay since he was at a SNF? I don't know how to do that any more. Just wanted you to know there is a CT call in the chart for billable puposes at his appointment Wed.      Thank you-   Chin Weir
ordered when patient discharged on 5/21.     Non-face-to-face services provided:  Obtained and reviewed discharge summary and/or continuity of care documents  Communication with home health agencies or other community services the patient is currently using-Spoke to Monty dias RN. Notified her of patient's c/o right calf is sore to touch and weeping. Contacted [facility] and completed medication review with this pharmacy that packages patient's meds. Education of patient/family/caregiver/guardian to support self-management-Pt encouraged to weigh daily each morning, check his fasting blood sugar and his SpO2. Reminded pt to use his incentive spirometer to keep lungs expanded. Assessment and support for treatment adherence and medication management-Pt gets his medications packaged by [facility].  Next delivery of weekly meds is scheduled for Wednesday.     Inpatient Assessment  Care Transitions 24 Hour Call    Schedule Follow Up Appointment with PCP: Completed  Do you have a copy of your discharge instructions?: Yes  Do you have all of your prescriptions and are they filled?: Yes  Have you been contacted by a [facility] Pharmacist?: No  Have you scheduled your follow up appointment?: Yes  How are you going to get to your appointment?: Car - family or friend to transport  Were you discharged with any Home Care or Post Acute Services: Yes  Post Acute Services: [facility], Outpatient/Community Services  Patient Home Equipment: Oxygen, BiPAP, Other  Do you have support at home?: Alone  Do you feel like you have everything you need to keep you well at home?: Yes  Are you an active caregiver in your home?: No  Care Transitions Interventions      Pharmacist: Completed

## 2021-05-25 ENCOUNTER — TELEPHONE (OUTPATIENT)
Dept: PRIMARY CARE CLINIC | Age: 86
End: 2021-05-25

## 2021-05-25 NOTE — TELEPHONE ENCOUNTER
Jairon Vergara, PT [facility] called, they will see him 2 x a week for one week and then 1x week for one week.

## 2021-05-26 ENCOUNTER — OFFICE VISIT (OUTPATIENT)
Dept: PRIMARY CARE CLINIC | Age: 86
End: 2021-05-26
Payer: MEDICARE

## 2021-05-26 ENCOUNTER — TELEPHONE (OUTPATIENT)
Dept: PRIMARY CARE CLINIC | Age: 86
End: 2021-05-26

## 2021-05-26 VITALS
TEMPERATURE: 96.7 F | BODY MASS INDEX: 36.36 KG/M2 | HEART RATE: 78 BPM | SYSTOLIC BLOOD PRESSURE: 136 MMHG | WEIGHT: 254 LBS | OXYGEN SATURATION: 91 % | DIASTOLIC BLOOD PRESSURE: 76 MMHG | HEIGHT: 70 IN
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
## 2021-05-26 DIAGNOSIS — T46.0X1D: ICD-10-CM

## 2021-05-26 DIAGNOSIS — E11.9 TYPE 2 DIABETES MELLITUS WITHOUT COMPLICATION, WITHOUT LONG-TERM CURRENT USE OF INSULIN (HCC): ICD-10-CM

## 2021-05-26 DIAGNOSIS — S22.42XD CLOSED FRACTURE OF MULTIPLE RIBS OF LEFT SIDE WITH ROUTINE HEALING, SUBSEQUENT ENCOUNTER: ICD-10-CM

## 2021-05-26 DIAGNOSIS — I50.42 CHRONIC COMBINED SYSTOLIC AND DIASTOLIC CONGESTIVE HEART FAILURE (HCC): ICD-10-CM

## 2021-05-26 DIAGNOSIS — N17.9 AKI (ACUTE KIDNEY INJURY) (HCC): Primary | ICD-10-CM

## 2021-05-26 DIAGNOSIS — N17.9 AKI (ACUTE KIDNEY INJURY) (HCC): ICD-10-CM

## 2021-05-26 PROBLEM — E66.9 OBESITY (BMI 30-39.9): Status: RESOLVED | Noted: 2019-07-19 | Resolved: 2021-05-26

## 2021-05-26 PROBLEM — J43.2 CENTRILOBULAR EMPHYSEMA: Chronic | Status: ACTIVE | Noted: 2019-06-05

## 2021-05-26 PROBLEM — J44.9 COPD, GROUP A, BY GOLD 2017 CLASSIFICATION (HCC): Chronic | Status: ACTIVE | Noted: 2019-03-04

## 2021-05-26 PROBLEM — R59.0 MEDIASTINAL ADENOPATHY: Chronic | Status: ACTIVE | Noted: 2019-03-04

## 2021-05-26 PROBLEM — E66.01 MORBID (SEVERE) OBESITY DUE TO EXCESS CALORIES (HCC): Chronic | Status: ACTIVE | Noted: 2020-06-18

## 2021-05-26 PROBLEM — G47.33 OBSTRUCTIVE SLEEP APNEA: Chronic | Status: ACTIVE | Noted: 2020-06-18

## 2021-05-26 LAB
ANION GAP SERPL CALCULATED.3IONS-SCNC: 12 MMOL/L (ref 7–19)
BASOPHILS ABSOLUTE: 0.1 K/UL (ref 0–0.2)
BASOPHILS RELATIVE PERCENT: 0.7 % (ref 0–1)
BUN BLDV-MCNC: 22 MG/DL (ref 8–23)
CALCIUM SERPL-MCNC: 9.4 MG/DL (ref 8.8–10.2)
CHLORIDE BLD-SCNC: 100 MMOL/L (ref 98–111)
CO2: 26 MMOL/L (ref 22–29)
CREAT SERPL-MCNC: 1.1 MG/DL (ref 0.5–1.2)
EOSINOPHILS ABSOLUTE: 0.2 K/UL (ref 0–0.6)
EOSINOPHILS RELATIVE PERCENT: 2.2 % (ref 0–5)
GFR AFRICAN AMERICAN: >59
GFR NON-AFRICAN AMERICAN: >60
GLUCOSE BLD-MCNC: 116 MG/DL (ref 74–109)
HCT VFR BLD CALC: 30.4 % (ref 42–52)
HEMOGLOBIN: 9.8 G/DL (ref 14–18)
IMMATURE GRANULOCYTES #: 0.5 K/UL
LYMPHOCYTES ABSOLUTE: 0.8 K/UL (ref 1.1–4.5)
LYMPHOCYTES RELATIVE PERCENT: 8.7 % (ref 20–40)
MCH RBC QN AUTO: 33.9 PG (ref 27–31)
MCHC RBC AUTO-ENTMCNC: 32.2 G/DL (ref 33–37)
MCV RBC AUTO: 105.2 FL (ref 80–94)
MONOCYTES ABSOLUTE: 0.7 K/UL (ref 0–0.9)
MONOCYTES RELATIVE PERCENT: 8.1 % (ref 0–10)
NEUTROPHILS ABSOLUTE: 6.4 K/UL (ref 1.5–7.5)
NEUTROPHILS RELATIVE PERCENT: 74.5 % (ref 50–65)
PDW BLD-RTO: 14 % (ref 11.5–14.5)
PLATELET # BLD: 187 K/UL (ref 130–400)
PMV BLD AUTO: 10 FL (ref 9.4–12.4)
POTASSIUM SERPL-SCNC: 4.8 MMOL/L (ref 3.5–5)
PRO-BNP: 1660 PG/ML (ref 0–1800)
RBC # BLD: 2.89 M/UL (ref 4.7–6.1)
SODIUM BLD-SCNC: 138 MMOL/L (ref 136–145)
WBC # BLD: 8.6 K/UL (ref 4.8–10.8)

## 2021-05-26 PROCEDURE — 1111F DSCHRG MED/CURRENT MED MERGE: CPT | Performed by: NURSE PRACTITIONER

## 2021-05-26 PROCEDURE — 99496 TRANSJ CARE MGMT HIGH F2F 7D: CPT | Performed by: NURSE PRACTITIONER

## 2021-05-26 SDOH — ECONOMIC STABILITY: FOOD INSECURITY: WITHIN THE PAST 12 MONTHS, YOU WORRIED THAT YOUR FOOD WOULD RUN OUT BEFORE YOU GOT MONEY TO BUY MORE.: NEVER TRUE

## 2021-05-26 ASSESSMENT — PATIENT HEALTH QUESTIONNAIRE - PHQ9
SUM OF ALL RESPONSES TO PHQ9 QUESTIONS 1 & 2: 0
1. LITTLE INTEREST OR PLEASURE IN DOING THINGS: 0
SUM OF ALL RESPONSES TO PHQ QUESTIONS 1-9: 0

## 2021-05-26 NOTE — PROGRESS NOTES
medications have been marked as taking for the 5/26/21 encounter (Office Visit) with ISAAC Ivan. Medications patient taking as of now reconciled against medications ordered at time of hospital discharge: Yes    Chief Complaint   Patient presents with    Follow-Up from Hospital     here for follow up, fell and broke several ribs. feeling better but more out of breath today that usual. didnt bring update to medications. [facility]    Leg Problem     right lower ext drainage. started 2 weeks ago. started while in [place]. no meds given to help. History of Present illness - Follow up of Hospital diagnosis(es): NELSY, HTN, AFIB, COPD, diabetes, left hemothorax, rib fractures left side and dig toxicity. Inpatient course: Discharge summary reviewed- see chart. Interval history/Current status: He has his medications from [facility] - they are packaged and delivered weekly on Wednesdays. Pt is mobile with a cane and also has as walker if needed. He has a shower chair as well. Pt is current with [employer] and stated the  can also drive him to appointments when needed. Al gets his meals at the dining room in his senior apartment center. He has home oxygen which he uses continuously and connects to his BiPAP at night as well. He has a finger oximeter, a digital scale, and a glucometer. His fasting blood sugar this morning was 98. He is being followed by [facility]. He has f/u with cardiology on 05/27/2021. He is mainly concerned about a sore place on his right calf which he stated is weeping some clear fluid. His weight is going up and he is swelling in his legs. He reports he is taking lasix 60mg a day. His record shows twice a day. He has had this before and is concerned he may have cellulitis.      Vitals:    05/26/21 1101   BP: 136/76   Pulse: 78   Temp: 96.7 °F (35.9 °C)   TempSrc: Temporal   SpO2: 91%   Weight: 254 lb (115.2 kg)   Height: 5' 10\" (1.778 m)     Body mass index is 36.45 kg/m². Wt Readings from Last 3 Encounters:   05/26/21 254 lb (115.2 kg)   05/04/21 246 lb (111.6 kg)   04/26/21 247 lb (112 kg)     BP Readings from Last 3 Encounters:   05/26/21 136/76   05/04/21 121/63   04/26/21 (!) 107/51       A comprehensive review of systems was negative except for what was noted in the HPI. Physical Exam:  General Appearance: alert and oriented to person, place and time, well-developed and well-nourished, in no acute distress  Skin: warm and dry, no rash or erythema and dry scaly skin lower legs with some clear drainage from scaly lesions. Eyes: conjunctivae normal  Neck: neck supple and non tender without mass, no thyromegaly or thyroid nodules, no cervical lymphadenopathy and neck supple and non tender without mass   Pulmonary/Chest: clear to auscultation bilaterally- no wheezes, rales or rhonchi, normal air movement, no respiratory distress  Cardiovascular: normal rate and irregularly irregular rhythm noted  Abdomen: soft, non-tender, non-distended, normal bowel sounds, no masses or organomegaly  Extremities: lower extremity swelling 1+ noted bilaterally  Musculoskeletal: normal range of motion, no joint swelling, deformity or tenderness and he is in wheelchair. Assessment/Plan:  1. NELSY (acute kidney injury) ([facility] [address]  Getting labs to reassess renal function  - [place] DISCHARGE MEDS RECONCILED W/ CURRENT OUTPATIENT MED LIST  - Basic Metabolic Panel; Future  - Brain Natriuretic Peptide; Future    2. Poisoning by digitalis glycoside, accidental or unintentional, subsequent encounter  Dig has been discontinued, have appt with cardiology tomorrow. - [place] DISCHARGE MEDS RECONCILED W/ CURRENT OUTPATIENT MED LIST    3.  Type 2 diabetes mellitus without complication, without long-term current use of insulin (HCC)  The current medical regimen is effective;  continue present plan and medications.    - [place] DISCHARGE MEDS RECONCILED W/ CURRENT OUTPATIENT MED LIST    4. Fall, subsequent encounter      5. Closed fracture of multiple ribs of left side with routine healing, subsequent encounter      6. Chronic combined systolic and diastolic congestive heart failure ([address]  Getting labs. He is currently on 40mg lasix bid and bilateral lower extremities are swollen. Assessing renal function and may have to adjust diuretics. Due to right LE edema having some clear drainage. Discussed may have to get PT/OT to do judie boots. They are following him at home. - Basic Metabolic Panel; Future  - Brain Natriuretic Peptide;  Future        Medical Decision Making: high complexity

## 2021-05-26 NOTE — TELEPHONE ENCOUNTER
Sterling with [address] called about pt. She did eval yesterday on him. And he is doing well with ADLs.  They will see him 2 times a week for 1 week and once a week for 2 weeks

## 2021-05-27 ENCOUNTER — OFFICE VISIT (OUTPATIENT)
Dept: CARDIOLOGY CLINIC | Age: 86
End: 2021-05-27
Payer: MEDICARE

## 2021-05-27 VITALS
SYSTOLIC BLOOD PRESSURE: 130 MMHG | HEIGHT: 70 IN | DIASTOLIC BLOOD PRESSURE: 70 MMHG | HEART RATE: 63 BPM | OXYGEN SATURATION: 92 % | WEIGHT: 254 LBS | BODY MASS INDEX: 36.36 KG/M2

## 2021-05-27 DIAGNOSIS — I25.10 CORONARY ARTERY DISEASE INVOLVING NATIVE CORONARY ARTERY OF NATIVE HEART WITHOUT ANGINA PECTORIS: ICD-10-CM

## 2021-05-27 DIAGNOSIS — I10 ESSENTIAL HYPERTENSION: ICD-10-CM

## 2021-05-27 DIAGNOSIS — Z95.0 PACEMAKER: ICD-10-CM

## 2021-05-27 DIAGNOSIS — I48.20 CHRONIC ATRIAL FIBRILLATION (HCC): Primary | ICD-10-CM

## 2021-05-27 DIAGNOSIS — E78.5 HYPERLIPIDEMIA, UNSPECIFIED HYPERLIPIDEMIA TYPE: ICD-10-CM

## 2021-05-27 PROCEDURE — 99214 OFFICE O/P EST MOD 30 MIN: CPT | Performed by: NURSE PRACTITIONER

## 2021-05-27 PROCEDURE — G8427 DOCREV CUR MEDS BY ELIG CLIN: HCPCS | Performed by: NURSE PRACTITIONER

## 2021-05-27 PROCEDURE — 93288 INTERROG EVL PM/LDLS PM IP: CPT | Performed by: NURSE PRACTITIONER

## 2021-05-27 PROCEDURE — 4040F PNEUMOC VAC/ADMIN/RCVD: CPT | Performed by: NURSE PRACTITIONER

## 2021-05-27 PROCEDURE — 1111F DSCHRG MED/CURRENT MED MERGE: CPT | Performed by: NURSE PRACTITIONER

## 2021-05-27 PROCEDURE — 1123F ACP DISCUSS/DSCN MKR DOCD: CPT | Performed by: NURSE PRACTITIONER

## 2021-05-27 PROCEDURE — G8417 CALC BMI ABV UP PARAM F/U: HCPCS | Performed by: NURSE PRACTITIONER

## 2021-05-27 PROCEDURE — 1036F TOBACCO NON-USER: CPT | Performed by: NURSE PRACTITIONER

## 2021-05-27 ASSESSMENT — ENCOUNTER SYMPTOMS
WHEEZING: 0
SHORTNESS OF BREATH: 0
SORE THROAT: 0
CHEST TIGHTNESS: 0

## 2021-05-27 NOTE — PROGRESS NOTES
[facility]   Established Patient Office Visit   [facility]. [address] [facility]  [phone]        OFFICE VISIT:  2021    Consuelo Esquivel - : 1935    Reason For Visit:  Urszula Cruz is a 81 y.o. male who is here for Follow-Up from Hospital (No cardiac symptoms. )    1. Chronic atrial fibrillation ([address]    2. Coronary artery disease involving native coronary artery of native heart without angina pectoris    3. Essential hypertension    4. Pacemaker    5. Hyperlipidemia, unspecified hyperlipidemia type      Patient with a history of hypertension, hyperlipidemia, diabetes, atrial fib, chronic anticoagulation, COPD, and coronary artery disease. He is a patient of Dr. Jaimie Reyes. Patient had a hospitalization on 2021 with complaint of falling at assisted living facility. Patient was found to have rib fractures, left pneumothorax, NELSY. Prior cardiac catheterization on 3/30/2020 with proximal LAD 60 to 70% bifurcation lesion, mild RCA 50 to 60%. Ejection fraction was 40 to 45%. Recommendation from cardiology was to reduce his Imdur to 30 mg daily. Continue Coreg 12.5 mg twice daily. Discontinued digoxin. Denies any chest pain, pressure or tightness. There is no shortness of breath, orthopnea or PND. Patient denies any lightheadedness, dizziness or syncope.     Subjective    Consuelo Esquivel is a 81 y.o. male with the following history as recorded in [facility]:    Patient Active Problem List    Diagnosis Date Noted    Chest discomfort     Mixed hyperlipidemia 02/15/2018    Prostate cancer ([address] 02/15/2018    Recurrent major depressive disorder, in partial remission ([address] 02/15/2018    Type 2 diabetes mellitus with diabetic polyneuropathy ([address] 2015    Mixed restrictive and obstructive lung disease ([address] 2015    COPD (chronic obstructive pulmonary disease) ([address] 2015    NEIDA (obstructive sleep apnea) 2015    Asbestosis ([address] 2015    Metabolic alkalosis with respiratory acidosis 05/21/2015    Chronic anticoagulation 05/21/2015    Acne rosacea 05/21/2015    Essential hypertension     Neuropathy     Chronic atrial fibrillation     Anxiety     Depression     Hemothorax on left 04/30/2021    Fracture of multiple ribs of left side 04/30/2021    Fall at home 04/30/2021    Digoxin toxicity 04/30/2021    NELSY (acute kidney injury) ([facility] 04/29/2021    CAD (coronary artery disease) 03/30/2020    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 ([address] 03/28/2020    Chest pain due to myocardial ischemia 03/27/2020    Mediastinal mass 03/10/2020    Sinoatrial node dysfunction (HCC) 04/25/2019    Bradycardia 03/15/2019    Pacemaker 03/15/2019    Chronic combined systolic and diastolic congestive heart failure ([facility] 11/15/2018    Macrocytic anemia 11/15/2018    Sepsis ([facility] 11/15/2018    Vitamin D deficiency 11/15/2018    Mitral regurgitation 11/15/2018    Tricuspid regurgitation 11/15/2018    Pulmonary hypertension ([facility] 11/15/2018    Bronchitis, acute 11/15/2018     Current Outpatient Medications   Medication Sig Dispense Refill    furosemide (LASIX) 40 MG tablet Take 40 mg by mouth daily 1.5 tablets daily      Omega-3 Fatty Acids (FISH OIL) 1000 MG CAPS Take 1,000 mg by mouth 2 times daily      acetaminophen (TYLENOL) 650 MG extended release tablet Take 650 mg by mouth daily      aspirin 81 MG EC tablet Take 81 mg by mouth daily      terazosin (HYTRIN) 5 MG capsule Take 5 mg by mouth nightly      lisinopril (PRINIVIL;ZESTRIL) 40 MG tablet Take 40 mg by mouth daily      isosorbide mononitrate (IMDUR) 60 MG extended release tablet Take 0.5 tablets by mouth daily 90 tablet 0    allopurinol (ZYLOPRIM) 100 MG tablet Take 1 tablet by mouth daily 30 tablet 0    colesevelam (WELCHOL) 625 MG tablet Take 1,875 mg by mouth every evening      pantoprazole (PROTONIX) 40 MG tablet Take 1 tablet by mouth every morning (before breakfast) 30 tablet 5    atorvastatin (LIPITOR) 40 MG tablet Take 1 tablet by mouth nightly 90 tablet 3    fluticasone (FLONASE) 50 MCG/ACT nasal spray 2 sprays by Nasal route daily 16 g 11    mirabegron (MYRBETRIQ) 25 MG TB24 Take 1 tablet by mouth daily 90 tablet 3    buPROPion (WELLBUTRIN XL) 150 MG extended release tablet Take 2 tablets by mouth every morning 180 tablet 3    TRELEGY ELLIPTA 100-62.5-25 MCG/INH AEPB INHALE 1 PUFF ONCE DAILY 1 each 11    Easy Touch Lancets 28G/Twist MISC Use to test Blood sugar daily 100 each 3    azelastine (ASTELIN) 0.1 % nasal spray 2 sprays by Nasal route 2 times daily Use in each nostril as directed 4 Bottle 3    carvedilol (COREG) 12.5 MG tablet Take 1 tablet by mouth 2 times daily (with meals) 180 tablet 3    enzalutamide (XTANDI) 40 MG capsule Take 4 tablets daily 360 capsule 1    amLODIPine (NORVASC) 5 MG tablet Take 1 tablet by mouth daily 90 tablet 3    Lift Chair MISC by Does not apply route Prefers leather  [facility] faxed through [facility] 1 each 0    FLUoxetine (PROZAC) 20 MG capsule Take 1 capsule by mouth daily 90 capsule 3    metFORMIN (GLUCOPHAGE) 500 MG tablet Take 1 tablet by mouth 2 times daily (with meals) 180 tablet 3    blood glucose monitor strips Test one times a day & as needed for symptoms of irregular blood glucose. DX: E11.9 100 strip 3    Alcohol Swabs (ALCOHOL PREP) PADS Use daily with glucose checks DX: E11.9 100 each 0    nitroGLYCERIN (NITROSTAT) 0.4 MG SL tablet up to max of 3 total doses. If no relief after 1 dose, call 911. 25 tablet 0    blood glucose monitor kit and supplies Test once a day for symptoms of irregular blood glucose.  1 kit 0    albuterol sulfate HFA (PROAIR HFA) 108 (90 Base) MCG/ACT inhaler Inhale 2 puffs into the lungs every 6 hours as needed for Wheezing 8.5 Inhaler 5    potassium chloride (KLOR-CON 10) 10 MEQ extended release tablet Take 1 tablet by mouth daily 90 tablet 3    Lancets MISC 1 each by Does not apply route daily Accu Chek Guid3 Lancets 100 each 5    OXYGEN Inhale 3 L into the lungs continuous       BiPAP Machine MISC by Does not apply route nightly      vitamin B-12 (CYANOCOBALAMIN) 1000 MCG tablet Take 1,000 mcg by mouth daily      leuprolide (LUPRON) 30 MG injection Inject 30 mg into the muscle once Every 4 months      lidocaine 4 % external patch Place 1 patch onto the skin daily (Patient not taking: Reported on 5/27/2021)       No current facility-administered medications for this visit. Allergies: Patient has no known allergies. Past Medical History:   Diagnosis Date    Anxiety     Arthritis     Atrial fibrillation ([facility]     Blood circulation, collateral     Cancer (HCC)     prostate, had treatment    Cellulitis     left leg    [facility]congestive [facility] failure) [facility]     Chronic kidney disease     COPD (chronic obstructive pulmonary disease) ([facility]     Depression     Hyperlipidemia     Hypertension     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 ([facility] 3/28/2020    Lung mass     Neuromuscular disorder (HCC)     Neuropathy     Other disorders of kidney and ureter in diseases classified elsewhere     Palliative care patient 11/15/2018    Pneumonia     Type II or unspecified type diabetes mellitus without mention of complication, not stated as uncontrolled      Past Surgical History:   Procedure Laterality Date    COLONOSCOPY      EYE SURGERY      EYE SURGERY      HERNIA REPAIR      umbilical with Dr Petty Sessions       Family History   Problem Relation Age of Onset    Heart Attack Mother     Cancer Father      Social History     Tobacco Use    Smoking status: Never Smoker    Smokeless tobacco: Never Used   Substance Use Topics    Alcohol use: No          Review of Systems:    Review of Systems   Constitutional: Negative for activity change, chills, diaphoresis, fatigue and fever.    HENT:

## 2021-06-02 ENCOUNTER — TELEPHONE (OUTPATIENT)
Dept: PRIMARY CARE CLINIC | Age: 86
End: 2021-06-02

## 2021-06-02 DIAGNOSIS — M16.0 OSTEOARTHRITIS OF BOTH HIPS, UNSPECIFIED OSTEOARTHRITIS TYPE: ICD-10-CM

## 2021-06-02 DIAGNOSIS — M15.9 OSTEOARTHRITIS INVOLVING MULTIPLE JOINTS ON BOTH SIDES OF BODY: Primary | ICD-10-CM

## 2021-06-02 NOTE — TELEPHONE ENCOUNTER
----- Message from ISAAC Lorenzo sent at 5/27/2021  7:48 AM CDT -----  Please call patient and let them know results. Metabolic panel is normal.  I have patient take an extra dose of Lasix today. He has home health physical therapy and occupational therapy coming to his home. See if they can put Unna boots on his lower extremities.   Anemia stable in comparison

## 2021-06-02 NOTE — TELEPHONE ENCOUNTER
Pt aware and voiced understanding. Informed patient of any recommendations from providers. Will call with any further questions. Legs and the weeping have actually stopped. Patient does not think he is needing the Unna boots. He is requesting a lift chair. Is this okay?

## 2021-06-03 ENCOUNTER — TELEPHONE (OUTPATIENT)
Dept: PRIMARY CARE CLINIC | Age: 86
End: 2021-06-03

## 2021-06-03 NOTE — TELEPHONE ENCOUNTER
Tayo De La Torre with [facility] called to let you know that pt has met and completed his goals for OT and she is discharging him today

## 2021-06-08 ENCOUNTER — TELEPHONE (OUTPATIENT)
Dept: PRIMARY CARE CLINIC | Age: 86
End: 2021-06-08

## 2021-06-08 NOTE — TELEPHONE ENCOUNTER
Howie Bach, [facility] called, she wanted you to know that they are going to cont his PT for [place]. See him once more this week and twice next week and then plan to discharge the week after next.

## 2021-06-09 ENCOUNTER — OFFICE VISIT (OUTPATIENT)
Dept: PRIMARY CARE CLINIC | Age: 86
End: 2021-06-09
Payer: MEDICARE

## 2021-06-09 VITALS
WEIGHT: 249 LBS | TEMPERATURE: 97.6 F | OXYGEN SATURATION: 97 % | DIASTOLIC BLOOD PRESSURE: 76 MMHG | SYSTOLIC BLOOD PRESSURE: 130 MMHG | BODY MASS INDEX: 35.73 KG/M2 | HEART RATE: 70 BPM

## 2021-06-09 DIAGNOSIS — Z99.81 DEPENDENCE ON CONTINUOUS SUPPLEMENTAL OXYGEN: ICD-10-CM

## 2021-06-09 DIAGNOSIS — I50.42 CHRONIC COMBINED SYSTOLIC AND DIASTOLIC CONGESTIVE HEART FAILURE (HCC): Primary | ICD-10-CM

## 2021-06-09 DIAGNOSIS — J44.9 CHRONIC OBSTRUCTIVE PULMONARY DISEASE, UNSPECIFIED COPD TYPE (HCC): ICD-10-CM

## 2021-06-09 DIAGNOSIS — E11.42 TYPE 2 DIABETES MELLITUS WITH DIABETIC POLYNEUROPATHY, WITHOUT LONG-TERM CURRENT USE OF INSULIN (HCC): ICD-10-CM

## 2021-06-09 DIAGNOSIS — M16.0 OSTEOARTHRITIS OF BOTH HIPS, UNSPECIFIED OSTEOARTHRITIS TYPE: ICD-10-CM

## 2021-06-09 DIAGNOSIS — M15.9 OSTEOARTHRITIS INVOLVING MULTIPLE JOINTS ON BOTH SIDES OF BODY: ICD-10-CM

## 2021-06-09 PROCEDURE — 1036F TOBACCO NON-USER: CPT | Performed by: NURSE PRACTITIONER

## 2021-06-09 PROCEDURE — 99214 OFFICE O/P EST MOD 30 MIN: CPT | Performed by: NURSE PRACTITIONER

## 2021-06-09 PROCEDURE — 3023F SPIROM DOC REV: CPT | Performed by: NURSE PRACTITIONER

## 2021-06-09 PROCEDURE — G8926 SPIRO NO PERF OR DOC: HCPCS | Performed by: NURSE PRACTITIONER

## 2021-06-09 PROCEDURE — 4040F PNEUMOC VAC/ADMIN/RCVD: CPT | Performed by: NURSE PRACTITIONER

## 2021-06-09 PROCEDURE — G8417 CALC BMI ABV UP PARAM F/U: HCPCS | Performed by: NURSE PRACTITIONER

## 2021-06-09 PROCEDURE — G8427 DOCREV CUR MEDS BY ELIG CLIN: HCPCS | Performed by: NURSE PRACTITIONER

## 2021-06-09 PROCEDURE — 1123F ACP DISCUSS/DSCN MKR DOCD: CPT | Performed by: NURSE PRACTITIONER

## 2021-06-09 ASSESSMENT — ENCOUNTER SYMPTOMS
COUGH: 0
RHINORRHEA: 0
TROUBLE SWALLOWING: 0
CONSTIPATION: 0
VOMITING: 0
ABDOMINAL PAIN: 0
SHORTNESS OF BREATH: 1
SORE THROAT: 0
DIARRHEA: 0
NAUSEA: 0

## 2021-06-09 NOTE — PATIENT INSTRUCTIONS
Daily weights, if gain 3-4 lbs over night or 5 or more pounds in a week notify office.      Fasting labs at convenience

## 2021-06-09 NOTE — PROGRESS NOTES
Consuelo Esquivel (:  1935) is a 81 y.o. male,Established patient, here for evaluation of the following chief complaint(s):  Follow-up (here for 2 week follow up. saw jolynn gann on . was supposed to get lift chair, was told that we need to auth from jolynn gann.) and Health Maintenance (discuss covid vaccine. )      ASSESSMENT/PLAN:    ICD-10-CM    1. Chronic combined systolic and diastolic congestive heart failure (HCC)  I50.42 Lift Chair MISC    Discussed daily weights. The current medical regimen is effective;  continue present plan and medications. 2. Type 2 diabetes mellitus with diabetic polyneuropathy, without long-term current use of insulin (HCC)  E11.42 Lift Chair MISC    The current medical regimen is effective;  continue present plan and medications. He needs to get fasting labs - I instructed his caregiver to see if can get it done. 3. Chronic obstructive pulmonary disease, unspecified COPD type ([facility]  J44.9 Lift Chair MISC    Stable, on chronic oxygen   4. Osteoarthritis of both hips, unspecified osteoarthritis type  M16.0 Lift Chair MISC   5. Osteoarthritis involving multiple joints on both sides of body  M15.9 Lift Chair MISC   6. Dependence on continuous supplemental oxygen  Z99.81 Lift Chair MISC       Return in about 3 months (around 2021). SUBJECTIVE/OBJECTIVE:  HPI  Here for f/u     CHF  He is feeling much better  Legs are not swelling. Last visit they were weeping some fluid. SOA but this is chronic. He sees Dr Anmol Cole. He uses oxygen and Bipap at night. Have appt with Dr Lucia Romero tomorrow to get new Bipap machine. Home Health is still coming out for visits. He reports they are going to try to extend it b/c he is almost out of visits. They are doing PT, OT and nursing care. He is weighing daily. Weight is down 5 lbs. Reviewed labs from last visit - they were good. Left side pain from rib fractures is good. Not having much pain.  Using bed rails to assist with Tenderness: There is no abdominal tenderness. There is no guarding. Musculoskeletal:         General: Normal range of motion. Cervical back: Normal range of motion and neck supple. Skin:     General: Skin is warm. Capillary Refill: Capillary refill takes less than 2 seconds. Neurological:      Mental Status: He is alert and oriented to person, place, and time. Psychiatric:         Mood and Affect: Mood normal.         Behavior: Behavior normal.         Thought Content: Thought content normal.         Judgment: Judgment normal.                 An electronic signature was used to authenticate this note.     --ISAAC Ivy

## 2021-06-10 DIAGNOSIS — Z12.5 ENCOUNTER FOR SCREENING FOR MALIGNANT NEOPLASM OF PROSTATE: ICD-10-CM

## 2021-06-10 DIAGNOSIS — F33.41 RECURRENT MAJOR DEPRESSIVE DISORDER, IN PARTIAL REMISSION (HCC): ICD-10-CM

## 2021-06-10 DIAGNOSIS — E78.2 MIXED HYPERLIPIDEMIA: ICD-10-CM

## 2021-06-10 DIAGNOSIS — I48.20 CHRONIC ATRIAL FIBRILLATION (HCC): ICD-10-CM

## 2021-06-10 DIAGNOSIS — R53.83 FATIGUE, UNSPECIFIED TYPE: ICD-10-CM

## 2021-06-10 DIAGNOSIS — S09.90XA INJURY OF HEAD, INITIAL ENCOUNTER: ICD-10-CM

## 2021-06-10 DIAGNOSIS — C61 PROSTATE CANCER (HCC): ICD-10-CM

## 2021-06-10 DIAGNOSIS — E11.42 TYPE 2 DIABETES MELLITUS WITH DIABETIC POLYNEUROPATHY, WITHOUT LONG-TERM CURRENT USE OF INSULIN (HCC): ICD-10-CM

## 2021-06-10 DIAGNOSIS — I10 ESSENTIAL HYPERTENSION: ICD-10-CM

## 2021-06-10 LAB
ALBUMIN SERPL-MCNC: 4.1 G/DL (ref 3.5–5.2)
ALP BLD-CCNC: 56 U/L (ref 40–130)
ALT SERPL-CCNC: 5 U/L (ref 5–41)
ANION GAP SERPL CALCULATED.3IONS-SCNC: 9 MMOL/L (ref 7–19)
AST SERPL-CCNC: 9 U/L (ref 5–40)
BASOPHILS ABSOLUTE: 0.1 K/UL (ref 0–0.2)
BASOPHILS RELATIVE PERCENT: 0.8 % (ref 0–1)
BILIRUB SERPL-MCNC: 0.5 MG/DL (ref 0.2–1.2)
BUN BLDV-MCNC: 22 MG/DL (ref 8–23)
CALCIUM SERPL-MCNC: 9.7 MG/DL (ref 8.8–10.2)
CHLORIDE BLD-SCNC: 99 MMOL/L (ref 98–111)
CHOLESTEROL, TOTAL: 106 MG/DL (ref 160–199)
CO2: 29 MMOL/L (ref 22–29)
CREAT SERPL-MCNC: 1.1 MG/DL (ref 0.5–1.2)
CREATININE URINE: 86.1 MG/DL (ref 4.2–622)
EOSINOPHILS ABSOLUTE: 0.4 K/UL (ref 0–0.6)
EOSINOPHILS RELATIVE PERCENT: 3.1 % (ref 0–5)
GFR AFRICAN AMERICAN: >59
GFR NON-AFRICAN AMERICAN: >60
GLUCOSE BLD-MCNC: 105 MG/DL (ref 74–109)
HBA1C MFR BLD: 4.8 % (ref 4–6)
HCT VFR BLD CALC: 35.1 % (ref 42–52)
HDLC SERPL-MCNC: 55 MG/DL (ref 55–121)
HEMOGLOBIN: 10.9 G/DL (ref 14–18)
IMMATURE GRANULOCYTES #: 0.6 K/UL
LDL CHOLESTEROL CALCULATED: 33 MG/DL
LYMPHOCYTES ABSOLUTE: 1 K/UL (ref 1.1–4.5)
LYMPHOCYTES RELATIVE PERCENT: 8.3 % (ref 20–40)
MCH RBC QN AUTO: 32.2 PG (ref 27–31)
MCHC RBC AUTO-ENTMCNC: 31.1 G/DL (ref 33–37)
MCV RBC AUTO: 103.8 FL (ref 80–94)
MICROALBUMIN UR-MCNC: 6.1 MG/DL (ref 0–19)
MICROALBUMIN/CREAT UR-RTO: 70.8 MG/G
MONOCYTES ABSOLUTE: 0.9 K/UL (ref 0–0.9)
MONOCYTES RELATIVE PERCENT: 7.4 % (ref 0–10)
NEUTROPHILS ABSOLUTE: 9.4 K/UL (ref 1.5–7.5)
NEUTROPHILS RELATIVE PERCENT: 76 % (ref 50–65)
PDW BLD-RTO: 13.7 % (ref 11.5–14.5)
PLATELET # BLD: 223 K/UL (ref 130–400)
PMV BLD AUTO: 10.3 FL (ref 9.4–12.4)
POTASSIUM SERPL-SCNC: 4.9 MMOL/L (ref 3.5–5)
PROSTATE SPECIFIC ANTIGEN: 0.01 NG/ML (ref 0–4)
RBC # BLD: 3.38 M/UL (ref 4.7–6.1)
SODIUM BLD-SCNC: 137 MMOL/L (ref 136–145)
T4 FREE: 1.14 NG/DL (ref 0.93–1.7)
TOTAL PROTEIN: 6.9 G/DL (ref 6.6–8.7)
TRIGL SERPL-MCNC: 91 MG/DL (ref 0–149)
TSH SERPL DL<=0.05 MIU/L-ACNC: 2.44 UIU/ML (ref 0.27–4.2)
WBC # BLD: 12.4 K/UL (ref 4.8–10.8)

## 2021-06-11 ENCOUNTER — TELEPHONE (OUTPATIENT)
Dept: PRIMARY CARE CLINIC | Age: 86
End: 2021-06-11

## 2021-06-11 NOTE — TELEPHONE ENCOUNTER
Patient called in stating that he needed a pre auth on a lift chair. Berna Garner the number to ins is [phone].

## 2021-06-16 ENCOUNTER — TELEPHONE (OUTPATIENT)
Dept: PRIMARY CARE CLINIC | Age: 86
End: 2021-06-16

## 2021-06-16 NOTE — TELEPHONE ENCOUNTER
[facility] called to let you know that they are discharging patient.

## 2021-06-17 ENCOUNTER — TELEPHONE (OUTPATIENT)
Dept: PRIMARY CARE CLINIC | Age: 86
End: 2021-06-17

## 2021-06-17 NOTE — TELEPHONE ENCOUNTER
----- Message from [address] [facility],[address], DO sent at 6/11/2021  8:21 AM CDT -----  There is some protein noted in the urine. We do need to maintain excellent blood pressure and blood sugar control in order to prevent further damage to your kidneys. We also need to remain on lisinopril as prescribed. PSA is nondetectable. Thyroid values are normal.Lipids are excellently controlled. Your metabolic profile is normal.  This includes kidney and liver functions as well as electrolytes. Hemoglobin A1c is 4.8 which indicates excellent blood sugar control over the past 3 months. This is fantastic. As long as you are not having any low blood sugars, we do not need to make any adjustments in your medications. If you are having low blood sugars, we will need to make an adjustment in your medications.   CBC is stable with slight improvement in your anemia

## 2021-06-17 NOTE — TELEPHONE ENCOUNTER
Pt aware and voiced understanding. Informed patient of any recommendations from providers. Will call with any further questions. Patient states that he has had no symptoms of low blood sugar but has noticed a couple of blood sugars of 95 and 91 and wanted to make sure this was ok? Also requesting a new lift chair. Is this ok? If so patient wants this order sent to [address] [facility],[address].

## 2021-06-18 NOTE — TELEPHONE ENCOUNTER
This was ordered on 6/9/21.  Will refax it to [phone] through [facility] [place]

## 2021-06-22 ENCOUNTER — TELEPHONE (OUTPATIENT)
Dept: PULMONOLOGY | Facility: CLINIC | Age: 86
End: 2021-06-22

## 2021-06-22 DIAGNOSIS — R59.0 MEDIASTINAL ADENOPATHY: Primary | ICD-10-CM

## 2021-06-22 NOTE — TELEPHONE ENCOUNTER
Jeanette not seen him before but Dr. Silvestre's note from April indicated that he wanted him to have a f/u CT in 2 mos so yes on scheduling the CT and making an appt here after the CT is done. He can be scheduled with anyone in the group if Dr. Silvestre or I do not have an opening. Thank you

## 2021-06-22 NOTE — TELEPHONE ENCOUNTER
Can you place a referral for CT Scan to be done as patient is due now and will need scheduled OV after scan is done.  Thanks

## 2021-06-22 NOTE — TELEPHONE ENCOUNTER
Patient called wanting to schedule his Chest CT.  He was a No Show on 06/15/21.  Will need to reschedule appt.  He did have a CT Chest WO Contrast at [facility] on 04/29/21.  Do you want to repeat this scan now?

## 2021-06-28 DIAGNOSIS — E11.42 TYPE 2 DIABETES MELLITUS WITH DIABETIC POLYNEUROPATHY, WITHOUT LONG-TERM CURRENT USE OF INSULIN (HCC): ICD-10-CM

## 2021-06-29 NOTE — TELEPHONE ENCOUNTER
Received fax from pharmacy requesting refill on pts medication(s). Pt was last seen in office on 6/9/2021  and has a follow up scheduled for 9/9/2021. Will send request to  Dr. Eden Frank  for patient.      Requested Prescriptions     Pending Prescriptions Disp Refills    metFORMIN (GLUCOPHAGE) 500 MG tablet [Pharmacy Med Name: METFORMIN HYDROCHLORIDE 500MG TABLET] 180 tablet 3     Sig: TAKE 1 TABLET BY MOUTH TWICE DAILY WITH MEALS

## 2021-07-02 NOTE — PROGRESS NOTES
[facility] - PODIATRY    Today's Date: 07/07/21    Patient Name: Prakash Castro  MRN: [id number]  CSN: [id number]  PCP: Gabriele Grover DO  Referring Provider: No ref. provider found    SUBJECTIVE     Chief Complaint   Patient presents with   • Follow-up     pcp04/27/2021 F/U/DIABETIC FOOT CARE- pt states left heel is sore= pt denies pain at this time- pt presents with cane and wheelchair and oxygen. Feet and legs have flaky skin, toe nails are thickened and irregularly shaped with discoloration   • Diabetes     109mg/dl last BG this morning     HPI: Prakash Castro, a 86 y.o.male, comes to clinic as a(n) established patient presenting for diabetic foot exam. Patient has h/o COPD, Arthritis, AF, CHF, CA, obesity, OAB, MARY, HTN, DM II with neuropthy, Depression. Patient is NIDDM with last stated BG level of 109mg/dl.  Patient reports numbness and tingling in feet.  States that he has had dry, flaky skin on lower extremities for some time but has been nonresponsive to both moisturization and previous trial of triamcinolone.  Patient states that toenails are long, thick, sharp, and discolored and that he is unable to reach nails to care for them himself.  Denies pain today. Relates previous treatment(s) including foot care by a podiatrist. Denies any constitutional symptoms. No other pedal complaints at this time.    Past Medical History:   Diagnosis Date   • Acute kidney injury (CMS/HCC) 7/26/2019   • Acute on chronic respiratory failure with hypoxia and hypercapnia (CMS/HCC) 7/26/2019   • Arthritis    • Atrial fibrillation (CMS/HCC)     HISTORY OF   • Cancer (CMS/HCC)     prostate   • Cellulitis     both legs   • CHF (congestive heart failure) (CMS/HCC)    • Depression    • Diabetes mellitus (CMS/HCC)    • Hematuria    • History of cardioversion     PER PATIENT REPORT   • Hypertension    • Obesity    • Obstructive sleep apnea 6/18/2020    BiPAP +3 L   • On home oxygen therapy     3 liters   •  Overactive bladder    • Pacemaker    • Sleep apnea     bipap   • SOB (shortness of breath)    • Stage 2 moderate COPD by GOLD classification (CMS/[facility]) 3/4/2019     Past Surgical History:   Procedure Laterality Date   • BRONCHOSCOPY N/A 7/6/2020    Procedure: BRONCHOSCOPY WITH ENDOBRONCHIAL ULTRASOUND;  Surgeon: Darrian Silvestre MD;  Location:  [facility] OR;  Service: Pulmonary;  Laterality: N/A;  pre mediastinal adenopathy  post mediastical adenopathy  dr rg beebe   • CATARACT EXTRACTION Right    • HERNIA REPAIR     • INCISION AND DRAINAGE LEG Left 7/19/2019    Procedure: INCISION AND DRAINAGE, LEFT FOOT;  Surgeon: Juan Antonio Uriarte DPM;  Location:  [facility] OR;  Service: Podiatry   • INSERTION PROSTATE RADIATION SEED     • JOINT REPLACEMENT     • PACEMAKER IMPLANTATION     • REPLACEMENT TOTAL KNEE BILATERAL       Family History   Problem Relation Age of Onset   • Prostate cancer Son    • Cancer Father    • Arthritis Mother    • Depression Mother    • Hearing loss Mother    • Heart disease Mother    • Hypertension Mother      Social History     Socioeconomic History   • Marital status:      Spouse name: Not on file   • Number of children: Not on file   • Years of education: Not on file   • Highest education level: Not on file   Tobacco Use   • Smoking status: Never Smoker   • Smokeless tobacco: Never Used   Vaping Use   • Vaping Use: Never used   Substance and Sexual Activity   • Alcohol use: No   • Drug use: No   • Sexual activity: Defer     No Known Allergies  Current Outpatient Medications   Medication Sig Dispense Refill   • acetaminophen (TYLENOL) 650 MG 8 hr tablet Take 650 mg by mouth Daily.     • albuterol sulfate  (90 Base) MCG/ACT inhaler Inhale 2 puffs Every 4 (Four) Hours As Needed for Wheezing.     • amLODIPine (NORVASC) 10 MG tablet Take 10 mg by mouth Daily.     • atorvastatin (LIPITOR) 40 MG tablet      • buPROPion XL (WELLBUTRIN XL) 150 MG 24 hr tablet Take 300 mg by mouth  Daily.     • carvedilol (COREG) 12.5 MG tablet Take 12.5 mg by mouth 2 (two) times a day with meals.     • celecoxib (CeleBREX) 200 MG capsule Take 200 mg by mouth 2 (Two) Times a Day.     • digoxin (LANOXIN) 125 MCG tablet Take 125 mcg by mouth every day.     • Dulaglutide (TRULICITY) 0.75 MG/0.5ML solution pen-injector Inject 0.5 mL under the skin into the appropriate area as directed 1 (One) Time Per Week. Saturday.     • enzalutamide (XTANDI) 40 MG chemo capsule Take 4 capsules by mouth Daily. 120 capsule 10   • FLUoxetine (PROzac) 20 MG capsule Take 20 mg by mouth Daily. Take with Prozac 40 mg (total dose of 60 mg daily).     • fluticasone (FLONASE) 50 MCG/ACT nasal spray 2 sprays into each nostril daily. Administer 2 sprays in each nostril for each dose.     • Fluticasone-Umeclidin-Vilant 100-62.5-25 MCG/INH aerosol powder  Inhale 1 each Daily.     • furosemide (LASIX) 40 MG tablet Take 60 mg by mouth Daily.     • hydrochlorothiazide (HYDRODIURIL) 12.5 MG tablet Take 1 tablet by mouth Daily. 30 tablet 1   • isosorbide mononitrate (IMDUR) 60 MG 24 hr tablet      • leuprolide (LUPRON) 30 MG injection Inject 30 mg into the appropriate muscle as directed by prescriber Every 6 (Six) Months.     • lisinopril (PRINIVIL,ZESTRIL) 40 MG tablet Take 40 mg by mouth daily.     • metFORMIN (GLUCOPHAGE) 500 MG tablet Take 500 mg by mouth 2 (Two) Times a Day With Meals.     • metolazone (ZAROXOLYN) 2.5 MG tablet Take 2.5 mg by mouth daily.     • Multiple Vitamins-Minerals (MULTIVITAMIN ADULT) tablet Take 1 tablet by mouth Daily.     • MYRBETRIQ 25 MG tablet sustained-release 24 hour 24 hr tablet TAKE 1 TABLET BY MOUTH EVERY DAY 90 tablet 3   • O2 (OXYGEN) Inhale 4 L/min Continuous.     • Omega-3 Fatty Acids (FISH OIL) 1200 MG capsule capsule Take 1,200 mg by mouth 2 (Two) Times a Day.     • potassium chloride (K-DUR,KLOR-CON) 10 MEQ CR tablet Take 10 mEq by mouth Daily.     • prazosin (MINIPRESS) 2 MG capsule Take 2 mg by  mouth Every Night.     • rivaroxaban (XARELTO) 20 MG tablet Take 20 mg by mouth daily.     • spironolactone (ALDACTONE) 50 MG tablet Take 50 mg by mouth Daily.     • terazosin (HYTRIN) 5 MG capsule Take 5 mg by mouth every night.     • betamethasone dipropionate 0.05 % cream Apply  topically to the appropriate area as directed 2 (Two) Times a Day for 10 days. Apply to affected area BID 45 g 1   • FLUoxetine (PROZAC) 40 MG capsule Take 40 mg by mouth Daily. Take with Prozac 20 mg (total dose of 60 mg daily).       No current facility-administered medications for this visit.     Review of Systems   Constitutional: Negative for chills and fever.   HENT: Negative for congestion.    Respiratory: Negative for shortness of breath.    Cardiovascular: Positive for leg swelling. Negative for chest pain.   Gastrointestinal: Negative for constipation, diarrhea, nausea and vomiting.   Musculoskeletal: Positive for arthralgias and gait problem. Negative for myalgias.   Skin: Negative for wound.        dry, flaky skin of legs   Neurological: Positive for numbness.       OBJECTIVE     Vitals:    21 1142   BP: 99/54   Pulse: 64   SpO2: 95%       PHYSICAL EXAM  GEN:   Accompanied by none.     Foot/Ankle Exam:       General:   Diabetic Foot Exam Performed    Appearance: obesity    Orientation: AAOx3    Affect: appropriate    Gait: shuffling    Gait comment:  Unsteady  Assistance: cane and wheelchair    Shoe Gear:  Diabetic shoes    VASCULAR      Right Foot Vascularity   Dorsalis pedis:  2+  Posterior tibial:  2+  Skin Temperature: warm    Edema Gradin+ and pitting  CFT:  3  Pedal Hair Growth:  Absent  Varicosities: mild varicosities       Left Foot Vascularity   Dorsalis pedis:  2+  Posterior tibial:  2+  Skin Temperature: warm    Edema Gradin+ and pitting  CFT:  3  Pedal Hair Growth:  Absent  Varicosities: mild varicosities        NEUROLOGIC     Right Foot Neurologic   Light touch sensation:  Diminished  Vibratory  sensation:  Diminished  Hot/Cold sensation: diminished    Protective Sensation using [place] Monofilament:  0     Left Foot Neurologic   Light touch sensation:  Diminished  Vibratory sensation:  Diminished  Hot/cold sensation: diminished    Protective Sensation using [place] Monofilament:  0     MUSCULOSKELETAL      Right Foot Musculoskeletal    Amputation   Right toes amputated: No    Ecchymosis:  None  Tenderness: none    Arch:  Normal  Hallux valgus: No    Hallux limitus: Yes       Left Foot Musculoskeletal    Amputation   Left toes amputated: No    Ecchymosis:  None  Tenderness: none    Arch:  Normal  Hallux valgus: No    Hallux limitus: Yes       MUSCLE STRENGTH     Right Foot Muscle Strength   Foot dorsiflexion:  4  Foot plantar flexion:  4  Foot inversion:  4  Foot eversion:  4     Left Foot Muscle Strength   Foot dorsiflexion:  4  Foot plantar flexion:  4  Foot inversion:  4  Foot eversion:  4     DERMATOLOGIC     Right Foot Dermatologic   Skin: dry skin and atrophic    Nails: onychomycosis, abnormally thick, subungual debris and dystrophic nails       Left Foot Dermatologic   Skin: dry skin and atrophic    Nails: onychomycosis, abnormally thick, subungual debris and dystrophic nails        RADIOLOGY/NUCLEAR:  No results found.    LABORATORY/CULTURE RESULTS:      PATHOLOGY RESULTS:       ASSESSMENT/PLAN     Diagnoses and all orders for this visit:    1. Onychomycosis (Primary)    2. Type 2 diabetes mellitus with diabetic polyneuropathy, without long-term current use of insulin (CMS/[facility])    3. Stasis dermatitis of both legs  -     betamethasone dipropionate 0.05 % cream; Apply  topically to the appropriate area as directed 2 (Two) Times a Day for 10 days. Apply to affected area BID  Dispense: 45 g; Refill: 1      Comprehensive lower extremity examination and evaluation was performed.  Discussed findings and treatment plan including risks, benefits, and treatment options with patient in  detail. Patient agreed with treatment plan.  After verbal consent obtained, nail(s) x10 debrided of length and thickness with nail nipper without incidence  Patient may maintain nails and calluses at home utilizing emery board or pumice stone between visits as needed  Reviewed at home diabetic foot care including daily foot checks   Rx betamethasone dipropionate 0.05% cream 1 application twice daily x10 days for stasis dermatitis.  Patient's Body mass index is 35.44 kg/m². BMI is above normal parameters. Recommendations include: educational material.  Continue to monitor and control blood glucose levels under direction of PCP.    An After Visit Summary was printed and given to the patient at discharge, including (if requested) any available informative/educational handouts regarding diagnosis, treatment, or medications. All questions were answered to patient/family satisfaction. Should symptoms fail to improve or worsen they agree to call or return to clinic or to go to the Emergency Department. Discussed the importance of following up with any needed screening tests/labs/specialist appointments and any requested follow-up recommended by me today. Importance of maintaining follow-up discussed and patient accepts that missed appointments can delay diagnosis and potentially lead to worsening of conditions.  Return in about 3 months (around 10/7/2021)., or sooner if acute issues arise.    Lab Frequency Next Occurrence   Follow Anesthesia Guidelines / Protocol Once 03/12/2020   Obtain Informed Consent Once 03/25/2020   Follow Anesthesia Guidelines / Protocol Once 05/27/2020   Obtain Informed Consent Once 06/01/2020   PSA DIAGNOSTIC Once 09/20/2020       This document has been electronically signed by RASHIDA Webber on July 7, 2021 12:39 CDT

## 2021-07-06 ENCOUNTER — TELEPHONE (OUTPATIENT)
Dept: PODIATRY | Facility: CLINIC | Age: 86
End: 2021-07-06

## 2021-07-06 NOTE — TELEPHONE ENCOUNTER
Spoke with pt regarding appt on 07/07/2021. Pt confirmed time and date of appt.     Tylenol and Ibuprofen as needed Tylenol and Ibuprofen as needed.  May have tylenol again at 6PM and ibuprophen at 4:30PM

## 2021-07-07 ENCOUNTER — OFFICE VISIT (OUTPATIENT)
Dept: PODIATRY | Facility: CLINIC | Age: 86
End: 2021-07-07

## 2021-07-07 VITALS
DIASTOLIC BLOOD PRESSURE: 54 MMHG | HEART RATE: 64 BPM | OXYGEN SATURATION: 95 % | WEIGHT: 247 LBS | SYSTOLIC BLOOD PRESSURE: 99 MMHG | BODY MASS INDEX: 35.36 KG/M2 | HEIGHT: 70 IN

## 2021-07-07 DIAGNOSIS — B35.1 ONYCHOMYCOSIS: Primary | ICD-10-CM

## 2021-07-07 DIAGNOSIS — E11.42 TYPE 2 DIABETES MELLITUS WITH DIABETIC POLYNEUROPATHY, WITHOUT LONG-TERM CURRENT USE OF INSULIN (HCC): ICD-10-CM

## 2021-07-07 DIAGNOSIS — I87.2 STASIS DERMATITIS OF BOTH LEGS: ICD-10-CM

## 2021-07-07 PROCEDURE — 99213 OFFICE O/P EST LOW 20 MIN: CPT | Performed by: NURSE PRACTITIONER

## 2021-07-07 PROCEDURE — 11721 DEBRIDE NAIL 6 OR MORE: CPT | Performed by: NURSE PRACTITIONER

## 2021-07-07 RX ORDER — FLUOXETINE HYDROCHLORIDE 20 MG/1
20 CAPSULE ORAL DAILY
Qty: 90 CAPSULE | Refills: 3 | Status: SHIPPED | OUTPATIENT
Start: 2021-07-07 | End: 2021-10-20 | Stop reason: SDUPTHER

## 2021-07-07 RX ORDER — BETAMETHASONE DIPROPIONATE 0.5 MG/G
CREAM TOPICAL 2 TIMES DAILY
Qty: 45 G | Refills: 1 | Status: SHIPPED | OUTPATIENT
Start: 2021-07-07 | End: 2021-07-17

## 2021-07-07 NOTE — PATIENT INSTRUCTIONS
Diabetes Mellitus and Foot Care  Foot care is an important part of your health, especially when you have diabetes. Diabetes may cause you to have problems because of poor blood flow (circulation) to your feet and legs, which can cause your skin to:  · Become thinner and drier.  · Break more easily.  · Heal more slowly.  · Peel and crack.  You may also have nerve damage (neuropathy) in your legs and feet, causing decreased feeling in them. This means that you may not notice minor injuries to your feet that could lead to more serious problems. Noticing and addressing any potential problems early is the best way to prevent future foot problems.  How to care for your feet  Foot hygiene  · Wash your feet daily with warm water and mild soap. Do not use hot water. Then, pat your feet and the areas between your toes until they are completely dry. Do not soak your feet as this can dry your skin.  · Trim your toenails straight across. Do not dig under them or around the cuticle. File the edges of your nails with an emery board or nail file.  · Apply a moisturizing lotion or petroleum jelly to the skin on your feet and to dry, brittle toenails. Use lotion that does not contain alcohol and is unscented. Do not apply lotion between your toes.  Shoes and socks  · Wear clean socks or stockings every day. Make sure they are not too tight. Do not wear knee-high stockings since they may decrease blood flow to your legs.  · Wear shoes that fit properly and have enough cushioning. Always look in your shoes before you put them on to be sure there are no objects inside.  · To break in new shoes, wear them for just a few hours a day. This prevents injuries on your feet.  Wounds, scrapes, corns, and calluses  · Check your feet daily for blisters, cuts, bruises, sores, and redness. If you cannot see the bottom of your feet, use a mirror or ask someone for help.  · Do not cut corns or calluses or try to remove them with medicine.  · If you  find a minor scrape, cut, or break in the skin on your feet, keep it and the skin around it clean and dry. You may clean these areas with mild soap and water. Do not clean the area with peroxide, alcohol, or iodine.  · If you have a wound, scrape, corn, or callus on your foot, look at it several times a day to make sure it is healing and not infected. Check for:  ? Redness, swelling, or pain.  ? Fluid or blood.  ? Warmth.  ? Pus or a bad smell.  General instructions  · Do not cross your legs. This may decrease blood flow to your feet.  · Do not use heating pads or hot water bottles on your feet. They may burn your skin. If you have lost feeling in your feet or legs, you may not know this is happening until it is too late.  · Protect your feet from hot and cold by wearing shoes, such as at the beach or on hot pavement.  · Schedule a complete foot exam at least once a year (annually) or more often if you have foot problems. If you have foot problems, report any cuts, sores, or bruises to your health care provider immediately.  Contact a health care provider if:  · You have a medical condition that increases your risk of infection and you have any cuts, sores, or bruises on your feet.  · You have an injury that is not healing.  · You have redness on your legs or feet.  · You feel burning or tingling in your legs or feet.  · You have pain or cramps in your legs and feet.  · Your legs or feet are numb.  · Your feet always feel cold.  · You have pain around a toenail.  Get help right away if:  · You have a wound, scrape, corn, or callus on your foot and:  ? You have pain, swelling, or redness that gets worse.  ? You have fluid or blood coming from the wound, scrape, corn, or callus.  ? Your wound, scrape, corn, or callus feels warm to the touch.  ? You have pus or a bad smell coming from the wound, scrape, corn, or callus.  ? You have a fever.  ? You have a red line going up your leg.  Summary  · Check your feet every day  for cuts, sores, red spots, swelling, and blisters.  · Moisturize feet and legs daily.  · Wear shoes that fit properly and have enough cushioning.  · If you have foot problems, report any cuts, sores, or bruises to your health care provider immediately.  · Schedule a complete foot exam at least once a year (annually) or more often if you have foot problems.  This information is not intended to replace advice given to you by your health care provider. Make sure you discuss any questions you have with your health care provider.  Document Revised: 09/09/2020 Document Reviewed: 01/19/2018  [employer] Patient Education © 2021 [employer]      Chronic Venous Insufficiency  Chronic venous insufficiency is a condition where the leg veins cannot effectively pump blood from the legs to the heart. This happens when the vein walls are either stretched, weakened, or damaged, or when the valves inside the vein are damaged. With the right treatment, you should be able to continue with an active life. This condition is also called venous stasis.  What are the causes?  Common causes of this condition include:  · High blood pressure inside the veins (venous hypertension).  · Sitting or standing too long, causing increased blood pressure in the leg veins.  · A blood clot that blocks blood flow in a vein (deep vein thrombosis, DVT).  · Inflammation of a vein (phlebitis) that causes a blood clot to form.  · Tumors in the pelvis that cause blood to back up.  What increases the risk?  The following factors may make you more likely to develop this condition:  · Having a family history of this condition.  · Obesity.  · Pregnancy.  · Living without enough regular physical activity or exercise (sedentary lifestyle).  · Smoking.  · Having a job that requires long periods of standing or sitting in one place.  · Being a certain age. Women in their 40s and 50s and men in their 70s are more likely to develop this condition.  What are the signs or  symptoms?  Symptoms of this condition include:  · Veins that are enlarged, bulging, or twisted (varicose veins).  · Skin breakdown or ulcers.  · Reddened skin or dark discoloration of skin on the leg between the knee and ankle.  · Brown, smooth, tight, and painful skin just above the ankle, usually on the inside of the leg (lipodermatosclerosis).  · Swelling of the legs.  How is this diagnosed?  This condition may be diagnosed based on:  · Your medical history.  · A physical exam.  · Tests, such as:  ? A procedure that creates an image of a blood vessel and nearby organs and provides information about blood flow through the blood vessel (duplex ultrasound).  ? A procedure that tests blood flow (plethysmography).  ? A procedure that looks at the veins using X-ray and dye (venogram).  How is this treated?  The goals of treatment are to help you return to an active life and to minimize pain or disability. Treatment depends on the severity of your condition, and it may include:  · Wearing compression stockings. These can help relieve symptoms and help prevent your condition from getting worse. However, they do not cure the condition.  · Sclerotherapy. This procedure involves an injection of a solution that shrinks damaged veins.  · Surgery. This may involve:  ? Removing a diseased vein (vein stripping).  ? Cutting off blood flow through the vein (laser ablation surgery).  ? Repairing or reconstructing a valve within the affected vein.  Follow these instructions at home:         · Wear compression stockings as told by your health care provider. These stockings help to prevent blood clots and reduce swelling in your legs.  · Take over-the-counter and prescription medicines only as told by your health care provider.  · Stay active by exercising, walking, or doing different activities. Ask your health care provider what activities are safe for you and how much exercise you need.  · Drink enough fluid to keep your urine pale  yellow.  · Do not use any products that contain nicotine or tobacco, such as cigarettes, e-cigarettes, and chewing tobacco. If you need help quitting, ask your health care provider.  · Keep all follow-up visits as told by your health care provider. This is important.  Contact a health care provider if you:  · Have redness, swelling, or more pain in the affected area.  · See a red streak or line that goes up or down from the affected area.  · Have skin breakdown or skin loss in the affected area, even if the breakdown is small.  · Get an injury in the affected area.  Get help right away if:  · You get an injury and an open wound in the affected area.  · You have:  ? Severe pain that does not get better with medicine.  ? Sudden numbness or weakness in the foot or ankle below the affected area.  ? Trouble moving your foot or ankle.  ? A fever.  ? Worse or persistent symptoms.  ? Chest pain.  ? Shortness of breath.  Summary  · Chronic venous insufficiency is a condition where the leg veins cannot effectively pump blood from the legs to the heart.  · Chronic venous insufficiency occurs when the vein walls become stretched, weakened, or damaged, or when valves within the vein are damaged.  · Treatment depends on how severe your condition is. It often involves wearing compression stockings and may involve having a procedure.  · Make sure you stay active by exercising, walking, or doing different activities. Ask your health care provider what activities are safe for you and how much exercise you need.  This information is not intended to replace advice given to you by your health care provider. Make sure you discuss any questions you have with your health care provider.  Document Revised: 09/10/2019 Document Reviewed: 09/10/2019  [employer] Patient Education © 2021 [employer]

## 2021-07-08 DIAGNOSIS — I50.42 CHRONIC COMBINED SYSTOLIC AND DIASTOLIC CONGESTIVE HEART FAILURE (HCC): Primary | ICD-10-CM

## 2021-07-09 ENCOUNTER — TELEPHONE (OUTPATIENT)
Dept: PULMONOLOGY | Facility: CLINIC | Age: 86
End: 2021-07-09

## 2021-07-09 RX ORDER — FUROSEMIDE 40 MG/1
60 TABLET ORAL DAILY
Qty: 135 TABLET | Refills: 3 | Status: SHIPPED | OUTPATIENT
Start: 2021-07-09

## 2021-07-13 DIAGNOSIS — M10.9 GOUT, UNSPECIFIED CAUSE, UNSPECIFIED CHRONICITY, UNSPECIFIED SITE: Primary | ICD-10-CM

## 2021-07-13 RX ORDER — ALLOPURINOL 100 MG/1
100 TABLET ORAL DAILY
Qty: 30 TABLET | Refills: 5 | Status: SHIPPED | OUTPATIENT
Start: 2021-07-13

## 2021-07-14 ENCOUNTER — HOSPITAL ENCOUNTER (OUTPATIENT)
Dept: CT IMAGING | Facility: HOSPITAL | Age: 86
Discharge: HOME | End: 2021-07-14
Admitting: INTERNAL MEDICINE

## 2021-07-14 DIAGNOSIS — R59.0 MEDIASTINAL ADENOPATHY: ICD-10-CM

## 2021-07-14 LAB — CREAT BLDA-MCNC: 1.2 MG/DL (ref 0.6–1.3)

## 2021-07-14 PROCEDURE — 25010000002 IOPAMIDOL 61 % SOLUTION: Performed by: INTERNAL MEDICINE

## 2021-07-14 PROCEDURE — 71260 CT THORAX DX C+: CPT

## 2021-07-14 PROCEDURE — 82565 ASSAY OF CREATININE: CPT

## 2021-07-14 RX ADMIN — IOPAMIDOL 100 ML: 612 INJECTION, SOLUTION INTRAVENOUS at 10:38

## 2021-07-15 ENCOUNTER — TELEPHONE (OUTPATIENT)
Dept: PRIMARY CARE CLINIC | Age: 86
End: 2021-07-15

## 2021-07-15 DIAGNOSIS — M16.0 OSTEOARTHRITIS OF BOTH HIPS, UNSPECIFIED OSTEOARTHRITIS TYPE: Primary | ICD-10-CM

## 2021-07-15 NOTE — TELEPHONE ENCOUNTER
Patient call needs a referral for a toilet raiser: must be for 300+ pounds and long gated    Patient is currently getting stuck on current toilet raiser.     Patient is requesting a call back [phone]

## 2021-08-05 ENCOUNTER — TELEPHONE (OUTPATIENT)
Dept: PRIMARY CARE CLINIC | Age: 86
End: 2021-08-05

## 2021-08-05 DIAGNOSIS — M16.0 OSTEOARTHRITIS OF BOTH HIPS, UNSPECIFIED OSTEOARTHRITIS TYPE: Primary | ICD-10-CM
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
## 2021-08-05 NOTE — TELEPHONE ENCOUNTER
Summer from [facility] called along with Pt about referral for Toilet Riser that has to be at least up to 300 lbs, and the seat and the hole has to be ELONGATED, the referral has to be sent to the  NOT pharmacy,  is Julia Pope, please scan and send to her email [email or web address], also please call mihai at [phone], so she knows that it has been sent .

## 2021-08-10 ENCOUNTER — TELEPHONE (OUTPATIENT)
Dept: PODIATRY | Facility: CLINIC | Age: 86
End: 2021-08-10

## 2021-08-10 NOTE — TELEPHONE ENCOUNTER
Tried to reach patient to explain his appointment with Marc Parra has been changed due to Provider being out of the office. Patient could not hear me. Patient is rescheduled to 10-4 @ 9:30. I will send an updated appointment reminder.

## 2021-08-12 ENCOUNTER — TELEPHONE (OUTPATIENT)
Dept: PRIMARY CARE CLINIC | Age: 86
End: 2021-08-12

## 2021-08-12 NOTE — TELEPHONE ENCOUNTER
Alix  called and LM about pts plan of care from 5/22/21. She said that she has sent this to be signed and dated, faxed 6/25, 7/27. Fax [phone]    Was faxed back 7/28/21. I refaxed through [facility], but also printed to hand fax as well.

## 2021-08-20 NOTE — PROGRESS NOTES
"Background:  Pt with right paratracheal mass, hx prostate cancer, copd with nocturnal hypoxemia, sleep apnea and obestiy.  FEV! 53% pred 2015.    Chief Complaint  Mediastinal Adenopathy (had chest ct scan done @ [facility] ), Centrilobular emphysema, and Chronic respiratory failure with hypoxia    Subjective    History of Present Illness       Prakash Castro [facility] PULMONARY [facility] CRITICAL CARE MEDICINE.  He deferred the corona vaccine.  He is staying careful and keeping distance.  No acute resp complaints.  His inhaler is out.         Objective     Vital Signs:   /74   Pulse 80   Ht 177.8 cm (70\")   Wt 112 kg (246 lb)   SpO2 92%   BMI 35.30 kg/m²   Physical Exam  Constitutional:       Appearance: Normal appearance. He is not ill-appearing or diaphoretic.   Eyes:      Extraocular Movements: Extraocular movements intact.   Pulmonary:      Effort: Pulmonary effort is normal. No respiratory distress.      Breath sounds: No wheezing, rhonchi or rales.   Skin:     Findings: No erythema or rash.   Neurological:      Mental Status: He is alert.        Result Review  Data Reviewed:{ Labs  Result Review  Imaging  Media :23}       PFT Values        Some values may be hidden. Unless noted otherwise, only the newest values recorded on each date are displayed.         Old Values PFT Results 3/12/20   No data to display.      Pre Drug PFT Results 3/12/20   FVC 56   FEV1 50   FEF 25-75% 30   FEV1/FVC 68.76      Post Drug PFT Results 3/12/20   No data to display.      Other Tests PFT Results 3/12/20   TLC 77      DLCO 80   D/VAsb 138         CT Chest With Contrast Diagnostic (07/14/2021 10:37)  My interpretation of radiograph: fluid density pretrachea, distal.               Assessment and Plan  {CC Problem List  Visit Diagnosis  ROS  Review (Popup)  Health Maintenance  Quality  BestPractice  Medications  SmartSets  SnapShot Encounters  Media :23}   Problem List Items " Addressed This Visit        Pulmonary Problems    Obstructive sleep apnea (Chronic)    Overview     BiPAP +3 L           Other Visit Diagnoses     Chronic respiratory failure with hypoxia (CMS/[facility])    -  Primary    Stage 2 moderate COPD by GOLD classification (CMS/[facility]), with exacerbation        Relevant Medications    albuterol sulfate  (90 Base) MCG/ACT inhaler      he is stable with respect to copd.  Meds reviewed. Continue trelegy and refill albuterol. Continue bipap for sleep; he says he could not be without it.  Continue oxygen which he is using compliantly and he benefits from it.    Follow Up {Instructions Charge Capture  Follow-up Communications :23}   Return in about 1 year (around 8/24/2022).  Patient was given instructions and counseling regarding his condition or for health maintenance advice. Please see specific information pulled into the AVS if appropriate.    Electronically signed by Darrian Sivlestre MD, 8/24/2021, 15:33 CDT

## 2021-08-21 DIAGNOSIS — M16.0 OSTEOARTHRITIS OF BOTH HIPS, UNSPECIFIED OSTEOARTHRITIS TYPE: Primary | ICD-10-CM

## 2021-08-24 ENCOUNTER — OFFICE VISIT (OUTPATIENT)
Dept: PULMONOLOGY | Facility: CLINIC | Age: 86
End: 2021-08-24

## 2021-08-24 VITALS
DIASTOLIC BLOOD PRESSURE: 74 MMHG | WEIGHT: 246 LBS | BODY MASS INDEX: 35.22 KG/M2 | HEART RATE: 80 BPM | HEIGHT: 70 IN | SYSTOLIC BLOOD PRESSURE: 120 MMHG | OXYGEN SATURATION: 92 %

## 2021-08-24 DIAGNOSIS — J96.11 CHRONIC RESPIRATORY FAILURE WITH HYPOXIA (HCC): Primary | ICD-10-CM

## 2021-08-24 DIAGNOSIS — J44.9 STAGE 2 MODERATE COPD BY GOLD CLASSIFICATION (HCC): ICD-10-CM

## 2021-08-24 DIAGNOSIS — G47.33 OBSTRUCTIVE SLEEP APNEA: ICD-10-CM

## 2021-08-24 PROCEDURE — 99214 OFFICE O/P EST MOD 30 MIN: CPT | Performed by: INTERNAL MEDICINE

## 2021-08-24 RX ORDER — ALLOPURINOL 100 MG/1
100 TABLET ORAL
COMMUNITY
Start: 2021-07-13

## 2021-08-24 RX ORDER — CELECOXIB 200 MG/1
200 CAPSULE ORAL DAILY
Qty: 30 CAPSULE | Refills: 11 | Status: SHIPPED | OUTPATIENT
Start: 2021-08-24

## 2021-08-24 RX ORDER — LANOLIN ALCOHOL/MO/W.PET/CERES
CREAM (GRAM) TOPICAL SEE ADMIN INSTRUCTIONS
COMMUNITY
Start: 2021-07-07

## 2021-08-24 RX ORDER — PANTOPRAZOLE SODIUM 40 MG/1
TABLET, DELAYED RELEASE ORAL
COMMUNITY
Start: 2021-07-29

## 2021-08-24 RX ORDER — ALBUTEROL SULFATE 90 UG/1
2 AEROSOL, METERED RESPIRATORY (INHALATION) EVERY 4 HOURS PRN
Qty: 18 G | Refills: 11 | Status: SHIPPED | OUTPATIENT
Start: 2021-08-24

## 2021-08-24 NOTE — TELEPHONE ENCOUNTER
Received fax from pharmacy requesting refill on pts medication(s). Pt was last seen in office on 6/9/2021  and has a follow up scheduled for 9/9/2021. Will send request to  Dr. Marilynn Strange  for patient.      Requested Prescriptions     Pending Prescriptions Disp Refills    CELEBREX 200 MG capsule [Pharmacy Med Name: CELEBREX 200MG CAPSULE] 30 capsule 0     Sig: TAKE 1 CAPSULE BY MOUTH DAILY

## 2021-08-25 ENCOUNTER — HOSPITAL ENCOUNTER (EMERGENCY)
Age: 86
Discharge: HOME | End: 2021-08-25
Attending: EMERGENCY MEDICINE
Payer: MEDICARE

## 2021-08-25 ENCOUNTER — APPOINTMENT (OUTPATIENT)
Dept: GENERAL RADIOLOGY | Age: 86
End: 2021-08-25
Payer: MEDICARE

## 2021-08-25 VITALS
RESPIRATION RATE: 11 BRPM | SYSTOLIC BLOOD PRESSURE: 140 MMHG | HEART RATE: 62 BPM | DIASTOLIC BLOOD PRESSURE: 75 MMHG | OXYGEN SATURATION: 100 % | TEMPERATURE: 98.3 F

## 2021-08-25 DIAGNOSIS — J44.9 CHRONIC OBSTRUCTIVE PULMONARY DISEASE, UNSPECIFIED COPD TYPE (HCC): ICD-10-CM

## 2021-08-25 DIAGNOSIS — U07.1 COVID-19 VIRUS INFECTION: Primary | ICD-10-CM

## 2021-08-25 DIAGNOSIS — J96.11 CHRONIC RESPIRATORY FAILURE WITH HYPOXIA (HCC): ICD-10-CM

## 2021-08-25 LAB
ALBUMIN SERPL-MCNC: 4.2 G/DL (ref 3.5–5.2)
ALP BLD-CCNC: 57 U/L (ref 40–130)
ALT SERPL-CCNC: 8 U/L (ref 5–41)
ANION GAP SERPL CALCULATED.3IONS-SCNC: 11 MMOL/L (ref 7–19)
AST SERPL-CCNC: 17 U/L (ref 5–40)
BASOPHILS ABSOLUTE: 0.1 K/UL (ref 0–0.2)
BASOPHILS RELATIVE PERCENT: 1 % (ref 0–1)
BILIRUB SERPL-MCNC: 0.5 MG/DL (ref 0.2–1.2)
BUN BLDV-MCNC: 24 MG/DL (ref 8–23)
CALCIUM SERPL-MCNC: 9.8 MG/DL (ref 8.8–10.2)
CHLORIDE BLD-SCNC: 98 MMOL/L (ref 98–111)
CO2: 29 MMOL/L (ref 22–29)
CREAT SERPL-MCNC: 1.1 MG/DL (ref 0.5–1.2)
EOSINOPHILS ABSOLUTE: 0.2 K/UL (ref 0–0.6)
EOSINOPHILS RELATIVE PERCENT: 2.2 % (ref 0–5)
GFR AFRICAN AMERICAN: >59
GFR NON-AFRICAN AMERICAN: >60
GLUCOSE BLD-MCNC: 115 MG/DL (ref 74–109)
HCT VFR BLD CALC: 35.3 % (ref 42–52)
HEMOGLOBIN: 10.9 G/DL (ref 14–18)
IMMATURE GRANULOCYTES #: 0.6 K/UL
LYMPHOCYTES ABSOLUTE: 0.4 K/UL (ref 1.1–4.5)
LYMPHOCYTES RELATIVE PERCENT: 4.8 % (ref 20–40)
MCH RBC QN AUTO: 30.4 PG (ref 27–31)
MCHC RBC AUTO-ENTMCNC: 30.9 G/DL (ref 33–37)
MCV RBC AUTO: 98.3 FL (ref 80–94)
MONOCYTES ABSOLUTE: 1 K/UL (ref 0–0.9)
MONOCYTES RELATIVE PERCENT: 13.5 % (ref 0–10)
NEUTROPHILS ABSOLUTE: 5.4 K/UL (ref 1.5–7.5)
NEUTROPHILS RELATIVE PERCENT: 70.6 % (ref 50–65)
PDW BLD-RTO: 14.6 % (ref 11.5–14.5)
PLATELET # BLD: 139 K/UL (ref 130–400)
PMV BLD AUTO: 10.3 FL (ref 9.4–12.4)
POTASSIUM SERPL-SCNC: 4.9 MMOL/L (ref 3.5–5)
RBC # BLD: 3.59 M/UL (ref 4.7–6.1)
SARS-COV-2, NAAT: DETECTED
SODIUM BLD-SCNC: 138 MMOL/L (ref 136–145)
TOTAL PROTEIN: 7.1 G/DL (ref 6.6–8.7)
WBC # BLD: 7.6 K/UL (ref 4.8–10.8)

## 2021-08-25 PROCEDURE — 99283 EMERGENCY DEPT VISIT LOW MDM: CPT

## 2021-08-25 PROCEDURE — 36415 COLL VENOUS BLD VENIPUNCTURE: CPT

## 2021-08-25 PROCEDURE — 87635 SARS-COV-2 COVID-19 AMP PRB: CPT

## 2021-08-25 PROCEDURE — 80053 COMPREHEN METABOLIC PANEL: CPT

## 2021-08-25 PROCEDURE — 85025 COMPLETE CBC W/AUTO DIFF WBC: CPT

## 2021-08-25 PROCEDURE — 71045 X-RAY EXAM CHEST 1 VIEW: CPT

## 2021-08-25 PROCEDURE — 6360000002 HC RX W HCPCS: Performed by: EMERGENCY MEDICINE

## 2021-08-25 PROCEDURE — 96374 THER/PROPH/DIAG INJ IV PUSH: CPT

## 2021-08-25 RX ORDER — METHYLPREDNISOLONE 4 MG/1
TABLET ORAL
Qty: 1 KIT | Refills: 0 | Status: SHIPPED | OUTPATIENT
Start: 2021-08-25 | End: 2021-08-31

## 2021-08-25 RX ORDER — DEXAMETHASONE SODIUM PHOSPHATE 10 MG/ML
4 INJECTION, SOLUTION INTRAMUSCULAR; INTRAVENOUS ONCE
Status: COMPLETED | OUTPATIENT
Start: 2021-08-25 | End: 2021-08-25

## 2021-08-25 RX ORDER — AZITHROMYCIN 250 MG/1
TABLET, FILM COATED ORAL
Qty: 1 PACKET | Refills: 0 | Status: SHIPPED | OUTPATIENT
Start: 2021-08-25 | End: 2021-08-29

## 2021-08-25 RX ADMIN — DEXAMETHASONE SODIUM PHOSPHATE 4 MG: 10 INJECTION INTRAMUSCULAR; INTRAVENOUS at 10:43

## 2021-08-25 ASSESSMENT — ENCOUNTER SYMPTOMS
ABDOMINAL PAIN: 0
COUGH: 1

## 2021-08-25 NOTE — ED NOTES
Patient waiting for EMS transfer back to residence     Patsie Bustamante, [place]  08/25/21 [phone]

## 2021-08-25 NOTE — ED NOTES
Bed: 08  Expected date: 8/25/21  Expected time:   Means of arrival:   Comments:  Nancy Perry  08/25/21 1260

## 2021-08-25 NOTE — ED PROVIDER NOTES
[facility] EMERGENCY DEPT  eMERGENCY dEPARTMENT eNCOUnter      Pt Name: Joaquin Kim  MRN: [id number]  Maria De Jesusgfjeferson 1935  Date of evaluation: 8/25/2021  Provider: Benitez Villa MD    CHIEF COMPLAINT       Chief Complaint   Patient presents with    Concern For COVID-19     arrived via EMS from local [facility] with concern for covid, general illness         HISTORY OF PRESENT ILLNESS   (Location/Symptom, Timing/Onset,Context/Setting, Quality, Duration, Modifying Factors, Severity)  Note limiting factors. Joaquin Kim is a 81 y.o. male who presents to the emergency department with cough. The patient states he had a cough for 1 to 2 days. The patient tells me that he does have COPD. The patient does not want the Covid vaccine he is not planning on getting it he does not trust it. The patient has had a cough now for the last day and a half. No known fevers. He has COPD and wears chronic oxygen 3 L. He is really not that short of breath. He denies any new leg swelling. He denies any strong chest pain. The patient is awake alert at his normal baseline he lives in [place]. The patient denies having any vomiting. Patient states he may have Covid has been exposed. The history is provided by the patient. NursingNotes were reviewed. REVIEW OF SYSTEMS    (2-9 systems for level 4, 10 or more for level 5)     Review of Systems   Constitutional: Negative for fever. Respiratory: Positive for cough. Cardiovascular: Negative for chest pain. Gastrointestinal: Negative for abdominal pain. Neurological: Negative for syncope. Psychiatric/Behavioral: Negative for confusion. A complete review of systems was performed and is negative except as noted above in the HPI.        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 ([facility] [address] 3/28/2020    Lung mass     Neuromuscular disorder (HCC)     Neuropathy     Other disorders of kidney and ureter in diseases classified elsewhere     Palliative care patient 11/15/2018    Pneumonia     Type II or unspecified type diabetes mellitus without mention of complication, not stated as uncontrolled          SURGICAL HISTORY       Past Surgical History:   Procedure Laterality Date    COLONOSCOPY      EYE SURGERY      EYE SURGERY      HERNIA REPAIR      umbilical with Dr Bebeto Childress       Previous Medications    ACETAMINOPHEN (TYLENOL) 650 MG EXTENDED RELEASE TABLET    Take 650 mg by mouth daily    ALBUTEROL SULFATE HFA (PROAIR HFA) 108 (90 BASE) MCG/ACT INHALER    Inhale 2 puffs into the lungs every 6 hours as needed for Wheezing    ALCOHOL SWABS (ALCOHOL PREP) PADS    Use daily with glucose checks DX: E11.9    ALLOPURINOL (ZYLOPRIM) 100 MG TABLET    Take 1 tablet by mouth daily    AMLODIPINE (NORVASC) 5 MG TABLET    Take 1 tablet by mouth daily    ASPIRIN 81 MG EC TABLET    Take 81 mg by mouth daily    ATORVASTATIN (LIPITOR) 40 MG TABLET    Take 1 tablet by mouth nightly    AZELASTINE (ASTELIN) 0.1 % NASAL SPRAY    2 sprays by Nasal route 2 times daily Use in each nostril as directed    BIPAP MACHINE MISC    by Does not apply route nightly    BLOOD GLUCOSE MONITOR KIT AND SUPPLIES    Test once a day for symptoms of irregular blood glucose. BLOOD GLUCOSE MONITOR STRIPS    Test one times a day & as needed for symptoms of irregular blood glucose.   DX: E11.9    BUPROPION (WELLBUTRIN XL) 150 MG EXTENDED RELEASE TABLET    Take 2 tablets by (CYANOCOBALAMIN) 1000 MCG TABLET    Take 1,000 mcg by mouth daily       ALLERGIES     Patient has no known allergies. FAMILY HISTORY       Family History   Problem Relation Age of Onset    Heart Attack Mother     Cancer Father           SOCIAL HISTORY       Social History     Socioeconomic History    Marital status:      Spouse name: Not on file    Number of children: 2    Years of education: Not on file    Highest education level: Not on file   Occupational History    Not on file   Tobacco Use    Smoking status: Never Smoker    Smokeless tobacco: Never Used   Vaping Use    Vaping Use: Never used   Substance and Sexual Activity    Alcohol use: No    Drug use: No    Sexual activity: Not on file   Other Topics Concern    Not on file   Social History Narrative    5/24: Lives alone at [address] ([address]    Has HC housekeeping assistance. Drives a little but  may also drive pt to appts. Home Health through [facility] w SN, PT, OT    Son and daughter do not live in same community. Social Determinants of Health     Financial Resource Strain: Low Risk     Difficulty of Paying Living Expenses: Not hard at all   Food Insecurity: No Food Insecurity    Worried About Running Out of Food in the Last Year: Never true    Cullen of Food in the Last Year: Never true   Transportation Needs:     Lack of Transportation (Medical):      Lack of Transportation (Non-Medical):    Physical Activity:     Days of Exercise per Week:     Minutes of Exercise per Session:    Stress:     Feeling of Stress :    Social Connections:     Frequency of Communication with Friends and Family:     Frequency of Social Gatherings with Friends and Family:     Attends [employer] Services:     Active Member of Clubs or Organizations:     Attends Club or Organization Meetings:     Marital Status:    Intimate Partner Violence:     Fear of Current or Ex-Partner:     Emotionally Abused:  Physically Abused:     Sexually Abused:        SCREENINGS             PHYSICAL EXAM    (up to 7 for level 4, 8 or more for level 5)     ED Triage Vitals [08/25/21 0908]   BP Temp Temp Source Pulse Resp SpO2 Height Weight   (!) 153/77 98.3 °F (36.8 °C) Oral 74 22 96 % -- --       Physical Exam  Vitals and nursing note reviewed. Constitutional:       General: He is not in acute distress. Appearance: Normal appearance. He is obese. He is not ill-appearing. Comments: Patient laying at a 15 degree angle in bed almost supine. He has his 2-1/2 to 3 L on nasal cannula he saturating 100% he appears in no distress he is not dyspneic he speaking in full sentences he is able to sit up and breathe for me. The patient's respiratory rate on my exam is 16. His heart rate is 70 paced on monitor his blood pressure is 140/75. HENT:      Head: Normocephalic and atraumatic. Eyes:      Extraocular Movements: Extraocular movements intact. Pupils: Pupils are equal, round, and reactive to light. Cardiovascular:      Rate and Rhythm: Normal rate and regular rhythm. Pulmonary:      Effort: Pulmonary effort is normal. No respiratory distress. Abdominal:      General: Abdomen is flat. Palpations: Abdomen is soft. Tenderness: There is no abdominal tenderness. Musculoskeletal:      Cervical back: Normal range of motion and neck supple. Skin:     General: Skin is warm and dry. Neurological:      General: No focal deficit present. Mental Status: He is alert and oriented to person, place, and time.    Psychiatric:         Mood and Affect: Mood normal.         Behavior: Behavior normal.         DIAGNOSTIC RESULTS     EKG: All EKG's are interpreted by the Emergency Department Physician who either signs or Co-signs this chart in the absence of a cardiologist.    Paced rhythm rate 69    RADIOLOGY:   Non-plain film images such as CT, Ultrasound and MRI are read by the radiologist. Author Ness images are visualized and preliminarily interpreted by the emergency physician with the below findings:        Interpretation per the Radiologist below, if available at the time of this note:    XR CHEST PORTABLE   Final Result   1. Cardiomegaly with pulmonary venous congestion versus vascular   crowding. No lobar consolidation. .   Signed by Dr Meggan Kenyon            ED BEDSIDE ULTRASOUND:   Performed by ED Physician - none    LABS:  Labs Reviewed   COVID-19, RAPID - Abnormal; Notable for the following components:       Result Value    SARS-CoV-2, NAAT DETECTED (*)     All other components within normal limits    Narrative:     Elana Concepcion tel. ,  Microbiology results called to and read back by Lesly Paige RN in ER,  08/25/2021 08:51, by MARK   COMPREHENSIVE METABOLIC PANEL - Abnormal; Notable for the following components:    Glucose 115 (*)     BUN 24 (*)     All other components within normal limits   CBC WITH AUTO DIFFERENTIAL - Abnormal; Notable for the following components:    RBC 3.59 (*)     Hemoglobin 10.9 (*)     Hematocrit 35.3 (*)     MCV 98.3 (*)     MCHC 30.9 (*)     RDW 14.6 (*)     Neutrophils % 70.6 (*)     Lymphocytes % 4.8 (*)     Monocytes % 13.5 (*)     Lymphocytes Absolute 0.4 (*)     Monocytes Absolute 1.00 (*)     All other components within normal limits   URINE RT REFLEX TO CULTURE       All other labs were within normal range or not returned as of this dictation. EMERGENCY DEPARTMENT COURSE and DIFFERENTIALDIAGNOSIS/MDM:   Vitals:    Vitals:    08/25/21 0908 08/25/21 0940 08/25/21 1013 08/25/21 1115   BP: (!) 153/77   (!) 140/75   Pulse: 74   62   Resp: 22   11   Temp: 98.3 °F (36.8 °C)      TempSrc: Oral      SpO2: 96% 99% 100% 100%       MDM  Number of Diagnoses or Management Options  Diagnosis management comments: Patient found to have Covid. He is in no distress. His x-ray does not have multifocal pneumonia he is on his chronic oxygen.   He tells me he will not take the Covid vaccine. We discussed risk and benefits of monoclonal antibodies given that he does not want the Covid vaccine he does not also want monoclonal antibodies after discussion. The patient's high risk for mortality and morbidity based on his age and comorbidities. The patient is in no distress does not require admission at this time. Given his underlying COPD I will start him on a little bit of steroids to help with the cough. I am also can write him for some antibiotic we will can treat this like a COPD exacerbation/Covid. Patient was given return precautions he has a pulse ox at home. Amount and/or Complexity of Data Reviewed  Clinical lab tests: ordered and reviewed  Tests in the radiology section of CPT®: reviewed and ordered  Independent visualization of images, tracings, or specimens: yes    Patient Progress  Patient progress: stable        CONSULTS:  None    PROCEDURES:  Unless otherwise notedbelow, none     Procedures    FINAL IMPRESSION     1. COVID-19 virus infection    2. Chronic obstructive pulmonary disease, unspecified COPD type ([facility]    3. Chronic respiratory failure with hypoxia [facility])          DISPOSITION/PLAN   DISPOSITION Decision To Discharge 08/25/2021 11:15:04 AM      PATIENT REFERRED TO:  Aretha Keenan DO  [address] [place] [address]  [phone]    Schedule an appointment as soon as possible for a visit in 2 weeks  call to let them know you have covid today      DISCHARGE MEDICATIONS:  New Prescriptions    AZITHROMYCIN (ZITHROMAX Z-NANCI) 250 MG TABLET    Take 2 tablets (500 mg) on Day 1, and then take 1 tablet (250 mg) on days 2 through 5. METHYLPREDNISOLONE (MEDROL, NANCI,) 4 MG TABLET    Take by mouth.           (Please note that portions of this note were completed with a voice recognition program.  Efforts were made to edit the dictations butoccasionally words are mis-transcribed.)    Myrna Rangel MD (electronically signed)  AttendingEmergency Physician Kaya Ferrer MD  08/25/21 6015

## 2021-08-26 ENCOUNTER — CARE COORDINATION (OUTPATIENT)
Dept: CARE COORDINATION | Age: 86
End: 2021-08-26

## 2021-08-26 NOTE — CARE COORDINATION
Patient contacted regarding COVID-19 diagnosis. Discussed COVID-19 related testing which was available at this time. Test results were positive. Patient informed of results, if available? Yes. Ambulatory Care Manager contacted the patient by telephone to perform post discharge assessment. Call within 2 business days of discharge: Yes. Verified name and  with patient as identifiers. Provided introduction to self, and explanation of the CTN/ACM role, and reason for call due to risk factors for infection and/or exposure to COVID-19. Symptoms reviewed with patient who verbalized the following symptoms: cough, no new symptoms and no worsening symptoms. Ribs hurting w coughing. Slept better last night. He has his new prescriptions and has started them; also is taking his routine meds by pill packs from his pharmacy. Meals are brought to him by staff at his [facility] complex. Pt has a caregiver that is checking on him and can bring groceries/supplies by as needed. He has contacted his adult children and they know of his positive COVID test. Patient is not short of breath presently but stated he has some SoB at times. Pt uses home oxygen continuously and is wearing it now. He has an oximeter but has not used it to check SpO2. ACM suggested that patient check his SpO2 a few times daily to make sure he is in normal range. Continue to eat regularly and drink plenty of fluids, get rest. Reviewed self-isolation and quarantine guidelines as AVS papers describe. Contact the health department or PCP office for any questions about quarantine timeline. Advised him on severe s/s that warrant return to the ED. He agreed to schedule phone appt with his PCP and has call scheduled  at 9 am - advised pt to check his morning weight in am along with BP and SpO2 prior to call and relay his readings to the provider - he voiced understanding. Due to no new or worsening symptoms encounter was not routed to provider for escalation. Discussed follow-up appointments. If no appointment was previously scheduled, appointment scheduling offered: Yes. [address] follow up appointment(s):   Future Appointments   Date Time Provider Addie Doss   8/27/2021  9:00 AM ISAAC Whitaker [facility] [place]   9/9/2021  1:30 PM [address], DO Joyce Edouard [place]   12/2/2021 11:00 AM Georganna Lanes, MD [facility] [place]     Non-[facility] follow up appointment(s): MAURO    Non-face-to-face services provided:  Scheduled appointment with PCP-See above  Obtained and reviewed discharge summary and/or continuity of care documents  Education of patient/family/caregiver/guardian to support self-management-Continiue to remain in quarantine at apartement. Pt has good support through apartment staff and his caregiver. He is following protocol - eating regularly, drinking fluids, resting and taking his medication. He has a f/u appt scheduled with PCP office by phone. Assessment and support for treatment adherence and medication management-Pt is taking his new prescriptions and has his routine meds through pharmacy pill packs. He is compliant. Due to cough noted during call, suggested patient ask caregiver to obtain OTC Mucinex for prn use. He voiced understanding. Advance Care Planning:   Does patient have an Advance Directive:  not on file. Educated patient about risk for severe COVID-19 due to risk factors according to CDC guidelines. ACM reviewed discharge instructions, medical action plan and red flag symptoms with the patient who verbalized understanding. Discussed COVID vaccination status: No. Education provided on COVID-19 vaccination as appropriate. Discussed exposure protocols and quarantine with CDC Guidelines. Patient was given an opportunity to verbalize any questions and concerns and agrees to contact ACM or health care provider for questions related to their healthcare.     Reviewed and educated patient on any new and changed medications related to discharge diagnosis     Was patient discharged with a pulse oximeter? No and patient already has oximeter Discussed and confirmed pulse oximeter discharge instructions and when to notify provider or seek emergency care. ACM provided contact information. Plan for follow-up call in 3-5 days based on severity of symptoms and risk factors.

## 2021-08-27 ENCOUNTER — VIRTUAL VISIT (OUTPATIENT)
Dept: PRIMARY CARE CLINIC | Age: 86
End: 2021-08-27
Payer: MEDICARE

## 2021-08-27 ENCOUNTER — TELEPHONE (OUTPATIENT)
Dept: CARDIOLOGY CLINIC | Age: 86
End: 2021-08-27

## 2021-08-27 DIAGNOSIS — I48.20 CHRONIC ATRIAL FIBRILLATION (HCC): ICD-10-CM

## 2021-08-27 DIAGNOSIS — Z95.0 PACEMAKER: Primary | ICD-10-CM

## 2021-08-27 DIAGNOSIS — E11.9 TYPE 2 DIABETES MELLITUS WITHOUT COMPLICATION, WITHOUT LONG-TERM CURRENT USE OF INSULIN (HCC): ICD-10-CM

## 2021-08-27 DIAGNOSIS — U07.1 COVID-19: Primary | ICD-10-CM

## 2021-08-27 DIAGNOSIS — I49.5 SA NODE DYSFUNCTION (HCC): ICD-10-CM

## 2021-08-27 DIAGNOSIS — J44.9 CHRONIC OBSTRUCTIVE PULMONARY DISEASE, UNSPECIFIED COPD TYPE (HCC): ICD-10-CM

## 2021-08-27 PROCEDURE — 99442 PR PHYS/QHP TELEPHONE EVALUATION 11-20 MIN: CPT | Performed by: NURSE PRACTITIONER

## 2021-08-27 ASSESSMENT — ENCOUNTER SYMPTOMS
DIARRHEA: 0
COUGH: 1
CONSTIPATION: 0
SHORTNESS OF BREATH: 1
ABDOMINAL PAIN: 0
RHINORRHEA: 0
TROUBLE SWALLOWING: 0
NAUSEA: 0
SORE THROAT: 0
VOMITING: 0

## 2021-08-27 NOTE — PROGRESS NOTES
Felicity Rosas (:  1935) is a 81 y.o. male,Established patient, here for evaluation of the following chief complaint(s): Positive For Covid-19         ASSESSMENT/PLAN:  1. COVID-19  2. Chronic obstructive pulmonary disease, unspecified COPD type ([facility] [address]  3. Type 2 diabetes mellitus without complication, without long-term current use of insulin ([facility] [address]    Discussed if breathing concerns or getting worse - go back to hospital. F/u if any concerns. Continue inhalers, stay active, encouraged deep breathing. Has f/u with Dr Rubio Soto on 2021    Return if symptoms worsen or fail to improve, for post covid follow up. SUBJECTIVE/OBJECTIVE:  HPI  Here for f/u from [facility] ER    He was dx with COVID -  Has diabetes and COPD  Feeling better today. He is on chronic oxygen 3L   No fever  A lot of coughing and some shortness of breath. He was started on zithromax and MDP. No concerns from medicine. He is on trelegy and albuterol. He is coughing up phlegm. Diabetes  Sugars running good. Review of Systems   Constitutional: Negative for activity change, appetite change, fatigue, fever and unexpected weight change. HENT: Negative for ear pain, rhinorrhea, sore throat and trouble swallowing. Eyes: Negative for visual disturbance. Respiratory: Positive for cough and shortness of breath. Cardiovascular: Negative for chest pain, palpitations and leg swelling. Gastrointestinal: Negative for abdominal pain, constipation, diarrhea, nausea and vomiting. Genitourinary: Negative for flank pain. Musculoskeletal: Negative for arthralgias, myalgias, neck pain and neck stiffness. Neurological: Negative for headaches. Psychiatric/Behavioral: Negative for decreased concentration and sleep disturbance. The patient is not nervous/anxious. No flowsheet data found. Physical Exam  No PE done due to telehealth visit.         On this date 2021 I have spent 12 minutes reviewing previous notes, test results and face to face (virtual) with the patient discussing the diagnosis and importance of compliance with the treatment plan as well as documenting on the day of the visit. Felicity Rosas was evaluated through a synchronous (real-time) audio-video encounter. The patient (or guardian if applicable) is aware that this is a billable service. Verbal consent to proceed has been obtained within the past 12 months. The visit was conducted pursuant to the emergency declaration under the [address] [place] [address] and the [employer] and [employer] Act. Patient identification was verified, and a caregiver was present when appropriate. The patient was located in a state where the provider was credentialed to provide care. An electronic signature was used to authenticate this note.     --ISAAC Dos Santos

## 2021-08-30 PROCEDURE — 93294 REM INTERROG EVL PM/LDLS PM: CPT | Performed by: NURSE PRACTITIONER

## 2021-08-30 PROCEDURE — 93296 REM INTERROG EVL PM/IDS: CPT | Performed by: NURSE PRACTITIONER

## 2021-09-01 ENCOUNTER — CARE COORDINATION (OUTPATIENT)
Dept: CARE COORDINATION | Age: 86
End: 2021-09-01

## 2021-09-01 RX ORDER — CHOLESTYRAMINE
1 POWDER (GRAM) MISCELLANEOUS PRN
COMMUNITY

## 2021-09-01 NOTE — CARE COORDINATION
Ambulatory Care Coordination Note  9/1/2021  CM Risk Score: 7  Charlson 10 Year Mortality Risk Score: 100%     ACC: Siri Young, RN    Summary Note: ACM spoke to Al today. Addressed self-monitoring with DM, COPD and CHF today. Pt is checking daily FBS, today was 107. He denied ever having sx of hypoglycemia but lately has not eaten as well due to poor appetite with COVID infection. Pt does wear oxygen at 3L continuously but has not checked his SpO2, stated his  usually keeps up with this for him. Said same regarding temp check. Pt has not weighed daily but did provide teach back for noticing/monitoring swelling of his abdomen and lower arms. He denied increased SoB but does have increased mucus production. He also reported some loose stool, stated this is chronic for him. He said he has Cholestyramine but hasn't used it. He is drinking plenty of fluids. He gets prepared meals from the [facility] staff, stated he does not add any salt to his meals. Plan:  ACM suggested that patient try to eat something light/small every 4 hours to keep up carb intake and avoid hypoglycemia. Reviewed s/s of low BS and protocol to treat with OJ (pt has), then recheck BS again. Pt voiced understanding. Reviewed importance of monitoring daily weight for fluid retention - to prevent / reduce worse symptoms such as chest pain, difficulty breathing, passing out, etc. Encouraged patient to resume daily weights to catch fluid early and be proactive to contact PCP or ACM. Pt vu.  Encouraged patient to contact  and locate his thermometer and oximeter to resume checks -  will be out until next week due to pt's COVID infection. Advised pt he needs to assess temp and SpO2, especially this week while in quarantine.  Pt vu.     Care Coordination Interventions    Program Enrollment: Complex Care  Referral from Primary Care Provider: No  Suggested Interventions and Community Resources  Fall Risk Prevention: Completed 12/29/20   ISAAC Rasheed   amLODIPine (NORVASC) 5 MG tablet Take 1 tablet by mouth daily 12/16/20   ISAAC Samaniego   blood glucose monitor strips Test one times a day & as needed for symptoms of irregular blood glucose. DX: E11.9 4/2/20   ALISON Mcdaniel DO   Alcohol Swabs (ALCOHOL PREP) PADS Use daily with glucose checks DX: E11.9 4/2/20   ALISON Mcdaniel DO   nitroGLYCERIN (NITROSTAT) 0.4 MG SL tablet up to max of 3 total doses. If no relief after 1 dose, call 911. 3/30/20   Valdemar Pozo PA-C   blood glucose monitor kit and supplies Test once a day for symptoms of irregular blood glucose.  2/3/20   ALISON Mcdaniel DO   albuterol sulfate HFA (PROAIR HFA) 108 (90 Base) MCG/ACT inhaler Inhale 2 puffs into the lungs every 6 hours as needed for Wheezing 12/4/19   ISAAC Samaniego   potassium chloride (KLOR-CON 10) 10 MEQ extended release tablet Take 1 tablet by mouth daily 6/19/19   ISAAC Samaniego   Lancets MISC 1 each by Does not apply route daily Accu Chek Guid3 Lancets 4/29/19   ALISON Mcdaniel DO   OXYGEN Inhale 3 L into the lungs continuous     Historical Provider, MD   BiPAP Machine MISC by Does not apply route nightly    Historical Provider, MD   vitamin B-12 (CYANOCOBALAMIN) 1000 MCG tablet Take 1,000 mcg by mouth daily    Historical Provider, MD   leuprolide (LUPRON) 30 MG injection Inject 30 mg into the muscle once Every 4 months    Historical Provider, MD       Future Appointments   Date Time Provider Addie Doss   9/9/2021  1:30 PM [address] [facility],[address]   12/2/2021 11:00 AM Roz [place] MD [facility]     ,   Diabetes Assessment    Medic Alert ID: No  Meal Planning: Avoidance of concentrated sweets   How often do you test your blood sugar?: No Testing (Comment: Agreeable to start daily FBG with help from [facility] RN)   Do you have barriers with adherence to non-pharmacologic self-management interventions? (Nutrition/Exercise/Self-Monitoring): Yes   Have you ever had to go to the ED for symptoms of low blood sugar?: No       Do you have hyperglycemia symptoms?: No   Do you have hypoglycemia symptoms?: No   Last Blood Sugar Value: 107   Blood Sugar Monitoring Regimen: Morning Fasting   Blood Sugar Trends: No Change      ,   Congestive Heart Failure Assessment    Are you currently restricting fluids?: No Restriction  Do you understand a low sodium diet?: No (Comment: Discussed today with pt and SO)  Do you understand how to read food labels?: Yes  How many restaurant meals do you eat per week?: 0  Do you salt your food before tasting it?: No         Symptoms:  CHF associated fatigue: Pos      Symptom course: stable  Patient-reported weight (lb):  (Comment: has not weighed.  Encouraged to do so)  Salt intake watch compared to last visit: stable      and   COPD Assessment    Does the patient understand envrionmental exposure?: Yes  Is the patient able to verbalize Rescue vs. Long Acting medications?: Yes  Does the patient have a nebulizer?: No  Does the patient use a space with inhaled medications?: No            Symptoms:  COPD associated anorexia: Pos      Symptom course: no change  Increase use of rapid acting/rescue inhaled medications?: No (Comment: 9/1: urged to use inhaler)  Change in chronic cough?: Increased  Change in sputum?: Increased  Sputum characteristics:  (Comment: grayish, stringy)  Self Monitoring - SaO2: No  Have you had a recent diagnosis of pneumonia either by PCP or at a hospital?: No

## 2021-09-07 ENCOUNTER — APPOINTMENT (OUTPATIENT)
Dept: GENERAL RADIOLOGY | Age: 86
DRG: 177 | End: 2021-09-07
Payer: MEDICARE

## 2021-09-07 ENCOUNTER — HOSPITAL ENCOUNTER (INPATIENT)
Age: 86
LOS: 5 days | Discharge: HOME HEALTH | DRG: 177 | End: 2021-09-13
Attending: EMERGENCY MEDICINE | Admitting: STUDENT IN AN ORGANIZED HEALTH CARE EDUCATION/TRAINING PROGRAM
Payer: MEDICARE

## 2021-09-07 DIAGNOSIS — N17.9 ACUTE KIDNEY INJURY (HCC): Primary | ICD-10-CM

## 2021-09-07 DIAGNOSIS — U07.1 COVID-19: ICD-10-CM

## 2021-09-07 DIAGNOSIS — R53.83 FATIGUE, UNSPECIFIED TYPE: ICD-10-CM

## 2021-09-07 LAB
ADENOVIRUS BY PCR: NOT DETECTED
ALBUMIN SERPL-MCNC: 3.2 G/DL (ref 3.5–5.2)
ALP BLD-CCNC: 48 U/L (ref 40–130)
ALT SERPL-CCNC: 8 U/L (ref 5–41)
ANION GAP SERPL CALCULATED.3IONS-SCNC: 10 MMOL/L (ref 7–19)
ANISOCYTOSIS: ABNORMAL
APTT: 31.3 SEC (ref 26–36.2)
AST SERPL-CCNC: 21 U/L (ref 5–40)
ATYPICAL LYMPHOCYTE RELATIVE PERCENT: 7 % (ref 0–8)
BANDED NEUTROPHILS RELATIVE PERCENT: 7 % (ref 0–5)
BASE EXCESS ARTERIAL: -1.7 MMOL/L (ref -2–2)
BASOPHILS ABSOLUTE: 0 K/UL (ref 0–0.2)
BASOPHILS RELATIVE PERCENT: 0 % (ref 0–1)
BILIRUB SERPL-MCNC: 0.5 MG/DL (ref 0.2–1.2)
BORDETELLA PARAPERTUSSIS BY PCR: NOT DETECTED
BORDETELLA PERTUSSIS BY PCR: NOT DETECTED
BUN BLDV-MCNC: 43 MG/DL (ref 8–23)
CALCIUM SERPL-MCNC: 9 MG/DL (ref 8.8–10.2)
CARBOXYHEMOGLOBIN ARTERIAL: 0.5 % (ref 0–5)
CHLAMYDOPHILIA PNEUMONIAE BY PCR: NOT DETECTED
CHLORIDE BLD-SCNC: 97 MMOL/L (ref 98–111)
CO2: 25 MMOL/L (ref 22–29)
CORONAVIRUS 229E BY PCR: NOT DETECTED
CORONAVIRUS HKU1 BY PCR: NOT DETECTED
CORONAVIRUS NL63 BY PCR: NOT DETECTED
CORONAVIRUS OC43 BY PCR: NOT DETECTED
CREAT SERPL-MCNC: 1.5 MG/DL (ref 0.5–1.2)
EOSINOPHILS ABSOLUTE: 0 K/UL (ref 0–0.6)
EOSINOPHILS RELATIVE PERCENT: 0 % (ref 0–5)
GFR AFRICAN AMERICAN: 54
GFR NON-AFRICAN AMERICAN: 44
GLUCOSE BLD-MCNC: 118 MG/DL (ref 74–109)
HCO3 ARTERIAL: 23.7 MMOL/L (ref 22–26)
HCT VFR BLD CALC: 29.9 % (ref 42–52)
HEMOGLOBIN, ART, EXTENDED: 9.4 G/DL (ref 14–18)
HEMOGLOBIN: 9.7 G/DL (ref 14–18)
HUMAN METAPNEUMOVIRUS BY PCR: NOT DETECTED
HUMAN RHINOVIRUS/ENTEROVIRUS BY PCR: NOT DETECTED
IMMATURE GRANULOCYTES #: 0.7 K/UL
INFLUENZA A BY PCR: NOT DETECTED
INFLUENZA B BY PCR: NOT DETECTED
INR BLD: 1.11 (ref 0.88–1.18)
LYMPHOCYTES ABSOLUTE: 0.9 K/UL (ref 1.1–4.5)
LYMPHOCYTES RELATIVE PERCENT: 9 % (ref 20–40)
MACROCYTES: ABNORMAL
MCH RBC QN AUTO: 31.1 PG (ref 27–31)
MCHC RBC AUTO-ENTMCNC: 32.4 G/DL (ref 33–37)
MCV RBC AUTO: 95.8 FL (ref 80–94)
METHEMOGLOBIN ARTERIAL: 0.2 %
MONOCYTES ABSOLUTE: 0.2 K/UL (ref 0–0.9)
MONOCYTES RELATIVE PERCENT: 3 % (ref 0–10)
MYCOPLASMA PNEUMONIAE BY PCR: NOT DETECTED
MYELOCYTE PERCENT: 6 %
NEUTROPHILS ABSOLUTE: 4.5 K/UL (ref 1.5–7.5)
NEUTROPHILS RELATIVE PERCENT: 68 % (ref 50–65)
O2 CONTENT ARTERIAL: 12.8 ML/DL
O2 SAT, ARTERIAL: 95.9 %
O2 THERAPY: ABNORMAL
OVALOCYTES: ABNORMAL
PARAINFLUENZA VIRUS 1 BY PCR: NOT DETECTED
PARAINFLUENZA VIRUS 2 BY PCR: NOT DETECTED
PARAINFLUENZA VIRUS 3 BY PCR: NOT DETECTED
PARAINFLUENZA VIRUS 4 BY PCR: NOT DETECTED
PCO2 ARTERIAL: 42 MMHG (ref 35–45)
PDW BLD-RTO: 14.6 % (ref 11.5–14.5)
PH ARTERIAL: 7.36 (ref 7.35–7.45)
PLATELET # BLD: 185 K/UL (ref 130–400)
PLATELET SLIDE REVIEW: ADEQUATE
PMV BLD AUTO: 9.6 FL (ref 9.4–12.4)
PO2 ARTERIAL: 91 MMHG (ref 80–100)
POLYCHROMASIA: ABNORMAL
POTASSIUM REFLEX MAGNESIUM: 4.6 MMOL/L (ref 3.5–5)
POTASSIUM, WHOLE BLOOD: 4
PRO-BNP: 1561 PG/ML (ref 0–1800)
PROTHROMBIN TIME: 14.5 SEC (ref 12–14.6)
RBC # BLD: 3.12 M/UL (ref 4.7–6.1)
RESPIRATORY SYNCYTIAL VIRUS BY PCR: NOT DETECTED
SARS-COV-2, PCR: DETECTED
SODIUM BLD-SCNC: 132 MMOL/L (ref 136–145)
TOTAL PROTEIN: 6.3 G/DL (ref 6.6–8.7)
TROPONIN: 0.01 NG/ML (ref 0–0.03)
WBC # BLD: 5.5 K/UL (ref 4.8–10.8)

## 2021-09-07 PROCEDURE — 99284 EMERGENCY DEPT VISIT MOD MDM: CPT

## 2021-09-07 PROCEDURE — 96374 THER/PROPH/DIAG INJ IV PUSH: CPT

## 2021-09-07 PROCEDURE — 85730 THROMBOPLASTIN TIME PARTIAL: CPT

## 2021-09-07 PROCEDURE — 36600 WITHDRAWAL OF ARTERIAL BLOOD: CPT

## 2021-09-07 PROCEDURE — 84132 ASSAY OF SERUM POTASSIUM: CPT

## 2021-09-07 PROCEDURE — 84484 ASSAY OF TROPONIN QUANT: CPT

## 2021-09-07 PROCEDURE — 93005 ELECTROCARDIOGRAM TRACING: CPT | Performed by: PHYSICIAN ASSISTANT

## 2021-09-07 PROCEDURE — 82803 BLOOD GASES ANY COMBINATION: CPT

## 2021-09-07 PROCEDURE — 83880 ASSAY OF NATRIURETIC PEPTIDE: CPT

## 2021-09-07 PROCEDURE — 85610 PROTHROMBIN TIME: CPT

## 2021-09-07 PROCEDURE — 80053 COMPREHEN METABOLIC PANEL: CPT

## 2021-09-07 PROCEDURE — 0202U NFCT DS 22 TRGT SARS-COV-2: CPT

## 2021-09-07 PROCEDURE — 36415 COLL VENOUS BLD VENIPUNCTURE: CPT

## 2021-09-07 PROCEDURE — 71045 X-RAY EXAM CHEST 1 VIEW: CPT

## 2021-09-07 PROCEDURE — 85025 COMPLETE CBC W/AUTO DIFF WBC: CPT

## 2021-09-07 RX ORDER — 0.9 % SODIUM CHLORIDE 0.9 %
500 INTRAVENOUS SOLUTION INTRAVENOUS ONCE
Status: COMPLETED | OUTPATIENT
Start: 2021-09-07 | End: 2021-09-08

## 2021-09-07 NOTE — ED PROVIDER NOTES
[address] [facility] EMERGENCY DEPT  eMERGENCYdEPARTMENT eNCOUnter      Pt Name: Nam Lacey  MRN: [id number]  [place] 1935  Date of evaluation: 9/7/2021  MARCELLE Spaulding    CHIEF COMPLAINT       Chief Complaint   Patient presents with    Fatigue     presents with weaqkness, covid pos x 2 weeks          HISTORY OF PRESENT ILLNESS  (Location/Symptom, Timing/Onset, Context/Setting, Quality, Duration, Modifying Factors, Severity.)   Nam Lacey is a 81 y.o. male who presents to the emergency department with complaints of fatigue and weakness. covid positive 2 weeks ago he is on nasal cannula 3L copd hx. Pacemaker hx states he is on thinners. A fib hx. Obesity with ascites edema bilateral lower extremity. Came in by EMS. Alert and oriented. HPI    Nursing Notes were reviewed and I agree. REVIEW OF SYSTEMS    (2-9 systems for level 4, 10 or more for level 5)     Review of Systems   Constitutional: Positive for fatigue. Negative for activity change, appetite change, chills and fever. HENT: Negative for congestion and dental problem. Eyes: Negative for photophobia, discharge and itching. Respiratory: Negative for apnea, cough and shortness of breath. Cardiovascular: Negative for chest pain. Musculoskeletal: Negative for arthralgias, back pain, gait problem, myalgias and neck pain. Skin: Negative for color change, pallor and rash. Neurological: Negative for dizziness, seizures and syncope. Psychiatric/Behavioral: Negative for agitation. The patient is not nervous/anxious. Except as noted above the remainder of the review of systems was reviewed and negative.        PAST MEDICAL HISTORY     Past Medical History:   Diagnosis Date    Anxiety     Arthritis     Atrial fibrillation ([address]     Blood circulation, collateral     Cancer (HCC)     prostate, had treatment    Cellulitis     left leg    CHF (congestive heart failure) (HCC)     Chronic kidney disease     COPD [facility] disease[facility] [address]     Depression     Hyperlipidemia     Hypertension     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 3/28/2020    Lung mass     Neuromuscular disorder (HCC)     Neuropathy     Other disorders of kidney and ureter in diseases classified elsewhere     Palliative care patient 11/15/2018    Pneumonia     Type II or unspecified type diabetes mellitus without mention of complication, not stated as uncontrolled          SURGICAL HISTORY       Past Surgical History:   Procedure Laterality Date    COLONOSCOPY      EYE SURGERY      EYE SURGERY      HERNIA REPAIR      umbilical with Dr Ernesto Medley       Previous Medications    ACETAMINOPHEN (TYLENOL) 650 MG EXTENDED RELEASE TABLET    Take 650 mg by mouth daily    ALBUTEROL SULFATE HFA (PROAIR HFA) 108 (90 BASE) MCG/ACT INHALER    Inhale 2 puffs into the lungs every 6 hours as needed for Wheezing    ALCOHOL SWABS (ALCOHOL PREP) PADS    Use daily with glucose checks DX: E11.9    ALLOPURINOL (ZYLOPRIM) 100 MG TABLET    Take 1 tablet by mouth daily    AMLODIPINE (NORVASC) 5 MG TABLET    Take 1 tablet by mouth daily    ASPIRIN 81 MG EC TABLET    Take 81 mg by mouth daily    ATORVASTATIN (LIPITOR) 40 MG TABLET    Take 1 tablet by mouth nightly    AZELASTINE (ASTELIN) 0.1 % NASAL SPRAY    2 sprays by Nasal route 2 times daily Use in each nostril as directed    BIPAP MACHINE MISC    by Does not apply route nightly    BLOOD GLUCOSE MONITOR KIT AND SUPPLIES    Test once a day for symptoms of irregular blood glucose. BLOOD GLUCOSE MONITOR STRIPS    Test one times a day & as needed for symptoms of irregular blood glucose.   DX: E11.9    BUPROPION (WELLBUTRIN XL) 150 MG EXTENDED RELEASE TABLET    Take 2 tablets by mouth every morning    CARVEDILOL (COREG) 12.5 MG TABLET    Take 1 tablet by mouth 2 times daily (with meals) CELECOXIB (CELEBREX) 200 MG CAPSULE    Take 1 capsule by mouth daily    CHOLESTYRAMINE POWD    1 each by Does not apply route as needed    COLESEVELAM (WELCHOL) 625 MG TABLET    Take 1,875 mg by mouth every evening    EASY TOUCH LANCETS 28G/TWIST MISC    Use to test Blood sugar daily    ENZALUTAMIDE (XTANDI) 40 MG CAPSULE    Take 4 tablets daily    FLUOXETINE (PROZAC) 20 MG CAPSULE    Take 1 capsule by mouth daily    FLUTICASONE (FLONASE) 50 MCG/ACT NASAL SPRAY    2 sprays by Nasal route daily    FUROSEMIDE (LASIX) 40 MG TABLET    Take 1.5 tablets by mouth daily    ISOSORBIDE MONONITRATE (IMDUR) 60 MG EXTENDED RELEASE TABLET    Take 0.5 tablets by mouth daily    LANCETS MISC    1 each by Does not apply route daily Accu Chek Guid3 Lancets    LEUPROLIDE (LUPRON) 30 MG INJECTION    Inject 30 mg into the muscle once Every 4 months    LIDOCAINE 4 % EXTERNAL PATCH    Place 1 patch onto the skin daily    LIFT CHAIR MISC    by Does not apply route    LISINOPRIL (PRINIVIL;ZESTRIL) 40 MG TABLET    Take 40 mg by mouth daily    METFORMIN (GLUCOPHAGE) 500 MG TABLET    Take 1 tablet by mouth 2 times daily (with meals)    MIRABEGRON (MYRBETRIQ) 25 MG TB24    Take 1 tablet by mouth daily    MISC. DEVICES (COMMODE) MISC    Toilet seat riser. Needs to be for 330lb + and long gaited    NITROGLYCERIN (NITROSTAT) 0.4 MG SL TABLET    up to max of 3 total doses. If no relief after 1 dose, call 911.     OMEGA-3 FATTY ACIDS (FISH OIL) 1000 MG CAPS    Take 1,000 mg by mouth 2 times daily    OXYGEN    Inhale 3 L into the lungs continuous     PANTOPRAZOLE (PROTONIX) 40 MG TABLET    Take 1 tablet by mouth every morning (before breakfast)    POTASSIUM CHLORIDE (KLOR-CON 10) 10 MEQ EXTENDED RELEASE TABLET    Take 1 tablet by mouth daily    TERAZOSIN (HYTRIN) 5 MG CAPSULE    Take 5 mg by mouth nightly    TRELEGY ELLIPTA 100-62.5-25 MCG/INH AEPB    INHALE 1 PUFF ONCE DAILY    VITAMIN B-12 (CYANOCOBALAMIN) 1000 MCG TABLET    Take 1,000 mcg by mouth daily       ALLERGIES     Patient has no known allergies. FAMILY HISTORY       Family History   Problem Relation Age of Onset    Heart Attack Mother     Cancer Father           SOCIAL HISTORY       Social History     Socioeconomic History    Marital status:      Spouse name: None    Number of children: 2    Years of education: None    Highest education level: None   Occupational History    None   Tobacco Use    Smoking status: Never Smoker    Smokeless tobacco: Never Used   Vaping Use    Vaping Use: Never used   Substance and Sexual Activity    Alcohol use: No    Drug use: No    Sexual activity: None   Other Topics Concern    None   Social History Narrative    9/1/21: Lives alone at [address] ([address]    Has [facility] housekeeping assistance. Drives a little but  may also drive pt to appts. Son and daughter do not live in same community. Social Determinants of Health     Financial Resource Strain: Low Risk     Difficulty of Paying Living Expenses: Not hard at all   Food Insecurity: No Food Insecurity    Worried About Running Out of Food in the Last Year: Never true    Cullen of Food in the Last Year: Never true   Transportation Needs:     Lack of Transportation (Medical):      Lack of Transportation (Non-Medical):    Physical Activity:     Days of Exercise per Week:     Minutes of Exercise per Session:    Stress:     Feeling of Stress :    Social Connections:     Frequency of Communication with Friends and Family:     Frequency of Social Gatherings with Friends and Family:     Attends [employer] Services:     Active Member of Clubs or Organizations:     Attends Club or Organization Meetings:     Marital Status:    Intimate Partner Violence:     Fear of Current or Ex-Partner:     Emotionally Abused:     Physically Abused:     Sexually Abused:        SCREENINGS           PHYSICAL EXAM    (up to 7 forlevel 4, 8 or more for level 5)     ED Triage Vitals [09/07/21 1840]   BP Temp Temp src Pulse Resp SpO2 Height Weight   106/65 98.4 °F (36.9 °C) -- 83 20 91 % 5' 10\" (1.778 m) 240 lb (108.9 kg)       Physical Exam  Vitals and nursing note reviewed. Constitutional:       General: He is not in acute distress. Appearance: Normal appearance. He is well-developed. He is obese. He is not diaphoretic. HENT:      Head: Normocephalic and atraumatic. Right Ear: Tympanic membrane, ear canal and external ear normal.      Left Ear: Tympanic membrane, ear canal and external ear normal.      Nose: Nose normal.      Mouth/Throat:      Mouth: Mucous membranes are moist.   Eyes:      Pupils: Pupils are equal, round, and reactive to light. Neck:      Trachea: No tracheal deviation. Cardiovascular:      Rate and Rhythm: Normal rate and regular rhythm. Pulses: Normal pulses. Heart sounds: Normal heart sounds. No murmur heard. Pulmonary:      Effort: Pulmonary effort is normal.      Breath sounds: Normal breath sounds. No stridor. No wheezing. Chest:      Chest wall: No tenderness. Abdominal:      General: Abdomen is flat. Bowel sounds are normal. There is no distension. Palpations: Abdomen is soft. Tenderness: There is no abdominal tenderness. Musculoskeletal:         General: Normal range of motion. Cervical back: Normal range of motion and neck supple. Skin:     General: Skin is warm and dry. Capillary Refill: Capillary refill takes less than 2 seconds. Neurological:      General: No focal deficit present. Mental Status: He is alert and oriented to person, place, and time. Mental status is at baseline. Psychiatric:         Mood and Affect: Mood normal.         Behavior: Behavior normal.         Thought Content:  Thought content normal.         Judgment: Judgment normal.           DIAGNOSTIC RESULTS     RADIOLOGY:   Non-plain film images such as CT, Ultrasound and MRI are read by the radiologist. Jj Price radiographic images are visualized and preliminarilyinterpreted by No att. providers found with the below findings:      Interpretation per the Radiologist below, if available at the time of this note:    XR CHEST PORTABLE   Final Result   1. New bilateral patchy infiltrates may be infectious or inflammatory. Covid 19 is considered. Pulmonary edema is also possible. 2. Cardiomegaly.    Signed by Dr Corbin Lowry:  Labs Reviewed   RESPIRATORY PANEL, MOLECULAR, WITH COVID-19 - Abnormal; Notable for the following components:       Result Value    SARS-CoV-2, PCR DETECTED (*)     All other components within normal limits    Narrative:     Amos Victoria tel. ,  Microbiology results called to and read back by gustavo zee rn/er,  09/07/2021 23:22, by [facility]  Microbiology results called to and read back by gustavo zee rn/er,  09/07/2021 23:22, by ZAYRADE   BLOOD GAS, ARTERIAL - Abnormal; Notable for the following components:    Hemoglobin, Art, Extended 9.4 (*)     All other components within normal limits   CBC WITH AUTO DIFFERENTIAL - Abnormal; Notable for the following components:    RBC 3.12 (*)     Hemoglobin 9.7 (*)     Hematocrit 29.9 (*)     MCV 95.8 (*)     MCH 31.1 (*)     MCHC 32.4 (*)     RDW 14.6 (*)     Neutrophils % 68.0 (*)     Lymphocytes % 9.0 (*)     Lymphocytes Absolute 0.9 (*)     Bands Relative 7 (*)     Myelocyte Percent 6 (*)     Anisocytosis 1+ (*)     Macrocytes 1+ (*)     Polychromasia 1+ (*)     Ovalocytes Occasional (*)     All other components within normal limits   COMPREHENSIVE METABOLIC PANEL W/ REFLEX TO MG FOR LOW K - Abnormal; Notable for the following components:    Sodium 132 (*)     Chloride 97 (*)     Glucose 118 (*)     BUN 43 (*)     CREATININE 1.5 (*)     GFR Non- 44 (*)     GFR  54 (*)     Total Protein 6.3 (*)     Albumin 3.2 (*)     All other components within normal limits   COMPREHENSIVE METABOLIC PANEL W/ REFLEX TO MG FOR LOW K - Abnormal; Notable for the following components:    Sodium 134 (*)     BUN 41 (*)     CREATININE 1.4 (*)     GFR Non- 48 (*)     GFR  58 (*)     Calcium 8.5 (*)     Total Protein 5.8 (*)     Albumin 3.3 (*)     All other components within normal limits   TROPONIN   BRAIN NATRIURETIC PEPTIDE   PROTIME-INR   APTT   POTASSIUM, WHOLE BLOOD   URINE RT REFLEX TO CULTURE       All other labs were within normal range or notreturned as of this dictation. RE-ASSESSMENT        EMERGENCY DEPARTMENT COURSE and DIFFERENTIAL DIAGNOSIS/MDM:   Vitals:    Vitals:    09/07/21 1840 09/08/21 0119   BP: 106/65 139/68   Pulse: 83 65   Resp: 20 18   Temp: 98.4 °F (36.9 °C)    SpO2: 91% 93%   Weight: 240 lb (108.9 kg)    Height: 5' 10\" (1.778 m)        MDM  Patient still showing positive Covid this is assumed to be day 12 of his course. He is requiring oxygen 5L at this time briefly going down to 85 % at rest concern for developing hypoxia with him living alone feeling poorly felt appropriate for admission plan for Dr Vazquez Cuervo with hospitalist service to take over care. PROCEDURES:    Procedures      FINAL IMPRESSION      1. Acute kidney injury ([facility] [address]    2. Fatigue, unspecified type          DISPOSITION/PLAN   DISPOSITION Discharge - Pending Orders Complete 09/08/2021 12:04:13 AM      PATIENT REFERRED TO:  No follow-up provider specified.     DISCHARGE MEDICATIONS:  New Prescriptions    No medications on file       (Please note that portions of this note were completed with a voice recognition program.  Efforts were made to edit the dictations but occasionallywords are mis-transcribed.)    Savita Summers [address], [place]  09/08/21 0136       MARCELLE Summers  09/08/21 0201 (0) independent

## 2021-09-07 NOTE — ED NOTES
Bed: 07  Expected date:   Expected time:   Means of arrival:   Comments:  red Rosales, [address] [facility]  09/07/21 8450

## 2021-09-08 PROBLEM — U07.1 PNEUMONIA DUE TO COVID-19 VIRUS: Status: ACTIVE | Noted: 2021-09-08

## 2021-09-08 PROBLEM — N18.9 CKD (CHRONIC KIDNEY DISEASE): Status: ACTIVE | Noted: 2021-09-08

## 2021-09-08 PROBLEM — J96.01 ACUTE HYPOXEMIC RESPIRATORY FAILURE (HCC): Status: ACTIVE | Noted: 2021-09-08

## 2021-09-08 PROBLEM — Z51.5 PALLIATIVE CARE PATIENT: Status: ACTIVE | Noted: 2018-11-15

## 2021-09-08 PROBLEM — E86.0 DEHYDRATION: Status: ACTIVE | Noted: 2021-09-08

## 2021-09-08 PROBLEM — J12.82 PNEUMONIA DUE TO COVID-19 VIRUS: Status: ACTIVE | Noted: 2021-09-08

## 2021-09-08 PROBLEM — R06.00 DYSPNEA: Status: ACTIVE | Noted: 2021-09-08

## 2021-09-08 LAB
ALBUMIN SERPL-MCNC: 3.1 G/DL (ref 3.5–5.2)
ALBUMIN SERPL-MCNC: 3.3 G/DL (ref 3.5–5.2)
ALP BLD-CCNC: 48 U/L (ref 40–130)
ALP BLD-CCNC: 49 U/L (ref 40–130)
ALT SERPL-CCNC: 7 U/L (ref 5–41)
ALT SERPL-CCNC: 8 U/L (ref 5–41)
ANION GAP SERPL CALCULATED.3IONS-SCNC: 10 MMOL/L (ref 7–19)
ANION GAP SERPL CALCULATED.3IONS-SCNC: 13 MMOL/L (ref 7–19)
AST SERPL-CCNC: 18 U/L (ref 5–40)
AST SERPL-CCNC: 22 U/L (ref 5–40)
BASOPHILS ABSOLUTE: 0 K/UL (ref 0–0.2)
BASOPHILS RELATIVE PERCENT: 0.6 % (ref 0–1)
BILIRUB SERPL-MCNC: 0.5 MG/DL (ref 0.2–1.2)
BILIRUB SERPL-MCNC: 0.5 MG/DL (ref 0.2–1.2)
BUN BLDV-MCNC: 38 MG/DL (ref 8–23)
BUN BLDV-MCNC: 41 MG/DL (ref 8–23)
C-REACTIVE PROTEIN: 3.78 MG/DL (ref 0–0.5)
CALCIUM SERPL-MCNC: 8.5 MG/DL (ref 8.8–10.2)
CALCIUM SERPL-MCNC: 8.8 MG/DL (ref 8.8–10.2)
CHLORIDE BLD-SCNC: 100 MMOL/L (ref 98–111)
CHLORIDE BLD-SCNC: 99 MMOL/L (ref 98–111)
CO2: 22 MMOL/L (ref 22–29)
CO2: 24 MMOL/L (ref 22–29)
CREAT SERPL-MCNC: 1.3 MG/DL (ref 0.5–1.2)
CREAT SERPL-MCNC: 1.4 MG/DL (ref 0.5–1.2)
EOSINOPHILS ABSOLUTE: 0 K/UL (ref 0–0.6)
EOSINOPHILS RELATIVE PERCENT: 0.2 % (ref 0–5)
GFR AFRICAN AMERICAN: 58
GFR AFRICAN AMERICAN: >59
GFR NON-AFRICAN AMERICAN: 48
GFR NON-AFRICAN AMERICAN: 52
GLUCOSE BLD-MCNC: 107 MG/DL (ref 70–99)
GLUCOSE BLD-MCNC: 173 MG/DL (ref 70–99)
GLUCOSE BLD-MCNC: 182 MG/DL (ref 70–99)
GLUCOSE BLD-MCNC: 194 MG/DL (ref 70–99)
GLUCOSE BLD-MCNC: 88 MG/DL (ref 74–109)
GLUCOSE BLD-MCNC: 95 MG/DL (ref 74–109)
HCT VFR BLD CALC: 30.7 % (ref 42–52)
HEMOGLOBIN: 9.6 G/DL (ref 14–18)
IMMATURE GRANULOCYTES #: 0.5 K/UL
LYMPHOCYTES ABSOLUTE: 0.4 K/UL (ref 1.1–4.5)
LYMPHOCYTES RELATIVE PERCENT: 7 % (ref 20–40)
MCH RBC QN AUTO: 30.1 PG (ref 27–31)
MCHC RBC AUTO-ENTMCNC: 31.3 G/DL (ref 33–37)
MCV RBC AUTO: 96.2 FL (ref 80–94)
MONOCYTES ABSOLUTE: 0.4 K/UL (ref 0–0.9)
MONOCYTES RELATIVE PERCENT: 7.4 % (ref 0–10)
NEUTROPHILS ABSOLUTE: 4.1 K/UL (ref 1.5–7.5)
NEUTROPHILS RELATIVE PERCENT: 75.4 % (ref 50–65)
PDW BLD-RTO: 14.4 % (ref 11.5–14.5)
PERFORMED ON: ABNORMAL
PLATELET # BLD: 179 K/UL (ref 130–400)
PMV BLD AUTO: 9.5 FL (ref 9.4–12.4)
POTASSIUM REFLEX MAGNESIUM: 4 MMOL/L (ref 3.5–5)
POTASSIUM REFLEX MAGNESIUM: 4.7 MMOL/L (ref 3.5–5)
PROCALCITONIN: 0.1 NG/ML (ref 0–0.09)
RBC # BLD: 3.19 M/UL (ref 4.7–6.1)
SODIUM BLD-SCNC: 134 MMOL/L (ref 136–145)
SODIUM BLD-SCNC: 134 MMOL/L (ref 136–145)
TOTAL PROTEIN: 5.8 G/DL (ref 6.6–8.7)
TOTAL PROTEIN: 6.1 G/DL (ref 6.6–8.7)
WBC # BLD: 5.4 K/UL (ref 4.8–10.8)

## 2021-09-08 PROCEDURE — 6360000002 HC RX W HCPCS: Performed by: INTERNAL MEDICINE

## 2021-09-08 PROCEDURE — 2500000003 HC RX 250 WO HCPCS: Performed by: INTERNAL MEDICINE

## 2021-09-08 PROCEDURE — 2580000003 HC RX 258: Performed by: INTERNAL MEDICINE

## 2021-09-08 PROCEDURE — 36415 COLL VENOUS BLD VENIPUNCTURE: CPT

## 2021-09-08 PROCEDURE — 2580000003 HC RX 258: Performed by: PHYSICIAN ASSISTANT

## 2021-09-08 PROCEDURE — 80053 COMPREHEN METABOLIC PANEL: CPT

## 2021-09-08 PROCEDURE — 6370000000 HC RX 637 (ALT 250 FOR IP): Performed by: INTERNAL MEDICINE

## 2021-09-08 PROCEDURE — 6360000002 HC RX W HCPCS: Performed by: PHYSICIAN ASSISTANT

## 2021-09-08 PROCEDURE — 1210000000 HC MED SURG R&B

## 2021-09-08 PROCEDURE — 84145 PROCALCITONIN (PCT): CPT

## 2021-09-08 PROCEDURE — P9047 ALBUMIN (HUMAN), 25%, 50ML: HCPCS | Performed by: INTERNAL MEDICINE

## 2021-09-08 PROCEDURE — 86140 C-REACTIVE PROTEIN: CPT

## 2021-09-08 PROCEDURE — 85025 COMPLETE CBC W/AUTO DIFF WBC: CPT

## 2021-09-08 PROCEDURE — 82947 ASSAY GLUCOSE BLOOD QUANT: CPT

## 2021-09-08 RX ORDER — ALBUTEROL SULFATE 90 UG/1
2 AEROSOL, METERED RESPIRATORY (INHALATION) EVERY 6 HOURS PRN
Status: DISCONTINUED | OUTPATIENT
Start: 2021-09-08 | End: 2021-09-13 | Stop reason: HOSPADM

## 2021-09-08 RX ORDER — NITROGLYCERIN 0.4 MG/1
0.4 TABLET SUBLINGUAL EVERY 5 MIN PRN
Status: DISCONTINUED | OUTPATIENT
Start: 2021-09-08 | End: 2021-09-13 | Stop reason: HOSPADM

## 2021-09-08 RX ORDER — ISOSORBIDE MONONITRATE 30 MG/1
30 TABLET, EXTENDED RELEASE ORAL DAILY
Status: DISCONTINUED | OUTPATIENT
Start: 2021-09-08 | End: 2021-09-13 | Stop reason: HOSPADM

## 2021-09-08 RX ORDER — ALBUMIN (HUMAN) 12.5 G/50ML
25 SOLUTION INTRAVENOUS ONCE
Status: COMPLETED | OUTPATIENT
Start: 2021-09-08 | End: 2021-09-08

## 2021-09-08 RX ORDER — ONDANSETRON 4 MG/1
4 TABLET, ORALLY DISINTEGRATING ORAL EVERY 8 HOURS PRN
Status: DISCONTINUED | OUTPATIENT
Start: 2021-09-08 | End: 2021-09-13 | Stop reason: HOSPADM

## 2021-09-08 RX ORDER — SENNOSIDES 8.6 MG
650 CAPSULE ORAL DAILY
Status: DISCONTINUED | OUTPATIENT
Start: 2021-09-08 | End: 2021-09-13 | Stop reason: HOSPADM

## 2021-09-08 RX ORDER — BUDESONIDE AND FORMOTEROL FUMARATE DIHYDRATE 160; 4.5 UG/1; UG/1
2 AEROSOL RESPIRATORY (INHALATION) 2 TIMES DAILY
Status: DISCONTINUED | OUTPATIENT
Start: 2021-09-08 | End: 2021-09-13 | Stop reason: HOSPADM

## 2021-09-08 RX ORDER — GUAIFENESIN/DEXTROMETHORPHAN 100-10MG/5
5 SYRUP ORAL EVERY 4 HOURS PRN
Status: DISCONTINUED | OUTPATIENT
Start: 2021-09-08 | End: 2021-09-13 | Stop reason: HOSPADM

## 2021-09-08 RX ORDER — FUROSEMIDE 10 MG/ML
20 INJECTION INTRAMUSCULAR; INTRAVENOUS ONCE
Status: COMPLETED | OUTPATIENT
Start: 2021-09-08 | End: 2021-09-08

## 2021-09-08 RX ORDER — PREDNISONE 10 MG/1
10 TABLET ORAL DAILY
Qty: 10 TABLET | Refills: 0 | Status: SHIPPED
Start: 2021-09-08 | End: 2021-09-13 | Stop reason: HOSPADM

## 2021-09-08 RX ORDER — TROSPIUM CHLORIDE 20 MG/1
20 TABLET, FILM COATED ORAL NIGHTLY
Status: DISCONTINUED | OUTPATIENT
Start: 2021-09-08 | End: 2021-09-13 | Stop reason: HOSPADM

## 2021-09-08 RX ORDER — SODIUM CHLORIDE 9 MG/ML
25 INJECTION, SOLUTION INTRAVENOUS PRN
Status: DISCONTINUED | OUTPATIENT
Start: 2021-09-08 | End: 2021-09-13 | Stop reason: HOSPADM

## 2021-09-08 RX ORDER — METHYLPREDNISOLONE SODIUM SUCCINATE 125 MG/2ML
125 INJECTION, POWDER, LYOPHILIZED, FOR SOLUTION INTRAMUSCULAR; INTRAVENOUS ONCE
Status: COMPLETED | OUTPATIENT
Start: 2021-09-08 | End: 2021-09-08

## 2021-09-08 RX ORDER — DEXAMETHASONE SODIUM PHOSPHATE 10 MG/ML
6 INJECTION, SOLUTION INTRAMUSCULAR; INTRAVENOUS EVERY 24 HOURS
Status: DISCONTINUED | OUTPATIENT
Start: 2021-09-09 | End: 2021-09-13 | Stop reason: HOSPADM

## 2021-09-08 RX ORDER — SODIUM CHLORIDE 9 MG/ML
INJECTION, SOLUTION INTRAVENOUS CONTINUOUS
Status: ACTIVE | OUTPATIENT
Start: 2021-09-08 | End: 2021-09-09

## 2021-09-08 RX ORDER — CARVEDILOL 12.5 MG/1
12.5 TABLET ORAL 2 TIMES DAILY WITH MEALS
Status: DISCONTINUED | OUTPATIENT
Start: 2021-09-08 | End: 2021-09-13 | Stop reason: HOSPADM

## 2021-09-08 RX ORDER — ALLOPURINOL 100 MG/1
100 TABLET ORAL DAILY
Status: DISCONTINUED | OUTPATIENT
Start: 2021-09-08 | End: 2021-09-13 | Stop reason: HOSPADM

## 2021-09-08 RX ORDER — SODIUM CHLORIDE 0.9 % (FLUSH) 0.9 %
5-40 SYRINGE (ML) INJECTION PRN
Status: DISCONTINUED | OUTPATIENT
Start: 2021-09-08 | End: 2021-09-13 | Stop reason: HOSPADM

## 2021-09-08 RX ORDER — FLUOXETINE HYDROCHLORIDE 20 MG/1
20 CAPSULE ORAL DAILY
Status: DISCONTINUED | OUTPATIENT
Start: 2021-09-08 | End: 2021-09-13 | Stop reason: HOSPADM

## 2021-09-08 RX ORDER — POTASSIUM CHLORIDE 750 MG/1
10 TABLET, EXTENDED RELEASE ORAL DAILY
Status: DISCONTINUED | OUTPATIENT
Start: 2021-09-08 | End: 2021-09-13 | Stop reason: HOSPADM

## 2021-09-08 RX ORDER — BUPROPION HYDROCHLORIDE 150 MG/1
300 TABLET ORAL EVERY MORNING
Status: DISCONTINUED | OUTPATIENT
Start: 2021-09-08 | End: 2021-09-13 | Stop reason: HOSPADM

## 2021-09-08 RX ORDER — DOXAZOSIN MESYLATE 1 MG/1
1 TABLET ORAL DAILY
Status: DISCONTINUED | OUTPATIENT
Start: 2021-09-08 | End: 2021-09-13 | Stop reason: HOSPADM

## 2021-09-08 RX ORDER — ASPIRIN 81 MG/1
81 TABLET ORAL DAILY
Status: DISCONTINUED | OUTPATIENT
Start: 2021-09-08 | End: 2021-09-13 | Stop reason: HOSPADM

## 2021-09-08 RX ORDER — POLYETHYLENE GLYCOL 3350 17 G/17G
17 POWDER, FOR SOLUTION ORAL DAILY PRN
Status: DISCONTINUED | OUTPATIENT
Start: 2021-09-08 | End: 2021-09-13 | Stop reason: HOSPADM

## 2021-09-08 RX ORDER — SODIUM CHLORIDE 0.9 % (FLUSH) 0.9 %
5-40 SYRINGE (ML) INJECTION EVERY 12 HOURS SCHEDULED
Status: DISCONTINUED | OUTPATIENT
Start: 2021-09-08 | End: 2021-09-13 | Stop reason: HOSPADM

## 2021-09-08 RX ORDER — VITAMIN B COMPLEX
2000 TABLET ORAL DAILY
Status: DISCONTINUED | OUTPATIENT
Start: 2021-09-08 | End: 2021-09-13 | Stop reason: HOSPADM

## 2021-09-08 RX ORDER — ONDANSETRON 2 MG/ML
4 INJECTION INTRAMUSCULAR; INTRAVENOUS EVERY 6 HOURS PRN
Status: DISCONTINUED | OUTPATIENT
Start: 2021-09-08 | End: 2021-09-13 | Stop reason: HOSPADM

## 2021-09-08 RX ORDER — AMLODIPINE BESYLATE 5 MG/1
5 TABLET ORAL DAILY
Status: DISCONTINUED | OUTPATIENT
Start: 2021-09-08 | End: 2021-09-13 | Stop reason: HOSPADM

## 2021-09-08 RX ORDER — ACETAMINOPHEN 325 MG/1
650 TABLET ORAL EVERY 6 HOURS PRN
Status: DISCONTINUED | OUTPATIENT
Start: 2021-09-08 | End: 2021-09-13 | Stop reason: HOSPADM

## 2021-09-08 RX ORDER — DEXAMETHASONE SODIUM PHOSPHATE 10 MG/ML
6 INJECTION, SOLUTION INTRAMUSCULAR; INTRAVENOUS EVERY 8 HOURS SCHEDULED
Status: DISCONTINUED | OUTPATIENT
Start: 2021-09-08 | End: 2021-09-08

## 2021-09-08 RX ORDER — ATORVASTATIN CALCIUM 20 MG/1
40 TABLET, FILM COATED ORAL NIGHTLY
Status: DISCONTINUED | OUTPATIENT
Start: 2021-09-08 | End: 2021-09-13 | Stop reason: HOSPADM

## 2021-09-08 RX ORDER — ACETAMINOPHEN 650 MG/1
650 SUPPOSITORY RECTAL EVERY 6 HOURS PRN
Status: DISCONTINUED | OUTPATIENT
Start: 2021-09-08 | End: 2021-09-13 | Stop reason: HOSPADM

## 2021-09-08 RX ORDER — AZITHROMYCIN 250 MG/1
TABLET, FILM COATED ORAL
Qty: 1 PACKET | Refills: 0 | Status: SHIPPED
Start: 2021-09-08 | End: 2021-09-13 | Stop reason: HOSPADM

## 2021-09-08 RX ADMIN — SODIUM CHLORIDE, PRESERVATIVE FREE 2000 MG: 5 INJECTION INTRAVENOUS at 08:31

## 2021-09-08 RX ADMIN — ALLOPURINOL 100 MG: 100 TABLET ORAL at 08:32

## 2021-09-08 RX ADMIN — ISOSORBIDE MONONITRATE 30 MG: 30 TABLET, EXTENDED RELEASE ORAL at 08:32

## 2021-09-08 RX ADMIN — ATORVASTATIN CALCIUM 40 MG: 20 TABLET, FILM COATED ORAL at 20:51

## 2021-09-08 RX ADMIN — FAMOTIDINE 20 MG: 10 INJECTION, SOLUTION INTRAVENOUS at 10:20

## 2021-09-08 RX ADMIN — ALBUMIN (HUMAN) 25 G: 0.25 INJECTION, SOLUTION INTRAVENOUS at 10:20

## 2021-09-08 RX ADMIN — FUROSEMIDE 20 MG: 10 INJECTION, SOLUTION INTRAVENOUS at 08:32

## 2021-09-08 RX ADMIN — ACETAMINOPHEN 650 MG: 650 TABLET, FILM COATED, EXTENDED RELEASE ORAL at 08:32

## 2021-09-08 RX ADMIN — SODIUM CHLORIDE: 9 INJECTION, SOLUTION INTRAVENOUS at 11:59

## 2021-09-08 RX ADMIN — FLUOXETINE HYDROCHLORIDE 20 MG: 20 CAPSULE ORAL at 08:32

## 2021-09-08 RX ADMIN — ASPIRIN 81 MG: 81 TABLET, COATED ORAL at 08:32

## 2021-09-08 RX ADMIN — CARVEDILOL 12.5 MG: 12.5 TABLET, FILM COATED ORAL at 17:49

## 2021-09-08 RX ADMIN — BUDESONIDE AND FORMOTEROL FUMARATE DIHYDRATE 2 PUFF: 160; 4.5 AEROSOL RESPIRATORY (INHALATION) at 08:33

## 2021-09-08 RX ADMIN — Medication 2000 UNITS: at 10:20

## 2021-09-08 RX ADMIN — ENOXAPARIN SODIUM 30 MG: 30 INJECTION SUBCUTANEOUS at 08:33

## 2021-09-08 RX ADMIN — BUDESONIDE AND FORMOTEROL FUMARATE DIHYDRATE 2 PUFF: 160; 4.5 AEROSOL RESPIRATORY (INHALATION) at 20:56

## 2021-09-08 RX ADMIN — AZITHROMYCIN MONOHYDRATE 500 MG: 500 INJECTION, POWDER, LYOPHILIZED, FOR SOLUTION INTRAVENOUS at 08:33

## 2021-09-08 RX ADMIN — METHYLPREDNISOLONE SODIUM SUCCINATE 125 MG: 125 INJECTION, POWDER, FOR SOLUTION INTRAMUSCULAR; INTRAVENOUS at 05:18

## 2021-09-08 RX ADMIN — DOXAZOSIN 1 MG: 1 TABLET ORAL at 08:32

## 2021-09-08 RX ADMIN — CARVEDILOL 12.5 MG: 12.5 TABLET, FILM COATED ORAL at 08:32

## 2021-09-08 RX ADMIN — POTASSIUM CHLORIDE 10 MEQ: 750 TABLET, EXTENDED RELEASE ORAL at 08:33

## 2021-09-08 RX ADMIN — AMLODIPINE BESYLATE 5 MG: 5 TABLET ORAL at 08:32

## 2021-09-08 RX ADMIN — ENOXAPARIN SODIUM 30 MG: 30 INJECTION SUBCUTANEOUS at 20:53

## 2021-09-08 RX ADMIN — BUPROPION HYDROCHLORIDE 300 MG: 150 TABLET, EXTENDED RELEASE ORAL at 08:32

## 2021-09-08 RX ADMIN — DEXAMETHASONE SODIUM PHOSPHATE 6 MG: 10 INJECTION, SOLUTION INTRAMUSCULAR; INTRAVENOUS at 10:20

## 2021-09-08 RX ADMIN — TIOTROPIUM BROMIDE INHALATION SPRAY 2 PUFF: 3.12 SPRAY, METERED RESPIRATORY (INHALATION) at 08:33

## 2021-09-08 RX ADMIN — FAMOTIDINE 20 MG: 10 INJECTION, SOLUTION INTRAVENOUS at 20:55

## 2021-09-08 RX ADMIN — INSULIN LISPRO 2 UNITS: 100 INJECTION, SOLUTION INTRAVENOUS; SUBCUTANEOUS at 17:49

## 2021-09-08 RX ADMIN — SODIUM CHLORIDE 500 ML: 9 INJECTION, SOLUTION INTRAVENOUS at 00:03

## 2021-09-08 RX ADMIN — TROSPIUM CHLORIDE 20 MG: 20 TABLET, FILM COATED ORAL at 20:52

## 2021-09-08 RX ADMIN — INSULIN LISPRO 2 UNITS: 100 INJECTION, SOLUTION INTRAVENOUS; SUBCUTANEOUS at 12:00

## 2021-09-08 ASSESSMENT — ENCOUNTER SYMPTOMS
PHOTOPHOBIA: 0
EYE DISCHARGE: 0
GASTROINTESTINAL NEGATIVE: 1
EYE ITCHING: 0
BACK PAIN: 1
APNEA: 0
COUGH: 0
COUGH: 1
COLOR CHANGE: 0
SHORTNESS OF BREATH: 1
EYES NEGATIVE: 1
BACK PAIN: 0
SHORTNESS OF BREATH: 0

## 2021-09-08 ASSESSMENT — PAIN SCALES - GENERAL: PAINLEVEL_OUTOF10: 0

## 2021-09-08 NOTE — PROGRESS NOTES
Post midnight admission    58-year-old obese male with a past medical history of COPD on 3 L nasal cannula oxygen at baseline, atrial fibrillation, diabetes, hypertension, prostate cancer presenting to the hospital for dyspnea and lethargy with pneumonia secondary to COVID-19. Patient also found to have NELSY which is improving with fluid administration. IV fluids. Supportive management.

## 2021-09-08 NOTE — ACP (ADVANCE CARE PLANNING)
Advance Care Planning     Advance Care Planning Activator (Inpatient)  Conversation Note      Date of ACP Conversation: 9/8/2021     Conversation Conducted with: Patient's son Samantha Curiel    ACP Activator: Francisco Javier Scales      [facility] Decision Maker:     Primary Decision Maker: Angela Gomez - Child - [phone]    Secondary Decision Maker: Omid Mullen Child - [phone]      Care Preferences    Ventilation: \"If you were in your present state of health and suddenly became very ill and were unable to breathe on your own, what would your preference be about the use of a ventilator (breathing machine) if it were available to you? \"      Would the patient desire the use of ventilator (breathing machine)?: No    \"If your health worsens and it becomes clear that your chance of recovery is unlikely, what would your preference be about the use of a ventilator (breathing machine) if it were available to you? \"     Would the patient desire the use of ventilator (breathing machine)?: No      Resuscitation  \"CPR works best to restart the heart when there is a sudden event, like a heart attack, in someone who is otherwise healthy. Unfortunately, CPR does not typically restart the heart for people who have serious health conditions or who are very sick. \"    \"In the event your heart stopped as a result of an underlying serious health condition, would you want attempts to be made to restart your heart (answer \"yes\" for attempt to resuscitate) or would you prefer a natural death (answer \"no\" for do not attempt to resuscitate)? \" No       [] Yes   [x] No   Educated Patient / Nigel Kaufman regarding differences between Advance Directives and portable DNR orders.         Conversation Outcomes:  [x] ACP discussion completed  [x] Existing advance directive reviewed with patient; no changes to patient's previously recorded wishes    Electronically signed by Francisco Javier Scales on 9/8/2021 at 11:36 AM

## 2021-09-08 NOTE — ED NOTES
PT states does not have a way home. States all of his children live out of state. [facility] declined PT, states does not meet medical necessity.       Colton Rangel RN  09/08/21 5506

## 2021-09-08 NOTE — H&P
HISTORY AND PHYSICAL             Date: 9/8/2021        Patient Name: Davida Brower     YOB: 1935      Age:  81 y.o. Chief Complaint     Chief Complaint   Patient presents with    Fatigue     presents with weaqkness, covid pos x 2 weeks       Comes in with cough and weakness and dehydration     History Obtained From   Patient and er chart. History of Present Illness   Patient with extensive medical history and multiple comorbid medical problems who lives in an assisted living and really has no one there at night to care for him brought in because he is feeling weak and lethargicl    He has a history of afib and no blood thinner on his medical records, since last hospitalization when he fell and had rib fractures and hemothorax etc.    He has prostate cancer on meds    Type ii dm somewhat controlled     Morbidly obese and sedentary     Hypertension controlled    He has copd and on home oxygen at 2 to 3 liters nasal cannula     He somewhat meets criteria for admission , however, mostly because he may not be able to care for himself alone at assisted living     He has bilateral covid pneumonia and cough and bronchitis and being admitted due to that. And will start therapy. Gave him fluids and albumin and diuresed slightly as he became short of breath with even the smallest bolus of fluids. He has had fever and chills and weakness   And mild nausea and loss of appetite also noted.         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 ([address] 3/28/2020    Lung mass     Neuromuscular disorder ([facility] [address]     Neuropathy     Other disorders of kidney and ureter in diseases classified elsewhere     Palliative care patient 11/15/2018    Pneumonia     Type II or unspecified type diabetes mellitus without mention of complication, not stated as uncontrolled         Past Surgical History     Past Surgical History:   Procedure Laterality Date    COLONOSCOPY      EYE SURGERY      EYE SURGERY      HERNIA REPAIR      umbilical with Dr Rina Good          Medications Prior to Admission     Prior to Admission medications    Medication Sig Start Date End Date Taking? Authorizing Provider   predniSONE (DELTASONE) 10 MG tablet Take 1 tablet by mouth daily for 10 days 9/8/21 9/18/21 Yes MARCELLE Summers   azithromycin (ZITHROMAX Z-NANCI) 250 MG tablet Take 2 tablets (500 mg) on Day 1, and then take 1 tablet (250 mg) on days 2 through 5. 9/8/21 9/12/21 Yes MARCELLE Summers   Cholestyramine POWD 1 each by Does not apply route as needed    ALISON Weber DO   celecoxib (CELEBREX) 200 MG capsule Take 1 capsule by mouth daily 8/24/21   ALISON Weber DO   Misc. Devices (COMMODE) MISC Toilet seat riser.  Needs to be for 330lb + and long gaited 7/16/21   ALISON Weber DO   allopurinol (ZYLOPRIM) 100 MG tablet Take 1 tablet by mouth daily 7/13/21   ISAAC Long   furosemide (LASIX) 40 MG tablet Take 1.5 tablets by mouth daily 7/9/21   ISAAC Long   FLUoxetine (PROZAC) 20 MG capsule Take 1 capsule by mouth daily 7/7/21   ALISON Weber DO   metFORMIN (GLUCOPHAGE) 500 MG tablet Take 1 tablet by mouth 2 times daily (with meals) 6/29/21   ALISON Weber DO   Lift Chair MISC by Does not apply route 6/9/21   ISAAC Russell   Omega-3 Fatty Acids (FISH OIL) 1000 MG CAPS Take 1,000 mg by mouth 2 times daily    Historical Provider, MD   acetaminophen (TYLENOL) 650 MG extended release tablet Take 650 mg by mouth daily    Historical Provider, MD   aspirin 81 MG EC tablet Take 81 mg by mouth daily    Historical Provider, MD   terazosin (HYTRIN) 5 MG capsule Take 5 mg by mouth nightly    Historical Provider, MD   lisinopril (PRINIVIL;ZESTRIL) 40 MG tablet Take 40 mg by mouth daily    Historical Provider, MD   isosorbide mononitrate (IMDUR) 60 MG extended release tablet Take 0.5 tablets by mouth daily 5/4/21   Kash Lopez PA-C   lidocaine 4 % external patch Place 1 patch onto the skin daily 5/5/21   Kash Lopez PA-C   [facility] 625 MG tablet Take 1,875 mg by mouth every evening    Historical Provider, MD   pantoprazole (PROTONIX) 40 MG tablet Take 1 tablet by mouth every morning (before breakfast) 4/14/21   B Syeda Pulido DO   atorvastatin (LIPITOR) 40 MG tablet Take 1 tablet by mouth nightly 3/30/21   B Syedaalber Pulido DO   fluticasone [facility]) 50 MCG/ACT nasal spray 2 sprays by Nasal route daily 3/25/21   B Syedaalber Pulido DO   mirabegron (MYRBETRIQ) 25 MG TB24 Take 1 tablet by mouth daily 3/17/21   B Syedaalber Pulido DO   buPROPion (WELLBUTRIN XL) 150 MG extended release tablet Take 2 tablets by mouth every morning 2/25/21   B Syeda Pulido DO   TRELEGY ELLIPTA 100-62.5-25 MCG/INH AEPB INHALE 1 PUFF ONCE DAILY 2/17/21   B Syeda Ashok DO   Easy Touch Lancets 28G/Twist MISC Use to test Blood sugar daily 2/3/21   B Syeda Ashok DO   azelastine (ASTELIN) 0.1 % nasal spray 2 sprays by Nasal route 2 times daily Use in each nostril as directed 1/14/21   B Syedaalber Pulido DO   carvedilol (COREG) 12.5 MG tablet Take 1 tablet by mouth 2 times daily (with meals) 1/4/21   B Syedaalber Pulido DO   enzalutamide Thedore Kallie) 40 MG capsule Take 4 tablets daily 12/29/20   ISAAC De La Rosa   amLODIPine (NORVASC) 5 MG tablet Take 1 tablet by mouth daily 12/16/20   ISAAC Watkins   blood glucose monitor strips Test one times a day & as needed for symptoms of irregular blood glucose. DX: E11.9 4/2/20   ALISON Galloway, DO   Alcohol Swabs (ALCOHOL PREP) PADS Use daily with glucose checks DX: E11.9 4/2/20   ALISON Galloway, DO   nitroGLYCERIN (NITROSTAT) 0.4 MG SL tablet up to max of 3 total doses. If no relief after 1 dose, call 911. 3/30/20   Guero Arreguin PA-C   blood glucose monitor kit and supplies Test once a day for symptoms of irregular blood glucose. 2/3/20   ALISON Galloway, DO   albuterol sulfate HFA (PROAIR HFA) 108 (90 Base) MCG/ACT inhaler Inhale 2 puffs into the lungs every 6 hours as needed for Wheezing 12/4/19   Claudeen Osmond, APRN   potassium chloride (KLOR-CON 10) 10 MEQ extended release tablet Take 1 tablet by mouth daily 6/19/19   Claudeen Osmond, APRN   Lancets MISC 1 each by Does not apply route daily Accu Chek Guid3 Lancets 4/29/19   LAISON Galloway DO   OXYGEN Inhale 3 L into the lungs continuous     Historical Provider, MD   BiPAP Machine MISC by Does not apply route nightly    Historical Provider, MD   vitamin B-12 (CYANOCOBALAMIN) 1000 MCG tablet Take 1,000 mcg by mouth daily    Historical Provider, MD   leuprolide (LUPRON) 30 MG injection Inject 30 mg into the muscle once Every 4 months    Historical Provider, MD        Allergies   Patient has no known allergies. Social History     Social History     Tobacco History     Smoking Status  Never Smoker    Smokeless Tobacco Use  Never Used          Alcohol History     Alcohol Use Status  No          Drug Use     Drug Use Status  No          Sexual Activity     Sexually Active  Not Asked                Family History     Family History   Problem Relation Age of Onset    Heart Attack Mother     Cancer Father        Review of Systems   Review of Systems   Constitutional: Positive for activity change, appetite change and fatigue. HENT: Positive for congestion. Eyes: Negative.     Respiratory: Positive for cough and shortness of breath. Cardiovascular: Positive for palpitations. Gastrointestinal: Negative. Endocrine: Negative. Genitourinary: Negative. Musculoskeletal: Positive for arthralgias, back pain and gait problem. Neurological: Positive for dizziness and weakness. All other systems reviewed and are negative. Physical Exam   /72   Pulse 73   Temp 98.4 °F (36.9 °C)   Resp 18   Ht 5' 10\" (1.778 m)   Wt 240 lb (108.9 kg)   SpO2 (!) 89%   BMI 34.44 kg/m²     Physical Exam  Vitals and nursing note reviewed. Constitutional:       General: He is in acute distress. Appearance: He is obese. He is ill-appearing and toxic-appearing. HENT:      Head: Normocephalic. Nose: Nose normal.   Eyes:      Conjunctiva/sclera: Conjunctivae normal.   Cardiovascular:      Rate and Rhythm: Tachycardia present. Rhythm irregular. Pulses: Normal pulses. Pulmonary:      Effort: Pulmonary effort is normal.      Breath sounds: Wheezing present. Abdominal:      General: Abdomen is flat. Bowel sounds are normal.      Palpations: Abdomen is soft. Musculoskeletal:      Cervical back: Normal range of motion. Right lower leg: Edema present. Left lower leg: Edema present. Skin:     General: Skin is warm. Neurological:      General: No focal deficit present.          Labs      Recent Results (from the past 24 hour(s))   BLOOD GAS, ARTERIAL    Collection Time: 09/07/21  7:20 PM   Result Value Ref Range    pH, Arterial 7.360 7.350 - 7.450    pCO2, Arterial 42.0 35.0 - 45.0 mmHg    pO2, Arterial 91.0 80.0 - 100.0 mmHg    HCO3, Arterial 23.7 22.0 - 26.0 mmol/L    Base Excess, Arterial -1.7 -2.0 - 2.0 mmol/L    Hemoglobin, Art, Extended 9.4 (L) 14.0 - 18.0 g/dL    O2 Sat, Arterial 95.9 >92 %    Carboxyhgb, Arterial 0.5 0.0 - 5.0 %    Methemoglobin, Arterial 0.2 <1.5 %    O2 Content, Arterial 12.8 Not Established mL/dL    O2 Therapy Unknown    Potassium, Whole Blood    Collection Time: 09/07/21  7:20 PM Result Value Ref Range    Potassium, Whole Blood 4.0    CBC Auto Differential    Collection Time: 09/07/21  7:30 PM   Result Value Ref Range    WBC 5.5 4.8 - 10.8 K/uL    RBC 3.12 (L) 4.70 - 6.10 M/uL    Hemoglobin 9.7 (L) 14.0 - 18.0 g/dL    Hematocrit 29.9 (L) 42.0 - 52.0 %    MCV 95.8 (H) 80.0 - 94.0 fL    MCH 31.1 (H) 27.0 - 31.0 pg    MCHC 32.4 (L) 33.0 - 37.0 g/dL    RDW 14.6 (H) 11.5 - 14.5 %    Platelets 786 668 - 230 K/uL    MPV 9.6 9.4 - 12.4 fL    PLATELET SLIDE REVIEW Adequate     Neutrophils % 68.0 (H) 50.0 - 65.0 %    Lymphocytes % 9.0 (L) 20.0 - 40.0 %    Monocytes % 3.0 0.0 - 10.0 %    Eosinophils % 0.0 0.0 - 5.0 %    Basophils % 0.0 0.0 - 1.0 %    Neutrophils Absolute 4.5 1.5 - 7.5 K/uL    Immature Granulocytes # 0.7 K/uL    Lymphocytes Absolute 0.9 (L) 1.1 - 4.5 K/uL    Monocytes Absolute 0.20 0.00 - 0.90 K/uL    Eosinophils Absolute 0.00 0.00 - 0.60 K/uL    Basophils Absolute 0.00 0.00 - 0.20 K/uL    Bands Relative 7 (H) 0 - 5 %    Atypical Lymphocytes Relative 7 0 - 8 %    Myelocyte Percent 6 (A) %    Anisocytosis 1+ (A)     Macrocytes 1+ (A)     Polychromasia 1+ (A)     Ovalocytes Occasional (A)    Comprehensive Metabolic Panel w/ Reflex to MG    Collection Time: 09/07/21  7:30 PM   Result Value Ref Range    Sodium 132 (L) 136 - 145 mmol/L    Potassium reflex Magnesium 4.6 3.5 - 5.0 mmol/L    Chloride 97 (L) 98 - 111 mmol/L    CO2 25 22 - 29 mmol/L    Anion Gap 10 7 - 19 mmol/L    Glucose 118 (H) 74 - 109 mg/dL    BUN 43 (H) 8 - 23 mg/dL    CREATININE 1.5 (H) 0.5 - 1.2 mg/dL    GFR Non- 44 (A) >60    GFR  54 (L) >59    Calcium 9.0 8.8 - 10.2 mg/dL    Total Protein 6.3 (L) 6.6 - 8.7 g/dL    Albumin 3.2 (L) 3.5 - 5.2 g/dL    Total Bilirubin 0.5 0.2 - 1.2 mg/dL    Alkaline Phosphatase 48 40 - 130 U/L    ALT 8 5 - 41 U/L    AST 21 5 - 40 U/L   TROPONIN    Collection Time: 09/07/21  7:30 PM   Result Value Ref Range    Troponin 0.01 0.00 - 0.03 ng/mL   PROBNP, N-TERMINAL    Collection Time: 09/07/21  7:30 PM   Result Value Ref Range    Pro-BNP 1,561 0 - 1,800 pg/mL   Protime-INR    Collection Time: 09/07/21  7:30 PM   Result Value Ref Range    Protime 14.5 12.0 - 14.6 sec    INR 1.11 0.88 - 1.18   APTT    Collection Time: 09/07/21  7:30 PM   Result Value Ref Range    aPTT 31.3 26.0 - 36.2 sec   Respiratory Panel, Molecular, with COVID-19 (Restricted: peds pts or suitable admitted adults)    Collection Time: 09/07/21  7:37 PM    Specimen: Nasopharyngeal   Result Value Ref Range    Adenovirus by PCR Not Detected Not Detected    Bordetella parapertussis by PCR Not Detected Not Detected    Bordetella pertussis by PCR Not Detected Not Detected    Chlamydophilia pneumoniae by PCR Not Detected Not Detected    Coronavirus 229E by PCR Not Detected Not Detected    Coronavirus HKU1 by PCR Not Detected Not Detected    Coronavirus NL63 by PCR Not Detected Not Detected    Coronavirus OC43 by PCR Not Detected Not Detected    SARS-CoV-2, PCR DETECTED (AA) Not Detected    Human Metapneumovirus by PCR Not Detected Not Detected    Human Rhinovirus/Enterovirus by PCR Not Detected Not Detected    Influenza A by PCR Not Detected Not Detected    Influenza B by PCR Not Detected Not Detected    Mycoplasma pneumoniae by PCR Not Detected Not Detected    Parainfluenza Virus 1 by PCR Not Detected Not Detected    Parainfluenza Virus 2 by PCR Not Detected Not Detected    Parainfluenza Virus 3 by PCR Not Detected Not Detected    Parainfluenza Virus 4 by PCR Not Detected Not Detected    Respiratory Syncytial Virus by PCR Not Detected Not Detected   Comprehensive Metabolic Panel w/ Reflex to MG    Collection Time: 09/08/21 12:48 AM   Result Value Ref Range    Sodium 134 (L) 136 - 145 mmol/L    Potassium reflex Magnesium 4.7 3.5 - 5.0 mmol/L    Chloride 99 98 - 111 mmol/L    CO2 22 22 - 29 mmol/L    Anion Gap 13 7 - 19 mmol/L    Glucose 88 74 - 109 mg/dL    BUN 41 (H) 8 - 23 mg/dL    CREATININE 1.4 (H) 0.5 - 1.2 mg/dL    GFR Non- 48 (A) >60    GFR  58 (L) >59    Calcium 8.5 (L) 8.8 - 10.2 mg/dL    Total Protein 5.8 (L) 6.6 - 8.7 g/dL    Albumin 3.3 (L) 3.5 - 5.2 g/dL    Total Bilirubin 0.5 0.2 - 1.2 mg/dL    Alkaline Phosphatase 48 40 - 130 U/L    ALT 8 5 - 41 U/L    AST 22 5 - 40 U/L        Imaging/Diagnostics Last 24 Hours   XR CHEST PORTABLE    Result Date: 9/7/2021  EXAMINATION:  XR CHEST PORTABLE  9/7/2021 8:58 PM HISTORY: Shortness of air and fatigue. Covid 19. COMPARISON: 8/25/2021. FINDINGS:  There are patchy infiltrates in the mid and lower lung zones that are new. There is cardiomegaly. There is a pacemaker on the left. The thoracic aorta is atheromatous. 1. New bilateral patchy infiltrates may be infectious or inflammatory. Covid 19 is considered. Pulmonary edema is also possible. 2. Cardiomegaly. Signed by Dr Jeanelle Cowden    * (Principal) Pneumonia due to COVID-19 virus 9/8/2021 Yes    Essential hypertension 9/8/2021 Yes    Neuropathy 9/8/2021 Yes    Anxiety 9/8/2021 Yes    Depression 9/8/2021 Yes    Mixed restrictive and obstructive lung disease ([address] 9/8/2021 Yes    COPD (chronic obstructive pulmonary disease) ([address] 9/8/2021 Yes    NEIDA (obstructive sleep apnea) 9/8/2021 Yes    Type 2 diabetes mellitus with diabetic polyneuropathy ([address] 9/8/2021 Yes    Prostate cancer ([address] 9/8/2021 Yes    Chronic combined systolic and diastolic congestive heart failure ([address] 9/8/2021 Yes    Mitral regurgitation 9/8/2021 Yes    Pulmonary hypertension ([address] 9/8/2021 Yes    Pacemaker 9/8/2021 Yes    Sinoatrial node dysfunction ([address] 9/8/2021 Yes    NELSY (acute kidney injury) ([address] 9/8/2021 Yes    CKD (chronic kidney disease) 9/8/2021 Yes    Dehydration 9/8/2021 Yes    Dyspnea 9/8/2021 Yes    Acute hypoxemic respiratory failure ([address] 9/8/2021 Yes          Plan   1.  Patient is being admitted due to covid pneumonia and acute respiratory failure with hypoxemia   2. Will admit to med surg floor   3. Supplemental oxygen bronchodilators   4. Steroids   5. abx and then request pharmacy to evaluate for remdesivir and / or actemerra  Infusion for viral syndrome     6. Gi prophylaxis     7.  dvt prophylaxis     8. Patient has afib and is NOT ON ANTICOAGULATION AND UNCLEAR REASONS. 9. Type ii dm and accuchecks and sliding scale. 10.   Copd and gayatri     11. Needs respiratory care plan     12. Medications reviewed    13. Code status reviewed   He insists on being a full code despite knowing that with all of his medical issues, if it comes to that , likely the outcome will not end up going well. Will respect and honor his wishes.         Consultations Ordered:  IP CONSULT TO PHARMACY    Electronically signed by Marla Clifford MD on 9/8/21 at 2:56 AM CDT

## 2021-09-08 NOTE — PROGRESS NOTES
Contains abnormal data BLOOD GAS, ARTERIAL  Status:  Final result   Ref Range & Units 09/07/21 1920   pH, Arterial 7.350 - 7.450 7.360    pCO2, Arterial 35.0 - 45.0 mmHg 42.0    pO2, Arterial 80.0 - 100.0 mmHg 91.0    HCO3, Arterial 22.0 - 26.0 mmol/L 23.7    Base Excess, Arterial -2.0 - 2.0 mmol/L -1.7    Hemoglobin, Art, Extended 14.0 - 18.0 g/dL 9.4Low     O2 Sat, Arterial >92 % 95.9    Carboxyhgb, Arterial 0.0 - 5.0 % 0.5    Comment:      0.0-1.5   (Smokers 1.5-5.0)    Methemoglobin, Arterial <1.5 % 0.2    O2 Content, Arterial Not Established mL/dL 12.8    O2 Therapy  Unknown    Resulting Agency  [facility]        Specimen Collected: 09/07/21 1920 Last Resulted: 09/07/21 1920 View Other Order Details        4 L/M nasal cannula  LR site choice

## 2021-09-08 NOTE — PROGRESS NOTES
Palliative Care/Spiritual Care: Spoke with pt's son Niesha Story. Pt is currently on oxygen for Covid but has multiple other diagnosis. Pt has a history of COPD, CKD, and CHF. See MARCELLE Sy's note for full list.       Advance Directives: Pt has a POA and a LW, according to his son. Pt's son Niesha Story is POA and his sister Pavithra Lombardi is secondary. Pt is a full code but pt's son says pt is okay with CPR but does not want electric shock. Pt did not want to be put on a ventilator due to multiple comorbidities. This  spoke with pt's nurse Trinidad Conley and code status was changed to LIMITED. See ACP note. Pain/other symptoms:  Unable to assess pain. Social/Spiritual: Pt does not have a apryl or Muslim preference. Pt/family discussion r/t goals: Pt's son says pt has two sons and a daughter and currently lives in assisted living. Pt's son says pt ambulates without assistance in his apartment, but also uses a walker, and a scooter for longer distances. Pt is able to care for himself at home but has someone come and do light housekeeping and helps him if needed. Pt's goal is to return home with previous quality of life. The details of the discharge plan are still to be determined due to pt being in quarantine. Provided spiritual care with support, nurtured hope, and prayer. Pt's son expressed gratitude for spiritual care.     Electronically signed by Maribel Crespo on 9/8/2021 at 11:32 AM

## 2021-09-08 NOTE — PROGRESS NOTES
Called Anai BANEGAS, to verify code status. Patient DOES NOT want to be intubated, DOES NOT want to be shocked, but DOES want CPR. MD made aware.   Electronically signed by John Sanabria RN on 9/8/2021 at 4:04 PM

## 2021-09-08 NOTE — CARE COORDINATION
Spoke with pt to assess dc plans/needs. Pt stated that he resides at [facility] but \"the problem is he can not get a caregiver until he tests negative or is out of quarantine\". Explained that pt can either dc home with Home Health once he is medically stable, or dc to a skilled nursing facility until he is out of quarantine and feels safe going home. He requested that his SW call him back later.    Electronically signed by Ceasar Parham on 9/8/2021 at 9:10 AM

## 2021-09-09 LAB
ALBUMIN SERPL-MCNC: 3.3 G/DL (ref 3.5–5.2)
ALP BLD-CCNC: 48 U/L (ref 40–130)
ALT SERPL-CCNC: 7 U/L (ref 5–41)
ANION GAP SERPL CALCULATED.3IONS-SCNC: 11 MMOL/L (ref 7–19)
AST SERPL-CCNC: 17 U/L (ref 5–40)
ATYPICAL LYMPHOCYTE RELATIVE PERCENT: 5 % (ref 0–8)
BANDED NEUTROPHILS RELATIVE PERCENT: 6 % (ref 0–5)
BASOPHILS ABSOLUTE: 0 K/UL (ref 0–0.2)
BASOPHILS RELATIVE PERCENT: 0 % (ref 0–1)
BILIRUB SERPL-MCNC: 0.5 MG/DL (ref 0.2–1.2)
BUN BLDV-MCNC: 27 MG/DL (ref 8–23)
CALCIUM SERPL-MCNC: 8.9 MG/DL (ref 8.8–10.2)
CHLORIDE BLD-SCNC: 99 MMOL/L (ref 98–111)
CHOLESTEROL, TOTAL: 86 MG/DL (ref 160–199)
CO2: 24 MMOL/L (ref 22–29)
CREAT SERPL-MCNC: 1 MG/DL (ref 0.5–1.2)
EKG P AXIS: 0 DEGREES
EKG P-R INTERVAL: 174 MS
EKG Q-T INTERVAL: 392 MS
EKG QRS DURATION: 127 MS
EKG QTC CALCULATION (BAZETT): 423 MS
EKG T AXIS: 158 DEGREES
EOSINOPHILS ABSOLUTE: 0.06 K/UL (ref 0–0.6)
EOSINOPHILS RELATIVE PERCENT: 1 % (ref 0–5)
GFR AFRICAN AMERICAN: >59
GFR NON-AFRICAN AMERICAN: >60
GLUCOSE BLD-MCNC: 111 MG/DL (ref 70–99)
GLUCOSE BLD-MCNC: 126 MG/DL (ref 70–99)
GLUCOSE BLD-MCNC: 130 MG/DL (ref 70–99)
GLUCOSE BLD-MCNC: 90 MG/DL (ref 74–109)
GLUCOSE BLD-MCNC: 96 MG/DL (ref 70–99)
HBA1C MFR BLD: 5.6 % (ref 4–6)
HCT VFR BLD CALC: 29.7 % (ref 42–52)
HDLC SERPL-MCNC: 40 MG/DL (ref 55–121)
HEMOGLOBIN: 9.8 G/DL (ref 14–18)
IMMATURE GRANULOCYTES #: 0.8 K/UL
LDL CHOLESTEROL CALCULATED: 24 MG/DL
LYMPHOCYTES ABSOLUTE: 1.1 K/UL (ref 1.1–4.5)
LYMPHOCYTES RELATIVE PERCENT: 13 % (ref 20–40)
MACROCYTES: ABNORMAL
MAGNESIUM: 1.8 MG/DL (ref 1.6–2.4)
MCH RBC QN AUTO: 31.4 PG (ref 27–31)
MCHC RBC AUTO-ENTMCNC: 33 G/DL (ref 33–37)
MCV RBC AUTO: 95.2 FL (ref 80–94)
MONOCYTES ABSOLUTE: 0.4 K/UL (ref 0–0.9)
MONOCYTES RELATIVE PERCENT: 7 % (ref 0–10)
MYELOCYTE PERCENT: 3 %
NEUTROPHILS ABSOLUTE: 4.5 K/UL (ref 1.5–7.5)
NEUTROPHILS RELATIVE PERCENT: 65 % (ref 50–65)
OVALOCYTES: ABNORMAL
PDW BLD-RTO: 14.1 % (ref 11.5–14.5)
PERFORMED ON: ABNORMAL
PERFORMED ON: NORMAL
PLATELET # BLD: 189 K/UL (ref 130–400)
PLATELET SLIDE REVIEW: ADEQUATE
PMV BLD AUTO: 9.8 FL (ref 9.4–12.4)
POTASSIUM SERPL-SCNC: 4 MMOL/L (ref 3.5–5)
RBC # BLD: 3.12 M/UL (ref 4.7–6.1)
SODIUM BLD-SCNC: 134 MMOL/L (ref 136–145)
TOTAL PROTEIN: 5.8 G/DL (ref 6.6–8.7)
TRIGL SERPL-MCNC: 109 MG/DL (ref 0–149)
WBC # BLD: 6.1 K/UL (ref 4.8–10.8)

## 2021-09-09 PROCEDURE — 97535 SELF CARE MNGMENT TRAINING: CPT

## 2021-09-09 PROCEDURE — 82947 ASSAY GLUCOSE BLOOD QUANT: CPT

## 2021-09-09 PROCEDURE — 6360000002 HC RX W HCPCS: Performed by: INTERNAL MEDICINE

## 2021-09-09 PROCEDURE — 2580000003 HC RX 258: Performed by: INTERNAL MEDICINE

## 2021-09-09 PROCEDURE — 2500000003 HC RX 250 WO HCPCS: Performed by: INTERNAL MEDICINE

## 2021-09-09 PROCEDURE — 36415 COLL VENOUS BLD VENIPUNCTURE: CPT

## 2021-09-09 PROCEDURE — 83036 HEMOGLOBIN GLYCOSYLATED A1C: CPT

## 2021-09-09 PROCEDURE — 85025 COMPLETE CBC W/AUTO DIFF WBC: CPT

## 2021-09-09 PROCEDURE — 97110 THERAPEUTIC EXERCISES: CPT

## 2021-09-09 PROCEDURE — 83735 ASSAY OF MAGNESIUM: CPT

## 2021-09-09 PROCEDURE — 80061 LIPID PANEL: CPT

## 2021-09-09 PROCEDURE — 2700000000 HC OXYGEN THERAPY PER DAY

## 2021-09-09 PROCEDURE — 1210000000 HC MED SURG R&B

## 2021-09-09 PROCEDURE — 6370000000 HC RX 637 (ALT 250 FOR IP): Performed by: INTERNAL MEDICINE

## 2021-09-09 PROCEDURE — 97162 PT EVAL MOD COMPLEX 30 MIN: CPT

## 2021-09-09 PROCEDURE — 80053 COMPREHEN METABOLIC PANEL: CPT

## 2021-09-09 PROCEDURE — 97166 OT EVAL MOD COMPLEX 45 MIN: CPT

## 2021-09-09 RX ORDER — AZITHROMYCIN 250 MG/1
500 TABLET, FILM COATED ORAL DAILY
Status: DISCONTINUED | OUTPATIENT
Start: 2021-09-10 | End: 2021-09-13 | Stop reason: HOSPADM

## 2021-09-09 RX ADMIN — ACETAMINOPHEN 650 MG: 650 TABLET, FILM COATED, EXTENDED RELEASE ORAL at 08:39

## 2021-09-09 RX ADMIN — ENOXAPARIN SODIUM 30 MG: 30 INJECTION SUBCUTANEOUS at 08:40

## 2021-09-09 RX ADMIN — TIOTROPIUM BROMIDE INHALATION SPRAY 2 PUFF: 3.12 SPRAY, METERED RESPIRATORY (INHALATION) at 08:43

## 2021-09-09 RX ADMIN — CARVEDILOL 12.5 MG: 12.5 TABLET, FILM COATED ORAL at 08:40

## 2021-09-09 RX ADMIN — SODIUM CHLORIDE, PRESERVATIVE FREE 10 ML: 5 INJECTION INTRAVENOUS at 08:42

## 2021-09-09 RX ADMIN — DOXAZOSIN 1 MG: 1 TABLET ORAL at 08:39

## 2021-09-09 RX ADMIN — FLUOXETINE HYDROCHLORIDE 20 MG: 20 CAPSULE ORAL at 08:40

## 2021-09-09 RX ADMIN — CARVEDILOL 12.5 MG: 12.5 TABLET, FILM COATED ORAL at 18:41

## 2021-09-09 RX ADMIN — BUDESONIDE AND FORMOTEROL FUMARATE DIHYDRATE 2 PUFF: 160; 4.5 AEROSOL RESPIRATORY (INHALATION) at 08:39

## 2021-09-09 RX ADMIN — AZITHROMYCIN MONOHYDRATE 500 MG: 500 INJECTION, POWDER, LYOPHILIZED, FOR SOLUTION INTRAVENOUS at 06:32

## 2021-09-09 RX ADMIN — DEXAMETHASONE SODIUM PHOSPHATE 6 MG: 10 INJECTION, SOLUTION INTRAMUSCULAR; INTRAVENOUS at 09:20

## 2021-09-09 RX ADMIN — ALLOPURINOL 100 MG: 100 TABLET ORAL at 08:40

## 2021-09-09 RX ADMIN — ISOSORBIDE MONONITRATE 30 MG: 30 TABLET, EXTENDED RELEASE ORAL at 08:40

## 2021-09-09 RX ADMIN — Medication 2000 UNITS: at 08:39

## 2021-09-09 RX ADMIN — FAMOTIDINE 20 MG: 10 INJECTION, SOLUTION INTRAVENOUS at 08:43

## 2021-09-09 RX ADMIN — SODIUM CHLORIDE, PRESERVATIVE FREE 2000 MG: 5 INJECTION INTRAVENOUS at 08:42

## 2021-09-09 RX ADMIN — ASPIRIN 81 MG: 81 TABLET, COATED ORAL at 08:39

## 2021-09-09 RX ADMIN — AMLODIPINE BESYLATE 5 MG: 5 TABLET ORAL at 08:40

## 2021-09-09 RX ADMIN — POTASSIUM CHLORIDE 10 MEQ: 750 TABLET, EXTENDED RELEASE ORAL at 08:40

## 2021-09-09 ASSESSMENT — PAIN SCALES - GENERAL
PAINLEVEL_OUTOF10: 5
PAINLEVEL_OUTOF10: 0

## 2021-09-09 NOTE — PROGRESS NOTES
Occupational Therapy   Occupational Therapy Initial Assessment  Date: 2021   Patient Name: Nam Lacey  MRN: [id number]     : 1935    Date of Service: 2021    Discharge Recommendations:  Patient would benefit from continued therapy after discharge       Assessment   Assessment: Evaluation completed and tx initiated. The patient would benefit from further therapy to upgrade functional status. Treatment Diagnosis: Pneumonia due to Covid 19  History: hx COPD, weakness  REQUIRES OT FOLLOW UP: Yes  Activity Tolerance  Activity Tolerance: Patient Tolerated treatment well  Safety Devices  Safety Devices in place: Yes  Type of devices: Call light within reach; Chair alarm in place;Nurse notified           Patient Diagnosis(es): The primary encounter diagnosis was Acute kidney injury ([address] Diagnoses of Fatigue, unspecified type and COVID-19 were also pertinent to this visit. 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 has a past surgical history that includes knee surgery; Eye surgery; hernia repair; joint replacement; Colonoscopy; eye surgery; and Pacemaker insertion.     Treatment Diagnosis: Pneumonia due to Covid 19      Restrictions  Restrictions/Precautions  Restrictions/Precautions: Fall Risk, Isolation  Position Activity Restriction  Other position/activity restrictions: droplet plus precautions    Subjective Goals  Short term goals  Time Frame for Short term goals: 1-2 weeks  Short term goal 1: Modified independent with dressing and toileting  Short term goal 2: Modified independent with standing and transfers  Short term goal 3: Modified independent with light ambulatory ADL  Short term goal 4:  Independent with therapeutic exercise program       Therapy Time   Individual Concurrent Group Co-treatment   Time In           Time Out           Minutes                   Bryn Arias OT Electronically signed by Bryn Arias OT on 9/9/2021 at 3:14 PM

## 2021-09-09 NOTE — PROGRESS NOTES
[facility]        Hospitalist Progress Note  9/9/2021 3:26 PM  Subjective:   Admit Date: 9/7/2021  PCP: Felisa Henderson DO    Chief Complaint: Shortness of breath    Subjective: Patient was seen and examined by the bedside this am. He has no new complaint. He endorsed reduced SOB and cough. Patient stated he quit taking his Advair 4 months ago due to abnormal throat sensation    [facility]: Patient is an 52-year-old  male with medical history significant for COPD (on 3 L home O2), atrial fibrillation, diabetes, hypertension, prostate cancer who was admitted on account of acute respiratory failure likely due to COVID pneumonia, COPD exacerbation and worsening kidney function. ROS: 14 point review of systems is negative except as specifically addressed above. ADULT DIET;  Regular    Intake/Output Summary (Last 24 hours) at 9/9/2021 1526  Last data filed at 9/9/2021 1443  Gross per 24 hour   Intake 360 ml   Output 550 ml   Net -190 ml     Medications:   sodium chloride       Current Facility-Administered Medications   Medication Dose Route Frequency Provider Last Rate Last Admin    [START ON 9/10/2021] azithromycin (ZITHROMAX) tablet 500 mg  500 mg Oral Daily Marcela Friedman MD        acetaminophen (TYLENOL) extended release tablet 650 mg  650 mg Oral Daily Marcela Friedman MD   650 mg at 09/09/21 0839    albuterol sulfate  (90 Base) MCG/ACT inhaler 2 puff  2 puff Inhalation Q6H PRN Marcela Friedman MD        allopurinol (ZYLOPRIM) tablet 100 mg  100 mg Oral Daily Marcela Friedman MD   100 mg at 09/09/21 0840    amLODIPine (NORVASC) tablet 5 mg  5 mg Oral Daily Marcela Friedman MD   5 mg at 09/09/21 0840    aspirin EC tablet 81 mg  81 mg Oral Daily Marcela Friedman MD   81 mg at 09/09/21 0839    atorvastatin (LIPITOR) tablet 40 mg  40 mg Oral Nightly Marcela Friedman MD   40 mg at 09/08/21 2051    buPROPion (WELLBUTRIN XL) extended release tablet 300 mg  300 mg Oral QAM Eamon Lea MD   300 mg at 09/08/21 0832    carvedilol (COREG) tablet 12.5 mg  12.5 mg Oral BID WC Eamon Lea MD   12.5 mg at 09/09/21 0840    FLUoxetine (PROZAC) capsule 20 mg  20 mg Oral Daily Eamon Lea MD   20 mg at 09/09/21 0840    isosorbide mononitrate (IMDUR) extended release tablet 30 mg  30 mg Oral Daily Eamon Lea MD   30 mg at 09/09/21 0840    trospium (SANCTURA) tablet 20 mg  20 mg Oral Nightly Eamon Lea MD   20 mg at 09/08/21 2052    nitroGLYCERIN (NITROSTAT) SL tablet 0.4 mg  0.4 mg SubLINGual Q5 Min PRN Eamon Lea MD        potassium chloride (KLOR-CON M) extended release tablet 10 mEq  10 mEq Oral Daily Eamon Lea MD   10 mEq at 09/09/21 0840    doxazosin (CARDURA) tablet 1 mg  1 mg Oral Daily Eamon Lea MD   1 mg at 09/09/21 0839    sodium chloride flush 0.9 % injection 5-40 mL  5-40 mL IntraVENous 2 times per day Eamon Lea MD   10 mL at 09/09/21 0842    sodium chloride flush 0.9 % injection 5-40 mL  5-40 mL IntraVENous PRN Eamon Lea MD        0.9 % sodium chloride infusion  25 mL IntraVENous PRN Eamon Lea MD        enoxaparin (LOVENOX) injection 30 mg  30 mg SubCUTAneous BID Eamon Lea MD   30 mg at 09/09/21 0840    ondansetron (ZOFRAN-ODT) disintegrating tablet 4 mg  4 mg Oral Q8H PRN Eamon Lea MD        Or    ondansetron (ZOFRAN) injection 4 mg  4 mg IntraVENous Q6H PRN Eamon Lea MD        polyethylene glycol (GLYCOLAX) packet 17 g  17 g Oral Daily PRN Eamon Lea MD        acetaminophen (TYLENOL) tablet 650 mg  650 mg Oral Q6H PRN Eamon Lea MD        Or    acetaminophen (TYLENOL) suppository 650 mg  650 mg Rectal Q6H PRN Eamon Lea MD        famotidine (PEPCID) injection 20 mg  20 mg IntraVENous BID Rc Hidalgo MD   20 mg at 09/09/21 0843    guaiFENesin-dextromethorphan (ROBITUSSIN DM) 100-10 MG/5ML syrup 5 mL  5 mL Oral Q4H PRN Rc Hidalgo MD        Vitamin D (CHOLECALCIFEROL) tablet 2,000 Units  2,000 Units Oral Daily Rc Hidalgo MD   2,000 Units at 09/09/21 0839    cefTRIAXone (ROCEPHIN) 2,000 mg in sodium chloride (PF) 20 mL IV syringe  2,000 mg IntraVENous Q24H Rc Hidalgo MD   2,000 mg at 09/09/21 0842    budesonide-formoterol (SYMBICORT) 160-4.5 MCG/ACT inhaler 2 puff  2 puff Inhalation BID Rc Hidalgo MD   2 puff at 09/09/21 0839    insulin lispro (HUMALOG) injection vial 0-12 Units  0-12 Units SubCUTAneous TID WC Rc Hidalog MD   2 Units at 09/08/21 1749    insulin lispro (HUMALOG) injection vial 0-6 Units  0-6 Units SubCUTAneous Nightly Rc Hidalgo MD        tiotropium (SPIRIVA RESPIMAT) 2.5 MCG/ACT inhaler 2 puff  2 puff Inhalation Daily Rc Hidalgo MD   2 puff at 09/09/21 0843    dexamethasone (PF) (DECADRON) injection 6 mg  6 mg IntraVENous Q24H Lexy Hensley MD   6 mg at 09/09/21 0920        Labs:     Recent Labs     09/07/21  1930 09/08/21  0620 09/09/21  0512   WBC 5.5 5.4 6.1   RBC 3.12* 3.19* 3.12*   HGB 9.7* 9.6* 9.8*   HCT 29.9* 30.7* 29.7*   MCV 95.8* 96.2* 95.2*   MCH 31.1* 30.1 31.4*   MCHC 32.4* 31.3* 33.0    179 189     Recent Labs     09/08/21  0048 09/08/21  0620 09/09/21  0512   * 134* 134*   K 4.7 4.0 4.0   ANIONGAP 13 10 11   CL 99 100 99   CO2 22 24 24   BUN 41* 38* 27*   CREATININE 1.4* 1.3* 1.0   GLUCOSE 88 95 90   CALCIUM 8.5* 8.8 8.9     Recent Labs     09/09/21  0512   MG 1.8     Recent Labs     09/08/21  0048 09/08/21  0620 09/09/21  0512   AST 22 18 17   ALT 8 7 7   BILITOT 0.5 0.5 0.5   ALKPHOS 50 49 50     ABGs:No results for input(s): PH, PO2, PCO2, HCO3, BE, O2SAT in the last 72 hours.   Troponin T:   Recent Labs     09/07/21  1930 TROPONINI 0.01     INR:   Recent Labs     09/07/21  1930   INR 1.11     Lactic Acid: No results for input(s): LACTA in the last 72 hours. Objective:   Vitals: BP (!) 138/119   Pulse 100   Temp 97.9 °F (36.6 °C) (Oral)   Resp 20   Ht 5' 10\" (1.778 m)   Wt 240 lb (108.9 kg)   SpO2 93%   BMI 34.44 kg/m²   24HR INTAKE/OUTPUT:      Intake/Output Summary (Last 24 hours) at 9/9/2021 1526  Last data filed at 9/9/2021 1443  Gross per 24 hour   Intake 360 ml   Output 550 ml   Net -190 ml     General appearance: alert and cooperative with exam  HEENT: atraumatic, eyes with clear conjunctiva external ears and nose are normal, lips normalLungs: Reduced air entry in lung bases no wheezing or crackles  Heart: S1, S2 heard no m,r,g  Abdomen: Soft, NT, ND, BS+  Extremities: Atraumatic, no cyanosis. 1+ BLE pting edema up to the midshin  Neurologic: no focal neurologic deficits, normal sensation, alert and oriented, affect and mood appropriate  Skin: no rashes, nodules    Assessment and Plan:   Principal Problem:    Pneumonia due to COVID-19 virus  Active Problems:    Essential hypertension    Neuropathy    Anxiety    Depression    Mixed restrictive and obstructive lung disease (HCC)    COPD (chronic obstructive pulmonary disease) (HCC)    NEIDA (obstructive sleep apnea)    Type 2 diabetes mellitus with diabetic polyneuropathy (HCC)    Prostate cancer (HCC)    Chronic combined systolic and diastolic congestive heart failure (HCC)    Mitral regurgitation    Pulmonary hypertension (HCC)    Pacemaker    Sinoatrial node dysfunction (HCC)    NELSY (acute kidney injury) ([address]    CKD (chronic kidney disease)    Dehydration    Dyspnea    Acute hypoxemic respiratory failure ([address]    Palliative care patient  Resolved Problems:    * No resolved hospital problems.  *    Plan:  Acute Respiratory Failure due to COVID 19 Pneumonia  -Continue O2 supplementation via nasal cannula  -Continue decadron, bronchodilator treatment    Acute COPD exacerbation  -Continue steroids, azithromycin, Symbicort bronchodilator treatment  -Outpatient follow up with pulmonologist    NELSY - resolved    Chronic problems - Continue home meds    Advance Directive: Limited    DVT prophylaxis: Lovenox    Discharge planning: Possible dc tomorrow    Signed:  Dionne Rajan MD 9/9/2021 3:26 PM  Rounding Hospitalist

## 2021-09-09 NOTE — PROGRESS NOTES
Physical Therapy    Facility/Department: [facility] ONCOLOGY UNIT  Initial Assessment    NAME: Varun Johnson  : 1935  MRN: [id number]    Date of Service: 2021    Discharge Recommendations:  Continue to assess pending progress, 24 hour supervision or assist, Patient would benefit from continued therapy after discharge        Assessment   Body structures, Functions, Activity limitations: Decreased functional mobility ; Decreased sensation;Decreased endurance;Decreased balance  Assessment: Pt. will benefit from cont. PT to decrease impairments. Pt. a fall risk and should not attempt mobility on his own at this time. Anticipate pt will need cont. PT for mobility and endurance training after d/c from [facility]. Would recommend pt use RW or cane and have staff A x1 for mobility. Treatment Diagnosis: deconditioning due to covid 19  Prognosis: Good  Decision Making: Medium Complexity  PT Education: Goals;PT Role;Plan of Care;Gait Training;General Safety;Transfer Training;Functional Mobility Training  Patient Education: use of call light for staff A for t/f back to bed. Barriers to Learning: none noted  REQUIRES PT FOLLOW UP: Yes  Activity Tolerance  Activity Tolerance: Patient Tolerated treatment well       Patient Diagnosis(es): The primary encounter diagnosis was Acute kidney injury ([address] Diagnoses of Fatigue, unspecified type and COVID-19 were also pertinent to this visit.      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 has a past surgical history that includes knee surgery; Eye surgery; hernia repair; joint replacement; Colonoscopy; eye surgery; and Pacemaker insertion. Restrictions  Restrictions/Precautions  Restrictions/Precautions: Fall Risk, Isolation  Implants present? : Pacemaker  Position Activity Restriction  Other position/activity restrictions: droplet plus precautions  Vision/Hearing        Subjective  General  Chart Reviewed: Yes  Patient assessed for rehabilitation services?: Yes  Response To Previous Treatment: Not applicable  Family / Caregiver Present: No  Referring Practitioner: Frank Vidal MD  Referral Date : 09/08/21  Diagnosis: NELSY, fatigue, PNA due to covid 19  Follows Commands: Within Functional Limits  General Comment  Comments: RN, Jonathan Anderson PT. Subjective  Subjective: Pt. states he has been sitting on EOB to eat meals, but does not feel like eating lunch. Reports that when he is moving around, it makes him cough more.   Pain Screening  Patient Currently in Pain: Denies  Pain Assessment  Pain Level: 0  Vital Signs  Patient Currently in Pain: Denies  Pre Treatment Pain Screening  Intervention List: Patient able to continue with treatment    Orientation  Orientation  Overall Orientation Status: Within Functional Limits  Social/Functional History  Social/Functional History  Lives With: Alone  Type of Home: Apartment [address]bi Tillman)  Home Layout: One level  Home Equipment: 4 wheeled walker, Quad cane, Wheelchair-electric, Oxygen  ADL Assistance: Independent  Homemaking Assistance: Needs assistance  Ambulation Assistance: Independent (walks short distances with cane, uses electric w/c for long distances)  Transfer Assistance: Independent  Occupation: Retired  Additional Comments: Barbi Tillman does meals for pt, has personal alert  Cognition   Cognition  Overall Cognitive Status: WFL    Objective Observation/Palpation  Posture: Good  Observation: 6LO2 by nc, IV    AROM RLE (degrees)  RLE AROM: WFL  AROM LLE (degrees)  LLE AROM : WFL  Strength RLE  Strength RLE: WFL  Comment: grossly 4/5  Strength LLE  Strength LLE: WFL  Comment: grossly 4/5     Sensation  Overall Sensation Status: Impaired (numb feet)  Bed mobility  Supine to Sit: Independent  Transfers  Sit to Stand: Contact guard assistance  Stand to sit: Contact guard assistance  Ambulation  Ambulation?: Yes  Ambulation 1  Surface: level tile  Device: No Device  Other Apparatus: O2  Assistance: Minimal assistance;Contact guard assistance;2 Person assistance  Quality of Gait: slightly unsteady  Gait Deviations: Slow Estefania;Decreased step length;Decreased step height  Distance: 25'  Comments: pt pushed IV pole from window to chair with much more steady gait pattern. Balance  Posture: Good  Sitting - Static: Good;+  Sitting - Dynamic: Good;-  Standing - Static: Fair;+  Standing - Dynamic: Poor;+        Plan   Plan  Times per week: 3-7  Times per day: Daily  Plan weeks: 2  Current Treatment Recommendations: Balance Training, Functional Mobility Training, Transfer Training, Endurance Training, Safety Education & Training, Positioning, Equipment Evaluation, Education, & procurement, Patient/Caregiver Education & Training  Plan Comment: cont. PT per POC.   Safety Devices  Type of devices: Gait belt, Patient at risk for falls, Nurse notified, Left in chair, Chair alarm in place, Call light within reach (reclined)    G-Code       OutComes Score                                                  AM-PAC Score             Goals  Short term goals  Time Frame for Short term goals: 2 wks  Short term goal 1: sit to stand indep  Short term goal 2: amb. 100' with cane or RW SBA  Patient Goals   Patient goals : get better       Therapy Time   Individual Concurrent Group Co-treatment   Time In           Time Out           Minutes                   Radha Baltazar PT    Electronically signed by Praveena Schmidt PT on 9/9/2021 at 4:49 PM

## 2021-09-09 NOTE — PROGRESS NOTES
Pharmacy Intravenous to Oral Protocol    Medication changed per [address] IV to PO protocol: Azithromycin    Patient meets the following inclusion criteria and none of the exclusion criteria:    Inclusion criteria:  - IV therapy > 24 hours (antibiotics only)  - Tolerating diet more advanced than clear liquids  - Tolerating PO medications  - No vasopressor blood pressure support (ie no signs of shock)  - Patient hasn't had a seizure for 72 hrs (antiepileptic medications only)    Exclusion criteria:  - Infections requiring IV therapy (ie meningitis, endocarditis, osteomyelitis, pancreatitis)   - Nausea and/or vomiting or severe diarrhea within past 24 hours   - Has gastrectomy, ileus, gastric outlet or bowel obstruction, or malabsorption syndromes   - Has significant painful oral ulceration   - TPN with an NPO order   - Active GI bleed   - Unable to swallow   - NPO   - Febrile in the last 24 hours (antibiotics only)   - Clinical deteriorating or unstable (antibiotics only)   - Pediatric patients and patients who are not euthyroid (not on oral levothyroxine/not stabilized on oral levothyroxine)- Levothyroxine only     Electronically signed by YOLY Reveles [facility] on 9/9/2021 at 2:16 PM

## 2021-09-09 NOTE — TELEPHONE ENCOUNTER
----- Message from ISAAC Mckeon sent at 7/15/2020  8:07 AM CDT -----  A1c is within normal limits at 5.2. This shows great control of blood sugars over the previous 3 months. Continue current regimen. Closure 3 Information: This tab is for additional flaps and grafts above and beyond our usual structured repairs.  Please note if you enter information here it will not currently bill and you will need to add the billing information manually.

## 2021-09-10 PROBLEM — M62.81 GENERALIZED MUSCLE WEAKNESS: Status: ACTIVE | Noted: 2021-09-10

## 2021-09-10 LAB
ALBUMIN SERPL-MCNC: 3.1 G/DL (ref 3.5–5.2)
ALP BLD-CCNC: 53 U/L (ref 40–130)
ALT SERPL-CCNC: 6 U/L (ref 5–41)
ANION GAP SERPL CALCULATED.3IONS-SCNC: 9 MMOL/L (ref 7–19)
AST SERPL-CCNC: 14 U/L (ref 5–40)
BASOPHILS ABSOLUTE: 0.1 K/UL (ref 0–0.2)
BASOPHILS RELATIVE PERCENT: 0.6 % (ref 0–1)
BILIRUB SERPL-MCNC: 0.5 MG/DL (ref 0.2–1.2)
BUN BLDV-MCNC: 17 MG/DL (ref 8–23)
CALCIUM SERPL-MCNC: 9.1 MG/DL (ref 8.8–10.2)
CHLORIDE BLD-SCNC: 101 MMOL/L (ref 98–111)
CO2: 25 MMOL/L (ref 22–29)
CREAT SERPL-MCNC: 0.9 MG/DL (ref 0.5–1.2)
EOSINOPHILS ABSOLUTE: 0.2 K/UL (ref 0–0.6)
EOSINOPHILS RELATIVE PERCENT: 1.9 % (ref 0–5)
GFR AFRICAN AMERICAN: >59
GFR NON-AFRICAN AMERICAN: >60
GLUCOSE BLD-MCNC: 131 MG/DL (ref 70–99)
GLUCOSE BLD-MCNC: 156 MG/DL (ref 70–99)
GLUCOSE BLD-MCNC: 210 MG/DL (ref 70–99)
GLUCOSE BLD-MCNC: 97 MG/DL (ref 70–99)
GLUCOSE BLD-MCNC: 97 MG/DL (ref 74–109)
HCT VFR BLD CALC: 31.6 % (ref 42–52)
HEMOGLOBIN: 10.1 G/DL (ref 14–18)
IMMATURE GRANULOCYTES #: 0.8 K/UL
LYMPHOCYTES ABSOLUTE: 0.5 K/UL (ref 1.1–4.5)
LYMPHOCYTES RELATIVE PERCENT: 5.5 % (ref 20–40)
MAGNESIUM: 1.8 MG/DL (ref 1.6–2.4)
MCH RBC QN AUTO: 30.1 PG (ref 27–31)
MCHC RBC AUTO-ENTMCNC: 32 G/DL (ref 33–37)
MCV RBC AUTO: 94.3 FL (ref 80–94)
MONOCYTES ABSOLUTE: 0.6 K/UL (ref 0–0.9)
MONOCYTES RELATIVE PERCENT: 7.6 % (ref 0–10)
NEUTROPHILS ABSOLUTE: 6.3 K/UL (ref 1.5–7.5)
NEUTROPHILS RELATIVE PERCENT: 75.1 % (ref 50–65)
PDW BLD-RTO: 14.3 % (ref 11.5–14.5)
PERFORMED ON: ABNORMAL
PERFORMED ON: NORMAL
PLATELET # BLD: 240 K/UL (ref 130–400)
PMV BLD AUTO: 9.6 FL (ref 9.4–12.4)
POTASSIUM SERPL-SCNC: 3.9 MMOL/L (ref 3.5–5)
RBC # BLD: 3.35 M/UL (ref 4.7–6.1)
SODIUM BLD-SCNC: 135 MMOL/L (ref 136–145)
TOTAL PROTEIN: 6.2 G/DL (ref 6.6–8.7)
WBC # BLD: 8.4 K/UL (ref 4.8–10.8)

## 2021-09-10 PROCEDURE — 6360000002 HC RX W HCPCS: Performed by: INTERNAL MEDICINE

## 2021-09-10 PROCEDURE — 2580000003 HC RX 258: Performed by: INTERNAL MEDICINE

## 2021-09-10 PROCEDURE — 97116 GAIT TRAINING THERAPY: CPT

## 2021-09-10 PROCEDURE — 1210000000 HC MED SURG R&B

## 2021-09-10 PROCEDURE — 97530 THERAPEUTIC ACTIVITIES: CPT

## 2021-09-10 PROCEDURE — 80053 COMPREHEN METABOLIC PANEL: CPT

## 2021-09-10 PROCEDURE — 83735 ASSAY OF MAGNESIUM: CPT

## 2021-09-10 PROCEDURE — 6370000000 HC RX 637 (ALT 250 FOR IP): Performed by: INTERNAL MEDICINE

## 2021-09-10 PROCEDURE — 82947 ASSAY GLUCOSE BLOOD QUANT: CPT

## 2021-09-10 PROCEDURE — 2500000003 HC RX 250 WO HCPCS: Performed by: INTERNAL MEDICINE

## 2021-09-10 PROCEDURE — 94761 N-INVAS EAR/PLS OXIMETRY MLT: CPT

## 2021-09-10 PROCEDURE — 85025 COMPLETE CBC W/AUTO DIFF WBC: CPT

## 2021-09-10 PROCEDURE — 36415 COLL VENOUS BLD VENIPUNCTURE: CPT

## 2021-09-10 PROCEDURE — 2700000000 HC OXYGEN THERAPY PER DAY

## 2021-09-10 PROCEDURE — 97535 SELF CARE MNGMENT TRAINING: CPT

## 2021-09-10 RX ORDER — FAMOTIDINE 20 MG/1
20 TABLET, FILM COATED ORAL 2 TIMES DAILY
Status: DISCONTINUED | OUTPATIENT
Start: 2021-09-10 | End: 2021-09-13 | Stop reason: HOSPADM

## 2021-09-10 RX ADMIN — SODIUM CHLORIDE, PRESERVATIVE FREE 10 ML: 5 INJECTION INTRAVENOUS at 20:34

## 2021-09-10 RX ADMIN — FLUOXETINE HYDROCHLORIDE 20 MG: 20 CAPSULE ORAL at 08:45

## 2021-09-10 RX ADMIN — BUDESONIDE AND FORMOTEROL FUMARATE DIHYDRATE 2 PUFF: 160; 4.5 AEROSOL RESPIRATORY (INHALATION) at 01:47

## 2021-09-10 RX ADMIN — ENOXAPARIN SODIUM 30 MG: 30 INJECTION SUBCUTANEOUS at 20:34

## 2021-09-10 RX ADMIN — TIOTROPIUM BROMIDE INHALATION SPRAY 2 PUFF: 3.12 SPRAY, METERED RESPIRATORY (INHALATION) at 08:48

## 2021-09-10 RX ADMIN — BUPROPION HYDROCHLORIDE 300 MG: 150 TABLET, EXTENDED RELEASE ORAL at 08:45

## 2021-09-10 RX ADMIN — BUDESONIDE AND FORMOTEROL FUMARATE DIHYDRATE 2 PUFF: 160; 4.5 AEROSOL RESPIRATORY (INHALATION) at 08:49

## 2021-09-10 RX ADMIN — ENOXAPARIN SODIUM 30 MG: 30 INJECTION SUBCUTANEOUS at 08:46

## 2021-09-10 RX ADMIN — POTASSIUM CHLORIDE 10 MEQ: 750 TABLET, EXTENDED RELEASE ORAL at 08:45

## 2021-09-10 RX ADMIN — SODIUM CHLORIDE, PRESERVATIVE FREE 10 ML: 5 INJECTION INTRAVENOUS at 01:46

## 2021-09-10 RX ADMIN — TROSPIUM CHLORIDE 20 MG: 20 TABLET, FILM COATED ORAL at 20:34

## 2021-09-10 RX ADMIN — ACETAMINOPHEN 650 MG: 650 TABLET, FILM COATED, EXTENDED RELEASE ORAL at 08:44

## 2021-09-10 RX ADMIN — AZITHROMYCIN MONOHYDRATE 500 MG: 250 TABLET ORAL at 08:44

## 2021-09-10 RX ADMIN — INSULIN LISPRO 4 UNITS: 100 INJECTION, SOLUTION INTRAVENOUS; SUBCUTANEOUS at 17:20

## 2021-09-10 RX ADMIN — ENOXAPARIN SODIUM 30 MG: 30 INJECTION SUBCUTANEOUS at 01:46

## 2021-09-10 RX ADMIN — TROSPIUM CHLORIDE 20 MG: 20 TABLET, FILM COATED ORAL at 01:46

## 2021-09-10 RX ADMIN — ISOSORBIDE MONONITRATE 30 MG: 30 TABLET, EXTENDED RELEASE ORAL at 08:44

## 2021-09-10 RX ADMIN — GUAIFENESIN SYRUP AND DEXTROMETHORPHAN 5 ML: 100; 10 SYRUP ORAL at 02:04

## 2021-09-10 RX ADMIN — CARVEDILOL 12.5 MG: 12.5 TABLET, FILM COATED ORAL at 17:20

## 2021-09-10 RX ADMIN — FAMOTIDINE 20 MG: 10 INJECTION, SOLUTION INTRAVENOUS at 08:46

## 2021-09-10 RX ADMIN — ALLOPURINOL 100 MG: 100 TABLET ORAL at 08:44

## 2021-09-10 RX ADMIN — CARVEDILOL 12.5 MG: 12.5 TABLET, FILM COATED ORAL at 08:45

## 2021-09-10 RX ADMIN — BUDESONIDE AND FORMOTEROL FUMARATE DIHYDRATE 2 PUFF: 160; 4.5 AEROSOL RESPIRATORY (INHALATION) at 20:35

## 2021-09-10 RX ADMIN — ATORVASTATIN CALCIUM 40 MG: 20 TABLET, FILM COATED ORAL at 01:47

## 2021-09-10 RX ADMIN — AMLODIPINE BESYLATE 5 MG: 5 TABLET ORAL at 08:44

## 2021-09-10 RX ADMIN — ASPIRIN 81 MG: 81 TABLET, COATED ORAL at 08:45

## 2021-09-10 RX ADMIN — Medication 2000 UNITS: at 08:45

## 2021-09-10 RX ADMIN — ATORVASTATIN CALCIUM 40 MG: 20 TABLET, FILM COATED ORAL at 20:34

## 2021-09-10 RX ADMIN — FAMOTIDINE 20 MG: 10 INJECTION, SOLUTION INTRAVENOUS at 01:46

## 2021-09-10 RX ADMIN — FAMOTIDINE 20 MG: 20 TABLET, FILM COATED ORAL at 20:34

## 2021-09-10 RX ADMIN — DOXAZOSIN 1 MG: 1 TABLET ORAL at 08:45

## 2021-09-10 RX ADMIN — DEXAMETHASONE SODIUM PHOSPHATE 6 MG: 10 INJECTION, SOLUTION INTRAMUSCULAR; INTRAVENOUS at 08:45

## 2021-09-10 RX ADMIN — SODIUM CHLORIDE, PRESERVATIVE FREE 10 ML: 5 INJECTION INTRAVENOUS at 08:46

## 2021-09-10 ASSESSMENT — PAIN SCALES - GENERAL: PAINLEVEL_OUTOF10: 1

## 2021-09-10 NOTE — CARE COORDINATION
Attempted to reach pt to discuss SNF placement vs return to intermediate. No answer on room phone. Will try again later. Electronically signed by Wolf Decker on 9/10/2021 at 8:26 AM    Spoke with pt. He stated that \"so long as he is showing any symptoms of COVID, he does not feel safe to go home\". Explained that he may show symptoms (cough, etc.) for a few weeks, but once he is medically stable, we will need a dc plan for him. Pt consented to a referral to [facility] so that they can follow him for possible placement at dc.    [place] [id number]  F  Electronically signed by Wolf Decker on 9/10/2021 at 12:04 PM

## 2021-09-10 NOTE — PROGRESS NOTES
Occupational Therapy     09/10/21 1525   Restrictions/Precautions   Restrictions/Precautions Fall Risk;Isolation   Implants present? Pacemaker   Position Activity Restriction   Other position/activity restrictions droplet plus precautions   Vision   Vision [facility]   Hearing   Hearing [facility]   General   Chart Reviewed Yes   Patient assessed for rehabilitation services? Yes   Response to previous treatment Patient with no complaints from previous session   Family / Caregiver Present No   Subjective   Subjective Pt in bed upon arrival for therapy and agreeable to participate. Pain Assessment   Patient Currently in Pain Denies   Oxygen Therapy   O2 Device Nasal cannula   O2 Flow Rate (L/min) 6 L/min   ADL   Feeding Independent   Grooming Independent;Setup   UE Bathing Supervision   LE Bathing Contact guard assistance;Minimal assistance   UE Dressing Independent;Setup   LE Dressing Contact guard assistance   Toileting Contact guard assistance   Balance   Sitting Balance Independent   Standing Balance Contact guard assistance   Functional Mobility   Functional - Mobility Device No device  (nut states he uses a cane at home. HH assist/ furniture walk)   Activity To/from bathroom   Assist Level Contact guard assistance   Functional Mobility Comments would benefit from a walker or a cane for safety while improving endurance. Bed mobility   Rolling to Left Stand by assistance   Supine to Sit Stand by assistance   Scooting Stand by assistance   Transfers   Stand Step Transfers Contact guard assistance   Sit to stand Contact guard assistance   Stand to sit Contact guard assistance   Toilet Transfers   Toilet - Technique Ambulating   Equipment Used Raised toilet seat with rails   Toilet Transfer Contact guard assistance   Assessment   Performance deficits / Impairments Decreased functional mobility ; Decreased ADL status; Decreased strength;Decreased endurance;Decreased balance;Decreased high-level IADLs   Assessment The patient would benefit from further therapy to upgrade functional status. Treatment Diagnosis Pneumonia due to Covid 19   Prognosis Good   History hx COPD, weakness   REQUIRES OT FOLLOW UP Yes   Treatment Initiated  Tx focused on sitting EOB for 10 mins, functional mobility to bathroom and hygiene task at sink in standing, Pt wanted to continued to sit EOB post treatment and nursing notified.     Discharge Recommendations Patient would benefit from continued therapy after discharge   Activity Tolerance   Activity Tolerance Patient Tolerated treatment well   Safety Devices   Safety Devices in place Yes   Type of devices Call light within reach;Nurse notified   Plan   Times per week 3-5   Times per day Daily   Electronically signed by SKYE Meeks on 9/10/2021 at 3:35 PM

## 2021-09-10 NOTE — PROGRESS NOTES
Pharmacy Intravenous to Oral Protocol    Medication changed per [address] IV to PO protocol: Famotidine    Patient meets the following inclusion criteria and none of the exclusion criteria:    Inclusion criteria:  - IV therapy > 24 hours (antibiotics only)  - Tolerating diet more advanced than clear liquids  - Tolerating PO medications  - No vasopressor blood pressure support (ie no signs of shock)  - Patient hasn't had a seizure for 72 hrs (antiepileptic medications only)    Exclusion criteria:  - Infections requiring IV therapy (ie meningitis, endocarditis, osteomyelitis, pancreatitis)   - Nausea and/or vomiting or severe diarrhea within past 24 hours   - Has gastrectomy, ileus, gastric outlet or bowel obstruction, or malabsorption syndromes   - Has significant painful oral ulceration   - TPN with an NPO order   - Active GI bleed   - Unable to swallow   - NPO   - Febrile in the last 24 hours (antibiotics only)   - Clinical deteriorating or unstable (antibiotics only)   - Pediatric patients and patients who are not euthyroid (not on oral levothyroxine/not stabilized on oral levothyroxine)- Levothyroxine only     Electronically signed by Nena Grady [facility] on 9/10/2021 at 12:50 PM

## 2021-09-10 NOTE — PROGRESS NOTES
Patient's SpO2 99% on 6 l/m nasal cannula. Patient states he's on continuous O2 @ home @ 3 l/m. Decreased O2 to 3 l/m, SpO2 93%. Walked patient in room on 3 l/m, SpO2 91%.

## 2021-09-10 NOTE — PROGRESS NOTES
Physical Therapy  Name: Jaquelin Lozano  MRN:  [id number]  Date of service:  9/10/2021     09/10/21 1538   Restrictions/Precautions   Restrictions/Precautions Fall Risk;Isolation   Implants present? Pacemaker   Position Activity Restriction   Other position/activity restrictions droplet plus precautions   Subjective   Subjective Pt agreed to therapy. Pain Screening   Patient Currently in Pain No   Bed Mobility   Supine to Sit Independent   Transfers   Sit to Stand Contact guard assistance   Stand to sit Contact guard assistance   Ambulation   Ambulation? Yes   Ambulation 1   Surface level tile   Device No Device   Other Apparatus O2   Assistance Contact guard assistance   Quality of Gait slightly unsteady   Gait Deviations Slow Estefania;Decreased step length;Decreased step height   Distance 25 ft   Comments amb to bathroom and back to bed   Exercises   Knee Long Arc Quad 10   Short term goals   Time Frame for Short term goals 2 wks   Short term goal 1 sit to stand indep   Short term goal 2 amb. 100' with cane or RW SBA   Conditions Requiring Skilled Therapeutic Intervention   Body structures, Functions, Activity limitations Decreased functional mobility ; Decreased sensation;Decreased endurance;Decreased balance   Assessment Pt able to tolerate amb to bathroom and back to bed. Pt able to perform some LE ex while sitting EOB during setup for amb. Left pt sitting up EOB after treatment, notified nursing. Activity Tolerance   Activity Tolerance Patient Tolerated treatment well   Safety Devices   Type of devices Call light within reach; Left in bed;Nurse notified         Electronically signed by Andrea Ta PTA on 9/10/2021 at 3:44 PM

## 2021-09-11 LAB
ALBUMIN SERPL-MCNC: 3.1 G/DL (ref 3.5–5.2)
ALP BLD-CCNC: 52 U/L (ref 40–130)
ALT SERPL-CCNC: 6 U/L (ref 5–41)
ANION GAP SERPL CALCULATED.3IONS-SCNC: 9 MMOL/L (ref 7–19)
AST SERPL-CCNC: 12 U/L (ref 5–40)
BASOPHILS ABSOLUTE: 0.1 K/UL (ref 0–0.2)
BASOPHILS RELATIVE PERCENT: 0.6 % (ref 0–1)
BILIRUB SERPL-MCNC: 0.5 MG/DL (ref 0.2–1.2)
BUN BLDV-MCNC: 17 MG/DL (ref 8–23)
CALCIUM SERPL-MCNC: 8.9 MG/DL (ref 8.8–10.2)
CHLORIDE BLD-SCNC: 101 MMOL/L (ref 98–111)
CO2: 26 MMOL/L (ref 22–29)
CREAT SERPL-MCNC: 0.8 MG/DL (ref 0.5–1.2)
EOSINOPHILS ABSOLUTE: 0.2 K/UL (ref 0–0.6)
EOSINOPHILS RELATIVE PERCENT: 2.4 % (ref 0–5)
GFR AFRICAN AMERICAN: >59
GFR NON-AFRICAN AMERICAN: >60
GLUCOSE BLD-MCNC: 111 MG/DL (ref 70–99)
GLUCOSE BLD-MCNC: 141 MG/DL (ref 70–99)
GLUCOSE BLD-MCNC: 146 MG/DL (ref 70–99)
GLUCOSE BLD-MCNC: 233 MG/DL (ref 70–99)
GLUCOSE BLD-MCNC: 99 MG/DL (ref 74–109)
HCT VFR BLD CALC: 29 % (ref 42–52)
HEMOGLOBIN: 9.6 G/DL (ref 14–18)
IMMATURE GRANULOCYTES #: 0.8 K/UL
LYMPHOCYTES ABSOLUTE: 0.6 K/UL (ref 1.1–4.5)
LYMPHOCYTES RELATIVE PERCENT: 6.2 % (ref 20–40)
MAGNESIUM: 1.7 MG/DL (ref 1.6–2.4)
MCH RBC QN AUTO: 31.2 PG (ref 27–31)
MCHC RBC AUTO-ENTMCNC: 33.1 G/DL (ref 33–37)
MCV RBC AUTO: 94.2 FL (ref 80–94)
MONOCYTES ABSOLUTE: 0.7 K/UL (ref 0–0.9)
MONOCYTES RELATIVE PERCENT: 7.4 % (ref 0–10)
NEUTROPHILS ABSOLUTE: 6.7 K/UL (ref 1.5–7.5)
NEUTROPHILS RELATIVE PERCENT: 74.1 % (ref 50–65)
PDW BLD-RTO: 14.2 % (ref 11.5–14.5)
PERFORMED ON: ABNORMAL
PLATELET # BLD: 232 K/UL (ref 130–400)
PMV BLD AUTO: 9.4 FL (ref 9.4–12.4)
POTASSIUM SERPL-SCNC: 4 MMOL/L (ref 3.5–5)
RBC # BLD: 3.08 M/UL (ref 4.7–6.1)
SODIUM BLD-SCNC: 136 MMOL/L (ref 136–145)
TOTAL PROTEIN: 5.7 G/DL (ref 6.6–8.7)
WBC # BLD: 9 K/UL (ref 4.8–10.8)

## 2021-09-11 PROCEDURE — 80053 COMPREHEN METABOLIC PANEL: CPT

## 2021-09-11 PROCEDURE — 6370000000 HC RX 637 (ALT 250 FOR IP): Performed by: INTERNAL MEDICINE

## 2021-09-11 PROCEDURE — 2580000003 HC RX 258: Performed by: INTERNAL MEDICINE

## 2021-09-11 PROCEDURE — 82947 ASSAY GLUCOSE BLOOD QUANT: CPT

## 2021-09-11 PROCEDURE — 6360000002 HC RX W HCPCS: Performed by: INTERNAL MEDICINE

## 2021-09-11 PROCEDURE — 36415 COLL VENOUS BLD VENIPUNCTURE: CPT

## 2021-09-11 PROCEDURE — 85025 COMPLETE CBC W/AUTO DIFF WBC: CPT

## 2021-09-11 PROCEDURE — 1210000000 HC MED SURG R&B

## 2021-09-11 PROCEDURE — 2700000000 HC OXYGEN THERAPY PER DAY

## 2021-09-11 PROCEDURE — 83735 ASSAY OF MAGNESIUM: CPT

## 2021-09-11 RX ADMIN — FAMOTIDINE 20 MG: 20 TABLET, FILM COATED ORAL at 09:34

## 2021-09-11 RX ADMIN — ASPIRIN 81 MG: 81 TABLET, COATED ORAL at 09:34

## 2021-09-11 RX ADMIN — DOXAZOSIN 1 MG: 1 TABLET ORAL at 09:36

## 2021-09-11 RX ADMIN — AMLODIPINE BESYLATE 5 MG: 5 TABLET ORAL at 09:35

## 2021-09-11 RX ADMIN — TROSPIUM CHLORIDE 20 MG: 20 TABLET, FILM COATED ORAL at 21:56

## 2021-09-11 RX ADMIN — Medication 2000 UNITS: at 09:34

## 2021-09-11 RX ADMIN — ENOXAPARIN SODIUM 30 MG: 30 INJECTION SUBCUTANEOUS at 21:56

## 2021-09-11 RX ADMIN — SODIUM CHLORIDE, PRESERVATIVE FREE 10 ML: 5 INJECTION INTRAVENOUS at 09:37

## 2021-09-11 RX ADMIN — ISOSORBIDE MONONITRATE 30 MG: 30 TABLET, EXTENDED RELEASE ORAL at 09:35

## 2021-09-11 RX ADMIN — AZITHROMYCIN MONOHYDRATE 500 MG: 250 TABLET ORAL at 09:35

## 2021-09-11 RX ADMIN — TIOTROPIUM BROMIDE INHALATION SPRAY 2 PUFF: 3.12 SPRAY, METERED RESPIRATORY (INHALATION) at 09:37

## 2021-09-11 RX ADMIN — POTASSIUM CHLORIDE 10 MEQ: 750 TABLET, EXTENDED RELEASE ORAL at 09:35

## 2021-09-11 RX ADMIN — FAMOTIDINE 20 MG: 20 TABLET, FILM COATED ORAL at 21:56

## 2021-09-11 RX ADMIN — SODIUM CHLORIDE, PRESERVATIVE FREE 10 ML: 5 INJECTION INTRAVENOUS at 21:56

## 2021-09-11 RX ADMIN — ACETAMINOPHEN 650 MG: 650 TABLET, FILM COATED, EXTENDED RELEASE ORAL at 09:34

## 2021-09-11 RX ADMIN — ENOXAPARIN SODIUM 30 MG: 30 INJECTION SUBCUTANEOUS at 09:34

## 2021-09-11 RX ADMIN — DEXAMETHASONE SODIUM PHOSPHATE 6 MG: 10 INJECTION, SOLUTION INTRAMUSCULAR; INTRAVENOUS at 09:34

## 2021-09-11 RX ADMIN — GUAIFENESIN SYRUP AND DEXTROMETHORPHAN 5 ML: 100; 10 SYRUP ORAL at 22:05

## 2021-09-11 RX ADMIN — ATORVASTATIN CALCIUM 40 MG: 20 TABLET, FILM COATED ORAL at 21:56

## 2021-09-11 RX ADMIN — CARVEDILOL 12.5 MG: 12.5 TABLET, FILM COATED ORAL at 09:35

## 2021-09-11 RX ADMIN — INSULIN LISPRO 4 UNITS: 100 INJECTION, SOLUTION INTRAVENOUS; SUBCUTANEOUS at 17:12

## 2021-09-11 RX ADMIN — INSULIN LISPRO 2 UNITS: 100 INJECTION, SOLUTION INTRAVENOUS; SUBCUTANEOUS at 12:33

## 2021-09-11 RX ADMIN — GUAIFENESIN SYRUP AND DEXTROMETHORPHAN 5 ML: 100; 10 SYRUP ORAL at 09:48

## 2021-09-11 RX ADMIN — CARVEDILOL 12.5 MG: 12.5 TABLET, FILM COATED ORAL at 17:11

## 2021-09-11 RX ADMIN — BUDESONIDE AND FORMOTEROL FUMARATE DIHYDRATE 2 PUFF: 160; 4.5 AEROSOL RESPIRATORY (INHALATION) at 21:55

## 2021-09-11 RX ADMIN — ALLOPURINOL 100 MG: 100 TABLET ORAL at 09:34

## 2021-09-11 RX ADMIN — BUDESONIDE AND FORMOTEROL FUMARATE DIHYDRATE 2 PUFF: 160; 4.5 AEROSOL RESPIRATORY (INHALATION) at 09:37

## 2021-09-11 RX ADMIN — FLUOXETINE HYDROCHLORIDE 20 MG: 20 CAPSULE ORAL at 09:36

## 2021-09-11 RX ADMIN — BUPROPION HYDROCHLORIDE 300 MG: 150 TABLET, EXTENDED RELEASE ORAL at 09:35

## 2021-09-11 ASSESSMENT — PAIN SCALES - GENERAL
PAINLEVEL_OUTOF10: 1
PAINLEVEL_OUTOF10: 0

## 2021-09-11 NOTE — PROGRESS NOTES
[facility]        Hospitalist Progress Note  9/11/2021 3:44 PM  Subjective:   Admit Date: 9/7/2021  PCP: Jessica Choudhary DO    Chief Complaint: Fatigue    Subjective: Patient was seen and examined by the bedside. Endorsed reduced shortness of breath, cough. He endorsed reduced fatigue. Patient denied fever, chills, rigor. Cumulative Hospital History: Patient is an 71-year-old  male with medical history significant for COPD (on 3 L home O2), atrial fibrillation, diabetes, hypertension, prostate cancer who was admitted on account of acute respiratory failure likely due to COVID pneumonia, COPD exacerbation and worsening kidney function. ROS: 14 point review of systems is negative except as specifically addressed above. ADULT DIET;  Regular    Intake/Output Summary (Last 24 hours) at 9/11/2021 1544  Last data filed at 9/11/2021 0934  Gross per 24 hour   Intake --   Output 100 ml   Net -100 ml     Medications:   sodium chloride       Current Facility-Administered Medications   Medication Dose Route Frequency Provider Last Rate Last Admin    famotidine (PEPCID) tablet 20 mg  20 mg Oral BID Rashid Rizo MD   20 mg at 09/11/21 0934    azithromycin (ZITHROMAX) tablet 500 mg  500 mg Oral Daily Rashid Rizo MD   500 mg at 09/11/21 0935    acetaminophen (TYLENOL) extended release tablet 650 mg  650 mg Oral Daily Rashid Rizo MD   650 mg at 09/11/21 0934    albuterol sulfate  (90 Base) MCG/ACT inhaler 2 puff  2 puff Inhalation Q6H PRN Rashid Rizo MD        allopurinol (ZYLOPRIM) tablet 100 mg  100 mg Oral Daily Rashid Rizo MD   100 mg at 09/11/21 0934    amLODIPine (NORVASC) tablet 5 mg  5 mg Oral Daily Rashid Rizo MD   5 mg at 09/11/21 0935    aspirin EC tablet 81 mg  81 mg Oral Daily Rashid Rizo MD   81 mg at 09/11/21 0934    atorvastatin (LIPITOR) tablet 40 mg  40 mg Oral Nightly Alicia LAMAR MD [place]   40 mg at 09/10/21 2034    buPROPion (WELLBUTRIN XL) extended release tablet 300 mg  300 mg Oral QAM Latisha Funes MD   300 mg at 09/11/21 0935    carvedilol (COREG) tablet 12.5 mg  12.5 mg Oral BID WC Latisha Funes MD   12.5 mg at 09/11/21 0935    FLUoxetine (PROZAC) capsule 20 mg  20 mg Oral Daily Latisha Funes MD   20 mg at 09/11/21 0936    isosorbide mononitrate (IMDUR) extended release tablet 30 mg  30 mg Oral Daily Latisha Funes MD   30 mg at 09/11/21 0935    trospium (SANCTURA) tablet 20 mg  20 mg Oral Nightly Latisha Funes MD   20 mg at 09/10/21 2034    nitroGLYCERIN (NITROSTAT) SL tablet 0.4 mg  0.4 mg SubLINGual Q5 Min PRN Latisha Funes MD        potassium chloride (KLOR-CON M) extended release tablet 10 mEq  10 mEq Oral Daily Latisha Funes MD   10 mEq at 09/11/21 0935    doxazosin (CARDURA) tablet 1 mg  1 mg Oral Daily Latisha Funes MD   1 mg at 09/11/21 0936    sodium chloride flush 0.9 % injection 5-40 mL  5-40 mL IntraVENous 2 times per day Latisha Funes MD   10 mL at 09/11/21 0937    sodium chloride flush 0.9 % injection 5-40 mL  5-40 mL IntraVENous PRN Latisha Funes MD        0.9 % sodium chloride infusion  25 mL IntraVENous PRN Latisha Funes MD        enoxaparin (LOVENOX) injection 30 mg  30 mg SubCUTAneous BID Latisha Funes MD   30 mg at 09/11/21 0934    ondansetron (ZOFRAN-ODT) disintegrating tablet 4 mg  4 mg Oral Q8H PRN Latisha Funes MD        Or    ondansetron (ZOFRAN) injection 4 mg  4 mg IntraVENous Q6H PRN Latisha Funes MD        polyethylene glycol (GLYCOLAX) packet 17 g  17 g Oral Daily PRN Latisha Funes MD        acetaminophen (TYLENOL) tablet 650 mg  650 mg Oral Q6H PRN Latisha Funes MD        Or    acetaminophen (TYLENOL) suppository 650 mg  650 mg Rectal Q6H PRN Latisha Funes MD  guaiFENesin-dextromethorphan (ROBITUSSIN DM) 100-10 MG/5ML syrup 5 mL  5 mL Oral Q4H PRN Deb Galvan MD   5 mL at 09/11/21 0948    Vitamin D (CHOLECALCIFEROL) tablet 2,000 Units  2,000 Units Oral Daily Deb Galvan MD   2,000 Units at 09/11/21 0934    budesonide-formoterol (SYMBICORT) 160-4.5 MCG/ACT inhaler 2 puff  2 puff Inhalation BID Deb Galvan MD   2 puff at 09/11/21 0937    insulin lispro (HUMALOG) injection vial 0-12 Units  0-12 Units SubCUTAneous TID WC Deb Galvan MD   2 Units at 09/11/21 1233    insulin lispro (HUMALOG) injection vial 0-6 Units  0-6 Units SubCUTAneous Nightly Deb Galvan MD        tiotropium (SPIRIVA RESPIMAT) 2.5 MCG/ACT inhaler 2 puff  2 puff Inhalation Daily Deb Galvan MD   2 puff at 09/11/21 0937    dexamethasone (PF) (DECADRON) injection 6 mg  6 mg IntraVENous Q24H Jose MD Bri   6 mg at 09/11/21 0934        Labs:     Recent Labs     09/09/21  0512 09/10/21  0705 09/11/21  0408   WBC 6.1 8.4 9.0   RBC 3.12* 3.35* 3.08*   HGB 9.8* 10.1* 9.6*   HCT 29.7* 31.6* 29.0*   MCV 95.2* 94.3* 94.2*   MCH 31.4* 30.1 31.2*   MCHC 33.0 32.0* 33.1    240 232     Recent Labs     09/09/21  0512 09/10/21  0705 09/11/21  0408   * 135* 136   K 4.0 3.9 4.0   ANIONGAP 11 9 9   CL 99 101 101   CO2 24 25 26   BUN 27* 17 17   CREATININE 1.0 0.9 0.8   GLUCOSE 90 97 99   CALCIUM 8.9 9.1 8.9     Recent Labs     09/09/21  0512 09/10/21  0705 09/11/21  0408   MG 1.8 1.8 1.7     Recent Labs     09/09/21  0512 09/10/21  0705 09/11/21  0408   AST 17 14 12   ALT 7 6 6   BILITOT 0.5 0.5 0.5   ALKPHOS 48 53 46     ABGs:No results for input(s): PH, PO2, PCO2, HCO3, BE, O2SAT in the last 72 hours. Troponin T:   No results for input(s): TROPONINI in the last 72 hours. INR:   No results for input(s): INR in the last 72 hours. Lactic Acid: No results for input(s): LACTA in the last 72 hours.     Objective:   Vitals: BP (!) 144/75   Pulse 75   Temp 96.9 °F (36.1 °C) (Temporal)   Resp 18   Ht 5' 10\" (1.778 m)   Wt 240 lb (108.9 kg)   SpO2 94%   BMI 34.44 kg/m²   24HR INTAKE/OUTPUT:      Intake/Output Summary (Last 24 hours) at 9/11/2021 154  Last data filed at 9/11/2021 0934  Gross per 24 hour   Intake --   Output 100 ml   Net -100 ml     General appearance: Elderly male seen lying in bed. Alert and cooperative with exam  HEENT: atraumatic, eyes with clear conjunctiva external ears and nose are normal, lips normal  Lungs: Reduced air entry in lung bases, expiratory wheeze   Heart: S1, S2 heard no m,r,g  Abdomen: Soft, NT, ND, BS+  Extremities: Atraumatic, no cyanosis. 1+ BLE pitting edema up to the midshin  Neurologic: no focal neurologic deficits, normal sensation, alert and oriented, affect and mood appropriate  Skin: no rashes, nodules    Assessment and Plan:   Principal Problem:    Pneumonia due to COVID-19 virus  Active Problems:    Essential hypertension    Neuropathy    Anxiety    Depression    Mixed restrictive and obstructive lung disease (HCC)    COPD (chronic obstructive pulmonary disease) (HCC)    NEIDA (obstructive sleep apnea)    Type 2 diabetes mellitus with diabetic polyneuropathy (HCC)    Prostate cancer (HCC)    Chronic combined systolic and diastolic congestive heart failure (HCC)    Mitral regurgitation    Pulmonary hypertension (HCC)    Pacemaker    Sinoatrial node dysfunction (HCC)    NELSY (acute kidney injury) ([facility] [address]    CKD (chronic kidney disease)    Dehydration    Dyspnea    Acute hypoxemic respiratory failure ([facility])    Palliative care patient    Generalized muscle weakness  Resolved Problems:    * No resolved hospital problems.  *    Plan:  Acute Respiratory Failure due to COVID 19 Pneumonia  -Continue O2 supplementation via nasal cannula  -Continue supportive therapy with Robitussin, bronchodilator treatment    Acute COPD exacerbation  -Continue steroids, azithromycin, bronchodilator treatment  -Outpatient follow up with pulmonologist    Generalized weakness  -Likely due to acute illness  -Continue PT OT  -Patient awaiting placement in SNF    NELSY - resolved    Chronic problems - Continue home meds    Advance Directive: Limited    DVT prophylaxis: Lovenox    Discharge planning:  to help with possible discharge to SNF    Signed:  Jaylan Duval MD 9/11/2021 3:44 PM  Rounding Hospitalist

## 2021-09-11 NOTE — PROGRESS NOTES
Physical Therapy  Name: Felicity Rosas  MRN:  [id number]  Date of service:  9/11/2021 09/11/21 1428   Subjective   Subjective Attempt: Pt asleep, stated he didn't feel like participating in therapy now.          Electronically signed by Liborio Moise PTA on 9/11/2021 at 2:30 PM

## 2021-09-12 LAB
ALBUMIN SERPL-MCNC: 2.7 G/DL (ref 3.5–5.2)
ALP BLD-CCNC: 49 U/L (ref 40–130)
ALT SERPL-CCNC: 6 U/L (ref 5–41)
ANION GAP SERPL CALCULATED.3IONS-SCNC: 7 MMOL/L (ref 7–19)
AST SERPL-CCNC: 11 U/L (ref 5–40)
BASOPHILS ABSOLUTE: 0.1 K/UL (ref 0–0.2)
BASOPHILS RELATIVE PERCENT: 0.9 % (ref 0–1)
BILIRUB SERPL-MCNC: 0.5 MG/DL (ref 0.2–1.2)
BUN BLDV-MCNC: 13 MG/DL (ref 8–23)
CALCIUM SERPL-MCNC: 9 MG/DL (ref 8.8–10.2)
CHLORIDE BLD-SCNC: 100 MMOL/L (ref 98–111)
CO2: 26 MMOL/L (ref 22–29)
CREAT SERPL-MCNC: 0.6 MG/DL (ref 0.5–1.2)
EOSINOPHILS ABSOLUTE: 0.3 K/UL (ref 0–0.6)
EOSINOPHILS RELATIVE PERCENT: 2.8 % (ref 0–5)
GFR AFRICAN AMERICAN: >59
GFR NON-AFRICAN AMERICAN: >60
GLUCOSE BLD-MCNC: 101 MG/DL (ref 70–99)
GLUCOSE BLD-MCNC: 114 MG/DL (ref 70–99)
GLUCOSE BLD-MCNC: 148 MG/DL (ref 70–99)
GLUCOSE BLD-MCNC: 239 MG/DL (ref 70–99)
GLUCOSE BLD-MCNC: 91 MG/DL (ref 74–109)
HCT VFR BLD CALC: 28.5 % (ref 42–52)
HEMOGLOBIN: 9.7 G/DL (ref 14–18)
IMMATURE GRANULOCYTES #: 1 K/UL
LYMPHOCYTES ABSOLUTE: 0.8 K/UL (ref 1.1–4.5)
LYMPHOCYTES RELATIVE PERCENT: 8.7 % (ref 20–40)
MAGNESIUM: 2 MG/DL (ref 1.6–2.4)
MCH RBC QN AUTO: 32.4 PG (ref 27–31)
MCHC RBC AUTO-ENTMCNC: 34 G/DL (ref 33–37)
MCV RBC AUTO: 95.3 FL (ref 80–94)
MONOCYTES ABSOLUTE: 0.6 K/UL (ref 0–0.9)
MONOCYTES RELATIVE PERCENT: 7 % (ref 0–10)
NEUTROPHILS ABSOLUTE: 6.4 K/UL (ref 1.5–7.5)
NEUTROPHILS RELATIVE PERCENT: 69.9 % (ref 50–65)
PDW BLD-RTO: 14 % (ref 11.5–14.5)
PERFORMED ON: ABNORMAL
PLATELET # BLD: 240 K/UL (ref 130–400)
PMV BLD AUTO: 9.5 FL (ref 9.4–12.4)
POTASSIUM SERPL-SCNC: 4.1 MMOL/L (ref 3.5–5)
RBC # BLD: 2.99 M/UL (ref 4.7–6.1)
SODIUM BLD-SCNC: 133 MMOL/L (ref 136–145)
TOTAL PROTEIN: 5.7 G/DL (ref 6.6–8.7)
WBC # BLD: 9.2 K/UL (ref 4.8–10.8)

## 2021-09-12 PROCEDURE — 36415 COLL VENOUS BLD VENIPUNCTURE: CPT

## 2021-09-12 PROCEDURE — 80053 COMPREHEN METABOLIC PANEL: CPT

## 2021-09-12 PROCEDURE — 6370000000 HC RX 637 (ALT 250 FOR IP): Performed by: INTERNAL MEDICINE

## 2021-09-12 PROCEDURE — 6360000002 HC RX W HCPCS: Performed by: INTERNAL MEDICINE

## 2021-09-12 PROCEDURE — 83735 ASSAY OF MAGNESIUM: CPT

## 2021-09-12 PROCEDURE — 2700000000 HC OXYGEN THERAPY PER DAY

## 2021-09-12 PROCEDURE — 2580000003 HC RX 258: Performed by: INTERNAL MEDICINE

## 2021-09-12 PROCEDURE — 82947 ASSAY GLUCOSE BLOOD QUANT: CPT

## 2021-09-12 PROCEDURE — 94761 N-INVAS EAR/PLS OXIMETRY MLT: CPT

## 2021-09-12 PROCEDURE — 1210000000 HC MED SURG R&B

## 2021-09-12 PROCEDURE — 85025 COMPLETE CBC W/AUTO DIFF WBC: CPT

## 2021-09-12 RX ADMIN — AZITHROMYCIN MONOHYDRATE 500 MG: 250 TABLET ORAL at 10:18

## 2021-09-12 RX ADMIN — SODIUM CHLORIDE, PRESERVATIVE FREE 10 ML: 5 INJECTION INTRAVENOUS at 21:44

## 2021-09-12 RX ADMIN — BUDESONIDE AND FORMOTEROL FUMARATE DIHYDRATE 2 PUFF: 160; 4.5 AEROSOL RESPIRATORY (INHALATION) at 21:46

## 2021-09-12 RX ADMIN — ALLOPURINOL 100 MG: 100 TABLET ORAL at 10:18

## 2021-09-12 RX ADMIN — ASPIRIN 81 MG: 81 TABLET, COATED ORAL at 10:19

## 2021-09-12 RX ADMIN — ATORVASTATIN CALCIUM 40 MG: 20 TABLET, FILM COATED ORAL at 21:45

## 2021-09-12 RX ADMIN — TROSPIUM CHLORIDE 20 MG: 20 TABLET, FILM COATED ORAL at 21:45

## 2021-09-12 RX ADMIN — CARVEDILOL 12.5 MG: 12.5 TABLET, FILM COATED ORAL at 16:47

## 2021-09-12 RX ADMIN — FAMOTIDINE 20 MG: 20 TABLET, FILM COATED ORAL at 10:19

## 2021-09-12 RX ADMIN — CARVEDILOL 12.5 MG: 12.5 TABLET, FILM COATED ORAL at 10:18

## 2021-09-12 RX ADMIN — POTASSIUM CHLORIDE 10 MEQ: 750 TABLET, EXTENDED RELEASE ORAL at 10:19

## 2021-09-12 RX ADMIN — ACETAMINOPHEN 650 MG: 650 TABLET, FILM COATED, EXTENDED RELEASE ORAL at 10:18

## 2021-09-12 RX ADMIN — TIOTROPIUM BROMIDE INHALATION SPRAY 2 PUFF: 3.12 SPRAY, METERED RESPIRATORY (INHALATION) at 10:21

## 2021-09-12 RX ADMIN — SODIUM CHLORIDE, PRESERVATIVE FREE 10 ML: 5 INJECTION INTRAVENOUS at 10:18

## 2021-09-12 RX ADMIN — INSULIN LISPRO 4 UNITS: 100 INJECTION, SOLUTION INTRAVENOUS; SUBCUTANEOUS at 17:10

## 2021-09-12 RX ADMIN — ISOSORBIDE MONONITRATE 30 MG: 30 TABLET, EXTENDED RELEASE ORAL at 10:19

## 2021-09-12 RX ADMIN — BUPROPION HYDROCHLORIDE 300 MG: 150 TABLET, EXTENDED RELEASE ORAL at 10:18

## 2021-09-12 RX ADMIN — ENOXAPARIN SODIUM 30 MG: 30 INJECTION SUBCUTANEOUS at 21:46

## 2021-09-12 RX ADMIN — Medication 2000 UNITS: at 10:19

## 2021-09-12 RX ADMIN — DOXAZOSIN 1 MG: 1 TABLET ORAL at 10:19

## 2021-09-12 RX ADMIN — FAMOTIDINE 20 MG: 20 TABLET, FILM COATED ORAL at 21:45

## 2021-09-12 RX ADMIN — BUDESONIDE AND FORMOTEROL FUMARATE DIHYDRATE 2 PUFF: 160; 4.5 AEROSOL RESPIRATORY (INHALATION) at 10:16

## 2021-09-12 RX ADMIN — DEXAMETHASONE SODIUM PHOSPHATE 6 MG: 10 INJECTION, SOLUTION INTRAMUSCULAR; INTRAVENOUS at 10:19

## 2021-09-12 RX ADMIN — ENOXAPARIN SODIUM 30 MG: 30 INJECTION SUBCUTANEOUS at 10:17

## 2021-09-12 RX ADMIN — AMLODIPINE BESYLATE 5 MG: 5 TABLET ORAL at 10:18

## 2021-09-12 RX ADMIN — FLUOXETINE HYDROCHLORIDE 20 MG: 20 CAPSULE ORAL at 10:19

## 2021-09-12 ASSESSMENT — PAIN SCALES - GENERAL: PAINLEVEL_OUTOF10: 0

## 2021-09-12 NOTE — PLAN OF CARE
Problem: Airway Clearance - Ineffective  Goal: Achieve or maintain patent airway  9/12/2021 0339 by Hudson Moon LPN  Outcome: Ongoing  9/11/2021 1635 by Shadia Colon RN  Outcome: Ongoing     Problem: Gas Exchange - Impaired  Goal: Absence of hypoxia  9/12/2021 0339 by Hudson Moon LPN  Outcome: Ongoing  9/11/2021 1635 by Shadia Colon RN  Outcome: Ongoing  Goal: Promote optimal lung function  9/12/2021 0339 by Hudson Moon LPN  Outcome: Ongoing  9/11/2021 1635 by Shadia Colon RN  Outcome: Ongoing     Problem: Breathing Pattern - Ineffective  Goal: Ability to achieve and maintain a regular respiratory rate  9/12/2021 0339 by Hudson Moon LPN  Outcome: Ongoing  9/11/2021 1635 by Shadia Colon RN  Outcome: Ongoing     Problem:  Body Temperature -  Risk of, Imbalanced  Goal: Ability to maintain a body temperature within defined limits  9/12/2021 0339 by Hudson Moon LPN  Outcome: Ongoing  9/11/2021 1635 by Shadia Colon RN  Outcome: Ongoing  Goal: Will regain or maintain usual level of consciousness  9/12/2021 0339 by Hudson Moon LPN  Outcome: Ongoing  9/11/2021 1635 by Shadia Colon RN  Outcome: Ongoing  Goal: Complications related to the disease process, condition or treatment will be avoided or minimized  9/12/2021 0339 by Hudson Moon LPN  Outcome: Ongoing  9/11/2021 1635 by Shadia Colon RN  Outcome: Ongoing     Problem: Isolation Precautions - Risk of Spread of Infection  Goal: Prevent transmission of infection  9/12/2021 0339 by Hudson Moon LPN  Outcome: Ongoing  9/11/2021 1635 by Shadia Colon RN  Outcome: Ongoing     Problem: Nutrition Deficits  Goal: Optimize nutritional status  9/12/2021 0339 by Hudson Moon LPN  Outcome: Ongoing  9/11/2021 1635 by Shadia Colon RN  Outcome: Ongoing     Problem: Risk for Fluid Volume Deficit  Goal: Maintain normal heart rhythm  9/12/2021 0339 by Hudson Moon LPN  Outcome: Ongoing  9/11/2021 1635 by [employer] Chery Epps RN  Outcome: Ongoing  Goal: Maintain absence of muscle cramping  9/12/2021 0339 by Cleo Conway LPN  Outcome: Ongoing  9/11/2021 1635 by Patience Maciel RN  Outcome: Ongoing  Goal: Maintain normal serum potassium, sodium, calcium, phosphorus, and pH  9/12/2021 0339 by Cleo Conway LPN  Outcome: Ongoing  9/11/2021 1635 by Patience Maciel RN  Outcome: Ongoing     Problem: Loneliness or Risk for Loneliness  Goal: Demonstrate positive use of time alone when socialization is not possible  9/12/2021 0339 by Cleo Conway LPN  Outcome: Ongoing  9/11/2021 1635 by Patience Maciel RN  Outcome: Ongoing     Problem: Fatigue  Goal: Verbalize increase energy and improved vitality  9/12/2021 0339 by Cleo Conway LPN  Outcome: Ongoing  9/11/2021 1635 by Patience Maciel RN  Outcome: Ongoing     Problem: Patient Education: Go to Patient Education Activity  Goal: Patient/Family Education  9/12/2021 0339 by Cleo Conway LPN  Outcome: Ongoing  9/11/2021 1635 by Patience Maciel RN  Outcome: Ongoing     Problem: Falls - Risk of:  Goal: Will remain free from falls  Description: Will remain free from falls  9/12/2021 0339 by Cleo Conway LPN  Outcome: Ongoing  9/11/2021 1635 by Patience Maciel RN  Outcome: Ongoing  Goal: Absence of physical injury  Description: Absence of physical injury  9/12/2021 0339 by Cleo Conway LPN  Outcome: Ongoing  9/11/2021 1635 by Patience Maciel RN  Outcome: Ongoing     Problem: Skin Integrity:  Goal: Will show no infection signs and symptoms  Description: Will show no infection signs and symptoms  9/12/2021 0339 by Cleo Conway LPN  Outcome: Ongoing  9/11/2021 1635 by Patience Maciel RN  Outcome: Ongoing  Goal: Absence of new skin breakdown  Description: Absence of new skin breakdown  9/12/2021 0339 by Cleo Conway LPN  Outcome: Ongoing  9/11/2021 1635 by Patience Maciel, RN  Outcome: Ongoing

## 2021-09-12 NOTE — PROGRESS NOTES
Pt evaluated for home O2 ,  Pt states he already has home O2    Pt on 2 lpm NC at rest Spo2 92%  Pt on room air at rest Spo2 80%

## 2021-09-12 NOTE — PROGRESS NOTES
[facility]        Hospitalist Progress Note  9/12/2021 12:35 PM  Subjective:   Admit Date: 9/7/2021  PCP: Cody Avila DO    Chief Complaint: Generalized weakness    Subjective: Patient was seen and examined by the bedside. He endorsed generalized muscle weakness, fatigue. He denied fever, chills, nausea, vomiting, change in bowel or urinary habit. Patient agreed to be discharged home with home health services when medically stable. Cumulative Hospital History: Patient is an 71-year-old  male with medical history significant for COPD (on 3 L home O2), atrial fibrillation, diabetes, hypertension, prostate cancer who was admitted on account of acute respiratory failure likely due to COVID pneumonia, COPD exacerbation and worsening kidney function. ROS: 14 point review of systems is negative except as specifically addressed above. ADULT DIET;  Regular    Intake/Output Summary (Last 24 hours) at 9/12/2021 1235  Last data filed at 9/12/2021 1058  Gross per 24 hour   Intake 650 ml   Output 1050 ml   Net -400 ml     Medications:   sodium chloride       Current Facility-Administered Medications   Medication Dose Route Frequency Provider Last Rate Last Admin    famotidine (PEPCID) tablet 20 mg  20 mg Oral BID Hugh Hansen MD   20 mg at 09/12/21 1019    azithromycin (ZITHROMAX) tablet 500 mg  500 mg Oral Daily Hugh Hansen MD   500 mg at 09/12/21 1018    acetaminophen (TYLENOL) extended release tablet 650 mg  650 mg Oral Daily Hugh Hansen MD   650 mg at 09/12/21 1018    albuterol sulfate  (90 Base) MCG/ACT inhaler 2 puff  2 puff Inhalation Q6H PRN Hugh Hansen MD        allopurinol (ZYLOPRIM) tablet 100 mg  100 mg Oral Daily Hugh Hansen MD   100 mg at 09/12/21 1018    amLODIPine (NORVASC) tablet 5 mg  5 mg Oral Daily Hugh Hansen MD   5 mg at 09/12/21 1018    aspirin EC tablet 81 mg  81 mg Oral Daily Alicia LAMAR MD        Or   Johanny acetaminophen (TYLENOL) suppository 650 mg  650 mg Rectal Q6H PRN Daria Kocher, MD        guaiFENesin-dextromethorphan (ROBITUSSIN DM) 100-10 MG/5ML syrup 5 mL  5 mL Oral Q4H PRN Daria Kocher, MD   5 mL at 09/11/21 2205    Vitamin D (CHOLECALCIFEROL) tablet 2,000 Units  2,000 Units Oral Daily Daria Kocher, MD   2,000 Units at 09/12/21 1019    budesonide-formoterol (SYMBICORT) 160-4.5 MCG/ACT inhaler 2 puff  2 puff Inhalation BID Daria Kocher, MD   2 puff at 09/12/21 1016    insulin lispro (HUMALOG) injection vial 0-12 Units  0-12 Units SubCUTAneous TID WC Daria Kocher, MD   4 Units at 09/11/21 1712    insulin lispro (HUMALOG) injection vial 0-6 Units  0-6 Units SubCUTAneous Nightly Daria Kocher, MD   1 Units at 09/11/21 2158    tiotropium (SPIRIVA RESPIMAT) 2.5 MCG/ACT inhaler 2 puff  2 puff Inhalation Daily Daria Kocher, MD   2 puff at 09/12/21 1021    dexamethasone (PF) (DECADRON) injection 6 mg  6 mg IntraVENous Q24H Donna Garcia MD   6 mg at 09/12/21 1019        Labs:     Recent Labs     09/10/21  0705 09/11/21  0408 09/12/21  0448   WBC 8.4 9.0 9.2   RBC 3.35* 3.08* 2.99*   HGB 10.1* 9.6* 9.7*   HCT 31.6* 29.0* 28.5*   MCV 94.3* 94.2* 95.3*   MCH 30.1 31.2* 32.4*   MCHC 32.0* 33.1 34.0    232 240     Recent Labs     09/10/21  0705 09/11/21  0408 09/12/21  0448   * 136 133*   K 3.9 4.0 4.1   ANIONGAP 9 9 7    101 100   CO2 25 26 26   BUN 17 17 13   CREATININE 0.9 0.8 0.6   GLUCOSE 97 99 91   CALCIUM 9.1 8.9 9.0     Recent Labs     09/10/21  0705 09/11/21  0408 09/12/21  0448   MG 1.8 1.7 2.0     Recent Labs     09/10/21  0705 09/11/21  0408 09/12/21  0448   AST 14 12 11   ALT 6 6 6   BILITOT 0.5 0.5 0.5   ALKPHOS 53 47 52     ABGs:No results for input(s): PH, PO2, PCO2, HCO3, BE, O2SAT in the last 72 hours. Troponin T:   No results for input(s): TROPONINI in the last 72 hours.   INR:   No results for to COVID 19 Pneumonia  -Continue O2 supplementation via nasal cannula  -Continue supportive therapy with Robitussin, bronchodilator treatment    Acute COPD exacerbation  -Continue steroids, azithromycin, bronchodilator treatment  -Outpatient follow up with pulmonologist    NELSY - resolved    Chronic problems - Continue home meds    Advance Directive: Limited    DVT prophylaxis: Lovenox    Discharge planning:  to help with possible discharge to SNF and/or home health services    Signed:  Mateo Arango MD 9/12/2021 12:35 PM  Rounding Hospitalist

## 2021-09-13 VITALS
WEIGHT: 240 LBS | HEIGHT: 70 IN | BODY MASS INDEX: 34.36 KG/M2 | TEMPERATURE: 97.4 F | RESPIRATION RATE: 20 BRPM | OXYGEN SATURATION: 98 % | SYSTOLIC BLOOD PRESSURE: 145 MMHG | DIASTOLIC BLOOD PRESSURE: 79 MMHG | HEART RATE: 73 BPM

## 2021-09-13 PROBLEM — D50.0 IRON DEFICIENCY ANEMIA SECONDARY TO BLOOD LOSS (CHRONIC): Status: ACTIVE | Noted: 2021-05-04

## 2021-09-13 PROBLEM — F33.9 MAJOR DEPRESSIVE DISORDER, RECURRENT, UNSPECIFIED (HCC): Status: ACTIVE | Noted: 2021-05-04

## 2021-09-13 PROBLEM — L03.90 CELLULITIS: Status: ACTIVE | Noted: 2019-07-18

## 2021-09-13 PROBLEM — J41.8 MIXED SIMPLE AND MUCOPURULENT CHRONIC BRONCHITIS (HCC): Status: ACTIVE | Noted: 2021-05-04

## 2021-09-13 PROBLEM — J43.2 CENTRILOBULAR EMPHYSEMA (HCC): Status: ACTIVE | Noted: 2019-06-05

## 2021-09-13 PROBLEM — J61 PNEUMOCONIOSIS DUE TO ASBESTOS AND OTHER MINERAL FIBERS (HCC): Status: ACTIVE | Noted: 2021-05-04

## 2021-09-13 PROBLEM — E66.01 MORBID (SEVERE) OBESITY DUE TO EXCESS CALORIES (HCC): Status: ACTIVE | Noted: 2020-06-18

## 2021-09-13 PROBLEM — H35.3190 NONEXUDATIVE AGE-RELATED MACULAR DEGENERATION: Status: ACTIVE | Noted: 2018-02-13

## 2021-09-13 LAB
ALBUMIN SERPL-MCNC: 3 G/DL (ref 3.5–5.2)
ALP BLD-CCNC: 51 U/L (ref 40–130)
ALT SERPL-CCNC: 6 U/L (ref 5–41)
ANION GAP SERPL CALCULATED.3IONS-SCNC: 13 MMOL/L (ref 7–19)
AST SERPL-CCNC: 17 U/L (ref 5–40)
BASOPHILS ABSOLUTE: 0 K/UL (ref 0–0.2)
BASOPHILS RELATIVE PERCENT: 0 % (ref 0–1)
BILIRUB SERPL-MCNC: 0.5 MG/DL (ref 0.2–1.2)
BUN BLDV-MCNC: 12 MG/DL (ref 8–23)
CALCIUM SERPL-MCNC: 9.1 MG/DL (ref 8.8–10.2)
CHLORIDE BLD-SCNC: 100 MMOL/L (ref 98–111)
CO2: 21 MMOL/L (ref 22–29)
CREAT SERPL-MCNC: 0.7 MG/DL (ref 0.5–1.2)
EOSINOPHILS ABSOLUTE: 0.22 K/UL (ref 0–0.6)
EOSINOPHILS RELATIVE PERCENT: 2 % (ref 0–5)
GFR AFRICAN AMERICAN: >59
GFR NON-AFRICAN AMERICAN: >60
GLUCOSE BLD-MCNC: 138 MG/DL (ref 70–99)
GLUCOSE BLD-MCNC: 91 MG/DL (ref 74–109)
GLUCOSE BLD-MCNC: 95 MG/DL (ref 70–99)
HCT VFR BLD CALC: 29.8 % (ref 42–52)
HEMOGLOBIN: 9.6 G/DL (ref 14–18)
IMMATURE GRANULOCYTES #: 1.3 K/UL
LYMPHOCYTES ABSOLUTE: 1.4 K/UL (ref 1.1–4.5)
LYMPHOCYTES RELATIVE PERCENT: 13 % (ref 20–40)
MAGNESIUM: 1.8 MG/DL (ref 1.6–2.4)
MCH RBC QN AUTO: 31.1 PG (ref 27–31)
MCHC RBC AUTO-ENTMCNC: 32.2 G/DL (ref 33–37)
MCV RBC AUTO: 96.4 FL (ref 80–94)
MONOCYTES ABSOLUTE: 0.2 K/UL (ref 0–0.9)
MONOCYTES RELATIVE PERCENT: 2 % (ref 0–10)
NEUTROPHILS ABSOLUTE: 9 K/UL (ref 1.5–7.5)
NEUTROPHILS RELATIVE PERCENT: 83 % (ref 50–65)
PDW BLD-RTO: 13.9 % (ref 11.5–14.5)
PERFORMED ON: ABNORMAL
PERFORMED ON: NORMAL
PLATELET # BLD: 277 K/UL (ref 130–400)
PLATELET SLIDE REVIEW: ADEQUATE
PMV BLD AUTO: 9.3 FL (ref 9.4–12.4)
POTASSIUM SERPL-SCNC: 4.8 MMOL/L (ref 3.5–5)
RBC # BLD: 3.09 M/UL (ref 4.7–6.1)
REASON FOR REJECTION: NORMAL
REJECTED TEST: NORMAL
SODIUM BLD-SCNC: 134 MMOL/L (ref 136–145)
TOTAL PROTEIN: 5.9 G/DL (ref 6.6–8.7)
WBC # BLD: 10.8 K/UL (ref 4.8–10.8)

## 2021-09-13 PROCEDURE — 85025 COMPLETE CBC W/AUTO DIFF WBC: CPT

## 2021-09-13 PROCEDURE — 6370000000 HC RX 637 (ALT 250 FOR IP): Performed by: INTERNAL MEDICINE

## 2021-09-13 PROCEDURE — 2580000003 HC RX 258: Performed by: INTERNAL MEDICINE

## 2021-09-13 PROCEDURE — 2700000000 HC OXYGEN THERAPY PER DAY

## 2021-09-13 PROCEDURE — 6360000002 HC RX W HCPCS: Performed by: INTERNAL MEDICINE

## 2021-09-13 PROCEDURE — 36415 COLL VENOUS BLD VENIPUNCTURE: CPT

## 2021-09-13 PROCEDURE — 80053 COMPREHEN METABOLIC PANEL: CPT

## 2021-09-13 PROCEDURE — 83735 ASSAY OF MAGNESIUM: CPT

## 2021-09-13 PROCEDURE — 82947 ASSAY GLUCOSE BLOOD QUANT: CPT

## 2021-09-13 RX ORDER — SPIRONOLACTONE 50 MG/1
TABLET, FILM COATED ORAL
Status: ON HOLD | COMMUNITY
End: 2021-09-13 | Stop reason: HOSPADM

## 2021-09-13 RX ORDER — BENZONATATE 100 MG/1
200 CAPSULE ORAL 2 TIMES DAILY
Status: DISCONTINUED | OUTPATIENT
Start: 2021-09-13 | End: 2021-09-13 | Stop reason: HOSPADM

## 2021-09-13 RX ORDER — TRIAMCINOLONE ACETONIDE 5 MG/G
OINTMENT TOPICAL
COMMUNITY

## 2021-09-13 RX ORDER — DULAGLUTIDE 0.75 MG/.5ML
INJECTION, SOLUTION SUBCUTANEOUS
COMMUNITY
End: 2021-12-07 | Stop reason: SDUPTHER

## 2021-09-13 RX ORDER — BENZONATATE 200 MG/1
200 CAPSULE ORAL 2 TIMES DAILY
Qty: 14 CAPSULE | Refills: 0 | Status: SHIPPED | OUTPATIENT
Start: 2021-09-13 | End: 2021-09-20

## 2021-09-13 RX ORDER — METOLAZONE 2.5 MG/1
TABLET ORAL
Status: ON HOLD | COMMUNITY
End: 2021-09-13 | Stop reason: HOSPADM

## 2021-09-13 RX ORDER — HYDROCODONE BITARTRATE AND ACETAMINOPHEN 10; 325 MG/1; MG/1
TABLET ORAL
Status: ON HOLD | COMMUNITY
End: 2021-09-13 | Stop reason: HOSPADM

## 2021-09-13 RX ORDER — POTASSIUM CHLORIDE 750 MG/1
TABLET, EXTENDED RELEASE ORAL
Status: ON HOLD | COMMUNITY
End: 2021-09-13

## 2021-09-13 RX ORDER — BETAMETHASONE DIPROPIONATE 0.5 MG/G
CREAM TOPICAL
COMMUNITY
Start: 2021-07-07

## 2021-09-13 RX ADMIN — ASPIRIN 81 MG: 81 TABLET, COATED ORAL at 10:43

## 2021-09-13 RX ADMIN — ACETAMINOPHEN 650 MG: 650 TABLET, FILM COATED, EXTENDED RELEASE ORAL at 10:43

## 2021-09-13 RX ADMIN — ISOSORBIDE MONONITRATE 30 MG: 30 TABLET, EXTENDED RELEASE ORAL at 10:43

## 2021-09-13 RX ADMIN — AMLODIPINE BESYLATE 5 MG: 5 TABLET ORAL at 10:44

## 2021-09-13 RX ADMIN — CARVEDILOL 12.5 MG: 12.5 TABLET, FILM COATED ORAL at 10:44

## 2021-09-13 RX ADMIN — DEXAMETHASONE SODIUM PHOSPHATE 6 MG: 10 INJECTION, SOLUTION INTRAMUSCULAR; INTRAVENOUS at 10:44

## 2021-09-13 RX ADMIN — DOXAZOSIN 1 MG: 1 TABLET ORAL at 10:43

## 2021-09-13 RX ADMIN — Medication 2000 UNITS: at 10:43

## 2021-09-13 RX ADMIN — FAMOTIDINE 20 MG: 20 TABLET, FILM COATED ORAL at 10:44

## 2021-09-13 RX ADMIN — BUDESONIDE AND FORMOTEROL FUMARATE DIHYDRATE 2 PUFF: 160; 4.5 AEROSOL RESPIRATORY (INHALATION) at 10:49

## 2021-09-13 RX ADMIN — ALLOPURINOL 100 MG: 100 TABLET ORAL at 10:43

## 2021-09-13 RX ADMIN — FLUOXETINE HYDROCHLORIDE 20 MG: 20 CAPSULE ORAL at 10:44

## 2021-09-13 RX ADMIN — TIOTROPIUM BROMIDE INHALATION SPRAY 2 PUFF: 3.12 SPRAY, METERED RESPIRATORY (INHALATION) at 10:44

## 2021-09-13 RX ADMIN — POTASSIUM CHLORIDE 10 MEQ: 750 TABLET, EXTENDED RELEASE ORAL at 10:43

## 2021-09-13 RX ADMIN — AZITHROMYCIN MONOHYDRATE 500 MG: 250 TABLET ORAL at 10:43

## 2021-09-13 RX ADMIN — ENOXAPARIN SODIUM 30 MG: 30 INJECTION SUBCUTANEOUS at 10:43

## 2021-09-13 RX ADMIN — SODIUM CHLORIDE, PRESERVATIVE FREE 10 ML: 5 INJECTION INTRAVENOUS at 10:44

## 2021-09-13 RX ADMIN — BUPROPION HYDROCHLORIDE 300 MG: 150 TABLET, EXTENDED RELEASE ORAL at 10:43

## 2021-09-13 ASSESSMENT — PAIN SCALES - GENERAL: PAINLEVEL_OUTOF10: 5

## 2021-09-13 NOTE — PROGRESS NOTES
Occupational Therapy  Pt in bed upon arrival for therapy. Pt states \"I'm getting ready to go home and will get Home Health therapy there\".  Electronically signed by SKYE Singh on 9/13/2021 at 5:05 PM

## 2021-09-13 NOTE — DISCHARGE SUMMARY
Discharge Summary    NAME: Manolo Black  :  1935  MRN:  [id number]    Admit date:  2021  Discharge date:      Admitting Physician:  Sharifa Morgan MD    Advance Directive: Limited     Consults: Physical therapy, Occupational Therapy    Primary Care Physician:  Lauri Kelley DO    Discharge Diagnoses:  Principal Problem:    Pneumonia due to COVID-19 virus  Active Problems:    Essential hypertension    Neuropathy    Anxiety    Depression    Mixed restrictive and obstructive lung disease (HCC)    COPD (chronic obstructive pulmonary disease) (HCC)    NEIDA (obstructive sleep apnea)    Type 2 diabetes mellitus with diabetic polyneuropathy ([address]    Prostate cancer ([facility] [address]    Chronic combined systolic and diastolic congestive heart failure (HCC)    Mitral regurgitation    Pulmonary hypertension ([address]    Pacemaker    Sinoatrial node dysfunction (HCC)    NELSY (acute kidney injury) ([facility] [address]    CKD (chronic kidney disease)    Dehydration    Dyspnea    Acute hypoxemic respiratory failure ([address]    Palliative care patient    Generalized muscle weakness  Resolved Problems:    * No resolved hospital problems. *      Significant Diagnostic Studies:   XR CHEST PORTABLE    Result Date: 2021  EXAMINATION:  XR CHEST PORTABLE  2021 8:58 PM HISTORY: Shortness of air and fatigue. Covid 19. COMPARISON: 2021. FINDINGS:  There are patchy infiltrates in the mid and lower lung zones that are new. There is cardiomegaly. There is a pacemaker on the left. The thoracic aorta is atheromatous. 1. New bilateral patchy infiltrates may be infectious or inflammatory. Covid 19 is considered. Pulmonary edema is also possible. 2. Cardiomegaly.  Signed by Dr Jayden Howard      Pertinent Labs:   CBC:   Recent Labs     21  0408 218 21  0538   WBC 9.0 9.2 10.8   HGB 9.6* 9.7* 9.6*    240 277     BMP:    Recent Labs     21  0408 218 21  0445    133* 134*   K 4.0 4.1 4.8   CL edema. Peripheral pulses palpable. Neurologic: Grossly intact. Discharge Medications:       [facility]   Home Medication Instructions JU    Printed on:21 [id number]   Medication Information                      albuterol sulfate HFA (PROAIR HFA) 108 (90 Base) MCG/ACT inhaler  Inhale 2 puffs into the lungs every 6 hours as needed for Wheezing             allopurinol (ZYLOPRIM) 100 MG tablet  Take 1 tablet by mouth daily             amLODIPine (NORVASC) 5 MG tablet  Take 1 tablet by mouth daily             aspirin 81 MG EC tablet  Take 81 mg by mouth daily             atorvastatin (LIPITOR) 40 MG tablet  Take 1 tablet by mouth nightly             azelastine (ASTELIN) 0.1 % nasal spray  2 sprays by Nasal route 2 times daily Use in each nostril as directed             benzonatate (TESSALON) 200 MG capsule  Take 1 capsule by mouth 2 times daily for 7 days             betamethasone dipropionate 0.05 % cream  TOPICALLY APPLY TO APPROPRIATE AREA AS DIRECTED 2 TIMES DAILY FOR 10 DAYS             BiPAP Machine MISC  by Does not apply route nightly             blood glucose monitor strips  Test one times a day & as needed for symptoms of irregular blood glucose.   DX: E11.9             buPROPion (WELLBUTRIN XL) 150 MG extended release tablet  Take 2 tablets by mouth every morning             carvedilol (COREG) 12.5 MG tablet  Take 1 tablet by mouth 2 times daily (with meals)             celecoxib (CELEBREX) 200 MG capsule  Take 1 capsule by mouth daily             Cholestyramine POWD  1 each by Does not apply route as needed             colesevelam (WELCHOL) 625 MG tablet  Take 1,875 mg by mouth every evening             Dulaglutide (TRULICITY) 3.06 GS/1.2JN SOPN  Trulicity 7.40 BI/3.3 mL subcutaneous pen injector             enzalutamide (XTANDI) 40 MG capsule  Take 4 tablets daily             FLUoxetine (PROZAC) 20 MG capsule  Take 1 capsule by mouth daily             fluticasone (FLONASE) 50 MCG/ACT nasal spray  2 sprays by Nasal route daily             furosemide (LASIX) 40 MG tablet  Take 1.5 tablets by mouth daily             isosorbide mononitrate (IMDUR) 60 MG extended release tablet  Take 0.5 tablets by mouth daily             Lancets MISC  1 each by Does not apply route daily Accu Chek Guid3 Lancets             leuprolide (LUPRON) 30 MG injection  Inject 30 mg into the muscle once Every 4 months             lidocaine 4 % external patch  Place 1 patch onto the skin daily             Lift Chair MISC  by Does not apply route             lisinopril (PRINIVIL;ZESTRIL) 40 MG tablet  Take 40 mg by mouth daily             metFORMIN (GLUCOPHAGE) 500 MG tablet  Take 1 tablet by mouth 2 times daily (with meals)             mirabegron (MYRBETRIQ) 25 MG TB24  Take 1 tablet by mouth daily             Misc. Devices (COMMODE) MISC  Toilet seat riser. Needs to be for 330lb + and long gaited             nitroGLYCERIN (NITROSTAT) 0.4 MG SL tablet  up to max of 3 total doses. If no relief after 1 dose, call 911. Omega-3 Fatty Acids (FISH OIL) 1000 MG CAPS  Take 1,000 mg by mouth 2 times daily             OXYGEN  Inhale 3 L into the lungs continuous              pantoprazole (PROTONIX) 40 MG tablet  Take 1 tablet by mouth every morning (before breakfast)             potassium chloride (KLOR-CON 10) 10 MEQ extended release tablet  Take 1 tablet by mouth daily             terazosin (HYTRIN) 5 MG capsule  Take 5 mg by mouth nightly             TRELEGY ELLIPTA 100-62.5-25 MCG/INH AEPB  INHALE 1 PUFF ONCE DAILY             triamcinolone (ARISTOCORT) 0.5 % ointment  triamcinolone acetonide 0.5 % topical ointment             vitamin B-12 (CYANOCOBALAMIN) 1000 MCG tablet  Take 1,000 mcg by mouth daily                 Discharge Instructions: Follow up with Alex Soni DO in 7 days. Take medications as directed. Resume activity as tolerated. Diet: ADULT DIET;  Regular     Disposition: Patient is medically stable and will be discharged home with home health.     Time spent on discharge-37 minutes    Signed:  Sheryle Presume, MD  9/13/2021 2:43 PM

## 2021-09-13 NOTE — CARE COORDINATION
Spoke with pt for follow up from SNF referral. Pt stated that he now plans to return back to his assisted living facility [facility]) at the time of dc. He believes he will be safe to dc back there with home health, which order has been placed for. Electronically signed by Diana Comer on 9/13/2021 at 10:35 AM    Pt does not have a ride back to [facility]. Called [facility], they are agreeable to transport him. Pt reports that he is not able to pay for [employer] fare. At $4.00/mile ([employer] for COVID), price for cab will be $24.00. PMF voucher provided for pt's nurse.    Electronically signed by Diana Comer on 9/13/2021 at 3:19 PM

## 2021-09-14 ENCOUNTER — TELEPHONE (OUTPATIENT)
Dept: PRIMARY CARE CLINIC | Age: 86
End: 2021-09-14

## 2021-09-14 ENCOUNTER — CARE COORDINATION (OUTPATIENT)
Dept: CASE MANAGEMENT | Age: 86
End: 2021-09-14

## 2021-09-14 NOTE — CARE COORDINATION
[facility] Transitions Initial Follow Up Call    Call within 2 business days of discharge: Yes    Patient: Maribell Russell Patient : 1935   MRN: [id number]  Reason for Admission: Covid  Discharge Date: 21 RARS: Readmission Risk Score: 31      Last Discharge [facility]       Complaint Diagnosis Description Type Department Provider    21 Fatigue Acute kidney injury ([address] . .. ED to Hosp-Admission (Discharged) (ADMIT) Yajaira Leong MD; Gutierrez Light. .. Spoke with: [id number]: [place] [address]    Non-face-to-face services provided:  Reviewed encounter information for continuity of care prior to follow up phone call - chart notes, consults, progress notes, test results, med list, appointments, AVS, other information. Care Transitions 24 Hour Call    Do you have any ongoing symptoms?: No  Do you have a copy of your discharge instructions?: Yes  Do you have all of your prescriptions and are they filled?: Yes  Have you been contacted by a [address]?: No  Have you scheduled your follow up appointment?: Yes  How are you going to get to your appointment?: Car - family or friend to transport  [address] [facility] [place] [address]  Patient DME: Rosetta Bile, Straight cane, Shower chair, Other  Other Patient DME: handrail for attachment to patient's bed  Patient Home Equipment: Oxygen, BiPAP, Other  Do you have support at home?: Alone  Do you feel like you have everything you need to keep you well at home?: Yes  Are you an active caregiver in your home?: No  Care Transitions Interventions    Pharmacist: Completed     Home Care Waiver: Completed            Follow Up : Spoke with patient today for initial Covid call. He says he is doing ok. Says he is on 3 LMP home oxygen, unsure of what his SpO2 is today, has not checked it. CTN asked that he check it, and he said he was eating breakfast, could CTN call back.  CTN will call back at a later time.    Future Appointments   Date Time Provider Addie Doss   9/20/2021  2:30 PM [address] [facility],[address], [facility]   12/2/2021 11:00 AM Kristen Rodríguez MD N [facility] Cardio P-NORMA Zuñiga RN

## 2021-09-14 NOTE — TELEPHONE ENCOUNTER
[address] Transitions Initial Follow Up Call    Outreach made within 2 business days of discharge: Yes    Patient: Aylin Scott Patient : 1935   MRN: [id number]  Reason for Admission: There are no discharge diagnoses documented for the most recent discharge. Discharge Date: 21       Spoke with: no one.  Care coord already did tcm    Discharge department/facility: [address] [place] [address]      Scheduled appointment with PCP within 7-14 days    Follow Up  Future Appointments   Date Time Provider Addie Doss   2021  2:30 PM [address]   2021 11:00 AM Cristy Ha MD N [facility] Cardio [facility], [place]

## 2021-09-15 ENCOUNTER — CARE COORDINATION (OUTPATIENT)
Dept: CASE MANAGEMENT | Age: 86
End: 2021-09-15

## 2021-09-15 ENCOUNTER — TELEPHONE (OUTPATIENT)
Dept: PRIMARY CARE CLINIC | Age: 86
End: 2021-09-15

## 2021-09-15 NOTE — TELEPHONE ENCOUNTER
Precious with [facility] is needing clearance for staff to see pt. He was diagnosed with Covid on 8/25/21 and then went into hospital again on 9/7/21 for kidneys but wasn't suppose to be out of quarantine until 9/8/21. They are just needing to be told by physician that he is out of the quaratine stage. She is also requesting a referral for PT/OT. Will send to provider for authorization.

## 2021-09-15 NOTE — CARE COORDINATION
[address] Transitions Initial Follow Up Call    Call within 2 business days of discharge: Yes    Patient: Aylin Scott Patient : 1935   MRN: [id number]  Reason for Admission: Covid  Discharge Date: 21 RARS: Readmission Risk Score: 31      Last Discharge [facility]       Complaint Diagnosis Description Type Department Provider    21 Fatigue Acute kidney injury ([address] . .. ED to Hosp-Admission (Discharged) (ADMIT) Hao Carrasco MD; Daija Duval. .. Spoke with: Aylin Scott    Advance Care Planning   Healthcare Decision Maker:    Primary Decision Maker: Tiffanie Jarek Child - [phone]    Secondary Decision Maker: Jeanie Portillo Child - [phone]    Patient contacted regarding COVID-19 diagnosis. Discussed COVID-19 related testing which was available at this time. Test results were positive. Patient informed of results, if available? Yes. Care Transition Nurse contacted the patient by telephone to perform post discharge assessment. Call within 2 business days of discharge: Yes. Verified name and  with patient as identifiers. Provided introduction to self, and explanation of the CTN/ACM role, and reason for call due to risk factors for infection and/or exposure to COVID-19. Symptoms reviewed with patient who verbalized the following symptoms: no new symptoms and no worsening symptoms. Due to no new or worsening symptoms encounter was not routed to provider for escalation. Discussed follow-up appointments. If no appointment was previously scheduled, appointment scheduling offered: Yes.   [facility] follow up appointment(s):   Future Appointments   Date Time Provider Addie Doss   2021  2:30 PM [address], DO Joyce Edouard [place]   2021 11:00 AM MD NEENA Martin [facility] Cardio [place]     Non-[facility] follow up appointment(s):     Non-face-to-face services provided:  Obtained and reviewed discharge summary and/or continuity of care documents     Advance Care Planning:   Does patient have an Advance Directive:  POA on file. Educated patient about risk for severe COVID-19 due to risk factors according to CDC guidelines. CTN reviewed discharge instructions, medical action plan and red flag symptoms with the patient who verbalized understanding. Discussed COVID vaccination status: Yes. Education provided on COVID-19 vaccination as appropriate. Discussed exposure protocols and quarantine with CDC Guidelines. Patient was given an opportunity to verbalize any questions and concerns and agrees to contact CTN or health care provider for questions related to their healthcare. Reviewed and educated patient on any new and changed medications related to discharge diagnosis     Was patient discharged with a pulse oximeter? Had one Discussed and confirmed pulse oximeter discharge instructions and when to notify provider or seek emergency care. CTN provided contact information. Plan for follow-up call in 5-7 days based on severity of symptoms and risk factors.         Care Transitions 24 Hour Call    Do you have any ongoing symptoms?: No  Do you have a copy of your discharge instructions?: Yes  Do you have all of your prescriptions and are they filled?: Yes  Have you been contacted by a [address]?: No  Have you scheduled your follow up appointment?: Yes  How are you going to get to your appointment?: Car - family or friend to transport  Were you discharged with any Home Care or Post Acute Services: Yes  Post Acute Services: [address] [place] [address]  Patient DME: Brayden Sutherland, Straight cane, Shower chair, Other  Other Patient DME: handrail for attachment to patient's bed  Patient Home Equipment: Oxygen, BiPAP, Other  Do you have support at home?: Alone  Do you feel like you have everything you need to keep you well at home?: Yes  Are you an active caregiver in your home?: No  Care Transitions Interventions    Pharmacist: Completed     Home Care Waiver: Completed            Follow Up : Spoke with patient today for Covid call after discharge. He says he is weak but getting around. He had thought he had Covid 2 x. CTN discussed with him and helped him to understand his course of treatment and how it progressed to hospitalization. Meds are set up by his pharmacy. He says he is on 3 LPM of home oxygen, reading 94% on 3 LPM. He says his blood sugars are good, 90 yesterday, 114 today. He is eating and drinking. CTN encouraged him to be mobile when he can, uses walker for stability. He has VV with PCP next Monday. [facility] is coming out to see patient. He lives at [place]. Discussed CTN calls and follow up and he is accepting. Will follow up at a later time.    Future Appointments   Date Time Provider Addie Doss   9/20/2021  2:30 PM [address] [facility],[address], [facility]   12/2/2021 11:00 AM Angy Burkett MD N [facility] Cardio [facility]-NORMA Garvey RN

## 2021-09-16 ENCOUNTER — CARE COORDINATION (OUTPATIENT)
Dept: CARE COORDINATION | Age: 86
End: 2021-09-16

## 2021-09-17 ENCOUNTER — TELEPHONE (OUTPATIENT)
Dept: PRIMARY CARE CLINIC | Age: 86
End: 2021-09-17

## 2021-09-17 ASSESSMENT — ENCOUNTER SYMPTOMS: DYSPNEA ASSOCIATED WITH: MINIMAL EXERTION

## 2021-09-17 NOTE — CARE COORDINATION
Ambulatory Care Coordination Note  9/17/2021  CM Risk Score: 7  Charlson 10 Year Mortality Risk Score: 100%     ACC: Stefani Mobley RN    Summary Note: ACM spoke to Al. He discharged from [facility] on 9/13/21 after tx for COVID Pneumonia. Pt continues on home oxygen at 3L, uses BiPAP at night. Sleeping well. Some increased mucus and cough. Pt stated taking Tessalon Perles with good cough management. Patient's PADD caregiver is to return on Monday. She had been 3x/week. Pt asked about 9/28: flu vaccine offered there. He would like to know if his PCP recommends he take the vaccine due to recent PNA and still recovering. Pt had [place] visit w \"Baldev\" today - he believe the physical therapist and a nurse came. HH did SpO2 ck: 94% on 3L O2. Still reports getting SoB easily. Pt used an electric WC to get to the dining room at his apt complex. He uses his cane in the apt but reports \"all done in\" just going to BR and back to den. Unable to review meds with patient - he was in the apartment complex dining room at time of call. Plan:  Discussed importance of improving lung function after PNA. Encouraged pt to use his incentive spirometer qid and gave verbal review of method of use. Pt voiced understanding and he will start use. Will contact Celine Balderrama next wk to review/reconcile medications as they package and deliver to patient. Pt has telehealth appt with PCP on Monday - ACM sent message requesting PCP discuss flu vaccine with patient at appt. Care Coordination Interventions    Program Enrollment: Complex Care  Referral from Primary Care Provider: No  Suggested Interventions and Community Resources  Fall Risk Prevention: Completed (Comment: 9/17: No falls since April. Now using scooter outside apt.)  Home Care Waiver: Completed (Comment: PADD  visits 3x/wk)  Physical Therapy: Completed (Comment: 9/17: Started [facility])  Zone Management Tools: In Process (Comment: 9/17: DC [facility] 9/13 w COVID PNA.  Feels 7/13/21   ISAAC Long   furosemide (LASIX) 40 MG tablet Take 1.5 tablets by mouth daily 7/9/21   ISAAC Long   FLUoxetine (PROZAC) 20 MG capsule Take 1 capsule by mouth daily 7/7/21   ALISON Weber DO   metFORMIN (GLUCOPHAGE) 500 MG tablet Take 1 tablet by mouth 2 times daily (with meals) 6/29/21   ALISON Weber DO   Lift Chair MISC by Does not apply route 6/9/21   ISAAC Russell   Omega-3 Fatty Acids (FISH OIL) 1000 MG CAPS Take 1,000 mg by mouth 2 times daily    Historical Provider, MD   aspirin 81 MG EC tablet Take 81 mg by mouth daily    Historical Provider, MD   terazosin (HYTRIN) 5 MG capsule Take 5 mg by mouth nightly    Historical Provider, MD   lisinopril (PRINIVIL;ZESTRIL) 40 MG tablet Take 40 mg by mouth daily    Historical Provider, MD   isosorbide mononitrate (IMDUR) 60 MG extended release tablet Take 0.5 tablets by mouth daily 5/4/21   Jonathan Agee PA-C   lidocaine 4 % external patch Place 1 patch onto the skin daily 5/5/21   Jonathan Agee PA-C   [facility]) 625 MG tablet Take 1,875 mg by mouth every evening    Historical Provider, MD   pantoprazole (PROTONIX) 40 MG tablet Take 1 tablet by mouth every morning (before breakfast) 4/14/21   ALISON Webre DO   atorvastatin (LIPITOR) 40 MG tablet Take 1 tablet by mouth nightly 3/30/21   ALISON Weber DO   fluticasone [facility]) 50 MCG/ACT nasal spray 2 sprays by Nasal route daily 3/25/21   ALISON Weber DO   mirabegron (MYRBETRIQ) 25 MG TB24 Take 1 tablet by mouth daily 3/17/21   ALISON Weber DO   buPROPion (WELLBUTRIN XL) 150 MG extended release tablet Take 2 tablets by mouth every morning 2/25/21   ALISON Weber DO   TRELEGY ELLIPTA 100-62.5-25 MCG/INH AEPB INHALE 1 PUFF ONCE DAILY 2/17/21   ALISON Weber DO   azelastine (ASTELIN) 0.1 % nasal spray 2 sprays by Nasal route 2 times daily Use in each nostril as directed 1/14/21   ALISON Romeo Norma Dukes DO   carvedilol (COREG) 12.5 MG tablet Take 1 tablet by mouth 2 times daily (with meals) 1/4/21   ALISON Jernigan DO   enzalutamide [facility]) 40 MG capsule Take 4 tablets daily 12/29/20   ISAAC Robison   amLODIPine (NORVASC) 5 MG tablet Take 1 tablet by mouth daily 12/16/20   ISAAC Muniz   blood glucose monitor strips Test one times a day & as needed for symptoms of irregular blood glucose. DX: E11.9 4/2/20   ALISON Jernigan DO   nitroGLYCERIN (NITROSTAT) 0.4 MG SL tablet up to max of 3 total doses.  If no relief after 1 dose, call 911. 3/30/20   Gail Hanson PA-C   albuterol sulfate HFA (PROAIR HFA) 108 (90 Base) MCG/ACT inhaler Inhale 2 puffs into the lungs every 6 hours as needed for Wheezing 12/4/19   ISAAC Muniz   potassium chloride (KLOR-CON 10) 10 MEQ extended release tablet Take 1 tablet by mouth daily 6/19/19   ISAAC Muniz   Lancets MISC 1 each by Does not apply route daily Accu Chek Guid3 Lancets 4/29/19   ALISON Jernigan DO   vitamin B-12 (CYANOCOBALAMIN) 1000 MCG tablet Take 1,000 mcg by mouth daily    Historical Provider, MD   leuprolide (LUPRON) 30 MG injection Inject 30 mg into the muscle once Every 4 months    Historical Provider, MD       Future Appointments   Date Time Provider Addie Doss   9/20/2021  2:30 PM [address] [facility],[address], DO Joyce PAN   12/2/2021 11:00 AM Fracisco Mayo MD N [facility] Cardio [place]      and   COPD Assessment    Does the patient understand envrionmental exposure?: Yes  Is the patient able to verbalize Rescue vs. Long Acting medications?: Yes  Does the patient have a nebulizer?: No  Does the patient use a space with inhaled medications?: No     Shortness of breath (worse than baseline)         Symptoms:  COPD associated increased fatigue: Pos      Symptom course: improving  Breathlessness: minimal exertion  Change in chronic cough?: Increased  Change in sputum?: Increased  Sputum characteristics: Clear  Self Monitoring - SaO2: Yes  Baseline SaO2 Readin (Comment: 3L O2)  Have you had a recent diagnosis of pneumonia either by PCP or at a hospital?: Yes at Hospital  Have you seen your PCP for follow up or do you have an appointment scheduled?: Yes  Are you taking your antibiotics as prescribed?: Yes  Symptom Course: improving

## 2021-09-17 NOTE — TELEPHONE ENCOUNTER
Bibi Baker, [address] [facility] called, they will see him 2 times a week for 2 weeks for OT

## 2021-09-20 ENCOUNTER — VIRTUAL VISIT (OUTPATIENT)
Dept: PRIMARY CARE CLINIC | Age: 86
End: 2021-09-20
Payer: MEDICARE

## 2021-09-20 ENCOUNTER — CARE COORDINATION (OUTPATIENT)
Dept: CARE COORDINATION | Age: 86
End: 2021-09-20

## 2021-09-20 DIAGNOSIS — I50.42 CHRONIC COMBINED SYSTOLIC AND DIASTOLIC CONGESTIVE HEART FAILURE (HCC): ICD-10-CM

## 2021-09-20 DIAGNOSIS — U07.1 COVID-19: ICD-10-CM

## 2021-09-20 DIAGNOSIS — J96.01 ACUTE HYPOXEMIC RESPIRATORY FAILURE (HCC): ICD-10-CM

## 2021-09-20 DIAGNOSIS — J44.9 CHRONIC OBSTRUCTIVE PULMONARY DISEASE, UNSPECIFIED COPD TYPE (HCC): ICD-10-CM

## 2021-09-20 DIAGNOSIS — Z09 HOSPITAL DISCHARGE FOLLOW-UP: Primary | ICD-10-CM

## 2021-09-20 DIAGNOSIS — J18.9 PNEUMONIA DUE TO INFECTIOUS ORGANISM, UNSPECIFIED LATERALITY, UNSPECIFIED PART OF LUNG: ICD-10-CM

## 2021-09-20 DIAGNOSIS — I25.10 CORONARY ARTERY DISEASE INVOLVING NATIVE CORONARY ARTERY OF NATIVE HEART WITHOUT ANGINA PECTORIS: ICD-10-CM

## 2021-09-20 PROCEDURE — 99496 TRANSJ CARE MGMT HIGH F2F 7D: CPT | Performed by: PEDIATRICS

## 2021-09-20 PROCEDURE — 1111F DSCHRG MED/CURRENT MED MERGE: CPT | Performed by: PEDIATRICS

## 2021-09-20 ASSESSMENT — ENCOUNTER SYMPTOMS
COUGH: 0
BACK PAIN: 0
WHEEZING: 0
DYSPNEA ASSOCIATED WITH: EXERTION
ABDOMINAL PAIN: 0
DIARRHEA: 0
SORE THROAT: 0
CHEST TIGHTNESS: 0
GASTROINTESTINAL NEGATIVE: 1
SHORTNESS OF BREATH: 1
NAUSEA: 0

## 2021-09-20 NOTE — PROGRESS NOTES
[facility], [address]  Phone [phone]   Fax [phone]      OFFICE VISIT: 2021    Aide Zabalaten-: 1935      HPI  Reason For Visit:  Renetta Posey is a 81 y.o. Follow-Up from Hospital, Positive For Covid-19, and Pneumonia    Patient presents for transitional care from hospitalization. He was admitted to [address] [place] [address] from 2021 until 2021     vitals were not taken for this visit. There is no height or weight on file to calculate BMI. I have reviewed the following with the . Yaa Easley   Lab Review  No results displayed because visit has over 200 results.       Admission on 2021, Discharged on 2021   Component Date Value    SARS-CoV-2, NAAT 2021 DETECTED*    Sodium 2021 138     Potassium 2021 4.9     Chloride 2021 98     CO2 2021 29     Anion Gap 2021 11     Glucose 2021 115*    BUN 2021 24*    CREATININE 2021 1.1     GFR Non- 2021 >60     GFR  2021 >59     Calcium 2021 9.8     Total Protein 2021 7.1     Albumin 2021 4.2     Total Bilirubin 2021 0.5     Alkaline Phosphatase 2021 57     ALT 2021 8     AST 2021 17     WBC 2021 7.6     RBC 2021 3.59*    Hemoglobin 2021 10.9*    Hematocrit 2021 35.3*    MCV 2021 98.3*    MCH 2021 30.4     MCHC 2021 30.9*    RDW 2021 14.6*    Platelets  139     MPV 2021 10.3     Neutrophils % 2021 70.6*    Lymphocytes % 2021 4.8*    Monocytes % 2021 13.5*    Eosinophils % 2021 2.2     Basophils % 2021 1.0     Neutrophils Absolute 2021 5.4     Immature Granulocytes # 2021 0.6     Lymphocytes Absolute 2021 0.4*    Monocytes Absolute 2021 1.00*    Eosinophils Absolute 2021 0.20     Basophils Absolute 08/25/2021 0.10      Copies of these are in the chart. Current Outpatient Medications   Medication Sig Dispense Refill    betamethasone dipropionate 0.05 % cream TOPICALLY APPLY TO APPROPRIATE AREA AS DIRECTED 2 TIMES DAILY FOR 10 DAYS      Dulaglutide (TRULICITY) 2.89 EU/6.1RU SOPN Trulicity 5.73 RL/8.3 mL subcutaneous pen injector      triamcinolone (ARISTOCORT) 0.5 % ointment triamcinolone acetonide 0.5 % topical ointment      benzonatate (TESSALON) 200 MG capsule Take 1 capsule by mouth 2 times daily for 7 days 14 capsule 0    Cholestyramine POWD 1 each by Does not apply route as needed      celecoxib (CELEBREX) 200 MG capsule Take 1 capsule by mouth daily 30 capsule 11    Misc. Devices (COMMODE) MISC Toilet seat riser.  Needs to be for 330lb + and long gaited 1 each 0    allopurinol (ZYLOPRIM) 100 MG tablet Take 1 tablet by mouth daily 30 tablet 5    furosemide (LASIX) 40 MG tablet Take 1.5 tablets by mouth daily 135 tablet 3    FLUoxetine (PROZAC) 20 MG capsule Take 1 capsule by mouth daily 90 capsule 3    metFORMIN (GLUCOPHAGE) 500 MG tablet Take 1 tablet by mouth 2 times daily (with meals) 180 tablet 3    Lift Chair MISC by Does not apply route 1 each 0    Omega-3 Fatty Acids (FISH OIL) 1000 MG CAPS Take 1,000 mg by mouth 2 times daily      aspirin 81 MG EC tablet Take 81 mg by mouth daily      terazosin (HYTRIN) 5 MG capsule Take 5 mg by mouth nightly      lisinopril (PRINIVIL;ZESTRIL) 40 MG tablet Take 40 mg by mouth daily      isosorbide mononitrate (IMDUR) 60 MG extended release tablet Take 0.5 tablets by mouth daily 90 tablet 0    lidocaine 4 % external patch Place 1 patch onto the skin daily      colesevelam (WELCHOL) 625 MG tablet Take 1,875 mg by mouth every evening      pantoprazole (PROTONIX) 40 MG tablet Take 1 tablet by mouth every morning (before breakfast) 30 tablet 5    atorvastatin (LIPITOR) 40 MG tablet Take 1 tablet by mouth nightly 90 tablet 3    fluticasone (FLONASE) 50 MCG/ACT nasal spray 2 sprays by Nasal route daily 16 g 11    mirabegron (MYRBETRIQ) 25 MG TB24 Take 1 tablet by mouth daily 90 tablet 3    buPROPion (WELLBUTRIN XL) 150 MG extended release tablet Take 2 tablets by mouth every morning 180 tablet 3    TRELEGY ELLIPTA 100-62.5-25 MCG/INH AEPB INHALE 1 PUFF ONCE DAILY 1 each 11    azelastine (ASTELIN) 0.1 % nasal spray 2 sprays by Nasal route 2 times daily Use in each nostril as directed 4 Bottle 3    carvedilol (COREG) 12.5 MG tablet Take 1 tablet by mouth 2 times daily (with meals) 180 tablet 3    enzalutamide (XTANDI) 40 MG capsule Take 4 tablets daily 360 capsule 1    amLODIPine (NORVASC) 5 MG tablet Take 1 tablet by mouth daily 90 tablet 3    blood glucose monitor strips Test one times a day & as needed for symptoms of irregular blood glucose. DX: E11.9 100 strip 3    nitroGLYCERIN (NITROSTAT) 0.4 MG SL tablet up to max of 3 total doses. If no relief after 1 dose, call 911. 25 tablet 0    albuterol sulfate HFA (PROAIR HFA) 108 (90 Base) MCG/ACT inhaler Inhale 2 puffs into the lungs every 6 hours as needed for Wheezing 8.5 Inhaler 5    potassium chloride (KLOR-CON 10) 10 MEQ extended release tablet Take 1 tablet by mouth daily 90 tablet 3    Lancets MISC 1 each by Does not apply route daily Accu Chek Guid3 Lancets 100 each 5    OXYGEN Inhale 3 L into the lungs continuous       BiPAP Machine MISC by Does not apply route nightly      vitamin B-12 (CYANOCOBALAMIN) 1000 MCG tablet Take 1,000 mcg by mouth daily      leuprolide (LUPRON) 30 MG injection Inject 30 mg into the muscle once Every 4 months       No current facility-administered medications for this visit. Allergies: Patient has no known allergies.      Past Medical History:   Diagnosis Date    Anxiety     Arthritis     Atrial fibrillation ([address]     Blood circulation, collateral     Cancer (HCC)     prostate, had treatment    Cellulitis     left leg    CHF (congestive heart failure) (HCC)     Chronic kidney disease     COPD (chronic obstructive pulmonary disease) (HCC)     Depression     Hyperlipidemia     Hypertension     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 ([facility] 3/28/2020    Lung mass     Neuromuscular disorder (HCC)     Neuropathy     Other disorders of kidney and ureter in diseases classified elsewhere     Palliative care patient 11/15/2018    Pneumonia     Type II or unspecified type diabetes mellitus without mention of complication, not stated as uncontrolled        Family History   Problem Relation Age of Onset    Heart Attack Mother     Cancer Father        Past Surgical History:   Procedure Laterality Date    COLONOSCOPY      EYE SURGERY      EYE SURGERY      HERNIA REPAIR      umbilical with Dr Satish Portillo         Social History     Tobacco Use    Smoking status: Never Smoker    Smokeless tobacco: Never Used   Substance Use Topics    Alcohol use: No        Post-Discharge Transitional Care Management Services or Hospital Follow Up      Timothy Holguin   YOB: 1935    Date of Office Visit:  9/20/2021  Date of Hospital Admission: 9/7/21  Date of Hospital Discharge: 9/13/21  Readmission Risk Score(high >=14%.  Medium >=10%):Readmission Risk Score: 31      Care management risk score Rising risk (score 2-5) and Complex Care (Scores >=6): 7     Non face to face  following discharge, date last encounter closed (first attempt may have been earlier): 9/15/2021  1:45 PM 9/15/2021  1:45 PM    Call initiated 2 business days of discharge: Yes     Principal diagnosis:   COVID-19 infection   Pneumonia bilateral    Secondary diagnoses:  Patient Active Problem List   Diagnosis    Essential hypertension    Neuropathy    Chronic atrial fibrillation    Anxiety    Depression    Mixed restrictive and obstructive lung disease ([facility]    COPD (chronic obstructive pulmonary disease) (HCC)    NEIDA (obstructive sleep apnea)    Asbestosis (HCC)    Metabolic alkalosis with respiratory acidosis    Chronic anticoagulation    Acne rosacea    Type 2 diabetes mellitus with diabetic polyneuropathy (HCC)    Mixed hyperlipidemia    Prostate cancer (HCC)    Recurrent major depressive disorder, in partial remission ([address]    Chronic combined systolic and diastolic congestive heart failure (HCC)    Macrocytic anemia    Sepsis (HCC)    Vitamin D deficiency    Mitral regurgitation    Tricuspid regurgitation    Pulmonary hypertension (HCC)    Bronchitis, acute    Bradycardia    Pacemaker    Sinoatrial node dysfunction (HCC)    Mediastinal mass    Chest pain due to myocardial ischemia    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 (HCC)    Chest discomfort    CAD (coronary artery disease)    NELSY (acute kidney injury) ([address]    Hemothorax on left    Fracture of multiple ribs of left side    Fall at home    Digoxin toxicity    Pneumonia due to COVID-19 virus    CKD (chronic kidney disease)    Dehydration    Dyspnea    Acute hypoxemic respiratory failure (HCC)    Palliative care patient    Generalized muscle weakness    Cellulitis    Centrilobular emphysema (HCC)    Iron deficiency anemia secondary to blood loss (chronic)    Major depressive disorder, recurrent, unspecified (HCC)    Mixed simple and mucopurulent chronic bronchitis (HCC)    Morbid (severe) obesity due to excess calories (HCC)    Nonexudative age-related macular degeneration    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 ([address]       No Known Allergies    Medications listed as ordered at the time of discharge from hospital   [facility] Medication Instructions COLLIN:    Printed on:09/20/21 2592   Medication Information                      albuterol sulfate HFA (PROAIR HFA) 108 (90 Base) MCG/ACT inhaler  Inhale 2 puffs into the lungs every 6 hours as needed for Wheezing             allopurinol (ZYLOPRIM) 100 MG tablet  Take 1 tablet by mouth daily             amLODIPine (NORVASC) 5 MG tablet  Take 1 tablet by mouth daily             aspirin 81 MG EC tablet  Take 81 mg by mouth daily             atorvastatin (LIPITOR) 40 MG tablet  Take 1 tablet by mouth nightly             azelastine (ASTELIN) 0.1 % nasal spray  2 sprays by Nasal route 2 times daily Use in each nostril as directed             benzonatate (TESSALON) 200 MG capsule  Take 1 capsule by mouth 2 times daily for 7 days             betamethasone dipropionate 0.05 % cream  TOPICALLY APPLY TO APPROPRIATE AREA AS DIRECTED 2 TIMES DAILY FOR 10 DAYS             BiPAP Machine MISC  by Does not apply route nightly             blood glucose monitor strips  Test one times a day & as needed for symptoms of irregular blood glucose.   DX: E11.9             buPROPion (WELLBUTRIN XL) 150 MG extended release tablet  Take 2 tablets by mouth every morning             carvedilol (COREG) 12.5 MG tablet  Take 1 tablet by mouth 2 times daily (with meals)             celecoxib (CELEBREX) 200 MG capsule  Take 1 capsule by mouth daily             Cholestyramine POWD  1 each by Does not apply route as needed             colesevelam (WELCHOL) 625 MG tablet  Take 1,875 mg by mouth every evening             Dulaglutide (TRULICITY) 2.06 RM/2.9ZZ SOPN  Trulicity 2.40 DE/9.0 mL subcutaneous pen injector             enzalutamide (XTANDI) 40 MG capsule  Take 4 tablets daily             FLUoxetine (PROZAC) 20 MG capsule  Take 1 capsule by mouth daily             fluticasone (FLONASE) 50 MCG/ACT nasal spray  2 sprays by Nasal route daily             furosemide (LASIX) 40 MG tablet  Take 1.5 tablets by mouth daily             isosorbide mononitrate (IMDUR) 60 MG extended release tablet  Take 0.5 tablets by mouth daily             Lancets MISC  1 each by Does not apply route daily Accu Chek Guid3 Lancets             leuprolide (LUPRON) 30 MG injection  Inject 30 mg into the muscle once Every 4 months             lidocaine 4 % external patch  Place 1 patch onto the skin daily             Lift Chair MISC  by Does not apply route             lisinopril (PRINIVIL;ZESTRIL) 40 MG tablet  Take 40 mg by mouth daily             metFORMIN (GLUCOPHAGE) 500 MG tablet  Take 1 tablet by mouth 2 times daily (with meals)             mirabegron (MYRBETRIQ) 25 MG TB24  Take 1 tablet by mouth daily             Misc. Devices (COMMODE) MISC  Toilet seat riser. Needs to be for 330lb + and long gaited             nitroGLYCERIN (NITROSTAT) 0.4 MG SL tablet  up to max of 3 total doses. If no relief after 1 dose, call 911. Omega-3 Fatty Acids (FISH OIL) 1000 MG CAPS  Take 1,000 mg by mouth 2 times daily             OXYGEN  Inhale 3 L into the lungs continuous              pantoprazole (PROTONIX) 40 MG tablet  Take 1 tablet by mouth every morning (before breakfast)             potassium chloride (KLOR-CON 10) 10 MEQ extended release tablet  Take 1 tablet by mouth daily             terazosin (HYTRIN) 5 MG capsule  Take 5 mg by mouth nightly             TRELEGY ELLIPTA 100-62.5-25 MCG/INH AEPB  INHALE 1 PUFF ONCE DAILY             triamcinolone (ARISTOCORT) 0.5 % ointment  triamcinolone acetonide 0.5 % topical ointment             vitamin B-12 (CYANOCOBALAMIN) 1000 MCG tablet  Take 1,000 mcg by mouth daily                   Medications marked \"taking\" at this time  No outpatient medications have been marked as taking for the 9/20/21 encounter (Virtual Visit) with ALISON Abernathy DO.        Medications patient taking as of now reconciled against medications ordered at time of hospital discharge: Yes    Chief Complaint   Patient presents with    [facility] from [facility]  Inpatient course: Discharge summary reviewed- see chart.   Mr. Jesse Schaumann was originally diagnosed with COVID-19 infection on 8/25/2021. At that time he was experiencing minimal symptoms, and he was discharged to home on his chronic home oxygen. His only treatment was steroids. He was initially doing fairly well until he started getting progressively worsening shortness of breath  He presented once again to the emergency department and was found to have bilateral pneumonia with associated hypoxia and profound weakness. Treatment was initiated with Decadron, IV fluids and bronchodilator therapy. He gradually improved over the next 6 days to the point where he was back on his 3 L of oxygen. His breathing was significantly improved and his creatinine also improved. Interval history/Current status:   He is back on 3 liters O2. He is still taking his cough medication, but this is much less than before. He is still quickly out of breath. He is not on any antibiotics. He has all of his medications that he needs. His meds are filled weekly. He has not had any fever or chills. He is wanting us to [facility] [phone]  He states that he is needing clearance for his caregiver to come back. As of now, he does not have anyone cleaning, dishes, laundry, etc.    He has improved significantly from his symptoms. Review of Systems   Constitutional: Positive for activity change (he is increasing his activity gradually) and fatigue. Negative for chills and fever. HENT: Positive for congestion. Negative for ear pain and sore throat. Eyes: Negative for visual disturbance. Respiratory: Positive for shortness of breath. Negative for cough, chest tightness and wheezing. Cardiovascular: Negative. Negative for chest pain, palpitations and leg swelling. Gastrointestinal: Negative. Negative for abdominal pain, diarrhea and nausea. Vomiting: with eating at times. Endocrine: Negative. Negative for polyuria. Genitourinary: Negative. Negative for frequency and urgency.    Musculoskeletal: Positive for arthralgias (worse since stopping celebrex. ). Negative for back pain and neck pain. Skin: Negative for rash. Neurological: Negative for dizziness and headaches. Psychiatric/Behavioral: Negative for decreased concentration, dysphoric mood (stable), self-injury, sleep disturbance (usually pretty good. ) and suicidal ideas. The patient is nervous/anxious (off and on, but not a problem right now. ). There were no vitals filed for this visit. There is no height or weight on file to calculate BMI. Wt Readings from Last 3 Encounters:   09/07/21 240 lb (108.9 kg)   06/09/21 249 lb (112.9 kg)   05/27/21 254 lb (115.2 kg)     BP Readings from Last 3 Encounters:   09/13/21 (!) 145/79   08/25/21 (!) 140/75   06/09/21 130/76       Physical Exam  PE was not done today as this was a telephone visit. Assessment/Plan:  1. Hospital discharge follow-up  Medications were reconciled and records reviewed in detail  - [facility] MED LIST    2. COVID-19  He has now met criteria for being discharged from quarantine due to [facility] MED LIST    3. Pneumonia due to infectious organism, unspecified laterality, unspecified part of lung  He continues to gradually improve. We will repeat an x-ray in about a month's time  - [place] DISCHARGE MEDS RECONCILED W/ CURRENT OUTPATIENT MED LIST    4. Coronary artery disease involving native coronary artery of native heart without angina pectoris  Stable without any present anginal symptoms    5. Acute hypoxemic respiratory failure ([address]  Now resolved and he is back on his baseline therapy    6. Chronic combined systolic and diastolic congestive heart failure (HCC)  On baseline therapy and stable  Clinically compensated    7.  Chronic obstructive pulmonary disease, unspecified COPD type ([address]  Back on his home medication regimen and doing fairly well with some residual shortness of breath with activity secondary to his healing pneumonia        Medical Decision Making: high complexity    Due to patients co-morbidities and risk for potential exposure of COVID 19 pandemic. Patient was contacted and agreed to proceed with a telephone virtual visit. The risks and benefits of converting to a telephone virtual visit were discussed in light of the current infectious disease epidemic. Patient also understood that insurance coverage and co-pays are up to their individual insurance plans. Provider performed history of present illness and review of systems. Diagnosis and treatment plan was discussed with patient. Pharmacy of choice was reviewed along with past medical history, medication allergies, and current medications. Education provided to patient or patient parents/guardian with current illness diagnosis as well as when to seek additional healthcare due to changing or for worsening symptoms. Patient voiced understanding. 30 minutes were spent on the phone with patient. Jaquelin Lozano, was evaluated through a synchronous (real-time) audio-video encounter. The patient (or guardian if applicable) is aware that this is a billable service. Verbal consent to proceed has been obtained within the past 12 months. The visit was conducted pursuant to the emergency declaration under the [address] [place] [address], [address] [place] [address] authority and the [employer] General Act. Patient identification was verified, and a caregiver was present when appropriate. The patient was located in a state where the provider was credentialed to provide care. Total time spent for this encounter: 30m    --HEATH Hyman DO on 9/21/2021 at 6:13 AM    An electronic signature was used to authenticate this note.

## 2021-09-20 NOTE — CARE COORDINATION
Ambulatory Care Coordination Note  9/20/2021  CM Risk Score: 7  Charlson 10 Year Mortality Risk Score: 100%     ACC: Reyna Shelley RN    Summary Note: ACM spoke to [employer]. He recently discharged from [facility] due to COVID Pneumonia. Pt still has some generalized low stamina/endurance. He expected his  today but she's coming tomorrow - pt stated the employer needs statement from his PCP that he is recovered. SpO2 during call: 96% with oxygen at 3L. Patient said he is not using his incentive spirometer. Pt reports continued noticeable SoB, especially moving or carrying something. He is using his cane in apt - said he is mindful: pt gave teach back that he tries not to trip over his oxygen hose. PT came this am: pt described upper body exercises he did this am. PT is currently twice a week - Al is not doing any exercises on his own. Pt gets up 2-3 times nightly - using a urinal rather than getting up the bathroom. This reduces his fall risk. His fasting BS check was 96 today. His productive cough has improved - he does not have any phlegm presently and occ, mild coughing. AM wgt: 242 this am.  His appetite is not fully recovered. This am, pt said he had gravy over hashbrowns, 2 fried eggs, toast, coffee, cranberry juice. Lunch: serving of fresh fruit mix, braised chicken w rice, mixed veggies, roll - ate a bit over 1/2 of his lunch. Plan:  ACM encouraged and praised pt for self-monitoring his weight, FBS and SpO2. Praised pt that he is mindful and aware of need to reduce fall risk/hazard in apt. Encouraged that he continue to do self-monitor for management of his chronic diseases. Discussed importance of improving/maximizing patient's physical recovery and his stamina. Reminded pt his physical recovery and endurance are part of supporting his goal to remain independent.  Strongly encouraged him to use his incentive spirometer multiple times per day and to perform the upper body exercises on days when PT is not visiting - these both will help improve his lung health, recovery and overall stamina. Pt voiced understanding. ACM will f/u with call to housekeeping employer: [facility] [employer] - verify what may be needed from PCP for return of patient's /caregiver.  #: [phone]    Care Coordination Interventions    Program Enrollment: Complex Care  Referral from Primary Care Provider: No  Suggested Interventions and Community Resources  Fall Risk Prevention: Completed (Comment: 9/17: No falls since April. Now using scooter outside apt.)  Home Care Waiver: Completed (Comment: [facility]ADD  visits 3x/wk)  [address]: Completed  Physical Therapy: Completed (Comment: 9/20: PT visit today. Reinforced upper body exercises daily!)  Zone Management Tools: In Process (Comment: 9/20: Pt encouraged to do daily upper body exercises per PT inst. Daily wgt, SpO2, FBS - today FBS 96. Pt is eating fair, voice is strong. Productive cough has resolved. Has f/u today w PCP)         Goals Addressed                    This Visit's Progress      Patient Stated (pt-stated)   No change      Pt wants to remain independent    Barriers: impairment:  physical: General weakness and balance problems, lack of support, and overwhelmed by complexity of regimen  Plan for overcoming my barriers:   Pt is willing to work with therapy to improve his strength and endurance  Pt is willing to utilize appropriate ambulatory aid devices to maximize safety  Pt willingness/ability to monitor specific vitals to manage his multiple co-morbidities  Confidence: 8/10  Anticipated Goal Completion Date: 7/24/21            Prior to Admission medications    Medication Sig Start Date End Date Taking?  Authorizing Provider   betamethasone dipropionate 0.05 % cream TOPICALLY APPLY TO APPROPRIATE AREA AS DIRECTED 2 TIMES DAILY FOR 10 DAYS 7/7/21   Historical Provider, MD   Dulaglutide (TRULICITY) 7.56 KS/7.3OG SOPN Trulicity 6.87 YS/9.1 mL subcutaneous pen injector    Historical Provider, MD   triamcinolone (ARISTOCORT) 0.5 % ointment triamcinolone acetonide 0.5 % topical ointment    Historical Provider, MD   benzonatate (TESSALON) 200 MG capsule Take 1 capsule by mouth 2 times daily for 7 days 9/13/21 9/20/21  Lata Sanford MD   Cholestyramine POWD 1 each by Does not apply route as needed    B Wilbur Phoenix, DO   celecoxib (CELEBREX) 200 MG capsule Take 1 capsule by mouth daily 8/24/21   B Wilbur Phoenix, DO   Misc. Devices (COMMODE) MISC Toilet seat riser.  Needs to be for 330lb + and long gaited 7/16/21   B Wilbur Phoenix, DO   allopurinol (ZYLOPRIM) 100 MG tablet Take 1 tablet by mouth daily 7/13/21   ISAAC Thao   furosemide (LASIX) 40 MG tablet Take 1.5 tablets by mouth daily 7/9/21   ISAAC Thao   FLUoxetine (PROZAC) 20 MG capsule Take 1 capsule by mouth daily 7/7/21   B Wilbur Phoenix,    metFORMIN (GLUCOPHAGE) 500 MG tablet Take 1 tablet by mouth 2 times daily (with meals) 6/29/21   B Wilbur Phoenix, DO   Lift Chair MISC by Does not apply route 6/9/21   ISAAC Lacey   Omega-3 Fatty Acids (FISH OIL) 1000 MG CAPS Take 1,000 mg by mouth 2 times daily    Historical Provider, MD   aspirin 81 MG EC tablet Take 81 mg by mouth daily    Historical Provider, MD   terazosin (HYTRIN) 5 MG capsule Take 5 mg by mouth nightly    Historical Provider, MD   lisinopril (PRINIVIL;ZESTRIL) 40 MG tablet Take 40 mg by mouth daily    Historical Provider, MD   isosorbide mononitrate (IMDUR) 60 MG extended release tablet Take 0.5 tablets by mouth daily 5/4/21   Carmina De Los Santos PA-C   lidocaine 4 % external patch Place 1 patch onto the skin daily 5/5/21   Carmina De Los Santos PA-C   [facility]) 625 MG tablet Take 1,875 mg by mouth every evening    Historical Provider, MD   pantoprazole (PROTONIX) 40 MG tablet Take 1 tablet by mouth every morning (before breakfast) 4/14/21   B Wilbur Phoenix, DO atorvastatin (LIPITOR) 40 MG tablet Take 1 tablet by mouth nightly 3/30/21   ALISON Ferrer DO   fluticasone [facility]) 50 MCG/ACT nasal spray 2 sprays by Nasal route daily 3/25/21   B Vero Ferrer DO   mirabegron [facility]) 25 MG TB24 Take 1 tablet by mouth daily 3/17/21   ALISON Ferrer DO   buPROPion (WELLBUTRIN XL) 150 MG extended release tablet Take 2 tablets by mouth every morning 2/25/21   ALISON Ferrer DO   TRELEGY ELLIPTA 100-62.5-25 MCG/INH AEPB INHALE 1 PUFF ONCE DAILY 2/17/21   ALISON Ferrer DO   azelastine (ASTELIN) 0.1 % nasal spray 2 sprays by Nasal route 2 times daily Use in each nostril as directed 1/14/21   ALISON Ferrer DO   carvedilol (COREG) 12.5 MG tablet Take 1 tablet by mouth 2 times daily (with meals) 1/4/21   ALISON Ferrer DO   enzalutamide Jennett Grates) 40 MG capsule Take 4 tablets daily 12/29/20   ISAAC Gray   amLODIPine (NORVASC) 5 MG tablet Take 1 tablet by mouth daily 12/16/20   ISAAC Mayers   blood glucose monitor strips Test one times a day & as needed for symptoms of irregular blood glucose. DX: E11.9 4/2/20   ALISON Ferrer DO   nitroGLYCERIN (NITROSTAT) 0.4 MG SL tablet up to max of 3 total doses.  If no relief after 1 dose, call 911. 3/30/20   Vita Goodpasture, PA-C   albuterol sulfate HFA (PROAIR HFA) 108 (90 Base) MCG/ACT inhaler Inhale 2 puffs into the lungs every 6 hours as needed for Wheezing 12/4/19   ISAAC Mayers   potassium chloride (KLOR-CON 10) 10 MEQ extended release tablet Take 1 tablet by mouth daily 6/19/19   ISAAC Mayers   Lancets MISC 1 each by Does not apply route daily Accu Chek Guid3 Lancets 4/29/19   ALISON Ferrer DO   OXYGEN Inhale 3 L into the lungs continuous     Historical Provider, MD   BiPAP Machine MISC by Does not apply route nightly    Historical Provider, MD   vitamin B-12 (CYANOCOBALAMIN) 1000 MCG tablet Take 1,000 mcg by mouth

## 2021-09-21 NOTE — CARE COORDINATION
ACM took call back from [facility] at [facility]. She stated she is located in [place], [address] [place] [address] S but the office services Medicaid waiver referrals in [facility] and they provide the  for [employer]. She stated the  came once last week on 9/14 but was not sure that patient is cleared from [place] [address] for her to return. Rodrigo De La Cruz stated that the PCP may fax a statement to their office to verify that Mr. Ileene Fothergill is clear and  may return: fax # [phone]. ACM will send message to PCP.   Electronically signed by Lauri Turner RN on 9/21/2021 at 10:31 AM

## 2021-09-22 ENCOUNTER — CARE COORDINATION (OUTPATIENT)
Dept: CARE COORDINATION | Age: 86
End: 2021-09-22

## 2021-09-22 NOTE — CARE COORDINATION
ACM took call from Al today re: his waiver program  has not resumed service due to the agency needs a note from PCP stating Al is out of quarantine. ACM resent a basket message to PCP's [place] asking for update if PCP will pen note or if I may assist with this issue. Awaiting response or call back from [place].   Electronically signed by Marlon Laurent RN on 9/22/2021 at 1:16 PM

## 2021-09-22 NOTE — CARE COORDINATION
ACM received message from Sissy Mercury that PCP typed letter for [facility]. ACM faxed letter from EMR to [place] [address] at: [phone]. ACM placed call to Kristine Rowe at [place] [address] and left voicemail requesting call back if she has not received the letter releasing pt from quarantine. Patient is aware.   Electronically signed by Cristian Taylor RN on 9/22/2021 at 4:20 PM

## 2021-09-23 RX ORDER — LISINOPRIL 40 MG/1
40 TABLET ORAL DAILY
Qty: 90 TABLET | Refills: 3 | Status: SHIPPED | OUTPATIENT
Start: 2021-09-23

## 2021-09-23 NOTE — TELEPHONE ENCOUNTER
Received fax from pharmacy requesting refill on pts medication(s). Pt was last seen in office on 9/20/2021  and has a follow up scheduled for 10/20/2021. Will send request to  Dr. Jaime Moore  for authorization.      Requested Prescriptions     Pending Prescriptions Disp Refills    lisinopril (PRINIVIL;ZESTRIL) 40 MG tablet [Pharmacy Med Name: LISINOPRIL 40MG TABLET] 90 tablet 3     Sig: Take 1 tablet by mouth daily

## 2021-09-24 ENCOUNTER — CARE COORDINATION (OUTPATIENT)
Dept: CARE COORDINATION | Age: 86
End: 2021-09-24

## 2021-09-24 RX ORDER — SENNOSIDES 8.6 MG
650 CAPSULE ORAL DAILY
COMMUNITY

## 2021-09-24 RX ORDER — M-VIT,TX,IRON,MINS/CALC/FOLIC 27MG-0.4MG
1 TABLET ORAL DAILY
COMMUNITY
End: 2021-12-09 | Stop reason: SDUPTHER

## 2021-09-24 NOTE — CARE COORDINATION
ACM spoke to patient's pharmacy, his waiver service provider and patient. Per pharmacy med review at [place] [address] [address]): Pt is missing his Allopurinol from packaged meds. Gabriele Seo stated Rx does not have RF on Allopurinol that was sent on 7/13/21. ACM gave verbal to resume med with regular pill packaging. Gabriele Seo voiced understanding. Patient has not been getting Welchol prescription since March, 2021. This medication was discontinued from patient's list.  ACM verified with [facility] that  will resume services on 9/27/21.   Electronically signed by Jeff Ramos RN on 9/24/2021 at 12:28 PM

## 2021-09-29 ENCOUNTER — CARE COORDINATION (OUTPATIENT)
Dept: CARE COORDINATION | Age: 86
End: 2021-09-29

## 2021-09-29 NOTE — CARE COORDINATION
BP, HR, SpO2, Daily wgt, FBS. Is  back. Leg swelling, resp sx, Inc Spirometer use, chair exercises      Ambulatory Care Coordination Note  9/29/2021  CM Risk Score: 7  Charlson 10 Year Mortality Risk Score: 100%     ACC: Cleve Kemp, RN    Summary Note: ACM spoke to patient today. Al's Horizon /caregiver came back Tues and today. \"The world is wonderful again. \"   What has improved the most: laundry, dishes, light cooking, she takes trash out. He asked her about making a chart for his daily vitals. Pt has appt on 10/4 with Dr. Gudelia Briggs in [place] for neurology f/u. Celebrex RF was only 30 d rather than 90 d, is unsure why this changed. BS this am - 99. Wgt 251 lb. Pt does not think legs are swollen. Doesn't think at waist either. SpO2 today was 95% (on 3L oxygen). No increased work of breathing, no increase in fatigue. He does not have compression socks. Plan:  ACM advised I will f/u on Celebrex prescription and let pt know any new information. ACM advised pt to consider looking at local chain pharmacy or [facility] for compression knee socks - will verify this for pt and let him know to get them locally if possible. Pt will ask [facility] nurse to assess his BLE for any edema. Pt to elevate legs if they are swollen to reduce fluid retention and help get water weight off. Continue to monitor closely for any increased fatigue, dyspnea or chest pain. Will f/u with patient again this week due to wgt gain and to reinforce heart failure monitoring. Care Coordination Interventions    Program Enrollment: Complex Care  Referral from Primary Care Provider: No  Suggested Interventions and Community Resources  Fall Risk Prevention: Completed (Comment: 9/17: No falls since April. Now using scooter outside apt.)  Home Care Waiver: Completed (Comment: Waiver  through [facility].  Fausto will resume 9/27/21)  Home Health Services: Completed  Medi Set or Pill Pack: Completed (Comment: Per [facility])  Pharmacist: Completed (Comment: 9/24: Reviewed medications w Patricia Hernández at [facility]: resumed Allopurinol from prescription ref noted on 7/13 - gave verbal to pharm as they did not have RF approved from 7/13.)  Physical Therapy: Completed (Comment: 9/20: PT visit today. Reinforced upper body exercises daily!)  Zone Management Tools: In Process (Comment: 9/29: Pt reported vitals today: SpO2 95%, FBS 99, wgt 251 lb. Pt's wgt is up - he denies increased work of breathing, edema of BLE or abd, increased fatigue. Cont to monitor wgt and for additional sx.)         Goals Addressed                    This Visit's Progress      Patient Stated (pt-stated)   Improving      Pt wants to remain independent    Barriers: impairment:  physical: General weakness and balance problems, lack of support, and overwhelmed by complexity of regimen  Plan for overcoming my barriers:   Pt is willing to work with therapy to improve his strength and endurance  Pt is willing to utilize appropriate ambulatory aid devices to maximize safety  Pt willingness/ability to monitor specific vitals to manage his multiple co-morbidities  Confidence: 8/10  Anticipated Goal Completion Date: 7/24/21            Prior to Admission medications    Medication Sig Start Date End Date Taking?  Authorizing Provider   Multiple Vitamins-Minerals (THERAPEUTIC MULTIVITAMIN-MINERALS) tablet Take 1 tablet by mouth daily [employer] MV w other meds for daily intake   Yes Historical Provider, MD   acetaminophen (TYLENOL 8 HOUR) 650 MG extended release tablet Take 650 mg by mouth daily Packed by [facility] for daily noon dose   Yes Historical Provider, MD   lisinopril (PRINIVIL;ZESTRIL) 40 MG tablet Take 1 tablet by mouth daily 9/23/21  Yes ALISON Langford, DO   betamethasone dipropionate 0.05 % cream TOPICALLY APPLY TO APPROPRIATE AREA AS DIRECTED 2 TIMES DAILY FOR 10 DAYS 7/7/21  Yes Historical Provider, MD   Dulaglutide (TRULICITY) 9.84 DA/0.9TC SOPN Trulicity 8.59 HB/2.9 mL subcutaneous pen injector   Yes Historical Provider, MD   triamcinolone (ARISTOCORT) 0.5 % ointment triamcinolone acetonide 0.5 % topical ointment   Yes Historical Provider, MD   celecoxib (CELEBREX) 200 MG capsule Take 1 capsule by mouth daily 8/24/21  Yes ALISON Castro, DO   Misc. Devices (COMMODE) MISC Toilet seat riser.  Needs to be for 330lb + and long gaited 7/16/21  Yes ALISON Castro, DO   allopurinol (ZYLOPRIM) 100 MG tablet Take 1 tablet by mouth daily 7/13/21  Yes Eden Mom, APRN   furosemide (LASIX) 40 MG tablet Take 1.5 tablets by mouth daily 7/9/21  Yes Eden Mom, APRN   FLUoxetine (PROZAC) 20 MG capsule Take 1 capsule by mouth daily 7/7/21  Yes ALISON Castro, DO   metFORMIN (GLUCOPHAGE) 500 MG tablet Take 1 tablet by mouth 2 times daily (with meals) 6/29/21  Yes ALISON Castro, DO   Lift Chair MISC by Does not apply route 6/9/21  Yes ISAAC Jackson   Omega-3 Fatty Acids (FISH OIL) 1000 MG CAPS Take 1,000 mg by mouth 2 times daily   Yes Historical Provider, MD   aspirin 81 MG EC tablet Take 81 mg by mouth daily   Yes Historical Provider, MD   terazosin (HYTRIN) 5 MG capsule Take 5 mg by mouth nightly   Yes Historical Provider, MD   isosorbide mononitrate (IMDUR) 60 MG extended release tablet Take 0.5 tablets by mouth daily 5/4/21  Yes Tiffanie Polo PA-C   pantoprazole (PROTONIX) 40 MG tablet Take 1 tablet by mouth every morning (before breakfast) 4/14/21  Yes ALISON Castro, DO   atorvastatin (LIPITOR) 40 MG tablet Take 1 tablet by mouth nightly 3/30/21  Yes ALISON Castro DO   mirabegron (MYRBETRIQ) 25 MG TB24 Take 1 tablet by mouth daily 3/17/21  Yes ALISON Castro, DO   buPROPion (WELLBUTRIN XL) 150 MG extended release tablet Take 2 tablets by mouth every morning 2/25/21  Yes ALISON Castro, DO   Vedia Parsons 100-62.5-25 MCG/INH AEPB INHALE 1 PUFF ONCE DAILY 2/17/21  Yes Arnol Shipman Daniele Weaver,    carvedilol (COREG) 12.5 MG tablet Take 1 tablet by mouth 2 times daily (with meals) 1/4/21  Yes ALISON Conde DO   enzalutamide Cleatis Romberg) 40 MG capsule Take 4 tablets daily 12/29/20  Yes ISAAC Renteria   amLODIPine (NORVASC) 5 MG tablet Take 1 tablet by mouth daily 12/16/20  Yes ISAAC Briceño   blood glucose monitor strips Test one times a day & as needed for symptoms of irregular blood glucose. DX: E11.9 4/2/20  Yes ALISON Conde DO   albuterol sulfate HFA (PROAIR HFA) 108 (90 Base) MCG/ACT inhaler Inhale 2 puffs into the lungs every 6 hours as needed for Wheezing 12/4/19  Yes ISAAC Briceño   potassium chloride (KLOR-CON 10) 10 MEQ extended release tablet Take 1 tablet by mouth daily 6/19/19  Yes ISAAC Briceño   Lancets MISC 1 each by Does not apply route daily Accu Chek Guid3 Lancets 4/29/19  Yes ALISON Conde DO   OXYGEN Inhale 3 L into the lungs continuous    Yes Historical Provider, MD   BiPAP Machine MISC by Does not apply route nightly   Yes Historical Provider, MD   vitamin B-12 (CYANOCOBALAMIN) 1000 MCG tablet Take 1,000 mcg by mouth daily   Yes Historical Provider, MD   leuprolide (LUPRON) 30 MG injection Inject 30 mg into the muscle once Every 4 months   Yes Historical Provider, MD   Cholestyramine POWD 1 each by Does not apply route as needed  Patient not taking: Reported on 9/24/2021    ALISON Conde DO   nitroGLYCERIN (NITROSTAT) 0.4 MG SL tablet up to max of 3 total doses. If no relief after 1 dose, call 911.   Patient not taking: Reported on 9/29/2021 3/30/20   Butch Mcwilliams PA-C       Future Appointments   Date Time Provider Addie Doss   10/20/2021  3:00 PM DO Joyce Doyle [facility]   12/2/2021 11:00 AM Isacc Kathleen MD [facility]     ,   Diabetes Assessment    Medic Alert ID: No  Meal Planning: Avoidance of concentrated sweets   How often do you test your blood sugar?: No Testing (Comment: Agreeable to start daily FBG with help from [facility] RN)   Do you have barriers with adherence to non-pharmacologic self-management interventions?  (Nutrition/Exercise/Self-Monitoring): Yes   Have you ever had to go to the ED for symptoms of low blood sugar?: No       Do you have hyperglycemia symptoms?: No   Do you have hypoglycemia symptoms?: No   Last Blood Sugar Value: 99   Blood Sugar Monitoring Regimen: Morning Fasting   Blood Sugar Trends: No Change      ,   Congestive Heart Failure Assessment    Are you currently restricting fluids?: No Restriction  Do you understand a low sodium diet?: No (Comment: Discussed today with pt and SO)  Do you understand how to read food labels?: Yes  How many restaurant meals do you eat per week?: 0  Do you salt your food before tasting it?: No     Increase in weights (more than 3lbs overnight or 5lbs in one week)      Symptoms:  None: Yes      Symptom course: no change  Patient-reported weight (lb): 251  Weight trend: increasing steadily      and   COPD Assessment    Does the patient understand envrionmental exposure?: Yes  Is the patient able to verbalize Rescue vs. Long Acting medications?: Yes  Does the patient have a nebulizer?: No  Does the patient use a space with inhaled medications?: No            Symptoms:  None: Yes      Symptom course: no change  Increase use of rapid acting/rescue inhaled medications?: No  Change in chronic cough?: No/At Baseline  Change in sputum?: No/At Baseline  Self Monitoring - SaO2: Yes  Baseline SaO2 Readin  Have you had a recent diagnosis of pneumonia either by PCP or at a hospital?: Yes at Hospital  Have you seen your PCP for follow up or do you have an appointment scheduled?: Yes  Are you taking your antibiotics as prescribed?:  (Comment: Abx completed)

## 2021-09-30 ENCOUNTER — TELEPHONE (OUTPATIENT)
Dept: PRIMARY CARE CLINIC | Age: 86
End: 2021-09-30

## 2021-09-30 NOTE — TELEPHONE ENCOUNTER
Donny Moran with [facility] called, pt has met all goals and is being discharged today

## 2021-10-01 ENCOUNTER — CARE COORDINATION (OUTPATIENT)
Dept: CARE COORDINATION | Age: 86
End: 2021-10-01

## 2021-10-01 NOTE — CARE COORDINATION
Ambulatory Care Coordination Note  10/1/2021  CM Risk Score: 7  Charlson 10 Year Mortality Risk Score: 100%     ACC: Candyce Collet, RN    Summary Note: ACM spoke to pt today - his focus was wgt mgmt and medication today. Pt noted new pill in his pill pack from [facility] - pt concerned it is extra Lasix (in his night medications dose); stated it looks similar to his morning Lasix tablet. He noted that last night he got up about 5 times during night to urinate (uses a handheld urinal at night). Pt has a bottle of Celebrex (30 d of 200 mg daily). It is not being placed into his pill pack though he takes it daily and [facility] supplies it to him. Pt has been participating in PT through home care visits. He gave teach back that he is elevating his legs on pillows at night and is using his incentive spirometer every day. He is not short of breath, no chest pain. He wears his oxygen at 3L continuously. However, he is not wearing compression socks and does not have any. Pt reported his  has returned - her schedule will be Tu/We/Th from 10 - 3. Plan:  ACM reviewed patient's current pill pack with him, and he identified with assistance that his new night time tablet is Allopurinol (not Lasix). ACM explained that patient's increased night time urination is likely a combination of effects from his improved/increased physical activity, his recovery from COVID 19 pneumonia, his increased and daily use of incentive spirometer. Explained that improved heart and kidney function are working together to remove extra fluid and waste products - this is a bit aggravating at night. Suggested that patient start elevating his legs during the day, at least twice a day for 30-45 minutes. Advised this will assist with pulling off the extra fluid weight he has noted this week (Weight up from 248 to 251 lb). Pt's increased urination overnight did result in his weight today dropping to 250 lb.    ACM suggested that patient ask his  to obtain compression knee socks for him to wear during the day as well - this will help compress his veins and push fluid back toward his kidneys - advised that patient has become more physically active and all this will hopefully work together to improve the partnership between his heart, lungs and kidneys. Patient voiced understanding. ACM contacted [facility] - requested that Celebrex be placed into patient's pill pack and they will do so beginning next week at next refill of pack, on Wednesday. Congestive Heart Failure Assessment    Are you currently restricting fluids?: No Restriction  Do you understand a low sodium diet?: No (Comment: Discussed today with pt and SO)  Do you understand how to read food labels?: Yes  How many restaurant meals do you eat per week?: 0  Do you salt your food before tasting it?: No         Symptoms:  None: Yes      Symptom course: stable  Patient-reported weight (lb): 250  Weight trend: fluctuating minimally  Salt intake watch compared to last visit: stable           Care Coordination Interventions    Program Enrollment: Complex Care  Referral from Primary Care Provider: No  Suggested Interventions and Community Resources  Fall Risk Prevention: Completed (Comment: 9/17: No falls since April. Now using scooter outside apt.)  Home Care Waiver: Completed (Comment: Waiver  through [facility]. Fausto will resume 9/27/21)  Home Health Services: Completed  Medi Set or Pill Pack: Completed (Comment: Per Agustin Dinh)  Pharmacist: Completed (Comment: 10/1: spoke to [facility] re: verified Allopurinol added to pill pack. Requested Celebrex be added to pill pack - will start next Wed. )  Physical Therapy: Completed (Comment: 10/1: PT continuing - pt reports noticeable improvement)  Zone Management Tools: In Process (Comment: 10/1: Focus on CHF today r/t recent weight gain of 4 lbs: down from high of 251 to 250 today.  Pt noted increased urination overnight. Reviewed heart & kidney work to pull off fluid. Elev legs bid over next 3 days!)  Other Services or Interventions: 10/1: Continues to use his incentive spirometer at home. Goals Addressed                    This Visit's Progress      Patient Stated (pt-stated)   On track      Pt wants to remain independent    Barriers: impairment:  physical: General weakness and balance problems, lack of support, and overwhelmed by complexity of regimen  Plan for overcoming my barriers:   Pt is willing to work with therapy to improve his strength and endurance  Pt is willing to utilize appropriate ambulatory aid devices to maximize safety  Pt willingness/ability to monitor specific vitals to manage his multiple co-morbidities  Confidence: 8/10  Anticipated Goal Completion Date: 7/24/21            Prior to Admission medications    Medication Sig Start Date End Date Taking?  Authorizing Provider   Multiple Vitamins-Minerals (THERAPEUTIC MULTIVITAMIN-MINERALS) tablet Take 1 tablet by mouth daily [employer] MV w other meds for daily intake    Historical Provider, MD   acetaminophen (TYLENOL 8 HOUR) 650 MG extended release tablet Take 650 mg by mouth daily Packed by [facility] for daily noon dose    Historical Provider, MD   lisinopril (PRINIVIL;ZESTRIL) 40 MG tablet Take 1 tablet by mouth daily 9/23/21   B Norm Duet, DO   betamethasone dipropionate 0.05 % cream TOPICALLY APPLY TO APPROPRIATE AREA AS DIRECTED 2 TIMES DAILY FOR 10 DAYS 7/7/21   Historical Provider, MD   Dulaglutide (TRULICITY) 2.60 RM/8.8YG SOPN Trulicity 9.44 RK/5.7 mL subcutaneous pen injector    Historical Provider, MD   triamcinolone (ARISTOCORT) 0.5 % ointment triamcinolone acetonide 0.5 % topical ointment    Historical Provider, MD   Cholestyramine POWD 1 each by Does not apply route as needed  Patient not taking: Reported on 9/24/2021    B Norm Krystyna DO   celecoxib (CELEBREX) 200 MG capsule Take 1 capsule by mouth daily 8/24/21   B Yuliana Copper, DO   Misc. Devices (COMMODE) MISC Toilet seat riser.  Needs to be for 330lb + and long gaited 7/16/21   B Yuliana Copper, DO   allopurinol (ZYLOPRIM) 100 MG tablet Take 1 tablet by mouth daily 7/13/21   ISAAC Kwon   furosemide (LASIX) 40 MG tablet Take 1.5 tablets by mouth daily 7/9/21   ISAAC Kwon   FLUoxetine (PROZAC) 20 MG capsule Take 1 capsule by mouth daily 7/7/21   B Yuliana Copper, DO   metFORMIN (GLUCOPHAGE) 500 MG tablet Take 1 tablet by mouth 2 times daily (with meals) 6/29/21   B Yuliana Copper, DO   Lift Chair MISC by Does not apply route 6/9/21   ISAAC Traore   Omega-3 Fatty Acids (FISH OIL) 1000 MG CAPS Take 1,000 mg by mouth 2 times daily    Historical Provider, MD   aspirin 81 MG EC tablet Take 81 mg by mouth daily    Historical Provider, MD   terazosin (HYTRIN) 5 MG capsule Take 5 mg by mouth nightly    Historical Provider, MD   isosorbide mononitrate (IMDUR) 60 MG extended release tablet Take 0.5 tablets by mouth daily 5/4/21   Albert White PA-C   pantoprazole (PROTONIX) 40 MG tablet Take 1 tablet by mouth every morning (before breakfast) 4/14/21   B Yuliana Copper, DO   atorvastatin (LIPITOR) 40 MG tablet Take 1 tablet by mouth nightly 3/30/21   B Yuliana Copper, DO   mirabegron (MYRBETRIQ) 25 MG TB24 Take 1 tablet by mouth daily 3/17/21   B Yuliana Copper, DO   buPROPion (WELLBUTRIN XL) 150 MG extended release tablet Take 2 tablets by mouth every morning 2/25/21   B Yuliana Copper, DO   TRELEGY ELLIPTA 100-62.5-25 MCG/INH AEPB INHALE 1 PUFF ONCE DAILY 2/17/21   B Yuliana Copper, DO   carvedilol (COREG) 12.5 MG tablet Take 1 tablet by mouth 2 times daily (with meals) 1/4/21   B Yuliana Copper, DO   enzalutamide Jacqualyn Haider) 40 MG capsule Take 4 tablets daily 12/29/20   ISAAC Kwon   amLODIPine (NORVASC) 5 MG tablet Take 1 tablet by mouth daily 12/16/20   ISAAC Traore blood glucose monitor strips Test one times a day & as needed for symptoms of irregular blood glucose. DX: E11.9 4/2/20   ALISON Byrd DO   nitroGLYCERIN (NITROSTAT) 0.4 MG SL tablet up to max of 3 total doses. If no relief after 1 dose, call 911.   Patient not taking: Reported on 9/29/2021 3/30/20   Denys Wray PA-C   albuterol sulfate HFA (PROAIR HFA) 108 (90 Base) MCG/ACT inhaler Inhale 2 puffs into the lungs every 6 hours as needed for Wheezing 12/4/19   ISAAC Louis   potassium chloride (KLOR-CON 10) 10 MEQ extended release tablet Take 1 tablet by mouth daily 6/19/19   ISAAC Louis   Lancets MISC 1 each by Does not apply route daily Accu Chek Guid3 Lancets 4/29/19   ALISON Byrd DO   OXYGEN Inhale 3 L into the lungs continuous     Historical Provider, MD   BiPAP Machine MISC by Does not apply route nightly    Historical Provider, MD   vitamin B-12 (CYANOCOBALAMIN) 1000 MCG tablet Take 1,000 mcg by mouth daily    Historical Provider, MD   leuprolide (LUPRON) 30 MG injection Inject 30 mg into the muscle once Every 4 months    Historical Provider, MD       Future Appointments   Date Time Provider Addie Doss   10/20/2021  3[facility]   12/2/2021 11:00 AM Angelica Coburn MD [facility] [place]

## 2021-10-05 ENCOUNTER — TELEPHONE (OUTPATIENT)
Dept: PODIATRY | Facility: CLINIC | Age: 86
End: 2021-10-05

## 2021-10-05 ENCOUNTER — TELEPHONE (OUTPATIENT)
Dept: PRIMARY CARE CLINIC | Age: 86
End: 2021-10-05

## 2021-10-05 NOTE — TELEPHONE ENCOUNTER
Joleen PT with [facility] called, he has met all goals and will be discharged from [facility] today

## 2021-10-08 PROBLEM — E86.0 DEHYDRATION: Status: RESOLVED | Noted: 2021-09-08 | Resolved: 2021-10-08

## 2021-10-11 ENCOUNTER — CARE COORDINATION (OUTPATIENT)
Dept: CARE COORDINATION | Age: 86
End: 2021-10-11

## 2021-10-11 ASSESSMENT — ENCOUNTER SYMPTOMS: DYSPNEA ASSOCIATED WITH: EXERTION

## 2021-10-11 NOTE — CARE COORDINATION
Ambulatory Care Coordination Note  10/11/2021  CM Risk Score: 7  Charlson 10 Year Mortality Risk Score: 100%     ACC: Josefina King, RN    Summary Note: ACM spoke to patient today about his multiple chronic diagnoses and his self-monitoring. Pt reported he checks/logs daily wgt, BP, FBS and SpO2 results. His BP has been more elevated over the weekend. Last week ranged 116/82, 120/66, 116/75. Saturday = 130/90;   Sunday = 157/92. Wgt is improved to 245.4 lb and pt reported he is wearing compression socks, denied BLE edema. He keeps to low sodium diet and does not add salt to his food. He denied any recent increase in salt intake or high salt foods. He is doing well with his mobility and strength - no falls. Pt believes his elevated BP over the weekend is related to phone calls with his close friend, Hai Vogt. They talk twice a day. She had a storage building at his old property with her personal things stored there. When they left the property, her building and stored items were still there. Patient's children sold the property and Hai Vogt was given 90 days to remove her property but did not take action. Al said he urged her several times to take action but she delayed and has now lost her things. Hai Vogt has been upset and this has upset Al as well. He also relayed that one of his usual table mates at [facility] fell after dinner on Friday evening and is in the hospital. This upset him as well. He has been discharged from [facility] for goals met and is moving about very well now. Pt feels stronger and back to his baseline. He wears his oxygen daily and SpO2 today was 97%. He has a  on Tu/We/Th - would like to have someone every day but has not found another person yet. His current  has called someone to ask for extra help on M/F. He is concerned about the food at his long-term complex. He stated several other residents are also concerned that it is poor quality and often reheated food.   Plan:  ZITA COPD Assessment    Does the patient understand envrionmental exposure?: Yes  Is the patient able to verbalize Rescue vs. Long Acting medications?: Yes  Does the patient have a nebulizer?: No  Does the patient use a space with inhaled medications?: No            Symptoms:  None: Yes      Symptom course: stable  Breathlessness: exertion  Increase use of rapid acting/rescue inhaled medications?: No  Change in chronic cough?: No/At Baseline  Change in sputum?: No/At Baseline  Sputum characteristics:  (Comment: none)  Self Monitoring - SaO2: Yes  Baseline SaO2 Readin (Comment: with oxygen)  Have you had a recent diagnosis of pneumonia either by PCP or at a hospital?: Yes at Hospital  Have you seen your PCP for follow up or do you have an appointment scheduled?: Yes  Are you taking your antibiotics as prescribed?: Yes  Symptom Course: stable           Care Coordination Interventions    Program Enrollment: Complex Care  Referral from Primary Care Provider: No  Suggested Interventions and Community Resources  Fall Risk Prevention: Completed (Comment: No falls since April. Will us a scooter outside apt. Marvin Ganja use when in apt)  Home Care Waiver: Completed (Comment: Waiver  through [facility]. //. Pt would like M-F)  Medi Set or Pill [facility]: Com[facility] ([facility]  Pharmacist:  (Comment:  )  Physical Therapy: Completed (Comment: Pt just discharged due to goals met 10/2021)  Zone Management Tools: In Process (Comment: 10/11: Wgt 246.4 today. SpO2 97% w oxygen. FBS today 105. DC from [facility] services, encouraged pt to continue with his stretch band exercises for upper and lower body. Pt is at good baseline status presently.  Good self-monitoring.)         Goals Addressed                    This Visit's Progress      Patient Stated (pt-stated)   On track      Pt wants to remain independent    Barriers: impairment:  physical: General weakness and balance problems, lack of support, and overwhelmed by complexity of regimen  Plan for overcoming my barriers:   Pt is willing to work with therapy to improve his strength and endurance  Pt is willing to utilize appropriate ambulatory aid devices to maximize safety  Pt willingness/ability to monitor specific vitals to manage his multiple co-morbidities  Confidence: 8/10  Anticipated Goal Completion Date: 7/24/21(goal date extended to 10/31/21 - due to COVID 19 infection w pneumonia; pt recovering well)            Prior to Admission medications    Medication Sig Start Date End Date Taking? Authorizing Provider   Multiple Vitamins-Minerals (THERAPEUTIC MULTIVITAMIN-MINERALS) tablet Take 1 tablet by mouth daily [employer] MV w other meds for daily intake   Yes Historical Provider, MD   acetaminophen (TYLENOL 8 HOUR) 650 MG extended release tablet Take 650 mg by mouth daily Packed by [facility] for daily noon dose   Yes Historical Provider, MD   lisinopril (PRINIVIL;ZESTRIL) 40 MG tablet Take 1 tablet by mouth daily 9/23/21  Yes ALISON Byrd DO   betamethasone dipropionate 0.05 % cream TOPICALLY APPLY TO APPROPRIATE AREA AS DIRECTED 2 TIMES DAILY FOR 10 DAYS 7/7/21  Yes Historical Provider, MD   Dulaglutide (TRULICITY) 9.15 TG/9.3QT SOPN Trulicity 6.76 QU/9.6 mL subcutaneous pen injector   Yes Historical Provider, MD   triamcinolone (ARISTOCORT) 0.5 % ointment triamcinolone acetonide 0.5 % topical ointment   Yes Historical Provider, MD   Cholestyramine POWD 1 each by Does not apply route as needed    Yes ALISON Byrd DO   celecoxib (CELEBREX) 200 MG capsule Take 1 capsule by mouth daily 8/24/21  Yes ALISON Byrd DO   Misc. Devices (COMMODE) MISC Toilet seat riser.  Needs to be for 330lb + and long gaited 7/16/21  Yes ALISON Byrd DO   allopurinol (ZYLOPRIM) 100 MG tablet Take 1 tablet by mouth daily 7/13/21  Yes ISAAC Madrid   furosemide (LASIX) 40 MG tablet Take 1.5 tablets by mouth daily 7/9/21  Yes ISAAC Pham   FLUoxetine (PROZAC) 20 MG capsule Take 1 capsule by mouth daily 7/7/21  Yes ALISON Arnold DO   metFORMIN (GLUCOPHAGE) 500 MG tablet Take 1 tablet by mouth 2 times daily (with meals) 6/29/21  Yes ALISON Arnold DO   Lift Chair MISC by Does not apply route 6/9/21  Yes ISAAC Pastrana   Omega-3 Fatty Acids (FISH OIL) 1000 MG CAPS Take 1,000 mg by mouth 2 times daily   Yes Historical Provider, MD   aspirin 81 MG EC tablet Take 81 mg by mouth daily   Yes Historical Provider, MD   terazosin (HYTRIN) 5 MG capsule Take 5 mg by mouth nightly   Yes Historical Provider, MD   isosorbide mononitrate (IMDUR) 60 MG extended release tablet Take 0.5 tablets by mouth daily 5/4/21  Yes Mariel Yañez PA-C   pantoprazole (PROTONIX) 40 MG tablet Take 1 tablet by mouth every morning (before breakfast) 4/14/21  Yes ALISON Arnold DO   atorvastatin (LIPITOR) 40 MG tablet Take 1 tablet by mouth nightly 3/30/21  Yes ALISON Arnold DO   mirabegron (MYRBETRIQ) 25 MG TB24 Take 1 tablet by mouth daily 3/17/21  Yes ALISON Arnold DO   buPROPion (WELLBUTRIN XL) 150 MG extended release tablet Take 2 tablets by mouth every morning 2/25/21  Yes ALISON Arnold DO   TRELEGY ELLIPTA 100-62.5-25 MCG/INH AEPB INHALE 1 PUFF ONCE DAILY 2/17/21  Yes ALISON Arnold DO   carvedilol (COREG) 12.5 MG tablet Take 1 tablet by mouth 2 times daily (with meals) 1/4/21  Yes ALISON Arnold DO   enzalutamide Yoselin Pott) 40 MG capsule Take 4 tablets daily 12/29/20  Yes ISAAC Pham   amLODIPine (NORVASC) 5 MG tablet Take 1 tablet by mouth daily 12/16/20  Yes ISAAC Pastrana   blood glucose monitor strips Test one times a day & as needed for symptoms of irregular blood glucose. DX: E11.9 4/2/20  Yes B Henretta Clayton, DO   nitroGLYCERIN (NITROSTAT) 0.4 MG SL tablet up to max of 3 total doses.  If no relief after 1 dose, call 911. 3/30/20  Yes Garrett Dillard

## 2021-10-20 ENCOUNTER — VIRTUAL VISIT (OUTPATIENT)
Dept: PRIMARY CARE CLINIC | Age: 86
End: 2021-10-20
Payer: MEDICARE

## 2021-10-20 DIAGNOSIS — I50.42 CHRONIC COMBINED SYSTOLIC AND DIASTOLIC CONGESTIVE HEART FAILURE (HCC): ICD-10-CM

## 2021-10-20 DIAGNOSIS — E11.42 TYPE 2 DIABETES MELLITUS WITH DIABETIC POLYNEUROPATHY, WITHOUT LONG-TERM CURRENT USE OF INSULIN (HCC): ICD-10-CM

## 2021-10-20 DIAGNOSIS — F33.1 MODERATE EPISODE OF RECURRENT MAJOR DEPRESSIVE DISORDER (HCC): Primary | ICD-10-CM

## 2021-10-20 DIAGNOSIS — I48.20 CHRONIC ATRIAL FIBRILLATION (HCC): ICD-10-CM

## 2021-10-20 DIAGNOSIS — G47.33 OSA (OBSTRUCTIVE SLEEP APNEA): ICD-10-CM

## 2021-10-20 DIAGNOSIS — I10 ESSENTIAL HYPERTENSION: ICD-10-CM

## 2021-10-20 DIAGNOSIS — E78.2 MIXED HYPERLIPIDEMIA: ICD-10-CM

## 2021-10-20 PROCEDURE — 99443 PR PHYS/QHP TELEPHONE EVALUATION 21-30 MIN: CPT | Performed by: PEDIATRICS

## 2021-10-20 RX ORDER — FLUOXETINE HYDROCHLORIDE 20 MG/1
40 CAPSULE ORAL DAILY
Qty: 90 CAPSULE | Refills: 3 | Status: SHIPPED | OUTPATIENT
Start: 2021-10-20 | End: 2021-10-20 | Stop reason: SDUPTHER

## 2021-10-20 RX ORDER — FLUOXETINE HYDROCHLORIDE 40 MG/1
40 CAPSULE ORAL DAILY
Qty: 90 CAPSULE | Refills: 3 | Status: SHIPPED | OUTPATIENT
Start: 2021-10-20

## 2021-10-20 ASSESSMENT — ENCOUNTER SYMPTOMS
COUGH: 0
CHEST TIGHTNESS: 0
VOMITING: 0
BACK PAIN: 0
ABDOMINAL PAIN: 0
SHORTNESS OF BREATH: 1
DIARRHEA: 0
SORE THROAT: 0
NAUSEA: 0
WHEEZING: 0

## 2021-10-20 NOTE — PATIENT INSTRUCTIONS
Patient Education        Recovering From Depression: Care Instructions  Your Care Instructions     Taking good care of yourself is important as you recover from depression. In time, your symptoms will fade as your treatment takes hold. Do not give up. Instead, focus your energy on getting better. Your mood will improve. It just takes some time. Focus on things that can help you feel better, such as being with friends and family, eating well, and getting enough rest. But take things slowly. Do not do too much too soon. You will begin to feel better gradually. Follow-up care is a key part of your treatment and safety. Be sure to make and go to all appointments, and call your doctor if you are having problems. It's also a good idea to know your test results and keep a list of the medicines you take. How can you care for yourself at home? Be realistic  · If you have a large task to do, break it up into smaller steps you can handle, and just do what you can. · You may want to put off important decisions until your depression has lifted. If you have plans that will have a major impact on your life, such as marriage, divorce, or a job change, try to wait a bit. Talk it over with friends and loved ones who can help you look at the overall picture first.  · Reaching out to people for help is important. Do not isolate yourself. Let your family and friends help you. Find someone you can trust and confide in, and talk to that person. · Be patient, and be kind to yourself. Remember that depression is not your fault and is not something you can overcome with willpower alone. Treatment is important for depression, just like for any other illness. Feeling better takes time, and your mood will improve little by little. Stay active  · Stay busy and get outside. Take a walk, or try some other light exercise. · Talk with your doctor about an exercise program. Exercise can help with mild depression.   · Go to a movie or concert. Take part in a [employer] activity or other social gathering. Go to a [employer] game. · Ask a friend to have dinner with you. Take care of yourself  · Eat a balanced diet with plenty of fresh fruits and vegetables, whole grains, and lean protein. If you have lost your appetite, eat small snacks rather than large meals. · Avoid using illegal drugs or marijuana and drinking alcohol. Do not take medicines that have not been prescribed for you. They may interfere with medicines you may be taking for depression, or they may make your depression worse. · Take your medicines exactly as they are prescribed. You may start to feel better within 1 to 3 weeks of taking antidepressant medicine. But it can take as many as 6 to 8 weeks to see more improvement. If you have questions or concerns about your medicines, or if you do not notice any improvement by 3 weeks, talk to your doctor. · Continue to take your medicine after your symptoms improve. Taking your medicine for at least 6 months after you feel better can help keep you from getting depressed again. If this isn't the first time you have been depressed, your doctor may recommend you to take medicine even longer. · If you have any side effects from your medicine, tell your doctor. Many side effects are mild and will go away on their own after you have been taking the medicine for a few weeks. Some may last longer. Talk to your doctor if side effects are bothering you too much. You might be able to try a different medicine. · Continue counseling. It may help prevent depression from returning, especially if you've had multiple episodes of depression. Talk with your counselor if you are having a hard time attending your sessions or you think the sessions aren't working. Don't just stop going. · Get enough sleep. Talk to your doctor if you are having problems sleeping. · Avoid sleeping pills unless they are prescribed by the doctor treating your depression.  Sleeping pills may make you groggy during the day, and they may interact with other medicine you are taking. · If you have any other illnesses, such as diabetes, heart disease, or high blood pressure, make sure to continue with your treatment. Tell your doctor about all of the medicines you take, including those with or without a prescription. · If you or someone you know talks about suicide, self-harm, or feeling hopeless, get help right away. Call the [facility] at 1-800-273-talk (4-270.210.6662) or text HOME to 580245 to access the Crisis Text Line. Consider saving these numbers in your phone. When should you call for help? Call 911 anytime you think you may need emergency care. For example, call if:    · You feel like hurting yourself or someone else.     · Someone you know has depression and is about to attempt or is attempting suicide. Call your doctor now or seek immediate medical care if:    · You hear voices.     · Someone you know has depression and:  ? Starts to give away his or her possessions. ? Uses illegal drugs or drinks alcohol heavily. ? Talks or writes about death, including writing suicide notes or talking about guns, knives, or pills. ? Starts to spend a lot of time alone. ? Acts very aggressively or suddenly appears calm. Watch closely for changes in your health, and be sure to contact your doctor if:    · You do not get better as expected. Where can you learn more? Go to https://[employer]. org and sign in to your [employer] account. Enter W797 in the [facility] box to learn more about \"Recovering From Depression: Care Instructions. \"     If you do not have an account, please click on the \"Sign Up Now\" link. Current as of: June 16, 2021               Content Version: 13.0  © 9863-5193 Healthwise, Incorporated. Care instructions adapted under license by [facility] ([facility]).  If you have questions about a medical condition or this people have dry mouth, constipation, headaches, sexual problems, an upset stomach, or diarrhea. Many of these side effects are mild and go away on their own after you take the medicine for a few weeks. Some may last longer. Talk to your doctor if side effects bother you too much. You might be able to try a different medicine. If you are pregnant or breastfeeding, talk to your doctor about what medicines you can take. Learn about counseling  In many cases, counseling can work as well as medicines to treat mild to moderate depression. Counseling is done by licensed mental health providers, such as psychologists, social workers, and some types of nurses. It can be done in one-on-one sessions or in a group setting. Many people find group sessions helpful. Cognitive-behavioral therapy is a type of counseling. In this treatment, you learn how to see and change unhelpful thinking styles that may be adding to your depression. Counseling and medicines often work well when used together. When should you call for help? Call 911 anytime you think you may need emergency care. For example, call if:    · You feel you cannot stop from hurting yourself or someone else. Call your doctor now or seek immediate medical care if:    · You hear voices.     · You feel much more depressed. Watch closely for changes in your health, and be sure to contact your doctor if:    · You are having problems with your depression medicine.     · You are not getting better as expected. Where can you learn more? Go to https://Nipendopeneto[employer]armani.Tymphany. org and sign in to your Medicine in Practice account. Enter R672 in the [employer] box to learn more about \"Depression Treatment: Care Instructions. \"     If you do not have an account, please click on the \"Sign Up Now\" link. Current as of: June 16, 2021               Content Version: 13.0  © 6707-2354 Healthwise, Incorporated. Care instructions adapted under license by [facility] ([facility]).  If you have questions about a medical condition or this instruction, always ask your healthcare professional. [place] [address] any warranty or liability for your use of this information.

## 2021-10-20 NOTE — PROGRESS NOTES
Lauren [address] [place], [address]  Phone [phone]   Fax [phone]      OFFICE VISIT: 10/20/2021    Rosemary Blank-: 1935      HPI  Reason For Visit:  Tayo Hanson is a 81 y.o.     1 Month Follow-Up (feeling much better, he is having no issues breathing. Followed up with Dr. Christine Maradiaga yesterday and recieved new bipap. )    Patient presents on follow-up for multiple health issues. Present concerns:  Recent Covid-19 infection:  He states that he is now feeling much better. He is no longer having any difficulty breathing. Diabetes mellitus type 2. Medications are unchanged  Blood sugars continue to be well controlled. He did have a hemoglobin A1c performed while in the hospital on 2021 and this was 5.6      Hypertension  He is on the same medication regimen as before. Blood pressures are well controlled. He is monitoring blood pressure at home. This has been very well controlled as well. Hyperlipidemia: This is been excellently controlled on present medication regimen. These have been excellently controlled as of 2021      CAD:  This has been stable on present medication regimen. Depression and Anxiety:  He would like to increase his fluoxetine. We discussed increasing this to 40mg daily. vitals were not taken for this visit. There is no height or weight on file to calculate BMI. I have reviewed the following with the Mr. Lyndsay Melo   Lab Review  No results displayed because visit has over 200 results.       Admission on 2021, Discharged on 2021   Component Date Value    SARS-CoV-2, NAAT 2021 DETECTED*    Sodium 2021 138     Potassium 2021 4.9     Chloride 2021 98     CO2 2021 29     Anion Gap 2021 11     Glucose 2021 115*    BUN 2021 24*    CREATININE 2021 1.1     GFR Non- 2021 >60     GFR  2021 >59     Calcium 08/25/2021 9.8     Total Protein 08/25/2021 7.1     Albumin 08/25/2021 4.2     Total Bilirubin 08/25/2021 0.5     Alkaline Phosphatase 08/25/2021 57     ALT 08/25/2021 8     AST 08/25/2021 17     WBC 08/25/2021 7.6     RBC 08/25/2021 3.59*    Hemoglobin 08/25/2021 10.9*    Hematocrit 08/25/2021 35.3*    MCV 08/25/2021 98.3*    MCH 08/25/2021 30.4     MCHC 08/25/2021 30.9*    RDW 08/25/2021 14.6*    Platelets 83/60/5104 139     MPV 08/25/2021 10.3     Neutrophils % 08/25/2021 70.6*    Lymphocytes % 08/25/2021 4.8*    Monocytes % 08/25/2021 13.5*    Eosinophils % 08/25/2021 2.2     Basophils % 08/25/2021 1.0     Neutrophils Absolute 08/25/2021 5.4     Immature Granulocytes # 08/25/2021 0.6     Lymphocytes Absolute 08/25/2021 0.4*    Monocytes Absolute 08/25/2021 1.00*    Eosinophils Absolute 08/25/2021 0.20     Basophils Absolute 08/25/2021 0.10    Orders Only on 06/10/2021   Component Date Value    PSA 06/10/2021 0.01     TSH 06/10/2021 2.440     T4 Free 06/10/2021 1.14     Microalbumin, Random Uri* 06/10/2021 6.10     Creatinine, Ur 06/10/2021 86.1     Microalbumin Creatinine * 06/10/2021 70.8     Cholesterol, Total 06/10/2021 106*    Triglycerides 06/10/2021 91     HDL 06/10/2021 55     LDL Calculated 06/10/2021 33     Hemoglobin A1C 06/10/2021 4.8     Sodium 06/10/2021 137     Potassium 06/10/2021 4.9     Chloride 06/10/2021 99     CO2 06/10/2021 29     Anion Gap 06/10/2021 9     Glucose 06/10/2021 105     BUN 06/10/2021 22     CREATININE 06/10/2021 1.1     GFR Non- 06/10/2021 >60     GFR  06/10/2021 >59     Calcium 06/10/2021 9.7     Total Protein 06/10/2021 6.9     Albumin 06/10/2021 4.1     Total Bilirubin 06/10/2021 0.5     Alkaline Phosphatase 06/10/2021 56     ALT 06/10/2021 5     AST 06/10/2021 9     WBC 06/10/2021 12.4*    RBC 06/10/2021 3.38*    Hemoglobin 06/10/2021 10.9*    Hematocrit 06/10/2021 35.1*    MCV 06/10/2021 103.8*    MCH 06/10/2021 32.2*    MCHC 06/10/2021 31.1*    RDW 06/10/2021 13.7     Platelets 99/33/2510 223     MPV 06/10/2021 10.3     Neutrophils % 06/10/2021 76.0*    Lymphocytes % 06/10/2021 8.3*    Monocytes % 06/10/2021 7.4     Eosinophils % 06/10/2021 3.1     Basophils % 06/10/2021 0.8     Neutrophils Absolute 06/10/2021 9.4*    Immature Granulocytes # 06/10/2021 0.6     Lymphocytes Absolute 06/10/2021 1.0*    Monocytes Absolute 06/10/2021 0.90     Eosinophils Absolute 06/10/2021 0.40     Basophils Absolute 06/10/2021 0.10    Orders Only on 05/26/2021   Component Date Value    Pro-BNP 05/26/2021 1,660     Sodium 05/26/2021 138     Potassium 05/26/2021 4.8     Chloride 05/26/2021 100     CO2 05/26/2021 26     Anion Gap 05/26/2021 12     Glucose 05/26/2021 116*    BUN 05/26/2021 22     CREATININE 05/26/2021 1.1     GFR Non- 05/26/2021 >60     GFR  05/26/2021 >59     Calcium 05/26/2021 9.4     WBC 05/26/2021 8.6     RBC 05/26/2021 2.89*    Hemoglobin 05/26/2021 9.8*    Hematocrit 05/26/2021 30.4*    MCV 05/26/2021 105.2*    MCH 05/26/2021 33.9*    MCHC 05/26/2021 32.2*    RDW 05/26/2021 14.0     Platelets 07/16/4656 187     MPV 05/26/2021 10.0     Neutrophils % 05/26/2021 74.5*    Lymphocytes % 05/26/2021 8.7*    Monocytes % 05/26/2021 8.1     Eosinophils % 05/26/2021 2.2     Basophils % 05/26/2021 0.7     Neutrophils Absolute 05/26/2021 6.4     Immature Granulocytes # 05/26/2021 0.5     Lymphocytes Absolute 05/26/2021 0.8*    Monocytes Absolute 05/26/2021 0.70     Eosinophils Absolute 05/26/2021 0.20     Basophils Absolute 05/26/2021 0.10    No results displayed because visit has over 200 results.       Admission on 04/26/2021, Discharged on 04/26/2021   Component Date Value    WBC 04/26/2021 18.3*    RBC 04/26/2021 2.92*    Hemoglobin 04/26/2021 10.1*    Hematocrit 04/26/2021 30.8*    [facility] 04/26/2021 34.6*    MCHC 04/26/2021 32.8*    RDW 04/26/2021 13.2     Platelets 50/52/0197 193     MPV 04/26/2021 9.8     Neutrophils % 04/26/2021 78.9*    Lymphocytes % 04/26/2021 4.2*    Monocytes % 04/26/2021 8.3     Eosinophils % 04/26/2021 1.4     Basophils % 04/26/2021 0.7     Neutrophils Absolute 04/26/2021 14.4*    Immature Granulocytes # 04/26/2021 1.2     Lymphocytes Absolute 04/26/2021 0.8*    Monocytes Absolute 04/26/2021 1.50*    Eosinophils Absolute 04/26/2021 0.30     Basophils Absolute 04/26/2021 0.10     Sodium 04/26/2021 138     Potassium reflex Magnesi* 04/26/2021 5.5*    Chloride 04/26/2021 103     CO2 04/26/2021 25     Anion Gap 04/26/2021 10     Glucose 04/26/2021 146*    BUN 04/26/2021 41*    CREATININE 04/26/2021 1.5*    GFR Non- 04/26/2021 44*    GFR  04/26/2021 54*    Calcium 04/26/2021 9.3     Total Protein 04/26/2021 6.8     Albumin 04/26/2021 4.2     Total Bilirubin 04/26/2021 0.3     Alkaline Phosphatase 04/26/2021 40     ALT 04/26/2021 7     AST 04/26/2021 11     QRS Duration 04/26/2021 152     Q-T Interval 04/26/2021 428     QTc Calculation (Bazett) 04/26/2021 429     T Axis 04/26/2021 undef     Troponin 04/26/2021 <0.01     Protime 04/26/2021 14.8*    INR 04/26/2021 1.16     Color, UA 04/26/2021 YELLOW     Clarity, UA 04/26/2021 Clear     Glucose, Ur 04/26/2021 Negative     Bilirubin Urine 04/26/2021 Negative     Ketones, Urine 04/26/2021 TRACE*    Specific Gravity, UA 04/26/2021 1.017     Blood, Urine 04/26/2021 Negative     pH, UA 04/26/2021 5.0     Protein, UA 04/26/2021 Negative     Urobilinogen, Urine 04/26/2021 0.2     Nitrite, Urine 04/26/2021 Negative     Leukocyte Esterase, Urine 04/26/2021 Negative      Copies of these are in the chart.     Current Outpatient Medications   Medication Sig Dispense Refill    FLUoxetine (PROZAC) 40 MG capsule Take 1 capsule by mouth daily 90 capsule 3    Multiple Vitamins-Minerals (THERAPEUTIC MULTIVITAMIN-MINERALS) tablet Take 1 tablet by mouth daily [employer] MV w other meds for daily intake      acetaminophen (TYLENOL 8 HOUR) 650 MG extended release tablet Take 650 mg by mouth daily [facility] for daily noon dose      lisinopril (PRINIVIL;ZESTRIL) 40 MG tablet Take 1 tablet by mouth daily 90 tablet 3    betamethasone dipropionate 0.05 % cream TOPICALLY APPLY TO APPROPRIATE AREA AS DIRECTED 2 TIMES DAILY FOR 10 DAYS      Dulaglutide (TRULICITY) 2.99 DJ/3.3BU SOPN Trulicity 8.30 BS/8.0 mL subcutaneous pen injector      triamcinolone (ARISTOCORT) 0.5 % ointment triamcinolone acetonide 0.5 % topical ointment      Cholestyramine POWD 1 each by Does not apply route as needed       celecoxib (CELEBREX) 200 MG capsule Take 1 capsule by mouth daily 30 capsule 11    Misc. Devices (COMMODE) MISC Toilet seat riser.  Needs to be for 330lb + and long gaited 1 each 0    allopurinol (ZYLOPRIM) 100 MG tablet Take 1 tablet by mouth daily 30 tablet 5    furosemide (LASIX) 40 MG tablet Take 1.5 tablets by mouth daily 135 tablet 3    metFORMIN (GLUCOPHAGE) 500 MG tablet Take 1 tablet by mouth 2 times daily (with meals) 180 tablet 3    Lift Chair MISC by Does not apply route 1 each 0    Omega-3 Fatty Acids (FISH OIL) 1000 MG CAPS Take 1,000 mg by mouth 2 times daily      aspirin 81 MG EC tablet Take 81 mg by mouth daily      terazosin (HYTRIN) 5 MG capsule Take 5 mg by mouth nightly      isosorbide mononitrate (IMDUR) 60 MG extended release tablet Take 0.5 tablets by mouth daily 90 tablet 0    pantoprazole (PROTONIX) 40 MG tablet Take 1 tablet by mouth every morning (before breakfast) 30 tablet 5    atorvastatin (LIPITOR) 40 MG tablet Take 1 tablet by mouth nightly 90 tablet 3    mirabegron (MYRBETRIQ) 25 MG TB24 Take 1 tablet by mouth daily 90 tablet 3    buPROPion (WELLBUTRIN XL) 150 MG extended release tablet Take 2 tablets by mouth every morning 180 tablet 3    TRELEGY ELLIPTA 100-62.5-25 MCG/INH AEPB INHALE 1 PUFF ONCE DAILY 1 each 11    carvedilol (COREG) 12.5 MG tablet Take 1 tablet by mouth 2 times daily (with meals) 180 tablet 3    enzalutamide (XTANDI) 40 MG capsule Take 4 tablets daily 360 capsule 1    amLODIPine (NORVASC) 5 MG tablet Take 1 tablet by mouth daily 90 tablet 3    blood glucose monitor strips Test one times a day & as needed for symptoms of irregular blood glucose. DX: E11.9 100 strip 3    nitroGLYCERIN (NITROSTAT) 0.4 MG SL tablet up to max of 3 total doses. If no relief after 1 dose, call 911. 25 tablet 0    albuterol sulfate HFA (PROAIR HFA) 108 (90 Base) MCG/ACT inhaler Inhale 2 puffs into the lungs every 6 hours as needed for Wheezing 8.5 Inhaler 5    potassium chloride (KLOR-CON 10) 10 MEQ extended release tablet Take 1 tablet by mouth daily 90 tablet 3    Lancets MISC 1 each by Does not apply route daily Accu Chek Guid3 Lancets 100 each 5    OXYGEN Inhale 3 L into the lungs continuous       BiPAP Machine MISC by Does not apply route nightly      vitamin B-12 (CYANOCOBALAMIN) 1000 MCG tablet Take 1,000 mcg by mouth daily      leuprolide (LUPRON) 30 MG injection Inject 30 mg into the muscle once Every 4 months       No current facility-administered medications for this visit. Allergies: Patient has no known allergies.      Past Medical History:   Diagnosis Date    Anxiety     Arthritis     Atrial fibrillation ([address]     Blood circulation, collateral     Cancer (HCC)     prostate, had treatment    Cellulitis     left leg    CHF (congestive heart failure) (HCC)     Chronic kidney disease     COPD (chronic obstructive pulmonary disease) (HCC)     Depression     Hyperlipidemia     Hypertension     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 ([address] 3/28/2020    Lung mass     Neuromuscular disorder (HCC)     Neuropathy     Other disorders of kidney and ureter in diseases classified elsewhere     Palliative care patient 11/15/2018    Pneumonia     Type II or unspecified type diabetes mellitus without mention of complication, not stated as uncontrolled        Family History   Problem Relation Age of Onset    Heart Attack Mother     Cancer Father        Past Surgical History:   Procedure Laterality Date    COLONOSCOPY      EYE SURGERY      EYE SURGERY      HERNIA REPAIR      umbilical with Dr Richerd Scheuermann         Social History     Tobacco Use    Smoking status: Never Smoker    Smokeless tobacco: Never Used   Substance Use Topics    Alcohol use: No        Review of Systems   Constitutional: Positive for fatigue. Negative for chills and fever. HENT: Negative for congestion, ear pain and sore throat. Eyes: Negative for visual disturbance. Respiratory: Positive for shortness of breath (baseline). Negative for cough, chest tightness and wheezing. Cardiovascular: Negative for chest pain, palpitations and leg swelling. Gastrointestinal: Negative for abdominal pain, diarrhea, nausea and vomiting. Endocrine: Negative for polyuria. Genitourinary: Negative for frequency and urgency. Musculoskeletal: Positive for arthralgias (worse since stopping celebrex. ). Negative for back pain and neck pain. Skin: Negative for rash. Neurological: Negative for dizziness and headaches. Psychiatric/Behavioral: Positive for dysphoric mood (since his covid-19 infection. ). Negative for decreased concentration, self-injury, sleep disturbance (usually pretty good. ) and suicidal ideas. The patient is nervous/anxious (off and on, but not a problem right now. ). Physical Exam  Physical exam was not performed today as this was a telephone conference visit      ASSESSMENT      ICD-10-CM    1.  Moderate episode of recurrent major depressive disorder (HCC)  F33.1 FLUoxetine (PROZAC) 40 MG capsule     DISCONTINUED: FLUoxetine (PROZAC) 20 MG capsule   2. Type 2 diabetes mellitus with diabetic polyneuropathy, without long-term current use of insulin (HCC)  E11.42    3. Essential hypertension  I10    4. Mixed hyperlipidemia  E78.2    5. Chronic atrial fibrillation  I48.20    6. Chronic combined systolic and diastolic congestive heart failure (HCC)  I50.42    7. NEIDA (obstructive sleep apnea)  G47.33          PLAN    1. Moderate episode of recurrent major depressive disorder (HCC)  We are going to increase his fluoxetine from 20 mg daily to 40 mg daily  This will hopefully help with his depression  - FLUoxetine (PROZAC) 40 MG capsule; Take 1 capsule by mouth daily  Dispense: 90 capsule; Refill: 3    2. Type 2 diabetes mellitus with diabetic polyneuropathy, without long-term current use of insulin ([address]  Doing very well from diabetes standpoint. Continue the same regimen as before. 3. Essential hypertension  Present regimen is also very effective. Continue the same    4. Lipids mixed hyperlipidemia  Lipids were performed in the hospital.  Labs were reviewed today. Appropriate control is being maintained in this regard    5. Chronic atrial fibrillation  He has not experienced any atrial fibrillation in the recent past.  He is appropriately anticoagulated and rate controlled    6. Chronic combined systolic and diastolic congestive heart failure (HCC)  Compensated on present medication regimen    7. NEIDA (obstructive sleep apnea)  He is using his BiPAP regularly. He just received a new machine      No orders of the defined types were placed in this encounter. Return in about 3 months (around 1/20/2022) for 30. Due to patients co-morbidities and risk for potential exposure of COVID 19 pandemic. Patient was contacted and agreed to proceed with a telephone virtual visit. The risks and benefits of converting to a telephone virtual visit were discussed in light of the current infectious disease epidemic.   Patient also understood that insurance coverage and co-pays are up to their individual insurance plans. Provider performed history of present illness and review of systems. Diagnosis and treatment plan was discussed with patient. Pharmacy of choice was reviewed along with past medical history, medication allergies, and current medications. Education provided to patient or patient parents/guardian with current illness diagnosis as well as when to seek additional healthcare due to changing or for worsening symptoms. Patient voiced understanding. 30 minutes were spent on the phone with patient. Fabiola Solis, was evaluated through a synchronous (real-time) audio-video encounter. The patient (or guardian if applicable) is aware that this is a billable service. Verbal consent to proceed has been obtained within the past 12 months. The visit was conducted pursuant to the emergency declaration under the [address] waiver authority and the [employer] and [employer] General Act. Patient identification was verified, and a caregiver was present when appropriate. The patient was located in a state where the provider was credentialed to provide care. Total time spent for this encounter: 30m    --HEATH Arnold DO on 10/20/2021 at 6:21 PM    An electronic signature was used to authenticate this note.

## 2021-10-25 ENCOUNTER — TELEPHONE (OUTPATIENT)
Dept: PRIMARY CARE CLINIC | Age: 86
End: 2021-10-25

## 2021-10-25 NOTE — TELEPHONE ENCOUNTER
Pt called, he is trying to move and the place he is trying to move to requires the vaccine or an exception letter. He wants to know if you can get him an exception letter since he can not get the vaccine for 90 days.     He was covid pos on 9/7/21

## 2021-10-25 NOTE — LETTER
[address] [address]  Phone: [phone]  Fax: [phone]    HEATH Aponte,         October 26, 2021     Patient: Arnoldo Kaur   YOB: 1935           To Whom it May Concern:    Edelmira Zapata tested positive for COVID-19 on 9/7/21. It is advised that he wait until his 90 days are up to obtain the COVID 19 vaccine. He would be eligible after December 7th, 2021. If you have any questions or concerns, please don't hesitate to call. Sincerely,         HEATH Aponte, DO

## 2021-10-26 NOTE — TELEPHONE ENCOUNTER
I am okay with that.   I would strongly recommend that he get the vaccine once his 90 days post infusion is up

## 2021-10-27 ENCOUNTER — TELEPHONE (OUTPATIENT)
Dept: PRIMARY CARE CLINIC | Age: 86
End: 2021-10-27

## 2021-10-27 DIAGNOSIS — K21.9 GASTROESOPHAGEAL REFLUX DISEASE, UNSPECIFIED WHETHER ESOPHAGITIS PRESENT: ICD-10-CM

## 2021-10-28 RX ORDER — PANTOPRAZOLE SODIUM 40 MG/1
40 TABLET, DELAYED RELEASE ORAL DAILY
Qty: 30 TABLET | Refills: 5 | Status: SHIPPED | OUTPATIENT
Start: 2021-10-28 | End: 2021-12-16 | Stop reason: SDUPTHER

## 2021-10-28 RX ORDER — POTASSIUM CHLORIDE 750 MG/1
10 TABLET, EXTENDED RELEASE ORAL DAILY
Qty: 90 TABLET | Refills: 3 | Status: SHIPPED | OUTPATIENT
Start: 2021-10-28

## 2021-10-28 NOTE — TELEPHONE ENCOUNTER
Received fax from pharmacy requesting refill on pts medication(s). Pt was last seen in office on 10/20/2021  and has a follow up scheduled for Visit date not found. Will send request to  Dr. Mariaelena Prescott  for authorization.      Requested Prescriptions     Pending Prescriptions Disp Refills    potassium chloride (KLOR-CON M) 10 MEQ extended release tablet [Pharmacy Med Name: POTASSIUM CHLORIDE ER 10MEQ TABLET EXTENDED RELEASE] 90 tablet 3     Sig: Take 1 tablet by mouth daily    pantoprazole (PROTONIX) 40 MG tablet [Pharmacy Med Name: PANTOPRAZOLE SODIUM 40MG TABLET DELAYED RELEASE] 30 tablet 5     Sig: Take 1 tablet by mouth daily

## 2021-11-02 ENCOUNTER — CARE COORDINATION (OUTPATIENT)
Dept: CARE COORDINATION | Age: 86
End: 2021-11-02

## 2021-11-02 NOTE — CARE COORDINATION
Ambulatory Care Coordination Note  11/2/2021  CM Risk Score: 7  Charlson 10 Year Mortality Risk Score: 100%     ACC: Pasquale Mcgregor, RN    Summary Note: ACM spoke to Al today. He had virtual appt with his PCP recently and is doing generally well. Pt stated he is making a big move later this month. Pt is moving to [place] - very close to his daughter. 50 min away from his son. Pt reported he is overall doing well. He has been compliant to self-monitor at home, maintain healthy diet and exercise. He is taking his medications. His scale battery is dead -  will replace the battery for him today. Last wgt on Oct 27 = 248.8 lb. This morning his SpO2 at 96% on 3L/min. He relayed he gets SoB if he does anything active around his apartment. Pt is doing upper body exercises. Has a stretch band he uses. However, he continues to get short of air within 5-10 minutes of doing anything around his apartment. FBS today 101. BP = 135/89. yest 148/94. Pt purchased a new pair of pull on compression socks - they are too hard for him to put on. Al thinks they are too small. He tried rolling them down to the ankle to get them on but said they are too tight. He plans to ask his caregiver to try and get them on. Plan:  ACM reviewed patient's good self-management and medication compliance. Praised pt for efforts to self-monitor and to exercise. Discussed that pulmonary rehab is a program to help build stamina and endurance of his lungs, that this may be a way to improve his endurance and reduce dyspnea on exertion that he has noticed. He will postpone discussion of this until he is established with his new provider. Discussed alternative options for compression socks - zippered type are available to order online and pt is open to this. Stated he will consider doing this with AC next call. Pt will measure his calf and length of his leg to order appropriate size sock. Continue with upper body exercises.   Will mail information on socks to patient. Diabetes Assessment    Medic Alert ID: No  Meal Planning: Avoidance of concentrated sweets   How often do you test your blood sugar?: No Testing (Comment: Agreeable to start daily FBG with help from [place] RN)   Do you have barriers with adherence to non-pharmacologic self-management interventions? (Nutrition/Exercise/Self-Monitoring): Yes   Have you ever had to go to the ED for symptoms of low blood sugar?: No       No patient-reported symptoms      ,   Congestive Heart Failure Assessment    Are you currently restricting fluids?: No Restriction  Do you understand a low sodium diet?: Yes (Comment:  assists pt with grocery shopping)  Do you understand how to read food labels?: Yes  How many restaurant meals do you eat per week?: 0  Do you salt your food before tasting it?: No         Symptoms:  CHF associated dyspnea on exertion: Pos      Symptom course: no change  Patient-reported weight (lb): 248.8 (Comment: 10/27/21 - last wgt due to scale battery dead)  Salt intake watch compared to last visit: stable      and   COPD Assessment    Does the patient understand envrionmental exposure?: Yes  Is the patient able to verbalize Rescue vs. Long Acting medications?: Yes  Does the patient have a nebulizer?: No  Does the patient use a space with inhaled medications?: No     No patient-reported symptoms         Symptoms:              Care Coordination Interventions    Program Enrollment: Complex Care  Referral from Primary Care Provider: No  Suggested Interventions and Community Resources  Fall Risk Prevention: Completed (Comment: No falls since April. Will us a scooter outside apt. [place] beach use when in apt)  Home Care Waiver: Completed (Comment: Waiver  through [facility]. T/We/Th. )  Medi Set or Pill Pack: Completed (Comment: Per Agustin Dinh)  Pharmacist:  (Comment:  )  Zone Management Tools: In Process (Comment: 11/2/21: FBS today 101, /89.  Scale triamcinolone (ARISTOCORT) 0.5 % ointment triamcinolone acetonide 0.5 % topical ointment   Yes Historical Provider, MD   Cholestyramine POWD 1 each by Does not apply route as needed    Yes ALISON Gil DO   celecoxib (CELEBREX) 200 MG capsule Take 1 capsule by mouth daily 8/24/21  Yes ALISON Gil DO   Misc. Devices (COMMODE) MISC Toilet seat riser.  Needs to be for 330lb + and long gaited 7/16/21  Yes ALISON Gil DO   allopurinol (ZYLOPRIM) 100 MG tablet Take 1 tablet by mouth daily 7/13/21  Yes Norval Patient, APRN   furosemide (LASIX) 40 MG tablet Take 1.5 tablets by mouth daily 7/9/21  Yes Norval Patient, APRN   metFORMIN (GLUCOPHAGE) 500 MG tablet Take 1 tablet by mouth 2 times daily (with meals) 6/29/21  Yes ALISON Gil DO   [address] by Does not apply route 6/9/21  Yes Atlee Smaller, APRN   Omega-3 Fatty Acids (FISH OIL) 1000 MG CAPS Take 1,000 mg by mouth 2 times daily   Yes Historical Provider, MD   aspirin 81 MG EC tablet Take 81 mg by mouth daily   Yes Historical Provider, MD   terazosin (HYTRIN) 5 MG capsule Take 5 mg by mouth nightly   Yes Historical Provider, MD   isosorbide mononitrate (IMDUR) 60 MG extended release tablet Take 0.5 tablets by mouth daily 5/4/21  Yes Nan Friedman PA-C   atorvastatin (LIPITOR) 40 MG tablet Take 1 tablet by mouth nightly 3/30/21  Yes ALISON Gil DO   mirabegron (MYRBETRIQ) 25 MG TB24 Take 1 tablet by mouth daily 3/17/21  Yes ALISON Gil DO   buPROPion (WELLBUTRIN XL) 150 MG extended release tablet Take 2 tablets by mouth every morning 2/25/21  Yes ALISON Gil DO   TRELEGY ELLIPTA 100-62.5-25 MCG/INH AEPB INHALE 1 PUFF ONCE DAILY 2/17/21  Yes ALISON Gil DO   carvedilol (COREG) 12.5 MG tablet Take 1 tablet by mouth 2 times daily (with meals) 1/4/21  Yes B Latesha Gil DO   enzalutamide Vicente Che) 40 MG capsule Take 4 tablets daily 12/29/20  Yes Serina Patient, APRN amLODIPine (NORVASC) 5 MG tablet Take 1 tablet by mouth daily 12/16/20  Yes ISAAC Smith   blood glucose monitor strips Test one times a day & as needed for symptoms of irregular blood glucose. DX: E11.9 4/2/20  Yes B Dany Rounds, DO   albuterol sulfate HFA (PROAIR HFA) 108 (90 Base) MCG/ACT inhaler Inhale 2 puffs into the lungs every 6 hours as needed for Wheezing 12/4/19  Yes ISAAC Smith   potassium chloride (KLOR-CON 10) 10 MEQ extended release tablet Take 1 tablet by mouth daily 6/19/19  Yes ISAAC Smith   Lancets MISC 1 each by Does not apply route daily Accu Chek Guid3 Lancets 4/29/19  Yes B Dany Rounds, DO   OXYGEN Inhale 3 L into the lungs continuous    Yes Historical Provider, MD   BiPAP Machine MISC by Does not apply route nightly   Yes Historical Provider, MD   vitamin B-12 (CYANOCOBALAMIN) 1000 MCG tablet Take 1,000 mcg by mouth daily   Yes Historical Provider, MD   leuprolide (LUPRON) 30 MG injection Inject 30 mg into the muscle once Every 4 months   Yes Historical Provider, MD   nitroGLYCERIN (NITROSTAT) 0.4 MG SL tablet up to max of 3 total doses. If no relief after 1 dose, call 911.   Patient not taking: Reported on 11/2/2021 3/30/20   Latanya Gambino PA-C       Future Appointments   Date Time Provider Addie Doss   12/2/2021 11:00 AM Brittany López MD [facility]

## 2021-11-15 ENCOUNTER — CARE COORDINATION (OUTPATIENT)
Dept: CARE COORDINATION | Age: 86
End: 2021-11-15

## 2021-11-15 ASSESSMENT — ENCOUNTER SYMPTOMS: DYSPNEA ASSOCIATED WITH: EXERTION

## 2021-11-15 NOTE — CARE COORDINATION
Ambulatory Care Coordination Note  11/15/2021  CM Risk Score: 7  Charlson 10 Year Mortality Risk Score: 100%     ACC: Timoteo Junior RN    Summary Note: ACM spoke to Al today. He is doing well with DM mgmt and COPD sx are stable. Al's FBS today was 105, yesterday was 115. His BP has been elevated on home checks with readings:  11/11/21 = 160/87; 11/12/21 = 157/89; 11/13/21 = 140/92; 11/14/21 = 166/84; 11/15/21 = 165/85. Patient has not been weighing due to needing batteries for his home scale - pt stated he has asked his  several times to obtain new batteries. Pt believes his elevated blood pressure is related to stress/concern over his friend that he talks to every day - he is concerned she is not taking good care of herself and does not have good support. He is unable to do much beyond talking and supporting her on the phone but stated she is resistant to suggestions. Plan:  ACM suggested that patient consider any BP reading 150s - 160s as a warning he should do something to de-stress such as some chair exercises for 15-20 minutes. Encouraged him to make a point to do upper or lower body exercises when he sees this high of a number on his BP. Then re-check BP about 30 minutes after his chair exercise routine. Pt voiced understanding, stated he will try. Encouraged that patient use prayer for his friend as a means to support her to also ease his own stress about her situation. Pt stated he will try. Diabetes Assessment    Medic Alert ID: No  Meal Planning: Avoidance of concentrated sweets   How often do you test your blood sugar?: No Testing (Comment: Agreeable to start daily FBG with help from [facility] RN)   Do you have barriers with adherence to non-pharmacologic self-management interventions?  (Nutrition/Exercise/Self-Monitoring): Yes   Have you ever had to go to the ED for symptoms of low blood sugar?: No       No patient-reported symptoms      ,   Congestive Heart Failure Assessment    Are you currently restricting fluids?: No Restriction  Do you understand a low sodium diet?: Yes (Comment:  assists pt with grocery shopping)  Do you understand how to read food labels?: Yes  How many restaurant meals do you eat per week?: 0  Do you salt your food before tasting it?: No         Symptoms:  CHF associated leg swelling: Pos      Symptom course: no change  Patient-reported weight (lb):  (Comment: scales need batteries)  Salt intake watch compared to last visit: stable      and   COPD Assessment    Does the patient understand envrionmental exposure?: Yes  Is the patient able to verbalize Rescue vs. Long Acting medications?: Yes  Does the patient have a nebulizer?: No  Does the patient use a space with inhaled medications?: No            Symptoms:  None: Yes      Symptom course: no change  Breathlessness: exertion  Increase use of rapid acting/rescue inhaled medications?: No  Change in chronic cough?: No/At Baseline  Change in sputum?: No/At Baseline  Self Monitoring - SaO2: Yes  Baseline SaO2 Readin (Comment: Oxygen at 3L/min)         Care Coordination Interventions    Program Enrollment: Complex Care  Referral from Primary Care Provider: No  Suggested Interventions and Community Resources  Fall Risk Prevention: Completed (Comment: No falls since April. Will us a scooter outside apt. [place] use when in apt)  Home Care Waiver: Completed (Comment: Waiver  through [facility]. T//. )  Medi Set or Pill Pack: Completed (Comment: Per Agustin Dinh)  Pharmacist:  (Comment:  )  Zone Management Tools: In Process (Comment: 11/15: BS well controlled, this am = 105. BP elevated, pt stated from situational stress. Unable to weigh, scaled needs batteries.  Resp sx are stable.)         Goals Addressed                    This Visit's Progress      Patient Stated (pt-stated)   On track      Pt wants to remain independent    Barriers: impairment:  physical: General weakness and balance problems, lack of support, and overwhelmed by complexity of regimen  Plan for overcoming my barriers:   Pt is willing to work with therapy to improve his strength and endurance  Pt is willing to utilize appropriate ambulatory aid devices to maximize safety  Pt willingness/ability to monitor specific vitals to manage his multiple co-morbidities  Confidence: 8/10  Anticipated Goal Completion Date: 7/24/21(goal date extended to 10/31/21 - due to COVID 19 infection w pneumonia; pt recovering well)            Prior to Admission medications    Medication Sig Start Date End Date Taking?  Authorizing Provider   potassium chloride (KLOR-CON M) 10 MEQ extended release tablet Take 1 tablet by mouth daily 10/28/21   ALISON Fuller DO   pantoprazole (PROTONIX) 40 MG tablet Take 1 tablet by mouth daily 10/28/21   ALISON Fuller DO   FLUoxetine (PROZAC) 40 MG capsule Take 1 capsule by mouth daily 10/20/21   ALISON Fuller DO   Multiple Vitamins-Minerals (THERAPEUTIC MULTIVITAMIN-MINERALS) tablet Take 1 tablet by mouth daily [employer] [facility] packages MV w other meds for daily intake    Historical Provider, MD   acetaminophen (TYLENOL 8 HOUR) 650 MG extended release tablet Take 650 mg by mouth daily Packed by [facility] for daily noon dose    Historical Provider, MD   lisinopril (PRINIVIL;ZESTRIL) 40 MG tablet Take 1 tablet by mouth daily 9/23/21   ALISON Fuller DO   betamethasone dipropionate 0.05 % cream TOPICALLY APPLY TO APPROPRIATE AREA AS DIRECTED 2 TIMES DAILY FOR 10 DAYS 7/7/21   Historical Provider, MD   Dulaglutide (TRULICITY) 2.24 TA/6.4CQ SOPN Trulicity 9.55 QB/8.4 mL subcutaneous pen injector    Historical Provider, MD   triamcinolone (ARISTOCORT) 0.5 % ointment triamcinolone acetonide 0.5 % topical ointment    Historical Provider, MD   Cholestyramine POWD 1 each by Does not apply route as needed     ALISON Fuller DO   celecoxib (CELEBREX) 200 MG capsule Take 1 capsule by mouth daily 8/24/21   B Sunday Kelby, DO   Misc. Devices (COMMODE) MISC Toilet seat riser. Needs to be for 330lb + and long gaited 7/16/21 B Sunday Dallam, DO   allopurinol (ZYLOPRIM) 100 MG tablet Take 1 tablet by mouth daily 7/13/21   ISAAC Wilson   furosemide (LASIX) 40 MG tablet Take 1.5 tablets by mouth daily 7/9/21   ISAAC Wilson   metFORMIN (GLUCOPHAGE) 500 MG tablet Take 1 tablet by mouth 2 times daily (with meals) 6/29/21 B Sunday Kelby, DO   Lift Chair MISC by Does not apply route 6/9/21   ISAAC Adhikari   Omega-3 Fatty Acids (FISH OIL) 1000 MG CAPS Take 1,000 mg by mouth 2 times daily    Historical Provider, MD   aspirin 81 MG EC tablet Take 81 mg by mouth daily    Historical Provider, MD   terazosin (HYTRIN) 5 MG capsule Take 5 mg by mouth nightly    Historical Provider, MD   isosorbide mononitrate (IMDUR) 60 MG extended release tablet Take 0.5 tablets by mouth daily 5/4/21   Mary Graham PA-C   atorvastatin (LIPITOR) 40 MG tablet Take 1 tablet by mouth nightly 3/30/21   B Jamey Dallam DO   mirabegron (MYRBETRIQ) 25 MG TB24 Take 1 tablet by mouth daily 3/17/21   B Jamey Dallam, DO   buPROPion (WELLBUTRIN XL) 150 MG extended release tablet Take 2 tablets by mouth every morning 2/25/21 B Sunday Bourbon DO   TRELEGY ELLIPTA 100-62.5-25 MCG/INH AEPB INHALE 1 PUFF ONCE DAILY 2/17/21 B Sunday Bourbon, DO   carvedilol (COREG) 12.5 MG tablet Take 1 tablet by mouth 2 times daily (with meals) 1/4/21 B Sunday Bourbon DO   enzalutamide Cody Islam) 40 MG capsule Take 4 tablets daily 12/29/20   ISAAC Wilson   amLODIPine (NORVASC) 5 MG tablet Take 1 tablet by mouth daily 12/16/20   ISAAC Adhikari   blood glucose monitor strips Test one times a day & as needed for symptoms of irregular blood glucose. DX: E11.9 4/2/20   B Sunday Dallam, DO   nitroGLYCERIN (NITROSTAT) 0.4 MG SL tablet up to max of 3 total doses.  If no relief after 1 dose, call 911.   Patient not taking: Reported on 11/2/2021 3/30/20   Mariel Yañez PA-C   albuterol sulfate HFA (PROAIR HFA) 108 (90 Base) MCG/ACT inhaler Inhale 2 puffs into the lungs every 6 hours as needed for Wheezing 12/4/19   ISAAC Pastrana   potassium chloride (KLOR-CON 10) 10 MEQ extended release tablet Take 1 tablet by mouth daily 6/19/19   ISAAC Pastrana   Lancets MISC 1 each by Does not apply route daily Accu Chek Guid3 Lancets 4/29/19   B Jessy Arnold, DO   OXYGEN Inhale 3 L into the lungs continuous     Historical Provider, MD   BiPAP Machine MISC by Does not apply route nightly    Historical Provider, MD   vitamin B-12 (CYANOCOBALAMIN) 1000 MCG tablet Take 1,000 mcg by mouth daily    Historical Provider, MD   leuprolide (LUPRON) 30 MG injection Inject 30 mg into the muscle once Every 4 months    Historical Provider, MD       Future Appointments   Date Time Provider Addie Doss   12/2/2021 11:00 AM Rigo Linares MD [id number]

## 2021-11-17 DIAGNOSIS — E11.42 TYPE 2 DIABETES MELLITUS WITH DIABETIC POLYNEUROPATHY, WITHOUT LONG-TERM CURRENT USE OF INSULIN (HCC): Primary | ICD-10-CM

## 2021-11-17 RX ORDER — CALCIUM CITRATE/VITAMIN D3 200MG-6.25
TABLET ORAL
Qty: 100 EACH | Refills: 3 | Status: SHIPPED | OUTPATIENT
Start: 2021-11-17

## 2021-11-17 NOTE — TELEPHONE ENCOUNTER
Received fax from pharmacy requesting refill on pts medication(s). Pt was last seen in office on 10/20/2021  and has a follow up scheduled for Visit date not found. Will send request to  Dr. Odin Keane  for patient.      Requested Prescriptions     Pending Prescriptions Disp Refills    blood glucose test strips (TRUE METRIX BLOOD GLUCOSE TEST) strip [Pharmacy Med Name: TRUE METRIX BLOOD GLUCOSETEST STRIPS  STRIP] 100 each 3     Sig: USE TO TEST BLOOD SUGAR ONCE DAILY

## 2021-11-22 ENCOUNTER — CARE COORDINATION (OUTPATIENT)
Dept: CARE COORDINATION | Age: 86
End: 2021-11-22

## 2021-11-22 NOTE — CARE COORDINATION
Ambulatory Care Coordination Note  11/22/2021  CM Risk Score: 7  Charlson 10 Year Mortality Risk Score: 100%     ACC: Lavonda Schlatter, RN    Summary Note: ACRALPH spoke to Al Monday afternoon. Pt stated his son and daughter are packing up his things now and he expects to leave for [place] sometime tomorrow (Tuesday). He reported he is now wearing compression socks daily for about 10 days or so. He has noticed an increase in the frequency of his urination yesterday and last night and today. No pain, no burning or other discomfort noted. He stated when he urinates it is a good amount. He wondered about decreasing his Lasix dose. He did not review his blood sugar, weight or SpO2 today due to items being packed up by his children. Plan:  ACM suggested getting a urinalysis to rule out a urinary tract infection. Pt stated he wants to wait as he is in the midst of his move. ACM advised to see a provider and get established as soon as possible for close follow up on his multiple chronic conditions. Suggested he request a urinalysis from his new provider - if it's normal, talk to new provider about adjusting his diuretic. Continue to monitor vitals, including daily weights. Praised pt for wearing his compression socks - this may be keeping fluid from collecting in his BLE and impacting his urination. Advised him also that he may receive mailed information to support healthy diet and management of his CHF during the holidays. Advised him I will call again next week to be sure he is settled. Encouraged him to call me if he needs anything. Patient voiced understanding. Care Coordination Interventions    Program Enrollment: Complex Care  Referral from Primary Care Provider: No  Suggested Interventions and Community Resources  Pharmacist:  (Comment:  )  Zone Management Tools: In Process (Comment: 11/22: Pt reported increased frequency of urination past 24 hours. Denied burning, dark or cloudy urine, no other discomfort.  Wearing 12/29/20   Isabell Mcneil, APRN   amLODIPine (NORVASC) 5 MG tablet Take 1 tablet by mouth daily 12/16/20   ISAAC Lamar   nitroGLYCERIN (NITROSTAT) 0.4 MG SL tablet up to max of 3 total doses. If no relief after 1 dose, call 911.   Patient not taking: Reported on 11/2/2021 3/30/20   Stormy Juan PA-C   albuterol sulfate HFA (PROAIR HFA) 108 (90 Base) MCG/ACT inhaler Inhale 2 puffs into the lungs every 6 hours as needed for Wheezing 12/4/19   ISAAC Lamar   potassium chloride (KLOR-CON 10) 10 MEQ extended release tablet Take 1 tablet by mouth daily 6/19/19   ISAAC Lamar   Lancets MISC 1 each by Does not apply route daily Accu Chek Guid3 Lancets 4/29/19   ALISON Colbert DO   OXYGEN Inhale 3 L into the lungs continuous     Historical Provider, MD   BiPAP Machine MISC by Does not apply route nightly    Historical Provider, MD   vitamin B-12 (CYANOCOBALAMIN) 1000 MCG tablet Take 1,000 mcg by mouth daily    Historical Provider, MD   leuprolide (LUPRON) 30 MG injection Inject 30 mg into the muscle once Every 4 months    Historical Provider, MD       Future Appointments   Date Time Provider Addie Doss   12/2/2021 11:00 AM Teresita Walter MD [id number]

## 2021-11-30 ENCOUNTER — CARE COORDINATION (OUTPATIENT)
Dept: CARE COORDINATION | Age: 86
End: 2021-11-30

## 2021-12-07 ENCOUNTER — TELEPHONE (OUTPATIENT)
Dept: CARDIOLOGY CLINIC | Age: 86
End: 2021-12-07

## 2021-12-07 DIAGNOSIS — J32.9 SINUSITIS: ICD-10-CM

## 2021-12-07 RX ORDER — FLUTICASONE PROPIONATE 50 MCG
2 SPRAY, SUSPENSION (ML) NASAL DAILY
Qty: 1 EACH | Refills: 3 | Status: SHIPPED | OUTPATIENT
Start: 2021-12-07 | End: 2022-04-04

## 2021-12-07 RX ORDER — DULAGLUTIDE 0.75 MG/.5ML
0.75 INJECTION, SOLUTION SUBCUTANEOUS
Qty: 6 ML | Refills: 3 | Status: SHIPPED | OUTPATIENT
Start: 2021-12-07 | End: 2021-12-13 | Stop reason: SDUPTHER

## 2021-12-07 NOTE — TELEPHONE ENCOUNTER
Received fax from pharmacy requesting refill on pts medication(s). Pt was last seen in office on 10/20/2021  and has a follow up scheduled for Visit date not found. Will send request to  Dr. Gerber Tirado  for authorization.      Requested Prescriptions     Pending Prescriptions Disp Refills    fluticasone (FLONASE) 50 MCG/ACT nasal spray 1 each 3     Si sprays by Nasal route daily

## 2021-12-07 NOTE — TELEPHONE ENCOUNTER
Received fax from pharmacy requesting refill on pts medication(s). Pt was last seen in office on 10/20/2021  and has a follow up scheduled for Visit date not found. Will send request to  Dr. René Coreas  for authorization.      Requested Prescriptions     Pending Prescriptions Disp Refills    Dulaglutide (TRULICITY) 0.12 VA/9.2ZH SOPN 6 mL 3     Sig: Inject 0.75 mg into the skin every 7 days

## 2021-12-09 ENCOUNTER — TELEPHONE (OUTPATIENT)
Dept: PRIMARY CARE CLINIC | Age: 86
End: 2021-12-09

## 2021-12-09 DIAGNOSIS — J98.4 MIXED RESTRICTIVE AND OBSTRUCTIVE LUNG DISEASE (HCC): ICD-10-CM

## 2021-12-09 DIAGNOSIS — J43.9 MIXED RESTRICTIVE AND OBSTRUCTIVE LUNG DISEASE (HCC): ICD-10-CM

## 2021-12-09 DIAGNOSIS — J44.9 CHRONIC OBSTRUCTIVE PULMONARY DISEASE, UNSPECIFIED COPD TYPE (HCC): ICD-10-CM

## 2021-12-09 RX ORDER — M-VIT,TX,IRON,MINS/CALC/FOLIC 27MG-0.4MG
1 TABLET ORAL DAILY
Qty: 30 TABLET | Refills: 11 | Status: SHIPPED | OUTPATIENT
Start: 2021-12-09 | End: 2022-01-07 | Stop reason: SDUPTHER

## 2021-12-09 RX ORDER — FLUTICASONE FUROATE, UMECLIDINIUM BROMIDE AND VILANTEROL TRIFENATATE 100; 62.5; 25 UG/1; UG/1; UG/1
1 POWDER RESPIRATORY (INHALATION) DAILY
Qty: 1 EACH | Refills: 11 | Status: SHIPPED | OUTPATIENT
Start: 2021-12-09

## 2021-12-09 NOTE — TELEPHONE ENCOUNTER
Received fax from pharmacy requesting refill on pts medication(s). Pt was last seen in office on 10/20/2021  and has a follow up scheduled for Visit date not found. Will send request to  Dr. Claudia Ennis  for authorization.      Requested Prescriptions     Pending Prescriptions Disp Refills    Multiple Vitamins-Minerals (THERAPEUTIC MULTIVITAMIN-MINERALS) tablet 30 tablet 11     Sig: Take 1 tablet by mouth daily [employer] MV w other meds for daily intake    fluticasone-umeclidin-vilant (TRELEGY ELLIPTA) 100-62.5-25 MCG/INH AEPB 1 each 11     Sig: Inhale 1 puff into the lungs daily

## 2021-12-09 NOTE — TELEPHONE ENCOUNTER
Needs refills on two meds. Didn't leave name of them though. Called and LM that we need to know what meds. That German and flonase were sent in on the 7th. If needs anything else, to call back.

## 2021-12-13 DIAGNOSIS — J44.9 CHRONIC OBSTRUCTIVE PULMONARY DISEASE, UNSPECIFIED COPD TYPE (HCC): Primary | ICD-10-CM

## 2021-12-13 RX ORDER — DULAGLUTIDE 0.75 MG/.5ML
0.75 INJECTION, SOLUTION SUBCUTANEOUS
Qty: 6 ML | Refills: 3 | Status: SHIPPED | OUTPATIENT
Start: 2021-12-13

## 2021-12-13 NOTE — TELEPHONE ENCOUNTER
----- Message from [employer] sent at 12/11/2021 10:30 AM CST -----  Subject: Refill Request    QUESTIONS  Name of Medication? Dulaglutide (TRULICITY) 6.65 SV/1.6CT SOPN  Patient-reported dosage and instructions? 1 shot once a week   How many days do you have left? 0  Preferred Pharmacy? [id number]  Pharmacy phone number (if available)? [phone]  ---------------------------------------------------------------------------  --------------  CALL BACK INFO  What is the best way for the office to contact you? OK to leave message on   voicemail  Preferred Call Back Phone Number?  [phone]

## 2021-12-15 ENCOUNTER — CARE COORDINATION (OUTPATIENT)
Dept: CARE COORDINATION | Age: 86
End: 2021-12-15

## 2021-12-15 NOTE — CARE COORDINATION
ACM returned call to patient's daughter, [facility]. She stated she has called [facility] office and tried to get patient's prescriptions refilled at his new pharmacy in [address] [place] [address] but has only received one successful prescription - for Trulicity. She stated she has called multiple times requesting RF of other meds and [facility] is not receiving them. Plan:  ACM reviewed chart, noted Trulicity was approved and [facility] to new [facility] [place]. Noted 3 other refills sent to [place] [id number] rather than [facility].  ACM explained that new pharmacy info has not been placed into our EMR system. ACM apologized for miscommunication on refills. ACM added new [place] [facility] electronic information as patient's preferred pharmacy for all future refills. ACM canceled 3 RFs sent to [facility], then called [facility] [place], [place] and gave verbal refill order for same. ACM provided phone number for [facility] to daughter - advised her to give [facility] [place] - ask new pharmacy to contact [facility] and obtain transfer of all prescriptions. ACM encouraged daughter to contact [facility] again if other support needs arise. She thanked me and expressed understanding.   Electronically signed by Timoteo Junior RN on 12/15/2021 at 9:11 AM

## 2021-12-16 DIAGNOSIS — K21.9 GASTROESOPHAGEAL REFLUX DISEASE, UNSPECIFIED WHETHER ESOPHAGITIS PRESENT: ICD-10-CM

## 2021-12-16 RX ORDER — PANTOPRAZOLE SODIUM 40 MG/1
40 TABLET, DELAYED RELEASE ORAL DAILY
Qty: 90 TABLET | Refills: 3 | Status: SHIPPED | OUTPATIENT
Start: 2021-12-16

## 2021-12-16 RX ORDER — TERAZOSIN 5 MG/1
5 CAPSULE ORAL NIGHTLY
Qty: 90 CAPSULE | Refills: 3 | Status: SHIPPED | OUTPATIENT
Start: 2021-12-16

## 2021-12-30 DIAGNOSIS — F33.1 MODERATE EPISODE OF RECURRENT MAJOR DEPRESSIVE DISORDER (HCC): ICD-10-CM

## 2022-01-03 DIAGNOSIS — I10 ESSENTIAL HYPERTENSION: ICD-10-CM

## 2022-01-03 RX ORDER — BUPROPION HYDROCHLORIDE 150 MG/1
300 TABLET ORAL EVERY MORNING
Qty: 180 TABLET | Refills: 3 | Status: SHIPPED | OUTPATIENT
Start: 2022-01-03

## 2022-01-03 RX ORDER — AMLODIPINE BESYLATE 5 MG/1
5 TABLET ORAL DAILY
Qty: 90 TABLET | Refills: 3 | Status: SHIPPED | OUTPATIENT
Start: 2022-01-03

## 2022-01-03 NOTE — TELEPHONE ENCOUNTER
Received fax from pharmacy requesting refill on pts medication(s). Pt was last seen in office on 10/20/2021  and has a follow up scheduled for Visit date not found. Will send request to  Dr. Fausto Salguero  for patient.      Requested Prescriptions     Pending Prescriptions Disp Refills    amLODIPine (NORVASC) 5 MG tablet 90 tablet 3     Sig: Take 1 tablet by mouth daily

## 2022-01-03 NOTE — TELEPHONE ENCOUNTER
Received fax from pharmacy requesting refill on pts medication(s). Pt was last seen in office on 10/20/2021  and has a follow up scheduled for Visit date not found. Will send request to  Dr. Caridad Blank  for authorization.      Requested Prescriptions     Pending Prescriptions Disp Refills    buPROPion (WELLBUTRIN XL) 150 MG extended release tablet [Pharmacy Med Name: BUPROPION HCL  MG TABLET] 180 tablet 3     Sig: Take 2 tablets by mouth every morning

## 2022-01-07 RX ORDER — M-VIT,TX,IRON,MINS/CALC/FOLIC 27MG-0.4MG
1 TABLET ORAL DAILY
Qty: 90 TABLET | Refills: 3 | Status: SHIPPED | OUTPATIENT
Start: 2022-01-07

## 2022-03-01 ENCOUNTER — TELEPHONE (OUTPATIENT)
Dept: CARDIOLOGY CLINIC | Age: 87
End: 2022-03-01

## 2022-03-01 NOTE — TELEPHONE ENCOUNTER
Received call from [facility] with [facility] to release patient in carelink pacemaker monitoring. He has moved to [place].   Carelink released

## 2022-04-03 DIAGNOSIS — J32.9 SINUSITIS: ICD-10-CM

## 2022-04-04 RX ORDER — FLUTICASONE PROPIONATE 50 MCG
2 SPRAY, SUSPENSION (ML) NASAL DAILY
Qty: 3 EACH | Refills: 3 | Status: SHIPPED | OUTPATIENT
Start: 2022-04-04

## 2022-04-04 NOTE — TELEPHONE ENCOUNTER
Received fax from pharmacy requesting refill on pts medication(s). Pt was last seen in office on 10/20/2021  and has a follow up scheduled for Visit date not found. Will send request to  Dr. Cristiane Winkler  for authorization.      Requested Prescriptions     Pending Prescriptions Disp Refills    fluticasone (FLONASE) 50 MCG/ACT nasal spray [Pharmacy Med Name: FLUTICASONE PROP 50 MCG SPRAY] 3 each 3     Si sprays by Nasal route daily

## 2022-04-29 DIAGNOSIS — C61 PROSTATE CANCER: ICD-10-CM

## 2022-07-05 RX ORDER — FLUOXETINE HYDROCHLORIDE 20 MG/1
CAPSULE ORAL
Qty: 90 CAPSULE | Refills: 1 | Status: SHIPPED | OUTPATIENT
Start: 2022-07-05

## 2022-07-05 NOTE — TELEPHONE ENCOUNTER
Requested Prescriptions     Pending Prescriptions Disp Refills    FLUoxetine (PROZAC) 20 MG capsule [Pharmacy Med Name: FLUOXETINE HCL 20 MG CAPSULE] 90 capsule 1     Sig: TAKE 1 CAPSULE BY MOUTH EVERY DAY

## 2022-07-06 DIAGNOSIS — E11.42 TYPE 2 DIABETES MELLITUS WITH DIABETIC POLYNEUROPATHY, WITHOUT LONG-TERM CURRENT USE OF INSULIN (HCC): ICD-10-CM

## 2022-07-06 NOTE — TELEPHONE ENCOUNTER
Requested Prescriptions     Pending Prescriptions Disp Refills    metFORMIN (GLUCOPHAGE) 500 MG tablet [Pharmacy Med Name: METFORMIN  MG TABLET] 180 tablet 1     Sig: TAKE 1 TABLET BY MOUTH TWICE A DAY WITH MEALS

## 2022-08-01 ENCOUNTER — TELEPHONE (OUTPATIENT)
Dept: UROLOGY | Facility: CLINIC | Age: 87
End: 2022-08-01

## 2022-08-01 NOTE — TELEPHONE ENCOUNTER
Patient's daughter, Shara Retana, called about her father who was last seen at our office in 2020 by Dr. Valverde. She is listed on the  Verbal as someone we can speak with regarding his care.    She said he has recently moved to [place], [place], to live with her.  He wants to know what he can do about his Lupron shots now that he's moved.  He also would like any recommendations/outgoing referral for someone in the area for transferring his urology care.     I told her I would pass the message along, and have someone call back.

## 2022-11-08 NOTE — OUTREACH NOTE
CHF Week 4 Survey      Responses   Facility patient discharged from?  [facility]   Does the patient have one of the following disease processes/diagnoses(primary or secondary)?  CHF   Week 4 attempt successful?  Yes   Call start time  0951   Call end time  0956   Discharge diagnosis  CHF   Is patient permission given to speak with other caregiver?  Yes   Meds reviewed with patient/caregiver?  Yes   Is the patient having any side effects they believe may be caused by any medication additions or changes?  No   Is the patient taking all medications as directed (includes completed medication regime)?  Yes   Has the patient kept scheduled appointments due by today?  Yes   Is the patient still receiving Home Health Services?  N/A   Psychosocial issues?  No   Comments  Still gets SOA with activity. Wears O2 at 3l/m.   What is the patient's perception of their health status since discharge?  Improving   Nursing interventions  Nurse provided patient education   Is the patient weighing daily?  Yes   Does the patient have scales?  Yes   Daily weight interventions  Education provided on importance of daily weight   Is the patient able to teach back Heart Failure diet management?  Yes   Is the patient able to teach back Heart Failure Zones?  Yes   Is the patient able to teach back signs and symptoms of worsening condition? (i.e. weight gain, shortness of air, etc.)  Yes   Is the patient/caregiver able to teach back the hierarchy of who to call/visit for symptoms/problems? PCP, Specialist, Home health nurse, Urgent Care, ED, 911  Yes   Additional teach back comments  Pt's wt is stable.    Week 4 Call Completed?  Yes   Would the patient like one additional call?  No   Graduated  Yes   Did the patient feel the follow up calls were helpful during their recovery period?  Yes   Was the number of calls appropriate?  Yes          Lalit Renee RN   Stable, monitor.

## 2023-04-03 NOTE — TELEPHONE ENCOUNTER
Keith Collier with home health called with questions about his prescriptions. She is questioning whether patient should be taken Celebrex and xarelto. Stop celebrex. Pt has been taking them. Looks like was suppose to be discontinued in March. I advised home health to d/c. They also questioned whether he should be on Prozac 60mg daily. According to the record patient is supposed to be on 60 mg daily. She has an appointment with Ju tomorrow, this can wait until that appointment and Ju can make treatment decision.

## 2023-07-12 NOTE — PATIENT INSTRUCTIONS
[place] at the [facility] and [address] [facility] located on the first floor of [place] [address] through hospital main entrance and turn immediately to your left. Patient's contact number:  [phone] (home)      Lexiscan Stress Test      Lexiscan (regadenoson injection) is a prescription drug given through an IV line that increases blood flow through the arteries of the heart during a cardiac nuclear stress test.     There are two parts to a Lexiscan stress test: the rest portion and the exercise portion. For the rest portion, a radioactive tracer is injected into your arm through the IV. After 30 to 60 minutes, the process of imaging will begin. A nuclear camera will be placed on your chest area and images are taken for the next 15 to 20 minutes. For the exercise portion, a nurse will attach EKG electrodes to your chest to monitor your heart rate. The drug [place] is administered to simulate stress on the heart. Your heart rhythm will then be monitored for the next few minutes. Your blood pressure will also be monitored throughout the exercise portion. [place] through the exercise portion, a second round of radioactive tracer is injected into your body. Your heart rate and EKG will be monitored for another few minutes after administering the drug. Test Preparation:     Bring a list of your current medications. Do not take any of your medications the morning of the test, but bring all morning medications with you as you will take them after the stress portion of the test is completed.  Do not eat Bananas 24 hours prior to test.     No caffeine 24 hours prior to the testing. This includes: coffee, pop/soda, chocolate, cold medications, etc.  Any product that might contain caffeine.  No nicotine or alcohol 12 hours prior to your test.    Nothing to eat or drink 6-8 hours prior to appointment time.   It is okay to drink small amounts of water during the four hours prior to the test.   Nitroglycerin patches must be taken off 1 hour before testing.  Wear comfortable clothing.  Please refrain from any strenuous exercise or activities the day before your test, or the day of your test.   The Nuclear Lexiscan Stress test takes about 2 ½ to 3 hours to complete. If for any reason you are unable to keep this appointment, please contact Outpatient Scheduling, [phone], as soon as possible to reschedule. declines

## 2024-05-07 NOTE — PROGRESS NOTES
[facility]        Hospitalist Progress Note  9/10/2021 4:26 PM  Subjective:   Admit Date: 9/7/2021  PCP: Hunter De La Rosa DO    Chief Complaint: Generalized weakness    Subjective: Patient was seen and examined by the bedside this am.  He complains of fatigue. He endorsed continued but reduced shortness of breath. He denied fever, chills, rigor, change in bowel or urinary habits    [facility] History: Patient is an 50-year-old  male with medical history significant for COPD (on 3 L home O2), atrial fibrillation, diabetes, hypertension, prostate cancer who was admitted on account of acute respiratory failure likely due to COVID pneumonia, COPD exacerbation and worsening kidney function. ROS: 14 point review of systems is negative except as specifically addressed above. ADULT DIET;  Regular    Intake/Output Summary (Last 24 hours) at 9/10/2021 1626  Last data filed at 9/10/2021 0855  Gross per 24 hour   Intake --   Output 100 ml   Net -100 ml     Medications:   sodium chloride       Current Facility-Administered Medications   Medication Dose Route Frequency Provider Last Rate Last Admin    famotidine (PEPCID) tablet 20 mg  20 mg Oral BID Anirudh Fuentes MD        azithromycin (ZITHROMAX) tablet 500 mg  500 mg Oral Daily Anirudh Fuentes MD   500 mg at 09/10/21 0844    acetaminophen (TYLENOL) extended release tablet 650 mg  650 mg Oral Daily Anirudh Fuentes MD   650 mg at 09/10/21 0844    albuterol sulfate  (90 Base) MCG/ACT inhaler 2 puff  2 puff Inhalation Q6H PRN Anirudh Fuentes MD        allopurinol (ZYLOPRIM) tablet 100 mg  100 mg Oral Daily Anirudh Fuentes MD   100 mg at 09/10/21 0844    amLODIPine (NORVASC) tablet 5 mg  5 mg Oral Daily Anirudh Fuentes MD   5 mg at 09/10/21 0844    aspirin EC tablet 81 mg  81 mg Oral Daily Anirudh Fuentes MD   81 mg at 09/10/21 0845    atorvastatin (LIPITOR) tablet 40 mg  40 mg Oral Nightly Jac Velasquez MD   40 mg at 09/10/21 0147    buPROPion (WELLBUTRIN XL) extended release tablet 300 mg  300 mg Oral QAM Jac Velasquez MD   300 mg at 09/10/21 0845    carvedilol (COREG) tablet 12.5 mg  12.5 mg Oral BID WC Jac Velasquez MD   12.5 mg at 09/10/21 0845    FLUoxetine (PROZAC) capsule 20 mg  20 mg Oral Daily Jac Velasquez MD   20 mg at 09/10/21 0845    isosorbide mononitrate (IMDUR) extended release tablet 30 mg  30 mg Oral Daily Jac Velasquez MD   30 mg at 09/10/21 0844    trospium (SANCTURA) tablet 20 mg  20 mg Oral Nightly Jac Velasquez MD   20 mg at 09/10/21 0146    nitroGLYCERIN (NITROSTAT) SL tablet 0.4 mg  0.4 mg SubLINGual Q5 Min PRN Jac Velasquez MD        potassium chloride (KLOR-CON M) extended release tablet 10 mEq  10 mEq Oral Daily Jac Velasquez MD   10 mEq at 09/10/21 0845    doxazosin (CARDURA) tablet 1 mg  1 mg Oral Daily Jac Velasquez MD   1 mg at 09/10/21 0845    sodium chloride flush 0.9 % injection 5-40 mL  5-40 mL IntraVENous 2 times per day Jac Velasquez MD   10 mL at 09/10/21 0846    sodium chloride flush 0.9 % injection 5-40 mL  5-40 mL IntraVENous PRN Jac Velasquez MD        0.9 % sodium chloride infusion  25 mL IntraVENous PRN Jac Velasquez MD        enoxaparin (LOVENOX) injection 30 mg  30 mg SubCUTAneous BID Jac Velasquez MD   30 mg at 09/10/21 0846    ondansetron (ZOFRAN-ODT) disintegrating tablet 4 mg  4 mg Oral Q8H PRN Jac Velasquez MD        Or    ondansetron (ZOFRAN) injection 4 mg  4 mg IntraVENous Q6H PRN Jac Velasquez MD        polyethylene glycol (GLYCOLAX) packet 17 g  17 g Oral Daily PRN Jac Velasquez MD        acetaminophen (TYLENOL) tablet 650 mg  650 mg Oral Q6H PRN Jac Velasquez MD        Or    acetaminophen (TYLENOL) suppository 650 mg  650 mg Rectal Q6H PRN Arnoldo Clancy MD        guaiFENesin-dextromethorphan [facility] DM) 100-10 MG/5ML syrup 5 mL  5 mL Oral Q4H PRN Arnoldo Clancy MD   5 mL at 09/10/21 0204    Vitamin D (CHOLECALCIFEROL) tablet 2,000 Units  2,000 Units Oral Daily Arnoldo Clancy MD   2,000 Units at 09/10/21 0845    budesonide-formoterol (SYMBICORT) 160-4.5 MCG/ACT inhaler 2 puff  2 puff Inhalation BID Arnoldo Clancy MD   2 puff at 09/10/21 0849    insulin lispro (HUMALOG) injection vial 0-12 Units  0-12 Units SubCUTAneous TID WC Arnoldo Clancy MD   2 Units at 09/08/21 1749    insulin lispro (HUMALOG) injection vial 0-6 Units  0-6 Units SubCUTAneous Nightly Arnoldo Clancy MD        tiotropium (SPIRIVA RESPIMAT) 2.5 MCG/ACT inhaler 2 puff  2 puff Inhalation Daily Arnoldo Clancy MD   2 puff at 09/10/21 0848    dexamethasone (PF) (DECADRON) injection 6 mg  6 mg IntraVENous Q24H Frank Vidal MD   6 mg at 09/10/21 0845        Labs:     Recent Labs     09/08/21 0620 09/09/21  0512 09/10/21  0705   WBC 5.4 6.1 8.4   RBC 3.19* 3.12* 3.35*   HGB 9.6* 9.8* 10.1*   HCT 30.7* 29.7* 31.6*   MCV 96.2* 95.2* 94.3*   MCH 30.1 31.4* 30.1   MCHC 31.3* 33.0 32.0*    189 240     Recent Labs     09/08/21  0620 09/09/21  0512 09/10/21  0705   * 134* 135*   K 4.0 4.0 3.9   ANIONGAP 10 11 9    99 101   CO2 24 24 25   BUN 38* 27* 17   CREATININE 1.3* 1.0 0.9   GLUCOSE 95 90 97   CALCIUM 8.8 8.9 9.1     Recent Labs     09/09/21  0512 09/10/21  0705   MG 1.8 1.8     Recent Labs     09/08/21  0620 09/09/21  0512 09/10/21  0705   AST 18 17 14   ALT 7 7 6   BILITOT 0.5 0.5 0.5   ALKPHOS 52 48 53     ABGs:No results for input(s): PH, PO2, PCO2, HCO3, BE, O2SAT in the last 72 hours. Troponin T:   Recent Labs     09/07/21  1930   TROPONINI 0.01     INR:   Recent Labs     09/07/21 1930   INR 1.11     Lactic Acid: No results for input(s): LACTA in the last 72 hours.     Objective:   Vitals: BP 125/71   Pulse 97   Temp 97.9 °F (36.6 °C) (Temporal)   Resp 16   Ht 5' 10\" (1.778 m)   Wt 240 lb (108.9 kg)   SpO2 93%   BMI 34.44 kg/m²   24HR INTAKE/OUTPUT:      Intake/Output Summary (Last 24 hours) at 9/10/2021 1626  Last data filed at 9/10/2021 0855  Gross per 24 hour   Intake --   Output 100 ml   Net -100 ml     General appearance: Middle-aged  male seen lying in bed. Alert and cooperative with exam  HEENT: atraumatic, eyes with clear conjunctiva external ears and nose are normal, lips normal  Lungs: Reduced air entry in lung bases, expiratory wheeze   Heart: S1, S2 heard no m,r,g  Abdomen: Soft, NT, ND, BS+  Extremities: Atraumatic, no cyanosis. 1+ BLE pitting edema up to the midshin  Neurologic: no focal neurologic deficits, normal sensation, alert and oriented, affect and mood appropriate  Skin: no rashes, nodules    Assessment and Plan:   Principal Problem:    Pneumonia due to COVID-19 virus  Active Problems:    Essential hypertension    Neuropathy    Anxiety    Depression    Mixed restrictive and obstructive lung disease (HCC)    COPD (chronic obstructive pulmonary disease) (HCC)    NEIDA (obstructive sleep apnea)    Type 2 diabetes mellitus with diabetic polyneuropathy (HCC)    Prostate cancer (HCC)    Chronic combined systolic and diastolic congestive heart failure (HCC)    Mitral regurgitation    Pulmonary hypertension (HCC)    Pacemaker    Sinoatrial node dysfunction (HCC)    NELSY (acute kidney injury) ([address]    CKD (chronic kidney disease)    Dehydration    Dyspnea    Acute hypoxemic respiratory failure (HCC)    Palliative care patient    Generalized muscle weakness  Resolved Problems:    * No resolved hospital problems.  *    Plan:  Acute Respiratory Failure due to COVID 19 Pneumonia  -Continue O2 supplementation via nasal cannula  -Continue decadron, bronchodilator treatment    Generalized weakness  -Likely due to acute illness  -Continue PT OT  -Consider discharge to SNF for acute rehab    Acute COPD exacerbation  -Continue steroids, azithromycin, Symbicort bronchodilator treatment  -Outpatient follow up with pulmonologist    NELSY - resolved    Chronic problems - Continue home meds    Advance Directive: Limited    DVT prophylaxis: Lovenox    Discharge planning:  to help with possible discharge to SNF    Signed:  Darin Jaffe MD 9/10/2021 4:26 PM  Rounding Hospitalist Portuguese

## 2024-07-29 NOTE — CONSULTS
[facility]  Cardiology Consult      Requesting MD:  Zara Banks MD   Admit Status:  Inpatient [101]       History obtained from:   ? Patient  ?  Other (specify):     PROBLEM LIST:    Patient Active Problem List    Diagnosis Date Noted    Chest discomfort      Priority: High    NELSY (acute kidney injury) ([facility] [address] 04/29/2021     Priority: Low    CAD (coronary artery disease) 03/30/2020     Priority: Low     Overview Note:     5/16/2018  lexiscan negative for myocardial ischemia, EF 46  %   3/28/20 lexiscan Positive for anterior septal myocardial ischemia, EF 43%, 3% ischemic myocardium on stress, low risk findings, AUC indication 15, AUC score 4, (MD Krystle)   3/30/20  Cath  Moderate diffuse disease, 70% mid LAD at the diagonal, normal LVFX      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 ([address] 03/28/2020     Priority: Low    Chest pain due to myocardial ischemia 03/27/2020     Priority: Low    Mediastinal mass 03/10/2020     Priority: Low    Sinoatrial node dysfunction (HCC) 04/25/2019     Priority: Low    Bradycardia 03/15/2019     Priority: Low    Pacemaker 03/15/2019     Priority: Low    Chronic combined systolic and diastolic congestive heart failure ([address] 11/15/2018     Priority: Low    Macrocytic anemia 11/15/2018     Priority: Low    Sepsis ([address] 11/15/2018     Priority: Low    Vitamin D deficiency 11/15/2018     Priority: Low    Mitral regurgitation 11/15/2018     Priority: Low    Tricuspid regurgitation 11/15/2018     Priority: Low    Pulmonary hypertension ([address] 11/15/2018     Priority: Low    Bronchitis, acute 11/15/2018     Priority: Low    Mixed hyperlipidemia 02/15/2018     Priority: Low    Prostate cancer ([address] 02/15/2018     Priority: Low    Recurrent major depressive disorder, in partial remission ([address] 02/15/2018     Priority: Low    Type 2 diabetes mellitus with diabetic polyneuropathy ([address] 11/25/2015     Priority: Low    Mixed artery disease, catheterization 3/30/2020 with proximal LAD 60 to 70% bifurcation lesion, mid RCA 50 to 60%, ejection fraction 40 to 45%. 2.  Chronic atrial fibrillation with severe left atrial enlargement, prior single lead RV pacemaker 3/15/2019, on Xarelto. 3.  Diabetes mellitus, insulin requiring. 4.  COPD, in assisted living facility. 5.  Frequent falls. PRESENTATION: Nguyen Paul is a 81y.o. year old male who presents with generalized weakness with frequent falls. He has had 3 falls this week. Patient reports initial episode occurred while trying to open a garage door and he turned and fell. No syncope or lightheadedness. No chest pain. 2 other episodes followed and occurred in the bathroom. He does not report any syncope or lightheadedness. Both occurred with no chest pain or shortness of breath. He pressed his medalert button and presented to the emergency room. Found to have multiple left-sided rib fractures, small left hemothorax and a left supraorbital contusion. Digoxin levels were noted to be elevated at 2.7. EKG shows paced rhythm with baseline atrial fibrillation. Pacemaker interrogation shows no arrhythmic events with normal pacemaker function. proBNP 1400. Troponin negative at 0.01. Hemoglobin 8.7. Noted to be mildly hyperkalemic with creatinine of 1.9. REVIEW OF SYSTEMS:  Review of Systems   Constitutional: Positive for fatigue. Negative for activity change and diaphoresis. HENT: Negative for hearing loss, nosebleeds and tinnitus. Eyes: Negative for visual disturbance. Respiratory: Negative for cough, shortness of breath and wheezing. Cardiovascular: Negative for chest pain, palpitations and leg swelling. Gastrointestinal: Negative for abdominal distention, abdominal pain, blood in stool, diarrhea and vomiting. Endocrine: Negative for cold intolerance, heat intolerance, polydipsia, polyphagia and polyuria.    Genitourinary: Negative for difficulty urinating, flank pain and hematuria. Musculoskeletal: Negative for arthralgias, back pain, joint swelling and myalgias. Skin: Negative for pallor and rash. Neurological: Positive for weakness. Negative for dizziness, seizures, syncope and headaches. Psychiatric/Behavioral: Negative for behavioral problems and dysphoric mood. The patient is not nervous/anxious. Past Medical History:      Diagnosis Date    Anxiety     Arthritis     Atrial fibrillation ([facility] [address]     Blood circulation, collateral     Cancer ([facility])     prostate, had treatment    Cellulitis     left leg    CHF (congestive heart failure) ([facility])     Chronic kidney disease     COPD (chronic obstructive pulmonary disease) ([facility])     Depression     Hyperlipidemia     Hypertension     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 ([facility] [address] 3/28/2020    Lung mass     Neuromuscular disorder ([facility])     Neuropathy     Other disorders of kidney and ureter in diseases classified elsewhere     Palliative care patient 11/15/2018    Pneumonia     Type II or unspecified type diabetes mellitus without mention of complication, not stated as uncontrolled        Past Surgical History:      Procedure Laterality Date    COLONOSCOPY      EYE SURGERY      EYE SURGERY      HERNIA REPAIR      umbilical with Dr Bacilio Bueno         Allergies:  Patient has no known allergies. Past Social History:  Social History     Socioeconomic History    Marital status:       Spouse name: Not on file    Number of children: Not on file    Years of education: Not on file    Highest education level: Not on file   Occupational History    Not on file   Social Needs    Financial resource strain: Not on file    Food insecurity     Worry: Not on file     Inability: Not on file    Transportation needs     Medical: Not on file     Non-medical: Not on file   Tobacco Use    Smoking status: Never Smoker    Smokeless tobacco: Never Used   Substance and Sexual Activity    Alcohol use: No    Drug use: No    Sexual activity: Not on file   Lifestyle    Physical activity     Days per week: Not on file     Minutes per session: Not on file    Stress: Not on file   Relationships    Social connections     Talks on phone: Not on file     Gets together: Not on file     Attends Jainism service: Not on file     Active member of club or organization: Not on file     Attends meetings of clubs or organizations: Not on file     Relationship status: Not on file    Intimate partner violence     Fear of current or ex partner: Not on file     Emotionally abused: Not on file     Physically abused: Not on file     Forced sexual activity: Not on file   Other Topics Concern    Not on file   Social History Narrative    Not on file       Family History:       Problem Relation Age of Onset    Heart Attack Mother     Cancer Father        Home Meds:  Prior to Admission medications    Medication Sig Start Date End Date Taking?  Authorizing Provider   pantoprazole (PROTONIX) 40 MG tablet Take 1 tablet by mouth every morning (before breakfast) 4/14/21  Yes ALISON Yepez DO   atorvastatin (LIPITOR) 40 MG tablet Take 1 tablet by mouth nightly 3/30/21  Yes ALISON Yepez DO   fluticasone [facility]) 50 MCG/ACT nasal spray 2 sprays by Nasal route daily 3/25/21  Yes ALISON Yepez DO   mirabegron (MYRBETRIQ) 25 MG TB24 Take 1 tablet by mouth daily 3/17/21  Yes ALISON Yepez DO   digoxin (LANOXIN) 125 MCG tablet Take 1 tablet by mouth daily 2/25/21  Yes ALISON Yepez DO   buPROPion (WELLBUTRIN XL) 150 MG extended release tablet Take 2 tablets by mouth every morning 2/25/21  Yes ALISON Yepez DO   TRELEGY ELLIPTA 100-62.5-25 MCG/INH AEPB INHALE 1 PUFF ONCE DAILY 2/17/21  Yes ALISON Yepez DO   terazosin (HYTRIN) 5 MG capsule Take 1 capsule by mouth nightly 2/4/21  Yes Meg Coker APRN   cholestyramine (QUESTRAN) 4 g packet Take 1 packet by mouth daily 1/21/21  Yes ALISON Reddy, DO   azelastine (ASTELIN) 0.1 % nasal spray 2 sprays by Nasal route 2 times daily Use in each nostril as directed 1/14/21  Yes ALISON Reddy, DO   carvedilol (COREG) 12.5 MG tablet Take 1 tablet by mouth 2 times daily (with meals) 1/4/21  Yes ALISON Reddy, DO   enzalutamide Ancil German) 40 MG capsule Take 4 tablets daily 12/29/20  Yes ISAAC Montemayor   furosemide (LASIX) 40 MG tablet TAKE 1 & 1/2 TABLETS DAILY 12/23/20  Yes ISAAC Lovelace   isosorbide mononitrate (IMDUR) 60 MG extended release tablet Take 1 tablet by mouth daily 12/16/20  Yes ISAAC Field   amLODIPine (NORVASC) 5 MG tablet Take 1 tablet by mouth daily 12/16/20  Yes ISAAC Field   Dulaglutide (TRULICITY) 2.09 XQ/0.0GZ SOPN Inject 0.75 mg as directed every 7 days 12/1/20  Yes ALISON Reddy, DO   lisinopril (PRINIVIL;ZESTRIL) 40 MG tablet Take 1 tablet by mouth daily 8/27/20  Yes ISAAC Montemayor   celecoxib (CELEBREX) 200 MG capsule Take 1 capsule by mouth 2 times daily 7/30/20  Yes ALISON Reddy, DO   rivaroxaban (XARELTO) 20 MG TABS tablet Take 1 tablet by mouth daily (with breakfast) 7/23/20  Yes ALISON Reddy, DO   metOLazone (ZAROXOLYN) 2.5 MG tablet Take 1 tablet by mouth daily 7/14/20  Yes ALISON Reddy, DO   FLUoxetine (PROZAC) 20 MG capsule Take 1 capsule by mouth daily 6/9/20  Yes ALISON Reddy, DO   spironolactone (ALDACTONE) 50 MG tablet TAKE 1 TABLET BY MOUTH EVERY DAY 6/3/20  Yes ALISON Reddy, DO   metFORMIN (GLUCOPHAGE) 500 MG tablet Take 1 tablet by mouth 2 times daily (with meals) 6/2/20  Yes ALISON Reddy, DO   Psyllium (KONSYL) 28.3 % POWD Take 4 g by mouth 2 times daily (with meals) 5/14/20  Yes ALISON Reddy, DO   hydroCHLOROthiazide (MICROZIDE) 12.5 MG capsule Take 1 capsule by mouth daily 5/14/20  Yes Opal Keenan Kaleigh Kwong,    aspirin 81 MG EC tablet Take 1 tablet by mouth daily 3/31/20  Yes Nimo Rivera PA-C   potassium chloride (KLOR-CON 10) 10 MEQ extended release tablet Take 1 tablet by mouth daily 6/19/19  Yes ISAAC Ring   Multiple Vitamins-Minerals (THERAPEUTIC MULTIVITAMIN-MINERALS) tablet Take 1 tablet by mouth daily   Yes Historical Provider, MD   OXYGEN Inhale 3 L into the lungs nightly    Yes Historical Provider, MD   cetirizine (ZYRTEC) 10 MG tablet Take 1 tablet by mouth daily 11/17/18  Yes Chuck Mcgowan DO   prazosin (MINIPRESS) 2 MG capsule Take 1 capsule by mouth nightly 8/20/18  Yes ISAAC Box   vitamin B-12 (CYANOCOBALAMIN) 1000 MCG tablet Take 1,000 mcg by mouth daily   Yes Historical Provider, MD   FISH OIL Take 1 capsule by mouth 2 times daily. Yes Historical Provider, MD   docusate sodium (COLACE) 100 MG capsule Take 1 capsule by mouth 2 times daily for 10 days 4/26/21 5/6/21  Nyla Severance, APRN   polyethylene glycol (MIRALAX) 17 g packet Take 17 g by mouth daily for 10 days 4/26/21 5/6/21  Nyla Severance, APRN   ondansetron (ZOFRAN) 4 MG tablet Take 1 tablet by mouth 3 times daily as needed for Nausea or Vomiting 4/14/21   B Artelia Slocumb, DO   Easy Touch Lancets 28G/Twist MISC Use to test Blood sugar daily 2/3/21   B Artelia Slocumb, DO   blood glucose test strips (EASY TOUCH TEST) strip USE TO TEST BLOOD SUGAR ONCE DAILY 11/11/20   ISAAC Ring   Lift Chair MISC by Does not apply route Prefers leather  At [address] faxed through [facility] 10/21/20   B Artelia Slocumb, DO   blood glucose monitor strips Test one times a day & as needed for symptoms of irregular blood glucose.   DX: E11.9 4/2/20   B Artelia Slocumb, DO   Alcohol Swabs (ALCOHOL PREP) PADS Use daily with glucose checks DX: E11.9 4/2/20   B Artelia Slocumb, DO   blood glucose monitor kit and supplies Test one times a day & as needed for symptoms of irregular blood Oral Nightly    sodium chloride flush  5-40 mL Intravenous 2 times per day    [Held by provider] heparin (porcine)  5,000 Units Subcutaneous 3 times per day       Current Infused Meds:   sodium chloride 100 mL/hr at 04/30/21 0749    sodium chloride         Physical Exam:  Vitals:    04/30/21 0657   BP: (!) 105/50   Pulse: 60   Resp: 18   Temp: 96.8 °F (36 °C)   SpO2: 100%       Intake/Output Summary (Last 24 hours) at 4/30/2021 1108  Last data filed at 4/30/2021 0936  Gross per 24 hour   Intake 480 ml   Output 200 ml   Net 280 ml     Estimated body mass index is 33.75 kg/m² as calculated from the following:    Height as of 4/26/21: 5' 10\" (1.778 m). Weight as of this encounter: 235 lb 3 oz (106.7 kg). Physical Exam  Constitutional:       Appearance: He is well-developed. He is obese. Comments: Severe obesity   HENT:      Mouth/Throat:      Pharynx: No oropharyngeal exudate. Eyes:      General: No scleral icterus. Right eye: No discharge. Left eye: No discharge. Neck:      Thyroid: No thyromegaly. Vascular: No JVD. Cardiovascular:      Rate and Rhythm: Normal rate and regular rhythm. Heart sounds: No murmur. No friction rub. No gallop. Comments: No obvious jugular venous distention  Trace edema  No obvious systolic or diastolic murmurs noted  Tenderness of chest wall    Pulmonary:      Effort: No respiratory distress. Breath sounds: No stridor. No wheezing or rales. Comments: Air entry present in both lung fields  Abdominal:      General: Bowel sounds are normal. There is no distension. Palpations: Abdomen is soft. There is no mass. Tenderness: There is no abdominal tenderness. There is no guarding or rebound. Comments: Difficult to deep palpate due to pain from the rib cage. No palpable organomegaly   Musculoskeletal:         General: No deformity. Skin:     General: Skin is warm. Coloration: Skin is not pale.       Findings: No erythema or rash. Neurological:      Mental Status: He is alert and oriented to person, place, and time. Motor: No abnormal muscle tone. Coordination: Coordination normal.      Deep Tendon Reflexes: Reflexes normal.           Labs:  Recent Labs     04/29/21  1704 04/30/21  0050   WBC 13.4* 10.9*   HGB 8.9* 8.7*    161       Recent Labs     04/29/21  1704 04/30/21  0050   * 138   K 6.1* 4.7  4.8    102   CO2 23 24   BUN 53* 47*   CREATININE 1.9* 1.7*   LABGLOM 34* 38*   MG  --  1.9   CALCIUM 9.2 9.1  9.1   PHOS  --  3.8       CK, CKMB, Troponin: @LABRCNT (CKTOTAL:3, CKMB:3, TROPONINI:3)@    Last 3 BNP:          IMAGING:  Ct Abdomen Pelvis Wo Contrast Additional Contrast? None    Result Date: 4/29/2021  Exam: CT CHEST WO CONTRAST, CT ABDOMEN PELVIS WO CONTRAST - 4/29/2021 6:14 PM Indication: Fall, trauma Comparison: 2/25/2000 DLP: 2117 mGy cm. In order to have a CT radiation dose as low as reasonably achievable, Automated Exposure Control was utilized for adjustment of the mA and/or KV according to patient size. Findings: No depressed fracture. No thoracic spine fracture or malalignment. Moderate multilevel thoracic spine degenerative change. The central airways are clear. Significant decrease in confluent ill-defined soft tissue density in the mediastinum probably in the RIGHT paratracheal region. Small LEFT hemothorax. No RIGHT effusion or hemopneumothorax. No pneumothorax. No large pulmonary contusion. No change in 3 mm lingular pulmonary nodule on image 65. No enlarged thoracic lymph nodes. LEFT chest wall port cardiac device. Heart is enlarged. No pericardial effusion. No unenhanced evidence of major thoracic aortic injury. LEFT anterior sixth rib fracture. Segmental LEFT seventh rib fracture. LEFT posterior eighth rib fracture. LEFT anterior ninth rib fracture. No large acute soft tissue chest wall hematoma.  Suspected mild contusion along the LEFT lateral anterior chest. 6:50 PM    Ct Head Wo Contrast    Result Date: 4/29/2021  Exam: CT HEAD WO CONTRAST - 4/29/2021 5:05 PM Indication: Fall Comparison: None available. DLP: 853 mGy cm. In order to have a CT radiation dose as low as reasonably achievable, Automated Exposure Control was utilized for adjustment of the mA and/or KV according to patient size. Findings: No evidence of intracranial hemorrhage. No loss of gray-white differentiation to suggest acute infarct. No midline shift or mass effect. Lateral ventricles are normal caliber. No acute orbital finding. Mastoid air cells and visualized paranasal sinuses are clear. No acute osseous findings. Small LEFT supraorbital soft tissue contusion. No acute intracranial findings. Small LEFT supraorbital soft tissue contusion. Signed by Dr Nat Sheikh on 4/29/2021 6:35 PM    Ct Head Wo Contrast    Result Date: 4/26/2021  Exam: CT HEAD WO CONTRAST - 4/26/2021 5:35 PM Indication: Head injury, fall, trauma the LEFT forehead Comparison: None available. DLP: 828 mGy cm. In order to have a CT radiation dose as low as reasonably achievable, Automated Exposure Control was utilized for adjustment of the mA and/or KV according to patient size. Findings: No evidence of intracranial hemorrhage. No loss of gray-white differentiation to suggest acute infarct. No midline shift or mass effect. Lateral ventricles are nondilated. Chronic microvascular ischemic white matter change. Mild global cerebral volume loss. LEFT supraorbital soft tissue contusion/hematoma. No visible evidence of injury to the LEFT globe. Retroconal fat appears maintained. Mastoid air cells are clear. Mild LEFT maxillary sinus mucosal thickening. No evidence of skull fracture. Facial bones will be better assessed on same-day CT facial bone. Impression: 1. No acute intracranial findings. 2.  LEFT supraorbital soft tissue contusion.  Signed by Dr Nat Sheikh on 4/26/2021 6:54 PM    Ct Facial Bones Wo Contrast    Result Date: 4/26/2021  Exam: CT FACIAL BONES WO CONTRAST - 4/26/2021 5:35 PM Indication: LEFT forehead injury, fall Comparison: None available. DLP: 463 mGy cm. In order to have a CT radiation dose as low as reasonably achievable, Automated Exposure Control was utilized for adjustment of the mA and/or KV according to patient size. Findings: Frontal sinuses intact. Maxillary buttresses are intact. Zygomatic arches are maintained. Pterygoid plates are intact. No visible temporal bone fracture. Mandible appears intact. No acute orbital fracture. No nasal bone fracture. No nasal septal fracture. LEFT supraorbital soft tissue contusion/hematoma. No evidence of injury to the LEFT globe. Extraocular muscles appear normal. Retroconal fat is maintained. Impression: 1. No acute facial fracture. 2.  LEFT supraorbital soft tissue contusion/hematoma. Signed by Dr Rakesh Smith on 4/26/2021 6:59 PM    Ct Chest Wo Contrast    Result Date: 4/29/2021  Exam: CT CHEST WO CONTRAST, CT ABDOMEN PELVIS WO CONTRAST - 4/29/2021 6:14 PM Indication: Fall, trauma Comparison: 2/25/2000 DLP: 2117 mGy cm. In order to have a CT radiation dose as low as reasonably achievable, Automated Exposure Control was utilized for adjustment of the mA and/or KV according to patient size. Findings: No depressed fracture. No thoracic spine fracture or malalignment. Moderate multilevel thoracic spine degenerative change. The central airways are clear. Significant decrease in confluent ill-defined soft tissue density in the mediastinum probably in the RIGHT paratracheal region. Small LEFT hemothorax. No RIGHT effusion or hemopneumothorax. No pneumothorax. No large pulmonary contusion. No change in 3 mm lingular pulmonary nodule on image 65. No enlarged thoracic lymph nodes. LEFT chest wall port cardiac device. Heart is enlarged. No pericardial effusion. No unenhanced evidence of major thoracic aortic injury. LEFT anterior sixth rib fracture.  Segmental LEFT seventh rib No fracture. LEFT posterior eighth rib fracture. LEFT anterior ninth rib fracture. No large acute soft tissue chest wall hematoma. Suspected mild contusion along the LEFT lateral anterior chest. Unenhanced liver appears unremarkable. Gallbladder and biliary tree appear normal. Pancreas, spleen, and adrenal glands are unremarkable. Small RIGHT renal low-density lesion, likely cyst. No urolithiasis or hydronephrosis. No focal urinary bladder wall thickening. Prostate radiation seeds. Trace fluid in LEFT paracolic gutter. Normal appendix. No evidence of bowel obstruction or active bowel inflammation. No evidence of mesenteric injury. No free pelvic fluid or pelvic mass. LEFT inguinal fat-containing hernia. Unenhanced abdominal aorta appears unremarkable other than for mild tortuosity and atherosclerotic change. No abdominal or pelvic lymphadenopathy. No major soft tissue contusion/hematoma. Lumbar spine vertebral body heights and alignment are maintained. No acute osseous findings. Impression: 1. Acute LEFT sixth through ninth rib fractures. No pneumothorax. Small LEFT hemothorax. 2.  Mild LEFT chest wall soft tissue contusion. 3.  No acute traumatic finding in the abdomen or pelvis identified. However, there is some trace fluid in the LEFT paracolic gutter which could be related to an occult injury. 4.  Significant decrease in mediastinal infiltrative soft tissue predominantly in the RIGHT paratracheal region. 5.  Enlarged heart. Signed by Dr Alphonso Win on 4/29/2021 8:05 PM    Ct Cervical Spine Wo Contrast    Result Date: 4/29/2021  Exam: CT CERVICAL SPINE WO CONTRAST - 4/29/2021 6:13 PM Indication: Fall, trauma Comparison: 4/26/2021 DLP: 709 mGy cm. In order to have a CT radiation dose as low as reasonably achievable, Automated Exposure Control was utilized for adjustment of the mA and/or KV according to patient size. Findings: Craniocervical relationships are maintained. The odontoid process is intact.  Re[age] y.o. year old male with past medical history of coronary artery disease with prior catheterization 3/30/2020 with intermediate proximal LAD and RCA stenosis, medically managed, ejection fraction 40 to 45%, chronic atrial fibrillation on Xarelto, single lead RV pacemaker, diabetes mellitus, currently in assisted living presenting with recurrent falls which appear mechanical in nature with generalized debility, no reported syncope and no events noted on pacemaker interrogation. 1.  Can discontinue digoxin. Given frequent falls and small left hemothorax, Xarelto currently being held. Will need to weigh benefits of anticoagulation with recurrent falls. Continue Coreg 12.5 mg twice daily. Hold off ACE inhibitor/ARB with renal dysfunction and hyperkalemia. Blood pressures on the lower side. Will reduce Imdur to 30 mg once daily. 2.  We will continue to follow.   Obtain echocardiogram.      Electronically signed by Odilon Drake MD on 4/30/2021 at 11:08 AM

## 2025-01-14 NOTE — TELEPHONE ENCOUNTER
Called the number given by patient. No Daylene Shook is needed. Ref# [id number] per Anthony Vergara. Called [employer] to let them know. It doesn't technically require auth, it is just that the insurance only truly covers the lifting mechanism and chair is still expensive. Per medical

## (undated) DEVICE — APPL DURAPREP IODOPHOR APL 26ML

## (undated) DEVICE — Device: Brand: BALLOON

## (undated) DEVICE — SENSR O2 OXIMAX FNGR A/ 18IN NONSTR

## (undated) DEVICE — THE CHANNEL CLEANING BRUSH IS A NYLON FLEXI BRUSH ATTACHED TO A FLEXIBLE PLASTIC SHEATH DESIGNED TO SAFELY REMOVE DEBRIS FROM FLEXIBLE ENDOSCOPES.

## (undated) DEVICE — TRAP,MUCUS SPECIMEN, 80CC: Brand: MEDLINE

## (undated) DEVICE — HANDPIECE SET WITH HIGH FLOW TIP AND SUCTION TUBE: Brand: INTERPULSE

## (undated) DEVICE — BANDAGE,GAUZE,BULKEE II,4.5"X4.1YD,STRL: Brand: MEDLINE

## (undated) DEVICE — 3M™ WARMING BLANKET, UPPER BODY, 10 PER CASE, 42268: Brand: BAIR HUGGER™

## (undated) DEVICE — SYR LL TP 10ML STRL

## (undated) DEVICE — DRSNG GZ CURAD XEROFORM NONADHS 5X9IN STRL

## (undated) DEVICE — PK TURNOVER RM ADV

## (undated) DEVICE — SINGLE USE BIOPSY VALVE MAJ-210: Brand: SINGLE USE BIOPSY VALVE (STERILE)

## (undated) DEVICE — TBG PRESS EXT

## (undated) DEVICE — SWAB CULT AERO TWIN MD

## (undated) DEVICE — INTENDED FOR TISSUE SEPARATION, AND OTHER PROCEDURES THAT REQUIRE A SHARP SURGICAL BLADE TO PUNCTURE OR CUT.: Brand: BARD-PARKER ® STAINLESS STEEL BLADES

## (undated) DEVICE — ADAPT SWVL FIBROPTIC BRONCH

## (undated) DEVICE — GLV SURG TRIUMPH ORTHO W/ALOE PF LTX 7.5 STRL

## (undated) DEVICE — PK EXTRM 30

## (undated) DEVICE — SINGLE USE SUCTION VALVE MAJ-209: Brand: SINGLE USE SUCTION VALVE (STERILE)

## (undated) DEVICE — GOWN,NON-REINFORCED,SIRUS,SET IN SLV,XL: Brand: MEDLINE

## (undated) DEVICE — TBG SMPL FLTR LINE NASL 02/C02 A/ BX/100

## (undated) DEVICE — CULT ANAERB

## (undated) DEVICE — 4-PORT MANIFOLD: Brand: NEPTUNE 2

## (undated) DEVICE — BNDG ELAS ECON W/CLIP 4IN 5YD LF STRL

## (undated) DEVICE — DISPOSABLE TOURNIQUET CUFF SINGLE BLADDER, SINGLE PORT AND QUICK CONNECT CONNECTOR: Brand: COLOR CUFF

## (undated) DEVICE — NDL HYPO PRECISIONGLIDE REG 22G 1 1/2